# Patient Record
Sex: FEMALE | Race: WHITE | Employment: UNEMPLOYED | URBAN - METROPOLITAN AREA
[De-identification: names, ages, dates, MRNs, and addresses within clinical notes are randomized per-mention and may not be internally consistent; named-entity substitution may affect disease eponyms.]

---

## 2018-07-16 ENCOUNTER — HOSPITAL ENCOUNTER (INPATIENT)
Age: 58
LOS: 9 days | Discharge: HOME | DRG: 881 | End: 2018-07-26
Attending: EMERGENCY MEDICINE | Admitting: PSYCHIATRY & NEUROLOGY
Payer: COMMERCIAL

## 2018-07-16 ENCOUNTER — APPOINTMENT (OUTPATIENT)
Dept: CT IMAGING | Age: 58
DRG: 881 | End: 2018-07-16
Attending: PHYSICIAN ASSISTANT
Payer: COMMERCIAL

## 2018-07-16 DIAGNOSIS — N39.0 URINARY TRACT INFECTION WITHOUT HEMATURIA, SITE UNSPECIFIED: ICD-10-CM

## 2018-07-16 DIAGNOSIS — F22 PARANOIA (HCC): Primary | ICD-10-CM

## 2018-07-16 PROCEDURE — 99285 EMERGENCY DEPT VISIT HI MDM: CPT

## 2018-07-16 PROCEDURE — 84484 ASSAY OF TROPONIN QUANT: CPT | Performed by: EMERGENCY MEDICINE

## 2018-07-16 PROCEDURE — 80307 DRUG TEST PRSMV CHEM ANLYZR: CPT | Performed by: EMERGENCY MEDICINE

## 2018-07-16 PROCEDURE — 85025 COMPLETE CBC W/AUTO DIFF WBC: CPT | Performed by: EMERGENCY MEDICINE

## 2018-07-16 PROCEDURE — 90791 PSYCH DIAGNOSTIC EVALUATION: CPT

## 2018-07-16 PROCEDURE — 80053 COMPREHEN METABOLIC PANEL: CPT | Performed by: EMERGENCY MEDICINE

## 2018-07-16 PROCEDURE — 82550 ASSAY OF CK (CPK): CPT | Performed by: EMERGENCY MEDICINE

## 2018-07-16 PROCEDURE — 36415 COLL VENOUS BLD VENIPUNCTURE: CPT | Performed by: EMERGENCY MEDICINE

## 2018-07-16 NOTE — IP AVS SNAPSHOT
[address] 
[phone] Patient: Brook Scheuermann MRN: [id number] XML:[id number] A check rosita indicates which time of day the medication should be taken. My Medications START taking these medications Instructions Each Dose to Equal  
 Morning Noon Evening Bedtime  
 hydrOXYzine HCl 25 mg tablet Commonly known as:  ATARAX Your last dose was: Today at 6448 Your next dose is:  As needed Take 1 Tab by mouth every six (6) hours as needed (Anxiety) for up to 10 days. Indications: anxiety, Anxiety 25 mg  
    
   
   
   
  
 risperiDONE 1 mg tablet Commonly known as:  RisperDAL Your next dose is: Tonight at bedtime Take 1 Tab by mouth two (2) times a day. Indications: delusion 1 mg  
    
  
   
   
   
  
  
 sertraline 25 mg tablet Commonly known as:  ZOLOFT Your next dose is: This evening Take 3 Tabs by mouth every evening. Indications: major depressive disorder 75 mg  
    
   
   
  
   
  
 traZODone 50 mg tablet Commonly known as:  Petra Au Your next dose is: Tonight at bedtime as needed for sleep Take 1 Tab by mouth nightly as needed for Sleep. Indications: insomnia associated with depression 50 mg CONTINUE taking these medications Instructions Each Dose to Equal  
 Morning Noon Evening Bedtime  
 aspirin 325 mg tablet Commonly known as:  ASPIRIN Your next dose is:  Tomorrow morning Take 325-650 mg by mouth as needed for Pain. 325-650 mg  
    
  
   
   
   
  
 FLONASE 50 mcg/actuation nasal spray Generic drug:  fluticasone Your next dose is: Tonight at bedtime 2 Sprays by Both Nostrils route two (2) times a day. 2 Spray  
    
  
   
   
   
  
  
 levothyroxine 125 mcg tablet Commonly known as:  SYNTHROID Start taking on:  7/27/2018 Your next dose is:  Tomorrow morning before breakfast  
   
 Take 1 Tab by mouth Daily (before breakfast). Indications: hypothyroidism 125 mcg THERA tablet Generic drug:  therapeutic multivitamin Your next dose is:  Tomorrow morning Take 1 Tab by mouth daily. 1 Tab VITAMIN B-12 5,000 mcg Subl Generic drug:  cyanocobalamin (vitamin B-12) Your next dose is:  Tomorrow morning   
   
 5,000 mcg by SubLINGual route daily. 5000 mcg VITAMIN D3 1,000 unit Cap Generic drug:  cholecalciferol Your next dose is:  Tomorrow morning Take 1,000 Units by mouth daily. 1000 Units STOP taking these medications AMBIEN 10 mg tablet Generic drug:  zolpidem BENADRYL ALLERGY 25 mg tablet Generic drug:  diphenhydrAMINE  
   
  
 GREEN TEA EXTRACT PO Where to Get Your Medications Information on where to get these meds will be given to you by the nurse or doctor. ! Ask your nurse or doctor about these medications  
  hydrOXYzine HCl 25 mg tablet  
 levothyroxine 125 mcg tablet  
 risperiDONE 1 mg tablet  
 sertraline 25 mg tablet  
 traZODone 50 mg tablet

## 2018-07-16 NOTE — IP AVS SNAPSHOT
[address] [facility] [address] [place] [address] 
[phone] Patient: Arcadio Kumar MRN: [id number] LDF:[id number] About your hospitalization You were admitted on:  July 17, 2018 You last received care in the:  [facility] You were discharged on:  July 26, 2018 Why you were hospitalized Your primary diagnosis was:  Depression Follow-up Information Follow up With Details Comments Contact Info [address] to  [address] [place] [address] 
[phone] Edwina De La Paz MD Schedule an appointment as soon as possible for a visit  [address] [address] 
[phone] Plan to discharge to [address] on 7/26/2018  Kalin Drake , [address]  
[phone] Discharge Orders None A check rosita indicates which time of day the medication should be taken. My Medications START taking these medications Instructions Each Dose to Equal  
 Morning Noon Evening Bedtime  
 hydrOXYzine HCl 25 mg tablet Commonly known as:  ATARAX Your last dose was: Today at 8093 Your next dose is:  As needed Take 1 Tab by mouth every six (6) hours as needed (Anxiety) for up to 10 days. Indications: anxiety, Anxiety 25 mg  
    
   
   
   
  
 risperiDONE 1 mg tablet Commonly known as:  RisperDAL Your next dose is: Tonight at bedtime Take 1 Tab by mouth two (2) times a day. Indications: delusion 1 mg  
    
  
   
   
   
  
  
 sertraline 25 mg tablet Commonly known as:  ZOLOFT Your next dose is: This evening Take 3 Tabs by mouth every evening. Indications: major depressive disorder 75 mg  
    
   
   
  
   
  
 traZODone 50 mg tablet Commonly known as:  Phil Medina Your next dose is: Tonight at bedtime as needed for sleep Take 1 Tab by mouth nightly as needed for Sleep.  Indications: insomnia associated with depression 50 mg CONTINUE taking these medications Instructions Each Dose to Equal  
 Morning Noon Evening Bedtime  
 aspirin 325 mg tablet Commonly known as:  ASPIRIN Your next dose is:  Tomorrow morning Take 325-650 mg by mouth as needed for Pain. 325-650 mg  
    
  
   
   
   
  
 FLONASE 50 mcg/actuation nasal spray Generic drug:  fluticasone Your next dose is: Tonight at bedtime 2 Sprays by Both Nostrils route two (2) times a day. 2 Spray  
    
  
   
   
   
  
  
 levothyroxine 125 mcg tablet Commonly known as:  SYNTHROID Start taking on:  7/27/2018 Your next dose is:  Tomorrow morning before breakfast  
   
 Take 1 Tab by mouth Daily (before breakfast). Indications: hypothyroidism 125 mcg THERA tablet Generic drug:  therapeutic multivitamin Your next dose is:  Tomorrow morning Take 1 Tab by mouth daily. 1 Tab VITAMIN B-12 5,000 mcg Subl Generic drug:  cyanocobalamin (vitamin B-12) Your next dose is:  Tomorrow morning   
   
 5,000 mcg by SubLINGual route daily. 5000 mcg VITAMIN D3 1,000 unit Cap Generic drug:  cholecalciferol Your next dose is:  Tomorrow morning Take 1,000 Units by mouth daily. 1000 Units STOP taking these medications AMBIEN 10 mg tablet Generic drug:  zolpidem BENADRYL ALLERGY 25 mg tablet Generic drug:  diphenhydrAMINE  
   
  
 GREEN TEA EXTRACT PO Where to Get Your Medications Information on where to get these meds will be given to you by the nurse or doctor. ! Ask your nurse or doctor about these medications  
  hydrOXYzine HCl 25 mg tablet  
 levothyroxine 125 mcg tablet  
 risperiDONE 1 mg tablet  
 sertraline 25 mg tablet  
 traZODone 50 mg tablet Discharge Instructions DISCHARGE SUMMARY Cata Veliz : 1960 MRN: [id number] The patient Zac Arredondo exhibits the ability to control behavior in a less restrictive environment. Patient's level of functioning is improving. No assaultive/destructive behavior has been observed for the past 24 hours. No suicidal/homicidal threat or behavior has been observed for the past 24 hours. There is no evidence of serious medication side effects. Patient has not been in physical or protective restraints for at least the past 24 hours. If weapons involved, how are they secured? No weapons involved Is patient aware of and in agreement with discharge plan? Patient is aware of discharge and is in agreement Arrangements for medication:  Prescriptions filled per derek . Referral for substance abuse treatment ? no Referral for smoking cessation needed ? no 
 
Copy of discharge instructions to  provider?:  Yes, fax to [facility] and to [employer] Arrangements for transportation home:  Erica to transport Keep all follow up appointments as scheduled, continue to take prescribed medications per physician instructions. Mental health crisis number:  315 or your local mental health crisis line number at [phone]  
 
    
Preventing a Relapse of Depression: Care Instructions Your Care Instructions A relapse of depression means your symptoms have come back after you have gotten better. This illness often comes and goes during a lifetime. But there are many things you can do to keep it from coming back. Follow-up care is a key part of your treatment and safety. Be sure to make and go to all appointments, and call your doctor if you are having problems. It's also a good idea to know your test results and keep a list of the medicines you take. What do you need to know? Know your risk of relapse Talk to your doctor to find out if you are at risk of relapse.  Many things can make a person more likely to relapse into depression. These include having a family member with depression, dealing with serious problems in a relationship or a job, having a serious medical condition, or abusing drugs or alcohol. It is important to know your risk and to recognize warning signs of relapse. Once you know these things, you will be better able to keep it from happening to you. Know the warning signs of relapse The two most common signs of relapse are: · Feeling sad or hopeless. · Losing interest in your daily activities. You may have other symptoms, such as: 
· You lose or gain weight. · You sleep too much or not enough. · You feel restless and unable to sit still. · You feel unable to move. · You feel tired all the time. · You feel unworthy or guilty without an obvious reason. · You have problems concentrating, remembering, or making decisions. · You think often about death or suicide. · You feel angry or have panic attacks. How can you care for yourself at home? · Take your medicine as prescribed. Call your doctor if you have any problems with your medicine. Many people take their medicines for at least 6 months after they have recovered. This often helps keep symptoms from coming back. However, if your depression keeps coming back, you may have to take medicine for the rest of your life. · Continue counseling even after you have stopped taking medicine. · Eat healthy foods. Include fruits, vegetables, beans, and whole grains in your diet each day. · Get at least 30 minutes of exercise on most days of the week. Walking is a good choice. You also may want to do other activities, such as running, swimming, cycling, or playing tennis or team sports. · See your doctor right away if you have new symptoms or feel that your depression is coming back. · Keep a regular sleep schedule. Try for 8 hours of sleep a night. · Avoid alcohol and illegal drugs. · Keep the numbers for these national suicide hotlines: 9-079-453-TALK (5-149.836.5899) and 3-561-MCNBCVX (2-951.959.4334). If you or someone you know talks about suicide or feeling hopeless, get help right away. When should you call for help? Call 911 anytime you think you may need emergency care. For example, call if: 
   · You are thinking about suicide or are threatening suicide.  
   · You feel you cannot stop from hurting yourself or someone else.  
   · You hear or see things that aren't real.  
   · You think or speak in a bizarre way that is not like your usual behavior.  
 Call your doctor now or seek immediate medical care if: 
   · You are drinking a lot of alcohol or using illegal drugs.    · You are talking or writing about death.  
 Watch closely for changes in your health, and be sure to contact your doctor if: 
   · You find it hard or it's getting harder to deal with school, a job, family, or friends.  
Declan Durand · You think your treatment is not helping or you are not getting better.  
   · Your symptoms get worse or you get new symptoms.  
   · You have any problems with your antidepressant medicines, such as side effects, or you are thinking about stopping your medicine.  
   · You are having manic behavior, such as having very high energy, needing less sleep than normal, or showing risky behavior such as spending money you don't have or abusing others verbally or physically. Where can you learn more? Go to http://karina-kya.info/. Enter H230 in the search box to learn more about \"Preventing a Relapse of Depression: Care Instructions. \" Current as of: December 7, 2017 Content Version: 11.7 © 1112-6890 [employer]. Care instructions adapted under license by [employer] (which disclaims liability or warranty for this information).  If you have questions about a medical condition or this instruction, always ask your healthcare professional. Scottie Jesus[employer] disclaims any warranty or liability for your use of this information. 
  
   
 
  
  
  
Typemock Announcement We are excited to announce that we are making your provider's discharge notes available to you in Typemock. You will see these notes when they are completed and signed by the physician that discharged you from your recent hospital stay. If you have any questions or concerns about any information you see in Typemock, please call the Health Information Department where you were seen or reach out to your Primary Care Provider for more information about your plan of care. Introducing [facility] HE[facility]! Peñarian Paiz introduces Typemock patient portal. Now you can access parts of your medical record, email your doctor's office, and request medication refills online. 1. In your internet browser, go to [email or web address] 2. Click on the First Time User? Click Here link in the Sign In box. You will see the New Member Sign Up page. 3. Enter your Typemock Access Code exactly as it appears below. You will not need to use this code after youve completed the sign-up process. If you do not sign up before the expiration date, you must request a new code. · Typemock Access Code: [email or web address] Expires: 10/14/2018 10:02 PM 
 
4. Enter the last four digits of your Social Security Number (xxxx) and Date of Birth (mm/dd/yyyy) as indicated and click Submit. You will be taken to the next sign-up page. 5. Create a Typemock ID. This will be your Typemock login ID and cannot be changed, so think of one that is secure and easy to remember. 6. Create a Typemock password. You can change your password at any time. 7. Enter your Password Reset Question and Answer. This can be used at a later time if you forget your password. 8. Enter your e-mail address. You will receive e-mail notification when new information is available in [address] 9. Click Sign Up. You can now view and download portions of your medical record. 10. Click the Download Summary menu link to download a portable copy of your medical information. If you have questions, please visit the Frequently Asked Questions section of the prolliet website. Remember, Talkbits is NOT to be used for urgent needs. For medical emergencies, dial 911. Now available from your iPhone and Android! Introducing Geraldo Feliciano As a [facility] patient, I wanted to make you aware of our electronic visit tool called Geraldo Feliciano. QR Artist [facility] 24/7 allows you to connect within minutes with a medical provider 24 hours a day, seven days a week via a mobile device or tablet or logging into a secure website from your computer. You can access Geraldo Feliciano from anywhere in the [place]. A virtual visit might be right for you when you have a simple condition and feel like you just dont want to get out of bed, or cant get away from work for an appointment, when your regular [facility] provider is not available (evenings, weekends or holidays), or when youre out of town and need minor care. Electronic visits cost only $49 and if the [facility] 24/7 provider determines a prescription is needed to treat your condition, one can be electronically transmitted to a nearby pharmacy*. Please take a moment to enroll today if you have not already done so. The enrollment process is free and takes just a few minutes. To enroll, please download the Borderfree 24/7 phi to your tablet or phone, or visit [email or web address] to enroll on your computer. And, as an [address] [place] [address] patient with a TextPayMe account, the results of your visits will be scanned into your electronic medical record and your primary care provider will be able to view the scanned results.    
We urge you to continue to see your regular [facility] provider for your ongoing medical care. And while your primary care provider may not be the one available when you seek a Geraldo Yorkdavisfin virtual visit, the peace of mind you get from getting a real diagnosis real time can be priceless. For more information on Geraldo Yorkdavisfin, view our Frequently Asked Questions (FAQs) at [email or web address]. Sincerely, 
 
Valentino Milks, MD 
Chief Medical Officer Mike Pedroza *:  certain medications cannot be prescribed via Geraldo Yorkmaggie Providers Seen During Your Hospitalization Provider Specialty Primary office phone Daniel Schwartz MD Emergency Medicine [phone] Jesi Vieira MD Psychiatry [phone] Mini Wilson MD Psychiatry [phone] Your Primary Care Physician (PCP) Primary Care Physician Office Phone Office Fax Nicko Tompkins [phone] You are allergic to the following Allergen Reactions Other Food Other (comments) \"Bad chest pain\" with walnuts. Coke - asthma/stuffy head. Fish sticks causes an asthma attack. Astepro (Azelastine) Other (comments) Violent headaches. Bactrim (Sulfamethoprim) Other (comments) Difficulty breathing, chills, fever. Banana Other (comments) Diffculty breathing, wheezing, asthma attack. Coconut Other (comments) Difficulty breathing, asthma attack, SOB. Cortisone Hives Difficulty breathing. Peanut Other (comments) Wheezing, asthma attack, SOB. Prednisone Hives Difficulty breathing, kidney stones. Recent Documentation Height Weight Breastfeeding? BMI OB Status Smoking Status 1.575 m 72.6 kg No 29.26 kg/m2 Menopause Never Smoker Emergency Contacts Name Discharge Info Relation Home Work Mobile Imani Lacey DISCHARGE CAREGIVER [3] Parent [1] [phone] Patient Belongings The following personal items are in your possession at time of discharge: Dental Appliances: None  Visual Aid: Glasses      Home Medications: None   Jewelry: None  Clothing: Pants, Shirt, Undergarments (pants,shrt,2undies)    Other Valuables: None  Personal Items Sent to Safe: None Please provide this summary of care documentation to your next provider. Signatures-by signing, you are acknowledging that this After Visit Summary has been reviewed with you and you have received a copy. Patient Signature:  ____________________________________________________________ Date:  ____________________________________________________________  
  
[facility] Provider Signature:  ____________________________________________________________ Date:  ____________________________________________________________

## 2018-07-17 ENCOUNTER — APPOINTMENT (OUTPATIENT)
Dept: CT IMAGING | Age: 58
DRG: 881 | End: 2018-07-17
Attending: PHYSICIAN ASSISTANT
Payer: COMMERCIAL

## 2018-07-17 PROBLEM — F32.A DEPRESSION: Status: ACTIVE | Noted: 2018-07-17

## 2018-07-17 LAB
ALBUMIN SERPL-MCNC: 4 G/DL (ref 3.5–5)
ALBUMIN/GLOB SERPL: 1 {RATIO} (ref 1.1–2.2)
ALP SERPL-CCNC: 77 U/L (ref 45–117)
ALT SERPL-CCNC: 24 U/L (ref 12–78)
AMPHET UR QL SCN: NEGATIVE
ANION GAP SERPL CALC-SCNC: 10 MMOL/L (ref 5–15)
APAP SERPL-MCNC: <2 UG/ML (ref 10–30)
APPEARANCE UR: CLEAR
AST SERPL-CCNC: 13 U/L (ref 15–37)
ATRIAL RATE: 71 BPM
BACTERIA URNS QL MICRO: NEGATIVE /HPF
BARBITURATES UR QL SCN: NEGATIVE
BASOPHILS # BLD: 0 K/UL (ref 0–0.1)
BASOPHILS NFR BLD: 1 % (ref 0–1)
BENZODIAZ UR QL: NEGATIVE
BILIRUB SERPL-MCNC: 0.4 MG/DL (ref 0.2–1)
BILIRUB UR QL: NEGATIVE
BUN SERPL-MCNC: 13 MG/DL (ref 6–20)
BUN/CREAT SERPL: 12 (ref 12–20)
CALCIUM SERPL-MCNC: 8.7 MG/DL (ref 8.5–10.1)
CALCULATED P AXIS, ECG09: 58 DEGREES
CALCULATED R AXIS, ECG10: -24 DEGREES
CALCULATED T AXIS, ECG11: 39 DEGREES
CANNABINOIDS UR QL SCN: NEGATIVE
CHLORIDE SERPL-SCNC: 107 MMOL/L (ref 97–108)
CK SERPL-CCNC: 108 U/L (ref 26–192)
CO2 SERPL-SCNC: 23 MMOL/L (ref 21–32)
COCAINE UR QL SCN: NEGATIVE
COLOR UR: ABNORMAL
CREAT SERPL-MCNC: 1.06 MG/DL (ref 0.55–1.02)
DIAGNOSIS, 93000: NORMAL
DIFFERENTIAL METHOD BLD: NORMAL
DRUG SCRN COMMENT,DRGCM: NORMAL
EOSINOPHIL # BLD: 0.2 K/UL (ref 0–0.4)
EOSINOPHIL NFR BLD: 3 % (ref 0–7)
EPITH CASTS URNS QL MICRO: ABNORMAL /LPF
ERYTHROCYTE [DISTWIDTH] IN BLOOD BY AUTOMATED COUNT: 12.8 % (ref 11.5–14.5)
ETHANOL SERPL-MCNC: <10 MG/DL
GLOBULIN SER CALC-MCNC: 4.2 G/DL (ref 2–4)
GLUCOSE SERPL-MCNC: 144 MG/DL (ref 65–100)
GLUCOSE UR STRIP.AUTO-MCNC: NEGATIVE MG/DL
HCT VFR BLD AUTO: 40.2 % (ref 35–47)
HGB BLD-MCNC: 13 G/DL (ref 11.5–16)
HGB UR QL STRIP: NEGATIVE
HYALINE CASTS URNS QL MICRO: ABNORMAL /LPF (ref 0–5)
IMM GRANULOCYTES # BLD: 0 K/UL (ref 0–0.04)
IMM GRANULOCYTES NFR BLD AUTO: 0 % (ref 0–0.5)
KETONES UR QL STRIP.AUTO: NEGATIVE MG/DL
LEUKOCYTE ESTERASE UR QL STRIP.AUTO: ABNORMAL
LYMPHOCYTES # BLD: 2.2 K/UL (ref 0.8–3.5)
LYMPHOCYTES NFR BLD: 26 % (ref 12–49)
MCH RBC QN AUTO: 27.5 PG (ref 26–34)
MCHC RBC AUTO-ENTMCNC: 32.3 G/DL (ref 30–36.5)
MCV RBC AUTO: 85.2 FL (ref 80–99)
METHADONE UR QL: NEGATIVE
MONOCYTES # BLD: 0.5 K/UL (ref 0–1)
MONOCYTES NFR BLD: 6 % (ref 5–13)
NEUTS SEG # BLD: 5.5 K/UL (ref 1.8–8)
NEUTS SEG NFR BLD: 65 % (ref 32–75)
NITRITE UR QL STRIP.AUTO: NEGATIVE
NRBC # BLD: 0 K/UL (ref 0–0.01)
NRBC BLD-RTO: 0 PER 100 WBC
OPIATES UR QL: NEGATIVE
P-R INTERVAL, ECG05: 158 MS
PCP UR QL: NEGATIVE
PH UR STRIP: 5.5 [PH] (ref 5–8)
PLATELET # BLD AUTO: 313 K/UL (ref 150–400)
PMV BLD AUTO: 10.4 FL (ref 8.9–12.9)
POTASSIUM SERPL-SCNC: 3.6 MMOL/L (ref 3.5–5.1)
PROT SERPL-MCNC: 8.2 G/DL (ref 6.4–8.2)
PROT UR STRIP-MCNC: NEGATIVE MG/DL
Q-T INTERVAL, ECG07: 406 MS
QRS DURATION, ECG06: 88 MS
QTC CALCULATION (BEZET), ECG08: 441 MS
RBC # BLD AUTO: 4.72 M/UL (ref 3.8–5.2)
RBC #/AREA URNS HPF: ABNORMAL /HPF (ref 0–5)
SALICYLATES SERPL-MCNC: <1.7 MG/DL (ref 2.8–20)
SODIUM SERPL-SCNC: 140 MMOL/L (ref 136–145)
SP GR UR REFRACTOMETRY: 1.01 (ref 1–1.03)
TROPONIN I SERPL-MCNC: <0.05 NG/ML
TSH SERPL DL<=0.05 MIU/L-ACNC: 0.05 UIU/ML (ref 0.36–3.74)
UR CULT HOLD, URHOLD: NORMAL
UROBILINOGEN UR QL STRIP.AUTO: 0.2 EU/DL (ref 0.2–1)
VENTRICULAR RATE, ECG03: 71 BPM
WBC # BLD AUTO: 8.4 K/UL (ref 3.6–11)
WBC URNS QL MICRO: ABNORMAL /HPF (ref 0–4)

## 2018-07-17 PROCEDURE — 81001 URINALYSIS AUTO W/SCOPE: CPT | Performed by: PHYSICIAN ASSISTANT

## 2018-07-17 PROCEDURE — 74011250637 HC RX REV CODE- 250/637: Performed by: PHYSICIAN ASSISTANT

## 2018-07-17 PROCEDURE — 84443 ASSAY THYROID STIM HORMONE: CPT | Performed by: PSYCHIATRY & NEUROLOGY

## 2018-07-17 PROCEDURE — 80307 DRUG TEST PRSMV CHEM ANLYZR: CPT | Performed by: PHYSICIAN ASSISTANT

## 2018-07-17 PROCEDURE — 93005 ELECTROCARDIOGRAM TRACING: CPT

## 2018-07-17 PROCEDURE — 87086 URINE CULTURE/COLONY COUNT: CPT | Performed by: PHYSICIAN ASSISTANT

## 2018-07-17 PROCEDURE — 80307 DRUG TEST PRSMV CHEM ANLYZR: CPT | Performed by: EMERGENCY MEDICINE

## 2018-07-17 PROCEDURE — 70450 CT HEAD/BRAIN W/O DYE: CPT

## 2018-07-17 PROCEDURE — 93005 ELECTROCARDIOGRAM TRACING: CPT | Performed by: EMERGENCY MEDICINE

## 2018-07-17 PROCEDURE — 65220000003 HC RM SEMIPRIVATE PSYCH

## 2018-07-17 PROCEDURE — 74011250637 HC RX REV CODE- 250/637: Performed by: PSYCHIATRY & NEUROLOGY

## 2018-07-17 RX ORDER — IBUPROFEN 200 MG
1 TABLET ORAL
Status: DISCONTINUED | OUTPATIENT
Start: 2018-07-17 | End: 2018-07-26 | Stop reason: HOSPADM

## 2018-07-17 RX ORDER — LEVOTHYROXINE SODIUM 125 UG/1
125 TABLET ORAL
COMMUNITY
End: 2018-07-26

## 2018-07-17 RX ORDER — BENZTROPINE MESYLATE 2 MG/1
2 TABLET ORAL
Status: DISCONTINUED | OUTPATIENT
Start: 2018-07-17 | End: 2018-07-26 | Stop reason: HOSPADM

## 2018-07-17 RX ORDER — BENZTROPINE MESYLATE 1 MG/ML
2 INJECTION INTRAMUSCULAR; INTRAVENOUS
Status: DISCONTINUED | OUTPATIENT
Start: 2018-07-17 | End: 2018-07-26 | Stop reason: HOSPADM

## 2018-07-17 RX ORDER — ZOLPIDEM TARTRATE 10 MG/1
10 TABLET ORAL
Status: DISCONTINUED | OUTPATIENT
Start: 2018-07-17 | End: 2018-07-25

## 2018-07-17 RX ORDER — OLANZAPINE 5 MG/1
5 TABLET ORAL
Status: DISCONTINUED | OUTPATIENT
Start: 2018-07-17 | End: 2018-07-26 | Stop reason: HOSPADM

## 2018-07-17 RX ORDER — LORAZEPAM 2 MG/ML
2 INJECTION INTRAMUSCULAR
Status: DISCONTINUED | OUTPATIENT
Start: 2018-07-17 | End: 2018-07-23

## 2018-07-17 RX ORDER — LORAZEPAM 1 MG/1
1 TABLET ORAL
Status: DISCONTINUED | OUTPATIENT
Start: 2018-07-17 | End: 2018-07-23

## 2018-07-17 RX ORDER — ZOLPIDEM TARTRATE 10 MG/1
10 TABLET ORAL
COMMUNITY
End: 2018-07-26

## 2018-07-17 RX ORDER — CEPHALEXIN 500 MG/1
500 CAPSULE ORAL
Status: COMPLETED | OUTPATIENT
Start: 2018-07-17 | End: 2018-07-17

## 2018-07-17 RX ORDER — ADHESIVE BANDAGE
30 BANDAGE TOPICAL DAILY PRN
Status: DISCONTINUED | OUTPATIENT
Start: 2018-07-17 | End: 2018-07-26 | Stop reason: HOSPADM

## 2018-07-17 RX ORDER — LORAZEPAM 1 MG/1
1 TABLET ORAL
Status: COMPLETED | OUTPATIENT
Start: 2018-07-17 | End: 2018-07-17

## 2018-07-17 RX ORDER — ACETAMINOPHEN 325 MG/1
650 TABLET ORAL
Status: DISCONTINUED | OUTPATIENT
Start: 2018-07-17 | End: 2018-07-26 | Stop reason: HOSPADM

## 2018-07-17 RX ORDER — IBUPROFEN 400 MG/1
400 TABLET ORAL
Status: DISCONTINUED | OUTPATIENT
Start: 2018-07-17 | End: 2018-07-26 | Stop reason: HOSPADM

## 2018-07-17 RX ADMIN — LORAZEPAM 1 MG: 1 TABLET ORAL at 18:32

## 2018-07-17 RX ADMIN — LORAZEPAM 1 MG: 1 TABLET ORAL at 13:23

## 2018-07-17 RX ADMIN — CEPHALEXIN 500 MG: 500 CAPSULE ORAL at 02:49

## 2018-07-17 RX ADMIN — LORAZEPAM 1 MG: 1 TABLET ORAL at 01:36

## 2018-07-17 NOTE — BH NOTES
PRN Medication Documentation    Specific patient behavior that led to need for PRN medication: pacing, expressing concern over TDO status, tangential in conversation and easily overwhelmed  Staff interventions attempted prior to PRN being given: unit orientation, listening presence, general unit guidelines, explanation of TDO process  PRN medication given: ativan 1mg po prn at 1323  Patient response/effectiveness of PRN medication: 45 minutes post administration pt is more organized and overtly less anxious

## 2018-07-17 NOTE — BH NOTES
[address] on [facility].  Medications filled in last 6 months: Ambien 10 mg 1 at bedtime prn, levothyroxine 125 micrograms 1 PO before breakfast

## 2018-07-17 NOTE — ED NOTES
Pt is paranoid and delusional. Pt voiced feeling suicidal, but does not have a plan top carry. \" I have to get these thoughts away. You can put me in a coma or whatever. \" Pt close to nurses's station for monitoring. 0230: Pt pulled IV out.

## 2018-07-17 NOTE — BH NOTES
PSYCHOSOCIAL ASSESSMENT  :Patient identifying info:  Jennifer Lozada is a 59 y.o., female admitted 2018 11:22 PM     Presenting problem and precipitating factors: Patient was admitted on a Desha TDO due to bizarre behavior. She stated she was suicidal in the ER , but hue admitted she denied suicidal ideations. Mental status assessment:alert , oriented x's 3 - she was pleasant and cooperative , insight and judgement are impaired - she denies suicidal ideations. Current psychiatric providers and contact info: no current     Previous psychiatric services/providers and response to treatment:     Family history of mental illness :    Substance abuse history:    Social History   Substance Use Topics    Smoking status: Never Smoker    Smokeless tobacco: Never Used    Alcohol use No       Family constellation:     Is significant other involved?        Describe support system:     Describe living arrangements and home environment:lives at home with her father   Health issues:   Hospital Problems  Never Reviewed          Codes Class Noted POA    Depression ICD-10-CM: F32.9  ICD-9-CM: 347  2018 Unknown              Trauma history:recent death of her mother      Legal issues: no    History of  service: no    Financial status: not employed     Christianity/cultural factors: none noted    Education/work history: high school education    Have you been licensed as a maximilian care professional (current or ): no  Leisure and recreation preferences: unknown   Describe coping skills:ineffectual     Ander Swanson  2018

## 2018-07-17 NOTE — ED TRIAGE NOTES
Patient arrived via EMS from the side the side of the road where police were following her in the car. Patient was pulled over and reported she didn't see or hear the police trying to pull her over. Patient arrived despondent, and not willing to talk to RN. EMS stated patient reported chest pain, but patient denies. Patient reports suicidal ideations, but no homicidal ideations. Patient has recent deaths of loved ones. Patient appears paranoid in triage, but does not report hallucinations. RN has concern whether patient can contract safety.

## 2018-07-17 NOTE — BSMART NOTE
[facility] informed  call back in a few hours to check bed status as they referral to review. [facility] accepted patient information for review.

## 2018-07-17 NOTE — ED NOTES
Verbal report given to [facility] AND [facility], oncoming nurse. nurse. Report included SBAR, Kardex, MAR, recent results and pt status. Pt resting comfortably in bed.

## 2018-07-17 NOTE — ED NOTES
0605Patient belongings secured at nurses station, changed into hospital gown and nonskid footwear. Patients pockets checked for belongings. Patient in custody of [employer] now. Curtain of room open, visible from nurses station, [employer] outside of room. Patient c/o chest pain, discussed with Dr. Jay Leiva given for EKG. 1327: EKG completed. Patient SRx2 up, remains in [employer] custody, right arm handcuffed to SR. HOB elevated. Beverage offered to patient and provided.

## 2018-07-17 NOTE — ED NOTES
TRANSFER - OUT REPORT:    Verbal report given to Warren Yadav RN(name) on Susana Ponce  being transferred to [facility] acute(unit) for routine progression of care       Report consisted of patients Situation, Background, Assessment and   Recommendations(SBAR). Information from the following report(s) SBAR, ED Summary, [facility] and Recent Results was reviewed with the receiving nurse. Opportunity for questions and clarification was provided.       Patient transported with:   Registered Nurse,

## 2018-07-17 NOTE — PROGRESS NOTES
Problem: Depressed Mood (Adult/Pediatric)  Goal: *STG: Attends activities and groups  Outcome: Progressing Towards Goal  Patient encouraged to attend groups/activities in unit common areas and not be isolative to room  Goal: *STG: Remains safe in hospital  Outcome: Progressing Towards Goal  Environmental safety checks ; q15 min safety checks

## 2018-07-17 NOTE — ED NOTES
ED EKG interpretation:  Rhythm: normal sinus rhythm; and regular . Rate (approx.): 71; Axis: left axis deviation; P wave: normal; QRS interval: normal ; ST/T wave: non-specific changes; This EKG was interpreted by Neftali Hung MD,ED Provider.

## 2018-07-17 NOTE — ED NOTES
Pt is restless and agitated. Pt attempting to leave hospital. Uncooperative with staff. Officer attempted to talk to patient when pt reached in her purse and took out maze spray to point at officer. Pt placed in handcuffs to bed rails for safety. Pt placed under ECO. Will continue to monitor.

## 2018-07-17 NOTE — BSMART NOTE
Comprehensive Assessment Form Part 1      Section I - Disposition    Axis I - Depressive Disorder NOS                Paranoid   Axis II - Deferred  Axis III -   Past Medical History Date Comments      Thyroid disease [E07.9]       Unspecified adverse effect of anesthesia [T41.45XA]  nasal growth and misalignment and cannot breath through nose     PUD (peptic ulcer disease) [K27.9]       Chronic kidney disease [N18.9]  kidney stones x 2-3 times     Asthma [J45.909] 1967 hospitalized for asthma attack x 2     Ill-defined condition [R69]  nasal blockage from growth and misalignment - cannot breath out of nose - surgery in the future/cleared for colonoscopy 2014 - will bring in letter     Other ill-defined conditions(799.89) Coco Heymann  blood in stool     Other ill-defined conditions(799.89) [R69]         Axis IV - Grief and Loss for family; mother  in April  [place] V -       The Medical Doctor to Psychiatrist conference was not completed. The Medical Doctor is in agreement with Psychiatrist disposition because of (reason) patient is a voluntary admission. The plan is admit to [facility]. The on-call Psychiatrist consulted was Dr. Víctor Mckinnon. The admitting Psychiatrist will be Dr. Víctor Mckinnon. The admitting Diagnosis is Depressive Disorder. The Payor source is [employer]. The name of the representative was . This was approved for  days. The authorization number is . Section II - Integrated Summary  Summary:  Patient is 59year old female reporting to ED,Patient arrived via EMS from the side the side of the road where police were following her in the car. Patient was pulled over and reported she didn't see or hear the police trying to pull her over. Patient arrived despondent, and not willing to talk to RN. EMS stated patient reported chest pain, but patient denies. Patient reports suicidal ideations, but no homicidal ideations. Patient has recent deaths of loved ones.   Patient appears paranoid in triage, but does not report hallucinations. RN has concern whether patient can contract safety. At bedside, patient reported having suicidal thoughts, as reported she has thought about several ways to do it and stated to  \"Im not going to go into all that\". Patient denied homicidal thoughts and defined hallucinations. Patient reported feeling like she was going in the [place] of the abst\" Patient denied hospitalizations or nay mental health connections. Patient presents with paranoid thoughts, patient continued to shut curtain, door asking  to whisper and come closer. Patient reported that her mother  in 2018, pauses when she discusses death of mother constantly. Patient presents random scenarios, about father knowing whats going on, about wanting to leave, about things being done that will not help her. Patient acknowledges that she needs some help. Patient reported that she was holding her when her eye were open. As reported she took care of her mother while sick. Patient randomly stated 2-3 times well you might as well just hit me over the head of do something to me because it is not going to matter. Patient reported over past few weeks she has been forgetting things. Patient reported that she thinks things, patient also reported poor sleeping habits as reported she stays up all the time, patient does not eat as she should. The patienthas demonstrated mental capacity to provide informed consent. The information is given by the patient. The Chief Complaint is suicidal thoughts, paranoid. The Precipitant Factors are loss of mother, other unknown stressors. Previous Hospitalizations: no  The patient has not previously been in restraints. Current Psychiatrist and/or  is none. Lethality Assessment:    The potential for suicide noted by the following: ideation . The potential for homicide is not noted.   The patient has not been a perpetrator of sexual or physical abuse. There are not pending charges. The patient is not felt to be at risk for self harm or harm to others. The attending nurse was advised not noted. Section III - Psychosocial  The patient's overall mood and attitude is paranoid. Feelings of helplessness and hopelessness are not observed. Generalized anxiety is not observed. Panic is not observed. Phobias are not observed. Obsessive compulsive tendencies are not observed. Section IV - Mental Status Exam  The patient's appearance shows no evidence of impairment. The patient's behavior is guarded and shows poor eye contact. The patient is oriented to time, place, person and situation. The patient's speech shows no evidence of impairment. The patient's mood is depressed and is anxious. The range of affect is flat. The patient's thought content demonstrates paranoia. The thought process shows no evidence of impairment. The patient's perception shows no evidence of impairment. The patient's memory shows no evidence of impairment. The patient's appetite shows no evidence of impairment. The patient's sleep has evidence of hypersomnia. The patient's insight shows no evidence of impairment. The patient's judgement shows no evidence of impairment. Section V - Substance Abuse  The patient is not using substances. The patient is using not noted. The patient has experienced the following withdrawal symptoms: N/A. Section VI - Living Arrangements  The patient is single. The patient lives father. The patient has no children. The patient does plan to return home upon discharge. The patient does not have legal issues pending. The patient's source of income comes from family. Episcopalian and cultural practices have not been voiced at this time. The patient's greatest support comes from family  and this person will not be involved with the treatment.     The patient has not been in an event described as horrible or outside the realm of ordinary life experience either currently or in the past.  The patient has not been a victim of sexual/physical abuse. Section VII - Other Areas of Clinical Concern  The highest grade achieved is unknown with the overall quality of school experience being described as unknown. The patient is currently unemployed and speaks [place] as a primary language. The patient has no communication impairments affecting communication. The patient's preference for learning can be described as: can read and write adequately.   The patient's hearing is normal.  The patient's vision is normal.      Finesse Veronica

## 2018-07-17 NOTE — ED PROVIDER NOTES
HPI Comments: 60 yo F with hx of thyroid dx, PUD, kidney stone, and low BP here for mental health evaluation. Pt came with EMS after police pulled patient over and reported she didn't see or hear them attempting to pull her over. Pt states recent death of her mother and has been \"taking it hard\". Admits to SI; denies plan. Pt also rambling about \"everyone acting weird\" and appears paranoid. Denies fever, CP, SOB, abd pain, flank pain, urinary symptoms. Non smoker. Patient is a 59 y.o. female presenting with mental health disorder. The history is provided by the patient. Mental Health Problem    This is a new problem. Past Medical History:   Diagnosis Date    Asthma 1967    hospitalized for asthma attack x 2    Chronic kidney disease     kidney stones x 2-3 times    Ill-defined condition     nasal blockage from growth and misalignment - cannot breath out of nose - surgery in the future/cleared for colonoscopy 7/31/2014 - will bring in letter    Other ill-defined conditions(799.89)     blood in stool    Other ill-defined conditions(799.89)     hx low BP - 90's/60's-70's    PUD (peptic ulcer disease)     Thyroid disease     Unspecified adverse effect of anesthesia     nasal growth and misalignment and cannot breath through nose       Past Surgical History:   Procedure Laterality Date    HX HEENT      T & A    HX HEENT      turbinate surgery         Family History:   Problem Relation Age of Onset    Stroke Mother     Other Mother      erosion of esophagus    Cancer Mother      melanoma/squamous    Heart Surgery Father      x2    Delayed Awakening Father     Pacemaker Father     Other Father      carotid endartarectomy/polio    Cataract Father        Social History     Social History    Marital status: SINGLE     Spouse name: N/A    Number of children: N/A    Years of education: N/A     Occupational History    Not on file.      Social History Main Topics    Smoking status: Never Smoker    Smokeless tobacco: Never Used    Alcohol use No    Drug use: Not on file    Sexual activity: Not on file     Other Topics Concern    Not on file     Social History Narrative         ALLERGIES: Other food; Astepro [azelastine]; Bactrim [sulfamethoprim]; Banana; Coconut; Cortisone; Peanut; and Prednisone    Review of Systems   Constitutional: Negative. HENT: Negative for ear discharge. Eyes: Negative for discharge. Respiratory: Negative for cough, chest tightness and shortness of breath. Cardiovascular: Negative for chest pain and leg swelling. Gastrointestinal: Negative for abdominal distention and abdominal pain. Genitourinary: Negative for difficulty urinating, dysuria, flank pain, frequency and hematuria. Musculoskeletal: Negative for back pain, gait problem and joint swelling. Skin: Negative for color change and pallor. Neurological: Negative for dizziness, syncope and headaches. Psychiatric/Behavioral: Positive for behavioral problems and suicidal ideas. The patient is nervous/anxious and is hyperactive. Vitals:    07/16/18 2201 07/17/18 0313   BP: 165/77 132/89   Pulse: 71 63   Resp: 15 17   Temp: 98.2 °F (36.8 °C) 98.1 °F (36.7 °C)   SpO2: 98% 97%   Weight: 72.6 kg (160 lb)    Height: 5' 2\" (1.575 m)             Physical Exam   Constitutional: She appears well-developed and well-nourished. No distress. HENT:   Head: Normocephalic and atraumatic. Right Ear: External ear normal.   Left Ear: External ear normal.   Nose: Nose normal.   Mouth/Throat: Oropharynx is clear and moist.   Eyes: Conjunctivae and EOM are normal. Pupils are equal, round, and reactive to light. Right eye exhibits no discharge. Left eye exhibits no discharge. Neck: Normal range of motion. Neck supple. Cardiovascular: Normal rate, regular rhythm, normal heart sounds and intact distal pulses. Pulmonary/Chest: Effort normal and breath sounds normal.   Abdominal: Soft.  Bowel sounds are normal. She exhibits no distension. There is no tenderness. There is no rebound and no guarding. Musculoskeletal: Normal range of motion. She exhibits no edema or tenderness. Neurological: She is alert. No cranial nerve deficit. Coordination normal.   Skin: Skin is warm and dry. No rash noted. Psychiatric: Judgment normal. Her mood appears anxious. Her speech is rapid and/or pressured. Thought content is paranoid. She expresses suicidal ideation. Pt rambling about being \"men are dogs\" and people \"acting weird\"   Nursing note and vitals reviewed. MDM  Number of Diagnoses or Management Options  Paranoia ([facility] [address]):   Urinary tract infection without hematuria, site unspecified:      Amount and/or Complexity of Data Reviewed  Clinical lab tests: ordered and reviewed  Tests in the radiology section of CPT®: ordered and reviewed  Obtain history from someone other than the patient: yes  Discuss the patient with other providers: yes  Independent visualization of images, tracings, or specimens: yes          ED Course       Procedures    [facility] in to see pt; pt to be admitted to psych. JONAH Obregon    Pt medically cleared. JONAH Obregon    Patient has been reassessed. Ambulating in room. JONAH Obregon     Pt to be admitted; awaiting placement; signed out to Dr Bryan Torres at change of shift.  JONAH Obregon

## 2018-07-17 NOTE — BH NOTES
PRN Medication Documentation    Specific patient behavior that led to need for PRN medication: patient complaining of escalating anxiety; pacing outside nursing station window  Staff interventions attempted prior to PRN being given: redirection, mindfulness  PRN medication given: 1 mg ativan PO  Patient response/effectiveness of PRN medication: will continue to assess

## 2018-07-17 NOTE — PROGRESS NOTES
TRANSFER - IN REPORT:    Verbal report received from [facility] (name) on Blanchie Schwab  being received from ED for routine progression of care      Report consisted of patients Situation, Background, Assessment and   Recommendations(SBAR). Information from the following report(s) SBAR, Kardex, ED Summary and MAR was reviewed with the receiving nurse. Opportunity for questions and clarification was provided. Assessment completed upon patients arrival to unit and care assumed.

## 2018-07-17 NOTE — BSMART NOTE
attempted to secure bed for patient at the following places and as reported they do not have any beds: [facility], [place], [place], [place], [place], [facility], [place], , [facility], [facility], [place].

## 2018-07-17 NOTE — BSMART NOTE
Patient being placed ECO at this time per [facility] as she attempted to [facility]. Emma Mckeon from [address] [place] [address] will come evaluate patient.

## 2018-07-17 NOTE — BH NOTES
Admission Note    Admitting Diagnosis: depressive D/O    Admission Status: Erica CORDOVAO    Contracts for Safety: yes    Complaints of Suicidal or Homicidal Ideations: no    Symptoms Present on Admission:  Pt. Received from ER per w/c   Anxious  Disorganized   Manic  Difficulty following directions   Paranoid and tangential   Continues to state someone is after her    Pt. Recently stopped by police  Brought to ER  For   paranoia  And  Suicidal ideation  Pt. Has had recent deaths of loved ones  Pt. Informed she is  A TDO and will be seeing the  to decide if she needs to stay in the hospital     Valuables sent to security: cash  Change drivers license  [address]     Safety Precautions: Q 15 min safety checks          Skin Assessment    Primary Nurse Larissa Torres RN and Lilia Shook RN performed a dual skin assessment on this patient No impairment noted  Dejon score is 23    Pressure Injury Documentation  (COMPLETE ONE LABEL PER PRESSURE INJURY)  For further information, please review corresponding Wound Care flowsheet. Opalaram Zoraida has:    No pressure injury noted and pressure ulcer prevention initiated.     Larissa Torres RN

## 2018-07-18 LAB
BACTERIA SPEC CULT: NORMAL
CC UR VC: NORMAL
SERVICE CMNT-IMP: NORMAL

## 2018-07-18 PROCEDURE — 65220000003 HC RM SEMIPRIVATE PSYCH

## 2018-07-18 PROCEDURE — 74011250637 HC RX REV CODE- 250/637: Performed by: PSYCHIATRY & NEUROLOGY

## 2018-07-18 RX ORDER — CEPHALEXIN 500 MG/1
500 CAPSULE ORAL 2 TIMES DAILY
Status: DISCONTINUED | OUTPATIENT
Start: 2018-07-18 | End: 2018-07-19

## 2018-07-18 RX ORDER — CEPHALEXIN 500 MG/1
500 CAPSULE ORAL 2 TIMES DAILY
Status: DISCONTINUED | OUTPATIENT
Start: 2018-07-18 | End: 2018-07-18

## 2018-07-18 RX ORDER — RISPERIDONE 0.25 MG/1
0.25 TABLET, FILM COATED ORAL 2 TIMES DAILY
Status: DISCONTINUED | OUTPATIENT
Start: 2018-07-18 | End: 2018-07-19

## 2018-07-18 RX ORDER — THERA TABS 400 MCG
1 TAB ORAL DAILY
COMMUNITY
End: 2018-11-09

## 2018-07-18 RX ADMIN — RISPERIDONE 0.25 MG: 0.25 TABLET ORAL at 20:31

## 2018-07-18 RX ADMIN — ZOLPIDEM TARTRATE 10 MG: 10 TABLET ORAL at 22:44

## 2018-07-18 RX ADMIN — CEPHALEXIN 500 MG: 500 CAPSULE ORAL at 10:43

## 2018-07-18 RX ADMIN — CEPHALEXIN 500 MG: 500 CAPSULE ORAL at 20:31

## 2018-07-18 RX ADMIN — LORAZEPAM 1 MG: 1 TABLET ORAL at 10:45

## 2018-07-18 RX ADMIN — LORAZEPAM 1 MG: 1 TABLET ORAL at 20:31

## 2018-07-18 RX ADMIN — RISPERIDONE 0.25 MG: 0.25 TABLET ORAL at 10:43

## 2018-07-18 NOTE — BH NOTES
PRN Medication Documentation    Specific patient behavior that led to need for PRN medication: pt complain of low back pain 6/10  Staff interventions attempted prior to PRN being given: suggest repositioning  PRN medication given: 650mg tylenol  Patient response/effectiveness of PRN medication: pt in bed resting, will follow with pain reassessment

## 2018-07-18 NOTE — H&P
INITIAL PSYCHIATRIC EVALUATION            IDENTIFICATION:    Patient Name  Mylene Conway   Date of Birth 1960   [facility] [id number]   Medical Record Number  [id number]      Age  59 y.o. PCP Karen Ramirez MD   Admit date:  7/16/2018    Room Number  732/02  @ [facility]   Date of Service  7/18/2018            HISTORY         REASON FOR HOSPITALIZATION:  CC:  Pt admitted under a temporary skilled nursing order (TDO)  psychosis  proving to be an imminent danger to self and an inability to care for self. HISTORY OF PRESENT ILLNESS:    The patient, Mylene Conway, is a 59 y.o. WHITE [place]  female with a past psychiatric history significant for possible depression and psychosis , who presents at this time with complaints of (and/or evidence of) the following emotional symptoms: depression and delusions. Additional symptomatology include anxiety, feeling depressed and poor concentration. The above symptoms have been present for weeks . These symptoms are of moderate severity. These symptoms are intermittent/ fleeting in nature. 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 She is tangential and she is not logical and she is not feeling she needs to be here and she stated police pulled her over but she was not aware about getting stopped and she came to ER and mention that she has thoughts about hurting herself and she is not feeling that way now    ALLERGIES:   Allergies   Allergen Reactions    Other Food Other (comments)     \"Bad chest pain\" with walnuts. Coke - asthma/stuffy head. Fish sticks causes an asthma attack.  Astepro [Azelastine] Other (comments)     Violent headaches.  Bactrim [Sulfamethoprim] Other (comments)     Difficulty breathing, chills, fever.  Banana Other (comments)     Diffculty breathing, wheezing, asthma attack.     Coconut Other (comments)     Difficulty breathing, asthma attack, SOB.  Cortisone Hives     Difficulty breathing.  Peanut Other (comments)     Wheezing, asthma attack, SOB.  Prednisone Hives     Difficulty breathing, kidney stones. MEDICATIONS PRIOR TO ADMISSION:   Prescriptions Prior to Admission   Medication Sig    zolpidem (AMBIEN) 10 mg tablet Take 10 mg by mouth.  levothyroxine (SYNTHROID) 125 mcg tablet Take 125 mcg by mouth Daily (before breakfast). Indications: hypothyroidism    fluticasone (FLONASE) 50 mcg/actuation nasal spray 2 Sprays by Both Nostrils route two (2) times a day.  Cholecalciferol, Vitamin D3, (VITAMIN D3) 1,000 unit cap Take 1,000 Units by mouth daily.  MULTIVITAMIN PO Take  by mouth. Takes one women's multivitamin po daily.  cyanocobalamin, vitamin B-12, (VITAMIN B-12) 5,000 mcg subl 5,000 mcg by SubLINGual route daily.  GREEN TEA EXTRACT PO Take 700 mg by mouth daily.  aspirin (ASPIRIN) 325 mg tablet Take 325-650 mg by mouth as needed for Pain.  diphenhydrAMINE (BENADRYL ALLERGY) 25 mg tablet Take 50 mg by mouth as needed for Sleep.  levothyroxine (SYNTHROID) 137 mcg tablet Take 137 mcg by mouth daily.       PAST MEDICAL HISTORY:   Past Medical History:   Diagnosis Date    Asthma 1967    hospitalized for asthma attack x 2    Chronic kidney disease     kidney stones x 2-3 times    Ill-defined condition     nasal blockage from growth and misalignment - cannot breath out of nose - surgery in the future/cleared for colonoscopy 7/31/2014 - will bring in letter    Other ill-defined conditions(799.89)     blood in stool    Other ill-defined conditions(799.89)     hx low BP - 90's/60's-70's    PUD (peptic ulcer disease)     Thyroid disease     Unspecified adverse effect of anesthesia     nasal growth and misalignment and cannot breath through nose     Past Surgical History:   Procedure Laterality Date    HX HEENT      T & A    HX HEENT      turbinate surgery      SOCIAL HISTORY: single Social History     Social History    Marital status: SINGLE     Spouse name: N/A    Number of children: N/A    Years of education: N/A     Occupational History    Not on file. Social History Main Topics    Smoking status: Never Smoker    Smokeless tobacco: Never Used    Alcohol use No    Drug use: No    Sexual activity: Not Currently     Other Topics Concern    Not on file     Social History Narrative      FAMILY HISTORY: History reviewed. No pertinent family history. Family History   Problem Relation Age of Onset    Stroke Mother     Other Mother      erosion of esophagus    Cancer Mother      melanoma/squamous    Heart Surgery Father      x2    Delayed Awakening Father    [facility] [employer] Father     Other Father      carotid endartarectomy/polio    Cataract Father        REVIEW OF SYSTEMS:   History obtained from chart review and the patient  Pertinent items are noted in the History of Present Illness. All other Systems reviewed and are considered negative. MENTAL STATUS EXAM & VITALS     MENTAL STATUS EXAM (MSE):    MSE FINDINGS ARE WITHIN NORMAL LIMITS (WNL) UNLESS OTHERWISE STATED BELOW. ( ALL OF THE BELOW CATEGORIES OF THE MSE HAVE BEEN REVIEWED (reviewed 7/18/2018) AND UPDATED AS DEEMED APPROPRIATE )  General Presentation age appropriate, cooperative   Orientation oriented to time, place and person   Vital Signs  See below (reviewed 7/18/2018); Vital Signs (BP, Pulse, & Temp) are within normal limits if not listed below.    Gait and Station Stable/steady, no ataxia   Musculoskeletal System No extrapyramidal symptoms (EPS); no abnormal muscular movements or Tardive Dyskinesia (TD); muscle strength and tone are within normal limits   Language No aphasia or dysarthria   Speech:  normal pitch and normal volume   Thought Processes illogical; slow rate of thoughts; fair abstract reasoning/computation   Thought Associations loose associations and tangential   Thought Content paranoid delusions   Suicidal Ideations no plan  and no intention   Homicidal Ideations no plan  and no intention   Mood:  depressed   Affect:  constricted   Memory recent  fair   Memory remote:  fair   Concentration/Attention:  fair   Fund of Knowledge average   Insight:  limited   Reliability poor   Judgment:  limited          VITALS:     Patient Vitals for the past 24 hrs:   Temp Pulse Resp BP SpO2   07/18/18 0827 97.9 °F (36.6 °C) 64 16 123/83 -   07/17/18 2032 98.5 °F (36.9 °C) 71 16 113/74 98 %   07/17/18 1551 98 °F (36.7 °C) 85 18 109/72 95 %   07/17/18 1056 98.4 °F (36.9 °C) 70 16 139/90 99 %   07/17/18 0928 98 °F (36.7 °C) 63 17 134/88 95 %     Wt Readings from Last 3 Encounters:   07/16/18 72.6 kg (160 lb)   07/31/14 79.4 kg (175 lb)     Temp Readings from Last 3 Encounters:   07/18/18 97.9 °F (36.6 °C)     BP Readings from Last 3 Encounters:   07/18/18 123/83   07/31/14 110/69     Pulse Readings from Last 3 Encounters:   07/18/18 64   07/31/14 69            DATA     LABORATORY DATA:  Labs Reviewed   METABOLIC PANEL, COMPREHENSIVE - Abnormal; Notable for the following:        Result Value    Glucose 144 (*)     Creatinine 1.06 (*)     GFR est non-AA 53 (*)     AST (SGOT) 13 (*)     Globulin 4.2 (*)     A-G Ratio 1.0 (*)     All other components within normal limits   SALICYLATE - Abnormal; Notable for the following:     Salicylate level <3.3 (*)     All other components within normal limits   ACETAMINOPHEN - Abnormal; Notable for the following:     Acetaminophen level <2 (*)     All other components within normal limits   URINALYSIS W/MICROSCOPIC - Abnormal; Notable for the following:     Leukocyte Esterase SMALL (*)     All other components within normal limits   TSH 3RD GENERATION - Abnormal; Notable for the following:     TSH 0.05 (*)     All other components within normal limits   URINE CULTURE HOLD SAMPLE   CULTURE, URINE   CBC WITH AUTOMATED DIFF   SAMPLES BEING HELD   DRUG SCREEN, URINE   TROPONIN I   CK W/ REFLX CKMB   ETHYL ALCOHOL   ETHYL ALCOHOL     Admission on 07/16/2018   Component Date Value Ref Range Status    WBC 07/16/2018 8.4  3.6 - 11.0 K/uL Final    RBC 07/16/2018 4.72  3.80 - 5.20 M/uL Final    HGB 07/16/2018 13.0  11.5 - 16.0 g/dL Final    HCT 07/16/2018 40.2  35.0 - 47.0 % Final    MCV 07/16/2018 85.2  80.0 - 99.0 FL Final    MCH 07/16/2018 27.5  26.0 - 34.0 PG Final    MCHC 07/16/2018 32.3  30.0 - 36.5 g/dL Final    RDW 07/16/2018 12.8  11.5 - 14.5 % Final    PLATELET 46/77/3470 485  150 - 400 K/uL Final    MPV 07/16/2018 10.4  8.9 - 12.9 FL Final    NRBC 07/16/2018 0.0  0  WBC Final    ABSOLUTE NRBC 07/16/2018 0.00  0.00 - 0.01 K/uL Final    NEUTROPHILS 07/16/2018 65  32 - 75 % Final    LYMPHOCYTES 07/16/2018 26  12 - 49 % Final    MONOCYTES 07/16/2018 6  5 - 13 % Final    EOSINOPHILS 07/16/2018 3  0 - 7 % Final    BASOPHILS 07/16/2018 1  0 - 1 % Final    IMMATURE GRANULOCYTES 07/16/2018 0  0.0 - 0.5 % Final    ABS. NEUTROPHILS 07/16/2018 5.5  1.8 - 8.0 K/UL Final    ABS. LYMPHOCYTES 07/16/2018 2.2  0.8 - 3.5 K/UL Final    ABS. MONOCYTES 07/16/2018 0.5  0.0 - 1.0 K/UL Final    ABS. EOSINOPHILS 07/16/2018 0.2  0.0 - 0.4 K/UL Final    ABS. BASOPHILS 07/16/2018 0.0  0.0 - 0.1 K/UL Final    ABS. IMM.  GRANS. 07/16/2018 0.0  0.00 - 0.04 K/UL Final    DF 07/16/2018 AUTOMATED    Final    Sodium 07/16/2018 140  136 - 145 mmol/L Final    Potassium 07/16/2018 3.6  3.5 - 5.1 mmol/L Final    Chloride 07/16/2018 107  97 - 108 mmol/L Final    CO2 07/16/2018 23  21 - 32 mmol/L Final    Anion gap 07/16/2018 10  5 - 15 mmol/L Final    Glucose 07/16/2018 144* 65 - 100 mg/dL Final    BUN 07/16/2018 13  6 - 20 MG/DL Final    Creatinine 07/16/2018 1.06* 0.55 - 1.02 MG/DL Final    BUN/Creatinine ratio 07/16/2018 12  12 - 20   Final    GFR est AA 07/16/2018 >60  >60 ml/min/1.73m2 Final    GFR est non-AA 07/16/2018 53* >60 ml/min/1.73m2 Final    Calcium 07/16/2018 8.7  8.5 - 10.1 MG/DL Final    Bilirubin, total 07/16/2018 0.4  0.2 - 1.0 MG/DL Final    ALT (SGPT) 07/16/2018 24  12 - 78 U/L Final    AST (SGOT) 07/16/2018 13* 15 - 37 U/L Final    Alk.  phosphatase 07/16/2018 77  45 - 117 U/L Final    Protein, total 07/16/2018 8.2  6.4 - 8.2 g/dL Final    Albumin 07/16/2018 4.0  3.5 - 5.0 g/dL Final    Globulin 07/16/2018 4.2* 2.0 - 4.0 g/dL Final    A-G Ratio 07/16/2018 1.0* 1.1 - 2.2   Final    AMPHETAMINES 07/17/2018 NEGATIVE   NEG   Final    BARBITURATES 07/17/2018 NEGATIVE   NEG   Final    BENZODIAZEPINES 07/17/2018 NEGATIVE   NEG   Final    COCAINE 07/17/2018 NEGATIVE   NEG   Final    METHADONE 07/17/2018 NEGATIVE   NEG   Final    OPIATES 07/17/2018 NEGATIVE   NEG   Final    PCP(PHENCYCLIDINE) 07/17/2018 NEGATIVE   NEG   Final    THC (TH-CANNABINOL) 07/17/2018 NEGATIVE   NEG   Final    Drug screen comment 07/17/2018 (NOTE)   Final    Salicylate level 03/04/7386 <1.7* 2.8 - 20.0 MG/DL Final    Acetaminophen level 07/16/2018 <2* 10 - 30 ug/mL Final    Troponin-I, Qt. 07/16/2018 <0.05  <0.05 ng/mL Final    CK 07/16/2018 108  26 - 192 U/L Final    Color 07/17/2018 YELLOW/STRAW    Final    Appearance 07/17/2018 CLEAR  CLEAR   Final    Specific gravity 07/17/2018 1.013  1.003 - 1.030   Final    pH (UA) 07/17/2018 5.5  5.0 - 8.0   Final    Protein 07/17/2018 NEGATIVE   NEG mg/dL Final    Glucose 07/17/2018 NEGATIVE   NEG mg/dL Final    Ketone 07/17/2018 NEGATIVE   NEG mg/dL Final    Bilirubin 07/17/2018 NEGATIVE   NEG   Final    Blood 07/17/2018 NEGATIVE   NEG   Final    Urobilinogen 07/17/2018 0.2  0.2 - 1.0 EU/dL Final    Nitrites 07/17/2018 NEGATIVE   NEG   Final    Leukocyte Esterase 07/17/2018 SMALL* NEG   Final    WBC 07/17/2018 10-20  0 - 4 /hpf Final    RBC 07/17/2018 0-5  0 - 5 /hpf Final    Epithelial cells 07/17/2018 FEW  FEW /lpf Final    Bacteria 07/17/2018 NEGATIVE   NEG /hpf Final    Hyaline cast 07/17/2018 0-2  0 - 5 /lpf Final    Urine culture hold 07/17/2018 URINE ON HOLD IN MICROBIOLOGY DEPT FOR 3 DAYS. IF UNPRESERVED URINE IS SUBMITTED, IT CANNOT BE USED FOR ADDITIONAL TESTING AFTER 24 HRS, RECOLLECTION WILL BE REQUIRED. Final    Ventricular Rate 07/17/2018 71  BPM Final    Atrial Rate 07/17/2018 71  BPM Final    P-R Interval 07/17/2018 158  ms Final    QRS Duration 07/17/2018 88  ms Final    Q-T Interval 07/17/2018 406  ms Final    QTC Calculation (Bezet) 07/17/2018 441  ms Final    Calculated P Axis 07/17/2018 58  degrees Final    Calculated R Axis 07/17/2018 -24  degrees Final    Calculated T Axis 07/17/2018 39  degrees Final    Diagnosis 07/17/2018    Final                    Value:Normal sinus rhythm  When compared with ECG of 04-MAR-2005 17:15,  Previous ECG has undetermined rhythm, needs review  T wave inversion no longer evident in Anterior leads  Confirmed by Fozia Frank MD, Aldair Cedillo ([id number]) on 7/17/2018 9:49:31 AM      ALCOHOL(ETHYL),SERUM 07/17/2018 <10  <10 MG/DL Final    TSH 07/17/2018 0.05* 0.36 - 3.74 uIU/mL Final        RADIOLOGY REPORTS:    Results from Hospital Encounter encounter on 09/10/12   XR CHEST PA LAT   Narrative **Final Report**      ICD Codes / Adm. Diagnosis: 786.09   / Other dyspnea and respiratory    Examination:  CR CHEST PA AND LATERAL  - [id number] - Sep 10 2012  5:22PM  Accession No:  [id number]  Reason:  786.0      REPORT:  INDICATION:    786.0     COMPARISON: None. FINDINGS: PA and lateral radiographs of the chest demonstrate clear lungs. The cardiac and mediastinal contours and pulmonary vascularity are normal.    Spondylitic changes are present. .        IMPRESSION: No acute process           Signing/Reading Doctor: Robert Lisa ([id number])    Approved: Robert Lisa ([id number])  09/10/2012                                  Ct Head Wo Cont    Result Date: 7/17/2018  EXAM:  CT HEAD WO CONT INDICATION:  Altered mental status. COMPARISON: None. CONTRAST:  None.  TECHNIQUE: Unenhanced CT of the head was performed using 5 mm images. Brain and bone windows were generated. CT dose reduction was achieved through use of a standardized protocol tailored for this examination and automatic exposure control for dose modulation. Adaptive statistical iterative reconstruction (ASIR) was utilized. FINDINGS: The ventricles and sulci are normal in size, shape and configuration and midline. There is no significant white matter disease. There is no intracranial hemorrhage, extra-axial collection, mass, mass effect or midline shift. The basilar cisterns are open. No acute infarct is identified. The bone windows demonstrate no abnormalities. The visualized portions of the paranasal sinuses and mastoid air cells are clear. IMPRESSION: No acute findings.              MEDICATIONS       ALL MEDICATIONS  Current Facility-Administered Medications   Medication Dose Route Frequency    ziprasidone (GEODON) 20 mg in sterile water (preservative free) 1 mL injection  20 mg IntraMUSCular BID PRN    OLANZapine (ZyPREXA) tablet 5 mg  5 mg Oral Q6H PRN    benztropine (COGENTIN) tablet 2 mg  2 mg Oral BID PRN    benztropine (COGENTIN) injection 2 mg  2 mg IntraMUSCular BID PRN    LORazepam (ATIVAN) injection 2 mg  2 mg IntraMUSCular Q4H PRN    LORazepam (ATIVAN) tablet 1 mg  1 mg Oral Q4H PRN    zolpidem (AMBIEN) tablet 10 mg  10 mg Oral QHS PRN    acetaminophen (TYLENOL) tablet 650 mg  650 mg Oral Q4H PRN    ibuprofen (MOTRIN) tablet 400 mg  400 mg Oral Q8H PRN    magnesium hydroxide (MILK OF MAGNESIA) 400 mg/5 mL oral suspension 30 mL  30 mL Oral DAILY PRN    nicotine (NICODERM CQ) 21 mg/24 hr patch 1 Patch  1 Patch TransDERmal DAILY PRN      SCHEDULED MEDICATIONS  Current Facility-Administered Medications   Medication Dose Route Frequency                ASSESSMENT & PLAN        The patient, Arcadio Kumar, is a 59 y.o.  female who presents at this time for treatment of the following diagnoses:  Patient Active Hospital Problem List:   Psychos and Depression (7/17/2018)    Assessment: depression and delusion     Plan: initiate Risperidone 0.25 mg po bid           I will continue to monitor blood levels (Depakote, Tegretol, lithium, clozapine---a drug with a narrow therapeutic index= NTI) and associated labs for drug therapy implemented that require intense monitoring for toxicity as deemed appropriate based on current medication side effects and pharmacodynamically determined drug 1/2 lives. A coordinated, multidisplinary treatment team (includes the nurse, unit pharmcist,  and writer) round was conducted for this initial evaluation with the patient present. The following regarding medications was addressed during rounds with patient:   the risks and benefits of the proposed medication. The patient was given the opportunity to ask questions. Informed consent given to the use of the above medications. I will continue to adjust psychiatric and non-psychiatric medications (see above \"medication\" section and orders section for details) as deemed appropriate & based upon diagnoses and response to treatment. I have reviewed admission (and previous/old) labs and medical tests in the EHR and or transferring hospital documents. I will continue to order blood tests/labs and diagnostic tests as deemed appropriate and review results as they become available (see orders for details). I have reviewed old psychiatric and medical records available in the EHR. I Will order additional psychiatric records from other institutions to further elucidate the nature of patient's psychopathology and review once available. I will gather additional collateral information from friends, family and o/p treatment team to further elucidate the nature of patient's psychopathology and baselline level of psychiatric functioning.       ESTIMATED LENGTH OF STAY:    3       STRENGTHS:  Exercising self-direction/Resourceful, Access to housing/residential stability and Interpersonal/supportive relationships (family, friends, peers)                                        SIGNED:    Pablo Storm MD  7/18/2018

## 2018-07-18 NOTE — BH NOTES
PRN Medication Documentation    Specific patient behavior that led to need for PRN medication: anxiety  Staff interventions attempted prior to PRN being given: coping skills  PRN medication given: Ativan 1 mg po PRN  Patient response/effectiveness of PRN medication: 11:26 am-pt less anxious at this time

## 2018-07-18 NOTE — BH NOTES
GROUP THERAPY PROGRESS NOTE    Brook Scheuermann participated in the Acute Unit's afternoon Process Group for yesterday, with a focus on identifying feelings, planning for the rest of the day, and preparing for discharge. Group time: 55 minutes. Personal goal for participation: To increase the capacity to improve ones mood and structure. Goal orientation: The patient will be able to identify their feelings, develop a plan for structuring their day, and discharge planning. Group therapy participation: With prompting, this patient actively participated in the group. Therapeutic interventions reviewed and discussed: The group members were asked to introduce themselves to each other and to see if they could identify an emotion they are having and/or let the group know what they want to focus on for the day as they continue to make discharge plans. Impression of participation: The patient said she had been taking care of her mother, who  on  of this year, for twelve years. She also mentioned that she had a brother who  five years ago. She said that she and her mother were a lot alike and that she admired her mother for her \"style. \" She added that her mother did not believe in going to the doctors' and that she had significant heart difficulties. The patient expressed no current SI/HI and displayed no overt psychotic symptoms in this group. She did express some guilt over not being able to save her mother from her death and she could not immediately come up with anything she wished she had shared with her mother but did not have the time. She also wondered why she didn't see the police in her rear view mirror before she finally saw them and pulled over - immediately before being brought to the hospital. She also wondered what might cause her to drive and suddenly lose track of time for a few moments, this latter behavior she described with an increase in heart rhythm.  She was encouraged to discuss this symptom or sign with her attending psychiatrist because its cause might be physiological and/or psychological. She also admitted that she had been so wrapped up in taking care of her mother that she had little time left over for her own life. Her affect was depressed and anxious. Her mood reflected her affect. This was the first process group for this patient with the undersigned.

## 2018-07-18 NOTE — PROGRESS NOTES
Problem: Depressed Mood (Adult/Pediatric)  Goal: *STG: Participates in treatment plan  Outcome: Progressing Towards Goal  Pt participated in treatment team. Visible on the unit for small periods of time. Goal: *STG: Attends activities and groups  Outcome: Progressing Towards Goal  Encouraged pt to attend groups and express feelings and concerns. Goal: *STG: Remains safe in hospital  Outcome: Progressing Towards Goal  Pt remains safe in the hospital and continued on Q 15 minute safety checks.

## 2018-07-18 NOTE — PROGRESS NOTES
Problem: Depressed Mood (Adult/Pediatric)  Goal: *STG: Participates in treatment plan  Outcome: Progressing Towards Goal  Patient denies SI. Med and meal compliant

## 2018-07-19 LAB
CHOLEST SERPL-MCNC: 205 MG/DL
EST. AVERAGE GLUCOSE BLD GHB EST-MCNC: 128 MG/DL
HBA1C MFR BLD: 6.1 % (ref 4.2–6.3)
HDLC SERPL-MCNC: 68 MG/DL
HDLC SERPL: 3 {RATIO} (ref 0–5)
LDLC SERPL CALC-MCNC: 99.8 MG/DL (ref 0–100)
LIPID PROFILE,FLP: ABNORMAL
TRIGL SERPL-MCNC: 186 MG/DL (ref ?–150)
VLDLC SERPL CALC-MCNC: 37.2 MG/DL

## 2018-07-19 PROCEDURE — 65220000003 HC RM SEMIPRIVATE PSYCH

## 2018-07-19 PROCEDURE — 74011250637 HC RX REV CODE- 250/637: Performed by: PSYCHIATRY & NEUROLOGY

## 2018-07-19 PROCEDURE — 83036 HEMOGLOBIN GLYCOSYLATED A1C: CPT | Performed by: PSYCHIATRY & NEUROLOGY

## 2018-07-19 PROCEDURE — 80061 LIPID PANEL: CPT | Performed by: PSYCHIATRY & NEUROLOGY

## 2018-07-19 PROCEDURE — 36415 COLL VENOUS BLD VENIPUNCTURE: CPT | Performed by: PSYCHIATRY & NEUROLOGY

## 2018-07-19 RX ORDER — MELATONIN
1000 DAILY
Status: DISCONTINUED | OUTPATIENT
Start: 2018-07-19 | End: 2018-07-26 | Stop reason: HOSPADM

## 2018-07-19 RX ORDER — ASPIRIN 325 MG
325 TABLET ORAL DAILY
Status: DISCONTINUED | OUTPATIENT
Start: 2018-07-19 | End: 2018-07-26 | Stop reason: HOSPADM

## 2018-07-19 RX ORDER — RISPERIDONE 0.25 MG/1
0.5 TABLET, FILM COATED ORAL 2 TIMES DAILY
Status: DISCONTINUED | OUTPATIENT
Start: 2018-07-19 | End: 2018-07-23

## 2018-07-19 RX ORDER — FLUTICASONE PROPIONATE 50 MCG
2 SPRAY, SUSPENSION (ML) NASAL 2 TIMES DAILY
Status: DISCONTINUED | OUTPATIENT
Start: 2018-07-19 | End: 2018-07-26 | Stop reason: HOSPADM

## 2018-07-19 RX ORDER — THERA TABS 400 MCG
1 TAB ORAL DAILY
Status: DISCONTINUED | OUTPATIENT
Start: 2018-07-19 | End: 2018-07-26 | Stop reason: HOSPADM

## 2018-07-19 RX ADMIN — RISPERIDONE 0.5 MG: 0.5 TABLET, FILM COATED ORAL at 20:31

## 2018-07-19 RX ADMIN — FLUTICASONE PROPIONATE 2 SPRAY: 50 SPRAY, METERED NASAL at 20:30

## 2018-07-19 RX ADMIN — ZOLPIDEM TARTRATE 10 MG: 10 TABLET ORAL at 21:36

## 2018-07-19 RX ADMIN — LORAZEPAM 1 MG: 1 TABLET ORAL at 08:14

## 2018-07-19 RX ADMIN — ASPIRIN 325 MG: 325 TABLET ORAL at 10:01

## 2018-07-19 RX ADMIN — LEVOTHYROXINE SODIUM 125 MCG: 100 TABLET ORAL at 09:37

## 2018-07-19 RX ADMIN — VITAMIN D, TAB 1000IU (100/BT) 1000 UNITS: 25 TAB at 09:37

## 2018-07-19 RX ADMIN — THERA TABS 1 TABLET: TAB at 09:37

## 2018-07-19 RX ADMIN — RISPERIDONE 0.25 MG: 0.25 TABLET ORAL at 08:13

## 2018-07-19 RX ADMIN — FLUTICASONE PROPIONATE 2 SPRAY: 50 SPRAY, METERED NASAL at 10:01

## 2018-07-19 RX ADMIN — CEPHALEXIN 500 MG: 500 CAPSULE ORAL at 08:13

## 2018-07-19 RX ADMIN — LORAZEPAM 1 MG: 1 TABLET ORAL at 16:28

## 2018-07-19 NOTE — BH NOTES
GROUP THERAPY PROGRESS NOTE    The patient Naty Segura is participating in [employer]. Group time: 30 minutes    Personal goal for participation: to orient the patient to the unit.     Goal orientation: successful adoption of unit rules    Group therapy participation: active    Therapeutic interventions reviewed and discussed: Yes    Impression of participation:     Lola Forrester  7/19/2018 9:37 AM

## 2018-07-19 NOTE — PROGRESS NOTES
Laboratory Monitoring for Antipsychotics: This patient is currently prescribed the following medication(s):   Current Facility-Administered Medications   Medication Dose Route Frequency    levothyroxine (SYNTHROID) tablet 125 mcg  125 mcg Oral ACB    therapeutic multivitamin (THERAGRAN) tablet 1 Tab  1 Tab Oral DAILY    aspirin (ASPIRIN) tablet 325 mg  325 mg Oral DAILY    fluticasone (FLONASE) 50 mcg/actuation nasal spray 2 Spray  2 Spray Both Nostrils BID    risperiDONE (RisperDAL) tablet 0.5 mg  0.5 mg Oral BID    cholecalciferol (VITAMIN D3) tablet 1,000 Units  1,000 Units Oral DAILY     The following labs have been completed for monitoring of antipsychotics and/or mood stabilizers:    Height, Weight, BMI Estimation  Estimated body mass index is 29.26 kg/(m^2) as calculated from the following:    Height as of this encounter: 157.5 cm (62\"). Weight as of this encounter: 72.6 kg (160 lb). Vital Signs/Blood Pressure  Visit Vitals    /78    Pulse 73    Temp 98.3 °F (36.8 °C)    Resp 16    Ht 157.5 cm (62\")    Wt 72.6 kg (160 lb)    SpO2 100%    Breastfeeding No    BMI 29.26 kg/m2     Renal Function, Hepatic Function and Chemistry  Estimated Creatinine Clearance: 53.3 mL/min (based on Cr of 1.06). Lab Results   Component Value Date/Time    Sodium 140 07/16/2018 11:55 PM    Potassium 3.6 07/16/2018 11:55 PM    Chloride 107 07/16/2018 11:55 PM    CO2 23 07/16/2018 11:55 PM    Anion gap 10 07/16/2018 11:55 PM    BUN 13 07/16/2018 11:55 PM    Creatinine 1.06 (H) 07/16/2018 11:55 PM    BUN/Creatinine ratio 12 07/16/2018 11:55 PM    Bilirubin, total 0.4 07/16/2018 11:55 PM    Protein, total 8.2 07/16/2018 11:55 PM    Albumin 4.0 07/16/2018 11:55 PM    Globulin 4.2 (H) 07/16/2018 11:55 PM    A-G Ratio 1.0 (L) 07/16/2018 11:55 PM    ALT (SGPT) 24 07/16/2018 11:55 PM    Alk.  phosphatase 77 07/16/2018 11:55 PM     Lab Results   Component Value Date/Time    Glucose 144 (H) 07/16/2018 11:55 PM Lab Results   Component Value Date/Time    Hemoglobin A1c 6.1 07/19/2018 06:03 AM     Hematology  Lab Results   Component Value Date/Time    WBC 8.4 07/16/2018 11:55 PM    RBC 4.72 07/16/2018 11:55 PM    HGB 13.0 07/16/2018 11:55 PM    HCT 40.2 07/16/2018 11:55 PM    MCV 85.2 07/16/2018 11:55 PM    MCH 27.5 07/16/2018 11:55 PM    MCHC 32.3 07/16/2018 11:55 PM    RDW 12.8 07/16/2018 11:55 PM    PLATELET 116 28/30/5055 11:55 PM     Lipids  Lab Results   Component Value Date/Time    Cholesterol, total 205 (H) 07/19/2018 06:03 AM    HDL Cholesterol 68 07/19/2018 06:03 AM    LDL, calculated 99.8 07/19/2018 06:03 AM    Triglyceride 186 (H) 07/19/2018 06:03 AM    CHOL/HDL Ratio 3.0 07/19/2018 06:03 AM     Thyroid Function  Lab Results   Component Value Date/Time    TSH 0.05 (L) 07/17/2018 09:15 AM     Assessment/Plan:  Recommended baseline laboratory monitoring has been completed based on this patient's current medication regimen. The patient's estimated ASCVD 10-year risk was 5.2%, based on this value do not recommend starting a statin at this time.      Dain Stewart, PharmD, BCPP, [facility], [address] [place] [address]

## 2018-07-19 NOTE — BH NOTES
GROUP THERAPY PROGRESS NOTE    Gallo Edmonds participated in the Acute Unit's afternoon Process Group for yesterday, with a focus on identifying feelings, planning for the rest of the day, and preparing for discharge. Group time: 45 minutes. Personal goal for participation: To increase the capacity to improve ones mood and structure. Goal orientation: The patient will be able to identify their feelings, develop a plan for structuring their day, and discharge planning. Group therapy participation: With prompting, this patient participated in the group. Therapeutic interventions reviewed and discussed: The group members were asked to introduce themselves to each other and to see if they could identify an emotion they are having and/or let the group know what they want to focus on for the day as they continue to make discharge plans. Impression of participation: The patient was quiet through most of the group but responded when prompted. She said she was doing, [place]. \" She went on to say that she has been working on constructing a letter to her mother, but that wasn't in a place to share more than that at this time. She did talk about how it has not been easy for her to got outside her and her mother's home, except for very brief errands. She also indicated that all of her mother's clothes and artifacts remain throughout the house, because she has been unable to deal with her mother's possessions. Pradeep Bear The darnell are still covered with what my mother put up there over the years. \" The patient was encouraged not to rush herself in regards to cleaning out her mother's possessions, because she will know when it is time to part with some of her things. It takes time and she may need a friend to help her go through all the items throughout the house. She was alert and generally oriented. She expressed no current SI/HI and displayed no overt psychotic symptoms in this group.  Her affect remained depressed and anxious. Her mood reflected her affect. She is just beginning to tackle some of her conflicting feelings in regards to her mother and to begin working through her grief and loss.

## 2018-07-19 NOTE — INTERDISCIPLINARY ROUNDS
Behavioral Health Interdisciplinary Rounds     Patient Name: Shahrzad Forrester  Age: 59 y.o. Room/Bed:  732/02  Primary Diagnosis: <principal problem not specified>   Admission Status: Involuntary Commitment     Readmission within 30 days: no  Power of  in place: no  Patient requires a blocked bed: no          Reason for blocked bed:    VTE Prophylaxis: yes Plavix    Mobility needs/Fall risk: no    Nutritional Plan: no  Consults:        Labs/Testing due today?: yes    Sleep hours:  4     Participation in Care/Groups:  yes  Medication Compliant?: Yes  PRNS (last 24 hours):  Antianxiety and Sleep Aid    Restraints (last 24 hours):  no     CIWA (range last 24 hours):     COWS (range last 24 hours):      Alcohol screening (AUDIT) completed -   AUDIT Score: 0     If applicable, date SBIRT discussed in treatment team AND documented:     Tobacco - patient is a smoker: Have You Used Tobacco in the Past 30 Days: No  Illegal Drugs use: Have You Used Any Illegal Substances Over the Past 12 Months: No    24 hour chart check complete: yes     Patient goal(s) for today:   Treatment team focus/goals:   Progress note     LOS:  2  Expected LOS:     Financial concerns/prescription coverage:   Date of last family contact:      Family requesting physician contact today:   Discharge plan:   Guns in the home:       Outpatient provider(s):     Participating treatment team members: Shahrzad Forrester, * (assigned SW),

## 2018-07-19 NOTE — BH NOTES
PRN Medication Documentation    Specific patient behavior that led to need for PRN medication: to promote sleep  Staff interventions attempted prior to PRN being given: decrease in stimuli, dimming of lights, etc  PRN medication given: PO 10 mg of ambien  Patient response/effectiveness of PRN medication: will continue to monitor

## 2018-07-19 NOTE — PROGRESS NOTES
Problem: Falls - Risk of  Goal: *Absence of Falls  Document Samanta Fall Risk and appropriate interventions in the flowsheet. Outcome: Progressing Towards Goal  Fall Risk Interventions:  Request to write for a brief time , sitting quietly .   Q!5 min safety rounds continue             Medication Interventions: Teach patient to arise slowly

## 2018-07-19 NOTE — BH NOTES
PRN Medication Documentation    Specific patient behavior that led to need for PRN medication: anxiety  Staff interventions attempted prior to PRN being given: express feelings and concerns with staff  PRN medication given: Ativan 1 mg po PRN  Patient response/effectiveness of PRN medication: 9:15 am-pt less anxious at this time.

## 2018-07-19 NOTE — PROGRESS NOTES
Problem: Depressed Mood (Adult/Pediatric)  Goal: *STG: Verbalizes anger, guilt, and other feelings in a constructive manor  Outcome: Progressing Towards Goal  Pt able to verbalize feelings in a constructive manor. Goal: *STG: Attends activities and groups  Outcome: Progressing Towards Goal  Attended the Community Group. Goal: *STG: Remains safe in hospital  Outcome: Progressing Towards Goal  Remains safe in the hospital and continued on Q 15 minute safety checks.

## 2018-07-19 NOTE — BH NOTES
PSYCHIATRIC PROGRESS NOTE             IDENTIFICATION:    Patient Name  Demetria Lennox   Date of Birth 1960   [facility] [id number]   Medical Record Number  [id number]      Age  59 y.o. PCP Omar Joiner MD   Admit date:  7/16/2018    Room Number  732/02  @ [facility]   Date of Service  7/19/2018            HISTORY         REASON FOR HOSPITALIZATION:  CC:  Pt admitted under a temporary penitentiary order (TDO)  psychosis  proving to be an imminent danger to self and an inability to care for self. HISTORY OF PRESENT ILLNESS:    The patient, Demetria Lennox, is a 59 y.o. WHITE [place]  female with a past psychiatric history significant for possible depression and psychosis , who presents at this time with complaints of (and/or evidence of) the following emotional symptoms: depression and delusions. Additional symptomatology include anxiety, feeling depressed and poor concentration. The above symptoms have been present for weeks . These symptoms are of moderate severity. These symptoms are intermittent/ fleeting in nature. 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 She is tangential and she is not logical and she is not feeling she needs to be here and she stated police pulled her over but she was not aware about getting stopped and she came to ER and mention that she has thoughts about hurting herself and she is not feeling that way now  7/19/18 she is talking but not making sense and she is talking about her mother and she write letter for her , she not expressing any depression and no SI or HI and she is paranoid and no aggressive behavior     ALLERGIES:   Allergies   Allergen Reactions    Other Food Other (comments)     \"Bad chest pain\" with walnuts. Coke - asthma/stuffy head. Fish sticks causes an asthma attack.  Astepro [Azelastine] Other (comments)     Violent headaches.     Bactrim [Sulfamethoprim] Other (comments)     Difficulty breathing, chills, fever.  Banana Other (comments)     Diffculty breathing, wheezing, asthma attack.  Coconut Other (comments)     Difficulty breathing, asthma attack, SOB.  Cortisone Hives     Difficulty breathing.  Peanut Other (comments)     Wheezing, asthma attack, SOB.  Prednisone Hives     Difficulty breathing, kidney stones. MEDICATIONS PRIOR TO ADMISSION:   Prescriptions Prior to Admission   Medication Sig    therapeutic multivitamin (THERA) tablet Take 1 Tab by mouth daily.  zolpidem (AMBIEN) 10 mg tablet Take 10 mg by mouth nightly as needed.  levothyroxine (SYNTHROID) 125 mcg tablet Take 125 mcg by mouth Daily (before breakfast). Indications: hypothyroidism    fluticasone (FLONASE) 50 mcg/actuation nasal spray 2 Sprays by Both Nostrils route two (2) times a day.  Cholecalciferol, Vitamin D3, (VITAMIN D3) 1,000 unit cap Take 1,000 Units by mouth daily.  cyanocobalamin, vitamin B-12, (VITAMIN B-12) 5,000 mcg subl 5,000 mcg by SubLINGual route daily.  GREEN TEA EXTRACT PO Take 700 mg by mouth daily.  aspirin (ASPIRIN) 325 mg tablet Take 325-650 mg by mouth as needed for Pain.  diphenhydrAMINE (BENADRYL ALLERGY) 25 mg tablet Take 50 mg by mouth as needed for Sleep.       PAST MEDICAL HISTORY:   Past Medical History:   Diagnosis Date    Asthma 1967    hospitalized for asthma attack x 2    Chronic kidney disease     kidney stones x 2-3 times    Ill-defined condition     nasal blockage from growth and misalignment - cannot breath out of nose - surgery in the future/cleared for colonoscopy 7/31/2014 - will bring in letter    Other ill-defined conditions(799.89)     blood in stool    Other ill-defined conditions(799.89)     hx low BP - 90's/60's-70's    PUD (peptic ulcer disease)     Thyroid disease     Unspecified adverse effect of anesthesia     nasal growth and misalignment and cannot breath through nose Past Surgical History:   Procedure Laterality Date    HX HEENT      T & A    HX HEENT      turbinate surgery      SOCIAL HISTORY: single    Social History     Social History    Marital status: SINGLE     Spouse name: N/A    Number of children: N/A    Years of education: N/A     Occupational History    Not on file. Social History Main Topics    Smoking status: Never Smoker    Smokeless tobacco: Never Used    Alcohol use No    Drug use: No    Sexual activity: Not Currently     Other Topics Concern    Not on file     Social History Narrative      FAMILY HISTORY: History reviewed. No pertinent family history. Family History   Problem Relation Age of Onset    Stroke Mother     Other Mother      erosion of esophagus    Cancer Mother      melanoma/squamous    Heart Surgery Father      x2    Delayed Awakening Father    [facility] Pacemaker Father     Other Father      carotid endartarectomy/polio    Cataract Father        REVIEW OF SYSTEMS:   History obtained from chart review and the patient  Pertinent items are noted in the History of Present Illness. All other Systems reviewed and are considered negative. MENTAL STATUS EXAM & VITALS     MENTAL STATUS EXAM (MSE):    MSE FINDINGS ARE WITHIN NORMAL LIMITS (WNL) UNLESS OTHERWISE STATED BELOW. ( ALL OF THE BELOW CATEGORIES OF THE MSE HAVE BEEN REVIEWED (reviewed 7/19/2018) AND UPDATED AS DEEMED APPROPRIATE )  General Presentation age appropriate, cooperative   Orientation oriented to time, place and person   Vital Signs  See below (reviewed 7/19/2018); Vital Signs (BP, Pulse, & Temp) are within normal limits if not listed below.    Gait and Station Stable/steady, no ataxia   Musculoskeletal System No extrapyramidal symptoms (EPS); no abnormal muscular movements or Tardive Dyskinesia (TD); muscle strength and tone are within normal limits   Language No aphasia or dysarthria   Speech:  normal pitch and normal volume   Thought Processes illogical; slow rate of thoughts; fair abstract reasoning/computation   Thought Associations loose associations and tangential   Thought Content paranoid delusions   Suicidal Ideations no plan  and no intention   Homicidal Ideations no plan  and no intention   Mood:  depressed   Affect:  constricted   Memory recent  fair   Memory remote:  fair   Concentration/Attention:  fair   Fund of Knowledge average   Insight:  limited   Reliability poor   Judgment:  limited          VITALS:     Patient Vitals for the past 24 hrs:   Temp Pulse Resp BP SpO2   07/19/18 0800 97.6 °F (36.4 °C) 70 16 112/62 100 %   07/18/18 2023 98.7 °F (37.1 °C) 76 16 108/69 -   07/18/18 1646 98.2 °F (36.8 °C) 70 16 107/69 100 %   07/18/18 1228 98.2 °F (36.8 °C) 70 16 112/69 -     Wt Readings from Last 3 Encounters:   07/16/18 72.6 kg (160 lb)   07/31/14 79.4 kg (175 lb)     Temp Readings from Last 3 Encounters:   07/19/18 97.6 °F (36.4 °C)     BP Readings from Last 3 Encounters:   07/19/18 112/62   07/31/14 110/69     Pulse Readings from Last 3 Encounters:   07/19/18 70   07/31/14 69            DATA     LABORATORY DATA:  Labs Reviewed   METABOLIC PANEL, COMPREHENSIVE - Abnormal; Notable for the following:        Result Value    Glucose 144 (*)     Creatinine 1.06 (*)     GFR est non-AA 53 (*)     AST (SGOT) 13 (*)     Globulin 4.2 (*)     A-G Ratio 1.0 (*)     All other components within normal limits   SALICYLATE - Abnormal; Notable for the following:     Salicylate level <9.2 (*)     All other components within normal limits   ACETAMINOPHEN - Abnormal; Notable for the following:     Acetaminophen level <2 (*)     All other components within normal limits   URINALYSIS W/MICROSCOPIC - Abnormal; Notable for the following:     Leukocyte Esterase SMALL (*)     All other components within normal limits   TSH 3RD GENERATION - Abnormal; Notable for the following:     TSH 0.05 (*)     All other components within normal limits   LIPID PANEL - Abnormal; Notable for the following:     Cholesterol, total 205 (*)     Triglyceride 186 (*)     All other components within normal limits   URINE CULTURE HOLD SAMPLE   CULTURE, URINE   CBC WITH AUTOMATED DIFF   SAMPLES BEING HELD   DRUG SCREEN, URINE   TROPONIN I   CK W/ REFLX CKMB   ETHYL ALCOHOL   HEMOGLOBIN A1C WITH EAG     Admission on 07/16/2018   Component Date Value Ref Range Status    WBC 07/16/2018 8.4  3.6 - 11.0 K/uL Final    RBC 07/16/2018 4.72  3.80 - 5.20 M/uL Final    HGB 07/16/2018 13.0  11.5 - 16.0 g/dL Final    HCT 07/16/2018 40.2  35.0 - 47.0 % Final    MCV 07/16/2018 85.2  80.0 - 99.0 FL Final    MCH 07/16/2018 27.5  26.0 - 34.0 PG Final    MCHC 07/16/2018 32.3  30.0 - 36.5 g/dL Final    RDW 07/16/2018 12.8  11.5 - 14.5 % Final    PLATELET 43/23/7521 275  150 - 400 K/uL Final    MPV 07/16/2018 10.4  8.9 - 12.9 FL Final    NRBC 07/16/2018 0.0  0  WBC Final    ABSOLUTE NRBC 07/16/2018 0.00  0.00 - 0.01 K/uL Final    NEUTROPHILS 07/16/2018 65  32 - 75 % Final    LYMPHOCYTES 07/16/2018 26  12 - 49 % Final    MONOCYTES 07/16/2018 6  5 - 13 % Final    EOSINOPHILS 07/16/2018 3  0 - 7 % Final    BASOPHILS 07/16/2018 1  0 - 1 % Final    IMMATURE GRANULOCYTES 07/16/2018 0  0.0 - 0.5 % Final    ABS. NEUTROPHILS 07/16/2018 5.5  1.8 - 8.0 K/UL Final    ABS. LYMPHOCYTES 07/16/2018 2.2  0.8 - 3.5 K/UL Final    ABS. MONOCYTES 07/16/2018 0.5  0.0 - 1.0 K/UL Final    ABS. EOSINOPHILS 07/16/2018 0.2  0.0 - 0.4 K/UL Final    ABS. BASOPHILS 07/16/2018 0.0  0.0 - 0.1 K/UL Final    ABS. IMM.  GRANS. 07/16/2018 0.0  0.00 - 0.04 K/UL Final    DF 07/16/2018 AUTOMATED    Final    Sodium 07/16/2018 140  136 - 145 mmol/L Final    Potassium 07/16/2018 3.6  3.5 - 5.1 mmol/L Final    Chloride 07/16/2018 107  97 - 108 mmol/L Final    CO2 07/16/2018 23  21 - 32 mmol/L Final    Anion gap 07/16/2018 10  5 - 15 mmol/L Final    Glucose 07/16/2018 144* 65 - 100 mg/dL Final    BUN 07/16/2018 13  6 - 20 MG/DL Final    Creatinine 07/16/2018 1.06* 0.55 - 1.02 MG/DL Final    BUN/Creatinine ratio 07/16/2018 12  12 - 20   Final    GFR est AA 07/16/2018 >60  >60 ml/min/1.73m2 Final    GFR est non-AA 07/16/2018 53* >60 ml/min/1.73m2 Final    Calcium 07/16/2018 8.7  8.5 - 10.1 MG/DL Final    Bilirubin, total 07/16/2018 0.4  0.2 - 1.0 MG/DL Final    ALT (SGPT) 07/16/2018 24  12 - 78 U/L Final    AST (SGOT) 07/16/2018 13* 15 - 37 U/L Final    Alk.  phosphatase 07/16/2018 77  45 - 117 U/L Final    Protein, total 07/16/2018 8.2  6.4 - 8.2 g/dL Final    Albumin 07/16/2018 4.0  3.5 - 5.0 g/dL Final    Globulin 07/16/2018 4.2* 2.0 - 4.0 g/dL Final    A-G Ratio 07/16/2018 1.0* 1.1 - 2.2   Final    AMPHETAMINES 07/17/2018 NEGATIVE   NEG   Final    BARBITURATES 07/17/2018 NEGATIVE   NEG   Final    BENZODIAZEPINES 07/17/2018 NEGATIVE   NEG   Final    COCAINE 07/17/2018 NEGATIVE   NEG   Final    METHADONE 07/17/2018 NEGATIVE   NEG   Final    OPIATES 07/17/2018 NEGATIVE   NEG   Final    PCP(PHENCYCLIDINE) 07/17/2018 NEGATIVE   NEG   Final    THC (TH-CANNABINOL) 07/17/2018 NEGATIVE   NEG   Final    Drug screen comment 07/17/2018 (NOTE)   Final    Salicylate level 74/39/5733 <1.7* 2.8 - 20.0 MG/DL Final    Acetaminophen level 07/16/2018 <2* 10 - 30 ug/mL Final    Troponin-I, Qt. 07/16/2018 <0.05  <0.05 ng/mL Final    CK 07/16/2018 108  26 - 192 U/L Final    Color 07/17/2018 YELLOW/STRAW    Final    Appearance 07/17/2018 CLEAR  CLEAR   Final    Specific gravity 07/17/2018 1.013  1.003 - 1.030   Final    pH (UA) 07/17/2018 5.5  5.0 - 8.0   Final    Protein 07/17/2018 NEGATIVE   NEG mg/dL Final    Glucose 07/17/2018 NEGATIVE   NEG mg/dL Final    Ketone 07/17/2018 NEGATIVE   NEG mg/dL Final    Bilirubin 07/17/2018 NEGATIVE   NEG   Final    Blood 07/17/2018 NEGATIVE   NEG   Final    Urobilinogen 07/17/2018 0.2  0.2 - 1.0 EU/dL Final    Nitrites 07/17/2018 NEGATIVE   NEG   Final    Leukocyte Esterase 07/17/2018 SMALL* NEG Final    WBC 07/17/2018 10-20  0 - 4 /hpf Final    RBC 07/17/2018 0-5  0 - 5 /hpf Final    Epithelial cells 07/17/2018 FEW  FEW /lpf Final    Bacteria 07/17/2018 NEGATIVE   NEG /hpf Final    Hyaline cast 07/17/2018 0-2  0 - 5 /lpf Final    Urine culture hold 07/17/2018 URINE ON HOLD IN MICROBIOLOGY DEPT FOR 3 DAYS. IF UNPRESERVED URINE IS SUBMITTED, IT CANNOT BE USED FOR ADDITIONAL TESTING AFTER 24 HRS, RECOLLECTION WILL BE REQUIRED.     Final    Special Requests: 07/17/2018 NO SPECIAL REQUESTS    Final    [place] Count 07/17/2018     Final                    Value:[id number]  COLONIES/mL      Culture result: 07/17/2018 MIXED UROGENITAL KIMBERLEE ISOLATED    Final    Ventricular Rate 07/17/2018 71  BPM Final    Atrial Rate 07/17/2018 71  BPM Final    P-R Interval 07/17/2018 158  ms Final    QRS Duration 07/17/2018 88  ms Final    Q-T Interval 07/17/2018 406  ms Final    QTC Calculation (Bezet) 07/17/2018 441  ms Final    Calculated P Axis 07/17/2018 58  degrees Final    Calculated R Axis 07/17/2018 -24  degrees Final    Calculated T Axis 07/17/2018 39  degrees Final    Diagnosis 07/17/2018    Final                    Value:Normal sinus rhythm  When compared with ECG of 04-MAR-2005 17:15,  Previous ECG has undetermined rhythm, needs review  T wave inversion no longer evident in Anterior leads  Confirmed by Laraine Libman, MD, Melvin ([id number]) on 7/17/2018 9:49:31 AM      ALCOHOL(ETHYL),SERUM 07/17/2018 <10  <10 MG/DL Final    TSH 07/17/2018 0.05* 0.36 - 3.74 uIU/mL Final    LIPID PROFILE 07/19/2018        Final    Cholesterol, total 07/19/2018 205* <200 MG/DL Final    Triglyceride 07/19/2018 186* <150 MG/DL Final    HDL Cholesterol 07/19/2018 68  MG/DL Final    LDL, calculated 07/19/2018 99.8  0 - 100 MG/DL Final    VLDL, calculated 07/19/2018 37.2  MG/DL Final    CHOL/HDL Ratio 07/19/2018 3.0  0 - 5.0   Final    Hemoglobin A1c 07/19/2018 6.1  4.2 - 6.3 % Final    Est. average glucose 07/19/2018 128  mg/dL Final        RADIOLOGY REPORTS:    Results from Hospital Encounter encounter on 09/10/12   XR CHEST PA LAT   Narrative **Final Report**      ICD Codes / Adm. Diagnosis: 786.09   / Other dyspnea and respiratory    Examination:  CR CHEST PA AND LATERAL  - [id number] - Sep 10 2012  5:22PM  Accession No:  [id number]  Reason:  786.0      REPORT:  INDICATION:    786.0     COMPARISON: None. FINDINGS: PA and lateral radiographs of the chest demonstrate clear lungs. The cardiac and mediastinal contours and pulmonary vascularity are normal.    Spondylitic changes are present. .        IMPRESSION: No acute process           Signing/Reading Doctor: Osvaldo Lea ([id number])    Approved: Osvaldo Lea ([id number])  09/10/2012                                  Ct Head Wo Cont    Result Date: 7/17/2018  EXAM:  CT HEAD WO CONT INDICATION:  Altered mental status. COMPARISON: None. CONTRAST:  None. TECHNIQUE: Unenhanced CT of the head was performed using 5 mm images. Brain and bone windows were generated. CT dose reduction was achieved through use of a standardized protocol tailored for this examination and automatic exposure control for dose modulation. Adaptive statistical iterative reconstruction (ASIR) was utilized. FINDINGS: The ventricles and sulci are normal in size, shape and configuration and midline. There is no significant white matter disease. There is no intracranial hemorrhage, extra-axial collection, mass, mass effect or midline shift. The basilar cisterns are open. No acute infarct is identified. The bone windows demonstrate no abnormalities. The visualized portions of the paranasal sinuses and mastoid air cells are clear. IMPRESSION: No acute findings.              MEDICATIONS       ALL MEDICATIONS  Current Facility-Administered Medications   Medication Dose Route Frequency    levothyroxine (SYNTHROID) tablet 125 mcg  125 mcg Oral ACB    therapeutic multivitamin (THERAGRAN) tablet 1 Tab  1 Tab Oral DAILY    aspirin (ASPIRIN) tablet 325-650 mg  325-650 mg Oral DAILY    fluticasone (FLONASE) 50 mcg/actuation nasal spray 2 Spray  2 Spray Both Nostrils BID    risperiDONE (RisperDAL) tablet 0.5 mg  0.5 mg Oral BID    cholecalciferol (VITAMIN D3) tablet 1,000 Units  1,000 Units Oral DAILY    ziprasidone (GEODON) 20 mg in sterile water (preservative free) 1 mL injection  20 mg IntraMUSCular BID PRN    OLANZapine (ZyPREXA) tablet 5 mg  5 mg Oral Q6H PRN    benztropine (COGENTIN) tablet 2 mg  2 mg Oral BID PRN    benztropine (COGENTIN) injection 2 mg  2 mg IntraMUSCular BID PRN    LORazepam (ATIVAN) injection 2 mg  2 mg IntraMUSCular Q4H PRN    LORazepam (ATIVAN) tablet 1 mg  1 mg Oral Q4H PRN    zolpidem (AMBIEN) tablet 10 mg  10 mg Oral QHS PRN    acetaminophen (TYLENOL) tablet 650 mg  650 mg Oral Q4H PRN    ibuprofen (MOTRIN) tablet 400 mg  400 mg Oral Q8H PRN    magnesium hydroxide (MILK OF MAGNESIA) 400 mg/5 mL oral suspension 30 mL  30 mL Oral DAILY PRN    nicotine (NICODERM CQ) 21 mg/24 hr patch 1 Patch  1 Patch TransDERmal DAILY PRN      SCHEDULED MEDICATIONS  Current Facility-Administered Medications   Medication Dose Route Frequency    levothyroxine (SYNTHROID) tablet 125 mcg  125 mcg Oral ACB    therapeutic multivitamin (THERAGRAN) tablet 1 Tab  1 Tab Oral DAILY    aspirin (ASPIRIN) tablet 325-650 mg  325-650 mg Oral DAILY    fluticasone (FLONASE) 50 mcg/actuation nasal spray 2 Spray  2 Spray Both Nostrils BID    risperiDONE (RisperDAL) tablet 0.5 mg  0.5 mg Oral BID    cholecalciferol (VITAMIN D3) tablet 1,000 Units  1,000 Units Oral DAILY                ASSESSMENT & PLAN        The patient, Asher Smith, is a 59 y.o.  female who presents at this time for treatment of the following diagnoses:  Patient Active Hospital Problem List:   Psychos and Depression (7/17/2018)    Assessment: depression and delusion     Plan: initiate Risperidone 0.25 mg po bid   7/19/18 increase Risperidone to 0.5 mg po bid I will continue to monitor blood levels (Depakote, Tegretol, lithium, clozapine---a drug with a narrow therapeutic index= NTI) and associated labs for drug therapy implemented that require intense monitoring for toxicity as deemed appropriate based on current medication side effects and pharmacodynamically determined drug 1/2 lives. A coordinated, multidisplinary treatment team (includes the nurse, unit pharmcist,  and writer) round was conducted for this initial evaluation with the patient present. The following regarding medications was addressed during rounds with patient:   the risks and benefits of the proposed medication. The patient was given the opportunity to ask questions. Informed consent given to the use of the above medications. I will continue to adjust psychiatric and non-psychiatric medications (see above \"medication\" section and orders section for details) as deemed appropriate & based upon diagnoses and response to treatment. I have reviewed admission (and previous/old) labs and medical tests in the EHR and or transferring hospital documents. I will continue to order blood tests/labs and diagnostic tests as deemed appropriate and review results as they become available (see orders for details). I have reviewed old psychiatric and medical records available in the EHR. I Will order additional psychiatric records from other institutions to further elucidate the nature of patient's psychopathology and review once available. I will gather additional collateral information from friends, family and o/p treatment team to further elucidate the nature of patient's psychopathology and baselline level of psychiatric functioning.       ESTIMATED LENGTH OF STAY:    3       STRENGTHS:  Exercising self-direction/Resourceful, Access to housing/residential stability and Interpersonal/supportive relationships (family, friends, peers) SIGNED:    Joe Ochoa MD  7/19/2018

## 2018-07-19 NOTE — BH NOTES
GROUP THERAPY PROGRESS NOTE    Blanchie Schwab is participating in [employer] Group. Group time: 15 minutes    Personal goal for participation:      Goal orientation: relaxation    Group therapy participation: active    Therapeutic interventions reviewed and discussed: Review of unit guidelines and the benefits of showing gratitude.     Impression of participation: active

## 2018-07-19 NOTE — BH NOTES
PRN Medication Documentation    Specific patient behavior that led to need for PRN medication: loud vocal tone, manic like behavior, intrusive with peers, pacing   Staff interventions attempted prior to PRN being given: decreased stimuli, foods, fluids, reassurance  PRN medication given: ativan 1mg po prn at 1628  Patient response/effectiveness of PRN medication: 1 hour post administration pt remains with loud vocal tone, continues to be restless, slightly less intrusive

## 2018-07-20 PROCEDURE — 74011250637 HC RX REV CODE- 250/637: Performed by: PSYCHIATRY & NEUROLOGY

## 2018-07-20 PROCEDURE — 65220000003 HC RM SEMIPRIVATE PSYCH

## 2018-07-20 RX ADMIN — ZOLPIDEM TARTRATE 10 MG: 10 TABLET ORAL at 21:04

## 2018-07-20 RX ADMIN — LORAZEPAM 1 MG: 1 TABLET ORAL at 07:56

## 2018-07-20 RX ADMIN — ACETAMINOPHEN 650 MG: 325 TABLET, FILM COATED ORAL at 06:21

## 2018-07-20 RX ADMIN — THERA TABS 1 TABLET: TAB at 08:00

## 2018-07-20 RX ADMIN — OLANZAPINE 5 MG: 5 TABLET, FILM COATED ORAL at 23:38

## 2018-07-20 RX ADMIN — LORAZEPAM 1 MG: 1 TABLET ORAL at 16:04

## 2018-07-20 RX ADMIN — LORAZEPAM 1 MG: 1 TABLET ORAL at 23:38

## 2018-07-20 RX ADMIN — LEVOTHYROXINE SODIUM 125 MCG: 100 TABLET ORAL at 06:17

## 2018-07-20 RX ADMIN — RISPERIDONE 0.5 MG: 0.5 TABLET, FILM COATED ORAL at 21:04

## 2018-07-20 RX ADMIN — ASPIRIN 325 MG: 325 TABLET ORAL at 08:00

## 2018-07-20 RX ADMIN — RISPERIDONE 0.5 MG: 0.5 TABLET, FILM COATED ORAL at 07:56

## 2018-07-20 RX ADMIN — FLUTICASONE PROPIONATE 2 SPRAY: 50 SPRAY, METERED NASAL at 08:01

## 2018-07-20 RX ADMIN — VITAMIN D, TAB 1000IU (100/BT) 1000 UNITS: 25 TAB at 08:00

## 2018-07-20 NOTE — PROGRESS NOTES
Problem: Depressed Mood (Adult/Pediatric)  Goal: *STG: Remains safe in hospital  Outcome: Progressing Towards Goal  Lying quietly in bed with eyes closed, respirations even and unlabored , NAD noted   Q15 min safety rounds continue , sleeping in psych 2

## 2018-07-20 NOTE — BH NOTES
TRANSFER - IN REPORT:    Verbal report received from [facility] MICHAEL MADDEN SE(name) on Natalee Houser  being received from  ACUTE(unit) for routine progression of care      Report consisted of patients Situation, Background, Assessment and   Recommendations(SBAR). Information from the following report(s) Kardex, ED SUMMARY, MAR was reviewed with the receiving nurse. Opportunity for questions and clarification was provided. Assessment completed upon patients arrival to unit and care assumed.

## 2018-07-20 NOTE — BH NOTES
PRN Medication Documentation    Specific patient behavior that led to need for PRN medication: Complaining of anxiety due to mother passing away  Staff interventions attempted prior to PRN being given: Distraction and deep breathing offered  PRN medication given: Ativan PO  Patient response/effectiveness of PRN medication: Pt states she feels better an hour later.  Medication effective

## 2018-07-20 NOTE — PROGRESS NOTES
Problem: Depressed Mood (Adult/Pediatric)  Goal: *STG: Participates in treatment plan  Outcome: Progressing Towards Goal  Out on unit social w peers and staff. Mood and smiling and appropriate on unit. Paranoid and slight disorganization noted during conversation. Demonstrates appropriate behaviors and ability to follow staff direction. Daily goal was to shower, call family and attend group.  Staff focus is on coping skills education and maintain structured routine

## 2018-07-20 NOTE — PROGRESS NOTES
Problem: Depressed Mood (Adult/Pediatric)  Goal: *STG: Demonstrates reduction in symptoms and increase in insight into coping skills/future focused  Outcome: Progressing Towards Goal  Pt is wearing sunglasses. \"I'm grieving my mother:\"  Staff give emotional support and encourage her to share feelings and recommend journal ing. Continue q15 checks, encourage groups and activities.

## 2018-07-20 NOTE — BH NOTES
PSYCHIATRIC PROGRESS NOTE             IDENTIFICATION:    Patient Name  Natalee Houser   Date of Birth 1960   [facility] [id number]   Medical Record Number  [id number]      Age  59 y.o. PCP Don Encinas MD   Admit date:  7/16/2018    Room Number  727/02  @ [facility]   Date of Service  7/20/2018            HISTORY         REASON FOR HOSPITALIZATION:  CC:  Pt admitted under a temporary nursing home order (TDO)  psychosis  proving to be an imminent danger to self and an inability to care for self. HISTORY OF PRESENT ILLNESS:    The patient, Natalee Houser, is a 59 y.o. WHITE [place]  female with a past psychiatric history significant for possible depression and psychosis , who presents at this time with complaints of (and/or evidence of) the following emotional symptoms: depression and delusions. Additional symptomatology include anxiety, feeling depressed and poor concentration. The above symptoms have been present for weeks . These symptoms are of moderate severity. These symptoms are intermittent/ fleeting in nature. 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   She is tangential and she is not logical and she is not feeling she needs to be here and she stated police pulled her over but she was not aware about getting stopped and she came to ER and mention that she has thoughts about hurting herself and she is not feeling that way now  7/19/18 she is talking but not making sense and she is talking about her mother and she write letter for her , she not expressing any depression and no SI or HI and she is paranoid and no aggressive behavior   7/20/18 she is feeling little better but still paranoid and disorganized but better than before and no anger issues and no actin g out behavior and no SI or HI     ALLERGIES:   Allergies   Allergen Reactions    Other Food Other (comments)     \"Bad chest pain\" with walnuts. Coke - asthma/stuffy head. Fish sticks causes an asthma attack.  Astepro [Azelastine] Other (comments)     Violent headaches.  Bactrim [Sulfamethoprim] Other (comments)     Difficulty breathing, chills, fever.  Banana Other (comments)     Diffculty breathing, wheezing, asthma attack.  Coconut Other (comments)     Difficulty breathing, asthma attack, SOB.  Cortisone Hives     Difficulty breathing.  Peanut Other (comments)     Wheezing, asthma attack, SOB.  Prednisone Hives     Difficulty breathing, kidney stones. MEDICATIONS PRIOR TO ADMISSION:   Prescriptions Prior to Admission   Medication Sig    therapeutic multivitamin (THERA) tablet Take 1 Tab by mouth daily.  zolpidem (AMBIEN) 10 mg tablet Take 10 mg by mouth nightly as needed.  levothyroxine (SYNTHROID) 125 mcg tablet Take 125 mcg by mouth Daily (before breakfast). Indications: hypothyroidism    fluticasone (FLONASE) 50 mcg/actuation nasal spray 2 Sprays by Both Nostrils route two (2) times a day.  Cholecalciferol, Vitamin D3, (VITAMIN D3) 1,000 unit cap Take 1,000 Units by mouth daily.  cyanocobalamin, vitamin B-12, (VITAMIN B-12) 5,000 mcg subl 5,000 mcg by SubLINGual route daily.  GREEN TEA EXTRACT PO Take 700 mg by mouth daily.  aspirin (ASPIRIN) 325 mg tablet Take 325-650 mg by mouth as needed for Pain.  diphenhydrAMINE (BENADRYL ALLERGY) 25 mg tablet Take 50 mg by mouth as needed for Sleep.       PAST MEDICAL HISTORY:   Past Medical History:   Diagnosis Date    Asthma 1967    hospitalized for asthma attack x 2    Chronic kidney disease     kidney stones x 2-3 times    Ill-defined condition     nasal blockage from growth and misalignment - cannot breath out of nose - surgery in the future/cleared for colonoscopy 7/31/2014 - will bring in letter    Other ill-defined conditions(799.89)     blood in stool    Other ill-defined conditions(799.89)     hx low BP - 90's/60's-70's    PUD (peptic ulcer disease)     Thyroid disease     Unspecified adverse effect of anesthesia     nasal growth and misalignment and cannot breath through nose     Past Surgical History:   Procedure Laterality Date    HX HEENT      T & A    HX HEENT      turbinate surgery      SOCIAL HISTORY: single    Social History     Social History    Marital status: SINGLE     Spouse name: N/A    Number of children: N/A    Years of education: N/A     Occupational History    Not on file. Social History Main Topics    Smoking status: Never Smoker    Smokeless tobacco: Never Used    Alcohol use No    Drug use: No    Sexual activity: Not Currently     Other Topics Concern    Not on file     Social History Narrative      FAMILY HISTORY: History reviewed. No pertinent family history. Family History   Problem Relation Age of Onset    Stroke Mother     Other Mother      erosion of esophagus    Cancer Mother      melanoma/squamous    Heart Surgery Father      x2    Delayed Awakening Father    Orma Damme Pacemaker Father     Other Father      carotid endartarectomy/polio    Cataract Father        REVIEW OF SYSTEMS:   History obtained from chart review and the patient  Pertinent items are noted in the History of Present Illness. All other Systems reviewed and are considered negative. MENTAL STATUS EXAM & VITALS     MENTAL STATUS EXAM (MSE):    MSE FINDINGS ARE WITHIN NORMAL LIMITS (WNL) UNLESS OTHERWISE STATED BELOW. ( ALL OF THE BELOW CATEGORIES OF THE MSE HAVE BEEN REVIEWED (reviewed 7/20/2018) AND UPDATED AS DEEMED APPROPRIATE )  General Presentation age appropriate, cooperative   Orientation oriented to time, place and person   Vital Signs  See below (reviewed 7/20/2018); Vital Signs (BP, Pulse, & Temp) are within normal limits if not listed below.    Gait and Station Stable/steady, no ataxia   Musculoskeletal System No extrapyramidal symptoms (EPS); no abnormal muscular movements or Tardive Dyskinesia (TD); muscle strength and tone are within normal limits   Language No aphasia or dysarthria   Speech:  normal pitch and normal volume   Thought Processes illogical; slow rate of thoughts; fair abstract reasoning/computation   Thought Associations loose associations and tangential   Thought Content paranoid delusions   Suicidal Ideations no plan  and no intention   Homicidal Ideations no plan  and no intention   Mood:  depressed   Affect:  constricted   Memory recent  fair   Memory remote:  fair   Concentration/Attention:  fair   Fund of Knowledge average   Insight:  limited   Reliability poor   Judgment:  limited          VITALS:     Patient Vitals for the past 24 hrs:   Temp Pulse Resp BP SpO2   07/19/18 1950 98.1 °F (36.7 °C) 73 16 121/76 100 %   07/19/18 1559 97.9 °F (36.6 °C) 80 16 103/70 96 %   07/19/18 1136 98.3 °F (36.8 °C) 73 16 113/78 -     Wt Readings from Last 3 Encounters:   07/16/18 72.6 kg (160 lb)   07/31/14 79.4 kg (175 lb)     Temp Readings from Last 3 Encounters:   07/19/18 98.1 °F (36.7 °C)     BP Readings from Last 3 Encounters:   07/19/18 121/76   07/31/14 110/69     Pulse Readings from Last 3 Encounters:   07/19/18 73   07/31/14 69            DATA     LABORATORY DATA:  Labs Reviewed   METABOLIC PANEL, COMPREHENSIVE - Abnormal; Notable for the following:        Result Value    Glucose 144 (*)     Creatinine 1.06 (*)     GFR est non-AA 53 (*)     AST (SGOT) 13 (*)     Globulin 4.2 (*)     A-G Ratio 1.0 (*)     All other components within normal limits   SALICYLATE - Abnormal; Notable for the following:     Salicylate level <1.7 (*)     All other components within normal limits   ACETAMINOPHEN - Abnormal; Notable for the following:     Acetaminophen level <2 (*)     All other components within normal limits   URINALYSIS W/MICROSCOPIC - Abnormal; Notable for the following:     Leukocyte Esterase SMALL (*)     All other components within normal limits   TSH 3RD GENERATION - Abnormal; Notable for the following:     TSH 0.05 (*)     All other components within normal limits   LIPID PANEL - Abnormal; Notable for the following:     Cholesterol, total 205 (*)     Triglyceride 186 (*)     All other components within normal limits   URINE CULTURE HOLD SAMPLE   CULTURE, URINE   CBC WITH AUTOMATED DIFF   SAMPLES BEING HELD   DRUG SCREEN, URINE   TROPONIN I   CK W/ REFLX CKMB   ETHYL ALCOHOL   HEMOGLOBIN A1C WITH EAG     Admission on 07/16/2018   Component Date Value Ref Range Status    WBC 07/16/2018 8.4  3.6 - 11.0 K/uL Final    RBC 07/16/2018 4.72  3.80 - 5.20 M/uL Final    HGB 07/16/2018 13.0  11.5 - 16.0 g/dL Final    HCT 07/16/2018 40.2  35.0 - 47.0 % Final    MCV 07/16/2018 85.2  80.0 - 99.0 FL Final    MCH 07/16/2018 27.5  26.0 - 34.0 PG Final    MCHC 07/16/2018 32.3  30.0 - 36.5 g/dL Final    RDW 07/16/2018 12.8  11.5 - 14.5 % Final    PLATELET 15/35/2335 435  150 - 400 K/uL Final    MPV 07/16/2018 10.4  8.9 - 12.9 FL Final    NRBC 07/16/2018 0.0  0  WBC Final    ABSOLUTE NRBC 07/16/2018 0.00  0.00 - 0.01 K/uL Final    NEUTROPHILS 07/16/2018 65  32 - 75 % Final    LYMPHOCYTES 07/16/2018 26  12 - 49 % Final    MONOCYTES 07/16/2018 6  5 - 13 % Final    EOSINOPHILS 07/16/2018 3  0 - 7 % Final    BASOPHILS 07/16/2018 1  0 - 1 % Final    IMMATURE GRANULOCYTES 07/16/2018 0  0.0 - 0.5 % Final    ABS. NEUTROPHILS 07/16/2018 5.5  1.8 - 8.0 K/UL Final    ABS. LYMPHOCYTES 07/16/2018 2.2  0.8 - 3.5 K/UL Final    ABS. MONOCYTES 07/16/2018 0.5  0.0 - 1.0 K/UL Final    ABS. EOSINOPHILS 07/16/2018 0.2  0.0 - 0.4 K/UL Final    ABS. BASOPHILS 07/16/2018 0.0  0.0 - 0.1 K/UL Final    ABS. IMM.  GRANS. 07/16/2018 0.0  0.00 - 0.04 K/UL Final    DF 07/16/2018 AUTOMATED    Final    Sodium 07/16/2018 140  136 - 145 mmol/L Final    Potassium 07/16/2018 3.6  3.5 - 5.1 mmol/L Final    Chloride 07/16/2018 107  97 - 108 mmol/L Final    CO2 07/16/2018 23  21 - 32 mmol/L Final    Anion gap 07/16/2018 10  5 - 15 mmol/L Final    Glucose 07/16/2018 144* 65 - 100 mg/dL Final    BUN 07/16/2018 13  6 - 20 MG/DL Final    Creatinine 07/16/2018 1.06* 0.55 - 1.02 MG/DL Final    BUN/Creatinine ratio 07/16/2018 12  12 - 20   Final    GFR est AA 07/16/2018 >60  >60 ml/min/1.73m2 Final    GFR est non-AA 07/16/2018 53* >60 ml/min/1.73m2 Final    Calcium 07/16/2018 8.7  8.5 - 10.1 MG/DL Final    Bilirubin, total 07/16/2018 0.4  0.2 - 1.0 MG/DL Final    ALT (SGPT) 07/16/2018 24  12 - 78 U/L Final    AST (SGOT) 07/16/2018 13* 15 - 37 U/L Final    Alk.  phosphatase 07/16/2018 77  45 - 117 U/L Final    Protein, total 07/16/2018 8.2  6.4 - 8.2 g/dL Final    Albumin 07/16/2018 4.0  3.5 - 5.0 g/dL Final    Globulin 07/16/2018 4.2* 2.0 - 4.0 g/dL Final    A-G Ratio 07/16/2018 1.0* 1.1 - 2.2   Final    AMPHETAMINES 07/17/2018 NEGATIVE   NEG   Final    BARBITURATES 07/17/2018 NEGATIVE   NEG   Final    BENZODIAZEPINES 07/17/2018 NEGATIVE   NEG   Final    COCAINE 07/17/2018 NEGATIVE   NEG   Final    METHADONE 07/17/2018 NEGATIVE   NEG   Final    OPIATES 07/17/2018 NEGATIVE   NEG   Final    PCP(PHENCYCLIDINE) 07/17/2018 NEGATIVE   NEG   Final    THC (TH-CANNABINOL) 07/17/2018 NEGATIVE   NEG   Final    Drug screen comment 07/17/2018 (NOTE)   Final    Salicylate level 05/90/6210 <1.7* 2.8 - 20.0 MG/DL Final    Acetaminophen level 07/16/2018 <2* 10 - 30 ug/mL Final    Troponin-I, Qt. 07/16/2018 <0.05  <0.05 ng/mL Final    CK 07/16/2018 108  26 - 192 U/L Final    Color 07/17/2018 YELLOW/STRAW    Final    Appearance 07/17/2018 CLEAR  CLEAR   Final    Specific gravity 07/17/2018 1.013  1.003 - 1.030   Final    pH (UA) 07/17/2018 5.5  5.0 - 8.0   Final    Protein 07/17/2018 NEGATIVE   NEG mg/dL Final    Glucose 07/17/2018 NEGATIVE   NEG mg/dL Final    Ketone 07/17/2018 NEGATIVE   NEG mg/dL Final    Bilirubin 07/17/2018 NEGATIVE   NEG   Final    Blood 07/17/2018 NEGATIVE   NEG   Final    Urobilinogen 07/17/2018 0.2  0.2 - 1.0 EU/dL Final    Nitrites 07/17/2018 NEGATIVE   NEG   Final    Leukocyte Esterase 07/17/2018 SMALL* NEG   Final    WBC 07/17/2018 10-20  0 - 4 /hpf Final    RBC 07/17/2018 0-5  0 - 5 /hpf Final    Epithelial cells 07/17/2018 FEW  FEW /lpf Final    Bacteria 07/17/2018 NEGATIVE   NEG /hpf Final    Hyaline cast 07/17/2018 0-2  0 - 5 /lpf Final    Urine culture hold 07/17/2018 URINE ON HOLD IN MICROBIOLOGY DEPT FOR 3 DAYS. IF UNPRESERVED URINE IS SUBMITTED, IT CANNOT BE USED FOR ADDITIONAL TESTING AFTER 24 HRS, RECOLLECTION WILL BE REQUIRED.     Final    Special Requests: 07/17/2018 NO SPECIAL REQUESTS    Final    Wiggins Count 07/17/2018     Final                    Value:[id number]  COLONIES/mL      Culture result: 07/17/2018 MIXED UROGENITAL KIMBERLEE ISOLATED    Final    Ventricular Rate 07/17/2018 71  BPM Final    Atrial Rate 07/17/2018 71  BPM Final    P-R Interval 07/17/2018 158  ms Final    QRS Duration 07/17/2018 88  ms Final    Q-T Interval 07/17/2018 406  ms Final    QTC Calculation (Bezet) 07/17/2018 441  ms Final    Calculated P Axis 07/17/2018 58  degrees Final    Calculated R Axis 07/17/2018 -24  degrees Final    Calculated T Axis 07/17/2018 39  degrees Final    Diagnosis 07/17/2018    Final                    Value:Normal sinus rhythm  When compared with ECG of 04-MAR-2005 17:15,  Previous ECG has undetermined rhythm, needs review  T wave inversion no longer evident in Anterior leads  Confirmed by Laura Benites MD, Marilee Valentin ([id number]) on 7/17/2018 9:49:31 AM      ALCOHOL(ETHYL),SERUM 07/17/2018 <10  <10 MG/DL Final    TSH 07/17/2018 0.05* 0.36 - 3.74 uIU/mL Final    LIPID PROFILE 07/19/2018        Final    Cholesterol, total 07/19/2018 205* <200 MG/DL Final    Triglyceride 07/19/2018 186* <150 MG/DL Final    HDL Cholesterol 07/19/2018 68  MG/DL Final    LDL, calculated 07/19/2018 99.8  0 - 100 MG/DL Final    VLDL, calculated 07/19/2018 37.2  MG/DL Final    CHOL/HDL Ratio 07/19/2018 3.0  0 - 5.0 Final    Hemoglobin A1c 07/19/2018 6.1  4.2 - 6.3 % Final    Est. average glucose 07/19/2018 128  mg/dL Final        RADIOLOGY REPORTS:    Results from Hospital Encounter encounter on 09/10/12   XR CHEST PA LAT   Narrative **Final Report**      ICD Codes / Adm. Diagnosis: 786.09   / Other dyspnea and respiratory    Examination:  CR CHEST PA AND LATERAL  - [id number] - Sep 10 2012  5:22PM  Accession No:  [id number]  Reason:  786.0      REPORT:  INDICATION:    786.0     COMPARISON: None. FINDINGS: PA and lateral radiographs of the chest demonstrate clear lungs. The cardiac and mediastinal contours and pulmonary vascularity are normal.    Spondylitic changes are present. .        IMPRESSION: No acute process           Signing/Reading Doctor: Faizan Cabrera ([id number])    Approved: Faizan Cabrera ([id number])  09/10/2012                                  Ct Head Wo Cont    Result Date: 7/17/2018  EXAM:  CT HEAD WO CONT INDICATION:  Altered mental status. COMPARISON: None. CONTRAST:  None. TECHNIQUE: Unenhanced CT of the head was performed using 5 mm images. Brain and bone windows were generated. CT dose reduction was achieved through use of a standardized protocol tailored for this examination and automatic exposure control for dose modulation. Adaptive statistical iterative reconstruction (ASIR) was utilized. FINDINGS: The ventricles and sulci are normal in size, shape and configuration and midline. There is no significant white matter disease. There is no intracranial hemorrhage, extra-axial collection, mass, mass effect or midline shift. The basilar cisterns are open. No acute infarct is identified. The bone windows demonstrate no abnormalities. The visualized portions of the paranasal sinuses and mastoid air cells are clear. IMPRESSION: No acute findings.              MEDICATIONS       ALL MEDICATIONS  Current Facility-Administered Medications   Medication Dose Route Frequency    levothyroxine (SYNTHROID) tablet 125 mcg  125 mcg Oral ACB    therapeutic multivitamin (THERAGRAN) tablet 1 Tab  1 Tab Oral DAILY    aspirin (ASPIRIN) tablet 325 mg  325 mg Oral DAILY    fluticasone (FLONASE) 50 mcg/actuation nasal spray 2 Spray  2 Spray Both Nostrils BID    risperiDONE (RisperDAL) tablet 0.5 mg  0.5 mg Oral BID    cholecalciferol (VITAMIN D3) tablet 1,000 Units  1,000 Units Oral DAILY    ziprasidone (GEODON) 20 mg in sterile water (preservative free) 1 mL injection  20 mg IntraMUSCular BID PRN    OLANZapine (ZyPREXA) tablet 5 mg  5 mg Oral Q6H PRN    benztropine (COGENTIN) tablet 2 mg  2 mg Oral BID PRN    benztropine (COGENTIN) injection 2 mg  2 mg IntraMUSCular BID PRN    LORazepam (ATIVAN) injection 2 mg  2 mg IntraMUSCular Q4H PRN    LORazepam (ATIVAN) tablet 1 mg  1 mg Oral Q4H PRN    zolpidem (AMBIEN) tablet 10 mg  10 mg Oral QHS PRN    acetaminophen (TYLENOL) tablet 650 mg  650 mg Oral Q4H PRN    ibuprofen (MOTRIN) tablet 400 mg  400 mg Oral Q8H PRN    magnesium hydroxide (MILK OF MAGNESIA) 400 mg/5 mL oral suspension 30 mL  30 mL Oral DAILY PRN    nicotine (NICODERM CQ) 21 mg/24 hr patch 1 Patch  1 Patch TransDERmal DAILY PRN      SCHEDULED MEDICATIONS  Current Facility-Administered Medications   Medication Dose Route Frequency    levothyroxine (SYNTHROID) tablet 125 mcg  125 mcg Oral ACB    therapeutic multivitamin (THERAGRAN) tablet 1 Tab  1 Tab Oral DAILY    aspirin (ASPIRIN) tablet 325 mg  325 mg Oral DAILY    fluticasone (FLONASE) 50 mcg/actuation nasal spray 2 Spray  2 Spray Both Nostrils BID    risperiDONE (RisperDAL) tablet 0.5 mg  0.5 mg Oral BID    cholecalciferol (VITAMIN D3) tablet 1,000 Units  1,000 Units Oral DAILY                ASSESSMENT & PLAN        The patient, Susana Ponce, is a 59 y.o.  female who presents at this time for treatment of the following diagnoses:  Patient Active Hospital Problem List:   Psychos and Depression (7/17/2018)    Assessment: depression and delusion     Plan: initiate Risperidone 0.25 mg po bid   7/19/18 increase Risperidone to 0.5 mg po bid    7/20/18 continue same medications            I will continue to monitor blood levels (Depakote, Tegretol, lithium, clozapine---a drug with a narrow therapeutic index= NTI) and associated labs for drug therapy implemented that require intense monitoring for toxicity as deemed appropriate based on current medication side effects and pharmacodynamically determined drug 1/2 lives. A coordinated, multidisplinary treatment team (includes the nurse, unit pharmcist,  and writer) round was conducted for this initial evaluation with the patient present. The following regarding medications was addressed during rounds with patient:   the risks and benefits of the proposed medication. The patient was given the opportunity to ask questions. Informed consent given to the use of the above medications. I will continue to adjust psychiatric and non-psychiatric medications (see above \"medication\" section and orders section for details) as deemed appropriate & based upon diagnoses and response to treatment. I have reviewed admission (and previous/old) labs and medical tests in the EHR and or transferring hospital documents. I will continue to order blood tests/labs and diagnostic tests as deemed appropriate and review results as they become available (see orders for details). I have reviewed old psychiatric and medical records available in the EHR. I Will order additional psychiatric records from other institutions to further elucidate the nature of patient's psychopathology and review once available. I will gather additional collateral information from friends, family and o/p treatment team to further elucidate the nature of patient's psychopathology and baselline level of psychiatric functioning.       ESTIMATED LENGTH OF STAY:    3       STRENGTHS:  Exercising self-direction/Resourceful, Access to housing/residential stability and Interpersonal/supportive relationships (family, friends, peers)                                        SIGNED:    Josue Carlson MD  7/20/2018

## 2018-07-20 NOTE — PROGRESS NOTES
Problem: Depressed Mood (Adult/Pediatric)  Goal: *STG: Complies with medication therapy  Outcome: Progressing Towards Goal  Pt took scheduled medications. Visible on unit throughout shift. Father visited this evening. Tangential in conversation.

## 2018-07-20 NOTE — BH NOTES
TRANSFER - OUT REPORT:    Verbal report given to [employer], RN (name) on Naty Segura  being transferred to [employer] (unit) for routine progression of care       Report consisted of patients Situation, Background, Assessment and   Recommendations(SBAR). Information from the following report(s) SBAR, Kardex, ED Summary and Intake/Output and MAR was reviewed with the receiving nurse. Lines:       Opportunity for questions and clarification was provided.       Patient transported with:   Patient-specific medications from Pharmacy  Registered Nurse

## 2018-07-20 NOTE — BH NOTES
GROUP THERAPY PROGRESS NOTE    Blanchie Schwab minimally participated in a Process Group on the General Unit with a focus identifying feelings,   planning for the day, and learning about using the [address] as a long-term personal treatment plan. .   Group time: 65 minutes. Personal goal for participation: To increase the capacity to improve ones mood, set personal goals, and understand   more about basic activities to successfully state and get ones needs met through personal treatment goal setting. Goal orientation: The patients will be able to identify their feelings and develop a goal for   themselves for their day. The didactic portion of the session covered developing a personal short-term and long-term treatment plan for oneself. The group members were asked to consider filling in on their own   after group the following elements of a DBT house drawing with multiple levels:    1) foundation - values that govern your life;   2) first floor with a door  behaviors would like to manage and feel more control over or areas you want to change;   the door represents an opportunity to list or draw things that you keep hidden from others;   3) second floor  list or draw emotions you want to experience more often, more fully, or in a more healthy fashion;   4) third floor  a list of things that make you happy or want to feel happy about;   5) attic  list or draw what  a Life Fredda Peel would look like. There is also a roof, where people and things that protect you can be listed. The chimney provides an opportunity to list ways in which you blow off steam.   The billboard allows one to post those things in one's life they are proud of and the walls of the house   provide an opportunity to list those people and things that provide support. The patients were also provided copies of the [address] that they could complete on their own.      Group therapy participation: With prompting, this patient marginally and passively participated in the group. Therapeutic interventions reviewed and discussed: The group members were asked to   identify an emotion they are having and/or let the group know what they want to focus on for the   day as they continue to make discharge plans. The group were informed of the elements of the   [address] for them to complete in their free time. Impression of participation: The patient sat through the group and when prompted she said, \"I don't want to share. \" After group, she asked if she could speak with me individually. I spoke with her in the [place] for about twenty minutes. She said, \"I want to be with my mother. Jeremy Barrera I don't think the medication I'm getting is working. \" She appeared to be expressing a vague suicidal wish and was not prepared to tell her attending and her treatment team about her lack of progress. In addition to working on a complicated grief response, she also mentioned a practical financial problem: she indicated that she does not know how she will support herself and that she is \"unable to pay my pills\" and doesn't want to ask her father for any assistance, \"I'm [de-identified] years old, I'd be crazy to ask him for financial help at my age. \" The patient expressed no current HI but did voice vague suicidal ideation. She displayed no overt psychotic symptoms in this group, although her judgement and insight are restricted. She asked for example if I thought she was ready to leave the hospital. Her affect was depressed and anxious. Her mood reflected her affect. Her thinking is understandable in the context of the stress associated with the death of her mother and her fear of not being able to support herself. I think her stated desire \"to be with mother\" needs to be taken seriously as a vague suicidal threat and that she may need a assistance and referral to a local  office to help her develop an initial plan to manage her finances.  She is still speaking as if she remains helpless and hopeless about her situation and crisis.

## 2018-07-20 NOTE — PROGRESS NOTES
Problem: Depressed Mood (Adult/Pediatric)  Goal: *STG: Attends activities and groups  Outcome: Progressing Towards Goal  Pt encouraged to attend groups and activities. Goal: *STG: Remains safe in hospital  Outcome: Progressing Towards Goal  Pt remains safe in hospital on q 15 min safety checks. Goal: *STG: Complies with medication therapy  Outcome: Progressing Towards Goal  Education provided.

## 2018-07-20 NOTE — BH NOTES
PRN Medication Documentation    Specific patient behavior that led to need for PRN medication: c/o left flank pain 7 of 10  Staff interventions attempted prior to PRN being given: given cranberry juice and encouraged to hydrate  PRN medication given: tylenol 650 mg po  Patient response/effectiveness of PRN medication: continue to monitor

## 2018-07-20 NOTE — INTERDISCIPLINARY ROUNDS
Behavioral Health Interdisciplinary Rounds     Patient Name: Alvy Frankel  Age: 59 y.o. Room/Bed:  732/02  Primary Diagnosis: <principal problem not specified>   Admission Status: Involuntary Commitment     Readmission within 30 days: no  Power of  in place: no  Patient requires a blocked bed: no          Reason for blocked bed:     VTE Prophylaxis: Not indicated    Mobility needs/Fall risk: no    Nutritional Plan: no  Consults:       Labs/Testing due today?: no    Sleep hours:  7      Participation in Care/Groups:  yes  Medication Compliant?: Yes  PRNS (last 24 hours):  Antianxiety and Sleep Aid    Restraints (last 24 hours):  no     CIWA (range last 24 hours):     COWS (range last 24 hours):      Alcohol screening (AUDIT) completed -   AUDIT Score: 0     If applicable, date SBIRT discussed in treatment team AND documented:     Tobacco - patient is a smoker: Have You Used Tobacco in the Past 30 Days: No  Illegal Drugs use: Have You Used Any Illegal Substances Over the Past 12 Months: No    24 hour chart check complete: yes     Patient goal(s) for today: Adjust to new unit and comply with tx  Treatment team focus/goals: Transfer to General Unit and eval meds for their effectiveness  Progress note: Much improved sleep, better moods but still paranoid and disorganized    LOS:  3  Expected LOS:    Financial concerns/prescription coverage:   Date of last family contact:     Family requesting physician contact today:   Discharge plan: return home  Guns in the home: n/a     Outpatient provider(s): Link to [facility] / [address]     Participating treatment team members: Alvy Frankel, Dr Evonnie Reams RN

## 2018-07-21 PROCEDURE — 74011250637 HC RX REV CODE- 250/637: Performed by: PSYCHIATRY & NEUROLOGY

## 2018-07-21 PROCEDURE — 65220000003 HC RM SEMIPRIVATE PSYCH

## 2018-07-21 RX ORDER — SERTRALINE HYDROCHLORIDE 50 MG/1
25 TABLET, FILM COATED ORAL EVERY EVENING
Status: DISCONTINUED | OUTPATIENT
Start: 2018-07-21 | End: 2018-07-22

## 2018-07-21 RX ADMIN — ASPIRIN 325 MG: 325 TABLET ORAL at 09:13

## 2018-07-21 RX ADMIN — RISPERIDONE 0.5 MG: 0.5 TABLET, FILM COATED ORAL at 21:06

## 2018-07-21 RX ADMIN — VITAMIN D, TAB 1000IU (100/BT) 1000 UNITS: 25 TAB at 09:13

## 2018-07-21 RX ADMIN — LORAZEPAM 1 MG: 1 TABLET ORAL at 14:14

## 2018-07-21 RX ADMIN — LORAZEPAM 1 MG: 1 TABLET ORAL at 09:16

## 2018-07-21 RX ADMIN — LORAZEPAM 1 MG: 1 TABLET ORAL at 18:30

## 2018-07-21 RX ADMIN — THERA TABS 1 TABLET: TAB at 09:13

## 2018-07-21 RX ADMIN — SERTRALINE HYDROCHLORIDE 25 MG: 50 TABLET ORAL at 17:15

## 2018-07-21 RX ADMIN — RISPERIDONE 0.5 MG: 0.5 TABLET, FILM COATED ORAL at 09:13

## 2018-07-21 RX ADMIN — LEVOTHYROXINE SODIUM 125 MCG: 100 TABLET ORAL at 06:35

## 2018-07-21 RX ADMIN — ZOLPIDEM TARTRATE 10 MG: 10 TABLET ORAL at 21:06

## 2018-07-21 NOTE — PROGRESS NOTES
Problem: Falls - Risk of  Goal: *Absence of Falls  Document Samanta Fall Risk and appropriate interventions in the flowsheet. Outcome: Progressing Towards Goal  Fall Risk Interventions:            Medication Interventions: Teach patient to arise slowly       Resting in bed with eyes closed, no complaints, no distress noted. Safety measures in place, will continue to monitor.            PRN Medication Documentation    Specific patient behavior that led to need for PRN medication: anxiety and psychosis  Staff interventions attempted prior to PRN being given: education  PRN medication given: Ativan and Zyprexa  Patient response/effectiveness of PRN medication: will continue to monitor

## 2018-07-21 NOTE — BH NOTES
PSYCHIATRIC PROGRESS NOTE             IDENTIFICATION:    Patient Name  Brook Scheuermann   Date of Birth 1960   [facility] [id number]   Medical Record Number  [id number]      Age  59 y.o. PCP Julia Davila MD   Admit date:  7/16/2018    Room Number  727/02  @ [facility]   Date of Service  7/21/2018            HISTORY         REASON FOR HOSPITALIZATION:  CC:  Pt admitted under a temporary snf order (TDO)  psychosis  proving to be an imminent danger to self and an inability to care for self. HISTORY OF PRESENT ILLNESS:    The patient, Brook Scheuermann, is a 59 y.o. WHITE [place]  female with a past psychiatric history significant for possible depression and psychosis , who presents at this time with complaints of (and/or evidence of) the following emotional symptoms: depression and delusions. Additional symptomatology include anxiety, feeling depressed and poor concentration. The above symptoms have been present for weeks . These symptoms are of moderate severity. These symptoms are intermittent/ fleeting in nature. 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   She is tangential and she is not logical and she is not feeling she needs to be here and she stated police pulled her over but she was not aware about getting stopped and she came to ER and mention that she has thoughts about hurting herself and she is not feeling that way now  7/19/18 she is talking but not making sense and she is talking about her mother and she write letter for her , she not expressing any depression and no SI or HI and she is paranoid and no aggressive behavior   7/20/18 she is feeling little better but still paranoid and disorganized but better than before and no anger issues and no actin g out behavior and no SI or HI    7/21/18 she is frustrated and she is talking about her mother and she is upset about her mother passed away and not feeling able to go over that , still disorganized and still confused about her situation, still feel depressed     ALLERGIES:   Allergies   Allergen Reactions    Other Food Other (comments)     \"Bad chest pain\" with walnuts. Coke - asthma/stuffy head. Fish sticks causes an asthma attack.  Astepro [Azelastine] Other (comments)     Violent headaches.  Bactrim [Sulfamethoprim] Other (comments)     Difficulty breathing, chills, fever.  Banana Other (comments)     Diffculty breathing, wheezing, asthma attack.  Coconut Other (comments)     Difficulty breathing, asthma attack, SOB.  Cortisone Hives     Difficulty breathing.  Peanut Other (comments)     Wheezing, asthma attack, SOB.  Prednisone Hives     Difficulty breathing, kidney stones. MEDICATIONS PRIOR TO ADMISSION:   Prescriptions Prior to Admission   Medication Sig    therapeutic multivitamin (THERA) tablet Take 1 Tab by mouth daily.  zolpidem (AMBIEN) 10 mg tablet Take 10 mg by mouth nightly as needed.  levothyroxine (SYNTHROID) 125 mcg tablet Take 125 mcg by mouth Daily (before breakfast). Indications: hypothyroidism    fluticasone (FLONASE) 50 mcg/actuation nasal spray 2 Sprays by Both Nostrils route two (2) times a day.  Cholecalciferol, Vitamin D3, (VITAMIN D3) 1,000 unit cap Take 1,000 Units by mouth daily.  cyanocobalamin, vitamin B-12, (VITAMIN B-12) 5,000 mcg subl 5,000 mcg by SubLINGual route daily.  GREEN TEA EXTRACT PO Take 700 mg by mouth daily.  aspirin (ASPIRIN) 325 mg tablet Take 325-650 mg by mouth as needed for Pain.  diphenhydrAMINE (BENADRYL ALLERGY) 25 mg tablet Take 50 mg by mouth as needed for Sleep.       PAST MEDICAL HISTORY:   Past Medical History:   Diagnosis Date    Asthma 1967    hospitalized for asthma attack x 2    Chronic kidney disease     kidney stones x 2-3 times    Ill-defined condition     nasal blockage from growth and misalignment - cannot breath out of nose - surgery in the future/cleared for colonoscopy 7/31/2014 - will bring in letter    Other ill-defined conditions(799.89)     blood in stool    Other ill-defined conditions(799.89)     hx low BP - 90's/60's-70's    PUD (peptic ulcer disease)     Thyroid disease     Unspecified adverse effect of anesthesia     nasal growth and misalignment and cannot breath through nose     Past Surgical History:   Procedure Laterality Date    HX HEENT      T & A    HX HEENT      turbinate surgery      SOCIAL HISTORY: single    Social History     Social History    Marital status: SINGLE     Spouse name: N/A    Number of children: N/A    Years of education: N/A     Occupational History    Not on file. Social History Main Topics    Smoking status: Never Smoker    Smokeless tobacco: Never Used    Alcohol use No    Drug use: No    Sexual activity: Not Currently     Other Topics Concern    Not on file     Social History Narrative      FAMILY HISTORY: History reviewed. No pertinent family history. Family History   Problem Relation Age of Onset    Stroke Mother     Other Mother      erosion of esophagus    Cancer Mother      melanoma/squamous    Heart Surgery Father      x2    Delayed Awakening Father    [facility] Pacemaker Father     Other Father      carotid endartarectomy/polio    Cataract Father        REVIEW OF SYSTEMS:   History obtained from chart review and the patient  Pertinent items are noted in the History of Present Illness. All other Systems reviewed and are considered negative. MENTAL STATUS EXAM & VITALS     MENTAL STATUS EXAM (MSE):    MSE FINDINGS ARE WITHIN NORMAL LIMITS (WNL) UNLESS OTHERWISE STATED BELOW. ( ALL OF THE BELOW CATEGORIES OF THE MSE HAVE BEEN REVIEWED (reviewed 7/21/2018) AND UPDATED AS DEEMED APPROPRIATE )  General Presentation age appropriate, cooperative   Orientation oriented to time, place and person   Vital Signs  See below (reviewed 7/21/2018);  Vital Signs (BP, Pulse, & Temp) are within normal limits if not listed below.    Gait and Station Stable/steady, no ataxia   Musculoskeletal System No extrapyramidal symptoms (EPS); no abnormal muscular movements or Tardive Dyskinesia (TD); muscle strength and tone are within normal limits   Language No aphasia or dysarthria   Speech:  normal pitch and normal volume   Thought Processes illogical; slow rate of thoughts; fair abstract reasoning/computation   Thought Associations loose associations and tangential   Thought Content paranoid delusions   Suicidal Ideations no plan  and no intention   Homicidal Ideations no plan  and no intention   Mood:  depressed   Affect:  constricted   Memory recent  fair   Memory remote:  fair   Concentration/Attention:  fair   Fund of Knowledge average   Insight:  limited   Reliability poor   Judgment:  limited          VITALS:     Patient Vitals for the past 24 hrs:   Temp Pulse Resp BP SpO2   07/21/18 0810 97.7 °F (36.5 °C) 79 16 (!) 131/96 95 %   07/20/18 2056 98 °F (36.7 °C) 87 16 113/77 100 %   07/20/18 1546 97.9 °F (36.6 °C) 80 16 132/83 100 %   07/20/18 1215 98.2 °F (36.8 °C) 95 16 123/81 99 %     Wt Readings from Last 3 Encounters:   07/16/18 72.6 kg (160 lb)   07/31/14 79.4 kg (175 lb)     Temp Readings from Last 3 Encounters:   07/21/18 97.7 °F (36.5 °C)     BP Readings from Last 3 Encounters:   07/21/18 (!) 131/96   07/31/14 110/69     Pulse Readings from Last 3 Encounters:   07/21/18 79   07/31/14 69            DATA     LABORATORY DATA:  Labs Reviewed   METABOLIC PANEL, COMPREHENSIVE - Abnormal; Notable for the following:        Result Value    Glucose 144 (*)     Creatinine 1.06 (*)     GFR est non-AA 53 (*)     AST (SGOT) 13 (*)     Globulin 4.2 (*)     A-G Ratio 1.0 (*)     All other components within normal limits   SALICYLATE - Abnormal; Notable for the following:     Salicylate level <6.3 (*)     All other components within normal limits   ACETAMINOPHEN - Abnormal; Notable for the following:     Acetaminophen level <2 (*)     All other components within normal limits   URINALYSIS W/MICROSCOPIC - Abnormal; Notable for the following:     Leukocyte Esterase SMALL (*)     All other components within normal limits   TSH 3RD GENERATION - Abnormal; Notable for the following:     TSH 0.05 (*)     All other components within normal limits   LIPID PANEL - Abnormal; Notable for the following:     Cholesterol, total 205 (*)     Triglyceride 186 (*)     All other components within normal limits   URINE CULTURE HOLD SAMPLE   CULTURE, URINE   CBC WITH AUTOMATED DIFF   SAMPLES BEING HELD   DRUG SCREEN, URINE   TROPONIN I   CK W/ REFLX CKMB   ETHYL ALCOHOL   HEMOGLOBIN A1C WITH EAG     Admission on 07/16/2018   Component Date Value Ref Range Status    WBC 07/16/2018 8.4  3.6 - 11.0 K/uL Final    RBC 07/16/2018 4.72  3.80 - 5.20 M/uL Final    HGB 07/16/2018 13.0  11.5 - 16.0 g/dL Final    HCT 07/16/2018 40.2  35.0 - 47.0 % Final    MCV 07/16/2018 85.2  80.0 - 99.0 FL Final    MCH 07/16/2018 27.5  26.0 - 34.0 PG Final    MCHC 07/16/2018 32.3  30.0 - 36.5 g/dL Final    RDW 07/16/2018 12.8  11.5 - 14.5 % Final    PLATELET 32/91/8506 818  150 - 400 K/uL Final    MPV 07/16/2018 10.4  8.9 - 12.9 FL Final    NRBC 07/16/2018 0.0  0  WBC Final    ABSOLUTE NRBC 07/16/2018 0.00  0.00 - 0.01 K/uL Final    NEUTROPHILS 07/16/2018 65  32 - 75 % Final    LYMPHOCYTES 07/16/2018 26  12 - 49 % Final    MONOCYTES 07/16/2018 6  5 - 13 % Final    EOSINOPHILS 07/16/2018 3  0 - 7 % Final    BASOPHILS 07/16/2018 1  0 - 1 % Final    IMMATURE GRANULOCYTES 07/16/2018 0  0.0 - 0.5 % Final    ABS. NEUTROPHILS 07/16/2018 5.5  1.8 - 8.0 K/UL Final    ABS. LYMPHOCYTES 07/16/2018 2.2  0.8 - 3.5 K/UL Final    ABS. MONOCYTES 07/16/2018 0.5  0.0 - 1.0 K/UL Final    ABS. EOSINOPHILS 07/16/2018 0.2  0.0 - 0.4 K/UL Final    ABS. BASOPHILS 07/16/2018 0.0  0.0 - 0.1 K/UL Final    ABS. IMM.  GRANS. 07/16/2018 0.0  0.00 - 0.04 K/UL Final    DF 07/16/2018 AUTOMATED    Final    Sodium 07/16/2018 140  136 - 145 mmol/L Final    Potassium 07/16/2018 3.6  3.5 - 5.1 mmol/L Final    Chloride 07/16/2018 107  97 - 108 mmol/L Final    CO2 07/16/2018 23  21 - 32 mmol/L Final    Anion gap 07/16/2018 10  5 - 15 mmol/L Final    Glucose 07/16/2018 144* 65 - 100 mg/dL Final    BUN 07/16/2018 13  6 - 20 MG/DL Final    Creatinine 07/16/2018 1.06* 0.55 - 1.02 MG/DL Final    BUN/Creatinine ratio 07/16/2018 12  12 - 20   Final    GFR est AA 07/16/2018 >60  >60 ml/min/1.73m2 Final    GFR est non-AA 07/16/2018 53* >60 ml/min/1.73m2 Final    Calcium 07/16/2018 8.7  8.5 - 10.1 MG/DL Final    Bilirubin, total 07/16/2018 0.4  0.2 - 1.0 MG/DL Final    ALT (SGPT) 07/16/2018 24  12 - 78 U/L Final    AST (SGOT) 07/16/2018 13* 15 - 37 U/L Final    Alk.  phosphatase 07/16/2018 77  45 - 117 U/L Final    Protein, total 07/16/2018 8.2  6.4 - 8.2 g/dL Final    Albumin 07/16/2018 4.0  3.5 - 5.0 g/dL Final    Globulin 07/16/2018 4.2* 2.0 - 4.0 g/dL Final    A-G Ratio 07/16/2018 1.0* 1.1 - 2.2   Final    AMPHETAMINES 07/17/2018 NEGATIVE   NEG   Final    BARBITURATES 07/17/2018 NEGATIVE   NEG   Final    BENZODIAZEPINES 07/17/2018 NEGATIVE   NEG   Final    COCAINE 07/17/2018 NEGATIVE   NEG   Final    METHADONE 07/17/2018 NEGATIVE   NEG   Final    OPIATES 07/17/2018 NEGATIVE   NEG   Final    PCP(PHENCYCLIDINE) 07/17/2018 NEGATIVE   NEG   Final    THC (TH-CANNABINOL) 07/17/2018 NEGATIVE   NEG   Final    Drug screen comment 07/17/2018 (NOTE)   Final    Salicylate level 01/51/5249 <1.7* 2.8 - 20.0 MG/DL Final    Acetaminophen level 07/16/2018 <2* 10 - 30 ug/mL Final    Troponin-I, Qt. 07/16/2018 <0.05  <0.05 ng/mL Final    CK 07/16/2018 108  26 - 192 U/L Final    Color 07/17/2018 YELLOW/STRAW    Final    Appearance 07/17/2018 CLEAR  CLEAR   Final    Specific gravity 07/17/2018 1.013  1.003 - 1.030   Final    pH (UA) 07/17/2018 5.5  5.0 - 8.0 Final    Protein 07/17/2018 NEGATIVE   NEG mg/dL Final    Glucose 07/17/2018 NEGATIVE   NEG mg/dL Final    Ketone 07/17/2018 NEGATIVE   NEG mg/dL Final    Bilirubin 07/17/2018 NEGATIVE   NEG   Final    Blood 07/17/2018 NEGATIVE   NEG   Final    Urobilinogen 07/17/2018 0.2  0.2 - 1.0 EU/dL Final    Nitrites 07/17/2018 NEGATIVE   NEG   Final    Leukocyte Esterase 07/17/2018 SMALL* NEG   Final    WBC 07/17/2018 10-20  0 - 4 /hpf Final    RBC 07/17/2018 0-5  0 - 5 /hpf Final    Epithelial cells 07/17/2018 FEW  FEW /lpf Final    Bacteria 07/17/2018 NEGATIVE   NEG /hpf Final    Hyaline cast 07/17/2018 0-2  0 - 5 /lpf Final    Urine culture hold 07/17/2018 URINE ON HOLD IN MICROBIOLOGY DEPT FOR 3 DAYS. IF UNPRESERVED URINE IS SUBMITTED, IT CANNOT BE USED FOR ADDITIONAL TESTING AFTER 24 HRS, RECOLLECTION WILL BE REQUIRED.     Final    Special Requests: 07/17/2018 NO SPECIAL REQUESTS    Final    [place] Count 07/17/2018     Final                    Value:[id number]  COLONIES/mL      Culture result: 07/17/2018 MIXED UROGENITAL KIMBERLEE ISOLATED    Final    Ventricular Rate 07/17/2018 71  BPM Final    Atrial Rate 07/17/2018 71  BPM Final    P-R Interval 07/17/2018 158  ms Final    QRS Duration 07/17/2018 88  ms Final    Q-T Interval 07/17/2018 406  ms Final    QTC Calculation (Bezet) 07/17/2018 441  ms Final    Calculated P Axis 07/17/2018 58  degrees Final    Calculated R Axis 07/17/2018 -24  degrees Final    Calculated T Axis 07/17/2018 39  degrees Final    Diagnosis 07/17/2018    Final                    Value:Normal sinus rhythm  When compared with ECG of 04-MAR-2005 17:15,  Previous ECG has undetermined rhythm, needs review  T wave inversion no longer evident in Anterior leads  Confirmed by Colonel Dara MD, Dudley Muñiz ([id number]) on 7/17/2018 9:49:31 AM      ALCOHOL(ETHYL),SERUM 07/17/2018 <10  <10 MG/DL Final    TSH 07/17/2018 0.05* 0.36 - 3.74 uIU/mL Final    LIPID PROFILE 07/19/2018        Final    Cholesterol, total 07/19/2018 205* <200 MG/DL Final    Triglyceride 07/19/2018 186* <150 MG/DL Final    HDL Cholesterol 07/19/2018 68  MG/DL Final    LDL, calculated 07/19/2018 99.8  0 - 100 MG/DL Final    VLDL, calculated 07/19/2018 37.2  MG/DL Final    CHOL/HDL Ratio 07/19/2018 3.0  0 - 5.0   Final    Hemoglobin A1c 07/19/2018 6.1  4.2 - 6.3 % Final    Est. average glucose 07/19/2018 128  mg/dL Final        RADIOLOGY REPORTS:    Results from Hospital Encounter encounter on 09/10/12   XR CHEST PA LAT   Narrative **Final Report**      ICD Codes / Adm. Diagnosis: 786.09   / Other dyspnea and respiratory    Examination:  CR CHEST PA AND LATERAL  - [id number] - Sep 10 2012  5:22PM  Accession No:  [id number]  Reason:  786.0      REPORT:  INDICATION:    786.0     COMPARISON: None. FINDINGS: PA and lateral radiographs of the chest demonstrate clear lungs. The cardiac and mediastinal contours and pulmonary vascularity are normal.    Spondylitic changes are present. .        IMPRESSION: No acute process           Signing/Reading Doctor: Nilda Strong ([id number])    Approved: Nilda Strong ([id number])  09/10/2012                                  Ct Head Wo Cont    Result Date: 7/17/2018  EXAM:  CT HEAD WO CONT INDICATION:  Altered mental status. COMPARISON: None. CONTRAST:  None. TECHNIQUE: Unenhanced CT of the head was performed using 5 mm images. Brain and bone windows were generated. CT dose reduction was achieved through use of a standardized protocol tailored for this examination and automatic exposure control for dose modulation. Adaptive statistical iterative reconstruction (ASIR) was utilized. FINDINGS: The ventricles and sulci are normal in size, shape and configuration and midline. There is no significant white matter disease. There is no intracranial hemorrhage, extra-axial collection, mass, mass effect or midline shift. The basilar cisterns are open. No acute infarct is identified.  The bone windows demonstrate no abnormalities. The visualized portions of the paranasal sinuses and mastoid air cells are clear. IMPRESSION: No acute findings.              MEDICATIONS       ALL MEDICATIONS  Current Facility-Administered Medications   Medication Dose Route Frequency    levothyroxine (SYNTHROID) tablet 125 mcg  125 mcg Oral ACB    therapeutic multivitamin (THERAGRAN) tablet 1 Tab  1 Tab Oral DAILY    aspirin (ASPIRIN) tablet 325 mg  325 mg Oral DAILY    fluticasone (FLONASE) 50 mcg/actuation nasal spray 2 Spray  2 Spray Both Nostrils BID    risperiDONE (RisperDAL) tablet 0.5 mg  0.5 mg Oral BID    cholecalciferol (VITAMIN D3) tablet 1,000 Units  1,000 Units Oral DAILY    ziprasidone (GEODON) 20 mg in sterile water (preservative free) 1 mL injection  20 mg IntraMUSCular BID PRN    OLANZapine (ZyPREXA) tablet 5 mg  5 mg Oral Q6H PRN    benztropine (COGENTIN) tablet 2 mg  2 mg Oral BID PRN    benztropine (COGENTIN) injection 2 mg  2 mg IntraMUSCular BID PRN    LORazepam (ATIVAN) injection 2 mg  2 mg IntraMUSCular Q4H PRN    LORazepam (ATIVAN) tablet 1 mg  1 mg Oral Q4H PRN    zolpidem (AMBIEN) tablet 10 mg  10 mg Oral QHS PRN    acetaminophen (TYLENOL) tablet 650 mg  650 mg Oral Q4H PRN    ibuprofen (MOTRIN) tablet 400 mg  400 mg Oral Q8H PRN    magnesium hydroxide (MILK OF MAGNESIA) 400 mg/5 mL oral suspension 30 mL  30 mL Oral DAILY PRN    nicotine (NICODERM CQ) 21 mg/24 hr patch 1 Patch  1 Patch TransDERmal DAILY PRN      SCHEDULED MEDICATIONS  Current Facility-Administered Medications   Medication Dose Route Frequency    levothyroxine (SYNTHROID) tablet 125 mcg  125 mcg Oral ACB    therapeutic multivitamin (THERAGRAN) tablet 1 Tab  1 Tab Oral DAILY    aspirin (ASPIRIN) tablet 325 mg  325 mg Oral DAILY    fluticasone (FLONASE) 50 mcg/actuation nasal spray 2 Spray  2 Spray Both Nostrils BID    risperiDONE (RisperDAL) tablet 0.5 mg  0.5 mg Oral BID    cholecalciferol (VITAMIN D3) tablet 1,000 Units  1,000 Units Oral DAILY                ASSESSMENT & PLAN        The patient, Gallo Edmonds, is a 59 y.o.  female who presents at this time for treatment of the following diagnoses:  Patient Active Hospital Problem List:   Psychos and Depression (7/17/2018)    Assessment: depression and delusion     Plan: initiate Risperidone 0.25 mg po bid   7/19/18 increase Risperidone to 0.5 mg po bid    7/20/18 continue same medications    7/21/18 add Zoloft           I will continue to monitor blood levels (Depakote, Tegretol, lithium, clozapine---a drug with a narrow therapeutic index= NTI) and associated labs for drug therapy implemented that require intense monitoring for toxicity as deemed appropriate based on current medication side effects and pharmacodynamically determined drug 1/2 lives. A coordinated, multidisplinary treatment team (includes the nurse, unit pharmcist,  and writer) round was conducted for this initial evaluation with the patient present. The following regarding medications was addressed during rounds with patient:   the risks and benefits of the proposed medication. The patient was given the opportunity to ask questions. Informed consent given to the use of the above medications. I will continue to adjust psychiatric and non-psychiatric medications (see above \"medication\" section and orders section for details) as deemed appropriate & based upon diagnoses and response to treatment. I have reviewed admission (and previous/old) labs and medical tests in the EHR and or transferring hospital documents. I will continue to order blood tests/labs and diagnostic tests as deemed appropriate and review results as they become available (see orders for details). I have reviewed old psychiatric and medical records available in the EHR.  I Will order additional psychiatric records from other institutions to further elucidate the nature of patient's psychopathology and review once available. I will gather additional collateral information from friends, family and o/p treatment team to further elucidate the nature of patient's psychopathology and baselline level of psychiatric functioning.       ESTIMATED LENGTH OF STAY:    3       STRENGTHS:  Exercising self-direction/Resourceful, Access to housing/residential stability and Interpersonal/supportive relationships (family, friends, peers)                                        SIGNED:    Dianelys Reid MD  7/21/2018

## 2018-07-21 NOTE — BH NOTES
PRN Medication Documentation    Specific patient behavior that led to need for PRN medication: patient speaking with BHT in dining room talking about her escalating anxiety level; patient seen pacing around dining room table  Staff interventions attempted prior to PRN being given: redirection, mindfulness  PRN medication given: 1 mg ativan PO  Patient response/effectiveness of PRN medication: will continue to assess

## 2018-07-21 NOTE — INTERDISCIPLINARY ROUNDS
Behavioral Health Interdisciplinary Rounds     Patient Name: Natalee Houser  Age: 59 y.o. Room/Bed:  727/  Primary Diagnosis: <principal problem not specified>   Admission Status: Involuntary Commitment     Readmission within 30 days: no  Power of  in place: no  Patient requires a blocked bed: no          Reason for blocked bed:     VTE Prophylaxis: Not indicated    Mobility needs/Fall risk: no    Nutritional Plan: no  Consults:          Labs/Testing due today?: no    Sleep hours:  2      Participation in Care/Groups:  yes  Medication Compliant?: Yes  PRNS (last 24 hours):  Antipsychotic (PO), Antianxiety and Sleep Aid    Restraints (last 24 hours):  no     CIWA (range last 24 hours):     COWS (range last 24 hours):      Alcohol screening (AUDIT) completed -   AUDIT Score: 0     If applicable, date SBIRT discussed in treatment team AND documented:     Tobacco - patient is a smoker: Have You Used Tobacco in the Past 30 Days: No  Illegal Drugs use: Have You Used Any Illegal Substances Over the Past 12 Months: No    24 hour chart check complete: yes     Patient goal(s) for today: Comply with tx   Treatment team focus/goals: Eval for safety- return back to acute unit for closer monitoring   Progress note : depressed and feeling unsafe- scratched herself with plastic spoon - grieving death of her mother    LOS:  4  Expected LOS:     Financial concerns/prescription coverage:    Date of last family contact:      Family requesting physician contact today:    Discharge plan: Return home with father  Zay Daugherty in the home: no      Outpatient provider(s): Refer to [facility] [address]     Participating treatment team members: Dr Sandra Barajas and Olman Camejo RN

## 2018-07-21 NOTE — PROGRESS NOTES
TRANSFER - OUT REPORT:    Verbal report given to Juan Jose Leigh RN(name) on Demetria Lennox  being transferred to Acute(unit) for increased level of care. Report consisted of patients Situation, Background, Assessment and   Recommendations(SBAR). Information from the following report(s) SBAR was reviewed with the receiving nurse. Lines:       Opportunity for questions and clarification was provided.       Patient transported with:   Registered Nurse

## 2018-07-21 NOTE — BH NOTES
PRN Medication Documentation    Specific patient behavior that led to need for PRN medication: \"I have anxiety, I need something\"  Staff interventions attempted prior to PRN being given:  Discussed coping skills  PRN medication given:  Ativan 1 mg po  Patient response/effectiveness of PRN medication:  Will continue to monitor

## 2018-07-21 NOTE — BH NOTES
GROUP THERAPY PROGRESS NOTE    Vitaly Kemp is participating in reflections group.      Group time: 30 minutes    Personal goal for participation: Stay positive     Goal orientation: personal    Group therapy participation: active    Therapeutic interventions reviewed and discussed: Unit Rules/Goals     Impression of participation: Active

## 2018-07-21 NOTE — BH NOTES
Pt. Pacing on unit minimal interaction with peers    Pt. States ativan usually helps her    Pt. On 15 min.  Safety checks

## 2018-07-21 NOTE — BH NOTES
PRN Medication Documentation    Specific patient behavior that led to need for PRN medication: complaining of bad mood and anxiety  Staff interventions attempted prior to PRN being given: coping skills, deep breathing, education offered  PRN medication given: ativan PO  Patient response/effectiveness of PRN medication: will reassess patient in one hour    1000: Went in patients room, verbalizes she feels better and less anxious. Medication effective.

## 2018-07-21 NOTE — PROGRESS NOTES
Problem: Depressed Mood (Adult/Pediatric)  Goal: *STG: Attends activities and groups  Outcome: Progressing Towards Goal  Patient encouraged to attend groups/activities and not be isolative to room  Goal: *STG: Remains safe in hospital  Outcome: Progressing Towards Goal  Environmental safety rounds and q15min safety checks

## 2018-07-21 NOTE — BH NOTES
TRANSFER - IN REPORT:    Verbal report received from Monica Pappas    on Blanchie Schwab  being received from general  Unit for routine progression of care      Report consisted of patients Situation, Background, Assessment and   Recommendations(SBAR). Information from the following report(s) SBAR was reviewed with the receiving nurse. Opportunity for questions and clarification was provided. Assessment completed upon patients arrival to unit and care assumed.

## 2018-07-21 NOTE — PROGRESS NOTES
Problem: Depressed Mood (Adult/Pediatric)  Goal: *STG: Remains safe in hospital  Outcome: Progressing Towards Goal  Patient remains safe in hospital.  Patient describes mood as \"depressed\", affect congruent, contracts for safety. Patient is participating in treatment plan, stated she wanted to be with her mom last night. Patient has disorganized thoughts, isolative to bed.

## 2018-07-22 PROCEDURE — 65220000003 HC RM SEMIPRIVATE PSYCH

## 2018-07-22 PROCEDURE — 74011250637 HC RX REV CODE- 250/637: Performed by: PSYCHIATRY & NEUROLOGY

## 2018-07-22 RX ORDER — HYDROXYZINE 25 MG/1
25 TABLET, FILM COATED ORAL
Status: DISCONTINUED | OUTPATIENT
Start: 2018-07-22 | End: 2018-07-26 | Stop reason: HOSPADM

## 2018-07-22 RX ORDER — SERTRALINE HYDROCHLORIDE 50 MG/1
50 TABLET, FILM COATED ORAL EVERY EVENING
Status: DISCONTINUED | OUTPATIENT
Start: 2018-07-22 | End: 2018-07-24

## 2018-07-22 RX ADMIN — ZOLPIDEM TARTRATE 10 MG: 10 TABLET ORAL at 21:12

## 2018-07-22 RX ADMIN — THERA TABS 1 TABLET: TAB at 08:43

## 2018-07-22 RX ADMIN — VITAMIN D, TAB 1000IU (100/BT) 1000 UNITS: 25 TAB at 08:42

## 2018-07-22 RX ADMIN — LEVOTHYROXINE SODIUM 125 MCG: 100 TABLET ORAL at 08:42

## 2018-07-22 RX ADMIN — RISPERIDONE 0.5 MG: 0.5 TABLET, FILM COATED ORAL at 21:12

## 2018-07-22 RX ADMIN — FLUTICASONE PROPIONATE 2 SPRAY: 50 SPRAY, METERED NASAL at 08:46

## 2018-07-22 RX ADMIN — HYDROXYZINE HYDROCHLORIDE 25 MG: 25 TABLET, FILM COATED ORAL at 12:47

## 2018-07-22 RX ADMIN — RISPERIDONE 0.5 MG: 0.5 TABLET, FILM COATED ORAL at 08:42

## 2018-07-22 RX ADMIN — ASPIRIN 325 MG: 325 TABLET ORAL at 08:42

## 2018-07-22 RX ADMIN — FLUTICASONE PROPIONATE 2 SPRAY: 50 SPRAY, METERED NASAL at 21:14

## 2018-07-22 RX ADMIN — SERTRALINE HYDROCHLORIDE 50 MG: 50 TABLET ORAL at 17:03

## 2018-07-22 NOTE — PROGRESS NOTES
Problem: Depressed Mood (Adult/Pediatric)  Goal: *STG: Remains safe in hospital  Outcome: Progressing Towards Goal  Patient is resting quietly in bed. Awake, alert, calm and cooperative. No respiratory distress noted. Will continue to monitor.

## 2018-07-22 NOTE — BH NOTES
PRN Medication Documentation    Specific patient behavior that led to need for PRN medication:  \"I  Need something for anxiety\"  Staff interventions attempted prior to PRN being given:  Discussed coping  PRN medication given:  Atarax 25 mg po  Patient response/effectiveness of PRN medication:  Will continue to monitor    1330 pt. journaling on the unit    Cal[place]  On 15 min.  Checks for safety

## 2018-07-22 NOTE — PROGRESS NOTES
Problem: Anxiety-Behavioral Health (Adult/Pediatric)  Goal: *STG: Attends activities and groups  Outcome: Progressing Towards Goal  Pt alert anxious. Cooperative. Education provided. Pt encourage to attend groups and activities. Coping skills encouraged. 1016- PRN Atarax 25 mg po q 6 hr available for pt as first line mediation for anxiety per Dr. Art Dykes.

## 2018-07-22 NOTE — BH NOTES
Behavioral Health Interdisciplinary Rounds     Patient Name: Moris Torres  Age: 59 y.o. Room/Bed:  732/02  Primary Diagnosis: <principal problem not specified>   Admission Status: Involuntary Commitment     Readmission within 30 days: no  Power of  in place: no  Patient requires a blocked bed: no          Reason for blocked bed: n/a    VTE Prophylaxis: No    Mobility needs/Fall risk: no    Nutritional Plan: no  Consults: n/a         Labs/Testing due today?: no    Sleep hours: 7       Participation in Care/Groups:  no  Medication Compliant?: Yes  PRNS (last 24 hours):  Antianxiety and Sleep Aid    Restraints (last 24 hours):  no     CIWA (range last 24 hours):     COWS (range last 24 hours):      Alcohol screening (AUDIT) completed -   AUDIT Score: 0     If applicable, date SBIRT discussed in treatment team AND documented:     Tobacco - patient is a smoker: Have You Used Tobacco in the Past 30 Days: No  Illegal Drugs use: Have You Used Any Illegal Substances Over the Past 12 Months: No    24 hour chart check complete: yes     Patient goal(s) for today:   Treatment team focus/goals:   Progress note     LOS:  4  Expected LOS:     Financial concerns/prescription coverage:   Date of last family contact:      Family requesting physician contact today:    Discharge plan:  Guns in the home:        Outpatient provider(s):     Participating treatment team members: Moris Torres  (assigned SW),

## 2018-07-22 NOTE — BH NOTES
GROUP THERAPY PROGRESS NOTE    Guillermo Topete is participating in [place]. Group time: 15 minutes    Personal goal for participation: Just to be cooperative. Goal orientation: personal    Group therapy participation: active    Therapeutic interventions reviewed and discussed: Writer listened attentively and explained the unit routine. Impression of participation: Patient participated actively.

## 2018-07-22 NOTE — PROGRESS NOTES
Problem: Falls - Risk of  Goal: *Absence of Falls  Document Samanta Fall Risk and appropriate interventions in the flowsheet. Outcome: Not Progressing Towards Goal  Fall Risk Interventions:     Non slip socks  Bed in low position        Medication Interventions: Teach patient to arise slowly                  Problem: Anxiety-Behavioral Health (Adult/Pediatric)  Goal: *STG: Remains safe in hospital  Variance: Patient Condition  Pt. Met with Dr. Kristian Welch in treatment team states she is still depressed and having anxiety   Has reoccurring thoughts about needing to be with her  mother  Contracts for safety in the hospital  Goal: *STG/LTG: Complies with medication therapy  Outcome: Progressing Towards Goal  Medication compliant  Reviewed in treatment team     Pt. Asking for prn ativan each  For anxiety    Coping skills discussed    Pt. Asked to journal feelings  Positives in her life   \"ok\"  Goal: Interventions  Outcome: Progressing Towards Goal  Will continue to monitor   On 15 min.  Checks for safety  Assess anxiety  Depression  Medication compliance   Effectiveness  Encourage groups

## 2018-07-22 NOTE — BH NOTES
PSYCHIATRIC PROGRESS NOTE             IDENTIFICATION:    Patient Name  Luis Fernando Tenorio   Date of Birth 1960   [facility] [id number]   Medical Record Number  [id number]      Age  59 y.o. PCP Vesna Mcgraw MD   Admit date:  7/16/2018    Room Number  732/02  @ . [facility]   Date of Service  7/22/2018            HISTORY         REASON FOR HOSPITALIZATION:  CC:  Pt admitted under a temporary skilled nursing order (TDO)  psychosis  proving to be an imminent danger to self and an inability to care for self. HISTORY OF PRESENT ILLNESS:    The patient, Luis Fernando Tenorio, is a 59 y.o. WHITE [place]  female with a past psychiatric history significant for possible depression and psychosis , who presents at this time with complaints of (and/or evidence of) the following emotional symptoms: depression and delusions. Additional symptomatology include anxiety, feeling depressed and poor concentration. The above symptoms have been present for weeks . These symptoms are of moderate severity. These symptoms are intermittent/ fleeting in nature. 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   She is tangential and she is not logical and she is not feeling she needs to be here and she stated police pulled her over but she was not aware about getting stopped and she came to ER and mention that she has thoughts about hurting herself and she is not feeling that way now  7/19/18 she is talking but not making sense and she is talking about her mother and she write letter for her , she not expressing any depression and no SI or HI and she is paranoid and no aggressive behavior   7/20/18 she is feeling little better but still paranoid and disorganized but better than before and no anger issues and no actin g out behavior and no SI or HI    7/21/18 she is frustrated and she is talking about her mother and she is upset about her mother passed away and not feeling able to go over that , still disorganized and still confused about her situation, still feel depressed    7/22/18 she still feeling depressed and still wants to go with her mother and she is not acting about depressive thoughts and no anger issues and no SI or HI and still little paranoid    ALLERGIES:   Allergies   Allergen Reactions    Other Food Other (comments)     \"Bad chest pain\" with walnuts. Coke - asthma/stuffy head. Fish sticks causes an asthma attack.  Astepro [Azelastine] Other (comments)     Violent headaches.  Bactrim [Sulfamethoprim] Other (comments)     Difficulty breathing, chills, fever.  Banana Other (comments)     Diffculty breathing, wheezing, asthma attack.  Coconut Other (comments)     Difficulty breathing, asthma attack, SOB.  Cortisone Hives     Difficulty breathing.  Peanut Other (comments)     Wheezing, asthma attack, SOB.  Prednisone Hives     Difficulty breathing, kidney stones. MEDICATIONS PRIOR TO ADMISSION:   Prescriptions Prior to Admission   Medication Sig    therapeutic multivitamin (THERA) tablet Take 1 Tab by mouth daily.  zolpidem (AMBIEN) 10 mg tablet Take 10 mg by mouth nightly as needed.  levothyroxine (SYNTHROID) 125 mcg tablet Take 125 mcg by mouth Daily (before breakfast). Indications: hypothyroidism    fluticasone (FLONASE) 50 mcg/actuation nasal spray 2 Sprays by Both Nostrils route two (2) times a day.  Cholecalciferol, Vitamin D3, (VITAMIN D3) 1,000 unit cap Take 1,000 Units by mouth daily.  cyanocobalamin, vitamin B-12, (VITAMIN B-12) 5,000 mcg subl 5,000 mcg by SubLINGual route daily.  GREEN TEA EXTRACT PO Take 700 mg by mouth daily.  aspirin (ASPIRIN) 325 mg tablet Take 325-650 mg by mouth as needed for Pain.  diphenhydrAMINE (BENADRYL ALLERGY) 25 mg tablet Take 50 mg by mouth as needed for Sleep.       PAST MEDICAL HISTORY:   Past Medical History:   Diagnosis Date    Asthma 1967    hospitalized for asthma attack x 2    Chronic kidney disease     kidney stones x 2-3 times    Ill-defined condition     nasal blockage from growth and misalignment - cannot breath out of nose - surgery in the future/cleared for colonoscopy 7/31/2014 - will bring in letter    Other ill-defined conditions(799.89)     blood in stool    Other ill-defined conditions(799.89)     hx low BP - 90's/60's-70's    PUD (peptic ulcer disease)     Thyroid disease     Unspecified adverse effect of anesthesia     nasal growth and misalignment and cannot breath through nose     Past Surgical History:   Procedure Laterality Date    HX HEENT      T & A    HX HEENT      turbinate surgery      SOCIAL HISTORY: single    Social History     Social History    Marital status: SINGLE     Spouse name: N/A    Number of children: N/A    Years of education: N/A     Occupational History    Not on file. Social History Main Topics    Smoking status: Never Smoker    Smokeless tobacco: Never Used    Alcohol use No    Drug use: No    Sexual activity: Not Currently     Other Topics Concern    Not on file     Social History Narrative      FAMILY HISTORY: History reviewed. No pertinent family history. Family History   Problem Relation Age of Onset    Stroke Mother     Other Mother      erosion of esophagus    Cancer Mother      melanoma/squamous    Heart Surgery Father      x2    Delayed Awakening Father    Maria R Chau Pacemaker Father     Other Father      carotid endartarectomy/polio    Cataract Father        REVIEW OF SYSTEMS:   History obtained from chart review and the patient  Pertinent items are noted in the History of Present Illness. All other Systems reviewed and are considered negative.            MENTAL STATUS EXAM & VITALS     MENTAL STATUS EXAM (MSE):    MSE FINDINGS ARE WITHIN NORMAL LIMITS (WNL) UNLESS OTHERWISE STATED BELOW. ( ALL OF THE BELOW CATEGORIES OF THE MSE HAVE BEEN REVIEWED (reviewed 7/22/2018) AND UPDATED AS DEEMED APPROPRIATE )  General Presentation age appropriate, cooperative   Orientation oriented to time, place and person   Vital Signs  See below (reviewed 7/22/2018); Vital Signs (BP, Pulse, & Temp) are within normal limits if not listed below.    Gait and Station Stable/steady, no ataxia   Musculoskeletal System No extrapyramidal symptoms (EPS); no abnormal muscular movements or Tardive Dyskinesia (TD); muscle strength and tone are within normal limits   Language No aphasia or dysarthria   Speech:  normal pitch and normal volume   Thought Processes illogical; slow rate of thoughts; fair abstract reasoning/computation   Thought Associations loose associations and tangential   Thought Content paranoid delusions   Suicidal Ideations no plan  and no intention   Homicidal Ideations no plan  and no intention   Mood:  depressed   Affect:  constricted   Memory recent  fair   Memory remote:  fair   Concentration/Attention:  fair   Fund of Knowledge average   Insight:  limited   Reliability poor   Judgment:  limited          VITALS:     Patient Vitals for the past 24 hrs:   Temp Pulse Resp BP SpO2   07/22/18 0934 97.7 °F (36.5 °C) 94 16 110/73 -   07/21/18 2014 98.2 °F (36.8 °C) 78 16 133/85 100 %   07/21/18 1630 98.5 °F (36.9 °C) 78 18 109/72 100 %     Wt Readings from Last 3 Encounters:   07/16/18 72.6 kg (160 lb)   07/31/14 79.4 kg (175 lb)     Temp Readings from Last 3 Encounters:   07/22/18 97.7 °F (36.5 °C)     BP Readings from Last 3 Encounters:   07/22/18 110/73   07/31/14 110/69     Pulse Readings from Last 3 Encounters:   07/22/18 94   07/31/14 69            DATA     LABORATORY DATA:  Labs Reviewed   METABOLIC PANEL, COMPREHENSIVE - Abnormal; Notable for the following:        Result Value    Glucose 144 (*)     Creatinine 1.06 (*)     GFR est non-AA 53 (*)     AST (SGOT) 13 (*)     Globulin 4.2 (*)     A-G Ratio 1.0 (*)     All other components within normal limits   SALICYLATE - Abnormal; Notable for the following:     Salicylate level <6.6 (*)     All other components within normal limits   ACETAMINOPHEN - Abnormal; Notable for the following:     Acetaminophen level <2 (*)     All other components within normal limits   URINALYSIS W/MICROSCOPIC - Abnormal; Notable for the following:     Leukocyte Esterase SMALL (*)     All other components within normal limits   TSH 3RD GENERATION - Abnormal; Notable for the following:     TSH 0.05 (*)     All other components within normal limits   LIPID PANEL - Abnormal; Notable for the following:     Cholesterol, total 205 (*)     Triglyceride 186 (*)     All other components within normal limits   URINE CULTURE HOLD SAMPLE   CULTURE, URINE   CBC WITH AUTOMATED DIFF   SAMPLES BEING HELD   DRUG SCREEN, URINE   TROPONIN I   CK W/ REFLX CKMB   ETHYL ALCOHOL   HEMOGLOBIN A1C WITH EAG     Admission on 07/16/2018   Component Date Value Ref Range Status    WBC 07/16/2018 8.4  3.6 - 11.0 K/uL Final    RBC 07/16/2018 4.72  3.80 - 5.20 M/uL Final    HGB 07/16/2018 13.0  11.5 - 16.0 g/dL Final    HCT 07/16/2018 40.2  35.0 - 47.0 % Final    MCV 07/16/2018 85.2  80.0 - 99.0 FL Final    MCH 07/16/2018 27.5  26.0 - 34.0 PG Final    MCHC 07/16/2018 32.3  30.0 - 36.5 g/dL Final    RDW 07/16/2018 12.8  11.5 - 14.5 % Final    PLATELET 81/94/0383 940  150 - 400 K/uL Final    MPV 07/16/2018 10.4  8.9 - 12.9 FL Final    NRBC 07/16/2018 0.0  0  WBC Final    ABSOLUTE NRBC 07/16/2018 0.00  0.00 - 0.01 K/uL Final    NEUTROPHILS 07/16/2018 65  32 - 75 % Final    LYMPHOCYTES 07/16/2018 26  12 - 49 % Final    MONOCYTES 07/16/2018 6  5 - 13 % Final    EOSINOPHILS 07/16/2018 3  0 - 7 % Final    BASOPHILS 07/16/2018 1  0 - 1 % Final    IMMATURE GRANULOCYTES 07/16/2018 0  0.0 - 0.5 % Final    ABS. NEUTROPHILS 07/16/2018 5.5  1.8 - 8.0 K/UL Final    ABS. LYMPHOCYTES 07/16/2018 2.2  0.8 - 3.5 K/UL Final    ABS. MONOCYTES 07/16/2018 0.5  0.0 - 1.0 K/UL Final    ABS.  EOSINOPHILS 07/16/2018 0.2  0.0 - 0.4 K/UL Final    ABS. BASOPHILS 07/16/2018 0.0  0.0 - 0.1 K/UL Final    ABS. IMM. GRANS. 07/16/2018 0.0  0.00 - 0.04 K/UL Final    DF 07/16/2018 AUTOMATED    Final    Sodium 07/16/2018 140  136 - 145 mmol/L Final    Potassium 07/16/2018 3.6  3.5 - 5.1 mmol/L Final    Chloride 07/16/2018 107  97 - 108 mmol/L Final    CO2 07/16/2018 23  21 - 32 mmol/L Final    Anion gap 07/16/2018 10  5 - 15 mmol/L Final    Glucose 07/16/2018 144* 65 - 100 mg/dL Final    BUN 07/16/2018 13  6 - 20 MG/DL Final    Creatinine 07/16/2018 1.06* 0.55 - 1.02 MG/DL Final    BUN/Creatinine ratio 07/16/2018 12  12 - 20   Final    GFR est AA 07/16/2018 >60  >60 ml/min/1.73m2 Final    GFR est non-AA 07/16/2018 53* >60 ml/min/1.73m2 Final    Calcium 07/16/2018 8.7  8.5 - 10.1 MG/DL Final    Bilirubin, total 07/16/2018 0.4  0.2 - 1.0 MG/DL Final    ALT (SGPT) 07/16/2018 24  12 - 78 U/L Final    AST (SGOT) 07/16/2018 13* 15 - 37 U/L Final    Alk.  phosphatase 07/16/2018 77  45 - 117 U/L Final    Protein, total 07/16/2018 8.2  6.4 - 8.2 g/dL Final    Albumin 07/16/2018 4.0  3.5 - 5.0 g/dL Final    Globulin 07/16/2018 4.2* 2.0 - 4.0 g/dL Final    A-G Ratio 07/16/2018 1.0* 1.1 - 2.2   Final    AMPHETAMINES 07/17/2018 NEGATIVE   NEG   Final    BARBITURATES 07/17/2018 NEGATIVE   NEG   Final    BENZODIAZEPINES 07/17/2018 NEGATIVE   NEG   Final    COCAINE 07/17/2018 NEGATIVE   NEG   Final    METHADONE 07/17/2018 NEGATIVE   NEG   Final    OPIATES 07/17/2018 NEGATIVE   NEG   Final    PCP(PHENCYCLIDINE) 07/17/2018 NEGATIVE   NEG   Final    THC (TH-CANNABINOL) 07/17/2018 NEGATIVE   NEG   Final    Drug screen comment 07/17/2018 (NOTE)   Final    Salicylate level 69/79/5704 <1.7* 2.8 - 20.0 MG/DL Final    Acetaminophen level 07/16/2018 <2* 10 - 30 ug/mL Final    Troponin-I, Qt. 07/16/2018 <0.05  <0.05 ng/mL Final    CK 07/16/2018 108  26 - 192 U/L Final    Color 07/17/2018 YELLOW/STRAW    Final    Appearance 07/17/2018 CLEAR  CLEAR   Final    Specific gravity 07/17/2018 1.013  1.003 - 1.030   Final    pH (UA) 07/17/2018 5.5  5.0 - 8.0   Final    Protein 07/17/2018 NEGATIVE   NEG mg/dL Final    Glucose 07/17/2018 NEGATIVE   NEG mg/dL Final    Ketone 07/17/2018 NEGATIVE   NEG mg/dL Final    Bilirubin 07/17/2018 NEGATIVE   NEG   Final    Blood 07/17/2018 NEGATIVE   NEG   Final    Urobilinogen 07/17/2018 0.2  0.2 - 1.0 EU/dL Final    Nitrites 07/17/2018 NEGATIVE   NEG   Final    Leukocyte Esterase 07/17/2018 SMALL* NEG   Final    WBC 07/17/2018 10-20  0 - 4 /hpf Final    RBC 07/17/2018 0-5  0 - 5 /hpf Final    Epithelial cells 07/17/2018 FEW  FEW /lpf Final    Bacteria 07/17/2018 NEGATIVE   NEG /hpf Final    Hyaline cast 07/17/2018 0-2  0 - 5 /lpf Final    Urine culture hold 07/17/2018 URINE ON HOLD IN MICROBIOLOGY DEPT FOR 3 DAYS. IF UNPRESERVED URINE IS SUBMITTED, IT CANNOT BE USED FOR ADDITIONAL TESTING AFTER 24 HRS, RECOLLECTION WILL BE REQUIRED.     Final    Special Requests: 07/17/2018 NO SPECIAL REQUESTS    Final    Closter Count 07/17/2018     Final                    Value:[id number]  COLONIES/mL      Culture result: 07/17/2018 MIXED UROGENITAL KIMBERLEE ISOLATED    Final    Ventricular Rate 07/17/2018 71  BPM Final    Atrial Rate 07/17/2018 71  BPM Final    P-R Interval 07/17/2018 158  ms Final    QRS Duration 07/17/2018 88  ms Final    Q-T Interval 07/17/2018 406  ms Final    QTC Calculation (Bezet) 07/17/2018 441  ms Final    Calculated P Axis 07/17/2018 58  degrees Final    Calculated R Axis 07/17/2018 -24  degrees Final    Calculated T Axis 07/17/2018 39  degrees Final    Diagnosis 07/17/2018    Final                    Value:Normal sinus rhythm  When compared with ECG of 04-MAR-2005 17:15,  Previous ECG has undetermined rhythm, needs review  T wave inversion no longer evident in Anterior leads  Confirmed by Shashank Edwards MD, [facility] ([id number]) on 7/17/2018 9:49:31 AM      ALCOHOL(ETHYL),SERUM 07/17/2018 <10  <10 MG/DL Final    TSH 07/17/2018 0.05* 0.36 - 3.74 uIU/mL Final    LIPID PROFILE 07/19/2018        Final    Cholesterol, total 07/19/2018 205* <200 MG/DL Final    Triglyceride 07/19/2018 186* <150 MG/DL Final    HDL Cholesterol 07/19/2018 68  MG/DL Final    LDL, calculated 07/19/2018 99.8  0 - 100 MG/DL Final    VLDL, calculated 07/19/2018 37.2  MG/DL Final    CHOL/HDL Ratio 07/19/2018 3.0  0 - 5.0   Final    Hemoglobin A1c 07/19/2018 6.1  4.2 - 6.3 % Final    Est. average glucose 07/19/2018 128  mg/dL Final        RADIOLOGY REPORTS:    Results from Hospital Encounter encounter on 09/10/12   XR CHEST PA LAT   Narrative **Final Report**      ICD Codes / Adm. Diagnosis: 786.09   / Other dyspnea and respiratory    Examination:  CR CHEST PA AND LATERAL  - [id number] - Sep 10 2012  5:22PM  Accession No:  [id number]  Reason:  786.0      REPORT:  INDICATION:    786.0     COMPARISON: None. FINDINGS: PA and lateral radiographs of the chest demonstrate clear lungs. The cardiac and mediastinal contours and pulmonary vascularity are normal.    Spondylitic changes are present. .        IMPRESSION: No acute process           Signing/Reading Doctor: Dinora Alfaro ([id number])    Approved: Dinora Alfaro ([id number])  09/10/2012                                  Ct Head Wo Cont    Result Date: 7/17/2018  EXAM:  CT HEAD WO CONT INDICATION:  Altered mental status. COMPARISON: None. CONTRAST:  None. TECHNIQUE: Unenhanced CT of the head was performed using 5 mm images. Brain and bone windows were generated. CT dose reduction was achieved through use of a standardized protocol tailored for this examination and automatic exposure control for dose modulation. Adaptive statistical iterative reconstruction (ASIR) was utilized. FINDINGS: The ventricles and sulci are normal in size, shape and configuration and midline. There is no significant white matter disease.  There is no intracranial hemorrhage, extra-axial collection, mass, mass effect or midline shift. The basilar cisterns are open. No acute infarct is identified. The bone windows demonstrate no abnormalities. The visualized portions of the paranasal sinuses and mastoid air cells are clear. IMPRESSION: No acute findings.              MEDICATIONS       ALL MEDICATIONS  Current Facility-Administered Medications   Medication Dose Route Frequency    hydrOXYzine HCl (ATARAX) tablet 25 mg  25 mg Oral Q6H PRN    sertraline (ZOLOFT) tablet 50 mg  50 mg Oral QPM    levothyroxine (SYNTHROID) tablet 125 mcg  125 mcg Oral ACB    therapeutic multivitamin (THERAGRAN) tablet 1 Tab  1 Tab Oral DAILY    aspirin (ASPIRIN) tablet 325 mg  325 mg Oral DAILY    fluticasone (FLONASE) 50 mcg/actuation nasal spray 2 Spray  2 Spray Both Nostrils BID    risperiDONE (RisperDAL) tablet 0.5 mg  0.5 mg Oral BID    cholecalciferol (VITAMIN D3) tablet 1,000 Units  1,000 Units Oral DAILY    ziprasidone (GEODON) 20 mg in sterile water (preservative free) 1 mL injection  20 mg IntraMUSCular BID PRN    OLANZapine (ZyPREXA) tablet 5 mg  5 mg Oral Q6H PRN    benztropine (COGENTIN) tablet 2 mg  2 mg Oral BID PRN    benztropine (COGENTIN) injection 2 mg  2 mg IntraMUSCular BID PRN    LORazepam (ATIVAN) injection 2 mg  2 mg IntraMUSCular Q4H PRN    LORazepam (ATIVAN) tablet 1 mg  1 mg Oral Q4H PRN    zolpidem (AMBIEN) tablet 10 mg  10 mg Oral QHS PRN    acetaminophen (TYLENOL) tablet 650 mg  650 mg Oral Q4H PRN    ibuprofen (MOTRIN) tablet 400 mg  400 mg Oral Q8H PRN    magnesium hydroxide (MILK OF MAGNESIA) 400 mg/5 mL oral suspension 30 mL  30 mL Oral DAILY PRN    nicotine (NICODERM CQ) 21 mg/24 hr patch 1 Patch  1 Patch TransDERmal DAILY PRN      SCHEDULED MEDICATIONS  Current Facility-Administered Medications   Medication Dose Route Frequency    sertraline (ZOLOFT) tablet 50 mg  50 mg Oral QPM    levothyroxine (SYNTHROID) tablet 125 mcg  125 mcg Oral ACB    therapeutic multivitamin (THERAGRAN) tablet 1 Tab  1 Tab Oral DAILY    aspirin (ASPIRIN) tablet 325 mg  325 mg Oral DAILY    fluticasone (FLONASE) 50 mcg/actuation nasal spray 2 Spray  2 Spray Both Nostrils BID    risperiDONE (RisperDAL) tablet 0.5 mg  0.5 mg Oral BID    cholecalciferol (VITAMIN D3) tablet 1,000 Units  1,000 Units Oral DAILY                ASSESSMENT & PLAN        The patient, Maggie Matson, is a 59 y.o.  female who presents at this time for treatment of the following diagnoses:  Patient Active Hospital Problem List:   Psychos and Depression (7/17/2018)    Assessment: depression and delusion     Plan: initiate Risperidone 0.25 mg po bid   7/19/18 increase Risperidone to 0.5 mg po bid    7/20/18 continue same medications    7/21/18 add Zoloft   7/22/18 increase Zoloft to 50 mg po daily           I will continue to monitor blood levels (Depakote, Tegretol, lithium, clozapine---a drug with a narrow therapeutic index= NTI) and associated labs for drug therapy implemented that require intense monitoring for toxicity as deemed appropriate based on current medication side effects and pharmacodynamically determined drug 1/2 lives. A coordinated, multidisplinary treatment team (includes the nurse, unit pharmcist,  and writer) round was conducted for this initial evaluation with the patient present. The following regarding medications was addressed during rounds with patient:   the risks and benefits of the proposed medication. The patient was given the opportunity to ask questions. Informed consent given to the use of the above medications. I will continue to adjust psychiatric and non-psychiatric medications (see above \"medication\" section and orders section for details) as deemed appropriate & based upon diagnoses and response to treatment. I have reviewed admission (and previous/old) labs and medical tests in the EHR and or transferring hospital documents.  I will continue to order blood tests/labs and diagnostic tests as deemed appropriate and review results as they become available (see orders for details). I have reviewed old psychiatric and medical records available in the EHR. I Will order additional psychiatric records from other institutions to further elucidate the nature of patient's psychopathology and review once available. I will gather additional collateral information from friends, family and o/p treatment team to further elucidate the nature of patient's psychopathology and baselline level of psychiatric functioning.       ESTIMATED LENGTH OF STAY:    3       STRENGTHS:  Exercising self-direction/Resourceful, Access to housing/residential stability and Interpersonal/supportive relationships (family, friends, peers)                                        SIGNED:    Eric Vergara MD  7/22/2018

## 2018-07-22 NOTE — BH NOTES
PRN Medication Documentation    Specific patient behavior that led to need for PRN medication: patient asking for sleep aid  Staff interventions attempted prior to PRN being given: redirection  PRN medication given: 10 mg ambien PO  Patient response/effectiveness of PRN medication: will continue to assess

## 2018-07-22 NOTE — BH NOTES
GROUP THERAPY PROGRESS NOTE    Sahara Randall is participating in [facility].      Group time: 30 minutes    Personal goal for participation: Relaxation    Goal orientation: relaxation    Group therapy participation: active    Therapeutic interventions reviewed and discussed:  Yes    Impression of participation: Active

## 2018-07-23 PROCEDURE — 74011250637 HC RX REV CODE- 250/637: Performed by: PSYCHIATRY & NEUROLOGY

## 2018-07-23 PROCEDURE — 65220000003 HC RM SEMIPRIVATE PSYCH

## 2018-07-23 RX ORDER — RISPERIDONE 1 MG/1
1 TABLET, FILM COATED ORAL 2 TIMES DAILY
Status: DISCONTINUED | OUTPATIENT
Start: 2018-07-23 | End: 2018-07-26 | Stop reason: HOSPADM

## 2018-07-23 RX ORDER — LORAZEPAM 0.5 MG/1
0.5 TABLET ORAL
Status: DISCONTINUED | OUTPATIENT
Start: 2018-07-23 | End: 2018-07-25

## 2018-07-23 RX ADMIN — ASPIRIN 325 MG: 325 TABLET ORAL at 08:37

## 2018-07-23 RX ADMIN — FLUTICASONE PROPIONATE 2 SPRAY: 50 SPRAY, METERED NASAL at 21:12

## 2018-07-23 RX ADMIN — SERTRALINE HYDROCHLORIDE 50 MG: 50 TABLET ORAL at 17:06

## 2018-07-23 RX ADMIN — RISPERIDONE 0.5 MG: 0.5 TABLET, FILM COATED ORAL at 08:37

## 2018-07-23 RX ADMIN — ACETAMINOPHEN 650 MG: 325 TABLET, FILM COATED ORAL at 17:08

## 2018-07-23 RX ADMIN — VITAMIN D, TAB 1000IU (100/BT) 1000 UNITS: 25 TAB at 08:37

## 2018-07-23 RX ADMIN — LORAZEPAM 0.5 MG: 0.5 TABLET ORAL at 17:08

## 2018-07-23 RX ADMIN — HYDROXYZINE HYDROCHLORIDE 25 MG: 25 TABLET, FILM COATED ORAL at 16:04

## 2018-07-23 RX ADMIN — THERA TABS 1 TABLET: TAB at 08:37

## 2018-07-23 RX ADMIN — ZOLPIDEM TARTRATE 10 MG: 10 TABLET ORAL at 22:08

## 2018-07-23 RX ADMIN — HYDROXYZINE HYDROCHLORIDE 25 MG: 25 TABLET, FILM COATED ORAL at 08:38

## 2018-07-23 RX ADMIN — LEVOTHYROXINE SODIUM 125 MCG: 100 TABLET ORAL at 06:39

## 2018-07-23 RX ADMIN — RISPERIDONE 1 MG: 1 TABLET ORAL at 21:05

## 2018-07-23 NOTE — BH NOTES
GROUP THERAPY PROGRESS NOTE    Shahrzad Forrester is participating in [place].      Group time: 10 minutes    Personal goal for participation: \"discharge\"    Goal orientation: personal    Group therapy participation: active    Therapeutic interventions reviewed and discussed:     Impression of participation:

## 2018-07-23 NOTE — PROGRESS NOTES
Problem: Falls - Risk of  Goal: *Absence of Falls  Document Samanta Fall Risk and appropriate interventions in the flowsheet. Fall Risk Interventions: In bed asleep with respirations noted as even and unlabored as chest was rising and falling. Will continue to monitor for safety and 15 minute checks throughout shift.          Medication Interventions: Teach patient to arise slowly

## 2018-07-23 NOTE — PROGRESS NOTES
Problem: Depressed Mood (Adult/Pediatric)  Goal: *STG: Participates in treatment plan  Outcome: Progressing Towards Goal  Pt. Met in treatment team with Dr. Dorina Hughes    Pt. Discussed  Her sadness  And feelings about her mothers death   Continues to want to be with her  Contracts for safety in the hospital   Goal: *STG: Attends activities and groups  Outcome: Progressing Towards Goal  Attends select groups   Writing in her journal   Goal: Interventions  Outcome: Progressing Towards Goal  Will continue to monitor  On 15 min. Checks for safety  Assess  depression and anxiety  Medication compliance effectiveness  Encourage groups    Problem: Falls - Risk of  Goal: *Absence of Falls  Document Samanta Fall Risk and appropriate interventions in the flowsheet.    Outcome: Progressing Towards Goal  Fall Risk Interventions:        non slip socks  Bed in low position    Medication Interventions: Teach patient to arise slowly

## 2018-07-23 NOTE — BH NOTES
PRN Medication Documentation    Specific patient behavior that led to need for PRN medication: increased anxiety  Staff interventions attempted prior to PRN being given: decrease in stimuli, dimming of lights, etc  PRN medication given: PO 25 mg of atarax  Patient response/effectiveness of PRN medication: will continue to monitor      1708   PRN Medication Documentation    Specific patient behavior that led to need for PRN medication: continued increased anxiety  Staff interventions attempted prior to PRN being given: decrease in stimuli, dimming of lights, etc  PRN medication given: PO 0.5 mg ativan  Patient response/effectiveness of PRN medication: Will continue to monitor    PRN Medication Documentation    Specific patient behavior that led to need for PRN medication: headache pain  Staff interventions attempted prior to PRN being given: decrease in stimuli  PRN medication given:  mg of tylenol  Patient response/effectiveness of PRN medication: will reassess within 1 hour

## 2018-07-23 NOTE — BH NOTES
PSYCHIATRIC PROGRESS NOTE             IDENTIFICATION:    Patient Name  Brook Scheuermann   Date of Birth 1960   [facility] [id number]   Medical Record Number  [id number]      Age  59 y.o. PCP Julia Davila MD   Admit date:  7/16/2018    Room Number  732/02  @ [facility]   Date of Service  7/23/2018            HISTORY         REASON FOR HOSPITALIZATION:  CC:  Pt admitted under a temporary senior living order (TDO)  psychosis  proving to be an imminent danger to self and an inability to care for self. HISTORY OF PRESENT ILLNESS:    The patient, Brook Scheuermann, is a 59 y.o. WHITE [place]  female with a past psychiatric history significant for possible depression and psychosis , who presents at this time with complaints of (and/or evidence of) the following emotional symptoms: depression and delusions. Additional symptomatology include anxiety, feeling depressed and poor concentration. The above symptoms have been present for weeks . These symptoms are of moderate severity. These symptoms are intermittent/ fleeting in nature. 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   She is tangential and she is not logical and she is not feeling she needs to be here and she stated police pulled her over but she was not aware about getting stopped and she came to ER and mention that she has thoughts about hurting herself and she is not feeling that way now  7/19/18 she is talking but not making sense and she is talking about her mother and she write letter for her , she not expressing any depression and no SI or HI and she is paranoid and no aggressive behavior   7/20/18 she is feeling little better but still paranoid and disorganized but better than before and no anger issues and no actin g out behavior and no SI or HI    7/21/18 she is frustrated and she is talking about her mother and she is upset about her mother passed away and not feeling able to go over that , still disorganized and still confused about her situation, still feel depressed    7/22/18 she still feeling depressed and still wants to go with her mother and she is not acting about depressive thoughts and no anger issues and no SI or HI and still little paranoid   7/23/18 she still has suicidal gesture and she still feel depressed and anxious and still has been asking for discharge and she still talking about her past and has been not feeling happy and she requested to see  , still internally preoccupied    ALLERGIES:   Allergies   Allergen Reactions    Other Food Other (comments)     \"Bad chest pain\" with walnuts. Coke - asthma/stuffy head. Fish sticks causes an asthma attack.  Astepro [Azelastine] Other (comments)     Violent headaches.  Bactrim [Sulfamethoprim] Other (comments)     Difficulty breathing, chills, fever.  Banana Other (comments)     Diffculty breathing, wheezing, asthma attack.  Coconut Other (comments)     Difficulty breathing, asthma attack, SOB.  Cortisone Hives     Difficulty breathing.  Peanut Other (comments)     Wheezing, asthma attack, SOB.  Prednisone Hives     Difficulty breathing, kidney stones. MEDICATIONS PRIOR TO ADMISSION:   Prescriptions Prior to Admission   Medication Sig    therapeutic multivitamin (THERA) tablet Take 1 Tab by mouth daily.  zolpidem (AMBIEN) 10 mg tablet Take 10 mg by mouth nightly as needed.  levothyroxine (SYNTHROID) 125 mcg tablet Take 125 mcg by mouth Daily (before breakfast). Indications: hypothyroidism    fluticasone (FLONASE) 50 mcg/actuation nasal spray 2 Sprays by Both Nostrils route two (2) times a day.  Cholecalciferol, Vitamin D3, (VITAMIN D3) 1,000 unit cap Take 1,000 Units by mouth daily.  cyanocobalamin, vitamin B-12, (VITAMIN B-12) 5,000 mcg subl 5,000 mcg by SubLINGual route daily.  GREEN TEA EXTRACT PO Take 700 mg by mouth daily.  aspirin (ASPIRIN) 325 mg tablet Take 325-650 mg by mouth as needed for Pain.  diphenhydrAMINE (BENADRYL ALLERGY) 25 mg tablet Take 50 mg by mouth as needed for Sleep. PAST MEDICAL HISTORY:   Past Medical History:   Diagnosis Date    Asthma 1967    hospitalized for asthma attack x 2    Chronic kidney disease     kidney stones x 2-3 times    Ill-defined condition     nasal blockage from growth and misalignment - cannot breath out of nose - surgery in the future/cleared for colonoscopy 7/31/2014 - will bring in letter    Other ill-defined conditions(799.89)     blood in stool    Other ill-defined conditions(799.89)     hx low BP - 90's/60's-70's    PUD (peptic ulcer disease)     Thyroid disease     Unspecified adverse effect of anesthesia     nasal growth and misalignment and cannot breath through nose     Past Surgical History:   Procedure Laterality Date    HX HEENT      T & A    HX HEENT      turbinate surgery      SOCIAL HISTORY: single    Social History     Social History    Marital status: SINGLE     Spouse name: N/A    Number of children: N/A    Years of education: N/A     Occupational History    Not on file. Social History Main Topics    Smoking status: Never Smoker    Smokeless tobacco: Never Used    Alcohol use No    Drug use: No    Sexual activity: Not Currently     Other Topics Concern    Not on file     Social History Narrative      FAMILY HISTORY: History reviewed. No pertinent family history. Family History   Problem Relation Age of Onset    Stroke Mother     Other Mother      erosion of esophagus    Cancer Mother      melanoma/squamous    Heart Surgery Father      x2    Delayed Awakening Father    [place].Darter Pacemaker Father     Other Father      carotid endartarectomy/polio    Cataract Father        REVIEW OF SYSTEMS:   History obtained from chart review and the patient  Pertinent items are noted in the History of Present Illness.   All other Systems reviewed and are considered negative. MENTAL STATUS EXAM & VITALS     MENTAL STATUS EXAM (MSE):    MSE FINDINGS ARE WITHIN NORMAL LIMITS (WNL) UNLESS OTHERWISE STATED BELOW. ( ALL OF THE BELOW CATEGORIES OF THE MSE HAVE BEEN REVIEWED (reviewed 7/23/2018) AND UPDATED AS DEEMED APPROPRIATE )  General Presentation age appropriate, cooperative   Orientation oriented to time, place and person   Vital Signs  See below (reviewed 7/23/2018); Vital Signs (BP, Pulse, & Temp) are within normal limits if not listed below.    Gait and Station Stable/steady, no ataxia   Musculoskeletal System No extrapyramidal symptoms (EPS); no abnormal muscular movements or Tardive Dyskinesia (TD); muscle strength and tone are within normal limits   Language No aphasia or dysarthria   Speech:  normal pitch and normal volume   Thought Processes illogical; slow rate of thoughts; fair abstract reasoning/computation   Thought Associations loose associations and tangential   Thought Content paranoid delusions   Suicidal Ideations no plan  and no intention   Homicidal Ideations no plan  and no intention   Mood:  depressed   Affect:  constricted   Memory recent  fair   Memory remote:  fair   Concentration/Attention:  fair   Fund of Knowledge average   Insight:  limited   Reliability poor   Judgment:  limited          VITALS:     Patient Vitals for the past 24 hrs:   Temp Pulse Resp BP SpO2   07/23/18 0824 98.6 °F (37 °C) 67 16 128/82 100 %   07/22/18 2029 98.4 °F (36.9 °C) 66 16 132/76 99 %   07/22/18 1634 98.1 °F (36.7 °C) 80 16 113/75 -   07/22/18 0934 97.7 °F (36.5 °C) 94 16 110/73 -     Wt Readings from Last 3 Encounters:   07/16/18 72.6 kg (160 lb)   07/31/14 79.4 kg (175 lb)     Temp Readings from Last 3 Encounters:   07/23/18 98.6 °F (37 °C)     BP Readings from Last 3 Encounters:   07/23/18 128/82   07/31/14 110/69     Pulse Readings from Last 3 Encounters:   07/23/18 67   07/31/14 69            DATA     LABORATORY DATA:  Labs Reviewed   METABOLIC PANEL, COMPREHENSIVE - Abnormal; Notable for the following:        Result Value    Glucose 144 (*)     Creatinine 1.06 (*)     GFR est non-AA 53 (*)     AST (SGOT) 13 (*)     Globulin 4.2 (*)     A-G Ratio 1.0 (*)     All other components within normal limits   SALICYLATE - Abnormal; Notable for the following:     Salicylate level <7.0 (*)     All other components within normal limits   ACETAMINOPHEN - Abnormal; Notable for the following:     Acetaminophen level <2 (*)     All other components within normal limits   URINALYSIS W/MICROSCOPIC - Abnormal; Notable for the following:     Leukocyte Esterase SMALL (*)     All other components within normal limits   TSH 3RD GENERATION - Abnormal; Notable for the following:     TSH 0.05 (*)     All other components within normal limits   LIPID PANEL - Abnormal; Notable for the following:     Cholesterol, total 205 (*)     Triglyceride 186 (*)     All other components within normal limits   URINE CULTURE HOLD SAMPLE   CULTURE, URINE   CBC WITH AUTOMATED DIFF   SAMPLES BEING HELD   DRUG SCREEN, URINE   TROPONIN I   CK W/ REFLX CKMB   ETHYL ALCOHOL   HEMOGLOBIN A1C WITH EAG     Admission on 07/16/2018   Component Date Value Ref Range Status    WBC 07/16/2018 8.4  3.6 - 11.0 K/uL Final    RBC 07/16/2018 4.72  3.80 - 5.20 M/uL Final    HGB 07/16/2018 13.0  11.5 - 16.0 g/dL Final    HCT 07/16/2018 40.2  35.0 - 47.0 % Final    MCV 07/16/2018 85.2  80.0 - 99.0 FL Final    MCH 07/16/2018 27.5  26.0 - 34.0 PG Final    MCHC 07/16/2018 32.3  30.0 - 36.5 g/dL Final    RDW 07/16/2018 12.8  11.5 - 14.5 % Final    PLATELET 60/56/9577 938  150 - 400 K/uL Final    MPV 07/16/2018 10.4  8.9 - 12.9 FL Final    NRBC 07/16/2018 0.0  0  WBC Final    ABSOLUTE NRBC 07/16/2018 0.00  0.00 - 0.01 K/uL Final    NEUTROPHILS 07/16/2018 65  32 - 75 % Final    LYMPHOCYTES 07/16/2018 26  12 - 49 % Final    MONOCYTES 07/16/2018 6  5 - 13 % Final    EOSINOPHILS 07/16/2018 3  0 - 7 % Final  BASOPHILS 07/16/2018 1  0 - 1 % Final    IMMATURE GRANULOCYTES 07/16/2018 0  0.0 - 0.5 % Final    ABS. NEUTROPHILS 07/16/2018 5.5  1.8 - 8.0 K/UL Final    ABS. LYMPHOCYTES 07/16/2018 2.2  0.8 - 3.5 K/UL Final    ABS. MONOCYTES 07/16/2018 0.5  0.0 - 1.0 K/UL Final    ABS. EOSINOPHILS 07/16/2018 0.2  0.0 - 0.4 K/UL Final    ABS. BASOPHILS 07/16/2018 0.0  0.0 - 0.1 K/UL Final    ABS. IMM. GRANS. 07/16/2018 0.0  0.00 - 0.04 K/UL Final    DF 07/16/2018 AUTOMATED    Final    Sodium 07/16/2018 140  136 - 145 mmol/L Final    Potassium 07/16/2018 3.6  3.5 - 5.1 mmol/L Final    Chloride 07/16/2018 107  97 - 108 mmol/L Final    CO2 07/16/2018 23  21 - 32 mmol/L Final    Anion gap 07/16/2018 10  5 - 15 mmol/L Final    Glucose 07/16/2018 144* 65 - 100 mg/dL Final    BUN 07/16/2018 13  6 - 20 MG/DL Final    Creatinine 07/16/2018 1.06* 0.55 - 1.02 MG/DL Final    BUN/Creatinine ratio 07/16/2018 12  12 - 20   Final    GFR est AA 07/16/2018 >60  >60 ml/min/1.73m2 Final    GFR est non-AA 07/16/2018 53* >60 ml/min/1.73m2 Final    Calcium 07/16/2018 8.7  8.5 - 10.1 MG/DL Final    Bilirubin, total 07/16/2018 0.4  0.2 - 1.0 MG/DL Final    ALT (SGPT) 07/16/2018 24  12 - 78 U/L Final    AST (SGOT) 07/16/2018 13* 15 - 37 U/L Final    Alk.  phosphatase 07/16/2018 77  45 - 117 U/L Final    Protein, total 07/16/2018 8.2  6.4 - 8.2 g/dL Final    Albumin 07/16/2018 4.0  3.5 - 5.0 g/dL Final    Globulin 07/16/2018 4.2* 2.0 - 4.0 g/dL Final    A-G Ratio 07/16/2018 1.0* 1.1 - 2.2   Final    AMPHETAMINES 07/17/2018 NEGATIVE   NEG   Final    BARBITURATES 07/17/2018 NEGATIVE   NEG   Final    BENZODIAZEPINES 07/17/2018 NEGATIVE   NEG   Final    COCAINE 07/17/2018 NEGATIVE   NEG   Final    METHADONE 07/17/2018 NEGATIVE   NEG   Final    OPIATES 07/17/2018 NEGATIVE   NEG   Final    PCP(PHENCYCLIDINE) 07/17/2018 NEGATIVE   NEG   Final    THC (TH-CANNABINOL) 07/17/2018 NEGATIVE   NEG   Final    Drug screen comment 07/17/2018 (NOTE)   Final    Salicylate level 76/38/9115 <1.7* 2.8 - 20.0 MG/DL Final    Acetaminophen level 07/16/2018 <2* 10 - 30 ug/mL Final    Troponin-I, Qt. 07/16/2018 <0.05  <0.05 ng/mL Final    CK 07/16/2018 108  26 - 192 U/L Final    Color 07/17/2018 YELLOW/STRAW    Final    Appearance 07/17/2018 CLEAR  CLEAR   Final    Specific gravity 07/17/2018 1.013  1.003 - 1.030   Final    pH (UA) 07/17/2018 5.5  5.0 - 8.0   Final    Protein 07/17/2018 NEGATIVE   NEG mg/dL Final    Glucose 07/17/2018 NEGATIVE   NEG mg/dL Final    Ketone 07/17/2018 NEGATIVE   NEG mg/dL Final    Bilirubin 07/17/2018 NEGATIVE   NEG   Final    Blood 07/17/2018 NEGATIVE   NEG   Final    Urobilinogen 07/17/2018 0.2  0.2 - 1.0 EU/dL Final    Nitrites 07/17/2018 NEGATIVE   NEG   Final    Leukocyte Esterase 07/17/2018 SMALL* NEG   Final    WBC 07/17/2018 10-20  0 - 4 /hpf Final    RBC 07/17/2018 0-5  0 - 5 /hpf Final    Epithelial cells 07/17/2018 FEW  FEW /lpf Final    Bacteria 07/17/2018 NEGATIVE   NEG /hpf Final    Hyaline cast 07/17/2018 0-2  0 - 5 /lpf Final    Urine culture hold 07/17/2018 URINE ON HOLD IN MICROBIOLOGY DEPT FOR 3 DAYS. IF UNPRESERVED URINE IS SUBMITTED, IT CANNOT BE USED FOR ADDITIONAL TESTING AFTER 24 HRS, RECOLLECTION WILL BE REQUIRED.     Final    Special Requests: 07/17/2018 NO SPECIAL REQUESTS    Final    [place] Count 07/17/2018     Final                    Value:[id number]  COLONIES/mL      Culture result: 07/17/2018 MIXED UROGENITAL KIMBERLEE ISOLATED    Final    Ventricular Rate 07/17/2018 71  BPM Final    Atrial Rate 07/17/2018 71  BPM Final    P-R Interval 07/17/2018 158  ms Final    QRS Duration 07/17/2018 88  ms Final    Q-T Interval 07/17/2018 406  ms Final    QTC Calculation (Bezet) 07/17/2018 441  ms Final    Calculated P Axis 07/17/2018 58  degrees Final    Calculated R Axis 07/17/2018 -24  degrees Final    Calculated T Axis 07/17/2018 39  degrees Final    Diagnosis 07/17/2018 Final                    Value:Normal sinus rhythm  When compared with ECG of 04-MAR-2005 17:15,  Previous ECG has undetermined rhythm, needs review  T wave inversion no longer evident in Anterior leads  Confirmed by Stella Longoria MD, Miller William ([id number]) on 7/17/2018 9:49:31 AM      ALCOHOL(ETHYL),SERUM 07/17/2018 <10  <10 MG/DL Final    TSH 07/17/2018 0.05* 0.36 - 3.74 uIU/mL Final    LIPID PROFILE 07/19/2018        Final    Cholesterol, total 07/19/2018 205* <200 MG/DL Final    Triglyceride 07/19/2018 186* <150 MG/DL Final    HDL Cholesterol 07/19/2018 68  MG/DL Final    LDL, calculated 07/19/2018 99.8  0 - 100 MG/DL Final    VLDL, calculated 07/19/2018 37.2  MG/DL Final    CHOL/HDL Ratio 07/19/2018 3.0  0 - 5.0   Final    Hemoglobin A1c 07/19/2018 6.1  4.2 - 6.3 % Final    Est. average glucose 07/19/2018 128  mg/dL Final        RADIOLOGY REPORTS:    Results from Hospital Encounter encounter on 09/10/12   XR CHEST PA LAT   Narrative **Final Report**      ICD Codes / Adm. Diagnosis: 786.09   / Other dyspnea and respiratory    Examination:  CR CHEST PA AND LATERAL  - [id number] - Sep 10 2012  5:22PM  Accession No:  [id number]  Reason:  786.0      REPORT:  INDICATION:    786.0     COMPARISON: None. FINDINGS: PA and lateral radiographs of the chest demonstrate clear lungs. The cardiac and mediastinal contours and pulmonary vascularity are normal.    Spondylitic changes are present. .        IMPRESSION: No acute process           Signing/Reading Doctor: Jasmeet Gandhi ([id number])    Approved: Jasmeet Gandhi ([id number])  09/10/2012                                  Ct Head Wo Cont    Result Date: 7/17/2018  EXAM:  CT HEAD WO CONT INDICATION:  Altered mental status. COMPARISON: None. CONTRAST:  None. TECHNIQUE: Unenhanced CT of the head was performed using 5 mm images. Brain and bone windows were generated.   CT dose reduction was achieved through use of a standardized protocol tailored for this examination and automatic exposure control for dose modulation. Adaptive statistical iterative reconstruction (ASIR) was utilized. FINDINGS: The ventricles and sulci are normal in size, shape and configuration and midline. There is no significant white matter disease. There is no intracranial hemorrhage, extra-axial collection, mass, mass effect or midline shift. The basilar cisterns are open. No acute infarct is identified. The bone windows demonstrate no abnormalities. The visualized portions of the paranasal sinuses and mastoid air cells are clear. IMPRESSION: No acute findings.              MEDICATIONS       ALL MEDICATIONS  Current Facility-Administered Medications   Medication Dose Route Frequency    LORazepam (ATIVAN) tablet 0.5 mg  0.5 mg Oral Q6H PRN    hydrOXYzine HCl (ATARAX) tablet 25 mg  25 mg Oral Q6H PRN    sertraline (ZOLOFT) tablet 50 mg  50 mg Oral QPM    levothyroxine (SYNTHROID) tablet 125 mcg  125 mcg Oral ACB    therapeutic multivitamin (THERAGRAN) tablet 1 Tab  1 Tab Oral DAILY    aspirin (ASPIRIN) tablet 325 mg  325 mg Oral DAILY    fluticasone (FLONASE) 50 mcg/actuation nasal spray 2 Spray  2 Spray Both Nostrils BID    risperiDONE (RisperDAL) tablet 0.5 mg  0.5 mg Oral BID    cholecalciferol (VITAMIN D3) tablet 1,000 Units  1,000 Units Oral DAILY    ziprasidone (GEODON) 20 mg in sterile water (preservative free) 1 mL injection  20 mg IntraMUSCular BID PRN    OLANZapine (ZyPREXA) tablet 5 mg  5 mg Oral Q6H PRN    benztropine (COGENTIN) tablet 2 mg  2 mg Oral BID PRN    benztropine (COGENTIN) injection 2 mg  2 mg IntraMUSCular BID PRN    zolpidem (AMBIEN) tablet 10 mg  10 mg Oral QHS PRN    acetaminophen (TYLENOL) tablet 650 mg  650 mg Oral Q4H PRN    ibuprofen (MOTRIN) tablet 400 mg  400 mg Oral Q8H PRN    magnesium hydroxide (MILK OF MAGNESIA) 400 mg/5 mL oral suspension 30 mL  30 mL Oral DAILY PRN    nicotine (NICODERM CQ) 21 mg/24 hr patch 1 Patch  1 Patch TransDERmal DAILY PRN      SCHEDULED MEDICATIONS  Current Facility-Administered Medications   Medication Dose Route Frequency    sertraline (ZOLOFT) tablet 50 mg  50 mg Oral QPM    levothyroxine (SYNTHROID) tablet 125 mcg  125 mcg Oral ACB    therapeutic multivitamin (THERAGRAN) tablet 1 Tab  1 Tab Oral DAILY    aspirin (ASPIRIN) tablet 325 mg  325 mg Oral DAILY    fluticasone (FLONASE) 50 mcg/actuation nasal spray 2 Spray  2 Spray Both Nostrils BID    risperiDONE (RisperDAL) tablet 0.5 mg  0.5 mg Oral BID    cholecalciferol (VITAMIN D3) tablet 1,000 Units  1,000 Units Oral DAILY                ASSESSMENT & PLAN        The patient, Hamilton Irving, is a 59 y.o.  female who presents at this time for treatment of the following diagnoses:  Patient Active Hospital Problem List:   Psychos and Depression (7/17/2018)    Assessment: depression and delusion     Plan: initiate Risperidone 0.25 mg po bid   7/19/18 increase Risperidone to 0.5 mg po bid    7/20/18 continue same medications    7/21/18 add Zoloft   7/22/18 increase Zoloft to 50 mg po daily   7/23/18 increase Risperidone to 1 mg po bid           I will continue to monitor blood levels (Depakote, Tegretol, lithium, clozapine---a drug with a narrow therapeutic index= NTI) and associated labs for drug therapy implemented that require intense monitoring for toxicity as deemed appropriate based on current medication side effects and pharmacodynamically determined drug 1/2 lives. A coordinated, multidisplinary treatment team (includes the nurse, unit pharmcist,  and writer) round was conducted for this initial evaluation with the patient present. The following regarding medications was addressed during rounds with patient:   the risks and benefits of the proposed medication. The patient was given the opportunity to ask questions. Informed consent given to the use of the above medications.      I will continue to adjust psychiatric and non-psychiatric medications (see above \"medication\" section and orders section for details) as deemed appropriate & based upon diagnoses and response to treatment. I have reviewed admission (and previous/old) labs and medical tests in the EHR and or transferring hospital documents. I will continue to order blood tests/labs and diagnostic tests as deemed appropriate and review results as they become available (see orders for details). I have reviewed old psychiatric and medical records available in the EHR. I Will order additional psychiatric records from other institutions to further elucidate the nature of patient's psychopathology and review once available. I will gather additional collateral information from friends, family and o/p treatment team to further elucidate the nature of patient's psychopathology and baselline level of psychiatric functioning.       ESTIMATED LENGTH OF STAY:    3       STRENGTHS:  Exercising self-direction/Resourceful, Access to housing/residential stability and Interpersonal/supportive relationships (family, friends, peers)                                        SIGNED:    Jamila Rai MD  7/23/2018

## 2018-07-23 NOTE — BH NOTES
GROUP THERAPY PROGRESS NOTE    Armaan Lagos did not participate in an 25 minute Acute Unit Process Group, with a focus identifying feelings, planning for the rest of the day, and discussing discharge.

## 2018-07-23 NOTE — PROGRESS NOTES
Spiritual Care Assessment/Progress Note  [facility]      NAME: Jada Peña      MRN: [id number]  AGE: 59 y.o.  SEX: female  Lutheran Affiliation: Scientologist   Language: English     7/23/2018     Total Time (in minutes): 50     Spiritual Assessment begun in [facility] through conversation with:         [x]Patient        [] Family    [] Friend(s)        Reason for Consult: Initial/Spiritual assessment, patient floor, Request by patient     Spiritual beliefs: (Please include comment if needed)     [x] Identifies with a brian tradition:         [] Supported by a brian community:            [] Claims no spiritual orientation:           [] Seeking spiritual identity:                [] Adheres to an individual form of spirituality:           [] Not able to assess:                           Identified resources for coping:      [] Prayer                               [] Music                  [] Guided Imagery     [x] Family/friends                 [] Pet visits     [] Devotional reading                         [] Unknown     [] Other:                                             Interventions offered during this visit: (See comments for more details)    Patient Interventions: Affirmation of emotions/emotional suffering, Affirmation of brian, Catharsis/review of pertinent events in supportive environment, Iconic (affirming the presence of God/Higher Power)           Plan of Care:     [x] Support spiritual and/or cultural needs    [] Support AMD and/or advance care planning process      [] Support grieving process   [] Coordinate Rites and/or Rituals    [] Coordination with community clergy   [] No spiritual needs identified at this time   [] Detailed Plan of Care below (See Comments)  [] Make referral to Music Therapy  [] Make referral to Pet Therapy     [] Make referral to Addiction services  [] Make referral to [facility]  [] Make referral to Spiritual Care Partner  [] No future visits requested [x] Follow up visits as needed     Visited pt on 7W Acute and her request. Pt's mother  recently and this was a difficult loss for her. Evidently she was very close to her mother and she repeated a refrain of wanting to go and be with her  mother. She spoke of not wanting to go the the \"bad place\" when she dies, rather wanting to go to the [facility]. \" She also reflected on her brother who  some years ago. During the visit she did not look up and never made eye contact with me. Chaplains will follow as needed.   Chaplain Eleno, MDiv, MS, [address] [employer]  [address] [place] [address] ([id number])

## 2018-07-23 NOTE — PROGRESS NOTES
Problem: Depressed Mood (Adult/Pediatric)  Goal: *STG: Remains safe in hospital  Outcome: Progressing Towards Goal  Pt demonstrates no acting out behaviors. She visits amenably with her father.

## 2018-07-23 NOTE — INTERDISCIPLINARY ROUNDS
Behavioral Health Interdisciplinary Rounds     Patient Name: Blanchie Schwab  Age: 59 y.o. Room/Bed:  732/02  Primary Diagnosis: Depression   Admission Status: Involuntary Commitment     Readmission within 30 days: no  Power of  in place: no  Patient requires a blocked bed: no          Reason for blocked bed:     VTE Prophylaxis: Not indicated    Mobility needs/Fall risk: no    Nutritional Plan: no  Consults:          Labs/Testing due today?: no    Sleep hours: 6.25       Participation in Care/Groups:  yes  Medication Compliant?: Yes  PRNS (last 24 hours): Antipsychotic (PO), Antianxiety and Sleep Aid    Restraints (last 24 hours):  no     CIWA (range last 24 hours):     COWS (range last 24 hours):      Alcohol screening (AUDIT) completed -   AUDIT Score: 0     If applicable, date SBIRT discussed in treatment team AND documented:     Tobacco - patient is a smoker: Have You Used Tobacco in the Past 30 Days: No  Illegal Drugs use: Have You Used Any Illegal Substances Over the Past 12 Months: No    24 hour chart check complete: yes     Patient goal(s) for today:   Treatment team focus/goals: Plan to titrate her medications. Progress note - she remains depressed and flat. She had bad dreams about her mother last night. She has been writing in her journal each day . LOS:  6  Expected LOS: TBD     Financial concerns/prescription coverage:  No benefits -- Erica SOTO   Date of last family contact:  Father visited last night      Family requesting physician contact today:    Discharge plan: She will return home with her father    Guns in the home:   no      Outpatient provider(s): [facility]    Participating treatment team members: Nikki Reyes, PharmD.

## 2018-07-24 PROCEDURE — 74011250637 HC RX REV CODE- 250/637: Performed by: PSYCHIATRY & NEUROLOGY

## 2018-07-24 PROCEDURE — 65220000003 HC RM SEMIPRIVATE PSYCH

## 2018-07-24 RX ORDER — SERTRALINE HYDROCHLORIDE 50 MG/1
75 TABLET, FILM COATED ORAL EVERY EVENING
Status: DISCONTINUED | OUTPATIENT
Start: 2018-07-24 | End: 2018-07-26 | Stop reason: HOSPADM

## 2018-07-24 RX ADMIN — ZOLPIDEM TARTRATE 10 MG: 10 TABLET ORAL at 21:16

## 2018-07-24 RX ADMIN — LEVOTHYROXINE SODIUM 125 MCG: 100 TABLET ORAL at 06:16

## 2018-07-24 RX ADMIN — VITAMIN D, TAB 1000IU (100/BT) 1000 UNITS: 25 TAB at 08:49

## 2018-07-24 RX ADMIN — HYDROXYZINE HYDROCHLORIDE 25 MG: 25 TABLET, FILM COATED ORAL at 10:16

## 2018-07-24 RX ADMIN — RISPERIDONE 1 MG: 1 TABLET ORAL at 08:49

## 2018-07-24 RX ADMIN — THERA TABS 1 TABLET: TAB at 08:49

## 2018-07-24 RX ADMIN — ACETAMINOPHEN 650 MG: 325 TABLET, FILM COATED ORAL at 06:16

## 2018-07-24 RX ADMIN — ASPIRIN 325 MG: 325 TABLET ORAL at 08:49

## 2018-07-24 RX ADMIN — SERTRALINE HYDROCHLORIDE 75 MG: 50 TABLET ORAL at 17:05

## 2018-07-24 RX ADMIN — LORAZEPAM 0.5 MG: 0.5 TABLET ORAL at 17:04

## 2018-07-24 RX ADMIN — RISPERIDONE 1 MG: 1 TABLET ORAL at 21:16

## 2018-07-24 NOTE — PROGRESS NOTES
Problem: Discharge Planning  Goal: *Discharge to safe environment  Outcome: Progressing Towards Goal  Father is very supportive. Erica is granting her stay and will see when she is discharge.       Goal: *Knowledge of medication management  She is complaint with her medications and treatment   Goal: *Knowledge of discharge instructions  Outcome: Progressing Towards Goal  Consider stepping down to CSU

## 2018-07-24 NOTE — PROGRESS NOTES
Problem: Depressed Mood (Adult/Pediatric)  Goal: *STG: Remains safe in hospital  Outcome: Progressing Towards Goal  2300- Received client resting quietly in bed with eyes closed. Respirations even and unlabored, NAD. Will continue to monitor for safety.

## 2018-07-24 NOTE — BH NOTES
PSYCHIATRIC PROGRESS NOTE             IDENTIFICATION:    Patient Name  Liot Li   Date of Birth 1960   [facility] [id number]   Medical Record Number  [id number]      Age  59 y.o. PCP Nohelia Meredith MD   Admit date:  7/16/2018    Room Number  732/02  @ [facility]   Date of Service  7/24/2018            HISTORY         REASON FOR HOSPITALIZATION:  CC:  Pt admitted under a temporary residential order (TDO)  psychosis  proving to be an imminent danger to self and an inability to care for self. HISTORY OF PRESENT ILLNESS:    The patient, Lito Li, is a 59 y.o. WHITE [place]  female with a past psychiatric history significant for possible depression and psychosis , who presents at this time with complaints of (and/or evidence of) the following emotional symptoms: depression and delusions. Additional symptomatology include anxiety, feeling depressed and poor concentration. The above symptoms have been present for weeks . These symptoms are of moderate severity. These symptoms are intermittent/ fleeting in nature. 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   She is tangential and she is not logical and she is not feeling she needs to be here and she stated police pulled her over but she was not aware about getting stopped and she came to ER and mention that she has thoughts about hurting herself and she is not feeling that way now  7/19/18 she is talking but not making sense and she is talking about her mother and she write letter for her , she not expressing any depression and no SI or HI and she is paranoid and no aggressive behavior   7/20/18 she is feeling little better but still paranoid and disorganized but better than before and no anger issues and no actin g out behavior and no SI or HI    7/21/18 she is frustrated and she is talking about her mother and she is upset about her mother passed away and not feeling able to go over that , still disorganized and still confused about her situation, still feel depressed    7/22/18 she still feeling depressed and still wants to go with her mother and she is not acting about depressive thoughts and no anger issues and no SI or HI and still little paranoid   7/23/18 she still has suicidal gesture and she still feel depressed and anxious and still has been asking for discharge and she still talking about her past and has been not feeling happy and she requested to see  , still internally preoccupied   7/24/18 she still feels depressed and she still grieving about her mother and she stated she wants to be with her and she does not have plan to hurt herself and she still talking about wanting to join her mother but still wants to act like her, no paranoid feeling but still disorganized    ALLERGIES:   Allergies   Allergen Reactions    Other Food Other (comments)     \"Bad chest pain\" with walnuts. Coke - asthma/stuffy head. Fish sticks causes an asthma attack.  Astepro [Azelastine] Other (comments)     Violent headaches.  Bactrim [Sulfamethoprim] Other (comments)     Difficulty breathing, chills, fever.  Banana Other (comments)     Diffculty breathing, wheezing, asthma attack.  Coconut Other (comments)     Difficulty breathing, asthma attack, SOB.  Cortisone Hives     Difficulty breathing.  Peanut Other (comments)     Wheezing, asthma attack, SOB.  Prednisone Hives     Difficulty breathing, kidney stones. MEDICATIONS PRIOR TO ADMISSION:   Prescriptions Prior to Admission   Medication Sig    therapeutic multivitamin (THERA) tablet Take 1 Tab by mouth daily.  zolpidem (AMBIEN) 10 mg tablet Take 10 mg by mouth nightly as needed.  levothyroxine (SYNTHROID) 125 mcg tablet Take 125 mcg by mouth Daily (before breakfast).  Indications: hypothyroidism    fluticasone (FLONASE) 50 mcg/actuation nasal spray 2 Sprays by Both Nostrils route two (2) times a day.  Cholecalciferol, Vitamin D3, (VITAMIN D3) 1,000 unit cap Take 1,000 Units by mouth daily.  cyanocobalamin, vitamin B-12, (VITAMIN B-12) 5,000 mcg subl 5,000 mcg by SubLINGual route daily.  GREEN TEA EXTRACT PO Take 700 mg by mouth daily.  aspirin (ASPIRIN) 325 mg tablet Take 325-650 mg by mouth as needed for Pain.  diphenhydrAMINE (BENADRYL ALLERGY) 25 mg tablet Take 50 mg by mouth as needed for Sleep. PAST MEDICAL HISTORY:   Past Medical History:   Diagnosis Date    Asthma 1967    hospitalized for asthma attack x 2    Chronic kidney disease     kidney stones x 2-3 times    Ill-defined condition     nasal blockage from growth and misalignment - cannot breath out of nose - surgery in the future/cleared for colonoscopy 7/31/2014 - will bring in letter    Other ill-defined conditions(799.89)     blood in stool    Other ill-defined conditions(799.89)     hx low BP - 90's/60's-70's    PUD (peptic ulcer disease)     Thyroid disease     Unspecified adverse effect of anesthesia     nasal growth and misalignment and cannot breath through nose     Past Surgical History:   Procedure Laterality Date    HX HEENT      T & A    HX HEENT      turbinate surgery      SOCIAL HISTORY: single    Social History     Social History    Marital status: SINGLE     Spouse name: N/A    Number of children: N/A    Years of education: N/A     Occupational History    Not on file. Social History Main Topics    Smoking status: Never Smoker    Smokeless tobacco: Never Used    Alcohol use No    Drug use: No    Sexual activity: Not Currently     Other Topics Concern    Not on file     Social History Narrative      FAMILY HISTORY: History reviewed. No pertinent family history.    Family History   Problem Relation Age of Onset    Stroke Mother     Other Mother      erosion of esophagus    Cancer Mother      melanoma/squamous    Heart Surgery Father      x2    Delayed Awakening Father     Pacemaker Father     Other Father      carotid endartarectomy/polio    Cataract Father        REVIEW OF SYSTEMS:   History obtained from chart review and the patient  Pertinent items are noted in the History of Present Illness. All other Systems reviewed and are considered negative. MENTAL STATUS EXAM & VITALS     MENTAL STATUS EXAM (MSE):    MSE FINDINGS ARE WITHIN NORMAL LIMITS (WNL) UNLESS OTHERWISE STATED BELOW. ( ALL OF THE BELOW CATEGORIES OF THE MSE HAVE BEEN REVIEWED (reviewed 7/24/2018) AND UPDATED AS DEEMED APPROPRIATE )  General Presentation age appropriate, cooperative   Orientation oriented to time, place and person   Vital Signs  See below (reviewed 7/24/2018); Vital Signs (BP, Pulse, & Temp) are within normal limits if not listed below.    Gait and Station Stable/steady, no ataxia   Musculoskeletal System No extrapyramidal symptoms (EPS); no abnormal muscular movements or Tardive Dyskinesia (TD); muscle strength and tone are within normal limits   Language No aphasia or dysarthria   Speech:  normal pitch and normal volume   Thought Processes illogical; slow rate of thoughts; fair abstract reasoning/computation   Thought Associations loose associations and tangential   Thought Content paranoid delusions   Suicidal Ideations no plan  and no intention   Homicidal Ideations no plan  and no intention   Mood:  depressed   Affect:  constricted   Memory recent  fair   Memory remote:  fair   Concentration/Attention:  fair   Fund of Knowledge average   Insight:  limited   Reliability poor   Judgment:  limited          VITALS:     Patient Vitals for the past 24 hrs:   Temp Pulse Resp BP SpO2   07/24/18 0727 97.8 °F (36.6 °C) 70 16 117/79 99 %   07/23/18 2100 98.5 °F (36.9 °C) 75 16 122/72 -   07/23/18 1515 98.1 °F (36.7 °C) 66 16 130/82 99 %   07/23/18 0824 98.6 °F (37 °C) 67 16 128/82 100 %     Wt Readings from Last 3 Encounters:   07/16/18 72.6 kg (160 lb)   07/31/14 79.4 kg (175 lb)     Temp Readings from Last 3 Encounters:   07/24/18 97.8 °F (36.6 °C)     BP Readings from Last 3 Encounters:   07/24/18 117/79   07/31/14 110/69     Pulse Readings from Last 3 Encounters:   07/24/18 70   07/31/14 69            DATA     LABORATORY DATA:  Labs Reviewed   METABOLIC PANEL, COMPREHENSIVE - Abnormal; Notable for the following:        Result Value    Glucose 144 (*)     Creatinine 1.06 (*)     GFR est non-AA 53 (*)     AST (SGOT) 13 (*)     Globulin 4.2 (*)     A-G Ratio 1.0 (*)     All other components within normal limits   SALICYLATE - Abnormal; Notable for the following:     Salicylate level <7.4 (*)     All other components within normal limits   ACETAMINOPHEN - Abnormal; Notable for the following:     Acetaminophen level <2 (*)     All other components within normal limits   URINALYSIS W/MICROSCOPIC - Abnormal; Notable for the following:     Leukocyte Esterase SMALL (*)     All other components within normal limits   TSH 3RD GENERATION - Abnormal; Notable for the following:     TSH 0.05 (*)     All other components within normal limits   LIPID PANEL - Abnormal; Notable for the following:     Cholesterol, total 205 (*)     Triglyceride 186 (*)     All other components within normal limits   URINE CULTURE HOLD SAMPLE   CULTURE, URINE   CBC WITH AUTOMATED DIFF   SAMPLES BEING HELD   DRUG SCREEN, URINE   TROPONIN I   CK W/ REFLX CKMB   ETHYL ALCOHOL   HEMOGLOBIN A1C WITH EAG     Admission on 07/16/2018   Component Date Value Ref Range Status    WBC 07/16/2018 8.4  3.6 - 11.0 K/uL Final    RBC 07/16/2018 4.72  3.80 - 5.20 M/uL Final    HGB 07/16/2018 13.0  11.5 - 16.0 g/dL Final    HCT 07/16/2018 40.2  35.0 - 47.0 % Final    MCV 07/16/2018 85.2  80.0 - 99.0 FL Final    MCH 07/16/2018 27.5  26.0 - 34.0 PG Final    MCHC 07/16/2018 32.3  30.0 - 36.5 g/dL Final    RDW 07/16/2018 12.8  11.5 - 14.5 % Final    PLATELET 84/74/6589 354  150 - 400 K/uL Final    MPV 07/16/2018 10.4  8.9 - 12.9 FL Final    NRBC 07/16/2018 0.0  0  WBC Final    ABSOLUTE NRBC 07/16/2018 0.00  0.00 - 0.01 K/uL Final    NEUTROPHILS 07/16/2018 65  32 - 75 % Final    LYMPHOCYTES 07/16/2018 26  12 - 49 % Final    MONOCYTES 07/16/2018 6  5 - 13 % Final    EOSINOPHILS 07/16/2018 3  0 - 7 % Final    BASOPHILS 07/16/2018 1  0 - 1 % Final    IMMATURE GRANULOCYTES 07/16/2018 0  0.0 - 0.5 % Final    ABS. NEUTROPHILS 07/16/2018 5.5  1.8 - 8.0 K/UL Final    ABS. LYMPHOCYTES 07/16/2018 2.2  0.8 - 3.5 K/UL Final    ABS. MONOCYTES 07/16/2018 0.5  0.0 - 1.0 K/UL Final    ABS. EOSINOPHILS 07/16/2018 0.2  0.0 - 0.4 K/UL Final    ABS. BASOPHILS 07/16/2018 0.0  0.0 - 0.1 K/UL Final    ABS. IMM. GRANS. 07/16/2018 0.0  0.00 - 0.04 K/UL Final    DF 07/16/2018 AUTOMATED    Final    Sodium 07/16/2018 140  136 - 145 mmol/L Final    Potassium 07/16/2018 3.6  3.5 - 5.1 mmol/L Final    Chloride 07/16/2018 107  97 - 108 mmol/L Final    CO2 07/16/2018 23  21 - 32 mmol/L Final    Anion gap 07/16/2018 10  5 - 15 mmol/L Final    Glucose 07/16/2018 144* 65 - 100 mg/dL Final    BUN 07/16/2018 13  6 - 20 MG/DL Final    Creatinine 07/16/2018 1.06* 0.55 - 1.02 MG/DL Final    BUN/Creatinine ratio 07/16/2018 12  12 - 20   Final    GFR est AA 07/16/2018 >60  >60 ml/min/1.73m2 Final    GFR est non-AA 07/16/2018 53* >60 ml/min/1.73m2 Final    Calcium 07/16/2018 8.7  8.5 - 10.1 MG/DL Final    Bilirubin, total 07/16/2018 0.4  0.2 - 1.0 MG/DL Final    ALT (SGPT) 07/16/2018 24  12 - 78 U/L Final    AST (SGOT) 07/16/2018 13* 15 - 37 U/L Final    Alk.  phosphatase 07/16/2018 77  45 - 117 U/L Final    Protein, total 07/16/2018 8.2  6.4 - 8.2 g/dL Final    Albumin 07/16/2018 4.0  3.5 - 5.0 g/dL Final    Globulin 07/16/2018 4.2* 2.0 - 4.0 g/dL Final    A-G Ratio 07/16/2018 1.0* 1.1 - 2.2   Final    AMPHETAMINES 07/17/2018 NEGATIVE   NEG   Final    BARBITURATES 07/17/2018 NEGATIVE   NEG   Final    BENZODIAZEPINES 07/17/2018 NEGATIVE NEG   Final    COCAINE 07/17/2018 NEGATIVE   NEG   Final    METHADONE 07/17/2018 NEGATIVE   NEG   Final    OPIATES 07/17/2018 NEGATIVE   NEG   Final    PCP(PHENCYCLIDINE) 07/17/2018 NEGATIVE   NEG   Final    THC (TH-CANNABINOL) 07/17/2018 NEGATIVE   NEG   Final    Drug screen comment 07/17/2018 (NOTE)   Final    Salicylate level 72/21/2949 <1.7* 2.8 - 20.0 MG/DL Final    Acetaminophen level 07/16/2018 <2* 10 - 30 ug/mL Final    Troponin-I, Qt. 07/16/2018 <0.05  <0.05 ng/mL Final    CK 07/16/2018 108  26 - 192 U/L Final    Color 07/17/2018 YELLOW/STRAW    Final    Appearance 07/17/2018 CLEAR  CLEAR   Final    Specific gravity 07/17/2018 1.013  1.003 - 1.030   Final    pH (UA) 07/17/2018 5.5  5.0 - 8.0   Final    Protein 07/17/2018 NEGATIVE   NEG mg/dL Final    Glucose 07/17/2018 NEGATIVE   NEG mg/dL Final    Ketone 07/17/2018 NEGATIVE   NEG mg/dL Final    Bilirubin 07/17/2018 NEGATIVE   NEG   Final    Blood 07/17/2018 NEGATIVE   NEG   Final    Urobilinogen 07/17/2018 0.2  0.2 - 1.0 EU/dL Final    Nitrites 07/17/2018 NEGATIVE   NEG   Final    Leukocyte Esterase 07/17/2018 SMALL* NEG   Final    WBC 07/17/2018 10-20  0 - 4 /hpf Final    RBC 07/17/2018 0-5  0 - 5 /hpf Final    Epithelial cells 07/17/2018 FEW  FEW /lpf Final    Bacteria 07/17/2018 NEGATIVE   NEG /hpf Final    Hyaline cast 07/17/2018 0-2  0 - 5 /lpf Final    Urine culture hold 07/17/2018 URINE ON HOLD IN MICROBIOLOGY DEPT FOR 3 DAYS. IF UNPRESERVED URINE IS SUBMITTED, IT CANNOT BE USED FOR ADDITIONAL TESTING AFTER 24 HRS, RECOLLECTION WILL BE REQUIRED.     Final    Special Requests: 07/17/2018 NO SPECIAL REQUESTS    Final    Beatrice Count 07/17/2018     Final                    Value:10702  COLONIES/mL      Culture result: 07/17/2018 MIXED UROGENITAL KIMBERLEE ISOLATED    Final    Ventricular Rate 07/17/2018 71  BPM Final    Atrial Rate 07/17/2018 71  BPM Final    P-R Interval 07/17/2018 158  ms Final    QRS Duration 07/17/2018 88  ms Final    Q-T Interval 07/17/2018 406  ms Final    QTC Calculation (Bezet) 07/17/2018 441  ms Final    Calculated P Axis 07/17/2018 58  degrees Final    Calculated R Axis 07/17/2018 -24  degrees Final    Calculated T Axis 07/17/2018 39  degrees Final    Diagnosis 07/17/2018    Final                    Value:Normal sinus rhythm  When compared with ECG of 04-MAR-2005 17:15,  Previous ECG has undetermined rhythm, needs review  T wave inversion no longer evident in Anterior leads  Confirmed by Reed King MD, Melvin ([id number]) on 7/17/2018 9:49:31 AM      ALCOHOL(ETHYL),SERUM 07/17/2018 <10  <10 MG/DL Final    TSH 07/17/2018 0.05* 0.36 - 3.74 uIU/mL Final    LIPID PROFILE 07/19/2018        Final    Cholesterol, total 07/19/2018 205* <200 MG/DL Final    Triglyceride 07/19/2018 186* <150 MG/DL Final    HDL Cholesterol 07/19/2018 68  MG/DL Final    LDL, calculated 07/19/2018 99.8  0 - 100 MG/DL Final    VLDL, calculated 07/19/2018 37.2  MG/DL Final    CHOL/HDL Ratio 07/19/2018 3.0  0 - 5.0   Final    Hemoglobin A1c 07/19/2018 6.1  4.2 - 6.3 % Final    Est. average glucose 07/19/2018 128  mg/dL Final        RADIOLOGY REPORTS:    Results from Hospital Encounter encounter on 09/10/12   XR CHEST PA LAT   Narrative **Final Report**      ICD Codes / Adm. Diagnosis: 786.09   / Other dyspnea and respiratory    Examination:  CR CHEST PA AND LATERAL  - [id number] - Sep 10 2012  5:22PM  Accession No:  [id number]  Reason:  786.0      REPORT:  INDICATION:    786.0     COMPARISON: None. FINDINGS: PA and lateral radiographs of the chest demonstrate clear lungs. The cardiac and mediastinal contours and pulmonary vascularity are normal.    Spondylitic changes are present. .        IMPRESSION: No acute process           Signing/Reading Doctor: Lars Mead ([id number])    Approved: Lars Mead ([id number])  09/10/2012                                  Ct Head Wo Cont    Result Date: 7/17/2018  EXAM:  CT HEAD WO CONT INDICATION: Altered mental status. COMPARISON: None. CONTRAST:  None. TECHNIQUE: Unenhanced CT of the head was performed using 5 mm images. Brain and bone windows were generated. CT dose reduction was achieved through use of a standardized protocol tailored for this examination and automatic exposure control for dose modulation. Adaptive statistical iterative reconstruction (ASIR) was utilized. FINDINGS: The ventricles and sulci are normal in size, shape and configuration and midline. There is no significant white matter disease. There is no intracranial hemorrhage, extra-axial collection, mass, mass effect or midline shift. The basilar cisterns are open. No acute infarct is identified. The bone windows demonstrate no abnormalities. The visualized portions of the paranasal sinuses and mastoid air cells are clear. IMPRESSION: No acute findings.              MEDICATIONS       ALL MEDICATIONS  Current Facility-Administered Medications   Medication Dose Route Frequency    LORazepam (ATIVAN) tablet 0.5 mg  0.5 mg Oral Q6H PRN    risperiDONE (RisperDAL) tablet 1 mg  1 mg Oral BID    hydrOXYzine HCl (ATARAX) tablet 25 mg  25 mg Oral Q6H PRN    sertraline (ZOLOFT) tablet 50 mg  50 mg Oral QPM    levothyroxine (SYNTHROID) tablet 125 mcg  125 mcg Oral ACB    therapeutic multivitamin (THERAGRAN) tablet 1 Tab  1 Tab Oral DAILY    aspirin (ASPIRIN) tablet 325 mg  325 mg Oral DAILY    fluticasone (FLONASE) 50 mcg/actuation nasal spray 2 Spray  2 Spray Both Nostrils BID    cholecalciferol (VITAMIN D3) tablet 1,000 Units  1,000 Units Oral DAILY    ziprasidone (GEODON) 20 mg in sterile water (preservative free) 1 mL injection  20 mg IntraMUSCular BID PRN    OLANZapine (ZyPREXA) tablet 5 mg  5 mg Oral Q6H PRN    benztropine (COGENTIN) tablet 2 mg  2 mg Oral BID PRN    benztropine (COGENTIN) injection 2 mg  2 mg IntraMUSCular BID PRN    zolpidem (AMBIEN) tablet 10 mg  10 mg Oral QHS PRN    acetaminophen (TYLENOL) tablet 650 mg 650 mg Oral Q4H PRN    ibuprofen (MOTRIN) tablet 400 mg  400 mg Oral Q8H PRN    magnesium hydroxide (MILK OF MAGNESIA) 400 mg/5 mL oral suspension 30 mL  30 mL Oral DAILY PRN    nicotine (NICODERM CQ) 21 mg/24 hr patch 1 Patch  1 Patch TransDERmal DAILY PRN      SCHEDULED MEDICATIONS  Current Facility-Administered Medications   Medication Dose Route Frequency    risperiDONE (RisperDAL) tablet 1 mg  1 mg Oral BID    sertraline (ZOLOFT) tablet 50 mg  50 mg Oral QPM    levothyroxine (SYNTHROID) tablet 125 mcg  125 mcg Oral ACB    therapeutic multivitamin (THERAGRAN) tablet 1 Tab  1 Tab Oral DAILY    aspirin (ASPIRIN) tablet 325 mg  325 mg Oral DAILY    fluticasone (FLONASE) 50 mcg/actuation nasal spray 2 Spray  2 Spray Both Nostrils BID    cholecalciferol (VITAMIN D3) tablet 1,000 Units  1,000 Units Oral DAILY                ASSESSMENT & PLAN        The patient, Natalee Houser, is a 59 y.o.  female who presents at this time for treatment of the following diagnoses:  Patient Active Hospital Problem List:   Psychos and Depression (7/17/2018)    Assessment: depression and delusion     Plan: initiate Risperidone 0.25 mg po bid   7/19/18 increase Risperidone to 0.5 mg po bid    7/20/18 continue same medications    7/21/18 add Zoloft   7/22/18 increase Zoloft to 50 mg   7/24/18 increase Zoloft to 75 mg            I will continue to monitor blood levels (Depakote, Tegretol, lithium, clozapine---a drug with a narrow therapeutic index= NTI) and associated labs for drug therapy implemented that require intense monitoring for toxicity as deemed appropriate based on current medication side effects and pharmacodynamically determined drug 1/2 lives. A coordinated, multidisplinary treatment team (includes the nurse, unit pharmcist,  and writer) round was conducted for this initial evaluation with the patient present.      The following regarding medications was addressed during rounds with patient:   the risks and benefits of the proposed medication. The patient was given the opportunity to ask questions. Informed consent given to the use of the above medications. I will continue to adjust psychiatric and non-psychiatric medications (see above \"medication\" section and orders section for details) as deemed appropriate & based upon diagnoses and response to treatment. I have reviewed admission (and previous/old) labs and medical tests in the EHR and or transferring hospital documents. I will continue to order blood tests/labs and diagnostic tests as deemed appropriate and review results as they become available (see orders for details). I have reviewed old psychiatric and medical records available in the EHR. I Will order additional psychiatric records from other institutions to further elucidate the nature of patient's psychopathology and review once available. I will gather additional collateral information from friends, family and o/p treatment team to further elucidate the nature of patient's psychopathology and baselline level of psychiatric functioning.       ESTIMATED LENGTH OF STAY:    3       STRENGTHS:  Exercising self-direction/Resourceful, Access to housing/residential stability and Interpersonal/supportive relationships (family, friends, peers)                                        SIGNED:    Antelmo Jose MD  7/24/2018

## 2018-07-24 NOTE — INTERDISCIPLINARY ROUNDS
Behavioral Health Interdisciplinary Rounds     Patient Name: Jada Peña  Age: 59 y.o. Room/Bed:  732/02  Primary Diagnosis: Depression   Admission Status: Involuntary Commitment     Readmission within 30 days: no  Power of  in place: no  Patient requires a blocked bed: no          Reason for blocked bed:     VTE Prophylaxis: Not indicated    Mobility needs/Fall risk: no    Nutritional Plan: no  Consults:          Labs/Testing due today?: no    Sleep hours:  7 hours      Participation in Care/Groups:  yes  Medication Compliant?: Yes  PRNS (last 24 hours): Antianxiety and Pain    Restraints (last 24 hours):  no     CIWA (range last 24 hours):     COWS (range last 24 hours):      Alcohol screening (AUDIT) completed -   AUDIT Score: 0     If applicable, date SBIRT discussed in treatment team AND documented:     Tobacco - patient is a smoker: Have You Used Tobacco in the Past 30 Days: No  Illegal Drugs use: Have You Used Any Illegal Substances Over the Past 12 Months: No    24 hour chart check complete: yes     Patient goal(s) for today: Continue to write in her journal about the feeling she has about joining her mother in her death. Treatment team focus/goals:  Urge patient to process feelings re death  Progress note Patient remains about the same and continues to ruminate about death. LOS:  7  Expected LOS: TBD    Financial concerns/prescription coverage:  no  Date of last family contact:       Family requesting physician contact today:  no  Discharge plan: Return to home  Guns in the home: no        Outpatient provider(s): [facility]    Participating treatment team members: Jada Treencemelyssa; Dr. Yobani Dewitt; Myra Salazar, Nurse Extern;  Xochitl Uriarte, PharmD; Damaso Sheth

## 2018-07-24 NOTE — DISCHARGE INSTRUCTIONS
DISCHARGE SUMMARY    NAME:Prudence Ware  : 1960  MRN: [id number]    The patient Justin García exhibits the ability to control behavior in a less restrictive environment. Patient's level of functioning is improving. No assaultive/destructive behavior has been observed for the past 24 hours. No suicidal/homicidal threat or behavior has been observed for the past 24 hours. There is no evidence of serious medication side effects. Patient has not been in physical or protective restraints for at least the past 24 hours. If weapons involved, how are they secured? No weapons involved     Is patient aware of and in agreement with discharge plan? Patient is aware of discharge and is in agreement     Arrangements for medication:  Prescriptions filled per derek . Referral for substance abuse treatment ? no    Referral for smoking cessation needed ? no    Copy of discharge instructions to  provider?:  Yes, fax to [facility] and to [facility] for transportation home:  [facility] to transport     Keep all follow up appointments as scheduled, continue to take prescribed medications per physician instructions. Mental health crisis number:  [phone] or your local mental health crisis line number at [phone]          Preventing a Relapse of Depression: Care Instructions  Your Care Instructions    A relapse of depression means your symptoms have come back after you have gotten better. This illness often comes and goes during a lifetime. But there are many things you can do to keep it from coming back. Follow-up care is a key part of your treatment and safety. Be sure to make and go to all appointments, and call your doctor if you are having problems. It's also a good idea to know your test results and keep a list of the medicines you take. What do you need to know? Know your risk of relapse  Talk to your doctor to find out if you are at risk of relapse.  Many things can make a person more likely to relapse into depression. These include having a family member with depression, dealing with serious problems in a relationship or a job, having a serious medical condition, or abusing drugs or alcohol. It is important to know your risk and to recognize warning signs of relapse. Once you know these things, you will be better able to keep it from happening to you. Know the warning signs of relapse  The two most common signs of relapse are:  · Feeling sad or hopeless. · Losing interest in your daily activities. You may have other symptoms, such as:  · You lose or gain weight. · You sleep too much or not enough. · You feel restless and unable to sit still. · You feel unable to move. · You feel tired all the time. · You feel unworthy or guilty without an obvious reason. · You have problems concentrating, remembering, or making decisions. · You think often about death or suicide. · You feel angry or have panic attacks. How can you care for yourself at home? · Take your medicine as prescribed. Call your doctor if you have any problems with your medicine. Many people take their medicines for at least 6 months after they have recovered. This often helps keep symptoms from coming back. However, if your depression keeps coming back, you may have to take medicine for the rest of your life. · Continue counseling even after you have stopped taking medicine. · Eat healthy foods. Include fruits, vegetables, beans, and whole grains in your diet each day. · Get at least 30 minutes of exercise on most days of the week. Walking is a good choice. You also may want to do other activities, such as running, swimming, cycling, or playing tennis or team sports. · See your doctor right away if you have new symptoms or feel that your depression is coming back. · Keep a regular sleep schedule. Try for 8 hours of sleep a night. · Avoid alcohol and illegal drugs.   · Keep the numbers for these national suicide hotlines: 2-558-941-TALK (2-528-568-699.846.5752) and 1-223-HOSEBFZ (9-707.783.4649). If you or someone you know talks about suicide or feeling hopeless, get help right away. When should you call for help? Call 911 anytime you think you may need emergency care. For example, call if:     · You are thinking about suicide or are threatening suicide.      · You feel you cannot stop from hurting yourself or someone else.      · You hear or see things that aren't real.      · You think or speak in a bizarre way that is not like your usual behavior.    Call your doctor now or seek immediate medical care if:     · You are drinking a lot of alcohol or using illegal drugs.    · You are talking or writing about death.    Watch closely for changes in your health, and be sure to contact your doctor if:     · You find it hard or it's getting harder to deal with school, a job, family, or friends.   [place] · You think your treatment is not helping or you are not getting better.      · Your symptoms get worse or you get new symptoms.      · You have any problems with your antidepressant medicines, such as side effects, or you are thinking about stopping your medicine.      · You are having manic behavior, such as having very high energy, needing less sleep than normal, or showing risky behavior such as spending money you don't have or abusing others verbally or physically. Where can you learn more? Go to http://karina-kya.info/. Enter D092 in the search box to learn more about \"Preventing a Relapse of Depression: Care Instructions. \"  Current as of: December 7, 2017  Content Version: 11.7  © 8532-7051 [employer]. Care instructions adapted under license by [employer] (which disclaims liability or warranty for this information).  If you have questions about a medical condition or this instruction, always ask your healthcare professional. [address] any warranty or liability for your use of this information.         DISCHARGE SUMMARY from Nurse    PATIENT INSTRUCTIONS:      What to do at Home:  Recommended activity: Activity as tolerated,     If you experience any of the following symptoms depression or self-harm thoughts, please follow up with [phone] . *  Please give a list of your current medications to your Primary Care Provider. *  Please update this list whenever your medications are discontinued, doses are      changed, or new medications (including over-the-counter products) are added. *  Please carry medication information at all times in case of emergency situations. These are general instructions for a healthy lifestyle:    No smoking/ No tobacco products/ Avoid exposure to second hand smoke  Surgeon General's Warning:  Quitting smoking now greatly reduces serious risk to your health. Obesity, smoking, and sedentary lifestyle greatly increases your risk for illness    A healthy diet, regular physical exercise & weight monitoring are important for maintaining a healthy lifestyle    You may be retaining fluid if you have a history of heart failure or if you experience any of the following symptoms:  Weight gain of 3 pounds or more overnight or 5 pounds in a week, increased swelling in our hands or feet or shortness of breath while lying flat in bed. Please call your doctor as soon as you notice any of these symptoms; do not wait until your next office visit. Recognize signs and symptoms of STROKE:    F-face looks uneven    A-arms unable to move or move unevenly    S-speech slurred or non-existent    T-time-call 911 as soon as signs and symptoms begin-DO NOT go       Back to bed or wait to see if you get better-TIME IS BRAIN. Warning Signs of HEART ATTACK     Call 911 if you have these symptoms:   Chest discomfort. Most heart attacks involve discomfort in the center of the chest that lasts more than a few minutes, or that goes away and comes back.  It can feel like uncomfortable pressure, squeezing, fullness, or pain.  Discomfort in other areas of the upper body. Symptoms can include pain or discomfort in one or both arms, the back, neck, jaw, or stomach.  Shortness of breath with or without chest discomfort.  Other signs may include breaking out in a cold sweat, nausea, or lightheadedness. Don't wait more than five minutes to call 911 - MINUTES MATTER! Fast action can save your life. Calling 911 is almost always the fastest way to get lifesaving treatment. Emergency Medical Services staff can begin treatment when they arrive -- up to an hour sooner than if someone gets to the hospital by car. The discharge information has been reviewed with the patient. The patient verbalized understanding. Discharge medications reviewed with the patient and appropriate educational materials and side effects teaching were provided.   ___________________________________________________________________________________________________________________________________

## 2018-07-24 NOTE — BH NOTES
LEISURE GROUP THERAPY PROGRESS NOTE    The patient Lito herron 59 y.o. female is participating in Leisure Group. Group time: 45 minutes    Personal goal for participation: Daily social interactions with other peers & staff.     Goal orientation: Relaxation activities    Group therapy participation: active    Therapeutic interventions reviewed and discussed:  Yes    Impression of participation: active    Yanique Vigil  7/23/2018  8:22 PM

## 2018-07-24 NOTE — BH NOTES
GROUP THERAPY PROGRESS NOTE    Camilla Chow is participating in [place]. Group time: 15 minutes    Personal goal for participation: To do laundry. Goal orientation: personal    Group therapy participation: active    Therapeutic interventions reviewed and discussed: writer listened attentively and explain the unit routine. Impression of participation: Patient participated actively.

## 2018-07-24 NOTE — PROGRESS NOTES
Problem: Anxiety-Behavioral Health (Adult/Pediatric)  Goal: *STG: Remains safe in hospital  Outcome: Progressing Towards Goal  Out in milieu during shift change complaining of some anxiety. Thoughts somewhat disorganized. Staff suggests alternative coping skills like deep breathing, meditating, etc.  Staff will continue to monitor q 15 min checks.

## 2018-07-24 NOTE — BH NOTES
GROUP THERAPY PROGRESS NOTE    Diana Guerrero participated in the Acute Unit's afternoon Process Group for yesterday, with a focus on identifying feelings, planning for the rest of the day, and preparing for discharge. Group time: 40 minutes. Personal goal for participation: To increase the capacity to improve ones mood and structure. Goal orientation: The patient will be able to identify their feelings, develop a plan for structuring their day, and discharge planning. Group therapy participation: With prompting, this patient participated in the group. Therapeutic interventions reviewed and discussed: The group members were asked to introduce themselves to each other and to see if they could identify an emotion they are having and/or let the group know what they want to focus on for the day as they continue to make discharge plans. Impression of participation: The patient said she wanted to share from some of the writing she has been doing in her journal about her brother and her mother (both are ). I found the writing difficult to follow but it was clear the patient found the passages personally meaningful. After her reading, she talked about still missing and wanting to be with her mother, while worrying about \"going to the bad place. \" She added that she wanted, \"what I cannot have, my mother back. \" She also talked about feeling both guilty and judged by her community because of \"something bad, I did in . [place] Tang I wanted to have a relationship with a  man. Daisy Tang I never had sex with him [?]. Daisy Tang I was running away from myself. \" She was asked if she were still \"running away from herself? \" She affirmed that she was still \"running from herself. \" She said she had made an agreement or contract with her mother based on her behavior and/or feelings in  and the patient said she maintained her end of the bargain, committing herself to taking care of her mother around the clock and very rarely leaving the house, except to make short trips to a local store. When asked what I life worth living might look like for her, she responded, \"I want to ride a motorcycle across the country. ..even though I've never ridden a motorcycle. \" She expressed a vague desire to be with her mother, without a plan, and which might be considered a death fantasy where she hopes to rejoin her mother. She said she is partially restrained because she is not completely sure she would not go \"to the bad place\" for killing herself. She expressed no HI and displayed no overt psychotic symptoms in this group. At one point, she looked up and said she, \"needed to be home to take care of my father. \" She seemed to express a dynamic conflict between family obligation and a desire for radical freedom, without responsibility or obligations. She did not share about any past trauma in her life in this session, but her fascination with \"joining her mother\" and her difficulty being around family, particularly over Summer, suggest that she may have been traumatized in her childhood. Her thinking continued to have an immature and disjointed quality to it. Her judgment and insight appear limited. Her self-fragmentation imaged in her dynamic conflict of family obligation and radical freedom is not yet resolved for this patient, making it hard for her to engage in much realistic planning. Focusing on realistic aftercare plans might help her take a more active step in this reintegration. Her affect is depressed and anxious. Her mood reflects her affect and restricts the broadening of her emotions. She almost presents in something like a schizoid or autistic fashion - in touch with profound feelings and cut off from them at the same time.

## 2018-07-24 NOTE — PROGRESS NOTES
Problem: Discharge Planning  Goal: *Discharge to safe environment  Outcome: Progressing Towards Goal  Plan to consider stepping down to [facility] for continued treatment

## 2018-07-24 NOTE — BH NOTES
PRN Medication Documentation    Specific patient behavior that led to need for PRN medication: pain  Staff interventions attempted prior to PRN being given: none  PRN medication given: Tylenol  Patient response/effectiveness of PRN medication: will continue to monitor

## 2018-07-24 NOTE — BH NOTES
PRN Medication Documentation  At 1016  Specific patient behavior that led to need for PRN medication: c/o anxiety and mid sternal chest pain, patient did not want med for pain she requested medication for anxiety  Staff interventions attempted prior to PRN being given: relaxation  PRN medication given: atarax 25 mg po prn   Patient response/effectiveness of PRN medication: will determine

## 2018-07-25 PROCEDURE — 74011250637 HC RX REV CODE- 250/637: Performed by: PSYCHIATRY & NEUROLOGY

## 2018-07-25 PROCEDURE — 65220000003 HC RM SEMIPRIVATE PSYCH

## 2018-07-25 RX ORDER — LORAZEPAM 0.5 MG/1
0.5 TABLET ORAL
Status: DISCONTINUED | OUTPATIENT
Start: 2018-07-25 | End: 2018-07-25

## 2018-07-25 RX ORDER — TRAZODONE HYDROCHLORIDE 50 MG/1
50 TABLET ORAL
Status: DISCONTINUED | OUTPATIENT
Start: 2018-07-25 | End: 2018-07-26 | Stop reason: HOSPADM

## 2018-07-25 RX ADMIN — TRAZODONE HYDROCHLORIDE 50 MG: 50 TABLET ORAL at 21:04

## 2018-07-25 RX ADMIN — THERA TABS 1 TABLET: TAB at 09:03

## 2018-07-25 RX ADMIN — RISPERIDONE 1 MG: 1 TABLET ORAL at 21:03

## 2018-07-25 RX ADMIN — FLUTICASONE PROPIONATE 2 SPRAY: 50 SPRAY, METERED NASAL at 09:05

## 2018-07-25 RX ADMIN — SERTRALINE HYDROCHLORIDE 75 MG: 50 TABLET ORAL at 17:04

## 2018-07-25 RX ADMIN — LEVOTHYROXINE SODIUM 125 MCG: 100 TABLET ORAL at 06:37

## 2018-07-25 RX ADMIN — ASPIRIN 325 MG: 325 TABLET ORAL at 09:03

## 2018-07-25 RX ADMIN — VITAMIN D, TAB 1000IU (100/BT) 1000 UNITS: 25 TAB at 09:00

## 2018-07-25 RX ADMIN — HYDROXYZINE HYDROCHLORIDE 25 MG: 25 TABLET, FILM COATED ORAL at 21:03

## 2018-07-25 RX ADMIN — LORAZEPAM 0.5 MG: 0.5 TABLET ORAL at 09:52

## 2018-07-25 RX ADMIN — RISPERIDONE 1 MG: 1 TABLET ORAL at 09:03

## 2018-07-25 NOTE — PROGRESS NOTES
Problem: Falls - Risk of  Goal: *Absence of Falls  Document Samanta Fall Risk and appropriate interventions in the flowsheet. Outcome: Progressing Towards Goal  Fall Risk Interventions: In bed asleep with respirations noted as even and unlabored as chest was rising and falling, Will continue to monitor for safety and 15  Minute checks throughout shift.          Medication Interventions: Teach patient to arise slowly

## 2018-07-25 NOTE — BH NOTES
LEISURE GROUP THERAPY PROGRESS NOTE    The patient Bob herron 59 y.o. female is participating in Leisure Group. Group time: 45 minutes    Personal goal for participation: Daily social interactions with other peers & staff.     Goal orientation: Relaxation activities     Group therapy participation: active    Therapeutic interventions reviewed and discussed:  Yes    Impression of participation: active    Rogelio Johns  7/24/2018  8:09 PM

## 2018-07-25 NOTE — BH NOTES
GROUP THERAPY PROGRESS NOTE    Kimberley Cervantes participated in the Acute Unit's afternoon Process Group for yesterday, with a focus   on identifying feelings, planning for the rest of the day, and preparing for discharge. Group time: 40 minutes. Personal goal for participation: To increase the capacity to improve ones mood and structure. Goal orientation: The patient will be able to identify their feelings, develop a plan for structuring their day,   and discharge planning. Group therapy participation: With prompting, this patient participated in the group. Therapeutic interventions reviewed and discussed: The group members were asked to introduce   themselves to each other and to see if they could identify an emotion they are having and/or let the group   know what they want to focus on for the day as they continue to make discharge plans. They were also   provided a handout to work on following the meeting on their own. It asked the members to consider three   potential obstacles to meeting their aftercare goals. Impression of participation: The patient waited until her peers had shared before speaking. She expressed  a desire \"to be with my mother. .my mother saw my soul. Chandrika Torres It was like we were one person. \"  She also said  she was anxious about going to the [facility] tomorrow. She indicated this was part of her  treatment and discharge plan. She was alert and generally oriented. She expressed no current and active   SI/HI, although her professed desire \"to be with\" her mother might be read as a passive suicidal wish. She displayed no overt psychotic symptoms in this group, although her thinking still has an odd   idiosyncratic tone. 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 Her affect was restricted  and her mood reflected her affect. Chandrika Torres

## 2018-07-25 NOTE — INTERDISCIPLINARY ROUNDS
Behavioral Health Interdisciplinary Rounds     Patient Name: Mylene Conway  Age: 59 y.o. Room/Bed:  732/02  Primary Diagnosis: Depression   Admission Status: Involuntary Commitment     Readmission within 30 days: no  Power of  in place: no  Patient requires a blocked bed: no          Reason for blocked bed:     VTE Prophylaxis: Not indicated    Mobility needs/Fall risk: no    Nutritional Plan: no  Consults:          Labs/Testing due today?: no    Sleep hours:  6.50      Participation in Care/Groups:  yes  Medication Compliant?: Yes  PRNS (last 24 hours): Antianxiety and sleep    Restraints (last 24 hours):  no     CIWA (range last 24 hours):     COWS (range last 24 hours):      Alcohol screening (AUDIT) completed -   AUDIT Score: 0     If applicable, date SBIRT discussed in treatment team AND documented:     Tobacco - patient is a smoker: Have You Used Tobacco in the Past 30 Days: No  Illegal Drugs use: Have You Used Any Illegal Substances Over the Past 12 Months: No    24 hour chart check complete: yes     Patient goal(s) for today:   Treatment team focus/goals: Plan to step down to [facility]   Progress note - she has been pleasant and complaint on the unit.       LOS:  7  Expected LOS: TBD     Financial concerns/prescription coverage: [facility] SOTO    Date of last family contact:       Family requesting physician contact today:    Discharge plan: plan to step down to [facility]   Guns in the home: no        Outpatient provider(s): Erica     Participating treatment team members: Stef Valadez Dr., RN

## 2018-07-25 NOTE — PROGRESS NOTES
Problem: Depressed Mood (Adult/Pediatric)  Goal: *STG: Verbalizes anger, guilt, and other feelings in a constructive manor  Outcome: Progressing Towards Goal  Continued significant personal information shareing during treatment team.  Goal: *STG: Demonstrates reduction in symptoms and increase in insight into coping skills/future focused  Outcome: Not Progressing Towards Goal  Feelings of depression and grief related to death of her approximately  2 years ago. Patient became tearful when expressing feelings. Goal: *STG: Remains safe in hospital  Outcome: Progressing Towards Goal  At intervals states \"I want to join my mother in heaven. \"but stated\"' I don`t really have a plan. I just miss her so much. \"    Problem: Anxiety-Behavioral Health (Adult/Pediatric)  Goal: *STG: Remains safe in hospital  Outcome: Progressing Towards Goal  Pt denies any suicidal or homicidal thoughts. Contracts for safety. Remains on Q 15 min safety checks. No agitation or aggression observed.

## 2018-07-25 NOTE — BH NOTES
PSYCHIATRIC PROGRESS NOTE             IDENTIFICATION:    Patient Name  Susana Ponce   Date of Birth 1960   [facility] [id number]   Medical Record Number  [id number]      Age  59 y.o. PCP Julio Rocha MD   Admit date:  7/16/2018    Room Number  732/02  @ [facility]   Date of Service  7/25/2018            HISTORY         REASON FOR HOSPITALIZATION:  CC:  Pt admitted under a temporary residential order (TDO)  psychosis  proving to be an imminent danger to self and an inability to care for self. HISTORY OF PRESENT ILLNESS:    The patient, Susana Ponce, is a 59 y.o. WHITE [place]  female with a past psychiatric history significant for possible depression and psychosis , who presents at this time with complaints of (and/or evidence of) the following emotional symptoms: depression and delusions. Additional symptomatology include anxiety, feeling depressed and poor concentration. The above symptoms have been present for weeks . These symptoms are of moderate severity. These symptoms are intermittent/ fleeting in nature. 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   She is tangential and she is not logical and she is not feeling she needs to be here and she stated police pulled her over but she was not aware about getting stopped and she came to ER and mention that she has thoughts about hurting herself and she is not feeling that way now  7/19/18 she is talking but not making sense and she is talking about her mother and she write letter for her , she not expressing any depression and no SI or HI and she is paranoid and no aggressive behavior   7/20/18 she is feeling little better but still paranoid and disorganized but better than before and no anger issues and no actin g out behavior and no SI or HI    7/21/18 she is frustrated and she is talking about her mother and she is upset about her mother passed away and not feeling able to go over that , still disorganized and still confused about her situation, still feel depressed    7/22/18 she still feeling depressed and still wants to go with her mother and she is not acting about depressive thoughts and no anger issues and no SI or HI and still little paranoid   7/23/18 she still has suicidal gesture and she still feel depressed and anxious and still has been asking for discharge and she still talking about her past and has been not feeling happy and she requested to see  , still internally preoccupied   7/24/18 she still feels depressed and she still grieving about her mother and she stated she wants to be with her and she does not have plan to hurt herself and she still talking about wanting to join her mother but still wants to act like her, no paranoid feeling but still disorganized   7/25/18 she still talking about her mother and she still not feeling  She is not  over the grieve and she still thinks about her mother and she has no SI or HI    ALLERGIES:   Allergies   Allergen Reactions    Other Food Other (comments)     \"Bad chest pain\" with walnuts. Coke - asthma/stuffy head. Fish sticks causes an asthma attack.  Astepro [Azelastine] Other (comments)     Violent headaches.  Bactrim [Sulfamethoprim] Other (comments)     Difficulty breathing, chills, fever.  Banana Other (comments)     Diffculty breathing, wheezing, asthma attack.  Coconut Other (comments)     Difficulty breathing, asthma attack, SOB.  Cortisone Hives     Difficulty breathing.  Peanut Other (comments)     Wheezing, asthma attack, SOB.  Prednisone Hives     Difficulty breathing, kidney stones. MEDICATIONS PRIOR TO ADMISSION:   Prescriptions Prior to Admission   Medication Sig    therapeutic multivitamin (THERA) tablet Take 1 Tab by mouth daily.  zolpidem (AMBIEN) 10 mg tablet Take 10 mg by mouth nightly as needed.     levothyroxine (SYNTHROID) 125 mcg tablet Take 125 mcg by mouth Daily (before breakfast). Indications: hypothyroidism    fluticasone (FLONASE) 50 mcg/actuation nasal spray 2 Sprays by Both Nostrils route two (2) times a day.  Cholecalciferol, Vitamin D3, (VITAMIN D3) 1,000 unit cap Take 1,000 Units by mouth daily.  cyanocobalamin, vitamin B-12, (VITAMIN B-12) 5,000 mcg subl 5,000 mcg by SubLINGual route daily.  GREEN TEA EXTRACT PO Take 700 mg by mouth daily.  aspirin (ASPIRIN) 325 mg tablet Take 325-650 mg by mouth as needed for Pain.  diphenhydrAMINE (BENADRYL ALLERGY) 25 mg tablet Take 50 mg by mouth as needed for Sleep. PAST MEDICAL HISTORY:   Past Medical History:   Diagnosis Date    Asthma 1967    hospitalized for asthma attack x 2    Chronic kidney disease     kidney stones x 2-3 times    Ill-defined condition     nasal blockage from growth and misalignment - cannot breath out of nose - surgery in the future/cleared for colonoscopy 7/31/2014 - will bring in letter    Other ill-defined conditions(799.89)     blood in stool    Other ill-defined conditions(799.89)     hx low BP - 90's/60's-70's    PUD (peptic ulcer disease)     Thyroid disease     Unspecified adverse effect of anesthesia     nasal growth and misalignment and cannot breath through nose     Past Surgical History:   Procedure Laterality Date    HX HEENT      T & A    HX HEENT      turbinate surgery      SOCIAL HISTORY: single    Social History     Social History    Marital status: SINGLE     Spouse name: N/A    Number of children: N/A    Years of education: N/A     Occupational History    Not on file. Social History Main Topics    Smoking status: Never Smoker    Smokeless tobacco: Never Used    Alcohol use No    Drug use: No    Sexual activity: Not Currently     Other Topics Concern    Not on file     Social History Narrative      FAMILY HISTORY: History reviewed. No pertinent family history.    Family History   Problem Relation Age of Onset    Stroke Mother     Other Mother      erosion of esophagus    Cancer Mother      melanoma/squamous    Heart Surgery Father      x2    Delayed Awakening Father    [facility] Pac[employer] Father     Other Father      carotid endartarectomy/polio    Cataract Father        REVIEW OF SYSTEMS:   History obtained from chart review and the patient  Pertinent items are noted in the History of Present Illness. All other Systems reviewed and are considered negative. MENTAL STATUS EXAM & VITALS     MENTAL STATUS EXAM (MSE):    MSE FINDINGS ARE WITHIN NORMAL LIMITS (WNL) UNLESS OTHERWISE STATED BELOW. ( ALL OF THE BELOW CATEGORIES OF THE MSE HAVE BEEN REVIEWED (reviewed 7/25/2018) AND UPDATED AS DEEMED APPROPRIATE )  General Presentation age appropriate, cooperative   Orientation oriented to time, place and person   Vital Signs  See below (reviewed 7/25/2018); Vital Signs (BP, Pulse, & Temp) are within normal limits if not listed below.    Gait and Station Stable/steady, no ataxia   Musculoskeletal System No extrapyramidal symptoms (EPS); no abnormal muscular movements or Tardive Dyskinesia (TD); muscle strength and tone are within normal limits   Language No aphasia or dysarthria   Speech:  normal pitch and normal volume   Thought Processes illogical; slow rate of thoughts; fair abstract reasoning/computation   Thought Associations loose associations and tangential   Thought Content paranoid delusions   Suicidal Ideations no plan  and no intention   Homicidal Ideations no plan  and no intention   Mood:  depressed   Affect:  constricted   Memory recent  fair   Memory remote:  fair   Concentration/Attention:  fair   Fund of Knowledge average   Insight:  limited   Reliability poor   Judgment:  limited          VITALS:     Patient Vitals for the past 24 hrs:   Temp Pulse Resp BP SpO2   07/25/18 0800 98 °F (36.7 °C) 67 16 110/71 97 %   07/24/18 2015 98.7 °F (37.1 °C) 81 20 137/80 -   07/24/18 1518 98 °F (36.7 °C) 75 16 122/70 100 %   07/24/18 1019 - 75 - 116/76 -     Wt Readings from Last 3 Encounters:   07/16/18 72.6 kg (160 lb)   07/31/14 79.4 kg (175 lb)     Temp Readings from Last 3 Encounters:   07/25/18 98 °F (36.7 °C)     BP Readings from Last 3 Encounters:   07/25/18 110/71   07/31/14 110/69     Pulse Readings from Last 3 Encounters:   07/25/18 67   07/31/14 69            DATA     LABORATORY DATA:  Labs Reviewed   METABOLIC PANEL, COMPREHENSIVE - Abnormal; Notable for the following:        Result Value    Glucose 144 (*)     Creatinine 1.06 (*)     GFR est non-AA 53 (*)     AST (SGOT) 13 (*)     Globulin 4.2 (*)     A-G Ratio 1.0 (*)     All other components within normal limits   SALICYLATE - Abnormal; Notable for the following:     Salicylate level <6.9 (*)     All other components within normal limits   ACETAMINOPHEN - Abnormal; Notable for the following:     Acetaminophen level <2 (*)     All other components within normal limits   URINALYSIS W/MICROSCOPIC - Abnormal; Notable for the following:     Leukocyte Esterase SMALL (*)     All other components within normal limits   TSH 3RD GENERATION - Abnormal; Notable for the following:     TSH 0.05 (*)     All other components within normal limits   LIPID PANEL - Abnormal; Notable for the following:     Cholesterol, total 205 (*)     Triglyceride 186 (*)     All other components within normal limits   URINE CULTURE HOLD SAMPLE   CULTURE, URINE   CBC WITH AUTOMATED DIFF   SAMPLES BEING HELD   DRUG SCREEN, URINE   TROPONIN I   CK W/ REFLX CKMB   ETHYL ALCOHOL   HEMOGLOBIN A1C WITH EAG     Admission on 07/16/2018   Component Date Value Ref Range Status    WBC 07/16/2018 8.4  3.6 - 11.0 K/uL Final    RBC 07/16/2018 4.72  3.80 - 5.20 M/uL Final    HGB 07/16/2018 13.0  11.5 - 16.0 g/dL Final    HCT 07/16/2018 40.2  35.0 - 47.0 % Final    MCV 07/16/2018 85.2  80.0 - 99.0 FL Final    MCH 07/16/2018 27.5  26.0 - 34.0 PG Final    MCHC 07/16/2018 32.3  30.0 - 36.5 g/dL Final    RDW 07/16/2018 12.8  11.5 - 14.5 % Final    PLATELET 57/80/8835 144  150 - 400 K/uL Final    MPV 07/16/2018 10.4  8.9 - 12.9 FL Final    NRBC 07/16/2018 0.0  0  WBC Final    ABSOLUTE NRBC 07/16/2018 0.00  0.00 - 0.01 K/uL Final    NEUTROPHILS 07/16/2018 65  32 - 75 % Final    LYMPHOCYTES 07/16/2018 26  12 - 49 % Final    MONOCYTES 07/16/2018 6  5 - 13 % Final    EOSINOPHILS 07/16/2018 3  0 - 7 % Final    BASOPHILS 07/16/2018 1  0 - 1 % Final    IMMATURE GRANULOCYTES 07/16/2018 0  0.0 - 0.5 % Final    ABS. NEUTROPHILS 07/16/2018 5.5  1.8 - 8.0 K/UL Final    ABS. LYMPHOCYTES 07/16/2018 2.2  0.8 - 3.5 K/UL Final    ABS. MONOCYTES 07/16/2018 0.5  0.0 - 1.0 K/UL Final    ABS. EOSINOPHILS 07/16/2018 0.2  0.0 - 0.4 K/UL Final    ABS. BASOPHILS 07/16/2018 0.0  0.0 - 0.1 K/UL Final    ABS. IMM. GRANS. 07/16/2018 0.0  0.00 - 0.04 K/UL Final    DF 07/16/2018 AUTOMATED    Final    Sodium 07/16/2018 140  136 - 145 mmol/L Final    Potassium 07/16/2018 3.6  3.5 - 5.1 mmol/L Final    Chloride 07/16/2018 107  97 - 108 mmol/L Final    CO2 07/16/2018 23  21 - 32 mmol/L Final    Anion gap 07/16/2018 10  5 - 15 mmol/L Final    Glucose 07/16/2018 144* 65 - 100 mg/dL Final    BUN 07/16/2018 13  6 - 20 MG/DL Final    Creatinine 07/16/2018 1.06* 0.55 - 1.02 MG/DL Final    BUN/Creatinine ratio 07/16/2018 12  12 - 20   Final    GFR est AA 07/16/2018 >60  >60 ml/min/1.73m2 Final    GFR est non-AA 07/16/2018 53* >60 ml/min/1.73m2 Final    Calcium 07/16/2018 8.7  8.5 - 10.1 MG/DL Final    Bilirubin, total 07/16/2018 0.4  0.2 - 1.0 MG/DL Final    ALT (SGPT) 07/16/2018 24  12 - 78 U/L Final    AST (SGOT) 07/16/2018 13* 15 - 37 U/L Final    Alk.  phosphatase 07/16/2018 77  45 - 117 U/L Final    Protein, total 07/16/2018 8.2  6.4 - 8.2 g/dL Final    Albumin 07/16/2018 4.0  3.5 - 5.0 g/dL Final    Globulin 07/16/2018 4.2* 2.0 - 4.0 g/dL Final    A-G Ratio 07/16/2018 1.0* 1.1 - 2.2   Final  AMPHETAMINES 07/17/2018 NEGATIVE   NEG   Final    BARBITURATES 07/17/2018 NEGATIVE   NEG   Final    BENZODIAZEPINES 07/17/2018 NEGATIVE   NEG   Final    COCAINE 07/17/2018 NEGATIVE   NEG   Final    METHADONE 07/17/2018 NEGATIVE   NEG   Final    OPIATES 07/17/2018 NEGATIVE   NEG   Final    PCP(PHENCYCLIDINE) 07/17/2018 NEGATIVE   NEG   Final    THC (TH-CANNABINOL) 07/17/2018 NEGATIVE   NEG   Final    Drug screen comment 07/17/2018 (NOTE)   Final    Salicylate level 42/89/7130 <1.7* 2.8 - 20.0 MG/DL Final    Acetaminophen level 07/16/2018 <2* 10 - 30 ug/mL Final    Troponin-I, Qt. 07/16/2018 <0.05  <0.05 ng/mL Final    CK 07/16/2018 108  26 - 192 U/L Final    Color 07/17/2018 YELLOW/STRAW    Final    Appearance 07/17/2018 CLEAR  CLEAR   Final    Specific gravity 07/17/2018 1.013  1.003 - 1.030   Final    pH (UA) 07/17/2018 5.5  5.0 - 8.0   Final    Protein 07/17/2018 NEGATIVE   NEG mg/dL Final    Glucose 07/17/2018 NEGATIVE   NEG mg/dL Final    Ketone 07/17/2018 NEGATIVE   NEG mg/dL Final    Bilirubin 07/17/2018 NEGATIVE   NEG   Final    Blood 07/17/2018 NEGATIVE   NEG   Final    Urobilinogen 07/17/2018 0.2  0.2 - 1.0 EU/dL Final    Nitrites 07/17/2018 NEGATIVE   NEG   Final    Leukocyte Esterase 07/17/2018 SMALL* NEG   Final    WBC 07/17/2018 10-20  0 - 4 /hpf Final    RBC 07/17/2018 0-5  0 - 5 /hpf Final    Epithelial cells 07/17/2018 FEW  FEW /lpf Final    Bacteria 07/17/2018 NEGATIVE   NEG /hpf Final    Hyaline cast 07/17/2018 0-2  0 - 5 /lpf Final    Urine culture hold 07/17/2018 URINE ON HOLD IN MICROBIOLOGY DEPT FOR 3 DAYS. IF UNPRESERVED URINE IS SUBMITTED, IT CANNOT BE USED FOR ADDITIONAL TESTING AFTER 24 HRS, RECOLLECTION WILL BE REQUIRED.     Final    Special Requests: 07/17/2018 NO SPECIAL REQUESTS    Final    [place] Count 07/17/2018     Final                    Value:[id number]  COLONIES/mL      Culture result: 07/17/2018 MIXED UROGENITAL KIMBERLEE ISOLATED    Final    Ventricular Rate 07/17/2018 71  BPM Final    Atrial Rate 07/17/2018 71  BPM Final    P-R Interval 07/17/2018 158  ms Final    QRS Duration 07/17/2018 88  ms Final    Q-T Interval 07/17/2018 406  ms Final    QTC Calculation (Bezet) 07/17/2018 441  ms Final    Calculated P Axis 07/17/2018 58  degrees Final    Calculated R Axis 07/17/2018 -24  degrees Final    Calculated T Axis 07/17/2018 39  degrees Final    Diagnosis 07/17/2018    Final                    Value:Normal sinus rhythm  When compared with ECG of 04-MAR-2005 17:15,  Previous ECG has undetermined rhythm, needs review  T wave inversion no longer evident in Anterior leads  Confirmed by Joyce Whyte MD, Melvin ([id number]) on 7/17/2018 9:49:31 AM      ALCOHOL(ETHYL),SERUM 07/17/2018 <10  <10 MG/DL Final    TSH 07/17/2018 0.05* 0.36 - 3.74 uIU/mL Final    LIPID PROFILE 07/19/2018        Final    Cholesterol, total 07/19/2018 205* <200 MG/DL Final    Triglyceride 07/19/2018 186* <150 MG/DL Final    HDL Cholesterol 07/19/2018 68  MG/DL Final    LDL, calculated 07/19/2018 99.8  0 - 100 MG/DL Final    VLDL, calculated 07/19/2018 37.2  MG/DL Final    CHOL/HDL Ratio 07/19/2018 3.0  0 - 5.0   Final    Hemoglobin A1c 07/19/2018 6.1  4.2 - 6.3 % Final    Est. average glucose 07/19/2018 128  mg/dL Final        RADIOLOGY REPORTS:    Results from Hospital Encounter encounter on 09/10/12   XR CHEST PA LAT   Narrative **Final Report**      ICD Codes / Adm. Diagnosis: 786.09   / Other dyspnea and respiratory    Examination:  CR CHEST PA AND LATERAL  - [id number] - Sep 10 2012  5:22PM  Accession No:  [id number]  Reason:  786.0      REPORT:  INDICATION:    786.0     COMPARISON: None. FINDINGS: PA and lateral radiographs of the chest demonstrate clear lungs. The cardiac and mediastinal contours and pulmonary vascularity are normal.    Spondylitic changes are present. .        IMPRESSION: No acute process           Signing/Reading Doctor: Rachelle Nichole ([id number])    Approved: Magali Wong ([id number])  09/10/2012                                  Ct Head Wo Cont    Result Date: 7/17/2018  EXAM:  CT HEAD WO CONT INDICATION:  Altered mental status. COMPARISON: None. CONTRAST:  None. TECHNIQUE: Unenhanced CT of the head was performed using 5 mm images. Brain and bone windows were generated. CT dose reduction was achieved through use of a standardized protocol tailored for this examination and automatic exposure control for dose modulation. Adaptive statistical iterative reconstruction (ASIR) was utilized. FINDINGS: The ventricles and sulci are normal in size, shape and configuration and midline. There is no significant white matter disease. There is no intracranial hemorrhage, extra-axial collection, mass, mass effect or midline shift. The basilar cisterns are open. No acute infarct is identified. The bone windows demonstrate no abnormalities. The visualized portions of the paranasal sinuses and mastoid air cells are clear. IMPRESSION: No acute findings.              MEDICATIONS       ALL MEDICATIONS  Current Facility-Administered Medications   Medication Dose Route Frequency    LORazepam (ATIVAN) tablet 0.5 mg  0.5 mg Oral Q6H PRN    sertraline (ZOLOFT) tablet 75 mg  75 mg Oral QPM    risperiDONE (RisperDAL) tablet 1 mg  1 mg Oral BID    hydrOXYzine HCl (ATARAX) tablet 25 mg  25 mg Oral Q6H PRN    levothyroxine (SYNTHROID) tablet 125 mcg  125 mcg Oral ACB    therapeutic multivitamin (THERAGRAN) tablet 1 Tab  1 Tab Oral DAILY    aspirin (ASPIRIN) tablet 325 mg  325 mg Oral DAILY    fluticasone (FLONASE) 50 mcg/actuation nasal spray 2 Spray  2 Spray Both Nostrils BID    cholecalciferol (VITAMIN D3) tablet 1,000 Units  1,000 Units Oral DAILY    ziprasidone (GEODON) 20 mg in sterile water (preservative free) 1 mL injection  20 mg IntraMUSCular BID PRN    OLANZapine (ZyPREXA) tablet 5 mg  5 mg Oral Q6H PRN    benztropine (COGENTIN) tablet 2 mg  2 mg Oral BID PRN    benztropine (COGENTIN) injection 2 mg  2 mg IntraMUSCular BID PRN    zolpidem (AMBIEN) tablet 10 mg  10 mg Oral QHS PRN    acetaminophen (TYLENOL) tablet 650 mg  650 mg Oral Q4H PRN    ibuprofen (MOTRIN) tablet 400 mg  400 mg Oral Q8H PRN    magnesium hydroxide (MILK OF MAGNESIA) 400 mg/5 mL oral suspension 30 mL  30 mL Oral DAILY PRN    nicotine (NICODERM CQ) 21 mg/24 hr patch 1 Patch  1 Patch TransDERmal DAILY PRN      SCHEDULED MEDICATIONS  Current Facility-Administered Medications   Medication Dose Route Frequency    sertraline (ZOLOFT) tablet 75 mg  75 mg Oral QPM    risperiDONE (RisperDAL) tablet 1 mg  1 mg Oral BID    levothyroxine (SYNTHROID) tablet 125 mcg  125 mcg Oral ACB    therapeutic multivitamin (THERAGRAN) tablet 1 Tab  1 Tab Oral DAILY    aspirin (ASPIRIN) tablet 325 mg  325 mg Oral DAILY    fluticasone (FLONASE) 50 mcg/actuation nasal spray 2 Spray  2 Spray Both Nostrils BID    cholecalciferol (VITAMIN D3) tablet 1,000 Units  1,000 Units Oral DAILY                ASSESSMENT & PLAN        The patient, Camilla Chow, is a 59 y.o.  female who presents at this time for treatment of the following diagnoses:  Patient Active Hospital Problem List:   Psychos and Depression (7/17/2018)    Assessment: depression and delusion     Plan: initiate Risperidone 0.25 mg po bid   7/19/18 increase Risperidone to 0.5 mg po bid    7/20/18 continue same medications    7/21/18 add Zoloft   7/22/18 increase Zoloft to 50 mg   7/24/18 increase Zoloft to 75 mg    7/25/18 possible discharge to stabilization  Unit           I will continue to monitor blood levels (Depakote, Tegretol, lithium, clozapine---a drug with a narrow therapeutic index= NTI) and associated labs for drug therapy implemented that require intense monitoring for toxicity as deemed appropriate based on current medication side effects and pharmacodynamically determined drug 1/2 lives.          A coordinated, multidisplinary treatment team (includes the nurse, unit pharmcist,  and writer) round was conducted for this initial evaluation with the patient present. The following regarding medications was addressed during rounds with patient:   the risks and benefits of the proposed medication. The patient was given the opportunity to ask questions. Informed consent given to the use of the above medications. I will continue to adjust psychiatric and non-psychiatric medications (see above \"medication\" section and orders section for details) as deemed appropriate & based upon diagnoses and response to treatment. I have reviewed admission (and previous/old) labs and medical tests in the EHR and or transferring hospital documents. I will continue to order blood tests/labs and diagnostic tests as deemed appropriate and review results as they become available (see orders for details). I have reviewed old psychiatric and medical records available in the EHR. I Will order additional psychiatric records from other institutions to further elucidate the nature of patient's psychopathology and review once available. I will gather additional collateral information from friends, family and o/p treatment team to further elucidate the nature of patient's psychopathology and baselline level of psychiatric functioning.       ESTIMATED LENGTH OF STAY:    3       STRENGTHS:  Exercising self-direction/Resourceful, Access to housing/residential stability and Interpersonal/supportive relationships (family, friends, peers)                                        SIGNED:    Boubacar Aguila MD  7/25/2018

## 2018-07-25 NOTE — PROGRESS NOTES
Problem: Depressed Mood (Adult/Pediatric)  Goal: *STG: Attends activities and groups  Outcome: Progressing Towards Goal  Patient encouraged to attend groups/activities in unit common areas and not be isolative to room  Goal: *STG: Remains safe in hospital  Outcome: Progressing Towards Goal  Environmental safety checks; q15min safety checks

## 2018-07-25 NOTE — PROGRESS NOTES
Susan Schilling actively participated in Spirituality Group about courage on [facility] 22 unit    dione Merrill M.Div, M.S, [place] 030 [id number]

## 2018-07-26 VITALS
HEART RATE: 62 BPM | SYSTOLIC BLOOD PRESSURE: 108 MMHG | DIASTOLIC BLOOD PRESSURE: 71 MMHG | WEIGHT: 160 LBS | BODY MASS INDEX: 29.44 KG/M2 | TEMPERATURE: 98 F | RESPIRATION RATE: 16 BRPM | OXYGEN SATURATION: 99 % | HEIGHT: 62 IN

## 2018-07-26 PROCEDURE — 74011250637 HC RX REV CODE- 250/637: Performed by: PSYCHIATRY & NEUROLOGY

## 2018-07-26 RX ORDER — SERTRALINE HYDROCHLORIDE 25 MG/1
75 TABLET, FILM COATED ORAL EVERY EVENING
Qty: 45 TAB | Refills: 0 | Status: SHIPPED | OUTPATIENT
Start: 2018-07-26 | End: 2018-08-13

## 2018-07-26 RX ORDER — HYDROXYZINE 25 MG/1
25 TABLET, FILM COATED ORAL
Qty: 15 TAB | Refills: 0 | Status: SHIPPED | OUTPATIENT
Start: 2018-07-26 | End: 2018-08-13

## 2018-07-26 RX ORDER — RISPERIDONE 1 MG/1
1 TABLET, FILM COATED ORAL 2 TIMES DAILY
Qty: 60 TAB | Refills: 0 | Status: SHIPPED | OUTPATIENT
Start: 2018-07-26 | End: 2018-08-13

## 2018-07-26 RX ORDER — TRAZODONE HYDROCHLORIDE 50 MG/1
50 TABLET ORAL
Qty: 30 TAB | Refills: 0 | Status: SHIPPED | OUTPATIENT
Start: 2018-07-26 | End: 2018-08-13

## 2018-07-26 RX ORDER — LEVOTHYROXINE SODIUM 125 UG/1
125 TABLET ORAL
Qty: 30 TAB | Refills: 0 | Status: SHIPPED | OUTPATIENT
Start: 2018-07-27 | End: 2018-08-13

## 2018-07-26 RX ADMIN — RISPERIDONE 1 MG: 1 TABLET ORAL at 09:11

## 2018-07-26 RX ADMIN — HYDROXYZINE HYDROCHLORIDE 25 MG: 25 TABLET, FILM COATED ORAL at 07:03

## 2018-07-26 RX ADMIN — VITAMIN D, TAB 1000IU (100/BT) 1000 UNITS: 25 TAB at 09:11

## 2018-07-26 RX ADMIN — LEVOTHYROXINE SODIUM 125 MCG: 100 TABLET ORAL at 06:35

## 2018-07-26 RX ADMIN — THERA TABS 1 TABLET: TAB at 09:11

## 2018-07-26 RX ADMIN — ASPIRIN 325 MG: 325 TABLET ORAL at 09:11

## 2018-07-26 NOTE — DISCHARGE SUMMARY
PSYCHIATRIC DISCHARGE SUMMARY         IDENTIFICATION:    Patient Name  Mylene Conway   Date of Birth 1960   [facility] [id number]   Medical Record Number  [id number]      Age  59 y.o. PCP Karen Ramirez MD   Admit date:  7/16/2018    Discharge date: 7/26/2018   Room Number  732/02  @ [facility]   Date of Service  7/26/2018            TYPE OF DISCHARGE: REGULAR               CONDITION AT DISCHARGE: improved       PROVISIONAL & DISCHARGE DIAGNOSES:    Problem List  Never Reviewed          Codes Class    * (Principal)Depression ICD-10-CM: F32.9  ICD-9-CM: [address] Problems    *Depression        DISCHARGE DIAGNOSIS:   Axis I:  SEE ABOVE  Axis II: SEE ABOVE  Axis III: SEE ABOVE  Axis IV:  lack of structure  Axis V:  35 on admission, 55 on discharge 55(baseline)       CC & HISTORY OF PRESENT ILLNESS:  CC:  Pt admitted under a temporary care home order (TDO)  psychosis  proving to be an imminent danger to self and an inability to care for self. HISTORY OF PRESENT ILLNESS:    The patient, Mylene Conway, is a 59 y.o. WHITE [place]  female with a past psychiatric history significant for possible depression and psychosis , who presents at this time with complaints of (and/or evidence of) the following emotional symptoms: depression and delusions. Additional symptomatology include anxiety, feeling depressed and poor concentration. The above symptoms have been present for weeks . These symptoms are of moderate severity. These symptoms are intermittent/ fleeting in nature. 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   She is tangential and she is not logical and she is not feeling she needs to be here and she stated police pulled her over but she was not aware about getting stopped and she came to ER and mention that she has thoughts about hurting herself and she is not feeling that way now  7/19/18 she is talking but not making sense and she is talking about her mother and she write letter for her , she not expressing any depression and no SI or HI and she is paranoid and no aggressive behavior   7/20/18 she is feeling little better but still paranoid and disorganized but better than before and no anger issues and no actin g out behavior and no SI or HI    7/21/18 she is frustrated and she is talking about her mother and she is upset about her mother passed away and not feeling able to go over that , still disorganized and still confused about her situation, still feel depressed    7/22/18 she still feeling depressed and still wants to go with her mother and she is not acting about depressive thoughts and no anger issues and no SI or HI and still little paranoid   7/23/18 she still has suicidal gesture and she still feel depressed and anxious and still has been asking for discharge and she still talking about her past and has been not feeling happy and she requested to see  , still internally preoccupied   7/24/18 she still feels depressed and she still grieving about her mother and she stated she wants to be with her and she does not have plan to hurt herself and she still talking about wanting to join her mother but still wants to act like her, no paranoid feeling but still disorganized   7/25/18 she still talking about her mother and she still not feeling  She is not  over the grieve and she still thinks about her mother and she has no SI or HI   7/26/18 she is doing the same and she still talking about her mother and she is welling to go to crisis stabilization and she is not feeling angry and agitated and no acting out behavior and  Compliant with medications         SOCIAL HISTORY:    Social History     Social History    Marital status: SINGLE     Spouse name: N/A    Number of children: N/A    Years of education: N/A     Occupational History    Not on file. Social History Main Topics    Smoking status: Never Smoker    Smokeless tobacco: Never Used    Alcohol use No    Drug use: No    Sexual activity: Not Currently     Other Topics Concern    Not on file     Social History Narrative      FAMILY HISTORY:   Family History   Problem Relation Age of Onset    Stroke Mother     Other Mother      erosion of esophagus    Cancer Mother      melanoma/squamous    Heart Surgery Father      x2    Delayed Awakening Father     Pacemaker Father     Other Father      carotid endartarectomy/polio    Cataract Father              HOSPITALIZATION COURSE:    Arcadio Kumar was admitted to the inpatient psychiatric unit [facility] for acute psychiatric stabilization in regards to symptomatology as described in the HPI above. The differential diagnosis at time of admission included: depression and psychosis . While on the unit Arcadio Kumar was involved in individual, group, occupational and milieu therapy. Psychiatric medications were adjusted during this hospitalization including Risperidone and Zoloft  . Arcadio Kumar demonstrated a slow, but progressive improvement in overall condition. Much of patient's depression appeared to be related to situational stressors,  and psychological factors. Please see individual progress notes for more specific details regarding patient's hospitalization course. At time of discharge, Arcadio Kumar is without significant problems of depression psychosis  guevara. Patient free of suicidal and homicidal ideations (appears to be at very low risk of suicide or homicide) and reports many positive predictive factors in terms of not attempting suicide or homicide. Overall presentation at time of discharge is most consistent with the diagnosis of adjustment disorder with depressed mood. Patient with request for discharge today. There are no grounds to seek a TDO.  Patient has maximized benefit to be derived from acute inpatient psychiatric treatment. All members of the treatment team concur with each other in regards to plans for discharge today per patient's request.  Patient and family are aware and in agreement with discharge and discharge plan.     Per my last note:          LABS AND IMAGAING:    Labs Reviewed   METABOLIC PANEL, COMPREHENSIVE - Abnormal; Notable for the following:        Result Value    Glucose 144 (*)     Creatinine 1.06 (*)     GFR est non-AA 53 (*)     AST (SGOT) 13 (*)     Globulin 4.2 (*)     A-G Ratio 1.0 (*)     All other components within normal limits   SALICYLATE - Abnormal; Notable for the following:     Salicylate level <8.7 (*)     All other components within normal limits   ACETAMINOPHEN - Abnormal; Notable for the following:     Acetaminophen level <2 (*)     All other components within normal limits   URINALYSIS W/MICROSCOPIC - Abnormal; Notable for the following:     Leukocyte Esterase SMALL (*)     All other components within normal limits   TSH 3RD GENERATION - Abnormal; Notable for the following:     TSH 0.05 (*)     All other components within normal limits   LIPID PANEL - Abnormal; Notable for the following:     Cholesterol, total 205 (*)     Triglyceride 186 (*)     All other components within normal limits   URINE CULTURE HOLD SAMPLE   CULTURE, URINE   CBC WITH AUTOMATED DIFF   SAMPLES BEING HELD   DRUG SCREEN, URINE   TROPONIN I   CK W/ REFLX CKMB   ETHYL ALCOHOL   HEMOGLOBIN A1C WITH EAG     No results found for: DS35, PHEN, PHENO, PHENT, DILF, DS39, PHENY, PTN, VALF2, VALAC, VALP, VALPR, DS6, CRBAM, CRBAMP, CARB2, XCRBAM  Admission on 07/16/2018   Component Date Value Ref Range Status    WBC 07/16/2018 8.4  3.6 - 11.0 K/uL Final    RBC 07/16/2018 4.72  3.80 - 5.20 M/uL Final    HGB 07/16/2018 13.0  11.5 - 16.0 g/dL Final    HCT 07/16/2018 40.2  35.0 - 47.0 % Final    MCV 07/16/2018 85.2  80.0 - 99.0 FL Final    MCH 07/16/2018 27.5  26.0 - 34.0 PG Final    MCHC 07/16/2018 32.3  30.0 - 36.5 g/dL Final    RDW 07/16/2018 12.8  11.5 - 14.5 % Final    PLATELET 58/53/7722 505  150 - 400 K/uL Final    MPV 07/16/2018 10.4  8.9 - 12.9 FL Final    NRBC 07/16/2018 0.0  0  WBC Final    ABSOLUTE NRBC 07/16/2018 0.00  0.00 - 0.01 K/uL Final    NEUTROPHILS 07/16/2018 65  32 - 75 % Final    LYMPHOCYTES 07/16/2018 26  12 - 49 % Final    MONOCYTES 07/16/2018 6  5 - 13 % Final    EOSINOPHILS 07/16/2018 3  0 - 7 % Final    BASOPHILS 07/16/2018 1  0 - 1 % Final    IMMATURE GRANULOCYTES 07/16/2018 0  0.0 - 0.5 % Final    ABS. NEUTROPHILS 07/16/2018 5.5  1.8 - 8.0 K/UL Final    ABS. LYMPHOCYTES 07/16/2018 2.2  0.8 - 3.5 K/UL Final    ABS. MONOCYTES 07/16/2018 0.5  0.0 - 1.0 K/UL Final    ABS. EOSINOPHILS 07/16/2018 0.2  0.0 - 0.4 K/UL Final    ABS. BASOPHILS 07/16/2018 0.0  0.0 - 0.1 K/UL Final    ABS. IMM. GRANS. 07/16/2018 0.0  0.00 - 0.04 K/UL Final    DF 07/16/2018 AUTOMATED    Final    Sodium 07/16/2018 140  136 - 145 mmol/L Final    Potassium 07/16/2018 3.6  3.5 - 5.1 mmol/L Final    Chloride 07/16/2018 107  97 - 108 mmol/L Final    CO2 07/16/2018 23  21 - 32 mmol/L Final    Anion gap 07/16/2018 10  5 - 15 mmol/L Final    Glucose 07/16/2018 144* 65 - 100 mg/dL Final    BUN 07/16/2018 13  6 - 20 MG/DL Final    Creatinine 07/16/2018 1.06* 0.55 - 1.02 MG/DL Final    BUN/Creatinine ratio 07/16/2018 12  12 - 20   Final    GFR est AA 07/16/2018 >60  >60 ml/min/1.73m2 Final    GFR est non-AA 07/16/2018 53* >60 ml/min/1.73m2 Final    Calcium 07/16/2018 8.7  8.5 - 10.1 MG/DL Final    Bilirubin, total 07/16/2018 0.4  0.2 - 1.0 MG/DL Final    ALT (SGPT) 07/16/2018 24  12 - 78 U/L Final    AST (SGOT) 07/16/2018 13* 15 - 37 U/L Final    Alk.  phosphatase 07/16/2018 77  45 - 117 U/L Final    Protein, total 07/16/2018 8.2  6.4 - 8.2 g/dL Final    Albumin 07/16/2018 4.0  3.5 - 5.0 g/dL Final    Globulin 07/16/2018 4.2* 2.0 - 4.0 g/dL Final    A-G Ratio 07/16/2018 1.0* 1.1 - 2.2   Final  AMPHETAMINES 07/17/2018 NEGATIVE   NEG   Final    BARBITURATES 07/17/2018 NEGATIVE   NEG   Final    BENZODIAZEPINES 07/17/2018 NEGATIVE   NEG   Final    COCAINE 07/17/2018 NEGATIVE   NEG   Final    METHADONE 07/17/2018 NEGATIVE   NEG   Final    OPIATES 07/17/2018 NEGATIVE   NEG   Final    PCP(PHENCYCLIDINE) 07/17/2018 NEGATIVE   NEG   Final    THC (TH-CANNABINOL) 07/17/2018 NEGATIVE   NEG   Final    Drug screen comment 07/17/2018 (NOTE)   Final    Salicylate level 92/41/3162 <1.7* 2.8 - 20.0 MG/DL Final    Acetaminophen level 07/16/2018 <2* 10 - 30 ug/mL Final    Troponin-I, Qt. 07/16/2018 <0.05  <0.05 ng/mL Final    CK 07/16/2018 108  26 - 192 U/L Final    Color 07/17/2018 YELLOW/STRAW    Final    Appearance 07/17/2018 CLEAR  CLEAR   Final    Specific gravity 07/17/2018 1.013  1.003 - 1.030   Final    pH (UA) 07/17/2018 5.5  5.0 - 8.0   Final    Protein 07/17/2018 NEGATIVE   NEG mg/dL Final    Glucose 07/17/2018 NEGATIVE   NEG mg/dL Final    Ketone 07/17/2018 NEGATIVE   NEG mg/dL Final    Bilirubin 07/17/2018 NEGATIVE   NEG   Final    Blood 07/17/2018 NEGATIVE   NEG   Final    Urobilinogen 07/17/2018 0.2  0.2 - 1.0 EU/dL Final    Nitrites 07/17/2018 NEGATIVE   NEG   Final    Leukocyte Esterase 07/17/2018 SMALL* NEG   Final    WBC 07/17/2018 10-20  0 - 4 /hpf Final    RBC 07/17/2018 0-5  0 - 5 /hpf Final    Epithelial cells 07/17/2018 FEW  FEW /lpf Final    Bacteria 07/17/2018 NEGATIVE   NEG /hpf Final    Hyaline cast 07/17/2018 0-2  0 - 5 /lpf Final    Urine culture hold 07/17/2018 URINE ON HOLD IN MICROBIOLOGY DEPT FOR 3 DAYS. IF UNPRESERVED URINE IS SUBMITTED, IT CANNOT BE USED FOR ADDITIONAL TESTING AFTER 24 HRS, RECOLLECTION WILL BE REQUIRED.     Final    Special Requests: 07/17/2018 NO SPECIAL REQUESTS    Final    Derby Count 07/17/2018     Final                    Value:[id number]  COLONIES/mL      Culture result: 07/17/2018 MIXED UROGENITAL KIMBERLEE ISOLATED    Final    Ventricular Rate 07/17/2018 71  BPM Final    Atrial Rate 07/17/2018 71  BPM Final    P-R Interval 07/17/2018 158  ms Final    QRS Duration 07/17/2018 88  ms Final    Q-T Interval 07/17/2018 406  ms Final    QTC Calculation (Bezet) 07/17/2018 441  ms Final    Calculated P Axis 07/17/2018 58  degrees Final    Calculated R Axis 07/17/2018 -24  degrees Final    Calculated T Axis 07/17/2018 39  degrees Final    Diagnosis 07/17/2018    Final                    Value:Normal sinus rhythm  When compared with ECG of 04-MAR-2005 17:15,  Previous ECG has undetermined rhythm, needs review  T wave inversion no longer evident in Anterior leads  Confirmed by Lisa Pompa MD, Melvin ([id number]) on 7/17/2018 9:49:31 AM      ALCOHOL(ETHYL),SERUM 07/17/2018 <10  <10 MG/DL Final    TSH 07/17/2018 0.05* 0.36 - 3.74 uIU/mL Final    LIPID PROFILE 07/19/2018        Final    Cholesterol, total 07/19/2018 205* <200 MG/DL Final    Triglyceride 07/19/2018 186* <150 MG/DL Final    HDL Cholesterol 07/19/2018 68  MG/DL Final    LDL, calculated 07/19/2018 99.8  0 - 100 MG/DL Final    VLDL, calculated 07/19/2018 37.2  MG/DL Final    CHOL/HDL Ratio 07/19/2018 3.0  0 - 5.0   Final    Hemoglobin A1c 07/19/2018 6.1  4.2 - 6.3 % Final    Est. average glucose 07/19/2018 128  mg/dL Final     Ct Head Wo Cont    Result Date: 7/17/2018  EXAM:  CT HEAD WO CONT INDICATION:  Altered mental status. COMPARISON: None. CONTRAST:  None. TECHNIQUE: Unenhanced CT of the head was performed using 5 mm images. Brain and bone windows were generated. CT dose reduction was achieved through use of a standardized protocol tailored for this examination and automatic exposure control for dose modulation. Adaptive statistical iterative reconstruction (ASIR) was utilized. FINDINGS: The ventricles and sulci are normal in size, shape and configuration and midline. There is no significant white matter disease.  There is no intracranial hemorrhage, extra-axial collection, mass, mass effect or midline shift. The basilar cisterns are open. No acute infarct is identified. The bone windows demonstrate no abnormalities. The visualized portions of the paranasal sinuses and mastoid air cells are clear. IMPRESSION: No acute findings. DISPOSITION:    Home. Patient to f/u with psychiatric, and psychotherapy appointments. Patient is to f/u with internist as directed. FOLLOW-UP CARE:    Activity as tolerated  Regular Diet  Wound Care: none needed. Follow-up Information     Follow up With Details Comments [address] [address]  Peace Fernandes MD   [address] [id number] [phone]                   PROGNOSIS:   Good / ---- based on nature of patient's pathology/ies and treatment compliance issues. Prognosis is greatly dependent upon patient's ability to follow up with psychiatric/psychotherapy appointments as well as to comply with psychiatric medications as prescribed. DISCHARGE MEDICATIONS:     Informed consent given for the use of following psychotropic medications:  Current Discharge Medication List      START taking these medications    Details   risperiDONE (RISPERDAL) 1 mg tablet Take 1 Tab by mouth two (2) times a day. Indications: delusion  Qty: 60 Tab, Refills: 0      sertraline (ZOLOFT) 25 mg tablet Take 3 Tabs by mouth every evening. Indications: major depressive disorder  Qty: 45 Tab, Refills: 0      traZODone (DESYREL) 50 mg tablet Take 1 Tab by mouth nightly as needed for Sleep. Indications: insomnia associated with depression  Qty: 30 Tab, Refills: 0         CONTINUE these medications which have CHANGED    Details   !! levothyroxine (SYNTHROID) 125 mcg tablet Take 1 Tab by mouth Daily (before breakfast). Indications: hypothyroidism  Qty: 30 Tab, Refills: 0       !! - Potential duplicate medications found.  Please discuss with provider. CONTINUE these medications which have NOT CHANGED    Details   therapeutic multivitamin (THERA) tablet Take 1 Tab by mouth daily. !! levothyroxine (SYNTHROID) 125 mcg tablet Take 125 mcg by mouth Daily (before breakfast). Indications: hypothyroidism      fluticasone (FLONASE) 50 mcg/actuation nasal spray 2 Sprays by Both Nostrils route two (2) times a day. Cholecalciferol, Vitamin D3, (VITAMIN D3) 1,000 unit cap Take 1,000 Units by mouth daily. cyanocobalamin, vitamin B-12, (VITAMIN B-12) 5,000 mcg subl 5,000 mcg by SubLINGual route daily. aspirin (ASPIRIN) 325 mg tablet Take 325-650 mg by mouth as needed for Pain. !! - Potential duplicate medications found. Please discuss with provider. STOP taking these medications       zolpidem (AMBIEN) 10 mg tablet Comments:   Reason for Stopping:         GREEN TEA EXTRACT PO Comments:   Reason for Stopping:         diphenhydrAMINE (BENADRYL ALLERGY) 25 mg tablet Comments:   Reason for Stopping:                      A coordinated, multidisplinary treatment team round was conducted with Bogdan Lacey---this is done daily here at [facility]. This team consists of the nurse, psychiatric unit pharmcist,  and writer. I have spent greater than 35 minutes on discharge work.     Signed:  Constantine Perry MD  7/26/2018

## 2018-07-26 NOTE — PROGRESS NOTES
Problem: Falls - Risk of  Goal: *Absence of Falls  Document Samanta Fall Risk and appropriate interventions in the flowsheet. Outcome: Progressing Towards Goal  Fall Risk Interventions: In bed asleep with respirations noted as even and unlabored as chest was rising and falling. Will continue to monitor for safety and 15 minute checks throughout shift.          Medication Interventions: Teach patient to arise slowly

## 2018-07-26 NOTE — PROGRESS NOTES
Pharmacist Discharge Medication Reconciliation    Discharging Provider: Dr. Yosi Naranjo PMH:   Past Medical History:   Diagnosis Date    Asthma 1967    hospitalized for asthma attack x 2    Chronic kidney disease     kidney stones x 2-3 times    Ill-defined condition     nasal blockage from growth and misalignment - cannot breath out of nose - surgery in the future/cleared for colonoscopy 7/31/2014 - will bring in letter    Other ill-defined conditions(799.89)     blood in stool    Other ill-defined conditions(799.89)     hx low BP - 90's/60's-70's    PUD (peptic ulcer disease)     Thyroid disease     Unspecified adverse effect of anesthesia     nasal growth and misalignment and cannot breath through nose     Chief Complaint for this Admission:   Chief Complaint   Patient presents with    Mental Health Problem     Allergies: Other food; Astepro [azelastine]; Bactrim [sulfamethoprim]; Banana; Coconut; Cortisone; Peanut; and Prednisone    Discharge Medications:   Current Discharge Medication List        START taking these medications    Details   risperiDONE (RISPERDAL) 1 mg tablet Take 1 Tab by mouth two (2) times a day. Indications: delusion  Qty: 60 Tab, Refills: 0      sertraline (ZOLOFT) 25 mg tablet Take 3 Tabs by mouth every evening. Indications: major depressive disorder  Qty: 45 Tab, Refills: 0      traZODone (DESYREL) 50 mg tablet Take 1 Tab by mouth nightly as needed for Sleep. Indications: insomnia associated with depression  Qty: 30 Tab, Refills: 0      hydrOXYzine HCl (ATARAX) 25 mg tablet Take 1 Tab by mouth every six (6) hours as needed (Anxiety) for up to 10 days. Indications: anxiety, Anxiety  Qty: 15 Tab, Refills: 0           CONTINUE these medications which have CHANGED    Details   levothyroxine (SYNTHROID) 125 mcg tablet Take 1 Tab by mouth Daily (before breakfast).  Indications: hypothyroidism  Qty: 30 Tab, Refills: 0           CONTINUE these medications which have NOT CHANGED Details   therapeutic multivitamin (THERA) tablet Take 1 Tab by mouth daily. fluticasone (FLONASE) 50 mcg/actuation nasal spray 2 Sprays by Both Nostrils route two (2) times a day. Cholecalciferol, Vitamin D3, (VITAMIN D3) 1,000 unit cap Take 1,000 Units by mouth daily. cyanocobalamin, vitamin B-12, (VITAMIN B-12) 5,000 mcg subl 5,000 mcg by SubLINGual route daily. aspirin (ASPIRIN) 325 mg tablet Take 325-650 mg by mouth as needed for Pain.            STOP taking these medications       zolpidem (AMBIEN) 10 mg tablet Comments:   Reason for Stopping:         GREEN TEA EXTRACT PO Comments:   Reason for Stopping:         diphenhydrAMINE (BENADRYL ALLERGY) 25 mg tablet Comments:   Reason for Stopping:             The patient's chart, MAR and AVS were reviewed by Sofia Sol, PHARMD.

## 2018-07-26 NOTE — BH NOTES
DISCHARGE SUMMARY from Nurse    PATIENT INSTRUCTIONS:    After general anesthesia or intravenous sedation, for 24 hours or while taking prescription Narcotics:  · Limit your activities  · Do not drive and operate hazardous machinery  · Do not make important personal or business decisions  · Do  not drink alcoholic beverages  · If you have not urinated within 8 hours after discharge, please contact your surgeon on call. Report the following to your surgeon:  · Excessive pain, swelling, redness or odor of or around the surgical area  · Temperature over 100.5  · Nausea and vomiting lasting longer than 4 hours or if unable to take medications  · Any signs of decreased circulation or nerve impairment to extremity: change in color, persistent  numbness, tingling, coldness or increase pain  · Any questions    What to do at Home:  Recommended activity: Activity as tolerated and no driving for today,     Ie give a list of your current medications to your Primary Care Provider. *  Please update this list whenever your medications are discontinued, doses are      changed, or new medications (including over-the-counter products) are added. *  Please carry medication information at all times in case of emergency situations. These are general instructions for a healthy lifestyle:    No smoking/ No tobacco products/ Avoid exposure to second hand smoke  Surgeon General's Warning:  Quitting smoking now greatly reduces serious risk to your health. Obesity, smoking, and sedentary lifestyle greatly increases your risk for illness    A healthy diet, regular physical exercise & weight monitoring are important for maintaining a healthy lifestyle    You may be retaining fluid if you have a history of heart failure or if you experience any of the following symptoms:  Weight gain of 3 pounds or more overnight or 5 pounds in a week, increased swelling in our hands or feet or shortness of breath while lying flat in bed. Please call your doctor as soon as you notice any of these symptoms; do not wait until your next office visit. Recognize signs and symptoms of STROKE:    F-face looks uneven    A-arms unable to move or move unevenly    S-speech slurred or non-existent    T-time-call 911 as soon as signs and symptoms begin-DO NOT go       Back to bed or wait to see if you get better-TIME IS BRAIN. Warning Signs of HEART ATTACK     Call 911 if you have these symptoms:   Chest discomfort. Most heart attacks involve discomfort in the center of the chest that lasts more than a few minutes, or that goes away and comes back. It can feel like uncomfortable pressure, squeezing, fullness, or pain.  Discomfort in other areas of the upper body. Symptoms can include pain or discomfort in one or both arms, the back, neck, jaw, or stomach.  Shortness of breath with or without chest discomfort.  Other signs may include breaking out in a cold sweat, nausea, or lightheadedness. Don't wait more than five minutes to call [address]! Fast action can save your life. Calling 911 is almost always the fastest way to get lifesaving treatment. Emergency Medical Services staff can begin treatment when they arrive  up to an hour sooner than if someone gets to the hospital by car. The discharge information has been reviewed with the patient. The patient verbalized understanding. Discharge medications reviewed with the patient and appropriate educational materials and side effects teaching were provided. Valuables from security returned to patient. Awaiting cab for transport to Crisis stabilization.    ___________________________________________________________________________________________________________________________________

## 2018-07-26 NOTE — BH NOTES
PSYCHIATRIC PROGRESS NOTE             IDENTIFICATION:    Patient Name  Pawan Bethea   Date of Birth 1960   [facility] [id number]   Medical Record Number  [id number]      Age  59 y.o. PCP Arnoldo Lees MD   Admit date:  7/16/2018    Room Number  732/02  @ [facility]   Date of Service  7/26/2018            HISTORY         REASON FOR HOSPITALIZATION:  CC:  Pt admitted under a temporary prison order (TDO)  psychosis  proving to be an imminent danger to self and an inability to care for self. HISTORY OF PRESENT ILLNESS:    The patient, Pawan Bethea, is a 59 y.o. WHITE [place]  female with a past psychiatric history significant for possible depression and psychosis , who presents at this time with complaints of (and/or evidence of) the following emotional symptoms: depression and delusions. Additional symptomatology include anxiety, feeling depressed and poor concentration. The above symptoms have been present for weeks . These symptoms are of moderate severity. These symptoms are intermittent/ fleeting in nature. 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   She is tangential and she is not logical and she is not feeling she needs to be here and she stated police pulled her over but she was not aware about getting stopped and she came to ER and mention that she has thoughts about hurting herself and she is not feeling that way now  7/19/18 she is talking but not making sense and she is talking about her mother and she write letter for her , she not expressing any depression and no SI or HI and she is paranoid and no aggressive behavior   7/20/18 she is feeling little better but still paranoid and disorganized but better than before and no anger issues and no actin g out behavior and no SI or HI    7/21/18 she is frustrated and she is talking about her mother and she is upset about her mother passed away and not feeling able to go over that , still disorganized and still confused about her situation, still feel depressed    7/22/18 she still feeling depressed and still wants to go with her mother and she is not acting about depressive thoughts and no anger issues and no SI or HI and still little paranoid   7/23/18 she still has suicidal gesture and she still feel depressed and anxious and still has been asking for discharge and she still talking about her past and has been not feeling happy and she requested to see  , still internally preoccupied   7/24/18 she still feels depressed and she still grieving about her mother and she stated she wants to be with her and she does not have plan to hurt herself and she still talking about wanting to join her mother but still wants to act like her, no paranoid feeling but still disorganized   7/25/18 she still talking about her mother and she still not feeling  She is not  over the grieve and she still thinks about her mother and she has no SI or HI   7/26/18 she is doing the same and she still talking about her mother and she is welling to go to crisis stabilization and she is not feeling angry and agitated and no acting out behavior and  Compliant with medications     ALLERGIES:   Allergies   Allergen Reactions    Other Food Other (comments)     \"Bad chest pain\" with walnuts. Coke - asthma/stuffy head. Fish sticks causes an asthma attack.  Astepro [Azelastine] Other (comments)     Violent headaches.  Bactrim [Sulfamethoprim] Other (comments)     Difficulty breathing, chills, fever.  Banana Other (comments)     Diffculty breathing, wheezing, asthma attack.  Coconut Other (comments)     Difficulty breathing, asthma attack, SOB.  Cortisone Hives     Difficulty breathing.  Peanut Other (comments)     Wheezing, asthma attack, SOB.  Prednisone Hives     Difficulty breathing, kidney stones.       MEDICATIONS PRIOR TO ADMISSION: Prescriptions Prior to Admission   Medication Sig    therapeutic multivitamin (THERA) tablet Take 1 Tab by mouth daily.  zolpidem (AMBIEN) 10 mg tablet Take 10 mg by mouth nightly as needed.  levothyroxine (SYNTHROID) 125 mcg tablet Take 125 mcg by mouth Daily (before breakfast). Indications: hypothyroidism    fluticasone (FLONASE) 50 mcg/actuation nasal spray 2 Sprays by Both Nostrils route two (2) times a day.  Cholecalciferol, Vitamin D3, (VITAMIN D3) 1,000 unit cap Take 1,000 Units by mouth daily.  cyanocobalamin, vitamin B-12, (VITAMIN B-12) 5,000 mcg subl 5,000 mcg by SubLINGual route daily.  GREEN TEA EXTRACT PO Take 700 mg by mouth daily.  aspirin (ASPIRIN) 325 mg tablet Take 325-650 mg by mouth as needed for Pain.  diphenhydrAMINE (BENADRYL ALLERGY) 25 mg tablet Take 50 mg by mouth as needed for Sleep. PAST MEDICAL HISTORY:   Past Medical History:   Diagnosis Date    Asthma 1967    hospitalized for asthma attack x 2    Chronic kidney disease     kidney stones x 2-3 times    Ill-defined condition     nasal blockage from growth and misalignment - cannot breath out of nose - surgery in the future/cleared for colonoscopy 7/31/2014 - will bring in letter    Other ill-defined conditions(799.89)     blood in stool    Other ill-defined conditions(799.89)     hx low BP - 90's/60's-70's    PUD (peptic ulcer disease)     Thyroid disease     Unspecified adverse effect of anesthesia     nasal growth and misalignment and cannot breath through nose     Past Surgical History:   Procedure Laterality Date    HX HEENT      T & A    HX HEENT      turbinate surgery      SOCIAL HISTORY: single    Social History     Social History    Marital status: SINGLE     Spouse name: N/A    Number of children: N/A    Years of education: N/A     Occupational History    Not on file.      Social History Main Topics    Smoking status: Never Smoker    Smokeless tobacco: Never Used    Alcohol use No  Drug use: No    Sexual activity: Not Currently     Other Topics Concern    Not on file     Social History Narrative      FAMILY HISTORY: History reviewed. No pertinent family history. Family History   Problem Relation Age of Onset    Stroke Mother     Other Mother      erosion of esophagus    Cancer Mother      melanoma/squamous    Heart Surgery Father      x2    Delayed Awakening Father    [facility] Pacemaker Father     Other Father      carotid endartarectomy/polio    Cataract Father        REVIEW OF SYSTEMS:   History obtained from chart review and the patient  Pertinent items are noted in the History of Present Illness. All other Systems reviewed and are considered negative. MENTAL STATUS EXAM & VITALS     MENTAL STATUS EXAM (MSE):    MSE FINDINGS ARE WITHIN NORMAL LIMITS (WNL) UNLESS OTHERWISE STATED BELOW. ( ALL OF THE BELOW CATEGORIES OF THE MSE HAVE BEEN REVIEWED (reviewed 7/26/2018) AND UPDATED AS DEEMED APPROPRIATE )  General Presentation age appropriate, cooperative   Orientation oriented to time, place and person   Vital Signs  See below (reviewed 7/26/2018); Vital Signs (BP, Pulse, & Temp) are within normal limits if not listed below.    Gait and Station Stable/steady, no ataxia   Musculoskeletal System No extrapyramidal symptoms (EPS); no abnormal muscular movements or Tardive Dyskinesia (TD); muscle strength and tone are within normal limits   Language No aphasia or dysarthria   Speech:  normal pitch and normal volume   Thought Processes illogical; slow rate of thoughts; fair abstract reasoning/computation   Thought Associations loose associations and tangential   Thought Content paranoid delusions   Suicidal Ideations no plan  and no intention   Homicidal Ideations no plan  and no intention   Mood:  depressed   Affect:  constricted   Memory recent  fair   Memory remote:  fair   Concentration/Attention:  fair   Fund of Knowledge average   Insight:  limited   Reliability poor Judgment:  limited          VITALS:     Patient Vitals for the past 24 hrs:   Temp Pulse Resp BP SpO2   07/25/18 2008 98.3 °F (36.8 °C) 73 - 126/80 -   07/25/18 1554 98.7 °F (37.1 °C) 71 16 107/62 99 %     Wt Readings from Last 3 Encounters:   07/16/18 72.6 kg (160 lb)   07/31/14 79.4 kg (175 lb)     Temp Readings from Last 3 Encounters:   07/25/18 98.3 °F (36.8 °C)     BP Readings from Last 3 Encounters:   07/25/18 126/80   07/31/14 110/69     Pulse Readings from Last 3 Encounters:   07/25/18 73   07/31/14 69            DATA     LABORATORY DATA:  Labs Reviewed   METABOLIC PANEL, COMPREHENSIVE - Abnormal; Notable for the following:        Result Value    Glucose 144 (*)     Creatinine 1.06 (*)     GFR est non-AA 53 (*)     AST (SGOT) 13 (*)     Globulin 4.2 (*)     A-G Ratio 1.0 (*)     All other components within normal limits   SALICYLATE - Abnormal; Notable for the following:     Salicylate level <2.5 (*)     All other components within normal limits   ACETAMINOPHEN - Abnormal; Notable for the following:     Acetaminophen level <2 (*)     All other components within normal limits   URINALYSIS W/MICROSCOPIC - Abnormal; Notable for the following:     Leukocyte Esterase SMALL (*)     All other components within normal limits   TSH 3RD GENERATION - Abnormal; Notable for the following:     TSH 0.05 (*)     All other components within normal limits   LIPID PANEL - Abnormal; Notable for the following:     Cholesterol, total 205 (*)     Triglyceride 186 (*)     All other components within normal limits   URINE CULTURE HOLD SAMPLE   CULTURE, URINE   CBC WITH AUTOMATED DIFF   SAMPLES BEING HELD   DRUG SCREEN, URINE   TROPONIN I   CK W/ REFLX CKMB   ETHYL ALCOHOL   HEMOGLOBIN A1C WITH EAG     Admission on 07/16/2018   Component Date Value Ref Range Status    WBC 07/16/2018 8.4  3.6 - 11.0 K/uL Final    RBC 07/16/2018 4.72  3.80 - 5.20 M/uL Final    HGB 07/16/2018 13.0  11.5 - 16.0 g/dL Final    HCT 07/16/2018 40.2 35.0 - 47.0 % Final    MCV 07/16/2018 85.2  80.0 - 99.0 FL Final    MCH 07/16/2018 27.5  26.0 - 34.0 PG Final    MCHC 07/16/2018 32.3  30.0 - 36.5 g/dL Final    RDW 07/16/2018 12.8  11.5 - 14.5 % Final    PLATELET 54/28/0442 818  150 - 400 K/uL Final    MPV 07/16/2018 10.4  8.9 - 12.9 FL Final    NRBC 07/16/2018 0.0  0  WBC Final    ABSOLUTE NRBC 07/16/2018 0.00  0.00 - 0.01 K/uL Final    NEUTROPHILS 07/16/2018 65  32 - 75 % Final    LYMPHOCYTES 07/16/2018 26  12 - 49 % Final    MONOCYTES 07/16/2018 6  5 - 13 % Final    EOSINOPHILS 07/16/2018 3  0 - 7 % Final    BASOPHILS 07/16/2018 1  0 - 1 % Final    IMMATURE GRANULOCYTES 07/16/2018 0  0.0 - 0.5 % Final    ABS. NEUTROPHILS 07/16/2018 5.5  1.8 - 8.0 K/UL Final    ABS. LYMPHOCYTES 07/16/2018 2.2  0.8 - 3.5 K/UL Final    ABS. MONOCYTES 07/16/2018 0.5  0.0 - 1.0 K/UL Final    ABS. EOSINOPHILS 07/16/2018 0.2  0.0 - 0.4 K/UL Final    ABS. BASOPHILS 07/16/2018 0.0  0.0 - 0.1 K/UL Final    ABS. IMM. GRANS. 07/16/2018 0.0  0.00 - 0.04 K/UL Final    DF 07/16/2018 AUTOMATED    Final    Sodium 07/16/2018 140  136 - 145 mmol/L Final    Potassium 07/16/2018 3.6  3.5 - 5.1 mmol/L Final    Chloride 07/16/2018 107  97 - 108 mmol/L Final    CO2 07/16/2018 23  21 - 32 mmol/L Final    Anion gap 07/16/2018 10  5 - 15 mmol/L Final    Glucose 07/16/2018 144* 65 - 100 mg/dL Final    BUN 07/16/2018 13  6 - 20 MG/DL Final    Creatinine 07/16/2018 1.06* 0.55 - 1.02 MG/DL Final    BUN/Creatinine ratio 07/16/2018 12  12 - 20   Final    GFR est AA 07/16/2018 >60  >60 ml/min/1.73m2 Final    GFR est non-AA 07/16/2018 53* >60 ml/min/1.73m2 Final    Calcium 07/16/2018 8.7  8.5 - 10.1 MG/DL Final    Bilirubin, total 07/16/2018 0.4  0.2 - 1.0 MG/DL Final    ALT (SGPT) 07/16/2018 24  12 - 78 U/L Final    AST (SGOT) 07/16/2018 13* 15 - 37 U/L Final    Alk.  phosphatase 07/16/2018 77  45 - 117 U/L Final    Protein, total 07/16/2018 8.2  6.4 - 8.2 g/dL Final  Albumin 07/16/2018 4.0  3.5 - 5.0 g/dL Final    Globulin 07/16/2018 4.2* 2.0 - 4.0 g/dL Final    A-G Ratio 07/16/2018 1.0* 1.1 - 2.2   Final    AMPHETAMINES 07/17/2018 NEGATIVE   NEG   Final    BARBITURATES 07/17/2018 NEGATIVE   NEG   Final    BENZODIAZEPINES 07/17/2018 NEGATIVE   NEG   Final    COCAINE 07/17/2018 NEGATIVE   NEG   Final    METHADONE 07/17/2018 NEGATIVE   NEG   Final    OPIATES 07/17/2018 NEGATIVE   NEG   Final    PCP(PHENCYCLIDINE) 07/17/2018 NEGATIVE   NEG   Final    THC (TH-CANNABINOL) 07/17/2018 NEGATIVE   NEG   Final    Drug screen comment 07/17/2018 (NOTE)   Final    Salicylate level 39/61/1786 <1.7* 2.8 - 20.0 MG/DL Final    Acetaminophen level 07/16/2018 <2* 10 - 30 ug/mL Final    Troponin-I, Qt. 07/16/2018 <0.05  <0.05 ng/mL Final    CK 07/16/2018 108  26 - 192 U/L Final    Color 07/17/2018 YELLOW/STRAW    Final    Appearance 07/17/2018 CLEAR  CLEAR   Final    Specific gravity 07/17/2018 1.013  1.003 - 1.030   Final    pH (UA) 07/17/2018 5.5  5.0 - 8.0   Final    Protein 07/17/2018 NEGATIVE   NEG mg/dL Final    Glucose 07/17/2018 NEGATIVE   NEG mg/dL Final    Ketone 07/17/2018 NEGATIVE   NEG mg/dL Final    Bilirubin 07/17/2018 NEGATIVE   NEG   Final    Blood 07/17/2018 NEGATIVE   NEG   Final    Urobilinogen 07/17/2018 0.2  0.2 - 1.0 EU/dL Final    Nitrites 07/17/2018 NEGATIVE   NEG   Final    Leukocyte Esterase 07/17/2018 SMALL* NEG   Final    WBC 07/17/2018 10-20  0 - 4 /hpf Final    RBC 07/17/2018 0-5  0 - 5 /hpf Final    Epithelial cells 07/17/2018 FEW  FEW /lpf Final    Bacteria 07/17/2018 NEGATIVE   NEG /hpf Final    Hyaline cast 07/17/2018 0-2  0 - 5 /lpf Final    Urine culture hold 07/17/2018 URINE ON HOLD IN MICROBIOLOGY DEPT FOR 3 DAYS. IF UNPRESERVED URINE IS SUBMITTED, IT CANNOT BE USED FOR ADDITIONAL TESTING AFTER 24 HRS, RECOLLECTION WILL BE REQUIRED.     Final    Special Requests: 07/17/2018 NO SPECIAL REQUESTS    Final    [place] Count 07/17/2018     Final                    Value:[id number]  COLONIES/mL      Culture result: 07/17/2018 MIXED UROGENITAL KIMBERLEE ISOLATED    Final    Ventricular Rate 07/17/2018 71  BPM Final    Atrial Rate 07/17/2018 71  BPM Final    P-R Interval 07/17/2018 158  ms Final    QRS Duration 07/17/2018 88  ms Final    Q-T Interval 07/17/2018 406  ms Final    QTC Calculation (Bezet) 07/17/2018 441  ms Final    Calculated P Axis 07/17/2018 58  degrees Final    Calculated R Axis 07/17/2018 -24  degrees Final    Calculated T Axis 07/17/2018 39  degrees Final    Diagnosis 07/17/2018    Final                    Value:Normal sinus rhythm  When compared with ECG of 04-MAR-2005 17:15,  Previous ECG has undetermined rhythm, needs review  T wave inversion no longer evident in Anterior leads  Confirmed by Angelica Mckeon MD, Melvin ([id number]) on 7/17/2018 9:49:31 AM      ALCOHOL(ETHYL),SERUM 07/17/2018 <10  <10 MG/DL Final    TSH 07/17/2018 0.05* 0.36 - 3.74 uIU/mL Final    LIPID PROFILE 07/19/2018        Final    Cholesterol, total 07/19/2018 205* <200 MG/DL Final    Triglyceride 07/19/2018 186* <150 MG/DL Final    HDL Cholesterol 07/19/2018 68  MG/DL Final    LDL, calculated 07/19/2018 99.8  0 - 100 MG/DL Final    VLDL, calculated 07/19/2018 37.2  MG/DL Final    CHOL/HDL Ratio 07/19/2018 3.0  0 - 5.0   Final    Hemoglobin A1c 07/19/2018 6.1  4.2 - 6.3 % Final    Est. average glucose 07/19/2018 128  mg/dL Final        RADIOLOGY REPORTS:    Results from Hospital Encounter encounter on 09/10/12   XR CHEST PA LAT   Narrative **Final Report**      ICD Codes / Adm. Diagnosis: 786.09   / Other dyspnea and respiratory    Examination:  CR CHEST PA AND LATERAL  - [id number] - Sep 10 2012  5:22PM  Accession No:  [id number]  Reason:  786.0      REPORT:  INDICATION:    786.0     COMPARISON: None. FINDINGS: PA and lateral radiographs of the chest demonstrate clear lungs.    The cardiac and mediastinal contours and pulmonary vascularity are normal. Spondylitic changes are present. .        IMPRESSION: No acute process           Signing/Reading Doctor: Magali Wong ([id number])    Approved: Magali Wong ([id number])  09/10/2012                                  Ct Head Wo Cont    Result Date: 7/17/2018  EXAM:  CT HEAD WO CONT INDICATION:  Altered mental status. COMPARISON: None. CONTRAST:  None. TECHNIQUE: Unenhanced CT of the head was performed using 5 mm images. Brain and bone windows were generated. CT dose reduction was achieved through use of a standardized protocol tailored for this examination and automatic exposure control for dose modulation. Adaptive statistical iterative reconstruction (ASIR) was utilized. FINDINGS: The ventricles and sulci are normal in size, shape and configuration and midline. There is no significant white matter disease. There is no intracranial hemorrhage, extra-axial collection, mass, mass effect or midline shift. The basilar cisterns are open. No acute infarct is identified. The bone windows demonstrate no abnormalities. The visualized portions of the paranasal sinuses and mastoid air cells are clear. IMPRESSION: No acute findings.              MEDICATIONS       ALL MEDICATIONS  Current Facility-Administered Medications   Medication Dose Route Frequency    traZODone (DESYREL) tablet 50 mg  50 mg Oral QHS PRN    sertraline (ZOLOFT) tablet 75 mg  75 mg Oral QPM    risperiDONE (RisperDAL) tablet 1 mg  1 mg Oral BID    hydrOXYzine HCl (ATARAX) tablet 25 mg  25 mg Oral Q6H PRN    levothyroxine (SYNTHROID) tablet 125 mcg  125 mcg Oral ACB    therapeutic multivitamin (THERAGRAN) tablet 1 Tab  1 Tab Oral DAILY    aspirin (ASPIRIN) tablet 325 mg  325 mg Oral DAILY    fluticasone (FLONASE) 50 mcg/actuation nasal spray 2 Spray  2 Spray Both Nostrils BID    cholecalciferol (VITAMIN D3) tablet 1,000 Units  1,000 Units Oral DAILY    ziprasidone (GEODON) 20 mg in sterile water (preservative free) 1 mL injection  20 mg IntraMUSCular BID PRN    OLANZapine (ZyPREXA) tablet 5 mg  5 mg Oral Q6H PRN    benztropine (COGENTIN) tablet 2 mg  2 mg Oral BID PRN    benztropine (COGENTIN) injection 2 mg  2 mg IntraMUSCular BID PRN    acetaminophen (TYLENOL) tablet 650 mg  650 mg Oral Q4H PRN    ibuprofen (MOTRIN) tablet 400 mg  400 mg Oral Q8H PRN    magnesium hydroxide (MILK OF MAGNESIA) 400 mg/5 mL oral suspension 30 mL  30 mL Oral DAILY PRN    nicotine (NICODERM CQ) 21 mg/24 hr patch 1 Patch  1 Patch TransDERmal DAILY PRN      SCHEDULED MEDICATIONS  Current Facility-Administered Medications   Medication Dose Route Frequency    sertraline (ZOLOFT) tablet 75 mg  75 mg Oral QPM    risperiDONE (RisperDAL) tablet 1 mg  1 mg Oral BID    levothyroxine (SYNTHROID) tablet 125 mcg  125 mcg Oral ACB    therapeutic multivitamin (THERAGRAN) tablet 1 Tab  1 Tab Oral DAILY    aspirin (ASPIRIN) tablet 325 mg  325 mg Oral DAILY    fluticasone (FLONASE) 50 mcg/actuation nasal spray 2 Spray  2 Spray Both Nostrils BID    cholecalciferol (VITAMIN D3) tablet 1,000 Units  1,000 Units Oral DAILY                ASSESSMENT & PLAN        The patient, Kristy Patel, is a 59 y.o.  female who presents at this time for treatment of the following diagnoses:  Patient Active Hospital Problem List:   Psychos and Depression (7/17/2018)    Assessment: depression and delusion     Plan: initiate Risperidone 0.25 mg po bid   7/19/18 increase Risperidone to 0.5 mg po bid    7/20/18 continue same medications    7/21/18 add Zoloft   7/22/18 increase Zoloft to 50 mg   7/24/18 increase Zoloft to 75 mg    7/25/18 possible discharge to stabilization  Unit   7/26/18 continue same medications  And will be discharge today           I will continue to monitor blood levels (Depakote, Tegretol, lithium, clozapine---a drug with a narrow therapeutic index= NTI) and associated labs for drug therapy implemented that require intense monitoring for toxicity as deemed appropriate based on current medication side effects and pharmacodynamically determined drug 1/2 lives. A coordinated, multidisplinary treatment team (includes the nurse, unit pharmcist,  and writer) round was conducted for this initial evaluation with the patient present. The following regarding medications was addressed during rounds with patient:   the risks and benefits of the proposed medication. The patient was given the opportunity to ask questions. Informed consent given to the use of the above medications. I will continue to adjust psychiatric and non-psychiatric medications (see above \"medication\" section and orders section for details) as deemed appropriate & based upon diagnoses and response to treatment. I have reviewed admission (and previous/old) labs and medical tests in the EHR and or transferring hospital documents. I will continue to order blood tests/labs and diagnostic tests as deemed appropriate and review results as they become available (see orders for details). I have reviewed old psychiatric and medical records available in the EHR. I Will order additional psychiatric records from other institutions to further elucidate the nature of patient's psychopathology and review once available. I will gather additional collateral information from friends, family and o/p treatment team to further elucidate the nature of patient's psychopathology and baselline level of psychiatric functioning.       ESTIMATED LENGTH OF STAY:    3       STRENGTHS:  Exercising self-direction/Resourceful, Access to housing/residential stability and Interpersonal/supportive relationships (family, friends, peers)                                        SIGNED:    Anderson Strickland MD  7/26/2018

## 2018-07-26 NOTE — BH NOTES
PRN Medication Documentation    Specific patient behavior that led to need for PRN medication: patient asking for sleep aid  Staff interventions attempted prior to PRN being given: redirection  PRN medication given: 50 mg trazodone PO  Patient response/effectiveness of PRN medication: will continue to assess

## 2018-07-26 NOTE — BH NOTES
Behavioral Health Transition Record to Provider    Patient Name: Kristy Patel  YOB: 1960  Medical Record Number: [id number]  Date of Admission: 7/16/2018  Date of Discharge: 7/26/2018     Attending Provider: Clive Vásquez MD  Discharging Provider: Dr. Zulema Roman   To contact this individual call [phone] and ask the  to page. If unavailable, ask to be transferred to [facility] Provider on call. [facility] Provider will be available on call 24/7 and during holidays. Primary Care Provider: Moreno Zapien MD    Allergies   Allergen Reactions    Other Food Other (comments)     \"Bad chest pain\" with walnuts. Coke - asthma/stuffy head. Fish sticks causes an asthma attack.  Astepro [Azelastine] Other (comments)     Violent headaches.  Bactrim [Sulfamethoprim] Other (comments)     Difficulty breathing, chills, fever.  Banana Other (comments)     Diffculty breathing, wheezing, asthma attack.  Coconut Other (comments)     Difficulty breathing, asthma attack, SOB.  Cortisone Hives     Difficulty breathing.  Peanut Other (comments)     Wheezing, asthma attack, SOB.  Prednisone Hives     Difficulty breathing, kidney stones. Reason for Admission: Pt admitted under a temporary prison order (TDO)  psychosis  proving to be an imminent danger to self and an inability to care for self. Admission Diagnosis: Depression    * No surgery found *    Results for orders placed or performed during the hospital encounter of 07/16/18   URINE CULTURE HOLD SAMPLE   Result Value Ref Range    Urine culture hold        URINE ON HOLD IN MICROBIOLOGY DEPT FOR 3 DAYS. IF UNPRESERVED URINE IS SUBMITTED, IT CANNOT BE USED FOR ADDITIONAL TESTING AFTER 24 HRS, RECOLLECTION WILL BE REQUIRED.    CULTURE, URINE   Result Value Ref Range    Special Requests: NO SPECIAL REQUESTS      [place] Count [id number]  COLONIES/mL        Culture result: MIXED UROGENITAL KIMBERLEE ISOLATED     CBC WITH AUTOMATED DIFF   Result Value Ref Range    WBC 8.4 3.6 - 11.0 K/uL    RBC 4.72 3.80 - 5.20 M/uL    HGB 13.0 11.5 - 16.0 g/dL    HCT 40.2 35.0 - 47.0 %    MCV 85.2 80.0 - 99.0 FL    MCH 27.5 26.0 - 34.0 PG    MCHC 32.3 30.0 - 36.5 g/dL    RDW 12.8 11.5 - 14.5 %    PLATELET 797 361 - 102 K/uL    MPV 10.4 8.9 - 12.9 FL    NRBC 0.0 0  WBC    ABSOLUTE NRBC 0.00 0.00 - 0.01 K/uL    NEUTROPHILS 65 32 - 75 %    LYMPHOCYTES 26 12 - 49 %    MONOCYTES 6 5 - 13 %    EOSINOPHILS 3 0 - 7 %    BASOPHILS 1 0 - 1 %    IMMATURE GRANULOCYTES 0 0.0 - 0.5 %    ABS. NEUTROPHILS 5.5 1.8 - 8.0 K/UL    ABS. LYMPHOCYTES 2.2 0.8 - 3.5 K/UL    ABS. MONOCYTES 0.5 0.0 - 1.0 K/UL    ABS. EOSINOPHILS 0.2 0.0 - 0.4 K/UL    ABS. BASOPHILS 0.0 0.0 - 0.1 K/UL    ABS. IMM. GRANS. 0.0 0.00 - 0.04 K/UL    DF AUTOMATED     METABOLIC PANEL, COMPREHENSIVE   Result Value Ref Range    Sodium 140 136 - 145 mmol/L    Potassium 3.6 3.5 - 5.1 mmol/L    Chloride 107 97 - 108 mmol/L    CO2 23 21 - 32 mmol/L    Anion gap 10 5 - 15 mmol/L    Glucose 144 (H) 65 - 100 mg/dL    BUN 13 6 - 20 MG/DL    Creatinine 1.06 (H) 0.55 - 1.02 MG/DL    BUN/Creatinine ratio 12 12 - 20      GFR est AA >60 >60 ml/min/1.73m2    GFR est non-AA 53 (L) >60 ml/min/1.73m2    Calcium 8.7 8.5 - 10.1 MG/DL    Bilirubin, total 0.4 0.2 - 1.0 MG/DL    ALT (SGPT) 24 12 - 78 U/L    AST (SGOT) 13 (L) 15 - 37 U/L    Alk.  phosphatase 77 45 - 117 U/L    Protein, total 8.2 6.4 - 8.2 g/dL    Albumin 4.0 3.5 - 5.0 g/dL    Globulin 4.2 (H) 2.0 - 4.0 g/dL    A-G Ratio 1.0 (L) 1.1 - 2.2     DRUG SCREEN, URINE   Result Value Ref Range    AMPHETAMINES NEGATIVE  NEG      BARBITURATES NEGATIVE  NEG      BENZODIAZEPINES NEGATIVE  NEG      COCAINE NEGATIVE  NEG      METHADONE NEGATIVE  NEG      OPIATES NEGATIVE  NEG      PCP(PHENCYCLIDINE) NEGATIVE  NEG      THC (TH-CANNABINOL) NEGATIVE  NEG      Drug screen comment (NOTE)    SALICYLATE   Result Value Ref Range    Salicylate level <1.6 (L) 2.8 - 20.0 MG/DL ACETAMINOPHEN   Result Value Ref Range    Acetaminophen level <2 (L) 10 - 30 ug/mL   TROPONIN I   Result Value Ref Range    Troponin-I, Qt. <0.05 <0.05 ng/mL   CK W/ REFLX CKMB   Result Value Ref Range     26 - 192 U/L   URINALYSIS W/MICROSCOPIC   Result Value Ref Range    Color YELLOW/STRAW      Appearance CLEAR CLEAR      Specific gravity 1.013 1.003 - 1.030      pH (UA) 5.5 5.0 - 8.0      Protein NEGATIVE  NEG mg/dL    Glucose NEGATIVE  NEG mg/dL    Ketone NEGATIVE  NEG mg/dL    Bilirubin NEGATIVE  NEG      Blood NEGATIVE  NEG      Urobilinogen 0.2 0.2 - 1.0 EU/dL    Nitrites NEGATIVE  NEG      Leukocyte Esterase SMALL (A) NEG      WBC 10-20 0 - 4 /hpf    RBC 0-5 0 - 5 /hpf    Epithelial cells FEW FEW /lpf    Bacteria NEGATIVE  NEG /hpf    Hyaline cast 0-2 0 - 5 /lpf   ETHYL ALCOHOL   Result Value Ref Range    ALCOHOL(ETHYL),SERUM <10 <10 MG/DL   TSH 3RD GENERATION   Result Value Ref Range    TSH 0.05 (L) 0.36 - 3.74 uIU/mL   LIPID PANEL   Result Value Ref Range    LIPID PROFILE          Cholesterol, total 205 (H) <200 MG/DL    Triglyceride 186 (H) <150 MG/DL    HDL Cholesterol 68 MG/DL    LDL, calculated 99.8 0 - 100 MG/DL    VLDL, calculated 37.2 MG/DL    CHOL/HDL Ratio 3.0 0 - 5.0     HEMOGLOBIN A1C WITH EAG   Result Value Ref Range    Hemoglobin A1c 6.1 4.2 - 6.3 %    Est. average glucose 128 mg/dL   EKG, 12 LEAD, INITIAL   Result Value Ref Range    Ventricular Rate 71 BPM    Atrial Rate 71 BPM    P-R Interval 158 ms    QRS Duration 88 ms    Q-T Interval 406 ms    QTC Calculation (Bezet) 441 ms    Calculated P Axis 58 degrees    Calculated R Axis -24 degrees    Calculated T Axis 39 degrees    Diagnosis       Normal sinus rhythm  When compared with ECG of 04-MAR-2005 17:15,  Previous ECG has undetermined rhythm, needs review  T wave inversion no longer evident in Anterior leads  Confirmed by Reed King MD, Shani Price ([id number]) on 7/17/2018 9:49:31 AM         Immunizations administered during this encounter: There is no immunization history on file for this patient. Screening for Metabolic Disorders for Patients on Antipsychotic Medications  (Data obtained from the EMR)    Estimated Body Mass Index  Estimated body mass index is 29.26 kg/(m^2) as calculated from the following:    Height as of this encounter: 5' 2\" (1.575 m). Weight as of this encounter: 72.6 kg (160 lb). Vital Signs/Blood Pressure  Visit Vitals    /71 (BP 1 Location: Left arm, BP Patient Position: Sitting)    Pulse 62    Temp 98 °F (36.7 °C)    Resp 16    Ht 5' 2\" (1.575 m)    Wt 72.6 kg (160 lb)    SpO2 99%    Breastfeeding No    BMI 29.26 kg/m2       Blood Glucose/Hemoglobin A1c  Lab Results   Component Value Date/Time    Glucose 144 (H) 07/16/2018 11:55 PM       Lab Results   Component Value Date/Time    Hemoglobin A1c 6.1 07/19/2018 06:03 AM        Lipid Panel  Lab Results   Component Value Date/Time    Cholesterol, total 205 (H) 07/19/2018 06:03 AM    HDL Cholesterol 68 07/19/2018 06:03 AM    LDL, calculated 99.8 07/19/2018 06:03 AM    Triglyceride 186 (H) 07/19/2018 06:03 AM    CHOL/HDL Ratio 3.0 07/19/2018 06:03 AM        Discharge Diagnosis: Depression    Discharge Plan: She will step down to CSU     The patient Arcadio Kumar exhibits the ability to control behavior in a less restrictive environment. Patient's level of functioning is improving. No assaultive/destructive behavior has been observed for the past 24 hours. No suicidal/homicidal threat or behavior has been observed for the past 24 hours. There is no evidence of serious medication side effects. Patient has not been in physical or protective restraints for at least the past 24 hours. If weapons involved, how are they secured? No weapons involved     Is patient aware of and in agreement with discharge plan? Patient is aware of discharge and is in agreement     Arrangements for medication:  Prescriptions filled per derek .       Referral for substance abuse treatment ? no    Referral for smoking cessation needed ? no    Copy of discharge instructions to  provider?:  Yes, fax to [facility] and to [facility] for transportation home:  Erica to transport     Keep all follow up appointments as scheduled, continue to take prescribed medications per physician instructions. Mental health crisis number:  [phone] or your local mental health crisis line number at [address] [place] [address]                Discharge Medication List and Instructions:   Current Discharge Medication List      START taking these medications    Details   risperiDONE (RISPERDAL) 1 mg tablet Take 1 Tab by mouth two (2) times a day. Indications: delusion  Qty: 60 Tab, Refills: 0      sertraline (ZOLOFT) 25 mg tablet Take 3 Tabs by mouth every evening. Indications: major depressive disorder  Qty: 45 Tab, Refills: 0      traZODone (DESYREL) 50 mg tablet Take 1 Tab by mouth nightly as needed for Sleep. Indications: insomnia associated with depression  Qty: 30 Tab, Refills: 0      hydrOXYzine HCl (ATARAX) 25 mg tablet Take 1 Tab by mouth every six (6) hours as needed (Anxiety) for up to 10 days. Indications: anxiety, Anxiety  Qty: 15 Tab, Refills: 0         CONTINUE these medications which have CHANGED    Details   !! levothyroxine (SYNTHROID) 125 mcg tablet Take 1 Tab by mouth Daily (before breakfast). Indications: hypothyroidism  Qty: 30 Tab, Refills: 0       !! - Potential duplicate medications found. Please discuss with provider. CONTINUE these medications which have NOT CHANGED    Details   therapeutic multivitamin (THERA) tablet Take 1 Tab by mouth daily. !! levothyroxine (SYNTHROID) 125 mcg tablet Take 125 mcg by mouth Daily (before breakfast). Indications: hypothyroidism      fluticasone (FLONASE) 50 mcg/actuation nasal spray 2 Sprays by Both Nostrils route two (2) times a day. Cholecalciferol, Vitamin D3, (VITAMIN D3) 1,000 unit cap Take 1,000 Units by mouth daily.       cyanocobalamin, vitamin B-12, (VITAMIN B-12) 5,000 mcg subl 5,000 mcg by SubLINGual route daily. aspirin (ASPIRIN) 325 mg tablet Take 325-650 mg by mouth as needed for Pain. !! - Potential duplicate medications found. Please discuss with provider. STOP taking these medications       zolpidem (AMBIEN) 10 mg tablet Comments:   Reason for Stopping:         GREEN TEA EXTRACT PO Comments:   Reason for Stopping:         diphenhydrAMINE (BENADRYL ALLERGY) 25 mg tablet Comments:   Reason for Stopping:               Unresulted Labs     None        To obtain results of studies pending at discharge, please contact [phone]    Follow-up Information     Follow up With Details Comments [address] to  Oswaldo Jeffries   [phone]    Luna Gill MD Schedule an appointment as soon as possible for a visit  [address]  [address] [address] [phone]      Plan to discharge to [address] [place] [address] on 7/26/2018  [address] [facility] [address]  [address] [place]   [phone]          Advanced Directive:   Does the patient have an appointed surrogate decision maker? No  Does the patient have a Medical Advance Directive? No  Does the patient have a Psychiatric Advance Directive? No  If the patient does not have a surrogate or Medical Advance Directive AND Psychiatric Advance Directive, the patient was offered information on these advance directives Patient declined to complete    Patient Instructions: Please continue all medications until otherwise directed by physician. Tobacco Cessation Discharge Plan:   Is the patient a smoker and needs referral for smoking cessation? No  Patient referred to the following for smoking cessation with an appointment? No     Patient was offered medication to assist with smoking cessation at discharge? No  Was education for smoking cessation added to the discharge instructions?  Yes    Alcohol/Substance Abuse Discharge Plan:   Does the patient have a history of substance/alcohol abuse and requires a referral for treatment? No  Patient referred to the following for substance/alcohol abuse treatment with an appointment? No  Patient was offered medication to assist with alcohol cessation at discharge? No  Was education for substance/alcohol abuse added to discharge instructions? No    Patient discharged to Home; discussed with patient/caregiver and provided to the patient/caregiver either in hard copy or electronically.

## 2018-07-26 NOTE — PROGRESS NOTES
GROUP THERAPY PROGRESS NOTE    Johanna Lagos is participating in community group. Group time: 15 min    Personal goal for participation: To \"move to the light at the end of the tunnel. \"    Goal orientation: community    Group therapy participation: Active    Therapeutic interventions reviewed and discussed:     Impression of participation: She actively participated in group, answered when called upon, shared her artwork, and asked questions.

## 2018-07-26 NOTE — BH NOTES
PRN Medication Documentation    Specific patient behavior that led to need for PRN medication: patient stating she is having escalating anxiety level and would like medicine for it  Staff interventions attempted prior to PRN being given: redirection, mindfulness  PRN medication given: 25 mg atarax PO  Patient response/effectiveness of PRN medication: will continue to assess

## 2018-07-26 NOTE — INTERDISCIPLINARY ROUNDS
Behavioral Health Interdisciplinary Rounds     Patient Name: Kane Huntley  Age: 59 y.o. Room/Bed:  732/02  Primary Diagnosis: Depression   Admission Status: Involuntary Commitment     Readmission within 30 days: no  Power of  in place: no  Patient requires a blocked bed: no          Reason for blocked bed:     VTE Prophylaxis: Not indicated    Mobility needs/Fall risk: no    Nutritional Plan: no  Consults:          Labs/Testing due today?: no    Sleep hours: 7.50       Participation in Care/Groups:  yes  Medication Compliant?: Yes  PRNS (last 24 hours): Antianxiety and Sleep Aid    Restraints (last 24 hours):  no     CIWA (range last 24 hours):     COWS (range last 24 hours):      Alcohol screening (AUDIT) completed -   AUDIT Score: 0     If applicable, date SBIRT discussed in treatment team AND documented:     Tobacco - patient is a smoker: Have You Used Tobacco in the Past 30 Days: No  Illegal Drugs use: Have You Used Any Illegal Substances Over the Past 12 Months: No    24 hour chart check complete: yes     Patient goal(s) for today:   Treatment team focus/goals: plan to discharge to CSU today   Progress note : she remains sad and flat. She attends all groups and is pleasant on the unit. She is compliant with her medications. LOS:  8  Expected LOS: plan to discharge to CSU today     Financial concerns/prescription coverage:  Marc Hanks   Date of last family contact:       Family requesting physician contact today:    Discharge plan: plan to discharge to [facility] in the home: no        Outpatient provider(s): Erica     Participating treatment team members: Kane Huntley,  Jeri Hickman, PharmD.

## 2018-07-26 NOTE — BH NOTES
PRN Medication Documentation    Specific patient behavior that led to need for PRN medication: c/o anxiety upon waking  Staff interventions attempted prior to PRN being given: milieu quiet, distraction, encouraged coping skills  PRN medication given: Atarax 25 mg po  Patient response/effectiveness of PRN medication: continue to monitor

## 2018-07-26 NOTE — BH NOTES
GROUP THERAPY PROGRESS NOTE    Demetria Lennox did not participate in an 20 minute Acute Unit Process Group, with a focus identifying feelings, planning for the rest of the day, and discussing discharge. The patient was discharged before the start of this group today.

## 2018-07-27 ENCOUNTER — HOSPITAL ENCOUNTER (INPATIENT)
Age: 58
LOS: 17 days | Discharge: HOME | DRG: 885 | End: 2018-08-13
Attending: PSYCHIATRY & NEUROLOGY | Admitting: PSYCHIATRY & NEUROLOGY
Payer: COMMERCIAL

## 2018-07-27 DIAGNOSIS — F41.9 ANXIETY: ICD-10-CM

## 2018-07-27 DIAGNOSIS — F51.04 PSYCHOPHYSIOLOGICAL INSOMNIA: Primary | ICD-10-CM

## 2018-07-27 PROCEDURE — 74011250637 HC RX REV CODE- 250/637: Performed by: PSYCHIATRY & NEUROLOGY

## 2018-07-27 PROCEDURE — 65220000003 HC RM SEMIPRIVATE PSYCH

## 2018-07-27 RX ORDER — IBUPROFEN 400 MG/1
400 TABLET ORAL
Status: DISCONTINUED | OUTPATIENT
Start: 2018-07-27 | End: 2018-08-13 | Stop reason: HOSPADM

## 2018-07-27 RX ORDER — MELATONIN
1000 DAILY
Status: DISCONTINUED | OUTPATIENT
Start: 2018-07-27 | End: 2018-08-13 | Stop reason: HOSPADM

## 2018-07-27 RX ORDER — OLANZAPINE 5 MG/1
5 TABLET ORAL
Status: DISCONTINUED | OUTPATIENT
Start: 2018-07-27 | End: 2018-08-13 | Stop reason: HOSPADM

## 2018-07-27 RX ORDER — LORAZEPAM 1 MG/1
1 TABLET ORAL
Status: DISCONTINUED | OUTPATIENT
Start: 2018-07-27 | End: 2018-07-27

## 2018-07-27 RX ORDER — HYDROXYZINE 25 MG/1
25 TABLET, FILM COATED ORAL
Status: DISCONTINUED | OUTPATIENT
Start: 2018-07-27 | End: 2018-08-13 | Stop reason: HOSPADM

## 2018-07-27 RX ORDER — ZOLPIDEM TARTRATE 10 MG/1
10 TABLET ORAL
Status: DISCONTINUED | OUTPATIENT
Start: 2018-07-27 | End: 2018-07-27

## 2018-07-27 RX ORDER — RISPERIDONE 1 MG/1
1 TABLET, FILM COATED ORAL 2 TIMES DAILY
Status: DISCONTINUED | OUTPATIENT
Start: 2018-07-27 | End: 2018-08-02

## 2018-07-27 RX ORDER — BENZTROPINE MESYLATE 1 MG/ML
2 INJECTION INTRAMUSCULAR; INTRAVENOUS
Status: DISCONTINUED | OUTPATIENT
Start: 2018-07-27 | End: 2018-08-13 | Stop reason: HOSPADM

## 2018-07-27 RX ORDER — ACETAMINOPHEN 325 MG/1
650 TABLET ORAL
Status: DISCONTINUED | OUTPATIENT
Start: 2018-07-27 | End: 2018-08-13 | Stop reason: HOSPADM

## 2018-07-27 RX ORDER — LORAZEPAM 2 MG/ML
2 INJECTION INTRAMUSCULAR
Status: DISCONTINUED | OUTPATIENT
Start: 2018-07-27 | End: 2018-08-13 | Stop reason: HOSPADM

## 2018-07-27 RX ORDER — ASPIRIN 325 MG
325 TABLET ORAL DAILY
Status: DISCONTINUED | OUTPATIENT
Start: 2018-07-27 | End: 2018-08-13 | Stop reason: HOSPADM

## 2018-07-27 RX ORDER — SERTRALINE HYDROCHLORIDE 50 MG/1
75 TABLET, FILM COATED ORAL EVERY EVENING
Status: DISCONTINUED | OUTPATIENT
Start: 2018-07-27 | End: 2018-07-28

## 2018-07-27 RX ORDER — TRAZODONE HYDROCHLORIDE 50 MG/1
50 TABLET ORAL
Status: DISCONTINUED | OUTPATIENT
Start: 2018-07-27 | End: 2018-07-30

## 2018-07-27 RX ORDER — IBUPROFEN 200 MG
1 TABLET ORAL
Status: DISCONTINUED | OUTPATIENT
Start: 2018-07-27 | End: 2018-08-13 | Stop reason: HOSPADM

## 2018-07-27 RX ORDER — BENZTROPINE MESYLATE 2 MG/1
2 TABLET ORAL
Status: DISCONTINUED | OUTPATIENT
Start: 2018-07-27 | End: 2018-08-13 | Stop reason: HOSPADM

## 2018-07-27 RX ORDER — ASPIRIN 325 MG
325-650 TABLET ORAL ONCE
Status: DISCONTINUED | OUTPATIENT
Start: 2018-07-27 | End: 2018-07-27

## 2018-07-27 RX ORDER — LEVOTHYROXINE SODIUM 50 UG/1
125 TABLET ORAL
Status: DISCONTINUED | OUTPATIENT
Start: 2018-07-28 | End: 2018-08-13 | Stop reason: HOSPADM

## 2018-07-27 RX ORDER — ASPIRIN 325 MG
325 TABLET ORAL ONCE
Status: DISCONTINUED | OUTPATIENT
Start: 2018-07-27 | End: 2018-07-27

## 2018-07-27 RX ORDER — ADHESIVE BANDAGE
30 BANDAGE TOPICAL DAILY PRN
Status: DISCONTINUED | OUTPATIENT
Start: 2018-07-27 | End: 2018-08-13 | Stop reason: HOSPADM

## 2018-07-27 RX ADMIN — HYDROXYZINE HYDROCHLORIDE 25 MG: 25 TABLET ORAL at 12:28

## 2018-07-27 RX ADMIN — SERTRALINE HYDROCHLORIDE 75 MG: 50 TABLET ORAL at 17:44

## 2018-07-27 RX ADMIN — TRAZODONE HYDROCHLORIDE 50 MG: 50 TABLET ORAL at 22:31

## 2018-07-27 RX ADMIN — VITAMIN D 1000 UNITS: 25 TAB ORAL at 12:28

## 2018-07-27 RX ADMIN — RISPERIDONE 1 MG: 1 TABLET ORAL at 21:19

## 2018-07-27 RX ADMIN — HYDROXYZINE HYDROCHLORIDE 25 MG: 25 TABLET ORAL at 21:19

## 2018-07-27 RX ADMIN — ASPIRIN 325 MG ORAL TABLET 325 MG: 325 PILL ORAL at 12:28

## 2018-07-27 RX ADMIN — RISPERIDONE 1 MG: 1 TABLET ORAL at 12:28

## 2018-07-27 NOTE — IP AVS SNAPSHOT
[address] 
 
 
 [address] Anna Marie Rosales Patient: Zac Arredondo MRN: [id number] About your hospitalization You were admitted on:  July 27, 2018 You last received care in the:  [facility]. 291 You were discharged on:  August 13, 2018 Why you were hospitalized Your primary diagnosis was:  Severe Major Depression With Psychotic Features, Mood-Congruent (Hcc) Your diagnoses also included:  Depression Follow-up Information Follow up With Details Comments Contact Info [address] on 8/21/2018 Follow up on 8/21/2018 @ 1[facility]00pm [facility] [address] [facility] [facility], case management and psychiatry services. *please bring id, insurance cards & medications [address] 
[phone] [facility]  Follow up referral for in-home mobile crisis stabilization services. [address] [facility] 
[address] 
[phone] Full [facility]  Follow up for intake to attend [facility] grief support group. [address] [facility], [facility] 
[phone] ext. [phone] Lucas Isaac Caceres MD   [address] [place] [facility] [address] [facility] 7 [address] [phone] Discharge Orders None A check rosita indicates which time of day the medication should be taken. My Medications START taking these medications Instructions Each Dose to Equal  
 Morning Noon Evening Bedtime  
 cholecalciferol 1,000 unit tablet Commonly known as:  VITAMIN D3 Start taking on:  8/14/2018 Replaces:  VITAMIN D3 1,000 unit Cap Your last dose was: Your next dose is: Take 1 Tab by mouth daily. Indications: PREVENTION OF VITAMIN D DEFICIENCY, Vitamin D Deficiency 1000 Units LORazepam 0.5 mg tablet Commonly known as:  ATIVAN  
   
 Your last dose was: Your next dose is: Take 0.5 Tabs by mouth three (3) times daily. Max Daily Amount: 0.75 mg. Indications: anxiety 0.25 mg  
    
   
   
   
  
 zolpidem 10 mg tablet Commonly known as:  AMBIEN Your last dose was: Your next dose is: Take 1 Tab by mouth nightly. Max Daily Amount: 10 mg. Indications: SLEEP-ONSET INSOMNIA 10 mg CHANGE how you take these medications Instructions Each Dose to Equal  
 Morning Noon Evening Bedtime  
 aspirin 325 mg tablet Commonly known as:  ASPIRIN Start taking on:  8/14/2018 What changed:   
- how much to take - when to take this 
- reasons to take this Your last dose was: Your next dose is: Take 1 Tab by mouth daily. Indications: myocardial infarction prevention 325 mg  
    
   
   
   
  
 fluticasone 50 mcg/actuation nasal spray Commonly known as:  Olivia Murphy What changed:  when to take this Your last dose was: Your next dose is: 2 Sprays by Both Nostrils route daily. Indications: Allergic Rhinitis 2 Spray  
    
   
   
   
  
 risperiDONE 2 mg tablet Commonly known as:  RisperDAL What changed:   
- medication strength 
- how much to take Your last dose was: Your next dose is: Take 1 Tab by mouth two (2) times a day. Indications: DEPRESSION TREATMENT ADJUNCT  
 2 mg  
    
   
   
   
  
 sertraline 100 mg tablet Commonly known as:  ZOLOFT What changed:   
- medication strength 
- how much to take - when to take this Your last dose was: Your next dose is: Take 2 Tabs by mouth daily (with dinner). Indications: Generalized Anxiety Disorder, major depressive disorder  
 200 mg CONTINUE taking these medications Instructions Each Dose to Equal  
 Morning Noon Evening Bedtime  
 levothyroxine 125 mcg tablet Commonly known as:  SYNTHROID Start taking on:  8/14/2018 Your last dose was: Your next dose is: Take 1 Tab by mouth Daily (before breakfast). Indications: hypothyroidism 125 mcg THERA tablet Generic drug:  therapeutic multivitamin Your last dose was: Your next dose is: Take 1 Tab by mouth daily. 1 Tab VITAMIN B-12 5,000 mcg Subl Generic drug:  cyanocobalamin (vitamin B-12) Your last dose was: Your next dose is:    
   
   
 5,000 mcg by SubLINGual route daily. 5000 mcg STOP taking these medications   
 hydrOXYzine HCl 25 mg tablet Commonly known as:  ATARAX  
   
  
 traZODone 50 mg tablet Commonly known as:  DESYREL  
   
  
 VITAMIN D3 1,000 unit Cap Generic drug:  cholecalciferol Replaced by:  cholecalciferol 1,000 unit tablet Where to Get Your Medications Information on where to get these meds will be given to you by the nurse or doctor. ! Ask your nurse or doctor about these medications  
  aspirin 325 mg tablet  
 cholecalciferol 1,000 unit tablet  
 fluticasone 50 mcg/actuation nasal spray  
 levothyroxine 125 mcg tablet LORazepam 0.5 mg tablet  
 risperiDONE 2 mg tablet  
 sertraline 100 mg tablet  
 zolpidem 10 mg tablet Discharge Instructions DISCHARGE SUMMARY from Nurse The following personal items are in your possession at time of discharge: 
 
  
Visual Aid: Glasses, With patient PATIENT INSTRUCTIONS: 
 
If I feel that I can not keep these promises and I am at risk of hurting myself or others, I will call the crisis office and speak with a crisis worker who will assist me during my crisis. [address] [place] [address]  [phone] [address] [place] [address]   [phone] [facility]  [phone] [facility]  [phone] Lifestyle Tips: 
Appointment after discharge and follow up phone call: After being discharged, if your appointment does not work for you, please feel free to contact the physician's office to change the appointment. Medications: Take your medicines exactly as instructed. Ask your doctor or nurse if you have questions about your medicines. Tell your doctor right away if you have problems with your medicines. These are general instructions for a healthy lifestyle: No smoking/ No tobacco products/ Avoid exposure to second hand smoke Surgeon General's Warning:  Quitting smoking now greatly reduces serious risk to your health. Obesity, smoking, and sedentary lifestyle greatly increases your risk for illness A healthy diet, regular physical exercise & weight monitoring are important for maintaining a healthy lifestyle Recognize signs and symptoms of STROKE: 
 
F-face looks uneven A-arms unable to move or move unevenly S-speech slurred or non-existent T-time-call 911 as soon as signs and symptoms begin-DO NOT go Back to bed or wait to see if you get better-TIME IS BRAIN. Warning Signs of HEART ATTACK Call 911 if you have these symptoms: 
? Chest discomfort. Most heart attacks involve discomfort in the center of the chest that lasts more than a few minutes, or that goes away and comes back. It can feel like uncomfortable pressure, squeezing, fullness, or pain. ? Discomfort in other areas of the upper body. Symptoms can include pain or discomfort in one or both arms, the back, neck, jaw, or stomach. ? Shortness of breath with or without chest discomfort. ? Other signs may include breaking out in a cold sweat, nausea, or lightheadedness. Don't wait more than five minutes to call [address]! Fast action can save your life.  Calling 911 is almost always the fastest way to get lifesaving treatment. Emergency Medical Services staff can begin treatment when they arrive  up to an hour sooner than if someone gets to the hospital by car. Myself and/or my family have received education about my diagnosis throughout my hospital stay. During my hospital stay, I and/or my family/caregiver were included in planning my care upon discharge. My needs were taken into consideration and I was included in my discharge planning. I had an opportunity to ask questions. The discharge information has been reviewed with the patient. The patient verbalized understanding. Discharge medications reviewed with the patient and appropriate educational materials and side effects teaching were provided. citibuddies Announcement We are excited to announce that we are making your provider's discharge notes available to you in citibuddies. You will see these notes when they are completed and signed by the physician that discharged you from your recent hospital stay. If you have any questions or concerns about any information you see in citibuddies, please call the Health Information Department where you were seen or reach out to your Primary Care Provider for more information about your plan of care. Introducing [facility]! Tyrese Arteaga introduces citibuddies patient portal. Now you can access parts of your medical record, email your doctor's office, and request medication refills online. 1. In your internet browser, go to [email or web address] 2. Click on the First Time User? Click Here link in the Sign In box. You will see the New Member Sign Up page. 3. Enter your citibuddies Access Code exactly as it appears below. You will not need to use this code after youve completed the sign-up process. If you do not sign up before the expiration date, you must request a new code. · citibuddies Access Code: [email or web address] Expires: 10/14/2018 10:02 PM 
 4. Enter the last four digits of your Social Security Number (xxxx) and Date of Birth (mm/dd/yyyy) as indicated and click Submit. You will be taken to the next sign-up page. 5. Create a Nettle ID. This will be your Nettle login ID and cannot be changed, so think of one that is secure and easy to remember. 6. Create a Nettle password. You can change your password at any time. 7. Enter your Password Reset Question and Answer. This can be used at a later time if you forget your password. 8. Enter your e-mail address. You will receive e-mail notification when new information is available in [address] 9. Click Sign Up. You can now view and download portions of your medical record. 10. Click the Download Summary menu link to download a portable copy of your medical information. If you have questions, please visit the Frequently Asked Questions section of the Nettle website. Remember, Nettle is NOT to be used for urgent needs. For medical emergencies, dial 911. Now available from your iPhone and Android! Introducing Geraldo Feliciano As a Eduard Shove patient, I wanted to make you aware of our electronic visit tool called Geraldo Hernandezfin. Eduard Hinton 24/7 allows you to connect within minutes with a medical provider 24 hours a day, seven days a week via a mobile device or tablet or logging into a secure website from your computer. You can access Geraldo Jaylendavisfin from anywhere in the [place]. A virtual visit might be right for you when you have a simple condition and feel like you just dont want to get out of bed, or cant get away from work for an appointment, when your regular Eduard Shove provider is not available (evenings, weekends or holidays), or when youre out of town and need minor care.   Electronic visits cost only $49 and if the Geraldo Feliciano provider determines a prescription is needed to treat your condition, one can be electronically transmitted to a nearby pharmacy*. Please take a moment to enroll today if you have not already done so. The enrollment process is free and takes just a few minutes. To enroll, please download the ImageShack 24/7 phi to your tablet or phone, or visit [email or web address] to enroll on your computer. And, as an [address] [place] [address] patient with a [email or web address] account, the results of your visits will be scanned into your electronic medical record and your primary care provider will be able to view the scanned results. We urge you to continue to see your regular ImageShack provider for your ongoing medical care. And while your primary care provider may not be the one available when you seek a Isowalk virtual visit, the peace of mind you get from getting a real diagnosis real time can be priceless. For more information on Isowalk, view our Frequently Asked Questions (FAQs) at [email or web address]. Sincerely, 
 
Gricel García MD 
Chief Medical Officer [address] [place] [address] *:  certain medications cannot be prescribed via Isowalk Providers Seen During Your Hospitalization Provider Specialty Primary office phone Thanh Junior MD Psychiatry [phone] John Paul Hameed MD Psychiatry [phone] Your Primary Care Physician (PCP) Primary Care Physician Office Phone Office Fax Thomas Simon [phone] You are allergic to the following Allergen Reactions Other Food Other (comments) \"Bad chest pain\" with walnuts. Coke - asthma/stuffy head. Fish sticks causes an asthma attack. Astepro (Azelastine) Other (comments) Violent headaches. Bactrim (Sulfamethoprim) Other (comments) Difficulty breathing, chills, fever. Banana Other (comments) Diffculty breathing, wheezing, asthma attack. Coconut Other (comments) Difficulty breathing, asthma attack, SOB. Cortisone Hives Difficulty breathing. Peanut Other (comments) Wheezing, asthma attack, SOB. Prednisone Hives Difficulty breathing, kidney stones. Recent Documentation Height Weight Breastfeeding? BMI OB Status Smoking Status 1.626 m 72.6 kg No 27.46 kg/m2 Menopause Never Smoker Emergency Contacts Name Discharge Info Relation Home Work Mobile Imani Lacey DISCHARGE CAREGIVER [3] Parent [1] [phone] Patient Belongings The following personal items are in your possession at time of discharge: 
     Visual Aid: Glasses, With patient Please provide this summary of care documentation to your next provider. Signatures-by signing, you are acknowledging that this After Visit Summary has been reviewed with you and you have received a copy. Patient Signature:  ____________________________________________________________ Date:  ____________________________________________________________  
  
[facility] Provider Signature:  ____________________________________________________________ Date:  ____________________________________________________________

## 2018-07-27 NOTE — PROGRESS NOTES
Problem: Depressed Mood (Adult/Pediatric) Goal: *STG: Participates in treatment plan [address] [facility] 60 Master Treatment Plan for Susana Ponce Date Treatment Plan Initiated:07/27/2018 Treatment Plan Modalities: 
Type of Modality Amount (x minutes) Frequency (x/week) Duration (x days) Name of Responsible Staff Community & wrap-up meetings to encourage peer interactions 15 7 1 Group psychotherapy to assist in building coping skills and internal controls 60 7 [address] [facility] Therapeutic activity groups to build coping skills 60 7 1 Davin Banegas Psychoeducation in group setting to address:  
Medication education 15 7 1 Coping skills Relaxation techniques Symptom management Discharge planning [address] [place] [address] 71 [facility] [address] [place] [address] 60 1 1 volunteer Recovery/AA/NA 
    volunteer Physician medication management 15 7 1 Family meeting/discharge planning [place] Argie Dandy

## 2018-07-27 NOTE — IP AVS SNAPSHOT
[address] [facility] 
 
 
 [address] Lucase Devang Gomez Patient: Eugenie Rabago MRN: [id number] A check rosita indicates which time of day the medication should be taken. My Medications START taking these medications Instructions Each Dose to Equal  
 Morning Noon Evening Bedtime  
 cholecalciferol 1,000 unit tablet Commonly known as:  VITAMIN D3 Start taking on:  8/14/2018 Replaces:  VITAMIN D3 1,000 unit Cap Your last dose was: Your next dose is: Take 1 Tab by mouth daily. Indications: PREVENTION OF VITAMIN D DEFICIENCY, Vitamin D Deficiency 1000 Units LORazepam 0.5 mg tablet Commonly known as:  ATIVAN Your last dose was: Your next dose is: Take 0.5 Tabs by mouth three (3) times daily. Max Daily Amount: 0.75 mg. Indications: anxiety 0.25 mg  
    
   
   
   
  
 zolpidem 10 mg tablet Commonly known as:  AMBIEN Your last dose was: Your next dose is: Take 1 Tab by mouth nightly. Max Daily Amount: 10 mg. Indications: SLEEP-ONSET INSOMNIA 10 mg CHANGE how you take these medications Instructions Each Dose to Equal  
 Morning Noon Evening Bedtime  
 aspirin 325 mg tablet Commonly known as:  ASPIRIN Start taking on:  8/14/2018 What changed:   
- how much to take - when to take this 
- reasons to take this Your last dose was: Your next dose is: Take 1 Tab by mouth daily. Indications: myocardial infarction prevention 325 mg  
    
   
   
   
  
 fluticasone 50 mcg/actuation nasal spray Commonly known as:  Shubham Underwood What changed:  when to take this Your last dose was: Your next dose is: 2 Sprays by Both Nostrils route daily. Indications: Allergic Rhinitis 2 Spray  
    
   
   
   
  
 risperiDONE 2 mg tablet Commonly known as:  RisperDAL What changed:   
- medication strength 
- how much to take Your last dose was: Your next dose is: Take 1 Tab by mouth two (2) times a day. Indications: DEPRESSION TREATMENT ADJUNCT  
 2 mg  
    
   
   
   
  
 sertraline 100 mg tablet Commonly known as:  ZOLOFT What changed:   
- medication strength 
- how much to take - when to take this Your last dose was: Your next dose is: Take 2 Tabs by mouth daily (with dinner). Indications: Generalized Anxiety Disorder, major depressive disorder  
 200 mg CONTINUE taking these medications Instructions Each Dose to Equal  
 Morning Noon Evening Bedtime  
 levothyroxine 125 mcg tablet Commonly known as:  SYNTHROID Start taking on:  8/14/2018 Your last dose was: Your next dose is: Take 1 Tab by mouth Daily (before breakfast). Indications: hypothyroidism 125 mcg THERA tablet Generic drug:  therapeutic multivitamin Your last dose was: Your next dose is: Take 1 Tab by mouth daily. 1 Tab VITAMIN B-12 5,000 mcg Subl Generic drug:  cyanocobalamin (vitamin B-12) Your last dose was: Your next dose is:    
   
   
 5,000 mcg by SubLINGual route daily. 5000 mcg STOP taking these medications   
 hydrOXYzine HCl 25 mg tablet Commonly known as:  ATARAX  
   
  
 traZODone 50 mg tablet Commonly known as:  DESYREL  
   
  
 VITAMIN D3 1,000 unit Cap Generic drug:  cholecalciferol Replaced by:  cholecalciferol 1,000 unit tablet Where to Get Your Medications Information on where to get these meds will be given to you by the nurse or doctor. ! Ask your nurse or doctor about these medications  
  aspirin 325 mg tablet  
 cholecalciferol 1,000 unit tablet fluticasone 50 mcg/actuation nasal spray  
 levothyroxine 125 mcg tablet LORazepam 0.5 mg tablet  
 risperiDONE 2 mg tablet  
 sertraline 100 mg tablet  
 zolpidem 10 mg tablet

## 2018-07-27 NOTE — PROGRESS NOTES
Patient arrived to unit at around 1030. Vital signs obtained and skin assessment/search completed by Chioma Turpin RN and [place]; skin unremarkable. Patient depressed with flat affect, thought blocking and suicidal ideations. Patient states she has no plan to harm herself. Voluntar admission status. Order obtained from Margaux Luna MD for patient to keep prescription sunglasses. [facility] [address]; will search them prior to 1500.  
 
1400- Patient's belongings checked and appropriate clothing (two pairs of elastic band pants, 1 shirt) and notebooks returned to patient. Rest were bagged, labeled and placed in storage closet. Home medications (zoloft, synthroid, atarax, risperdal, trazadone) placed in medication bag, labeled, and placed in medication room to take down to pharmacy. Patient has valuables to send down to security; attempted to contact them with no success. 1500- Security arrived to retrieve patient's valuables.

## 2018-07-27 NOTE — PROGRESS NOTES
Laboratory monitoring for mood stabilizer and antipsychotics: 
 
Recommended baseline monitoring has been completed based on this patient's current medication regimen. The patient is currently taking the following medication(s):  
Current Facility-Administered Medications Medication Dose Route Frequency  [START ON 7/28/2018] levothyroxine (SYNTHROID) tablet 125 mcg  125 mcg Oral ACB  risperiDONE (RisperDAL) tablet 1 mg  1 mg Oral BID  sertraline (ZOLOFT) tablet 75 mg  75 mg Oral QPM  
 cholecalciferol (VITAMIN D3) tablet 1,000 Units  1,000 Units Oral DAILY  aspirin (ASPIRIN) tablet 325 mg  325 mg Oral DAILY Height, Weight, BMI Estimation Estimated body mass index is 27.46 kg/(m^2) as calculated from the following: 
  Height as of this encounter: 162.6 cm (64\"). Weight as of this encounter: 72.6 kg (160 lb). Renal Function, Hepatic Function and Chemistry Estimated Creatinine Clearance: 55.8 mL/min (based on Cr of 1.06). Lab Results Component Value Date/Time Sodium 140 07/16/2018 11:55 PM  
 Potassium 3.6 07/16/2018 11:55 PM  
 Chloride 107 07/16/2018 11:55 PM  
 CO2 23 07/16/2018 11:55 PM  
 Anion gap 10 07/16/2018 11:55 PM  
 Glucose 144 (H) 07/16/2018 11:55 PM  
 BUN 13 07/16/2018 11:55 PM  
 Creatinine 1.06 (H) 07/16/2018 11:55 PM  
 BUN/Creatinine ratio 12 07/16/2018 11:55 PM  
 GFR est AA >60 07/16/2018 11:55 PM  
 GFR est non-AA 53 (L) 07/16/2018 11:55 PM  
 Calcium 8.7 07/16/2018 11:55 PM  
 ALT (SGPT) 24 07/16/2018 11:55 PM  
 AST (SGOT) 13 (L) 07/16/2018 11:55 PM  
 Alk. phosphatase 77 07/16/2018 11:55 PM  
 Protein, total 8.2 07/16/2018 11:55 PM  
 Albumin 4.0 07/16/2018 11:55 PM  
 Globulin 4.2 (H) 07/16/2018 11:55 PM  
 A-G Ratio 1.0 (L) 07/16/2018 11:55 PM  
 Bilirubin, total 0.4 07/16/2018 11:55 PM  
 
 
Lab Results Component Value Date/Time Glucose 144 (H) 07/16/2018 11:55 PM  
 
 
Lab Results Component Value Date/Time  Hemoglobin A1c 6.1 07/19/2018 06:03 AM  
 
 
Hematology Lab Results Component Value Date/Time WBC 8.4 07/16/2018 11:55 PM  
 HGB 13.0 07/16/2018 11:55 PM  
 HCT 40.2 07/16/2018 11:55 PM  
 PLATELET 747 27/90/6092 11:55 PM  
 MCV 85.2 07/16/2018 11:55 PM  
 
 
Lipids Lab Results Component Value Date/Time Cholesterol, total 205 (H) 07/19/2018 06:03 AM  
 HDL Cholesterol 68 07/19/2018 06:03 AM  
 LDL, calculated 99.8 07/19/2018 06:03 AM  
 Triglyceride 186 (H) 07/19/2018 06:03 AM  
 CHOL/HDL Ratio 3.0 07/19/2018 06:03 AM  
 
 
Thyroid Function Lab Results Component Value Date/Time TSH 0.05 (L) 07/17/2018 09:15 AM  
 
Vitals Visit Vitals  /81 (BP 1 Location: Left arm, BP Patient Position: At rest;Sitting)  Pulse 62  Temp 98.4 °F (36.9 °C)  Resp 16  
 Ht 162.6 cm (64\")  Wt 72.6 kg (160 lb)  SpO2 100%  Breastfeeding No  
 BMI 27.46 kg/m2 Rufino Becerra, PharmD, BCPS 
[phone] (pharmacy)

## 2018-07-27 NOTE — PROGRESS NOTES
[facility] Admission Pharmacy Medication Reconciliation Recommendations/Findings:  
1) Patient was discharged from [facility] yesterday. Discharge medication list was compared with current [facility] medication list and no changes were made. Total Time Spent: 5 minutes Information obtained from: Discharge medication reconciliation completed at [facility] on 18 Patient allergies: Allergies as of 2018 - Review Complete 2018 Allergen Reaction Noted  Other food Other (comments) 2014  Astepro [azelastine] Other (comments) 2014  Bactrim [sulfamethoprim] Other (comments) 2014  Banana Other (comments) 2014  Coconut Other (comments) 2014  Cortisone Hives 2014  Peanut Other (comments) 2014  Prednisone Hives 2014 Prior to Admission Medications Prescriptions Last Dose Informant Patient Reported? Taking? Cholecalciferol, Vitamin D3, (VITAMIN D3) 1,000 unit cap   Yes No  
Sig: Take 1,000 Units by mouth daily. aspirin (ASPIRIN) 325 mg tablet   Yes No  
Sig: Take 325-650 mg by mouth as needed for Pain. cyanocobalamin, vitamin B-12, (VITAMIN B-12) 5,000 mcg subl   Yes No  
Si,000 mcg by SubLINGual route daily. fluticasone (FLONASE) 50 mcg/actuation nasal spray   Yes No  
Si Sprays by Both Nostrils route two (2) times a day.  
hydrOXYzine HCl (ATARAX) 25 mg tablet   No No  
Sig: Take 1 Tab by mouth every six (6) hours as needed (Anxiety) for up to 10 days. Indications: anxiety, Anxiety  
levothyroxine (SYNTHROID) 125 mcg tablet   No No  
Sig: Take 1 Tab by mouth Daily (before breakfast). Indications: hypothyroidism  
risperiDONE (RISPERDAL) 1 mg tablet   No No  
Sig: Take 1 Tab by mouth two (2) times a day. Indications: delusion  
sertraline (ZOLOFT) 25 mg tablet   No No  
Sig: Take 3 Tabs by mouth every evening.  Indications: major depressive disorder  
therapeutic multivitamin (THERA) tablet   Yes No  
Sig: Take 1 Tab by mouth daily.  
traZODone (DESYREL) 50 mg tablet   No No  
Sig: Take 1 Tab by mouth nightly as needed for Sleep. Indications: insomnia associated with depression Facility-Administered Medications: None Thank you, Angel Bullock, PHARMD, BCPS Clinical Pharmacy Specialist, [facility]

## 2018-07-28 LAB
CHOLEST SERPL-MCNC: 209 MG/DL
GLUCOSE P FAST SERPL-MCNC: 87 MG/DL (ref 65–100)
HDLC SERPL-MCNC: 55 MG/DL
HDLC SERPL: 3.8 {RATIO} (ref 0–5)
LDLC SERPL CALC-MCNC: 126.4 MG/DL (ref 0–100)
LIPID PROFILE,FLP: ABNORMAL
TRIGL SERPL-MCNC: 138 MG/DL (ref ?–150)
TSH SERPL DL<=0.05 MIU/L-ACNC: 0.08 UIU/ML (ref 0.36–3.74)
VLDLC SERPL CALC-MCNC: 27.6 MG/DL

## 2018-07-28 PROCEDURE — 74011250637 HC RX REV CODE- 250/637: Performed by: PSYCHIATRY & NEUROLOGY

## 2018-07-28 PROCEDURE — 65220000003 HC RM SEMIPRIVATE PSYCH

## 2018-07-28 PROCEDURE — 80061 LIPID PANEL: CPT | Performed by: PSYCHIATRY & NEUROLOGY

## 2018-07-28 PROCEDURE — 82947 ASSAY GLUCOSE BLOOD QUANT: CPT | Performed by: PSYCHIATRY & NEUROLOGY

## 2018-07-28 PROCEDURE — 74011250637 HC RX REV CODE- 250/637: Performed by: INTERNAL MEDICINE

## 2018-07-28 PROCEDURE — 84443 ASSAY THYROID STIM HORMONE: CPT | Performed by: PSYCHIATRY & NEUROLOGY

## 2018-07-28 RX ORDER — SERTRALINE HYDROCHLORIDE 50 MG/1
100 TABLET, FILM COATED ORAL EVERY EVENING
Status: DISCONTINUED | OUTPATIENT
Start: 2018-07-28 | End: 2018-08-01

## 2018-07-28 RX ORDER — FLUTICASONE PROPIONATE 50 MCG
2 SPRAY, SUSPENSION (ML) NASAL 2 TIMES DAILY
Status: DISCONTINUED | OUTPATIENT
Start: 2018-07-28 | End: 2018-08-13 | Stop reason: HOSPADM

## 2018-07-28 RX ORDER — SERTRALINE HYDROCHLORIDE 50 MG/1
100 TABLET, FILM COATED ORAL EVERY EVENING
Status: DISCONTINUED | OUTPATIENT
Start: 2018-07-29 | End: 2018-07-28

## 2018-07-28 RX ORDER — SERTRALINE HYDROCHLORIDE 50 MG/1
25 TABLET, FILM COATED ORAL ONCE
Status: DISCONTINUED | OUTPATIENT
Start: 2018-07-28 | End: 2018-07-28 | Stop reason: DRUGHIGH

## 2018-07-28 RX ADMIN — HYDROXYZINE HYDROCHLORIDE 25 MG: 25 TABLET ORAL at 06:07

## 2018-07-28 RX ADMIN — FLUTICASONE PROPIONATE 2 SPRAY: 50 SPRAY, METERED NASAL at 12:57

## 2018-07-28 RX ADMIN — ASPIRIN 325 MG ORAL TABLET 325 MG: 325 PILL ORAL at 08:26

## 2018-07-28 RX ADMIN — LEVOTHYROXINE SODIUM 125 MCG: 50 TABLET ORAL at 06:08

## 2018-07-28 RX ADMIN — FLUTICASONE PROPIONATE 2 SPRAY: 50 SPRAY, METERED NASAL at 16:41

## 2018-07-28 RX ADMIN — RISPERIDONE 1 MG: 1 TABLET ORAL at 20:12

## 2018-07-28 RX ADMIN — VITAMIN D 1000 UNITS: 25 TAB ORAL at 08:26

## 2018-07-28 RX ADMIN — HYDROXYZINE HYDROCHLORIDE 25 MG: 25 TABLET ORAL at 12:57

## 2018-07-28 RX ADMIN — OLANZAPINE 5 MG: 5 TABLET, FILM COATED ORAL at 19:07

## 2018-07-28 RX ADMIN — TRAZODONE HYDROCHLORIDE 50 MG: 50 TABLET ORAL at 21:26

## 2018-07-28 RX ADMIN — SERTRALINE HYDROCHLORIDE 100 MG: 50 TABLET ORAL at 17:00

## 2018-07-28 RX ADMIN — RISPERIDONE 1 MG: 1 TABLET ORAL at 08:26

## 2018-07-28 NOTE — BH NOTES
Patient visited with her elderly father. Discussed the difficulty of elderly parents. She verbalized difficulty with fixed memory of her mother dying in her presence. Good interaction about difficulty of parents becoming older, with disabilities and Death. Did request sleep aide. Given trazodone as ordered, for sleep.
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
## 2018-07-28 NOTE — BH NOTES
Patient is alert and oriented x 3, denies SI/HI, denies auditory or visual hallucination. Patient requested prn for anxiety, 25 mg atarax administered as per doctor's order, breathing is unlabored, slept for 6.5 hours. Staff will continue to monitor patient every 15 minutes for safety.

## 2018-07-28 NOTE — PROGRESS NOTES
Problem: Depressed Mood (Adult/Pediatric) Goal: *STG: Remains safe in hospital 
Outcome: Progressing Towards Goal 
Pt slept 6 hours. Cooperative with AM lab work. PRN Atarax 25 mg given. Pt had no complaints or signs of distress throughout night. Respirations were unlabored. Continuing to monitor with q15 min rounds for safety.

## 2018-07-29 PROCEDURE — 65220000003 HC RM SEMIPRIVATE PSYCH

## 2018-07-29 PROCEDURE — 74011250637 HC RX REV CODE- 250/637: Performed by: PSYCHIATRY & NEUROLOGY

## 2018-07-29 RX ORDER — LORAZEPAM 0.5 MG/1
0.5 TABLET ORAL
Status: DISCONTINUED | OUTPATIENT
Start: 2018-07-29 | End: 2018-08-02

## 2018-07-29 RX ORDER — LORAZEPAM 1 MG/1
1 TABLET ORAL
Status: DISCONTINUED | OUTPATIENT
Start: 2018-07-29 | End: 2018-08-13 | Stop reason: HOSPADM

## 2018-07-29 RX ADMIN — ASPIRIN 325 MG ORAL TABLET 325 MG: 325 PILL ORAL at 08:36

## 2018-07-29 RX ADMIN — FLUTICASONE PROPIONATE 2 SPRAY: 50 SPRAY, METERED NASAL at 17:30

## 2018-07-29 RX ADMIN — OLANZAPINE 5 MG: 5 TABLET, FILM COATED ORAL at 05:38

## 2018-07-29 RX ADMIN — RISPERIDONE 1 MG: 1 TABLET ORAL at 21:18

## 2018-07-29 RX ADMIN — TRAZODONE HYDROCHLORIDE 50 MG: 50 TABLET ORAL at 21:18

## 2018-07-29 RX ADMIN — LORAZEPAM 0.5 MG: 0.5 TABLET ORAL at 17:29

## 2018-07-29 RX ADMIN — FLUTICASONE PROPIONATE 2 SPRAY: 50 SPRAY, METERED NASAL at 08:37

## 2018-07-29 RX ADMIN — LORAZEPAM 1 MG: 1 TABLET ORAL at 08:36

## 2018-07-29 RX ADMIN — RISPERIDONE 1 MG: 1 TABLET ORAL at 08:36

## 2018-07-29 RX ADMIN — VITAMIN D 1000 UNITS: 25 TAB ORAL at 08:36

## 2018-07-29 RX ADMIN — LEVOTHYROXINE SODIUM 125 MCG: 50 TABLET ORAL at 05:38

## 2018-07-29 RX ADMIN — SERTRALINE HYDROCHLORIDE 100 MG: 50 TABLET ORAL at 17:29

## 2018-07-29 RX ADMIN — LORAZEPAM 0.5 MG: 0.5 TABLET ORAL at 11:50

## 2018-07-29 NOTE — BH NOTES
Pt was suicidal. She broke a plastic spoon to use it to cut her wrist. Dr. Loly Ruelas saw pt and ordered one to one for active suicide prevention. Nursing supervisor has been notified of needing a staff to watch pt. Nursing supervisor stated she does not have anybody. Dr. Loly Ruelas said it is the hospital's responsibility to provide.

## 2018-07-29 NOTE — BH NOTES
Pt was assessed in the seclusion room. She no longer displays any SI. She is now able to contract for safety. Pt is walked out with staff. Will monitor q 15 for safety, mood and behavior changes.

## 2018-07-29 NOTE — BH NOTES
BEHAVIORAL HEALTH RESTRAINT/SECLUSION INITIAL ASSESSMENT 1. Assessment of High Risk factors: Preexisting medical conditions that places patient at greater risk when placed in restraints are as follows: None 2. Preexisting history of psychological conditions that place patient at greater risk when placed in restraints: None 3. Current behavior/mental status: Insight into issues, Severe anxiety and Suicidal ideation 4. Initial use of restraint for this patient is justified by: Occurrence of self injury 5. Least restrictive tools/methods (Check all that apply): Attended comfort needs, Problem solving, PRN medications, Quiet time, Redirect patient focus, Verbal de-escalation and 1:1 Observation 6. Criteria for release: No longer verbalizing threats of harm to self or others, Acute signs and symptoms necessitating restraint/seclusion have decreased substantially to the level where patient safety function in less restrictive environment and Substantial reduction in level of agitation/anxiety as indicated in reduction in motor over activity, ability to focus, maintain attention and decrease in hostility 7. Treatment plan revisions to assist the patient in regaining control: Continue problem solving discussions with staff and Medication evaluation 8. Patient consented to family involvement in restraint/seclusion process: No 
 
9. Personal safety search completed: Yes - Ms. Claudia Kim, [facility] 10. Vital Signs: 
       B/P: 
       Pulse: 
       Respirations: 
       Temperature: 
 
 
 
MD/RN Signature:_________________________________  Date:_____________

## 2018-07-29 NOTE — PROGRESS NOTES
Problem: Depressed Mood (Adult/Pediatric) Goal: *STG: Remains safe in hospital 
Outcome: Progressing Towards Goal 
Pt slept 5 hours. Pt began c/o AVH of her mom. She reported that she wanted to kill her self by cutting herself. After talking to pt, she said she would contract for safety to not harm herself or others. She was still placed on an unofficial 1:1 to ensure safety. PRN Zyprexa 5 mg given this. Respirations were unlabored. Continuing to monitor with q15 min rounds for safety.

## 2018-07-29 NOTE — BH NOTES
Pt is unable to contract for safety in the hospital. Pt has cut self in her left wrist several times. Dr. Jese Cardenas was contacted and ordered pt go into seclusion for safety. Pt was explained and she agreed and walked with staff into seclusion.

## 2018-07-29 NOTE — BH NOTES
BEHAVIORAL HEALTH RESTRAINT/SECLUSION PROGRESS NOTE TERMINATION OF RESTRAINT/SECLUSION 1. Current mental status/behavior: Cooperative and Denies suicidal ideation 2. Criteria for release met: No longer verbalizing threats of harm to self and others 3. Additional interventions to prevent recurrence of dangerous behaviors include the following in addition to the debriefing process by the team.    
 
 
DOROTHY Signature_____becky espinosa_____________________________ Date_______________ 
 
 
MD Signature__________________________________ Date_______________

## 2018-07-29 NOTE — H&P
INITIAL PSYCHIATRIC EVALUATION   
 
   
 
IDENTIFICATION:   
Patient Name  Kimberley Cervantes Date of Birth 1960 [facility] [id number] Medical Record Number  [id number] Age  59 y.o. PCP Jaime Mata MD  
Admit date:  7/27/2018 Room Number  323/02  @ [facility]  
Date of Service  7/28/2018 HISTORY  
 
   
REASON FOR HOSPITALIZATION: 
CC:  Pt admitted under a voluntary basis for severe depression with suicidal ideations and guevara proving to be an imminent danger to self. HISTORY OF PRESENT ILLNESS:   
The patient, Kimberley Cervantes, is a 59 y.o. WHITE [place]  female with a past psychiatric history significant for Depression, who presents at this time with complaints of (and/or evidence of) the following emotional symptoms: depression, suicidal thoughts/threats, self mutilation and anxiety. Additional symptomatology include anxiety, depression worse, difficulty sleeping, fear of impending doom, fearfulness, feeling depressed and feeling suicidal.  The above symptoms have been present for two weeks. These symptoms are of high severity. These symptoms are constant  in nature. The patient's condition has been precipitated by multiple psychosocial stressors . Patient's condition made worse by unresolved grief issues and Borderline tendencies. UDS:negative; BAL=0. Pt was discharged from [facility] [place] to [facility] on 7/26/18. She c/o increased suicidal ideations. Pt's mother passed away in April. Reports, she keeps hearing her voice to come join her. Contracts for safety. Presents with Depressed mood, flat affect, poor eye contact. She was seen by the Psych attending on Friday AM and started on her PTA meds. ALLERGIES:  
Allergies Allergen Reactions  Other Food Other (comments) \"Bad chest pain\" with walnuts. Coke - asthma/stuffy head. Fish sticks causes an asthma attack.  Astepro [Azelastine] Other (comments) Violent headaches.   
 Bactrim [Sulfamethoprim] Other (comments) Difficulty breathing, chills, fever.  Banana Other (comments) Diffculty breathing, wheezing, asthma attack.  Coconut Other (comments) Difficulty breathing, asthma attack, SOB.  Cortisone Hives Difficulty breathing.  Peanut Other (comments) Wheezing, asthma attack, SOB.  Prednisone Hives Difficulty breathing, kidney stones. MEDICATIONS PRIOR TO ADMISSION:  
Prescriptions Prior to Admission Medication Sig  levothyroxine (SYNTHROID) 125 mcg tablet Take 1 Tab by mouth Daily (before breakfast). Indications: hypothyroidism  risperiDONE (RISPERDAL) 1 mg tablet Take 1 Tab by mouth two (2) times a day. Indications: delusion  sertraline (ZOLOFT) 25 mg tablet Take 3 Tabs by mouth every evening. Indications: major depressive disorder  traZODone (DESYREL) 50 mg tablet Take 1 Tab by mouth nightly as needed for Sleep. Indications: insomnia associated with depression  hydrOXYzine HCl (ATARAX) 25 mg tablet Take 1 Tab by mouth every six (6) hours as needed (Anxiety) for up to 10 days. Indications: anxiety, Anxiety  therapeutic multivitamin (THERA) tablet Take 1 Tab by mouth daily.  fluticasone (FLONASE) 50 mcg/actuation nasal spray 2 Sprays by Both Nostrils route two (2) times a day.  Cholecalciferol, Vitamin D3, (VITAMIN D3) 1,000 unit cap Take 1,000 Units by mouth daily.  cyanocobalamin, vitamin B-12, (VITAMIN B-12) 5,000 mcg subl 5,000 mcg by SubLINGual route daily.  aspirin (ASPIRIN) 325 mg tablet Take 325-650 mg by mouth as needed for Pain. PAST MEDICAL HISTORY:  
Past Medical History:  
Diagnosis Date  Asthma 1967  
 hospitalized for asthma attack x 2  Chronic kidney disease   
 kidney stones x 2-3 times  Ill-defined condition   
 nasal blockage from growth and misalignment - cannot breath out of nose - surgery in the future/cleared for colonoscopy 7/31/2014 - will bring in letter  Other ill-defined conditions(182.35) blood in stool  Other ill-defined conditions(799.89)   
 hx low BP - 90's/60's-70's  PUD (peptic ulcer disease)  Thyroid disease  Unspecified adverse effect of anesthesia   
 nasal growth and misalignment and cannot breath through nose Past Surgical History:  
Procedure Laterality Date  HX HEENT    
 T & A  
 HX HEENT    
 turbinate surgery SOCIAL HISTORY: Per  note Social History Social History  Marital status: SINGLE Spouse name: N/A  
 Number of children: N/A  
 Years of education: N/A Occupational History  Not on file. Social History Main Topics  Smoking status: Never Smoker  Smokeless tobacco: Never Used  Alcohol use No  
 Drug use: No  
 Sexual activity: Not Currently Other Topics Concern  Not on file Social History Narrative FAMILY HISTORY: History reviewed. No pertinent family history. Family History Problem Relation Age of Onset  Stroke Mother  Other Mother   
  erosion of esophagus  Cancer Mother   
  melanoma/squamous  Heart Surgery Father x2  Delayed Awakening Father  Pacemaker Father  Other Father   
  carotid endartarectomy/polio  Cataract Father REVIEW OF SYSTEMS:  
Psychological ROS: positive for - anxiety, depression, irritability, sleep disturbances and suicidal ideation Pertinent items are noted in the History of Present Illness. All other Systems reviewed and are considered negative. [facility] MENTAL STATUS EXAM (MSE): MSE FINDINGS ARE WITHIN NORMAL LIMITS (WNL) UNLESS OTHERWISE STATED BELOW. ( ALL OF THE BELOW CATEGORIES OF THE MSE HAVE BEEN REVIEWED (reviewed 7/28/2018) AND UPDATED AS DEEMED APPROPRIATE ) General Presentation age appropriate and well dressed, cooperative Orientation oriented to time, place and person Vital Signs  See below (reviewed 7/28/2018);  Vital Signs (BP, Pulse, & Temp) are within normal limits if not listed below. Gait and Station Stable/steady, no ataxia Musculoskeletal System No extrapyramidal symptoms (EPS); no abnormal muscular movements or Tardive Dyskinesia (TD); muscle strength and tone are within normal limits Language No aphasia or dysarthria Speech:  hypoverbal, monotone and soft Thought Processes logical; slow rate of thoughts; fair abstract reasoning/computation Thought Associations goal directed Thought Content auditory hallucinations and preoccupations Suicidal Ideations contracts for safety Homicidal Ideations none Mood:  depressed Affect:  sad Memory recent  fair Memory remote:  fair Concentration/Attention:  fair Fund of Knowledge average Insight:  limited Reliability fair Judgment:  limited VITALS:    
Patient Vitals for the past 24 hrs: 
 Temp Pulse Resp BP SpO2  
07/28/18 2013 98.6 °F (37 °C) (!) 59 16 94/55 98 %  
07/28/18 0633 98.7 °F (37.1 °C) 68 16 115/60 100 % Wt Readings from Last 3 Encounters:  
07/27/18 72.6 kg (160 lb)  
07/16/18 72.6 kg (160 lb)  
07/31/14 79.4 kg (175 lb) Temp Readings from Last 3 Encounters:  
07/28/18 98.6 °F (37 °C)  
07/26/18 98 °F (36.7 °C) BP Readings from Last 3 Encounters:  
07/28/18 94/55  
07/26/18 108/71  
07/31/14 110/69 Pulse Readings from Last 3 Encounters:  
07/28/18 (!) 59  
07/26/18 62  
07/31/14 69 DATA LABORATORY DATA: 
Labs Reviewed TSH 3RD GENERATION - Abnormal; Notable for the following:   
    Result Value TSH 0.08 (*) All other components within normal limits LIPID PANEL - Abnormal; Notable for the following:   
 Cholesterol, total 209 (*)   
 LDL, calculated 126.4 (*) All other components within normal limits GLUCOSE, FASTING Admission on 07/27/2018 Component Date Value Ref Range Status  Glucose 07/28/2018 87  65 - 100 MG/DL Final  
 TSH 07/28/2018 0.08* 0.36 - 3.74 uIU/mL Final  
 LIPID PROFILE 07/28/2018        Final  
 Cholesterol, total 07/28/2018 209* <200 MG/DL Final  
 Triglyceride 07/28/2018 138  <150 MG/DL Final  
 HDL Cholesterol 07/28/2018 55  MG/DL Final  
 LDL, calculated 07/28/2018 126.4* 0 - 100 MG/DL Final  
 VLDL, calculated 07/28/2018 27.6  MG/DL Final  
 CHOL/HDL Ratio 07/28/2018 3.8  0 - 5.0   Final  
 
  
RADIOLOGY REPORTS: 
 
Results from Hospital Encounter encounter on 09/10/12 XR CHEST PA LAT Narrative **Final Report** 
 
 
ICD Codes / Adm. Diagnosis: 786.09   / Other dyspnea and respiratory Examination:  CR CHEST PA AND LATERAL  - [id number] - Sep 10 2012  5:22PM 
Accession No:  [id number] Reason:  786.0 
 
 
REPORT: 
INDICATION:    786.0  
 
COMPARISON: None. FINDINGS: PA and lateral radiographs of the chest demonstrate clear lungs. The cardiac and mediastinal contours and pulmonary vascularity are normal.   
Spondylitic changes are present. Raymundo Bunch IMPRESSION: No acute process Signing/Reading Doctor: Rachelle Nichole ([id number]) Addy Burr ([id number])  09/10/2012 Ct Head Wo Cont Result Date: 7/17/2018 EXAM:  CT HEAD WO CONT INDICATION:  Altered mental status. COMPARISON: None. CONTRAST:  None. TECHNIQUE: Unenhanced CT of the head was performed using 5 mm images. Brain and bone windows were generated. CT dose reduction was achieved through use of a standardized protocol tailored for this examination and automatic exposure control for dose modulation. Adaptive statistical iterative reconstruction (ASIR) was utilized. FINDINGS: The ventricles and sulci are normal in size, shape and configuration and midline. There is no significant white matter disease. There is no intracranial hemorrhage, extra-axial collection, mass, mass effect or midline shift. The basilar cisterns are open. No acute infarct is identified. The bone windows demonstrate no abnormalities. The visualized portions of the paranasal sinuses and mastoid air cells are clear.   
 
IMPRESSION: No acute findings. MEDICATIONS ALL MEDICATIONS Current Facility-Administered Medications Medication Dose Route Frequency  fluticasone (FLONASE) 50 mcg/actuation nasal spray 2 Spray  2 Spray Both Nostrils BID  sertraline (ZOLOFT) tablet 100 mg  100 mg Oral QPM  
 ziprasidone (GEODON) 20 mg in sterile water (preservative free) 1 mL injection  20 mg IntraMUSCular BID PRN  
 OLANZapine (ZyPREXA) tablet 5 mg  5 mg Oral Q6H PRN  
 benztropine (COGENTIN) tablet 2 mg  2 mg Oral BID PRN  
 benztropine (COGENTIN) injection 2 mg  2 mg IntraMUSCular BID PRN  
 LORazepam (ATIVAN) injection 2 mg  2 mg IntraMUSCular Q4H PRN  
 acetaminophen (TYLENOL) tablet 650 mg  650 mg Oral Q4H PRN  
 ibuprofen (MOTRIN) tablet 400 mg  400 mg Oral Q8H PRN  
 magnesium hydroxide (MILK OF MAGNESIA) 400 mg/5 mL oral suspension 30 mL  30 mL Oral DAILY PRN  
 nicotine (NICODERM CQ) 21 mg/24 hr patch 1 Patch  1 Patch TransDERmal DAILY PRN  
 traZODone (DESYREL) tablet 50 mg  50 mg Oral QHS PRN  
 hydrOXYzine HCl (ATARAX) tablet 25 mg  25 mg Oral Q6H PRN  
 levothyroxine (SYNTHROID) tablet 125 mcg  125 mcg Oral ACB  risperiDONE (RisperDAL) tablet 1 mg  1 mg Oral BID  cholecalciferol (VITAMIN D3) tablet 1,000 Units  1,000 Units Oral DAILY  aspirin (ASPIRIN) tablet 325 mg  325 mg Oral DAILY  
  
SCHEDULED MEDICATIONS Current Facility-Administered Medications Medication Dose Route Frequency  fluticasone (FLONASE) 50 mcg/actuation nasal spray 2 Spray  2 Spray Both Nostrils BID  sertraline (ZOLOFT) tablet 100 mg  100 mg Oral QPM  
 levothyroxine (SYNTHROID) tablet 125 mcg  125 mcg Oral ACB  risperiDONE (RisperDAL) tablet 1 mg  1 mg Oral BID  cholecalciferol (VITAMIN D3) tablet 1,000 Units  1,000 Units Oral DAILY  aspirin (ASPIRIN) tablet 325 mg  325 mg Oral DAILY ASSESSMENT & PLAN The patient, Luis Angel Frank, is a 59 y.o.  female who presents at this time for treatment of the following diagnoses: 
Patient Active Hospital Problem List: 
 Depression (7/17/2018) Assessment: Depressed and c/o AH Plan: Started by Psych attending on her PTA meds Risperdal 1 mg bid; Sertraline 75 mg daily 7/28- Sertraline increased to 100 mg daily If started on any of these meds,I will continue to monitor blood levels (Depakote, Tegretol, lithium, clozapine---a drug with a narrow therapeutic index= NTI) and associated labs for drug therapy implemented that require intense monitoring for toxicity as deemed appropriate based on current medication side effects and pharmacodynamically determined drug 1/2 lives. A coordinated, multidisplinary treatment team (includes the nurse, unit pharmcist,  and writer) round was conducted for this initial evaluation with the patient present. The following regarding medications was addressed during rounds with patient:  
the risks and benefits of the proposed medication. The patient was given the opportunity to ask questions. Informed consent given to the use of the above medications. I will continue to adjust psychiatric and non-psychiatric medications (see above \"medication\" section and orders section for details) as deemed appropriate & based upon diagnoses and response to treatment. I have reviewed admission (and previous/old) labs and medical tests in the EHR and or transferring hospital documents. I will continue to order blood tests/labs and diagnostic tests as deemed appropriate and review results as they become available (see orders for details). I have reviewed old psychiatric and medical records available in the EHR. I Will order additional psychiatric records from other institutions to further elucidate the nature of patient's psychopathology and review once available.  
 
I will gather additional collateral information from friends, family and o/p treatment team to further elucidate the nature of patient's psychopathology and baselline level of psychiatric functioning. ESTIMATED LENGTH OF STAY:   
tbd  
 
 
STRENGTHS: 
Exercising self-direction/Resourceful, Access to housing/residential stability and Knowledge of medications SIGNED:   
Wes Adamson MD 
7/28/2018

## 2018-07-30 PROBLEM — F32.3 SEVERE MAJOR DEPRESSION WITH PSYCHOTIC FEATURES, MOOD-CONGRUENT (HCC): Status: ACTIVE | Noted: 2018-07-30

## 2018-07-30 PROBLEM — F33.2 SEVERE EPISODE OF RECURRENT MAJOR DEPRESSIVE DISORDER (HCC): Status: ACTIVE | Noted: 2018-07-30

## 2018-07-30 PROCEDURE — 74011250637 HC RX REV CODE- 250/637: Performed by: PSYCHIATRY & NEUROLOGY

## 2018-07-30 PROCEDURE — 65220000003 HC RM SEMIPRIVATE PSYCH

## 2018-07-30 RX ORDER — TRAZODONE HYDROCHLORIDE 100 MG/1
100 TABLET ORAL
Status: DISCONTINUED | OUTPATIENT
Start: 2018-07-30 | End: 2018-07-31

## 2018-07-30 RX ADMIN — OLANZAPINE 5 MG: 5 TABLET, FILM COATED ORAL at 19:48

## 2018-07-30 RX ADMIN — RISPERIDONE 1 MG: 1 TABLET ORAL at 08:20

## 2018-07-30 RX ADMIN — LORAZEPAM 1 MG: 1 TABLET ORAL at 19:48

## 2018-07-30 RX ADMIN — HYDROXYZINE HYDROCHLORIDE 25 MG: 25 TABLET ORAL at 16:48

## 2018-07-30 RX ADMIN — LEVOTHYROXINE SODIUM 125 MCG: 50 TABLET ORAL at 06:11

## 2018-07-30 RX ADMIN — FLUTICASONE PROPIONATE 2 SPRAY: 50 SPRAY, METERED NASAL at 09:00

## 2018-07-30 RX ADMIN — ASPIRIN 325 MG ORAL TABLET 325 MG: 325 PILL ORAL at 08:20

## 2018-07-30 RX ADMIN — LORAZEPAM 0.5 MG: 0.5 TABLET ORAL at 13:14

## 2018-07-30 RX ADMIN — SERTRALINE HYDROCHLORIDE 100 MG: 50 TABLET ORAL at 16:43

## 2018-07-30 RX ADMIN — FLUTICASONE PROPIONATE 2 SPRAY: 50 SPRAY, METERED NASAL at 16:44

## 2018-07-30 RX ADMIN — LORAZEPAM 0.5 MG: 0.5 TABLET ORAL at 08:21

## 2018-07-30 RX ADMIN — ACETAMINOPHEN 650 MG: 325 TABLET, FILM COATED ORAL at 08:59

## 2018-07-30 RX ADMIN — VITAMIN D 1000 UNITS: 25 TAB ORAL at 08:21

## 2018-07-30 NOTE — BH NOTES
The patient is on one to one observation with staff because  She continues to express suicidal ideations. She is medication and meal compliant. The patient will continue with one on one until she has no more suicidal ideations and can contract for safety.

## 2018-07-30 NOTE — BH NOTES
PSYCHIATRIC PROGRESS NOTE IDENTIFICATION:   
Patient Name  Thomas Whelan Date of Birth 1960 [facility] [id number] Medical Record Number  [id number] Age  59 y.o. PCP Fern Ambrose MD  
Admit date:  7/27/2018 Room Number  323/02  @ [facility]  
Date of Service  7/29/2018 HISTORY  
 
   
REASON FOR HOSPITALIZATION: 
CC:  Pt admitted under a voluntary basis for severe depression with suicidal ideations and guevara proving to be an imminent danger to self. HISTORY OF PRESENT ILLNESS:   
The patient, Thomas Whelan, is a 59 y.o. WHITE [place]  female with a past psychiatric history significant for Depression, who presents at this time with complaints of (and/or evidence of) the following emotional symptoms: depression, suicidal thoughts/threats, self mutilation and anxiety. Additional symptomatology include anxiety, depression worse, difficulty sleeping, fear of impending doom, fearfulness, feeling depressed and feeling suicidal.  The above symptoms have been present for two weeks. These symptoms are of high severity. These symptoms are constant  in nature. The patient's condition has been precipitated by multiple psychosocial stressors . Patient's condition made worse by unresolved grief issues and Borderline tendencies. UDS:negative; BAL=0. Pt was discharged from [facility] to [facility] on 7/26/18. She c/o increased suicidal ideations. Pt's mother passed away in April. Reports, she keeps hearing her voice to come join her. Contracts for safety. Presents with Depressed mood, flat affect, poor eye contact. She was seen by the Psych attending on Friday AM and started on her PTA meds. 7/29-Has periods with increased anxiety. Received Zyprexa, Ativan as prn's. Presented withdrawn with poor eye contact. Fresh,red superficial scratches noticed on her Rt. Wrist. Pt admitted to cutting on herself and produced a sharpened plastic fork.  Admitted to ongoing SI, will not contact for safety. Placed on 1 to 1. ALLERGIES:  
Allergies Allergen Reactions  Other Food Other (comments) \"Bad chest pain\" with walnuts. Coke - asthma/stuffy head. Fish sticks causes an asthma attack.  Astepro [Azelastine] Other (comments) Violent headaches.  Bactrim [Sulfamethoprim] Other (comments) Difficulty breathing, chills, fever.  Banana Other (comments) Diffculty breathing, wheezing, asthma attack.  Coconut Other (comments) Difficulty breathing, asthma attack, SOB.  Cortisone Hives Difficulty breathing.  Peanut Other (comments) Wheezing, asthma attack, SOB.  Prednisone Hives Difficulty breathing, kidney stones. MEDICATIONS PRIOR TO ADMISSION:  
Prescriptions Prior to Admission Medication Sig  levothyroxine (SYNTHROID) 125 mcg tablet Take 1 Tab by mouth Daily (before breakfast). Indications: hypothyroidism  risperiDONE (RISPERDAL) 1 mg tablet Take 1 Tab by mouth two (2) times a day. Indications: delusion  sertraline (ZOLOFT) 25 mg tablet Take 3 Tabs by mouth every evening. Indications: major depressive disorder  traZODone (DESYREL) 50 mg tablet Take 1 Tab by mouth nightly as needed for Sleep. Indications: insomnia associated with depression  hydrOXYzine HCl (ATARAX) 25 mg tablet Take 1 Tab by mouth every six (6) hours as needed (Anxiety) for up to 10 days. Indications: anxiety, Anxiety  therapeutic multivitamin (THERA) tablet Take 1 Tab by mouth daily.  fluticasone (FLONASE) 50 mcg/actuation nasal spray 2 Sprays by Both Nostrils route two (2) times a day.  Cholecalciferol, Vitamin D3, (VITAMIN D3) 1,000 unit cap Take 1,000 Units by mouth daily.  cyanocobalamin, vitamin B-12, (VITAMIN B-12) 5,000 mcg subl 5,000 mcg by SubLINGual route daily.  aspirin (ASPIRIN) 325 mg tablet Take 325-650 mg by mouth as needed for Pain. PAST MEDICAL HISTORY:  
Past Medical History:  
Diagnosis Date [address] 72 hospitalized for asthma attack x 2  Chronic kidney disease   
 kidney stones x 2-3 times  Ill-defined condition   
 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   
 blood in stool  Other ill-defined conditions(799.89)   
 hx low BP - 90's/60's-70's  PUD (peptic ulcer disease)  Thyroid disease  Unspecified adverse effect of anesthesia   
 nasal growth and misalignment and cannot breath through nose Past Surgical History:  
Procedure Laterality Date  HX HEENT    
 T & A  
 HX HEENT    
 turbinate surgery SOCIAL HISTORY: Per  note Social History Social History  Marital status: SINGLE Spouse name: N/A  
 Number of children: N/A  
 Years of education: N/A Occupational History  Not on file. Social History Main Topics  Smoking status: Never Smoker  Smokeless tobacco: Never Used  Alcohol use No  
 Drug use: No  
 Sexual activity: Not Currently Other Topics Concern  Not on file Social History Narrative FAMILY HISTORY: History reviewed. No pertinent family history. Family History Problem Relation Age of Onset  Stroke Mother  Other Mother   
  erosion of esophagus  Cancer Mother   
  melanoma/squamous  Heart Surgery Father x2  Delayed Awakening Father  Pacemaker Father  Other Father   
  carotid endartarectomy/polio  Cataract Father REVIEW OF SYSTEMS:  
Psychological ROS: positive for - anxiety, depression, irritability, sleep disturbances and suicidal ideation Pertinent items are noted in the History of Present Illness. All other Systems reviewed and are considered negative. [place] MENTAL STATUS EXAM (MSE): MSE FINDINGS ARE WITHIN NORMAL LIMITS (WNL) UNLESS OTHERWISE STATED BELOW. ( ALL OF THE BELOW CATEGORIES OF THE MSE HAVE BEEN REVIEWED (reviewed 7/29/2018) AND UPDATED AS DEEMED APPROPRIATE ) General Presentation age appropriate and well dressed, cooperative Orientation oriented to time, place and person Vital Signs  See below (reviewed 7/29/2018); Vital Signs (BP, Pulse, & Temp) are within normal limits if not listed below. Gait and Station Stable/steady, no ataxia Musculoskeletal System No extrapyramidal symptoms (EPS); no abnormal muscular movements or Tardive Dyskinesia (TD); muscle strength and tone are within normal limits Language No aphasia or dysarthria Speech:  hypoverbal, monotone and soft Thought Processes logical; slow rate of thoughts; fair abstract reasoning/computation Thought Associations goal directed Thought Content auditory hallucinations and preoccupations Suicidal Ideations contracts for safety Homicidal Ideations none Mood:  depressed Affect:  sad Memory recent  fair Memory remote:  fair Concentration/Attention:  fair Fund of Knowledge average Insight:  limited Reliability fair Judgment:  limited VITALS:    
Patient Vitals for the past 24 hrs: 
 Temp Pulse Resp BP  
07/29/18 0632 97.9 °F (36.6 °C) 64 16 124/68 Wt Readings from Last 3 Encounters:  
07/27/18 72.6 kg (160 lb)  
07/16/18 72.6 kg (160 lb)  
07/31/14 79.4 kg (175 lb) Temp Readings from Last 3 Encounters:  
07/29/18 97.9 °F (36.6 °C)  
07/26/18 98 °F (36.7 °C) BP Readings from Last 3 Encounters:  
07/29/18 124/68  
07/26/18 108/71  
07/31/14 110/69 Pulse Readings from Last 3 Encounters:  
07/29/18 64  
07/26/18 62  
07/31/14 69 DATA LABORATORY DATA: 
Labs Reviewed TSH 3RD GENERATION - Abnormal; Notable for the following:   
    Result Value TSH 0.08 (*) All other components within normal limits LIPID PANEL - Abnormal; Notable for the following:   
 Cholesterol, total 209 (*)   
 LDL, calculated 126.4 (*)  All other components within normal limits GLUCOSE, FASTING Admission on 07/27/2018 Component Date Value Ref Range Status  Glucose 07/28/2018 87  65 - 100 MG/DL Final  
 TSH 07/28/2018 0.08* 0.36 - 3.74 uIU/mL Final  
 LIPID PROFILE 07/28/2018        Final  
 Cholesterol, total 07/28/2018 209* <200 MG/DL Final  
 Triglyceride 07/28/2018 138  <150 MG/DL Final  
 HDL Cholesterol 07/28/2018 55  MG/DL Final  
 LDL, calculated 07/28/2018 126.4* 0 - 100 MG/DL Final  
 VLDL, calculated 07/28/2018 27.6  MG/DL Final  
 CHOL/HDL Ratio 07/28/2018 3.8  0 - 5.0   Final  
 
  
RADIOLOGY REPORTS: 
 
Results from Hospital Encounter encounter on 09/10/12 XR CHEST PA LAT Narrative **Final Report** 
 
 
ICD Codes / Adm. Diagnosis: 786.09   / Other dyspnea and respiratory Examination:  CR CHEST PA AND LATERAL  - [id number] - Sep 10 2012  5:22PM 
Accession No:  [id number] Reason:  786.0 
 
 
REPORT: 
INDICATION:    786.0  
 
COMPARISON: None. FINDINGS: PA and lateral radiographs of the chest demonstrate clear lungs. The cardiac and mediastinal contours and pulmonary vascularity are normal.   
Spondylitic changes are present. [place] IMPRESSION: No acute process Signing/Reading Doctor: Magali Wong ([id number]) Clayton Reynolds ([id number])  09/10/2012 Ct Head Wo Cont Result Date: 7/17/2018 EXAM:  CT HEAD WO CONT INDICATION:  Altered mental status. COMPARISON: None. CONTRAST:  None. TECHNIQUE: Unenhanced CT of the head was performed using 5 mm images. Brain and bone windows were generated. CT dose reduction was achieved through use of a standardized protocol tailored for this examination and automatic exposure control for dose modulation. Adaptive statistical iterative reconstruction (ASIR) was utilized. FINDINGS: The ventricles and sulci are normal in size, shape and configuration and midline. There is no significant white matter disease.  There is no intracranial hemorrhage, extra-axial collection, mass, mass effect or midline shift. The basilar cisterns are open. No acute infarct is identified. The bone windows demonstrate no abnormalities. The visualized portions of the paranasal sinuses and mastoid air cells are clear. IMPRESSION: No acute findings. MEDICATIONS ALL MEDICATIONS Current Facility-Administered Medications Medication Dose Route Frequency  LORazepam (ATIVAN) tablet 1 mg  1 mg Oral Q6H PRN  
 LORazepam (ATIVAN) tablet 0.5 mg  0.5 mg Oral TID WITH MEALS  fluticasone (FLONASE) 50 mcg/actuation nasal spray 2 Spray  2 Spray Both Nostrils BID  sertraline (ZOLOFT) tablet 100 mg  100 mg Oral QPM  
 ziprasidone (GEODON) 20 mg in sterile water (preservative free) 1 mL injection  20 mg IntraMUSCular BID PRN  
 OLANZapine (ZyPREXA) tablet 5 mg  5 mg Oral Q6H PRN  
 benztropine (COGENTIN) tablet 2 mg  2 mg Oral BID PRN  
 benztropine (COGENTIN) injection 2 mg  2 mg IntraMUSCular BID PRN  
 LORazepam (ATIVAN) injection 2 mg  2 mg IntraMUSCular Q4H PRN  
 acetaminophen (TYLENOL) tablet 650 mg  650 mg Oral Q4H PRN  
 ibuprofen (MOTRIN) tablet 400 mg  400 mg Oral Q8H PRN  
 magnesium hydroxide (MILK OF MAGNESIA) 400 mg/5 mL oral suspension 30 mL  30 mL Oral DAILY PRN  
 nicotine (NICODERM CQ) 21 mg/24 hr patch 1 Patch  1 Patch TransDERmal DAILY PRN  
 traZODone (DESYREL) tablet 50 mg  50 mg Oral QHS PRN  
 hydrOXYzine HCl (ATARAX) tablet 25 mg  25 mg Oral Q6H PRN  
 levothyroxine (SYNTHROID) tablet 125 mcg  125 mcg Oral ACB  risperiDONE (RisperDAL) tablet 1 mg  1 mg Oral BID  cholecalciferol (VITAMIN D3) tablet 1,000 Units  1,000 Units Oral DAILY  aspirin (ASPIRIN) tablet 325 mg  325 mg Oral DAILY  
  
SCHEDULED MEDICATIONS Current Facility-Administered Medications Medication Dose Route Frequency  LORazepam (ATIVAN) tablet 0.5 mg  0.5 mg Oral TID WITH MEALS  fluticasone (FLONASE) 50 mcg/actuation nasal spray 2 Spray  2 Spray Both Nostrils BID  sertraline (ZOLOFT) tablet 100 mg  100 mg Oral QPM  
 levothyroxine (SYNTHROID) tablet 125 mcg  125 mcg Oral ACB  risperiDONE (RisperDAL) tablet 1 mg  1 mg Oral BID  cholecalciferol (VITAMIN D3) tablet 1,000 Units  1,000 Units Oral DAILY  aspirin (ASPIRIN) tablet 325 mg  325 mg Oral DAILY ASSESSMENT & PLAN The patient, Blanchie Schwab, is a 59 y.o.  female who presents at this time for treatment of the following diagnoses: 
Patient Active Hospital Problem List: 
 Depression (7/17/2018) Assessment: Depressed and c/o AH Plan: Started by Psych attending on her PTA meds Risperdal 1 mg bid; Sertraline 75 mg daily 7/28- Sertraline increased to 100 mg daily 7/29- Ativan 0.5 mg tid If started on any of these meds,I will continue to monitor blood levels (Depakote, Tegretol, lithium, clozapine---a drug with a narrow therapeutic index= NTI) and associated labs for drug therapy implemented that require intense monitoring for toxicity as deemed appropriate based on current medication side effects and pharmacodynamically determined drug 1/2 lives. A coordinated, multidisplinary treatment team (includes the nurse, unit pharmcist,  and writer) round was conducted for this initial evaluation with the patient present. The following regarding medications was addressed during rounds with patient:  
the risks and benefits of the proposed medication. The patient was given the opportunity to ask questions. Informed consent given to the use of the above medications. I will continue to adjust psychiatric and non-psychiatric medications (see above \"medication\" section and orders section for details) as deemed appropriate & based upon diagnoses and response to treatment. I have reviewed admission (and previous/old) labs and medical tests in the EHR and or transferring hospital documents.  I will continue to order blood tests/labs and diagnostic tests as deemed appropriate and review results as they become available (see orders for details). I have reviewed old psychiatric and medical records available in the EHR. I Will order additional psychiatric records from other institutions to further elucidate the nature of patient's psychopathology and review once available. I will gather additional collateral information from friends, family and o/p treatment team to further elucidate the nature of patient's psychopathology and baselline level of psychiatric functioning. I certify that this patient's inpatient psychiatric hospital services furnished since the previous certificatIon were, and continue to be, required for treatment that could reasonably be expected to improve the patient's condition, or for diagnostic study, and that the patient continues to need, on a daily basis, active treatment furnished directly by or requiring the supervision of inpatient psychiatric facility personnel. In addition, the hospital records show that services furnished were intensive treatment services, admission or related services, or equivalent services. ESTIMATED LENGTH OF STAY:   
tbd  
 
 
STRENGTHS: 
Exercising self-direction/Resourceful, Access to housing/residential stability and Knowledge of medications SIGNED:   
Millie Daly MD 
7/29/2018

## 2018-07-30 NOTE — BH NOTES
GROUP THERAPY PROGRESS NOTE The patient Sahara herron 59 y.o. female is participating in [address] [place] [address]. Group time: 45 minutes Personal goal for participation: To develop a personal plan for success Goal orientation:  personal 
 
Group therapy participation: active Therapeutic interventions reviewed and discussed: positive coping strategies/goals Impression of participation:  The patient was attentive. [facility], [place] 7/30/2018  1:57 PM

## 2018-07-30 NOTE — PROGRESS NOTES
Problem: Depressed Mood (Adult/Pediatric) Goal: *STG: Remains safe in hospital 
Outcome: Progressing Towards Goal 
Pt slept 7 hours. No med/beh concerns overnight. Staff will continue to monitor for health and safety.

## 2018-07-30 NOTE — BH NOTES
The patient in bed with eyes closed asleep with light snoring noted. Continue with 1;1 observation for safety to self.

## 2018-07-30 NOTE — BH NOTES
Patient stated that she was having thoughts of harming herself. Patient stated that she had a plan to harm herself but refused to disclose with staff. Patient contracted for safety and stated she would notify staff before attempting to harm herself. Patient asked to go into the dayroom and remain visible.

## 2018-07-30 NOTE — BH NOTES
GROUP THERAPY PROGRESS NOTE The patient Daniele Damico a 59 y.o. female is participating in [employer]. Group time: 1 hour Personal goal for participation: To concentrate on selected task Goal orientation: social 
 
Group therapy participation: minimal 
 
Therapeutic interventions reviewed and discussed: Crafts, games, music Impression of participation: The patient was present-elected to watch [employer] 7/30/2018  5:48 PM

## 2018-07-30 NOTE — BH NOTES
Patient stated that she was actively feeling suicidal . Patient stated her plan was to swallow something sharp. Patient stated that she has been feeling this way for a long time. Nothing precipitated the feeling she said. Patient stated that she has attempted suicide before, but her mom being alive helped her get through the incident. Patient was instructed to stay in the milieu and if she has to go to the bathroom a staff member would have to go with her. Patient stated she understood.
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
## 2018-07-30 NOTE — PROGRESS NOTES
Problem: Depressed Mood (Adult/Pediatric) Goal: *STG: Demonstrates reduction in symptoms and increase in insight into coping skills/future focused Outcome: Progressing Towards Goal 
Pt alert, oriented and visible on the unit. Pt received visit from father. Pt verbalized feelings of guilt and stress to staff. Pt provided with therapeutic communication and coping strategies. Pt is meal and medication compliant. Denies S/I or intent to self harm. Staff will continue to support and monitor for health and safety.

## 2018-07-30 NOTE — BH NOTES
PSYCHIATRIC PROGRESS NOTE Patient Name  Sally York Date of Birth 1960 [facility] [id number] Medical Record Number  [id number] Age  59 y.o. PCP Kerri Cyr MD  
Admit date:  7/27/2018 Room Number  323/02  @ [facility]  
Date of Service  7/30/2018 PSYCHOTHERAPY SESSION NOTE: 
Length of psychotherapy session: 20 minutes Main condition/diagnosis/issues treated during session today, 7/30/2018 : depression, grief, AH, thoughts of self harm I employed Cognitive Behavioral therapy techniques, Reality-Oriented psychotherapy, as well as supportive psychotherapy in regards to various ongoing psychosocial stressors, including the following: pre-admission and current problems; housing issues; occupational issues; academic issues; legal issues; medical issues; and stress of hospitalization. Interpersonal relationship issues and psychodynamic conflicts explored. Attempts made to alleviate maladaptive patterns. Overall, patient is not progressing Treatment Plan Update (reviewed an updated 7/30/2018) : I will modify psychotherapy tx plan by implementing more stress management strategies, building upon cognitive behavioral techniques, increasing coping skills, as well as shoring up psychological defenses). An extended energy and skill set was needed to engage pt in psychotherapy due to some of the following: resistiveness, complexity, negativity, confrontational nature, hostile behaviors, and/or severe abnormalities in thought processes/psychosis resulting in the loss of expressive/receptive language communication skills. E & M PROGRESS NOTE: 
  
 
 
HISTORY  
   
CC:  \"Depressed\" HISTORY OF PRESENT ILLNESS/INTERVAL HISTORY:  (reviewed/updated 7/30/2018). per initial evaluation: The patient, Sally York, is a 59 y.o.   WHITE [place]  female with a past psychiatric history significant for Depression, who presents at this time with complaints of (and/or evidence of) the following emotional symptoms: depression, suicidal thoughts/threats, self mutilation and anxiety. Additional symptomatology include anxiety, depression worse, difficulty sleeping, fear of impending doom, fearfulness, feeling depressed and feeling suicidal.  The above symptoms have been present for two weeks. These symptoms are of high severity. These symptoms are constant  in nature. The patient's condition has been precipitated by multiple psychosocial stressors . Patient's condition made worse by unresolved grief issues and Borderline tendencies. UDS:negative; BAL=0. Pt was discharged from [facility] [place] to [facility] on 18. She c/o increased suicidal ideations. Pt's mother passed away in April. Reports, she keeps hearing her voice to come join her. Contracts for safety. Presents with Depressed mood, flat affect, poor eye contact. She was seen by the Psych attending on Friday AM and started on her PTA meds. Jada Terencemelyssa presents/reports/evidences the following emotional symptoms today, 2018:depression and suicidal thoughts/threats. The above symptoms have been present for several weeks. Over the weekend she scratched her wrists with a plastic knife. These symptoms are of moderate to high severity. The symptoms are constant  in nature. Additional symptomatology and features include low mood, AH/ vh of her mother telling her to be with her (). Compliant with medications. Cara Metz SIDE EFFECTS: (reviewed/updated 2018) None reported or admitted to. ALLERGIES:(reviewed/updated 2018) Allergies Allergen Reactions  Other Food Other (comments) \"Bad chest pain\" with walnuts. Coke - asthma/stuffy head. Fish sticks causes an asthma attack.  Astepro [Azelastine] Other (comments) Violent headaches.  Bactrim [Sulfamethoprim] Other (comments) Difficulty breathing, chills, fever.  Banana Other (comments)   Diffculty breathing, wheezing, asthma attack.  Coconut Other (comments) Difficulty breathing, asthma attack, SOB.  Cortisone Hives Difficulty breathing.  Peanut Other (comments) Wheezing, asthma attack, SOB.  Prednisone Hives Difficulty breathing, kidney stones. MEDICATIONS PRIOR TO ADMISSION:(reviewed/updated 7/30/2018) Prescriptions Prior to Admission Medication Sig  levothyroxine (SYNTHROID) 125 mcg tablet Take 1 Tab by mouth Daily (before breakfast). Indications: hypothyroidism  risperiDONE (RISPERDAL) 1 mg tablet Take 1 Tab by mouth two (2) times a day. Indications: delusion  sertraline (ZOLOFT) 25 mg tablet Take 3 Tabs by mouth every evening. Indications: major depressive disorder  traZODone (DESYREL) 50 mg tablet Take 1 Tab by mouth nightly as needed for Sleep. Indications: insomnia associated with depression  hydrOXYzine HCl (ATARAX) 25 mg tablet Take 1 Tab by mouth every six (6) hours as needed (Anxiety) for up to 10 days. Indications: anxiety, Anxiety  therapeutic multivitamin (THERA) tablet Take 1 Tab by mouth daily.  fluticasone (FLONASE) 50 mcg/actuation nasal spray 2 Sprays by Both Nostrils route two (2) times a day.  Cholecalciferol, Vitamin D3, (VITAMIN D3) 1,000 unit cap Take 1,000 Units by mouth daily.  cyanocobalamin, vitamin B-12, (VITAMIN B-12) 5,000 mcg subl 5,000 mcg by SubLINGual route daily.  aspirin (ASPIRIN) 325 mg tablet Take 325-650 mg by mouth as needed for Pain. PAST MEDICAL HISTORY: Past medical history from the initial psychiatric evaluation has been reviewed (reviewed/updated 7/30/2018) with no additional updates (I asked patient and no additional past medical history provided). Past Medical History:  
Diagnosis Date  Asthma 1967  
 hospitalized for asthma attack x 2  Chronic kidney disease   
 kidney stones x 2-3 times  Ill-defined condition   
 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   
 blood in stool  Other ill-defined conditions(799.89)   
 hx low BP - 90's/60's-70's  PUD (peptic ulcer disease)  Thyroid disease  Unspecified adverse effect of anesthesia   
 nasal growth and misalignment and cannot breath through nose Past Surgical History:  
Procedure Laterality Date  HX HEENT    
 T & A  
 HX HEENT    
 turbinate surgery SOCIAL HISTORY: Social history from the initial psychiatric evaluation has been reviewed (reviewed/updated 7/30/2018) with no additional updates (I asked patient and no additional social history provided). Social History Social History  Marital status: SINGLE Spouse name: N/A  
 Number of children: N/A  
 Years of education: N/A Occupational History  Not on file. Social History Main Topics  Smoking status: Never Smoker  Smokeless tobacco: Never Used  Alcohol use No  
 Drug use: No  
 Sexual activity: Not Currently Other Topics Concern  Not on file Social History Narrative FAMILY HISTORY: Family history from the initial psychiatric evaluation has been reviewed (reviewed/updated 7/30/2018) with no additional updates (I asked patient and no additional family history provided). Family History Problem Relation Age of Onset  Stroke Mother  Other Mother   
  erosion of esophagus  Cancer Mother   
  melanoma/squamous  Heart Surgery Father x2  Delayed Awakening Father  Pacemaker Father  Other Father   
  carotid endartarectomy/polio  Cataract Father REVIEW OF SYSTEMS: (reviewed/updated 7/30/2018) Appetite:good Sleep: good All other Review of Systems: depressed mood, AH/ VH  
 
 
 
MENTAL STATUS EXAM & VITALS MENTAL STATUS EXAM (MSE): MSE FINDINGS ARE WITHIN NORMAL LIMITS (WNL) UNLESS OTHERWISE STATED BELOW. ( ALL OF THE BELOW CATEGORIES OF THE MSE HAVE BEEN REVIEWED (reviewed 7/30/2018) AND UPDATED AS DEEMED APPROPRIATE ) General Presentation age appropriate and casually dressed, cooperative Orientation oriented to time, place and person Vital Signs  See below (reviewed 7/30/2018); Vital Signs (BP, Pulse, & Temp) are within normal limits if not listed below. Gait and Station Stable/steady, no ataxia Musculoskeletal System No extrapyramidal symptoms (EPS); no abnormal muscular movements or Tardive Dyskinesia (TD); muscle strength and tone are within normal limits Language No aphasia or dysarthria Speech:  normal pitch and normal volume Thought Processes logical; normal rate of thoughts; good abstract reasoning/computation Thought Associations goal directed Thought Content free of delusions and free of hallucinations Suicidal Ideations intention; (+)SI Homicidal Ideations none Mood:  depressed Affect:  depressed Memory recent  fair Memory remote:  fair Concentration/Attention:  wnl  
Fund of Knowledge wnl Insight:  fair Reliability fair Judgment:  fair VITALS:    
Patient Vitals for the past 24 hrs: 
 Temp Pulse Resp BP  
07/30/18 0708 98 °F (36.7 °C) 74 16 110/65 Wt Readings from Last 3 Encounters:  
07/27/18 72.6 kg (160 lb)  
07/16/18 72.6 kg (160 lb)  
07/31/14 79.4 kg (175 lb) Temp Readings from Last 3 Encounters:  
07/30/18 98 °F (36.7 °C)  
07/26/18 98 °F (36.7 °C) BP Readings from Last 3 Encounters:  
07/30/18 110/65  
07/26/18 108/71  
07/31/14 110/69 Pulse Readings from Last 3 Encounters:  
07/30/18 74  
07/26/18 62  
07/31/14 69 DATA LABORATORY DATA:(reviewed/updated 7/30/2018) No results found for this or any previous visit (from the past 24 hour(s)). No results found for: VALF2, VALAC, VALP, VALPR, DS6, CRBAM, CRBAMP, CARB2, XCRBAM 
No results found for: LITHM  
RADIOLOGY REPORTS:(reviewed/updated 7/30/2018) Ct Head Wo Cont Result Date: 7/17/2018 EXAM: CT HEAD WO CONT INDICATION:  Altered mental status. COMPARISON: None. CONTRAST:  None. TECHNIQUE: Unenhanced CT of the head was performed using 5 mm images. Brain and bone windows were generated. CT dose reduction was achieved through use of a standardized protocol tailored for this examination and automatic exposure control for dose modulation. Adaptive statistical iterative reconstruction (ASIR) was utilized. FINDINGS: The ventricles and sulci are normal in size, shape and configuration and midline. There is no significant white matter disease. There is no intracranial hemorrhage, extra-axial collection, mass, mass effect or midline shift. The basilar cisterns are open. No acute infarct is identified. The bone windows demonstrate no abnormalities. The visualized portions of the paranasal sinuses and mastoid air cells are clear. IMPRESSION: No acute findings. MEDICATIONS ALL MEDICATIONS:  
Current Facility-Administered Medications Medication Dose Route Frequency  LORazepam (ATIVAN) tablet 1 mg  1 mg Oral Q6H PRN  
 LORazepam (ATIVAN) tablet 0.5 mg  0.5 mg Oral TID WITH MEALS  fluticasone (FLONASE) 50 mcg/actuation nasal spray 2 Spray  2 Spray Both Nostrils BID  sertraline (ZOLOFT) tablet 100 mg  100 mg Oral QPM  
 ziprasidone (GEODON) 20 mg in sterile water (preservative free) 1 mL injection  20 mg IntraMUSCular BID PRN  
 OLANZapine (ZyPREXA) tablet 5 mg  5 mg Oral Q6H PRN  
 benztropine (COGENTIN) tablet 2 mg  2 mg Oral BID PRN  
 benztropine (COGENTIN) injection 2 mg  2 mg IntraMUSCular BID PRN  
 LORazepam (ATIVAN) injection 2 mg  2 mg IntraMUSCular Q4H PRN  
 acetaminophen (TYLENOL) tablet 650 mg  650 mg Oral Q4H PRN  
 ibuprofen (MOTRIN) tablet 400 mg  400 mg Oral Q8H PRN  
 magnesium hydroxide (MILK OF MAGNESIA) 400 mg/5 mL oral suspension 30 mL  30 mL Oral DAILY PRN  
 nicotine (NICODERM CQ) 21 mg/24 hr patch 1 Patch  1 Patch TransDERmal DAILY PRN  
 traZODone (DESYREL) tablet 50 mg  50 mg Oral QHS PRN  
 hydrOXYzine HCl (ATARAX) tablet 25 mg  25 mg Oral Q6H PRN  
 levothyroxine (SYNTHROID) tablet 125 mcg  125 mcg Oral ACB  risperiDONE (RisperDAL) tablet 1 mg  1 mg Oral BID  cholecalciferol (VITAMIN D3) tablet 1,000 Units  1,000 Units Oral DAILY  aspirin (ASPIRIN) tablet 325 mg  325 mg Oral DAILY  
  
SCHEDULED MEDICATIONS:  
Current Facility-Administered Medications Medication Dose Route Frequency  LORazepam (ATIVAN) tablet 0.5 mg  0.5 mg Oral TID WITH MEALS  fluticasone (FLONASE) 50 mcg/actuation nasal spray 2 Spray  2 Spray Both Nostrils BID  sertraline (ZOLOFT) tablet 100 mg  100 mg Oral QPM  
 levothyroxine (SYNTHROID) tablet 125 mcg  125 mcg Oral ACB  risperiDONE (RisperDAL) tablet 1 mg  1 mg Oral BID  cholecalciferol (VITAMIN D3) tablet 1,000 Units  1,000 Units Oral DAILY  aspirin (ASPIRIN) tablet 325 mg  325 mg Oral DAILY ASSESSMENT & PLAN  
 
DIAGNOSES REQUIRING ACTIVE TREATMENT AND MONITORING: (reviewed/updated 7/30/2018) Patient Active Hospital Problem List: MDD with psychosis Assessment: moderate to severe; exacerbated by borderline pd, Grief, lack of resources, lack of social support Plan: Agree with inpatient hospitalization for further stabilization, safety monitoring and medication management Close observation due to self harm Titrate medications In summary, Arcadio Kumar, is a 59 y.o.  female who presents with a severe exacerbation of the principal diagnosis of MDD with psychosis. Patient's condition is not improving. Patient requires continued inpatient hospitalization for further stabilization, safety monitoring and medication management. I will continue to coordinate the provision of individual, milieu, occupational, group, and substance abuse therapies to address target symptoms/diagnoses as deemed appropriate for the individual patient.   A coordinated, multidisplinary treatment team round was conducted with the patient (this team consists of the nurse, psychiatric unit pharmcist,  and writer). Complete current electronic health record for patient has been reviewed today including consultant notes, ancillary staff notes, nurses and psychiatric tech notes. Suicide risk assessment completed and patient deemed to be of low risk for suicide at this time. The following regarding medications was addressed during rounds with patient:  
the risks and benefits of the proposed medication. The patient was given the opportunity to ask questions. Informed consent given to the use of the above medications. Will continue to adjust psychiatric and non-psychiatric medications (see above \"medication\" section and orders section for details) as deemed appropriate & based upon diagnoses and response to treatment. I will continue to order blood tests/labs and diagnostic tests as deemed appropriate and review results as they become available (see orders for details and above listed lab/test results). I will order psychiatric records from previous [facility] hospitals to further elucidate the nature of patient's psychopathology and review once available. I will gather additional collateral information from friends, family and o/p treatment team to further elucidate the nature of patient's psychopathology and baselline level of psychiatric functioning. I certify that this patient's inpatient psychiatric hospital services furnished since the previous certification were, and continue to be, required for treatment that could reasonably be expected to improve the patient's condition, or for diagnostic study, and that the patient continues to need, on a daily basis, active treatment furnished directly by or requiring the supervision of inpatient psychiatric facility personnel.  In addition, the hospital records show that services furnished were intensive treatment services, admission or related services, or equivalent services. EXPECTED DISCHARGE DATE/DAY: TBD  
 
DISPOSITION: Home Signed By:  
Luis Alberto Lance MD 
7/30/2018

## 2018-07-31 PROCEDURE — 74011250637 HC RX REV CODE- 250/637: Performed by: PSYCHIATRY & NEUROLOGY

## 2018-07-31 PROCEDURE — 65220000003 HC RM SEMIPRIVATE PSYCH

## 2018-07-31 RX ORDER — TRAZODONE HYDROCHLORIDE 100 MG/1
100 TABLET ORAL
Status: DISCONTINUED | OUTPATIENT
Start: 2018-07-31 | End: 2018-08-01

## 2018-07-31 RX ADMIN — LEVOTHYROXINE SODIUM 125 MCG: 50 TABLET ORAL at 06:35

## 2018-07-31 RX ADMIN — LORAZEPAM 0.5 MG: 0.5 TABLET ORAL at 09:43

## 2018-07-31 RX ADMIN — LORAZEPAM 0.5 MG: 0.5 TABLET ORAL at 17:27

## 2018-07-31 RX ADMIN — FLUTICASONE PROPIONATE 2 SPRAY: 50 SPRAY, METERED NASAL at 17:27

## 2018-07-31 RX ADMIN — RISPERIDONE 1 MG: 1 TABLET ORAL at 21:37

## 2018-07-31 RX ADMIN — OLANZAPINE 5 MG: 5 TABLET, FILM COATED ORAL at 21:37

## 2018-07-31 RX ADMIN — TRAZODONE HYDROCHLORIDE 100 MG: 100 TABLET ORAL at 21:37

## 2018-07-31 RX ADMIN — FLUTICASONE PROPIONATE 2 SPRAY: 50 SPRAY, METERED NASAL at 09:44

## 2018-07-31 RX ADMIN — RISPERIDONE 1 MG: 1 TABLET ORAL at 09:43

## 2018-07-31 RX ADMIN — ASPIRIN 325 MG ORAL TABLET 325 MG: 325 PILL ORAL at 09:43

## 2018-07-31 RX ADMIN — VITAMIN D 1000 UNITS: 25 TAB ORAL at 09:43

## 2018-07-31 RX ADMIN — SERTRALINE HYDROCHLORIDE 100 MG: 50 TABLET ORAL at 17:27

## 2018-07-31 NOTE — PROGRESS NOTES
Spiritual Care Assessment/Progress Note [facility] 
 
 
NAME: Naty Segura      MRN: [id number] AGE: 59 y.o. SEX: female Caodaism Affiliation: Mosque  
Language: [place] 7/31/2018     Total Time (in minutes): 5 Spiritual Assessment begun in [place] [id number] through conversation with: 
  
    [x]Patient        [] Family    [] Friend(s) Reason for Consult: Request by staff Spiritual beliefs: (Please include comment if needed) 
   [] Identifies with a brian tradition:     
   [] Supported by a brian community:        
   [] Claims no spiritual orientation:       
   [] Seeking spiritual identity:            
   [] Adheres to an individual form of spirituality:       
   [x] Not able to assess:                   
 
    
Identified resources for coping:  
   [] Prayer                           
   [] Music                  [] Guided Imagery 
   [] Family/friends                 [] Pet visits [] Devotional reading                         [x] Unknown 
   [] Other:                                          
 
 
Interventions offered during this visit: (See comments for more details) Patient Interventions: Iconic (affirming the presence of God/Higher Power) Plan of Care: 
 
 [] Support spiritual and/or cultural needs  
 [] Support AMD and/or advance care planning process    
 [] Support grieving process 
 [] Coordinate Rites and/or Rituals  
 [] Coordination with community clergy 
 [x] No spiritual needs identified at this time 
 [] Detailed Plan of Care below (See Comments)  [] Make referral to Music Therapy 
[] Make referral to Pet Therapy    
[] Make referral to Addiction services 
[] Make referral to [facility] 
[] Make referral to Spiritual Care Partner 
[] No future visits requested       
[] Follow up visits as needed Comments: Responded to request from Care Mgmt to visit with patient on [address] [facility].  At time of visit patient appeared to be resting and did not respond to her name being called or knock on the door. Chaplains remain available as needed and desired. Chaplain Frandy Delgado M.Div.  Paging Service [phone])

## 2018-07-31 NOTE — BH NOTES
Per Jazmin Loyola, Nurse Manager, patient's 1:1 status was discontinued earlier today by Dr. Alice Calloway. Per Ena Riddle, patient is to be closely monitored, should not be allowed to have plastic ware, and should not be allowed to enter the bathroom unattended.

## 2018-07-31 NOTE — H&P
History and Physical 
 
Subjective:  
 
Eugenie Rabago is a 59 y.o. female with past medical hx significant for Asthma, CKD, Kidney stone, PUD, thyroid disease not specified, blood in stool, Vitamin D def. Pt is hospitalized with prinicipal diagnosis of bipolar disorder. She has little insight into her health issues. Showing no signs of acute distress. Very poor historian. Noted to be on multiple medications for which patient had no knowledge. Pt will be continued on her T4 supplement. Pt will be monitored with her Cr. Pt will be started on rescue inhaler. Pt denies any acute medical issues. Pt is showing no signs of acute distress. Past Medical History:  
Diagnosis Date  Asthma 1967  
 hospitalized for asthma attack x 2  Chronic kidney disease   
 kidney stones x 2-3 times  Ill-defined condition   
 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   
 blood in stool  Other ill-defined conditions(799.89)   
 hx low BP - 90's/60's-70's  PUD (peptic ulcer disease)  Thyroid disease  Unspecified adverse effect of anesthesia   
 nasal growth and misalignment and cannot breath through nose Past Surgical History:  
Procedure Laterality Date  HX HEENT    
 T & A  
 HX HEENT    
 turbinate surgery Family History Problem Relation Age of Onset  Stroke Mother  Other Mother   
  erosion of esophagus  Cancer Mother   
  melanoma/squamous  Heart Surgery Father x2  Delayed Awakening Father  Pacemaker Father  Other Father   
  carotid endartarectomy/polio  Cataract Father Social History Substance Use Topics  Smoking status: Never Smoker  Smokeless tobacco: Never Used  Alcohol use No  
   
Prior to Admission medications Medication Sig Start Date End Date Taking?  Authorizing Provider levothyroxine (SYNTHROID) 125 mcg tablet Take 1 Tab by mouth Daily (before breakfast). Indications: hypothyroidism 7/27/18   Pablo Storm MD  
risperiDONE (RISPERDAL) 1 mg tablet Take 1 Tab by mouth two (2) times a day. Indications: delusion 7/26/18   Pablo Storm MD  
sertraline (ZOLOFT) 25 mg tablet Take 3 Tabs by mouth every evening. Indications: major depressive disorder 7/26/18   Pablo Storm MD  
traZODone (DESYREL) 50 mg tablet Take 1 Tab by mouth nightly as needed for Sleep. Indications: insomnia associated with depression 7/26/18   Pablo Storm MD  
hydrOXYzine HCl (ATARAX) 25 mg tablet Take 1 Tab by mouth every six (6) hours as needed (Anxiety) for up to 10 days. Indications: anxiety, Anxiety 7/26/18 8/5/18  Pablo Storm MD  
therapeutic multivitamin (THERA) tablet Take 1 Tab by mouth daily. Historical Provider  
fluticasone (FLONASE) 50 mcg/actuation nasal spray 2 Sprays by Both Nostrils route two (2) times a day. Historical Provider Cholecalciferol, Vitamin D3, (VITAMIN D3) 1,000 unit cap Take 1,000 Units by mouth daily. Historical Provider  
cyanocobalamin, vitamin B-12, (VITAMIN B-12) 5,000 mcg subl 5,000 mcg by SubLINGual route daily. Historical Provider  
aspirin (ASPIRIN) 325 mg tablet Take 325-650 mg by mouth as needed for Pain. Historical Provider Allergies Allergen Reactions  Other Food Other (comments) \"Bad chest pain\" with walnuts. Coke - asthma/stuffy head. Fish sticks causes an asthma attack.  Astepro [Azelastine] Other (comments) Violent headaches.  Bactrim [Sulfamethoprim] Other (comments) Difficulty breathing, chills, fever.  Banana Other (comments) Diffculty breathing, wheezing, asthma attack.  Coconut Other (comments) Difficulty breathing, asthma attack, SOB.  Cortisone Hives Difficulty breathing.  Peanut Other (comments) Wheezing, asthma attack, SOB.  Prednisone Hives Difficulty breathing, kidney stones. Review of Systems: 
Constitutional: negative Eyes: negative Ears, Nose, Mouth, Throat, and Face: negative Respiratory: negative Cardiovascular: negative Gastrointestinal: negative Genitourinary:negative Integument/Breast: negative Hematologic/Lymphatic: negative Musculoskeletal:negative Neurological: negative Behavioral/Psychiatric: bipolar disorder. Endocrine: negative Allergic/Immunologic: negative Objective: Intake and Output:   
  
  
 
Physical Exam:  
Visit Vitals  /81 (BP 1 Location: Left arm, BP Patient Position: At rest;Sitting)  Pulse 62  Temp 98.4 °F (36.9 °C)  Resp 16  
 Ht 5' 4\" (1.626 m)  Wt 72.6 kg (160 lb)  SpO2 100%  Breastfeeding No  
 BMI 27.46 kg/m2 General:  Alert, cooperative, no distress, appears stated age. Head:  Normocephalic, without obvious abnormality, atraumatic. Eyes:  Conjunctivae/corneas clear. PERRL, EOMs intact. Ears:  Normal external ear canals both ears. Nose: Nares normal. Septum midline. Mucosa normal. No drainage or sinus tenderness. Throat: Lips, mucosa, and tongue normal. Teeth and gums normal.  
Neck: Supple, symmetrical, trachea midline, no adenopathy, thyroid: no enlargement/tenderness/nodules. Back:   Symmetric, no curvature. ROM normal. No CVA tenderness. Lungs:   Clear to auscultation bilaterally. Chest wall:  No tenderness or deformity. Heart:  Regular rate and rhythm, S1, S2 normal, no murmur, click, rub or gallop. Abdomen:   Soft, non-tender. Bowel sounds normal. No masses,  No organomegaly. Extremities: Extremities normal, atraumatic, no cyanosis or edema. Pulses: 2+ distally. Skin: Skin color, texture, turgor normal. No rashes or lesions Lymph nodes: Cervical and supraclavicular nodes not enlarged. Neurologic: CNII-XII intact. Normal strength, sensation intact, reflexes distally intact. Data Review: No results found for this or any previous visit (from the past 24 hour(s)). Assessment:  
 
Active Problems: 
  Depression (7/17/2018) Asthma CKD Kidney stone Hypothyroidism Blood in stool Environmental allergies Plan:  
 
Restart Synthroid. check free T4 and TSH Restart Flonase Monitor Cr Check hgb No VTE prophylaxis indicated or necessary at this time. Signed By: Laure Stone MD   
 July 27, 2018 Never

## 2018-07-31 NOTE — BH NOTES
Patient stayed in bed the entire shift. Patient refused to meals. Patient was instructed by doctor to take a shower. Patient went back to bed after treatment team. Affect flat, mood depressed. Patient continues to say she wants to be with her mom and her mom is telling her to come with her. Patient stated that her father is coping with the loss of her mother. Patient denied having a support system. Patient had no eye contact during treatment rounds. Staff will continue to encourage patient to come out of room.

## 2018-08-01 PROCEDURE — 74011250637 HC RX REV CODE- 250/637: Performed by: PSYCHIATRY & NEUROLOGY

## 2018-08-01 PROCEDURE — 65220000003 HC RM SEMIPRIVATE PSYCH

## 2018-08-01 RX ORDER — ZOLPIDEM TARTRATE 10 MG/1
10 TABLET ORAL
Status: DISCONTINUED | OUTPATIENT
Start: 2018-08-01 | End: 2018-08-13 | Stop reason: HOSPADM

## 2018-08-01 RX ORDER — SERTRALINE HYDROCHLORIDE 50 MG/1
150 TABLET, FILM COATED ORAL EVERY EVENING
Status: DISCONTINUED | OUTPATIENT
Start: 2018-08-02 | End: 2018-08-04

## 2018-08-01 RX ADMIN — LORAZEPAM 0.5 MG: 0.5 TABLET ORAL at 17:31

## 2018-08-01 RX ADMIN — FLUTICASONE PROPIONATE 2 SPRAY: 50 SPRAY, METERED NASAL at 17:29

## 2018-08-01 RX ADMIN — RISPERIDONE 1 MG: 1 TABLET ORAL at 22:12

## 2018-08-01 RX ADMIN — RISPERIDONE 1 MG: 1 TABLET ORAL at 09:02

## 2018-08-01 RX ADMIN — LORAZEPAM 0.5 MG: 0.5 TABLET ORAL at 09:02

## 2018-08-01 RX ADMIN — VITAMIN D 1000 UNITS: 25 TAB ORAL at 09:02

## 2018-08-01 RX ADMIN — LEVOTHYROXINE SODIUM 125 MCG: 50 TABLET ORAL at 06:41

## 2018-08-01 RX ADMIN — SERTRALINE HYDROCHLORIDE 100 MG: 50 TABLET ORAL at 17:29

## 2018-08-01 RX ADMIN — FLUTICASONE PROPIONATE 2 SPRAY: 50 SPRAY, METERED NASAL at 09:04

## 2018-08-01 RX ADMIN — ZOLPIDEM TARTRATE 10 MG: 10 TABLET ORAL at 22:12

## 2018-08-01 RX ADMIN — OLANZAPINE 5 MG: 5 TABLET, FILM COATED ORAL at 22:11

## 2018-08-01 RX ADMIN — ASPIRIN 325 MG ORAL TABLET 325 MG: 325 PILL ORAL at 09:02

## 2018-08-01 NOTE — BH NOTES
GROUP THERAPY PROGRESS NOTE The patient Zac Arredondo a 59 y.o. female is participating in Creative Expression Group. Group time: 1 hour Personal goal for participation: To concentrate on selected task Goal orientation: social 
 
Group therapy participation: minimal 
 
Therapeutic interventions reviewed and discussed: Crafts, games, music Impression of participation: The patient was present-arrived late Keshawn Falcon 8/1/2018  5:12 PM

## 2018-08-01 NOTE — BH NOTES
Patient is calm and cooperative on the unit, denies SI/HI, denies auditory or visual hallucination at this time. Patient has been compliant with medications, and there are noticeable distress at this time. Staff will continue to monitor patient every 15 minutes for safety.

## 2018-08-01 NOTE — BH NOTES
REFLECTIONS GROUP THERAPY PROGRESS NOTE The patient Susana Ponec is participating in Reflections Group Therapy. Group time: 30 minutes Personal goal for participation: Share with group about feelings and concerns throughout the course of the day. Goal orientation: personal 
 
Group therapy participation: active Therapeutic interventions reviewed and discussed: Yes Impression of participation:   Positive input. Ariana Wilson 7/31/2018

## 2018-08-01 NOTE — BH NOTES
PSYCHIATRIC PROGRESS NOTE Patient Name  Natalee Houser Date of Birth 1960 [facility] [id number] Medical Record Number  [id number] Age  59 y.o. PCP Don Encinas MD  
Admit date:  7/27/2018 Room Number  830/42  @ [facility]  
Date of Service  7/31/2018 PSYCHOTHERAPY SESSION NOTE: 
Length of psychotherapy session: 20 minutes Main condition/diagnosis/issues treated during session today, 7/31/2018 : depression, grief, AH, thoughts of self harm I employed Cognitive Behavioral therapy techniques, Reality-Oriented psychotherapy, as well as supportive psychotherapy in regards to various ongoing psychosocial stressors, including the following: pre-admission and current problems; housing issues; occupational issues; academic issues; legal issues; medical issues; and stress of hospitalization. Interpersonal relationship issues and psychodynamic conflicts explored. Attempts made to alleviate maladaptive patterns. Overall, patient is not progressing Treatment Plan Update (reviewed an updated 7/31/2018) : I will modify psychotherapy tx plan by implementing more stress management strategies, building upon cognitive behavioral techniques, increasing coping skills, as well as shoring up psychological defenses). An extended energy and skill set was needed to engage pt in psychotherapy due to some of the following: resistiveness, complexity, negativity, confrontational nature, hostile behaviors, and/or severe abnormalities in thought processes/psychosis resulting in the loss of expressive/receptive language communication skills. E & M PROGRESS NOTE: 
  
 
 
HISTORY  
   
CC:  \"Depressed\" HISTORY OF PRESENT ILLNESS/INTERVAL HISTORY:  (reviewed/updated 7/31/2018). per initial evaluation: The patient, Natalee Houser, is a 59 y.o.   WHITE [place]  female with a past psychiatric history significant for Depression, who presents at this time with complaints of (and/or evidence of) the following emotional symptoms: depression, suicidal thoughts/threats, self mutilation and anxiety. Additional symptomatology include anxiety, depression worse, difficulty sleeping, fear of impending doom, fearfulness, feeling depressed and feeling suicidal.  The above symptoms have been present for two weeks. These symptoms are of high severity. These symptoms are constant  in nature. The patient's condition has been precipitated by multiple psychosocial stressors . Patient's condition made worse by unresolved grief issues and Borderline tendencies. UDS:negative; BAL=0. Pt was discharged from [facility] [place] to [facility] on 18. She c/o increased suicidal ideations. Pt's mother passed away in April. Reports, she keeps hearing her voice to come join her. Contracts for safety. Presents with Depressed mood, flat affect, poor eye contact. She was seen by the Psych attending on Friday AM and started on her PTA meds. Tri Scheuermann presents/reports/evidences the following emotional symptoms today, 2018:depression and suicidal thoughts/threats. The above symptoms have been present for several weeks. No incidents of agitation or self harm. She described severe grief and desire to be with her  mother, difficulty facing life without he rmother is overwhelming. She also describes fears of going to hell if she discusses certain topics. (+)AH of her mother telling her to kill herself. (+)del. Of ref- special significance of certain numbers. She responded well to suggestions of increasing support system. Manuel Garza SIDE EFFECTS: (reviewed/updated 2018) None reported or admitted to. ALLERGIES:(reviewed/updated 2018) Allergies Allergen Reactions  Other Food Other (comments) \"Bad chest pain\" with walnuts. Coke - asthma/stuffy head. Fish sticks causes an asthma attack.  Astepro [Azelastine] Other (comments) Violent headaches.   
 Bactrim [Sulfamethoprim] Other (comments) Difficulty breathing, chills, fever.  Banana Other (comments) Diffculty breathing, wheezing, asthma attack.  Coconut Other (comments) Difficulty breathing, asthma attack, SOB.  Cortisone Hives Difficulty breathing.  Peanut Other (comments) Wheezing, asthma attack, SOB.  Prednisone Hives Difficulty breathing, kidney stones. MEDICATIONS PRIOR TO ADMISSION:(reviewed/updated 7/31/2018) Prescriptions Prior to Admission Medication Sig  levothyroxine (SYNTHROID) 125 mcg tablet Take 1 Tab by mouth Daily (before breakfast). Indications: hypothyroidism  risperiDONE (RISPERDAL) 1 mg tablet Take 1 Tab by mouth two (2) times a day. Indications: delusion  sertraline (ZOLOFT) 25 mg tablet Take 3 Tabs by mouth every evening. Indications: major depressive disorder  traZODone (DESYREL) 50 mg tablet Take 1 Tab by mouth nightly as needed for Sleep. Indications: insomnia associated with depression  hydrOXYzine HCl (ATARAX) 25 mg tablet Take 1 Tab by mouth every six (6) hours as needed (Anxiety) for up to 10 days. Indications: anxiety, Anxiety  therapeutic multivitamin (THERA) tablet Take 1 Tab by mouth daily.  fluticasone (FLONASE) 50 mcg/actuation nasal spray 2 Sprays by Both Nostrils route two (2) times a day.  Cholecalciferol, Vitamin D3, (VITAMIN D3) 1,000 unit cap Take 1,000 Units by mouth daily.  cyanocobalamin, vitamin B-12, (VITAMIN B-12) 5,000 mcg subl 5,000 mcg by SubLINGual route daily.  aspirin (ASPIRIN) 325 mg tablet Take 325-650 mg by mouth as needed for Pain. PAST MEDICAL HISTORY: Past medical history from the initial psychiatric evaluation has been reviewed (reviewed/updated 7/31/2018) with no additional updates (I asked patient and no additional past medical history provided). Past Medical History:  
Diagnosis Date  Asthma 1969  
 hospitalized for asthma attack x 2  Chronic kidney disease   
 kidney stones x 2-3 times  Ill-defined condition   
 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   
 blood in stool  Other ill-defined conditions(799.89)   
 hx low BP - 90's/60's-70's  PUD (peptic ulcer disease)  Thyroid disease  Unspecified adverse effect of anesthesia   
 nasal growth and misalignment and cannot breath through nose Past Surgical History:  
Procedure Laterality Date  HX HEENT    
 T & A  
 HX HEENT    
 turbinate surgery SOCIAL HISTORY: Social history from the initial psychiatric evaluation has been reviewed (reviewed/updated 7/31/2018) with no additional updates (I asked patient and no additional social history provided). Social History Social History  Marital status: SINGLE Spouse name: N/A  
 Number of children: N/A  
 Years of education: N/A Occupational History  Not on file. Social History Main Topics  Smoking status: Never Smoker  Smokeless tobacco: Never Used  Alcohol use No  
 Drug use: No  
 Sexual activity: Not Currently Other Topics Concern  Not on file Social History Narrative FAMILY HISTORY: Family history from the initial psychiatric evaluation has been reviewed (reviewed/updated 7/31/2018) with no additional updates (I asked patient and no additional family history provided). Family History Problem Relation Age of Onset  Stroke Mother  Other Mother   
  erosion of esophagus  Cancer Mother   
  melanoma/squamous  Heart Surgery Father x2  Delayed Awakening Father  Pacemaker Father  Other Father   
  carotid endartarectomy/polio  Cataract Father REVIEW OF SYSTEMS: (reviewed/updated 7/31/2018) Appetite:good Sleep: good All other Review of Systems: depressed mood, AH/ VH  
 
 
 
MENTAL STATUS EXAM & VITALS MENTAL STATUS EXAM (MSE): MSE FINDINGS ARE WITHIN NORMAL LIMITS (WNL) UNLESS OTHERWISE STATED BELOW. ( ALL OF THE BELOW CATEGORIES OF THE MSE HAVE BEEN REVIEWED (reviewed 7/31/2018) AND UPDATED AS DEEMED APPROPRIATE ) General Presentation age appropriate and casually dressed, cooperative Orientation oriented to time, place and person Vital Signs  See below (reviewed 7/31/2018); Vital Signs (BP, Pulse, & Temp) are within normal limits if not listed below. Gait and Station Stable/steady, no ataxia Musculoskeletal System No extrapyramidal symptoms (EPS); no abnormal muscular movements or Tardive Dyskinesia (TD); muscle strength and tone are within normal limits Language No aphasia or dysarthria Speech:  normal pitch and normal volume Thought Processes logical; normal rate of thoughts; good abstract reasoning/computation Thought Associations goal directed Thought Content (+)delusions of reference; disorganized Suicidal Ideations intention; (+)SI Homicidal Ideations none Mood:  depressed Affect:  depressed Memory recent  fair Memory remote:  fair Concentration/Attention:  wnl  
Fund of Knowledge wnl Insight:  fair Reliability fair Judgment:  fair VITALS:    
Patient Vitals for the past 24 hrs: 
 Temp Pulse Resp BP  
07/31/18 0638 97.7 °F (36.5 °C) 62 16 109/60 Wt Readings from Last 3 Encounters:  
07/27/18 72.6 kg (160 lb)  
07/16/18 72.6 kg (160 lb)  
07/31/14 79.4 kg (175 lb) Temp Readings from Last 3 Encounters:  
07/31/18 97.7 °F (36.5 °C)  
07/26/18 98 °F (36.7 °C) BP Readings from Last 3 Encounters:  
07/31/18 109/60  
07/26/18 108/71  
07/31/14 110/69 Pulse Readings from Last 3 Encounters:  
07/31/18 62  
07/26/18 62  
07/31/14 69 DATA LABORATORY DATA:(reviewed/updated 7/31/2018) No results found for this or any previous visit (from the past 24 hour(s)).  
No results found for: VALF2, VALAC, VALP, VALPR, DS6, CRBAM, CRBAMP, CARB2, XCRBAM 
No results found for: LITHRENATA  
RADIOLOGY REPORTS:(reviewed/updated 7/31/2018) Ct Head Wo Cont Result Date: 7/17/2018 EXAM:  CT HEAD WO CONT INDICATION:  Altered mental status. COMPARISON: None. CONTRAST:  None. TECHNIQUE: Unenhanced CT of the head was performed using 5 mm images. Brain and bone windows were generated. CT dose reduction was achieved through use of a standardized protocol tailored for this examination and automatic exposure control for dose modulation. Adaptive statistical iterative reconstruction (ASIR) was utilized. FINDINGS: The ventricles and sulci are normal in size, shape and configuration and midline. There is no significant white matter disease. There is no intracranial hemorrhage, extra-axial collection, mass, mass effect or midline shift. The basilar cisterns are open. No acute infarct is identified. The bone windows demonstrate no abnormalities. The visualized portions of the paranasal sinuses and mastoid air cells are clear. IMPRESSION: No acute findings. MEDICATIONS ALL MEDICATIONS:  
Current Facility-Administered Medications Medication Dose Route Frequency  traZODone (DESYREL) tablet 100 mg  100 mg Oral QHS  LORazepam (ATIVAN) tablet 1 mg  1 mg Oral Q6H PRN  
 LORazepam (ATIVAN) tablet 0.5 mg  0.5 mg Oral TID WITH MEALS  fluticasone (FLONASE) 50 mcg/actuation nasal spray 2 Spray  2 Spray Both Nostrils BID  sertraline (ZOLOFT) tablet 100 mg  100 mg Oral QPM  
 ziprasidone (GEODON) 20 mg in sterile water (preservative free) 1 mL injection  20 mg IntraMUSCular BID PRN  
 OLANZapine (ZyPREXA) tablet 5 mg  5 mg Oral Q6H PRN  
 benztropine (COGENTIN) tablet 2 mg  2 mg Oral BID PRN  
 benztropine (COGENTIN) injection 2 mg  2 mg IntraMUSCular BID PRN  
 LORazepam (ATIVAN) injection 2 mg  2 mg IntraMUSCular Q4H PRN  
 acetaminophen (TYLENOL) tablet 650 mg  650 mg Oral Q4H PRN  
 ibuprofen (MOTRIN) tablet 400 mg  400 mg Oral Q8H PRN  
 magnesium hydroxide (MILK OF MAGNESIA) 400 mg/5 mL oral suspension 30 mL  30 mL Oral DAILY PRN  
 nicotine (NICODERM CQ) 21 mg/24 hr patch 1 Patch  1 Patch TransDERmal DAILY PRN  
 hydrOXYzine HCl (ATARAX) tablet 25 mg  25 mg Oral Q6H PRN  
 levothyroxine (SYNTHROID) tablet 125 mcg  125 mcg Oral ACB  risperiDONE (RisperDAL) tablet 1 mg  1 mg Oral BID  cholecalciferol (VITAMIN D3) tablet 1,000 Units  1,000 Units Oral DAILY  aspirin (ASPIRIN) tablet 325 mg  325 mg Oral DAILY  
  
SCHEDULED MEDICATIONS:  
Current Facility-Administered Medications Medication Dose Route Frequency  traZODone (DESYREL) tablet 100 mg  100 mg Oral QHS  LORazepam (ATIVAN) tablet 0.5 mg  0.5 mg Oral TID WITH MEALS  fluticasone (FLONASE) 50 mcg/actuation nasal spray 2 Spray  2 Spray Both Nostrils BID  sertraline (ZOLOFT) tablet 100 mg  100 mg Oral QPM  
 levothyroxine (SYNTHROID) tablet 125 mcg  125 mcg Oral ACB  risperiDONE (RisperDAL) tablet 1 mg  1 mg Oral BID  cholecalciferol (VITAMIN D3) tablet 1,000 Units  1,000 Units Oral DAILY  aspirin (ASPIRIN) tablet 325 mg  325 mg Oral DAILY ASSESSMENT & PLAN  
 
DIAGNOSES REQUIRING ACTIVE TREATMENT AND MONITORING: (reviewed/updated 7/31/2018) Patient Active Hospital Problem List: MDD with psychosis Assessment: moderate to severe; exacerbated by borderline pd, Grief, lack of resources, lack of social support Plan: Agree with inpatient hospitalization for further stabilization, safety monitoring and medication management Close observation due to self harm Titrate medications In summary, Gallo Edmonds, is a 59 y.o.  female who presents with a severe exacerbation of the principal diagnosis of MDD with psychosis. Patient's condition is not improving. Patient requires continued inpatient hospitalization for further stabilization, safety monitoring and medication management.   I will continue to coordinate the provision of individual, milieu, occupational, group, and substance abuse therapies to address target symptoms/diagnoses as deemed appropriate for the individual patient. A coordinated, multidisplinary treatment team round was conducted with the patient (this team consists of the nurse, psychiatric unit pharmcist,  and writer). Complete current electronic health record for patient has been reviewed today including consultant notes, ancillary staff notes, nurses and psychiatric tech notes. Suicide risk assessment completed and patient deemed to be of low risk for suicide at this time. The following regarding medications was addressed during rounds with patient:  
the risks and benefits of the proposed medication. The patient was given the opportunity to ask questions. Informed consent given to the use of the above medications. Will continue to adjust psychiatric and non-psychiatric medications (see above \"medication\" section and orders section for details) as deemed appropriate & based upon diagnoses and response to treatment. I will continue to order blood tests/labs and diagnostic tests as deemed appropriate and review results as they become available (see orders for details and above listed lab/test results). I will order psychiatric records from previous [facility] hospitals to further elucidate the nature of patient's psychopathology and review once available. I will gather additional collateral information from friends, family and o/p treatment team to further elucidate the nature of patient's psychopathology and baselline level of psychiatric functioning.  
  
 
 
I certify that this patient's inpatient psychiatric hospital services furnished since the previous certification were, and continue to be, required for treatment that could reasonably be expected to improve the patient's condition, or for diagnostic study, and that the patient continues to need, on a daily basis, active treatment furnished directly by or requiring the supervision of inpatient psychiatric facility personnel. In addition, the hospital records show that services furnished were intensive treatment services, admission or related services, or equivalent services. EXPECTED DISCHARGE DATE/DAY: TBD  
 
DISPOSITION: Home Signed By:  
Jc Butt MD 
7/31/2018

## 2018-08-01 NOTE — PROGRESS NOTES
Problem: Depressed Mood (Adult/Pediatric) Goal: *STG: Remains safe in hospital 
Outcome: Progressing Towards Goal 
Pt slept 6 hours. No med/beh concerns overnight. No self harm attempts witnessed. Staff will continue to support and monitor for health and safety.

## 2018-08-01 NOTE — BH NOTES
Patient was encouraged to be seen in the milieu. Patient has been pacing in the hallway and sitting in the dayroom this shift. Patient has also bee noted interacting with staff. Patient was encouraged to eat lunch after refusing to eat breakfast. Patient ate about 10% of meal. Patient stated that she still wants to be with her [] mom. Patient continues to be under constant surveillance.

## 2018-08-01 NOTE — PROGRESS NOTES
Initial visit with patient in behavioral health unit at patient request.  Jacobo Hu lost her mother on April 18th of this past year. She shared of her intense pain and her desire to go be with her mother. Patient shared brief description of her relationship with her mother - which appeared very fused and mother was very dependent upon Jacobo Hu. They lived together. Pt's mother was raised with a strict Spiritism upbringing and Jacobo Hu has some Spiritism fixations which were distracting to her. Guided Prudence's thoughts as able to God's love and viewing life as a gift. Jacobo Hu does have a father and was eventually able to redirect some of her thoughts to being able to reach out to her father for support during this difficult time. Jacobo Hu was able to share that she would like to just put her head on her father's chest and have him hold her. We prayed together. Encouraged Jacobo Hu to reach out to her father and maybe he would come to visit her. Assured her of availability of spiritual care staff. Jacobo Hu appeared to benefit from having some one listen and was encouraged by care and support. Pastoral care is available to continue to follow. Please page as needed, [phone]. Visit by: Bonita Junior. Nanette Santos. Andry Wagner MA, [facility] Lead  Prof[employer] Development & Advancement

## 2018-08-02 LAB — T4 FREE SERPL-MCNC: 1.2 NG/DL (ref 0.8–1.5)

## 2018-08-02 PROCEDURE — 74011250637 HC RX REV CODE- 250/637: Performed by: PSYCHIATRY & NEUROLOGY

## 2018-08-02 PROCEDURE — 65220000003 HC RM SEMIPRIVATE PSYCH

## 2018-08-02 PROCEDURE — 36415 COLL VENOUS BLD VENIPUNCTURE: CPT | Performed by: PSYCHIATRY & NEUROLOGY

## 2018-08-02 PROCEDURE — 84439 ASSAY OF FREE THYROXINE: CPT | Performed by: PSYCHIATRY & NEUROLOGY

## 2018-08-02 RX ORDER — LORAZEPAM 0.5 MG/1
0.25 TABLET ORAL
Status: DISCONTINUED | OUTPATIENT
Start: 2018-08-02 | End: 2018-08-04

## 2018-08-02 RX ORDER — RISPERIDONE 1 MG/1
2 TABLET, FILM COATED ORAL
Status: DISCONTINUED | OUTPATIENT
Start: 2018-08-02 | End: 2018-08-06

## 2018-08-02 RX ORDER — RISPERIDONE 1 MG/1
1 TABLET, FILM COATED ORAL DAILY
Status: DISCONTINUED | OUTPATIENT
Start: 2018-08-03 | End: 2018-08-05

## 2018-08-02 RX ADMIN — SERTRALINE HYDROCHLORIDE 150 MG: 50 TABLET ORAL at 17:03

## 2018-08-02 RX ADMIN — FLUTICASONE PROPIONATE 2 SPRAY: 50 SPRAY, METERED NASAL at 08:31

## 2018-08-02 RX ADMIN — LEVOTHYROXINE SODIUM 125 MCG: 50 TABLET ORAL at 05:50

## 2018-08-02 RX ADMIN — LORAZEPAM 0.25 MG: 0.5 TABLET ORAL at 17:03

## 2018-08-02 RX ADMIN — FLUTICASONE PROPIONATE 2 SPRAY: 50 SPRAY, METERED NASAL at 17:05

## 2018-08-02 RX ADMIN — IBUPROFEN 400 MG: 400 TABLET ORAL at 11:17

## 2018-08-02 RX ADMIN — RISPERIDONE 2 MG: 1 TABLET ORAL at 21:08

## 2018-08-02 RX ADMIN — LORAZEPAM 0.25 MG: 0.5 TABLET ORAL at 11:16

## 2018-08-02 RX ADMIN — ZOLPIDEM TARTRATE 10 MG: 10 TABLET ORAL at 21:08

## 2018-08-02 RX ADMIN — RISPERIDONE 1 MG: 1 TABLET ORAL at 08:32

## 2018-08-02 RX ADMIN — ASPIRIN 325 MG ORAL TABLET 325 MG: 325 PILL ORAL at 08:31

## 2018-08-02 RX ADMIN — LORAZEPAM 0.5 MG: 0.5 TABLET ORAL at 08:32

## 2018-08-02 RX ADMIN — VITAMIN D 1000 UNITS: 25 TAB ORAL at 08:31

## 2018-08-02 NOTE — BH NOTES
GROUP THERAPY PROGRESS NOTE The patient Yehuda Sheth a 59 y.o. female is participating in [address] [place] [address]. Group time: 45 minutes Personal goal for participation: To participate in chair yoga routine Goal orientation:  relaxation Group therapy participation: minimal 
 
Therapeutic interventions reviewed and discussed: benefits of yoga Impression of participation:  The patient was present-elected to watch Dory Gambino 8/2/2018  1:19 PM

## 2018-08-02 NOTE — BH NOTES
PSYCHIATRIC PROGRESS NOTE Patient Name  Jada Peña Date of Birth 1960 [facility] [id number] Medical Record Number  [id number] Age  59 y.o. PCP Ciro Martinez MD  
Admit date:  7/27/2018 Room Number  932/66  @ [facility]  
Date of Service  8/1/2018 PSYCHOTHERAPY SESSION NOTE: 
Length of psychotherapy session: 20 minutes Main condition/diagnosis/issues treated during session today, 8/1/2018 : depression, grief, AH, thoughts of self harm I employed Cognitive Behavioral therapy techniques, Reality-Oriented psychotherapy, as well as supportive psychotherapy in regards to various ongoing psychosocial stressors, including the following: pre-admission and current problems; housing issues; occupational issues; academic issues; legal issues; medical issues; and stress of hospitalization. Interpersonal relationship issues and psychodynamic conflicts explored. Attempts made to alleviate maladaptive patterns. Overall, patient is not progressing Treatment Plan Update (reviewed an updated 8/1/2018) : I will modify psychotherapy tx plan by implementing more stress management strategies, building upon cognitive behavioral techniques, increasing coping skills, as well as shoring up psychological defenses). An extended energy and skill set was needed to engage pt in psychotherapy due to some of the following: resistiveness, complexity, negativity, confrontational nature, hostile behaviors, and/or severe abnormalities in thought processes/psychosis resulting in the loss of expressive/receptive language communication skills. E & M PROGRESS NOTE: 
  
 
 
HISTORY  
   
CC:  \"Depressed\" HISTORY OF PRESENT ILLNESS/INTERVAL HISTORY:  (reviewed/updated 8/1/2018). per initial evaluation: The patient, Jada Peña, is a 59 y.o.   WHITE [place]  female with a past psychiatric history significant for Depression, who presents at this time with complaints of (and/or evidence of) the following emotional symptoms: depression, suicidal thoughts/threats, self mutilation and anxiety. Additional symptomatology include anxiety, depression worse, difficulty sleeping, fear of impending doom, fearfulness, feeling depressed and feeling suicidal.  The above symptoms have been present for two weeks. These symptoms are of high severity. These symptoms are constant  in nature. The patient's condition has been precipitated by multiple psychosocial stressors . Patient's condition made worse by unresolved grief issues and Borderline tendencies. UDS:negative; BAL=0. Pt was discharged from [facility] to [facility] on 18. She c/o increased suicidal ideations. Pt's mother passed away in April. Reports, she keeps hearing her voice to come join her. Contracts for safety. Presents with Depressed mood, flat affect, poor eye contact. She was seen by the Psych attending on Friday AM and started on her PTA meds. Kristy Patel presents/reports/evidences the following emotional symptoms today, 2018:depression and suicidal thoughts/threats. The above symptoms have been present for several weeks. No incidents of agitation or self harm. Continued feelings to be with her  mother. Severe depression, delusions. No self harm. She responded well to her session with . Compliant with medications. She reports limited sleep, but nursing noted 6 hours of sleep. Pt. Would like trial of ambien. Josemanuel Torres SIDE EFFECTS: (reviewed/updated 2018) None reported or admitted to. ALLERGIES:(reviewed/updated 2018) Allergies Allergen Reactions  Other Food Other (comments) \"Bad chest pain\" with walnuts. Coke - asthma/stuffy head. Fish sticks causes an asthma attack.  Astepro [Azelastine] Other (comments) Violent headaches.  Bactrim [Sulfamethoprim] Other (comments) Difficulty breathing, chills, fever.  Banana Other (comments)   Diffculty breathing, wheezing, asthma attack.  Coconut Other (comments) Difficulty breathing, asthma attack, SOB.  Cortisone Hives Difficulty breathing.  Peanut Other (comments) Wheezing, asthma attack, SOB.  Prednisone Hives Difficulty breathing, kidney stones. MEDICATIONS PRIOR TO ADMISSION:(reviewed/updated 8/1/2018) Prescriptions Prior to Admission Medication Sig  levothyroxine (SYNTHROID) 125 mcg tablet Take 1 Tab by mouth Daily (before breakfast). Indications: hypothyroidism  risperiDONE (RISPERDAL) 1 mg tablet Take 1 Tab by mouth two (2) times a day. Indications: delusion  sertraline (ZOLOFT) 25 mg tablet Take 3 Tabs by mouth every evening. Indications: major depressive disorder  traZODone (DESYREL) 50 mg tablet Take 1 Tab by mouth nightly as needed for Sleep. Indications: insomnia associated with depression  hydrOXYzine HCl (ATARAX) 25 mg tablet Take 1 Tab by mouth every six (6) hours as needed (Anxiety) for up to 10 days. Indications: anxiety, Anxiety  therapeutic multivitamin (THERA) tablet Take 1 Tab by mouth daily.  fluticasone (FLONASE) 50 mcg/actuation nasal spray 2 Sprays by Both Nostrils route two (2) times a day.  Cholecalciferol, Vitamin D3, (VITAMIN D3) 1,000 unit cap Take 1,000 Units by mouth daily.  cyanocobalamin, vitamin B-12, (VITAMIN B-12) 5,000 mcg subl 5,000 mcg by SubLINGual route daily.  aspirin (ASPIRIN) 325 mg tablet Take 325-650 mg by mouth as needed for Pain. PAST MEDICAL HISTORY: Past medical history from the initial psychiatric evaluation has been reviewed (reviewed/updated 8/1/2018) with no additional updates (I asked patient and no additional past medical history provided). Past Medical History:  
Diagnosis Date  Asthma 1967  
 hospitalized for asthma attack x 2  Chronic kidney disease   
 kidney stones x 2-3 times  Ill-defined condition   
 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   
 blood in stool  Other ill-defined conditions(799.89)   
 hx low BP - 90's/60's-70's  PUD (peptic ulcer disease)  Thyroid disease  Unspecified adverse effect of anesthesia   
 nasal growth and misalignment and cannot breath through nose Past Surgical History:  
Procedure Laterality Date  HX HEENT    
 T & A  
 HX HEENT    
 turbinate surgery SOCIAL HISTORY: Social history from the initial psychiatric evaluation has been reviewed (reviewed/updated 8/1/2018) with no additional updates (I asked patient and no additional social history provided). Social History Social History  Marital status: SINGLE Spouse name: N/A  
 Number of children: N/A  
 Years of education: N/A Occupational History  Not on file. Social History Main Topics  Smoking status: Never Smoker  Smokeless tobacco: Never Used  Alcohol use No  
 Drug use: No  
 Sexual activity: Not Currently Other Topics Concern  Not on file Social History Narrative FAMILY HISTORY: Family history from the initial psychiatric evaluation has been reviewed (reviewed/updated 8/1/2018) with no additional updates (I asked patient and no additional family history provided). Family History Problem Relation Age of Onset  Stroke Mother  Other Mother   
  erosion of esophagus  Cancer Mother   
  melanoma/squamous  Heart Surgery Father x2  Delayed Awakening Father  Pacemaker Father  Other Father   
  carotid endartarectomy/polio  Cataract Father REVIEW OF SYSTEMS: (reviewed/updated 8/1/2018) Appetite:good Sleep: good All other Review of Systems: depressed mood, AH/ VH  
 
 
 
MENTAL STATUS EXAM & VITALS MENTAL STATUS EXAM (MSE): MSE FINDINGS ARE WITHIN NORMAL LIMITS (WNL) UNLESS OTHERWISE STATED BELOW. ( ALL OF THE BELOW CATEGORIES OF THE MSE HAVE BEEN REVIEWED (reviewed 8/1/2018) AND UPDATED AS DEEMED APPROPRIATE ) General Presentation age appropriate and casually dressed, cooperative Orientation oriented to time, place and person Vital Signs  See below (reviewed 8/1/2018); Vital Signs (BP, Pulse, & Temp) are within normal limits if not listed below. Gait and Station Stable/steady, no ataxia Musculoskeletal System No extrapyramidal symptoms (EPS); no abnormal muscular movements or Tardive Dyskinesia (TD); muscle strength and tone are within normal limits Language No aphasia or dysarthria Speech:  normal pitch and normal volume Thought Processes logical; normal rate of thoughts; good abstract reasoning/computation Thought Associations goal directed Thought Content (+)delusions of reference; disorganized Suicidal Ideations intention; (+)SI Homicidal Ideations none Mood:  depressed Affect:  depressed Memory recent  fair Memory remote:  fair Concentration/Attention:  wnl  
Fund of Knowledge wnl Insight:  fair Reliability fair Judgment:  fair VITALS:    
Patient Vitals for the past 24 hrs: 
 Temp Pulse Resp BP  
08/01/18 0630 97.8 °F (36.6 °C) 66 18 105/57 Wt Readings from Last 3 Encounters:  
07/27/18 72.6 kg (160 lb)  
07/16/18 72.6 kg (160 lb)  
07/31/14 79.4 kg (175 lb) Temp Readings from Last 3 Encounters:  
08/01/18 97.8 °F (36.6 °C)  
07/26/18 98 °F (36.7 °C) BP Readings from Last 3 Encounters:  
08/01/18 105/57  
07/26/18 108/71  
07/31/14 110/69 Pulse Readings from Last 3 Encounters:  
08/01/18 66  
07/26/18 62  
07/31/14 69 DATA LABORATORY DATA:(reviewed/updated 8/1/2018) No results found for this or any previous visit (from the past 24 hour(s)). No results found for: VALF2, VALAC, VALP, VALPR, DS6, CRBAM, CRBAMP, CARB2, XCRBAM 
No results found for: LITHM  
RADIOLOGY REPORTS:(reviewed/updated 8/1/2018) Ct Head Wo Cont Result Date: 7/17/2018 EXAM:  CT HEAD WO CONT INDICATION:  Altered mental status. COMPARISON: None. CONTRAST:  None. TECHNIQUE: Unenhanced CT of the head was performed using 5 mm images. Brain and bone windows were generated. CT dose reduction was achieved through use of a standardized protocol tailored for this examination and automatic exposure control for dose modulation. Adaptive statistical iterative reconstruction (ASIR) was utilized. FINDINGS: The ventricles and sulci are normal in size, shape and configuration and midline. There is no significant white matter disease. There is no intracranial hemorrhage, extra-axial collection, mass, mass effect or midline shift. The basilar cisterns are open. No acute infarct is identified. The bone windows demonstrate no abnormalities. The visualized portions of the paranasal sinuses and mastoid air cells are clear. IMPRESSION: No acute findings. MEDICATIONS ALL MEDICATIONS:  
Current Facility-Administered Medications Medication Dose Route Frequency  zolpidem (AMBIEN) tablet 10 mg  10 mg Oral QHS  [START ON 8/2/2018] sertraline (ZOLOFT) tablet 150 mg  150 mg Oral QPM  
 LORazepam (ATIVAN) tablet 1 mg  1 mg Oral Q6H PRN  
 LORazepam (ATIVAN) tablet 0.5 mg  0.5 mg Oral TID WITH MEALS  fluticasone (FLONASE) 50 mcg/actuation nasal spray 2 Spray  2 Spray Both Nostrils BID  ziprasidone (GEODON) 20 mg in sterile water (preservative free) 1 mL injection  20 mg IntraMUSCular BID PRN  
 OLANZapine (ZyPREXA) tablet 5 mg  5 mg Oral Q6H PRN  
 benztropine (COGENTIN) tablet 2 mg  2 mg Oral BID PRN  
 benztropine (COGENTIN) injection 2 mg  2 mg IntraMUSCular BID PRN  
 LORazepam (ATIVAN) injection 2 mg  2 mg IntraMUSCular Q4H PRN  
 acetaminophen (TYLENOL) tablet 650 mg  650 mg Oral Q4H PRN  
 ibuprofen (MOTRIN) tablet 400 mg  400 mg Oral Q8H PRN  
 magnesium hydroxide (MILK OF MAGNESIA) 400 mg/5 mL oral suspension 30 mL  30 mL Oral DAILY PRN  
 nicotine (NICODERM CQ) 21 mg/24 hr patch 1 Patch  1 Patch TransDERmal DAILY PRN  
 hydrOXYzine HCl (ATARAX) tablet 25 mg  25 mg Oral Q6H PRN  
 levothyroxine (SYNTHROID) tablet 125 mcg  125 mcg Oral ACB  risperiDONE (RisperDAL) tablet 1 mg  1 mg Oral BID  cholecalciferol (VITAMIN D3) tablet 1,000 Units  1,000 Units Oral DAILY  aspirin (ASPIRIN) tablet 325 mg  325 mg Oral DAILY  
  
SCHEDULED MEDICATIONS:  
Current Facility-Administered Medications Medication Dose Route Frequency  zolpidem (AMBIEN) tablet 10 mg  10 mg Oral QHS  [START ON 8/2/2018] sertraline (ZOLOFT) tablet 150 mg  150 mg Oral QPM  
 LORazepam (ATIVAN) tablet 0.5 mg  0.5 mg Oral TID WITH MEALS  fluticasone (FLONASE) 50 mcg/actuation nasal spray 2 Spray  2 Spray Both Nostrils BID  levothyroxine (SYNTHROID) tablet 125 mcg  125 mcg Oral ACB  risperiDONE (RisperDAL) tablet 1 mg  1 mg Oral BID  cholecalciferol (VITAMIN D3) tablet 1,000 Units  1,000 Units Oral DAILY  aspirin (ASPIRIN) tablet 325 mg  325 mg Oral DAILY ASSESSMENT & PLAN  
 
DIAGNOSES REQUIRING ACTIVE TREATMENT AND MONITORING: (reviewed/updated 8/1/2018) Patient Active Hospital Problem List: MDD with psychosis Assessment: moderate to severe; exacerbated by borderline pd, Grief, lack of resources, lack of social support Plan: Agree with inpatient hospitalization for further stabilization, safety monitoring and medication management Close observation due to self harm Titrate medications, Increase zoloft to 150mg. Add [place] In summary, Jennifer Lozada, is a 59 y.o.  female who presents with a severe exacerbation of the principal diagnosis of MDD with psychosis. Patient's condition is not improving. Patient requires continued inpatient hospitalization for further stabilization, safety monitoring and medication management.   I will continue to coordinate the provision of individual, milieu, occupational, group, and substance abuse therapies to address target symptoms/diagnoses as deemed appropriate for the individual patient. A coordinated, multidisplinary treatment team round was conducted with the patient (this team consists of the nurse, psychiatric unit pharmcist,  and writer). Complete current electronic health record for patient has been reviewed today including consultant notes, ancillary staff notes, nurses and psychiatric tech notes. Suicide risk assessment completed and patient deemed to be of low risk for suicide at this time. The following regarding medications was addressed during rounds with patient:  
the risks and benefits of the proposed medication. The patient was given the opportunity to ask questions. Informed consent given to the use of the above medications. Will continue to adjust psychiatric and non-psychiatric medications (see above \"medication\" section and orders section for details) as deemed appropriate & based upon diagnoses and response to treatment. I will continue to order blood tests/labs and diagnostic tests as deemed appropriate and review results as they become available (see orders for details and above listed lab/test results). I will order psychiatric records from previous [facility] hospitals to further elucidate the nature of patient's psychopathology and review once available. I will gather additional collateral information from friends, family and o/p treatment team to further elucidate the nature of patient's psychopathology and baselline level of psychiatric functioning.  
  
 
 
I certify that this patient's inpatient psychiatric hospital services furnished since the previous certification were, and continue to be, required for treatment that could reasonably be expected to improve the patient's condition, or for diagnostic study, and that the patient continues to need, on a daily basis, active treatment furnished directly by or requiring the supervision of inpatient psychiatric facility personnel. In addition, the hospital records show that services furnished were intensive treatment services, admission or related services, or equivalent services. EXPECTED DISCHARGE DATE/DAY: TBD  
 
DISPOSITION: Home Signed By:  
John Paul Hameed MD 
8/1/2018

## 2018-08-02 NOTE — BH NOTES
Patient is alert and oriented x 3, denies SI/HI, denies auditory or visual hallucination. Patient has been visible in the milieu, participated in group session, compliant with medications. Staff will continue to monitor patient every 15 minutes for safety.

## 2018-08-02 NOTE — BH NOTES
PSYCHIATRIC PROGRESS NOTE Patient Name  Jada Peña Date of Birth 1960 [facility] [id number] Medical Record Number  [id number] Age  59 y.o. PCP Ciro Martinez MD  
Admit date:  7/27/2018 Room Number  826/03  @ [facility]  
Date of Service  8/2/2018 PSYCHOTHERAPY SESSION NOTE: 
Length of psychotherapy session: 20 minutes Main condition/diagnosis/issues treated during session today, 8/2/2018 : depression, grief, AH, thoughts of self harm I employed Cognitive Behavioral therapy techniques, Reality-Oriented psychotherapy, as well as supportive psychotherapy in regards to various ongoing psychosocial stressors, including the following: pre-admission and current problems; housing issues; occupational issues; academic issues; legal issues; medical issues; and stress of hospitalization. Interpersonal relationship issues and psychodynamic conflicts explored. Attempts made to alleviate maladaptive patterns. Overall, patient is not progressing Treatment Plan Update (reviewed an updated 8/2/2018) : I will modify psychotherapy tx plan by implementing more stress management strategies, building upon cognitive behavioral techniques, increasing coping skills, as well as shoring up psychological defenses). An extended energy and skill set was needed to engage pt in psychotherapy due to some of the following: resistiveness, complexity, negativity, confrontational nature, hostile behaviors, and/or severe abnormalities in thought processes/psychosis resulting in the loss of expressive/receptive language communication skills. E & M PROGRESS NOTE: 
  
 
 
HISTORY  
   
CC:  \"Depressed\" HISTORY OF PRESENT ILLNESS/INTERVAL HISTORY:  (reviewed/updated 8/2/2018). per initial evaluation: The patient, Jada Peña, is a 59 y.o.   WHITE [place]  female with a past psychiatric history significant for Depression, who presents at this time with complaints of (and/or evidence of) the following emotional symptoms: depression, suicidal thoughts/threats, self mutilation and anxiety. Additional symptomatology include anxiety, depression worse, difficulty sleeping, fear of impending doom, fearfulness, feeling depressed and feeling suicidal.  The above symptoms have been present for two weeks. These symptoms are of high severity. These symptoms are constant  in nature. The patient's condition has been precipitated by multiple psychosocial stressors . Patient's condition made worse by unresolved grief issues and Borderline tendencies. UDS:negative; BAL=0. Pt was discharged from [address] [place] [address] to [facility] on 18. She c/o increased suicidal ideations. Pt's mother passed away in April. Reports, she keeps hearing her voice to come join her. Contracts for safety. Presents with Depressed mood, flat affect, poor eye contact. She was seen by the Psych attending on Friday AM and started on her PTA meds. Naty Mekaimer presents/reports/evidences the following emotional symptoms today, 2018:depression and suicidal thoughts/threats. The above symptoms have been present for several weeks. No incidents of agitation or self harm. Continued feelings to be with her  mother. Severe depression, delusions. No self harm. She responded well to her session with . Improved sleep with Ambien. She continues to report that there is a thought in her head that she cannot talk about, because it could lead to harm to her health. She says that she has had this thought for many years . Nursing notes some improvements in affect, engagement. Responding well to encouragement. SIDE EFFECTS: (reviewed/updated 2018) None reported or admitted to. ALLERGIES:(reviewed/updated 2018) Allergies Allergen Reactions  Other Food Other (comments) \"Bad chest pain\" with walnuts. Coke - asthma/stuffy head. Fish sticks causes an asthma attack.   
[facility] [Azelastine] Other (comments) Violent headaches.  Bactrim [Sulfamethoprim] Other (comments) Difficulty breathing, chills, fever.  Banana Other (comments) Diffculty breathing, wheezing, asthma attack.  Coconut Other (comments) Difficulty breathing, asthma attack, SOB.  Cortisone Hives Difficulty breathing.  Peanut Other (comments) Wheezing, asthma attack, SOB.  Prednisone Hives Difficulty breathing, kidney stones. MEDICATIONS PRIOR TO ADMISSION:(reviewed/updated 8/2/2018) Prescriptions Prior to Admission Medication Sig  levothyroxine (SYNTHROID) 125 mcg tablet Take 1 Tab by mouth Daily (before breakfast). Indications: hypothyroidism  risperiDONE (RISPERDAL) 1 mg tablet Take 1 Tab by mouth two (2) times a day. Indications: delusion  sertraline (ZOLOFT) 25 mg tablet Take 3 Tabs by mouth every evening. Indications: major depressive disorder  traZODone (DESYREL) 50 mg tablet Take 1 Tab by mouth nightly as needed for Sleep. Indications: insomnia associated with depression  hydrOXYzine HCl (ATARAX) 25 mg tablet Take 1 Tab by mouth every six (6) hours as needed (Anxiety) for up to 10 days. Indications: anxiety, Anxiety  therapeutic multivitamin (THERA) tablet Take 1 Tab by mouth daily.  fluticasone (FLONASE) 50 mcg/actuation nasal spray 2 Sprays by Both Nostrils route two (2) times a day.  Cholecalciferol, Vitamin D3, (VITAMIN D3) 1,000 unit cap Take 1,000 Units by mouth daily.  cyanocobalamin, vitamin B-12, (VITAMIN B-12) 5,000 mcg subl 5,000 mcg by SubLINGual route daily.  aspirin (ASPIRIN) 325 mg tablet Take 325-650 mg by mouth as needed for Pain. PAST MEDICAL HISTORY: Past medical history from the initial psychiatric evaluation has been reviewed (reviewed/updated 8/2/2018) with no additional updates (I asked patient and no additional past medical history provided). Past Medical History:  
Diagnosis Date [address] hospitalized for asthma attack x 2  Chronic kidney disease   
 kidney stones x 2-3 times  Ill-defined condition   
 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   
 blood in stool  Other ill-defined conditions(799.89)   
 hx low BP - 90's/60's-70's  PUD (peptic ulcer disease)  Thyroid disease  Unspecified adverse effect of anesthesia   
 nasal growth and misalignment and cannot breath through nose Past Surgical History:  
Procedure Laterality Date  HX HEENT    
 T & A  
 HX HEENT    
 turbinate surgery SOCIAL HISTORY: Social history from the initial psychiatric evaluation has been reviewed (reviewed/updated 8/2/2018) with no additional updates (I asked patient and no additional social history provided). Social History Social History  Marital status: SINGLE Spouse name: N/A  
 Number of children: N/A  
 Years of education: N/A Occupational History  Not on file. Social History Main Topics  Smoking status: Never Smoker  Smokeless tobacco: Never Used  Alcohol use No  
 Drug use: No  
 Sexual activity: Not Currently Other Topics Concern  Not on file Social History Narrative FAMILY HISTORY: Family history from the initial psychiatric evaluation has been reviewed (reviewed/updated 8/2/2018) with no additional updates (I asked patient and no additional family history provided). Family History Problem Relation Age of Onset  Stroke Mother  Other Mother   
  erosion of esophagus  Cancer Mother   
  melanoma/squamous  Heart Surgery Father x2  Delayed Awakening Father  Pacemaker Father  Other Father   
  carotid endartarectomy/polio  Cataract Father REVIEW OF SYSTEMS: (reviewed/updated 8/2/2018) Appetite:good Sleep: good All other Review of Systems: depressed mood, AH/ VH  
 
 
 
MENTAL STATUS EXAM & VITALS MENTAL STATUS EXAM (MSE): MSE FINDINGS ARE WITHIN NORMAL LIMITS (WNL) UNLESS OTHERWISE STATED BELOW. ( ALL OF THE BELOW CATEGORIES OF THE MSE HAVE BEEN REVIEWED (reviewed 8/2/2018) AND UPDATED AS DEEMED APPROPRIATE ) General Presentation age appropriate and casually dressed, cooperative Orientation oriented to time, place and person Vital Signs  See below (reviewed 8/2/2018); Vital Signs (BP, Pulse, & Temp) are within normal limits if not listed below. Gait and Station Stable/steady, no ataxia Musculoskeletal System No extrapyramidal symptoms (EPS); no abnormal muscular movements or Tardive Dyskinesia (TD); muscle strength and tone are within normal limits Language No aphasia or dysarthria Speech:  normal pitch and normal volume Thought Processes logical; normal rate of thoughts; good abstract reasoning/computation Thought Associations goal directed Thought Content (+)delusions of reference; disorganized Suicidal Ideations intention; (+)SI Homicidal Ideations none Mood:  depressed Affect:  depressed Memory recent  fair Memory remote:  fair Concentration/Attention:  wnl  
Fund of Knowledge wnl Insight:  fair Reliability fair Judgment:  fair VITALS:    
Patient Vitals for the past 24 hrs: 
 Temp Pulse Resp BP  
08/02/18 0721 98.1 °F (36.7 °C) 61 16 111/59 Wt Readings from Last 3 Encounters:  
07/27/18 72.6 kg (160 lb)  
07/16/18 72.6 kg (160 lb)  
07/31/14 79.4 kg (175 lb) Temp Readings from Last 3 Encounters:  
08/02/18 98.1 °F (36.7 °C)  
07/26/18 98 °F (36.7 °C) BP Readings from Last 3 Encounters:  
08/02/18 111/59  
07/26/18 108/71  
07/31/14 110/69 Pulse Readings from Last 3 Encounters:  
08/02/18 61  
07/26/18 62  
07/31/14 69 DATA LABORATORY DATA:(reviewed/updated 8/2/2018) No results found for this or any previous visit (from the past 24 hour(s)).  
No results found for: VALF2, VALAC, VALP, VALPR, DS6, CRBAM, CRBAMP, CARB2, XCRBAM 
No results found for: LITHM  
RADIOLOGY REPORTS:(reviewed/updated 8/2/2018) Ct Head Wo Cont Result Date: 7/17/2018 EXAM:  CT HEAD WO CONT INDICATION:  Altered mental status. COMPARISON: None. CONTRAST:  None. TECHNIQUE: Unenhanced CT of the head was performed using 5 mm images. Brain and bone windows were generated. CT dose reduction was achieved through use of a standardized protocol tailored for this examination and automatic exposure control for dose modulation. Adaptive statistical iterative reconstruction (ASIR) was utilized. FINDINGS: The ventricles and sulci are normal in size, shape and configuration and midline. There is no significant white matter disease. There is no intracranial hemorrhage, extra-axial collection, mass, mass effect or midline shift. The basilar cisterns are open. No acute infarct is identified. The bone windows demonstrate no abnormalities. The visualized portions of the paranasal sinuses and mastoid air cells are clear. IMPRESSION: No acute findings. MEDICATIONS ALL MEDICATIONS:  
Current Facility-Administered Medications Medication Dose Route Frequency  LORazepam (ATIVAN) tablet 0.25 mg  0.25 mg Oral TID WITH MEALS  zolpidem (AMBIEN) tablet 10 mg  10 mg Oral QHS  sertraline (ZOLOFT) tablet 150 mg  150 mg Oral QPM  
 LORazepam (ATIVAN) tablet 1 mg  1 mg Oral Q6H PRN  
 fluticasone (FLONASE) 50 mcg/actuation nasal spray 2 Spray  2 Spray Both Nostrils BID  ziprasidone (GEODON) 20 mg in sterile water (preservative free) 1 mL injection  20 mg IntraMUSCular BID PRN  
 OLANZapine (ZyPREXA) tablet 5 mg  5 mg Oral Q6H PRN  
 benztropine (COGENTIN) tablet 2 mg  2 mg Oral BID PRN  
 benztropine (COGENTIN) injection 2 mg  2 mg IntraMUSCular BID PRN  
 LORazepam (ATIVAN) injection 2 mg  2 mg IntraMUSCular Q4H PRN  
 acetaminophen (TYLENOL) tablet 650 mg  650 mg Oral Q4H PRN  
 ibuprofen (MOTRIN) tablet 400 mg  400 mg Oral Q8H PRN  
 magnesium hydroxide (MILK OF MAGNESIA) 400 mg/5 mL oral suspension 30 mL  30 mL Oral DAILY PRN  
 nicotine (NICODERM CQ) 21 mg/24 hr patch 1 Patch  1 Patch TransDERmal DAILY PRN  
 hydrOXYzine HCl (ATARAX) tablet 25 mg  25 mg Oral Q6H PRN  
 levothyroxine (SYNTHROID) tablet 125 mcg  125 mcg Oral ACB  risperiDONE (RisperDAL) tablet 1 mg  1 mg Oral BID  cholecalciferol (VITAMIN D3) tablet 1,000 Units  1,000 Units Oral DAILY  aspirin (ASPIRIN) tablet 325 mg  325 mg Oral DAILY  
  
SCHEDULED MEDICATIONS:  
Current Facility-Administered Medications Medication Dose Route Frequency  LORazepam (ATIVAN) tablet 0.25 mg  0.25 mg Oral TID WITH MEALS  zolpidem (AMBIEN) tablet 10 mg  10 mg Oral QHS  sertraline (ZOLOFT) tablet 150 mg  150 mg Oral QPM  
 fluticasone (FLONASE) 50 mcg/actuation nasal spray 2 Spray  2 Spray Both Nostrils BID  levothyroxine (SYNTHROID) tablet 125 mcg  125 mcg Oral ACB  risperiDONE (RisperDAL) tablet 1 mg  1 mg Oral BID  cholecalciferol (VITAMIN D3) tablet 1,000 Units  1,000 Units Oral DAILY  aspirin (ASPIRIN) tablet 325 mg  325 mg Oral DAILY ASSESSMENT & PLAN  
 
DIAGNOSES REQUIRING ACTIVE TREATMENT AND MONITORING: (reviewed/updated 8/2/2018) Patient Active Hospital Problem List: MDD with psychosis Assessment: moderate to severe; exacerbated by borderline pd, Grief, lack of resources, lack of social support Plan: Agree with inpatient hospitalization for further stabilization, safety monitoring and medication management Close observation due to self harm Titrate medications, Increase zoloft to 150mg. Add ambien, continue Risperdal  
Reduce dose of ativan to 0.25mg three times daily In summary, Gordon Foster, is a 59 y.o.  female who presents with a severe exacerbation of the principal diagnosis of MDD with psychosis. Patient's condition is not improving.   Patient requires continued inpatient hospitalization for further stabilization, safety monitoring and medication management. I will continue to coordinate the provision of individual, milieu, occupational, group, and substance abuse therapies to address target symptoms/diagnoses as deemed appropriate for the individual patient. A coordinated, multidisplinary treatment team round was conducted with the patient (this team consists of the nurse, psychiatric unit pharmcist,  and writer). Complete current electronic health record for patient has been reviewed today including consultant notes, ancillary staff notes, nurses and psychiatric tech notes. Suicide risk assessment completed and patient deemed to be of low risk for suicide at this time. The following regarding medications was addressed during rounds with patient:  
the risks and benefits of the proposed medication. The patient was given the opportunity to ask questions. Informed consent given to the use of the above medications. Will continue to adjust psychiatric and non-psychiatric medications (see above \"medication\" section and orders section for details) as deemed appropriate & based upon diagnoses and response to treatment. I will continue to order blood tests/labs and diagnostic tests as deemed appropriate and review results as they become available (see orders for details and above listed lab/test results). I will order psychiatric records from previous [facility] hospitals to further elucidate the nature of patient's psychopathology and review once available. I will gather additional collateral information from friends, family and o/p treatment team to further elucidate the nature of patient's psychopathology and baselline level of psychiatric functioning.  
  
 
 
I certify that this patient's inpatient psychiatric hospital services furnished since the previous certification were, and continue to be, required for treatment that could reasonably be expected to improve the patient's condition, or for diagnostic study, and that the patient continues to need, on a daily basis, active treatment furnished directly by or requiring the supervision of inpatient psychiatric facility personnel. In addition, the hospital records show that services furnished were intensive treatment services, admission or related services, or equivalent services. EXPECTED DISCHARGE DATE/DAY: TBD  
 
DISPOSITION: Home Signed By:  
Brett Monroy MD 
8/2/2018

## 2018-08-02 NOTE — PROGRESS NOTES
GROUP THERAPY PROGRESS NOTE Eugenie Rabago was not present for medication group Mima Reza, PHARMD, BCPS

## 2018-08-03 PROCEDURE — 65220000003 HC RM SEMIPRIVATE PSYCH

## 2018-08-03 PROCEDURE — 74011250637 HC RX REV CODE- 250/637: Performed by: PSYCHIATRY & NEUROLOGY

## 2018-08-03 RX ADMIN — LORAZEPAM 0.25 MG: 0.5 TABLET ORAL at 13:11

## 2018-08-03 RX ADMIN — RISPERIDONE 2 MG: 1 TABLET ORAL at 21:04

## 2018-08-03 RX ADMIN — OLANZAPINE 5 MG: 5 TABLET, FILM COATED ORAL at 17:22

## 2018-08-03 RX ADMIN — ASPIRIN 325 MG ORAL TABLET 325 MG: 325 PILL ORAL at 07:48

## 2018-08-03 RX ADMIN — LEVOTHYROXINE SODIUM 125 MCG: 50 TABLET ORAL at 05:57

## 2018-08-03 RX ADMIN — LORAZEPAM 0.25 MG: 0.5 TABLET ORAL at 07:48

## 2018-08-03 RX ADMIN — HYDROXYZINE HYDROCHLORIDE 25 MG: 25 TABLET ORAL at 07:48

## 2018-08-03 RX ADMIN — VITAMIN D 1000 UNITS: 25 TAB ORAL at 07:48

## 2018-08-03 RX ADMIN — SERTRALINE HYDROCHLORIDE 150 MG: 50 TABLET ORAL at 17:20

## 2018-08-03 RX ADMIN — ZOLPIDEM TARTRATE 10 MG: 10 TABLET ORAL at 21:04

## 2018-08-03 RX ADMIN — FLUTICASONE PROPIONATE 2 SPRAY: 50 SPRAY, METERED NASAL at 07:49

## 2018-08-03 RX ADMIN — RISPERIDONE 1 MG: 1 TABLET ORAL at 07:48

## 2018-08-03 RX ADMIN — FLUTICASONE PROPIONATE 2 SPRAY: 50 SPRAY, METERED NASAL at 17:24

## 2018-08-03 RX ADMIN — LORAZEPAM 0.25 MG: 0.5 TABLET ORAL at 17:19

## 2018-08-03 NOTE — BH NOTES
GROUP THERAPY PROGRESS NOTE The patient Zac Arredondo a 59 y.o. female is participating in [employer]. Group time: 1 hour Personal goal for participation: To concentrate on selected task Goal orientation: social 
 
Group therapy participation: active Therapeutic interventions reviewed and discussed: Crafts, games, music Impression of participation: The patient was attentive. Keshawn Falcon 8/3/2018  5:46 PM

## 2018-08-03 NOTE — BH NOTES
PSYCHIATRIC PROGRESS NOTE Patient Name  Neal Boo Date of Birth 1960 [facility] [id number] Medical Record Number  [id number] Age  59 y.o. PCP Bryant Saldana MD  
Admit date:  7/27/2018 Room Number  758/34  @ [facility]  
Date of Service  8/3/2018 PSYCHOTHERAPY SESSION NOTE: 
Length of psychotherapy session: 20 minutes Main condition/diagnosis/issues treated during session today, 8/3/2018 : depression, grief, AH, thoughts of self harm I employed Cognitive Behavioral therapy techniques, Reality-Oriented psychotherapy, as well as supportive psychotherapy in regards to various ongoing psychosocial stressors, including the following: pre-admission and current problems; housing issues; occupational issues; academic issues; legal issues; medical issues; and stress of hospitalization. Interpersonal relationship issues and psychodynamic conflicts explored. Attempts made to alleviate maladaptive patterns. Overall, patient is not progressing Treatment Plan Update (reviewed an updated 8/3/2018) : I will modify psychotherapy tx plan by implementing more stress management strategies, building upon cognitive behavioral techniques, increasing coping skills, as well as shoring up psychological defenses). An extended energy and skill set was needed to engage pt in psychotherapy due to some of the following: resistiveness, complexity, negativity, confrontational nature, hostile behaviors, and/or severe abnormalities in thought processes/psychosis resulting in the loss of expressive/receptive language communication skills. E & M PROGRESS NOTE: 
  
 
 
HISTORY  
   
CC:  \"Depressed\" HISTORY OF PRESENT ILLNESS/INTERVAL HISTORY:  (reviewed/updated 8/3/2018). per initial evaluation: The patient, Neal Boo, is a 59 y.o.   WHITE [place]  female with a past psychiatric history significant for Depression, who presents at this time with complaints of (and/or evidence of) the following emotional symptoms: depression, suicidal thoughts/threats, self mutilation and anxiety. Additional symptomatology include anxiety, depression worse, difficulty sleeping, fear of impending doom, fearfulness, feeling depressed and feeling suicidal.  The above symptoms have been present for two weeks. These symptoms are of high severity. These symptoms are constant  in nature. The patient's condition has been precipitated by multiple psychosocial stressors . Patient's condition made worse by unresolved grief issues and Borderline tendencies. UDS:negative; BAL=0. Pt was discharged from [facility] [place] to [facility] on 18. She c/o increased suicidal ideations. Pt's mother passed away in April. Reports, she keeps hearing her voice to come join her. Contracts for safety. Presents with Depressed mood, flat affect, poor eye contact. She was seen by the Psych attending on Friday AM and started on her PTA meds. Jennifer Kierra presents/reports/evidences the following emotional symptoms today, 8/3/2018:depression and suicidal thoughts/threats. The above symptoms have been present for several weeks. No incidents of agitation or self harm. Continued feelings to be with her  mother. Severe depression, delusions. Eating meals, coming out of room more regularly. SIDE EFFECTS: (reviewed/updated 8/3/2018) None reported or admitted to. ALLERGIES:(reviewed/updated 8/3/2018) Allergies Allergen Reactions  Other Food Other (comments) \"Bad chest pain\" with walnuts. Coke - asthma/stuffy head. Fish sticks causes an asthma attack.  Astepro [Azelastine] Other (comments) Violent headaches.  Bactrim [Sulfamethoprim] Other (comments) Difficulty breathing, chills, fever.  Banana Other (comments) Diffculty breathing, wheezing, asthma attack.  Coconut Other (comments) Difficulty breathing, asthma attack, SOB.  Cortisone Hives   Difficulty breathing.  Peanut Other (comments) Wheezing, asthma attack, SOB.  Prednisone Hives Difficulty breathing, kidney stones. MEDICATIONS PRIOR TO ADMISSION:(reviewed/updated 8/3/2018) Prescriptions Prior to Admission Medication Sig  levothyroxine (SYNTHROID) 125 mcg tablet Take 1 Tab by mouth Daily (before breakfast). Indications: hypothyroidism  risperiDONE (RISPERDAL) 1 mg tablet Take 1 Tab by mouth two (2) times a day. Indications: delusion  sertraline (ZOLOFT) 25 mg tablet Take 3 Tabs by mouth every evening. Indications: major depressive disorder  traZODone (DESYREL) 50 mg tablet Take 1 Tab by mouth nightly as needed for Sleep. Indications: insomnia associated with depression  hydrOXYzine HCl (ATARAX) 25 mg tablet Take 1 Tab by mouth every six (6) hours as needed (Anxiety) for up to 10 days. Indications: anxiety, Anxiety  therapeutic multivitamin (THERA) tablet Take 1 Tab by mouth daily.  fluticasone (FLONASE) 50 mcg/actuation nasal spray 2 Sprays by Both Nostrils route two (2) times a day.  Cholecalciferol, Vitamin D3, (VITAMIN D3) 1,000 unit cap Take 1,000 Units by mouth daily.  cyanocobalamin, vitamin B-12, (VITAMIN B-12) 5,000 mcg subl 5,000 mcg by SubLINGual route daily.  aspirin (ASPIRIN) 325 mg tablet Take 325-650 mg by mouth as needed for Pain. PAST MEDICAL HISTORY: Past medical history from the initial psychiatric evaluation has been reviewed (reviewed/updated 8/3/2018) with no additional updates (I asked patient and no additional past medical history provided). Past Medical History:  
Diagnosis Date  Asthma 1967  
 hospitalized for asthma attack x 2  Chronic kidney disease   
 kidney stones x 2-3 times  Ill-defined condition   
 nasal blockage from growth and misalignment - cannot breath out of nose - surgery in the future/cleared for colonoscopy 7/31/2014 - will bring in letter  Other ill-defined conditions(806.82)   
 blood in stool  Other ill-defined conditions(799.89)   
 hx low BP - 90's/60's-70's  PUD (peptic ulcer disease)  Thyroid disease  Unspecified adverse effect of anesthesia   
 nasal growth and misalignment and cannot breath through nose Past Surgical History:  
Procedure Laterality Date  HX HEENT    
 T & A  
 HX HEENT    
 turbinate surgery SOCIAL HISTORY: Social history from the initial psychiatric evaluation has been reviewed (reviewed/updated 8/3/2018) with no additional updates (I asked patient and no additional social history provided). Social History Social History  Marital status: SINGLE Spouse name: N/A  
 Number of children: N/A  
 Years of education: N/A Occupational History  Not on file. Social History Main Topics  Smoking status: Never Smoker  Smokeless tobacco: Never Used  Alcohol use No  
 Drug use: No  
 Sexual activity: Not Currently Other Topics Concern  Not on file Social History Narrative FAMILY HISTORY: Family history from the initial psychiatric evaluation has been reviewed (reviewed/updated 8/3/2018) with no additional updates (I asked patient and no additional family history provided). Family History Problem Relation Age of Onset  Stroke Mother  Other Mother   
  erosion of esophagus  Cancer Mother   
  melanoma/squamous  Heart Surgery Father x2  Delayed Awakening Father  Pacemaker Father  Other Father   
  carotid endartarectomy/polio  Cataract Father REVIEW OF SYSTEMS: (reviewed/updated 8/3/2018) Appetite:good Sleep: good All other Review of Systems: depressed mood, AH/ VH  
 
 
 
MENTAL STATUS EXAM & VITALS MENTAL STATUS EXAM (MSE): MSE FINDINGS ARE WITHIN NORMAL LIMITS (WNL) UNLESS OTHERWISE STATED BELOW. ( ALL OF THE BELOW CATEGORIES OF THE MSE HAVE BEEN REVIEWED (reviewed 8/3/2018) AND UPDATED AS DEEMED APPROPRIATE ) General Presentation age appropriate and casually dressed, cooperative Orientation oriented to time, place and person Vital Signs  See below (reviewed 8/3/2018); Vital Signs (BP, Pulse, & Temp) are within normal limits if not listed below. Gait and Station Stable/steady, no ataxia Musculoskeletal System No extrapyramidal symptoms (EPS); no abnormal muscular movements or Tardive Dyskinesia (TD); muscle strength and tone are within normal limits Language No aphasia or dysarthria Speech:  normal pitch and normal volume Thought Processes logical; normal rate of thoughts; good abstract reasoning/computation Thought Associations goal directed Thought Content (+)delusions of reference; disorganized Suicidal Ideations intention; (+)SI Homicidal Ideations none Mood:  depressed Affect:  depressed Memory recent  fair Memory remote:  fair Concentration/Attention:  wnl  
Fund of Knowledge wnl Insight:  fair Reliability fair Judgment:  fair VITALS:    
Patient Vitals for the past 24 hrs: 
 Temp Pulse Resp BP  
08/03/18 0622 98.1 °F (36.7 °C) 65 16 112/70 Wt Readings from Last 3 Encounters:  
07/27/18 72.6 kg (160 lb)  
07/16/18 72.6 kg (160 lb)  
07/31/14 79.4 kg (175 lb) Temp Readings from Last 3 Encounters:  
08/03/18 98.1 °F (36.7 °C)  
07/26/18 98 °F (36.7 °C) BP Readings from Last 3 Encounters:  
08/03/18 112/70  
07/26/18 108/71  
07/31/14 110/69 Pulse Readings from Last 3 Encounters:  
08/03/18 65  
07/26/18 62  
07/31/14 69 DATA LABORATORY DATA:(reviewed/updated 8/3/2018) No results found for this or any previous visit (from the past 24 hour(s)). No results found for: VALF2, VALAC, VALP, VALPR, DS6, CRBAM, CRBAMP, CARB2, XCRBAM 
No results found for: LITHM  
RADIOLOGY REPORTS:(reviewed/updated 8/3/2018) Ct Head Wo Cont Result Date: 7/17/2018 EXAM:  CT HEAD WO CONT INDICATION:  Altered mental status. COMPARISON: None. CONTRAST:  None.  TECHNIQUE: Unenhanced CT of the head was performed using 5 mm images. Brain and bone windows were generated. CT dose reduction was achieved through use of a standardized protocol tailored for this examination and automatic exposure control for dose modulation. Adaptive statistical iterative reconstruction (ASIR) was utilized. FINDINGS: The ventricles and sulci are normal in size, shape and configuration and midline. There is no significant white matter disease. There is no intracranial hemorrhage, extra-axial collection, mass, mass effect or midline shift. The basilar cisterns are open. No acute infarct is identified. The bone windows demonstrate no abnormalities. The visualized portions of the paranasal sinuses and mastoid air cells are clear. IMPRESSION: No acute findings. MEDICATIONS ALL MEDICATIONS:  
Current Facility-Administered Medications Medication Dose Route Frequency  LORazepam (ATIVAN) tablet 0.25 mg  0.25 mg Oral TID WITH MEALS  risperiDONE (RisperDAL) tablet 1 mg  1 mg Oral DAILY  risperiDONE (RisperDAL) tablet 2 mg  2 mg Oral QHS  zolpidem (AMBIEN) tablet 10 mg  10 mg Oral QHS  sertraline (ZOLOFT) tablet 150 mg  150 mg Oral QPM  
 LORazepam (ATIVAN) tablet 1 mg  1 mg Oral Q6H PRN  
 fluticasone (FLONASE) 50 mcg/actuation nasal spray 2 Spray  2 Spray Both Nostrils BID  ziprasidone (GEODON) 20 mg in sterile water (preservative free) 1 mL injection  20 mg IntraMUSCular BID PRN  
 OLANZapine (ZyPREXA) tablet 5 mg  5 mg Oral Q6H PRN  
 benztropine (COGENTIN) tablet 2 mg  2 mg Oral BID PRN  
 benztropine (COGENTIN) injection 2 mg  2 mg IntraMUSCular BID PRN  
 LORazepam (ATIVAN) injection 2 mg  2 mg IntraMUSCular Q4H PRN  
 acetaminophen (TYLENOL) tablet 650 mg  650 mg Oral Q4H PRN  
 ibuprofen (MOTRIN) tablet 400 mg  400 mg Oral Q8H PRN  
 magnesium hydroxide (MILK OF MAGNESIA) 400 mg/5 mL oral suspension 30 mL  30 mL Oral DAILY PRN  
 nicotine (NICODERM CQ) 21 mg/24 hr patch 1 Patch  1 Patch TransDERmal DAILY PRN  
 hydrOXYzine HCl (ATARAX) tablet 25 mg  25 mg Oral Q6H PRN  
 levothyroxine (SYNTHROID) tablet 125 mcg  125 mcg Oral ACB  cholecalciferol (VITAMIN D3) tablet 1,000 Units  1,000 Units Oral DAILY  aspirin (ASPIRIN) tablet 325 mg  325 mg Oral DAILY  
  
SCHEDULED MEDICATIONS:  
Current Facility-Administered Medications Medication Dose Route Frequency  LORazepam (ATIVAN) tablet 0.25 mg  0.25 mg Oral TID WITH MEALS  risperiDONE (RisperDAL) tablet 1 mg  1 mg Oral DAILY  risperiDONE (RisperDAL) tablet 2 mg  2 mg Oral QHS  zolpidem (AMBIEN) tablet 10 mg  10 mg Oral QHS  sertraline (ZOLOFT) tablet 150 mg  150 mg Oral QPM  
 fluticasone (FLONASE) 50 mcg/actuation nasal spray 2 Spray  2 Spray Both Nostrils BID  levothyroxine (SYNTHROID) tablet 125 mcg  125 mcg Oral ACB  cholecalciferol (VITAMIN D3) tablet 1,000 Units  1,000 Units Oral DAILY  aspirin (ASPIRIN) tablet 325 mg  325 mg Oral DAILY ASSESSMENT & PLAN  
 
DIAGNOSES REQUIRING ACTIVE TREATMENT AND MONITORING: (reviewed/updated 8/3/2018) Patient Active Hospital Problem List: MDD with psychosis Assessment: moderate to severe; exacerbated by borderline pd, Grief, lack of resources, lack of social support Plan: Agree with inpatient hospitalization for further stabilization, safety monitoring and medication management Close observation due to self harm Titrate medications, Increase zoloft to 150mg. Add ambien, continue Risperdal but increase to 1mg/ 2mg hs Reduce dose of ativan to 0.25mg three times daily In summary, Lizeth Morris, is a 59 y.o.  female who presents with a severe exacerbation of the principal diagnosis of MDD with psychosis. Patient's condition is not improving. Patient requires continued inpatient hospitalization for further stabilization, safety monitoring and medication management.   I will continue to coordinate the provision of individual, milieu, occupational, group, and substance abuse therapies to address target symptoms/diagnoses as deemed appropriate for the individual patient. A coordinated, multidisplinary treatment team round was conducted with the patient (this team consists of the nurse, psychiatric unit pharmcist,  and writer). Complete current electronic health record for patient has been reviewed today including consultant notes, ancillary staff notes, nurses and psychiatric tech notes. Suicide risk assessment completed and patient deemed to be of low risk for suicide at this time. The following regarding medications was addressed during rounds with patient:  
the risks and benefits of the proposed medication. The patient was given the opportunity to ask questions. Informed consent given to the use of the above medications. Will continue to adjust psychiatric and non-psychiatric medications (see above \"medication\" section and orders section for details) as deemed appropriate & based upon diagnoses and response to treatment. I will continue to order blood tests/labs and diagnostic tests as deemed appropriate and review results as they become available (see orders for details and above listed lab/test results). I will order psychiatric records from previous [facility] hospitals to further elucidate the nature of patient's psychopathology and review once available. I will gather additional collateral information from friends, family and o/p treatment team to further elucidate the nature of patient's psychopathology and baselline level of psychiatric functioning.  
  
 
 
I certify that this patient's inpatient psychiatric hospital services furnished since the previous certification were, and continue to be, required for treatment that could reasonably be expected to improve the patient's condition, or for diagnostic study, and that the patient continues to need, on a daily basis, active treatment furnished directly by or requiring the supervision of inpatient psychiatric facility personnel. In addition, the hospital records show that services furnished were intensive treatment services, admission or related services, or equivalent services. EXPECTED DISCHARGE DATE/DAY: TBD  
 
DISPOSITION: Home Signed By:  
Renae Dinero MD 
8/3/2018

## 2018-08-03 NOTE — BH NOTES
7-11 Pt has not been visible in milieu, isolated to room. Calm, quiet and cooperative with staff. Pt is medication and meal compliant. Will continue to monitor Q 15 minutes for safety and provide support.

## 2018-08-03 NOTE — BH NOTES
Patient stated that she still has SI. Patient isolates in her room until staff request that she enters the milieu. Patient then, mostly loiters in the hallway. Minimum interaction with peers. Affect brighter when talking to staff, mood still depressed.
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
## 2018-08-04 PROCEDURE — 74011250637 HC RX REV CODE- 250/637: Performed by: PSYCHIATRY & NEUROLOGY

## 2018-08-04 PROCEDURE — 65220000003 HC RM SEMIPRIVATE PSYCH

## 2018-08-04 RX ORDER — LORAZEPAM 0.5 MG/1
0.25 TABLET ORAL
Status: DISCONTINUED | OUTPATIENT
Start: 2018-08-04 | End: 2018-08-09

## 2018-08-04 RX ORDER — SERTRALINE HYDROCHLORIDE 50 MG/1
175 TABLET, FILM COATED ORAL EVERY EVENING
Status: DISCONTINUED | OUTPATIENT
Start: 2018-08-04 | End: 2018-08-05

## 2018-08-04 RX ADMIN — HYDROXYZINE HYDROCHLORIDE 25 MG: 25 TABLET ORAL at 08:33

## 2018-08-04 RX ADMIN — FLUTICASONE PROPIONATE 2 SPRAY: 50 SPRAY, METERED NASAL at 08:35

## 2018-08-04 RX ADMIN — FLUTICASONE PROPIONATE 2 SPRAY: 50 SPRAY, METERED NASAL at 16:57

## 2018-08-04 RX ADMIN — LORAZEPAM 0.25 MG: 0.5 TABLET ORAL at 21:37

## 2018-08-04 RX ADMIN — RISPERIDONE 1 MG: 1 TABLET ORAL at 08:33

## 2018-08-04 RX ADMIN — VITAMIN D 1000 UNITS: 25 TAB ORAL at 08:33

## 2018-08-04 RX ADMIN — LEVOTHYROXINE SODIUM 125 MCG: 50 TABLET ORAL at 05:57

## 2018-08-04 RX ADMIN — ZOLPIDEM TARTRATE 10 MG: 10 TABLET ORAL at 21:37

## 2018-08-04 RX ADMIN — LORAZEPAM 0.25 MG: 0.5 TABLET ORAL at 08:33

## 2018-08-04 RX ADMIN — LORAZEPAM 0.25 MG: 0.5 TABLET ORAL at 17:03

## 2018-08-04 RX ADMIN — LORAZEPAM 0.25 MG: 0.5 TABLET ORAL at 12:34

## 2018-08-04 RX ADMIN — RISPERIDONE 2 MG: 1 TABLET ORAL at 21:36

## 2018-08-04 RX ADMIN — ASPIRIN 325 MG ORAL TABLET 325 MG: 325 PILL ORAL at 08:33

## 2018-08-04 RX ADMIN — SERTRALINE HYDROCHLORIDE 175 MG: 50 TABLET ORAL at 17:03

## 2018-08-04 NOTE — BH NOTES
PSYCHIATRIC PROGRESS NOTE Patient Name  Vitaly Kemp Date of Birth 1960 [facility] [id number] Medical Record Number  [id number] Age  59 y.o. PCP Ramona Sanchez MD  
Admit date:  7/27/2018 Room Number  257/53  @ [facility]  
Date of Service  8/4/2018 PSYCHOTHERAPY SESSION NOTE: 
Length of psychotherapy session: 20 minutes Main condition/diagnosis/issues treated during session today, 8/4/2018 : depression, grief, AH, thoughts of self harm I employed Cognitive Behavioral therapy techniques, Reality-Oriented psychotherapy, as well as supportive psychotherapy in regards to various ongoing psychosocial stressors, including the following: pre-admission and current problems; housing issues; occupational issues; academic issues; legal issues; medical issues; and stress of hospitalization. Interpersonal relationship issues and psychodynamic conflicts explored. Attempts made to alleviate maladaptive patterns. Overall, patient is not progressing Treatment Plan Update (reviewed an updated 8/4/2018) : I will modify psychotherapy tx plan by implementing more stress management strategies, building upon cognitive behavioral techniques, increasing coping skills, as well as shoring up psychological defenses). An extended energy and skill set was needed to engage pt in psychotherapy due to some of the following: resistiveness, complexity, negativity, confrontational nature, hostile behaviors, and/or severe abnormalities in thought processes/psychosis resulting in the loss of expressive/receptive language communication skills. E & M PROGRESS NOTE: 
  
 
 
HISTORY  
   
CC:  \"Depressed\" HISTORY OF PRESENT ILLNESS/INTERVAL HISTORY:  (reviewed/updated 8/4/2018). per initial evaluation: The patient, Vitaly Kemp, is a 59 y.o.   WHITE [place]  female with a past psychiatric history significant for Depression, who presents at this time with complaints of (and/or evidence of) the following emotional symptoms: depression, suicidal thoughts/threats, self mutilation and anxiety. Additional symptomatology include anxiety, depression worse, difficulty sleeping, fear of impending doom, fearfulness, feeling depressed and feeling suicidal.  The above symptoms have been present for two weeks. These symptoms are of high severity. These symptoms are constant  in nature. The patient's condition has been precipitated by multiple psychosocial stressors . Patient's condition made worse by unresolved grief issues and Borderline tendencies. UDS:negative; BAL=0. Pt was discharged from [facility] to [facility] on 18. She c/o increased suicidal ideations. Pt's mother passed away in April. Reports, she keeps hearing her voice to come join her. Contracts for safety. Presents with Depressed mood, flat affect, poor eye contact. She was seen by the Psych attending on Friday AM and started on her PTA meds. Brook Scheuermann presents/reports/evidences the following emotional symptoms today, 2018:depression and suicidal thoughts/threats. The above symptoms have been present for several weeks. No incidents of agitation or self harm. Continued feelings to be with her  mother. Severe depression, delusions. Eating meals, coming out of room more regularly. C/O periods with increased depressed mood and high anxiety. Still hearing  mother's voice periodically, telling to join her. No self mutilation. Increase Sertraline to 175 mg/day and increase Ativan to 0.25 mg qid. SIDE EFFECTS: (reviewed/updated 2018) None reported or admitted to. ALLERGIES:(reviewed/updated 2018) Allergies Allergen Reactions  Other Food Other (comments) \"Bad chest pain\" with walnuts. Coke - asthma/stuffy head. Fish sticks causes an asthma attack.  Astepro [Azelastine] Other (comments) Violent headaches.  Bactrim [Sulfamethoprim] Other (comments) Difficulty breathing, chills, fever.  Banana Other (comments) Diffculty breathing, wheezing, asthma attack.  Coconut Other (comments) Difficulty breathing, asthma attack, SOB.  Cortisone Hives Difficulty breathing.  Peanut Other (comments) Wheezing, asthma attack, SOB.  Prednisone Hives Difficulty breathing, kidney stones. MEDICATIONS PRIOR TO ADMISSION:(reviewed/updated 8/4/2018) Prescriptions Prior to Admission Medication Sig  levothyroxine (SYNTHROID) 125 mcg tablet Take 1 Tab by mouth Daily (before breakfast). Indications: hypothyroidism  risperiDONE (RISPERDAL) 1 mg tablet Take 1 Tab by mouth two (2) times a day. Indications: delusion  sertraline (ZOLOFT) 25 mg tablet Take 3 Tabs by mouth every evening. Indications: major depressive disorder  traZODone (DESYREL) 50 mg tablet Take 1 Tab by mouth nightly as needed for Sleep. Indications: insomnia associated with depression  hydrOXYzine HCl (ATARAX) 25 mg tablet Take 1 Tab by mouth every six (6) hours as needed (Anxiety) for up to 10 days. Indications: anxiety, Anxiety  therapeutic multivitamin (THERA) tablet Take 1 Tab by mouth daily.  fluticasone (FLONASE) 50 mcg/actuation nasal spray 2 Sprays by Both Nostrils route two (2) times a day.  Cholecalciferol, Vitamin D3, (VITAMIN D3) 1,000 unit cap Take 1,000 Units by mouth daily.  cyanocobalamin, vitamin B-12, (VITAMIN B-12) 5,000 mcg subl 5,000 mcg by SubLINGual route daily.  aspirin (ASPIRIN) 325 mg tablet Take 325-650 mg by mouth as needed for Pain. PAST MEDICAL HISTORY: Past medical history from the initial psychiatric evaluation has been reviewed (reviewed/updated 8/4/2018) with no additional updates (I asked patient and no additional past medical history provided). Past Medical History:  
Diagnosis Date  Asthma 1969  
 hospitalized for asthma attack x 2  Chronic kidney disease   
 kidney stones x 2-3 times  Ill-defined condition   
 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   
 blood in stool  Other ill-defined conditions(799.89)   
 hx low BP - 90's/60's-70's  PUD (peptic ulcer disease)  Thyroid disease  Unspecified adverse effect of anesthesia   
 nasal growth and misalignment and cannot breath through nose Past Surgical History:  
Procedure Laterality Date  HX HEENT    
 T & A  
 HX HEENT    
 turbinate surgery SOCIAL HISTORY: Social history from the initial psychiatric evaluation has been reviewed (reviewed/updated 8/4/2018) with no additional updates (I asked patient and no additional social history provided). Social History Social History  Marital status: SINGLE Spouse name: N/A  
 Number of children: N/A  
 Years of education: N/A Occupational History  Not on file. Social History Main Topics  Smoking status: Never Smoker  Smokeless tobacco: Never Used  Alcohol use No  
 Drug use: No  
 Sexual activity: Not Currently Other Topics Concern  Not on file Social History Narrative FAMILY HISTORY: Family history from the initial psychiatric evaluation has been reviewed (reviewed/updated 8/4/2018) with no additional updates (I asked patient and no additional family history provided). Family History Problem Relation Age of Onset  Stroke Mother  Other Mother   
  erosion of esophagus  Cancer Mother   
  melanoma/squamous  Heart Surgery Father x2  Delayed Awakening Father  Pacemaker Father  Other Father   
  carotid endartarectomy/polio  Cataract Father REVIEW OF SYSTEMS: (reviewed/updated 8/4/2018) Appetite:good Sleep: good All other Review of Systems: depressed mood, AH/ VH  
 
 
 
MENTAL STATUS EXAM & VITALS MENTAL STATUS EXAM (MSE): MSE FINDINGS ARE WITHIN NORMAL LIMITS (WNL) UNLESS OTHERWISE STATED BELOW. ( ALL OF THE BELOW CATEGORIES OF THE MSE HAVE BEEN REVIEWED (reviewed 8/4/2018) AND UPDATED AS DEEMED APPROPRIATE ) General Presentation age appropriate and casually dressed, cooperative Orientation oriented to time, place and person Vital Signs  See below (reviewed 8/4/2018); Vital Signs (BP, Pulse, & Temp) are within normal limits if not listed below. Gait and Station Stable/steady, no ataxia Musculoskeletal System No extrapyramidal symptoms (EPS); no abnormal muscular movements or Tardive Dyskinesia (TD); muscle strength and tone are within normal limits Language No aphasia or dysarthria Speech:  normal pitch and normal volume Thought Processes logical; normal rate of thoughts; good abstract reasoning/computation Thought Associations goal directed Thought Content (+)delusions of reference; disorganized Suicidal Ideations intention; (+)SI Homicidal Ideations none Mood:  depressed Affect:  depressed Memory recent  fair Memory remote:  fair Concentration/Attention:  wnl  
Fund of Knowledge wnl Insight:  fair Reliability fair Judgment:  fair VITALS:    
Patient Vitals for the past 24 hrs: 
 Temp Pulse Resp BP SpO2  
08/04/18 0629 97.8 °F (36.6 °C) 61 16 115/63 100 % Wt Readings from Last 3 Encounters:  
07/27/18 72.6 kg (160 lb)  
07/16/18 72.6 kg (160 lb)  
07/31/14 79.4 kg (175 lb) Temp Readings from Last 3 Encounters:  
08/04/18 97.8 °F (36.6 °C)  
07/26/18 98 °F (36.7 °C) BP Readings from Last 3 Encounters:  
08/04/18 115/63  
07/26/18 108/71  
07/31/14 110/69 Pulse Readings from Last 3 Encounters:  
08/04/18 61  
07/26/18 62  
07/31/14 69 DATA LABORATORY DATA:(reviewed/updated 8/4/2018) No results found for this or any previous visit (from the past 24 hour(s)).  
No results found for: VALF2, VALAC, VALP, VALPR, DS6, CRBAM, CRBAMP, CARB2, XCRBAM 
No results found for: [facility] RADIOLOGY REPORTS:(reviewed/updated 8/4/2018) Ct Head Wo Cont Result Date: 7/17/2018 EXAM:  CT HEAD WO CONT INDICATION:  Altered mental status. COMPARISON: None. CONTRAST:  None. TECHNIQUE: Unenhanced CT of the head was performed using 5 mm images. Brain and bone windows were generated. CT dose reduction was achieved through use of a standardized protocol tailored for this examination and automatic exposure control for dose modulation. Adaptive statistical iterative reconstruction (ASIR) was utilized. FINDINGS: The ventricles and sulci are normal in size, shape and configuration and midline. There is no significant white matter disease. There is no intracranial hemorrhage, extra-axial collection, mass, mass effect or midline shift. The basilar cisterns are open. No acute infarct is identified. The bone windows demonstrate no abnormalities. The visualized portions of the paranasal sinuses and mastoid air cells are clear. IMPRESSION: No acute findings. MEDICATIONS ALL MEDICATIONS:  
Current Facility-Administered Medications Medication Dose Route Frequency  LORazepam (ATIVAN) tablet 0.25 mg  0.25 mg Oral QID WITH MEALS  sertraline (ZOLOFT) tablet 175 mg  175 mg Oral QPM  
 risperiDONE (RisperDAL) tablet 1 mg  1 mg Oral DAILY  risperiDONE (RisperDAL) tablet 2 mg  2 mg Oral QHS  zolpidem (AMBIEN) tablet 10 mg  10 mg Oral QHS  LORazepam (ATIVAN) tablet 1 mg  1 mg Oral Q6H PRN  
 fluticasone (FLONASE) 50 mcg/actuation nasal spray 2 Spray  2 Spray Both Nostrils BID  ziprasidone (GEODON) 20 mg in sterile water (preservative free) 1 mL injection  20 mg IntraMUSCular BID PRN  
 OLANZapine (ZyPREXA) tablet 5 mg  5 mg Oral Q6H PRN  
 benztropine (COGENTIN) tablet 2 mg  2 mg Oral BID PRN  
 benztropine (COGENTIN) injection 2 mg  2 mg IntraMUSCular BID PRN  
 LORazepam (ATIVAN) injection 2 mg  2 mg IntraMUSCular Q4H PRN  
 acetaminophen (TYLENOL) tablet 650 mg  650 mg Oral Q4H PRN  
 ibuprofen (MOTRIN) tablet 400 mg  400 mg Oral Q8H PRN  
 magnesium hydroxide (MILK OF MAGNESIA) 400 mg/5 mL oral suspension 30 mL  30 mL Oral DAILY PRN  
 nicotine (NICODERM CQ) 21 mg/24 hr patch 1 Patch  1 Patch TransDERmal DAILY PRN  
 hydrOXYzine HCl (ATARAX) tablet 25 mg  25 mg Oral Q6H PRN  
 levothyroxine (SYNTHROID) tablet 125 mcg  125 mcg Oral ACB  cholecalciferol (VITAMIN D3) tablet 1,000 Units  1,000 Units Oral DAILY  aspirin (ASPIRIN) tablet 325 mg  325 mg Oral DAILY  
  
SCHEDULED MEDICATIONS:  
Current Facility-Administered Medications Medication Dose Route Frequency  LORazepam (ATIVAN) tablet 0.25 mg  0.25 mg Oral QID WITH MEALS  sertraline (ZOLOFT) tablet 175 mg  175 mg Oral QPM  
 risperiDONE (RisperDAL) tablet 1 mg  1 mg Oral DAILY  risperiDONE (RisperDAL) tablet 2 mg  2 mg Oral QHS  zolpidem (AMBIEN) tablet 10 mg  10 mg Oral QHS  fluticasone (FLONASE) 50 mcg/actuation nasal spray 2 Spray  2 Spray Both Nostrils BID  levothyroxine (SYNTHROID) tablet 125 mcg  125 mcg Oral ACB  cholecalciferol (VITAMIN D3) tablet 1,000 Units  1,000 Units Oral DAILY  aspirin (ASPIRIN) tablet 325 mg  325 mg Oral DAILY ASSESSMENT & PLAN  
 
DIAGNOSES REQUIRING ACTIVE TREATMENT AND MONITORING: (reviewed/updated 8/4/2018) Patient Active Hospital Problem List: MDD with psychosis Assessment: moderate to severe; exacerbated by borderline pd, Grief, lack of resources, lack of social support Plan: Agree with inpatient hospitalization for further stabilization, safety monitoring and medication management Close observation due to self harm Titrate medications, Increase zoloft to 150mg. Add ambien, continue Risperdal but increase to 1mg/ 2mg hs Reduce dose of ativan to 0.25mg three times daily 8/4- Zoloft increased to 175 mg/day. Ativan to 0.25 qid In summary, Gallo Edmonds, is a 59 y.o.  female who presents with a severe exacerbation of the principal diagnosis of MDD with psychosis. Patient's condition is not improving. Patient requires continued inpatient hospitalization for further stabilization, safety monitoring and medication management. I will continue to coordinate the provision of individual, milieu, occupational, group, and substance abuse therapies to address target symptoms/diagnoses as deemed appropriate for the individual patient. A coordinated, multidisplinary treatment team round was conducted with the patient (this team consists of the nurse, psychiatric unit pharmcist,  and writer). Complete current electronic health record for patient has been reviewed today including consultant notes, ancillary staff notes, nurses and psychiatric tech notes. Suicide risk assessment completed and patient deemed to be of low risk for suicide at this time. The following regarding medications was addressed during rounds with patient:  
the risks and benefits of the proposed medication. The patient was given the opportunity to ask questions. Informed consent given to the use of the above medications. Will continue to adjust psychiatric and non-psychiatric medications (see above \"medication\" section and orders section for details) as deemed appropriate & based upon diagnoses and response to treatment. I will continue to order blood tests/labs and diagnostic tests as deemed appropriate and review results as they become available (see orders for details and above listed lab/test results). I will order psychiatric records from previous [facility] hospitals to further elucidate the nature of patient's psychopathology and review once available. I will gather additional collateral information from friends, family and o/p treatment team to further elucidate the nature of patient's psychopathology and baselline level of psychiatric functioning.  
  
 
 
I certify that this patient's inpatient psychiatric hospital services furnished since the previous certification were, and continue to be, required for treatment that could reasonably be expected to improve the patient's condition, or for diagnostic study, and that the patient continues to need, on a daily basis, active treatment furnished directly by or requiring the supervision of inpatient psychiatric facility personnel. In addition, the hospital records show that services furnished were intensive treatment services, admission or related services, or equivalent services. EXPECTED DISCHARGE DATE/DAY: TBD  
 
DISPOSITION: Home Signed By:  
Marquise Vernon MD 
8/4/2018

## 2018-08-04 NOTE — BH NOTES
Problem: Depressed Mood (Adult/Pediatric) Goal: *STG: Complies with medication therapy Outcome: Progressing Towards Goal 
Pt is visible in the milieu. Pt presents as labile and anxious but cooperative. Pt is meds/meals compliant. Pt has participated in her treatment team meeting today. Pt voiced no complaints. Will continue to monitor q 15 for safety, mood and behavior changes.

## 2018-08-04 NOTE — BH NOTES
Patient is alert and oriented. Visited by her , visitation went uneventful. Pt continues to be with flat affect, has not voiced any suicidal ideation this evening. Medication and meal compliant. Continue to monitor the patient's status and assess needs.

## 2018-08-05 PROCEDURE — 65220000003 HC RM SEMIPRIVATE PSYCH

## 2018-08-05 PROCEDURE — 74011250637 HC RX REV CODE- 250/637: Performed by: PSYCHIATRY & NEUROLOGY

## 2018-08-05 RX ORDER — SERTRALINE HYDROCHLORIDE 50 MG/1
175 TABLET, FILM COATED ORAL
Status: DISCONTINUED | OUTPATIENT
Start: 2018-08-05 | End: 2018-08-09

## 2018-08-05 RX ORDER — RISPERIDONE 1 MG/1
2 TABLET, FILM COATED ORAL DAILY
Status: DISCONTINUED | OUTPATIENT
Start: 2018-08-06 | End: 2018-08-06

## 2018-08-05 RX ORDER — RISPERIDONE 1 MG/1
1 TABLET, FILM COATED ORAL ONCE
Status: COMPLETED | OUTPATIENT
Start: 2018-08-05 | End: 2018-08-05

## 2018-08-05 RX ADMIN — SERTRALINE HYDROCHLORIDE 175 MG: 50 TABLET ORAL at 16:35

## 2018-08-05 RX ADMIN — FLUTICASONE PROPIONATE 2 SPRAY: 50 SPRAY, METERED NASAL at 08:05

## 2018-08-05 RX ADMIN — RISPERIDONE 1 MG: 1 TABLET ORAL at 08:05

## 2018-08-05 RX ADMIN — LEVOTHYROXINE SODIUM 125 MCG: 50 TABLET ORAL at 05:44

## 2018-08-05 RX ADMIN — RISPERIDONE 2 MG: 1 TABLET ORAL at 21:13

## 2018-08-05 RX ADMIN — RISPERIDONE 1 MG: 1 TABLET ORAL at 12:07

## 2018-08-05 RX ADMIN — VITAMIN D 1000 UNITS: 25 TAB ORAL at 08:05

## 2018-08-05 RX ADMIN — LORAZEPAM 0.25 MG: 0.5 TABLET ORAL at 12:07

## 2018-08-05 RX ADMIN — ASPIRIN 325 MG ORAL TABLET 325 MG: 325 PILL ORAL at 08:05

## 2018-08-05 RX ADMIN — LORAZEPAM 0.25 MG: 0.5 TABLET ORAL at 21:13

## 2018-08-05 RX ADMIN — LORAZEPAM 0.25 MG: 0.5 TABLET ORAL at 16:35

## 2018-08-05 RX ADMIN — FLUTICASONE PROPIONATE 2 SPRAY: 50 SPRAY, METERED NASAL at 16:36

## 2018-08-05 RX ADMIN — ZOLPIDEM TARTRATE 10 MG: 10 TABLET ORAL at 21:13

## 2018-08-05 RX ADMIN — LORAZEPAM 0.25 MG: 0.5 TABLET ORAL at 08:05

## 2018-08-05 RX ADMIN — HYDROXYZINE HYDROCHLORIDE 25 MG: 25 TABLET ORAL at 08:05

## 2018-08-05 NOTE — BH NOTES
Approximately 9:15pm 
 
Patient approached staff and whispered to staff that she need to show staff something. Patient pulled out a plastic knife that had blood around the edges. Staff immediately informed the nurse of the situation. Staff asked patient where did the blood come from. Patient showed staff the cuts on her wrist (right) that she made with the (plastic) knife. Patient admitted to staff that she felt suicidal when she cut her wrist. Staff asked patient where did she get the knife from. Patient stated that she had the knife for some time now (couple of days). Staff asked patient if she had a plan and patient replied \"yes\". Nurse called the physician to get an order for a 1:1 for safety/suicidal precautions. Patient requests to speak with the  tomorrow.

## 2018-08-05 NOTE — BH NOTES
Pt made superficial self inflicted skin abrasions on her Right wrist with a plastic knife, on assessment skin breakdown (3 longitudinal skin abrasions noted), no bleeding was observed. Area cleaned and dressed with bandage. Knife was confiscated from her, body, belongings and room search was performed by writejennifer and BRUCE Sawyer. No other contrabands were found, pencil was removed from the room. Dr. Hubbard Lennox was notified and received an order for suicidal precautions with 1:1 staff. Pt still reports feeling suicidal. Scheduled medication given. 1;1 staff monitoring continued.

## 2018-08-05 NOTE — BH NOTES
Problem: Depressed Mood (Adult/Pediatric)  Goal: *STG: Complies with medication therapy  Outcome: Progressing Towards Goal  Pt is visible in the milieu this evening. Pt is within sight of staff in the dayroom. Pt presents as labile and anxious but cooperative. Pt is meds/meals compliant. Pt presently is not expressing SI. Pt will go into seclusion room if she has SI per order. Will continue to monitor q 15 for safety, mood and behavior changes. Detail Level: Zone Quality 226: Preventive Care And Screening: Tobacco Use: Screening And Cessation Intervention: Patient screened for tobacco and never smoked

## 2018-08-05 NOTE — BH NOTES
Problem: Depressed Mood (Adult/Pediatric)  Goal: *STG: Complies with medication therapy  Outcome: Progressing Towards Goal  Pt is visible in the milieu. Pt is within sight of staff in the dayroom. Pt presents as labile and anxious but cooperative. Pt is meds/meals compliant. Pt has participated in her treatment team meeting today. Pt presently is not expressing SI. Pt will go into seclusion room if she has SI per order. Will continue to monitor q 15 for safety, mood and behavior changes.

## 2018-08-05 NOTE — BH NOTES
Patient was observed to be sleeping for 6 hours without any distress and even respirations. Close monitoring continued with 1:1 staff.

## 2018-08-05 NOTE — BH NOTES
COMMUNITY GROUP THERAPY PROGRESS NOTE    The patient Natalee Houser is participating in the Community Therapy Group. Group time: 30 minutes    Personal goal for participation: To review unit guidelines and treatment plan    Goal orientation: personal    Group therapy participation: active    Therapeutic interventions reviewed and discussed: Yes    Impression of participation: Positive input.     Ariana Sauceda  8/5/2018

## 2018-08-05 NOTE — BH NOTES
BEHAVIORAL HEALTH RESTRAINT/SECLUSION PROGRESS NOTE TERMINATION OF RESTRAINT/SECLUSION    1. Current mental status/behavior: Calm and Cooperative    2. Criteria for release met: No longer verbalizing threats of harm to self and others, Acute signs and symptoms necessitating restraint/seclusion have decreased substantially to the level where patient can safely function in least restrictive environment. and Substantial reduction in level of agitation/anxiety as indicated by reduction in motor over activity, ability to focus, maintian attention and decrease in hostility    3. Additional interventions to prevent recurrence of dangerous behaviors include the following in addition to the debriefing process by the team.   Finger food only, no plastic on her tray.       RN Signature__________________________________ Date_______________      MD Signature__________________________________ Date_______________

## 2018-08-06 PROCEDURE — 74011250637 HC RX REV CODE- 250/637: Performed by: PSYCHIATRY & NEUROLOGY

## 2018-08-06 PROCEDURE — 65220000003 HC RM SEMIPRIVATE PSYCH

## 2018-08-06 RX ORDER — RISPERIDONE 1 MG/1
2 TABLET, FILM COATED ORAL 2 TIMES DAILY
Status: DISCONTINUED | OUTPATIENT
Start: 2018-08-06 | End: 2018-08-13 | Stop reason: HOSPADM

## 2018-08-06 RX ADMIN — RISPERIDONE 2 MG: 1 TABLET ORAL at 08:28

## 2018-08-06 RX ADMIN — VITAMIN D 1000 UNITS: 25 TAB ORAL at 08:28

## 2018-08-06 RX ADMIN — RISPERIDONE 2 MG: 1 TABLET ORAL at 21:30

## 2018-08-06 RX ADMIN — LEVOTHYROXINE SODIUM 125 MCG: 50 TABLET ORAL at 05:51

## 2018-08-06 RX ADMIN — LORAZEPAM 0.25 MG: 0.5 TABLET ORAL at 16:16

## 2018-08-06 RX ADMIN — FLUTICASONE PROPIONATE 2 SPRAY: 50 SPRAY, METERED NASAL at 16:15

## 2018-08-06 RX ADMIN — LORAZEPAM 0.25 MG: 0.5 TABLET ORAL at 14:12

## 2018-08-06 RX ADMIN — ZOLPIDEM TARTRATE 10 MG: 10 TABLET ORAL at 21:30

## 2018-08-06 RX ADMIN — SERTRALINE HYDROCHLORIDE 175 MG: 50 TABLET ORAL at 16:15

## 2018-08-06 RX ADMIN — LORAZEPAM 0.25 MG: 0.5 TABLET ORAL at 08:28

## 2018-08-06 RX ADMIN — ASPIRIN 325 MG ORAL TABLET 325 MG: 325 PILL ORAL at 08:29

## 2018-08-06 RX ADMIN — FLUTICASONE PROPIONATE 2 SPRAY: 50 SPRAY, METERED NASAL at 08:27

## 2018-08-06 RX ADMIN — LORAZEPAM 0.25 MG: 0.5 TABLET ORAL at 21:30

## 2018-08-06 NOTE — PROGRESS NOTES
Pt tolerating diet, may benefit from CC diet as A1x 6.1 and BMI c/w overweight. Hx notable for prediabetes;THS noted 0.05. Pt noted to be meal compliant.   Ht: 5'4\"  Wt: 160 lb  BMI: 27.46 kg/(m^2) c/w overweight  Est energy needs: 1665 kcal, 65 g protein, 1 mL/kcal fluids  Pt will consume > 75% of meals at follow up 5-7 days

## 2018-08-06 NOTE — BH NOTES
Social Work     Pt was seen in treatment team this morning. Pt is alert and oriented. Pt denied HI but continues endorsing SI. Pt's mood is depressed, affect is sad. Pt's thought process is delusional. Pt shared about weekend in which she cut self superficially with plastic knife from meal tray. Alec Oates will continue trying to obtain CRI for father. Pt's insight and judgment are limited to poor, reliability is limited to poor. Social work department will continue to coordinate discharge plans.

## 2018-08-06 NOTE — BH NOTES
GROUP THERAPY PROGRESS NOTE    The patient Lito herron 59 y.o. female is participating in [employer] Group. Group time: 1 hour    Personal goal for participation:  To concentrate on selected task    Goal orientation: social    Group therapy participation: minimal    Therapeutic interventions reviewed and discussed: Crafts, games, music    Impression of participation: The patient was present-elected to watch    Brittany Miss  8/6/2018  5:20 PM

## 2018-08-06 NOTE — BH NOTES
GROUP THERAPY PROGRESS NOTE    The patient Zac Arredondo a 59 y.o. female is participating in [employer]. Group time: 45 minutes    Personal goal for participation: To participate in mental health journey game  Goal orientation:  personal    Group therapy participation: active    Therapeutic interventions reviewed and discussed: choices in recovery    Impression of participation:  The patient was attentive.     Keshawn Falcon  8/6/2018  1:34 PM

## 2018-08-06 NOTE — BH NOTES
Patient has been visible in the milieu by request. Patient was reading her journal to staff in the hallway at intervals. Patient also noted pacing at times. Patient was more informative during treatment rounds. Affect still flat and mood flat. Patient stated that she still wants to be with her mom. Patient has contracted for safety and she has made no gestures to harm herself.

## 2018-08-06 NOTE — BH NOTES
PSYCHIATRIC PROGRESS NOTE         Patient Name  Kane Huntley   Date of Birth 1960   [facility] [id number]   Medical Record Number  [id number]      Age  59 y.o. PCP Micheline Robertson MD   Admit date:  7/27/2018    Room Number  321/01  @ [facility]   Date of Service  8/6/2018          PSYCHOTHERAPY SESSION NOTE:  Length of psychotherapy session: 20 minutes    Main condition/diagnosis/issues treated during session today, 8/6/2018 : depression, grief, AH, thoughts of self harm    I employed Cognitive Behavioral therapy techniques, Reality-Oriented psychotherapy, as well as supportive psychotherapy in regards to various ongoing psychosocial stressors, including the following: pre-admission and current problems; housing issues; occupational issues; academic issues; legal issues; medical issues; and stress of hospitalization. Interpersonal relationship issues and psychodynamic conflicts explored. Attempts made to alleviate maladaptive patterns. Overall, patient is not progressing    Treatment Plan Update (reviewed an updated 8/6/2018) : I will modify psychotherapy tx plan by implementing more stress management strategies, building upon cognitive behavioral techniques, increasing coping skills, as well as shoring up psychological defenses). An extended energy and skill set was needed to engage pt in psychotherapy due to some of the following: resistiveness, complexity, negativity, confrontational nature, hostile behaviors, and/or severe abnormalities in thought processes/psychosis resulting in the loss of expressive/receptive language communication skills. E & M PROGRESS NOTE:         HISTORY       CC:  \"Depressed\"  HISTORY OF PRESENT ILLNESS/INTERVAL HISTORY:  (reviewed/updated 8/6/2018). per initial evaluation:   The patient, Kane Huntley, is a 59 y.o.   WHITE [place]  female with a past psychiatric history significant for Depression, who presents at this time with complaints of (and/or evidence of) the following emotional symptoms: depression, suicidal thoughts/threats, self mutilation and anxiety. Additional symptomatology include anxiety, depression worse, difficulty sleeping, fear of impending doom, fearfulness, feeling depressed and feeling suicidal.  The above symptoms have been present for two weeks. These symptoms are of high severity. These symptoms are constant  in nature. The patient's condition has been precipitated by multiple psychosocial stressors . Patient's condition made worse by unresolved grief issues and Borderline tendencies. UDS:negative; BAL=0. Pt was discharged from [facility] [place] to [facility] on 18. She c/o increased suicidal ideations. Pt's mother passed away in April. Reports, she keeps hearing her voice to come join her. Contracts for safety. Presents with Depressed mood, flat affect, poor eye contact. She was seen by the Psych attending on Friday AM and started on her PTA meds. Demetria Lennox presents/reports/evidences the following emotional symptoms today, 2018:depression and suicidal thoughts/threats. The above symptoms have been present for several weeks. No incidents of agitation or self harm. Continued feelings to be with her  mother.   (+)intrusive thoughts, anxiety, thoughts of self harm. SIDE EFFECTS: (reviewed/updated 2018)  None reported or admitted to. ALLERGIES:(reviewed/updated 2018)  Allergies   Allergen Reactions    Other Food Other (comments)     \"Bad chest pain\" with walnuts. Coke - asthma/stuffy head. Fish sticks causes an asthma attack.  Astepro [Azelastine] Other (comments)     Violent headaches.  Bactrim [Sulfamethoprim] Other (comments)     Difficulty breathing, chills, fever.  Banana Other (comments)     Diffculty breathing, wheezing, asthma attack.  Coconut Other (comments)     Difficulty breathing, asthma attack, SOB.  Cortisone Hives     Difficulty breathing.     Peanut Other (comments)     Wheezing, asthma attack, SOB.  Prednisone Hives     Difficulty breathing, kidney stones. MEDICATIONS PRIOR TO ADMISSION:(reviewed/updated 2018)  Prescriptions Prior to Admission   Medication Sig    levothyroxine (SYNTHROID) 125 mcg tablet Take 1 Tab by mouth Daily (before breakfast). Indications: hypothyroidism    risperiDONE (RISPERDAL) 1 mg tablet Take 1 Tab by mouth two (2) times a day. Indications: delusion    sertraline (ZOLOFT) 25 mg tablet Take 3 Tabs by mouth every evening. Indications: major depressive disorder    traZODone (DESYREL) 50 mg tablet Take 1 Tab by mouth nightly as needed for Sleep. Indications: insomnia associated with depression    [] hydrOXYzine HCl (ATARAX) 25 mg tablet Take 1 Tab by mouth every six (6) hours as needed (Anxiety) for up to 10 days. Indications: anxiety, Anxiety    therapeutic multivitamin (THERA) tablet Take 1 Tab by mouth daily.  fluticasone (FLONASE) 50 mcg/actuation nasal spray 2 Sprays by Both Nostrils route two (2) times a day.  Cholecalciferol, Vitamin D3, (VITAMIN D3) 1,000 unit cap Take 1,000 Units by mouth daily.  cyanocobalamin, vitamin B-12, (VITAMIN B-12) 5,000 mcg subl 5,000 mcg by SubLINGual route daily.  aspirin (ASPIRIN) 325 mg tablet Take 325-650 mg by mouth as needed for Pain. PAST MEDICAL HISTORY: Past medical history from the initial psychiatric evaluation has been reviewed (reviewed/updated 2018) with no additional updates (I asked patient and no additional past medical history provided).    Past Medical History:   Diagnosis Date    Asthma 1967    hospitalized for asthma attack x 2    Chronic kidney disease     kidney stones x 2-3 times    Ill-defined condition     nasal blockage from growth and misalignment - cannot breath out of nose - surgery in the future/cleared for colonoscopy 2014 - will bring in letter    Other ill-defined conditions(340.86)     blood in stool    Other ill-defined conditions(799.89)     hx low BP - 90's/60's-70's    PUD (peptic ulcer disease)     Thyroid disease     Unspecified adverse effect of anesthesia     nasal growth and misalignment and cannot breath through nose     Past Surgical History:   Procedure Laterality Date    HX HEENT      T & A    HX HEENT      turbinate surgery      SOCIAL HISTORY: Social history from the initial psychiatric evaluation has been reviewed (reviewed/updated 8/6/2018) with no additional updates (I asked patient and no additional social history provided). Social History     Social History    Marital status: SINGLE     Spouse name: N/A    Number of children: N/A    Years of education: N/A     Occupational History    Not on file. Social History Main Topics    Smoking status: Never Smoker    Smokeless tobacco: Never Used    Alcohol use No    Drug use: No    Sexual activity: Not Currently     Other Topics Concern    Not on file     Social History Narrative      FAMILY HISTORY: Family history from the initial psychiatric evaluation has been reviewed (reviewed/updated 8/6/2018) with no additional updates (I asked patient and no additional family history provided).    Family History   Problem Relation Age of Onset    Stroke Mother     Other Mother      erosion of esophagus    Cancer Mother      melanoma/squamous    Heart Surgery Father      x2    Delayed Awakening Father     Pacemaker Father     Other Father      carotid endartarectomy/polio    Cataract Father        REVIEW OF SYSTEMS: (reviewed/updated 8/6/2018)  Appetite:good   Sleep: good   All other Review of Systems: depressed mood, AH/ VH         MENTAL STATUS EXAM & VITALS     MENTAL STATUS EXAM (MSE):    MSE FINDINGS ARE WITHIN NORMAL LIMITS (WNL) UNLESS OTHERWISE STATED BELOW. ( ALL OF THE BELOW CATEGORIES OF THE MSE HAVE BEEN REVIEWED (reviewed 8/6/2018) AND UPDATED AS DEEMED APPROPRIATE )  General Presentation age appropriate and casually dressed, cooperative   Orientation oriented to time, place and person   Vital Signs  See below (reviewed 8/6/2018); Vital Signs (BP, Pulse, & Temp) are within normal limits if not listed below. Gait and Station Stable/steady, no ataxia   Musculoskeletal System No extrapyramidal symptoms (EPS); no abnormal muscular movements or Tardive Dyskinesia (TD); muscle strength and tone are within normal limits   Language No aphasia or dysarthria   Speech:  normal pitch and normal volume   Thought Processes logical; normal rate of thoughts; good abstract reasoning/computation   Thought Associations goal directed   Thought Content (+)delusions of reference; disorganized   Suicidal Ideations intention; (+)SI   Homicidal Ideations none   Mood:  depressed   Affect:  depressed   Memory recent  fair   Memory remote:  fair   Concentration/Attention:  wnl   Fund of Knowledge wnl   Insight:  fair   Reliability fair   Judgment:  fair          VITALS:     Patient Vitals for the past 24 hrs:   Temp Pulse Resp BP   08/06/18 0654 98.3 °F (36.8 °C) 60 16 107/60     Wt Readings from Last 3 Encounters:   07/27/18 72.6 kg (160 lb)   07/16/18 72.6 kg (160 lb)   07/31/14 79.4 kg (175 lb)     Temp Readings from Last 3 Encounters:   08/06/18 98.3 °F (36.8 °C)   07/26/18 98 °F (36.7 °C)     BP Readings from Last 3 Encounters:   08/06/18 107/60   07/26/18 108/71   07/31/14 110/69     Pulse Readings from Last 3 Encounters:   08/06/18 60   07/26/18 62   07/31/14 69            DATA     LABORATORY DATA:(reviewed/updated 8/6/2018)  No results found for this or any previous visit (from the past 24 hour(s)). No results found for: VALF2, VALAC, VALP, VALPR, DS6, CRBAM, CRBAMP, CARB2, XCRBAM  No results found for: LITHM   RADIOLOGY REPORTS:(reviewed/updated 8/6/2018)  Ct Head Wo Cont    Result Date: 7/17/2018  EXAM:  CT HEAD WO CONT INDICATION:  Altered mental status. COMPARISON: None. CONTRAST:  None.  TECHNIQUE: Unenhanced CT of the head was performed using 5 mm images. Brain and bone windows were generated. CT dose reduction was achieved through use of a standardized protocol tailored for this examination and automatic exposure control for dose modulation. Adaptive statistical iterative reconstruction (ASIR) was utilized. FINDINGS: The ventricles and sulci are normal in size, shape and configuration and midline. There is no significant white matter disease. There is no intracranial hemorrhage, extra-axial collection, mass, mass effect or midline shift. The basilar cisterns are open. No acute infarct is identified. The bone windows demonstrate no abnormalities. The visualized portions of the paranasal sinuses and mastoid air cells are clear. IMPRESSION: No acute findings.          MEDICATIONS     ALL MEDICATIONS:   Current Facility-Administered Medications   Medication Dose Route Frequency    risperiDONE (RisperDAL) tablet 2 mg  2 mg Oral DAILY    sertraline (ZOLOFT) tablet 175 mg  175 mg Oral DAILY WITH DINNER    LORazepam (ATIVAN) tablet 0.25 mg  0.25 mg Oral QID WITH MEALS    risperiDONE (RisperDAL) tablet 2 mg  2 mg Oral QHS    zolpidem (AMBIEN) tablet 10 mg  10 mg Oral QHS    LORazepam (ATIVAN) tablet 1 mg  1 mg Oral Q6H PRN    fluticasone (FLONASE) 50 mcg/actuation nasal spray 2 Spray  2 Spray Both Nostrils BID    ziprasidone (GEODON) 20 mg in sterile water (preservative free) 1 mL injection  20 mg IntraMUSCular BID PRN    OLANZapine (ZyPREXA) tablet 5 mg  5 mg Oral Q6H PRN    benztropine (COGENTIN) tablet 2 mg  2 mg Oral BID PRN    benztropine (COGENTIN) injection 2 mg  2 mg IntraMUSCular BID PRN    LORazepam (ATIVAN) injection 2 mg  2 mg IntraMUSCular Q4H PRN    acetaminophen (TYLENOL) tablet 650 mg  650 mg Oral Q4H PRN    ibuprofen (MOTRIN) tablet 400 mg  400 mg Oral Q8H PRN    magnesium hydroxide (MILK OF MAGNESIA) 400 mg/5 mL oral suspension 30 mL  30 mL Oral DAILY PRN    nicotine (NICODERM CQ) 21 mg/24 hr patch 1 Patch  1 Patch TransDERmal DAILY PRN    hydrOXYzine HCl (ATARAX) tablet 25 mg  25 mg Oral Q6H PRN    levothyroxine (SYNTHROID) tablet 125 mcg  125 mcg Oral ACB    cholecalciferol (VITAMIN D3) tablet 1,000 Units  1,000 Units Oral DAILY    aspirin (ASPIRIN) tablet 325 mg  325 mg Oral DAILY      SCHEDULED MEDICATIONS:   Current Facility-Administered Medications   Medication Dose Route Frequency    risperiDONE (RisperDAL) tablet 2 mg  2 mg Oral DAILY    sertraline (ZOLOFT) tablet 175 mg  175 mg Oral DAILY WITH DINNER    LORazepam (ATIVAN) tablet 0.25 mg  0.25 mg Oral QID WITH MEALS    risperiDONE (RisperDAL) tablet 2 mg  2 mg Oral QHS    zolpidem (AMBIEN) tablet 10 mg  10 mg Oral QHS    fluticasone (FLONASE) 50 mcg/actuation nasal spray 2 Spray  2 Spray Both Nostrils BID    levothyroxine (SYNTHROID) tablet 125 mcg  125 mcg Oral ACB    cholecalciferol (VITAMIN D3) tablet 1,000 Units  1,000 Units Oral DAILY    aspirin (ASPIRIN) tablet 325 mg  325 mg Oral DAILY          ASSESSMENT & PLAN     DIAGNOSES REQUIRING ACTIVE TREATMENT AND MONITORING: (reviewed/updated 8/6/2018)  Patient Active Hospital Problem List:     MDD with psychosis   Assessment: moderate to severe; exacerbated by borderline pd, Grief, lack of resources, lack of social support   Plan: Agree with inpatient hospitalization for further stabilization, safety monitoring and medication management  Close observation due to self harm   Titrate medications, Increase zoloft to 150mg. Add ambien, continue Risperdal but increase to 1mg/ 2mg hs  Reduce dose of ativan to 0.25mg three times daily  8/4- Zoloft increased to 175 mg/day. Ativan to 0.25 qid  8/5- Risperdal increased to 2 mg bid  In summary, Daniele Damico, is a 59 y.o.  female who presents with a severe exacerbation of the principal diagnosis of MDD with psychosis. Patient's condition is not improving.   Patient requires continued inpatient hospitalization for further stabilization, safety monitoring and medication management. I will continue to coordinate the provision of individual, milieu, occupational, group, and substance abuse therapies to address target symptoms/diagnoses as deemed appropriate for the individual patient. A coordinated, multidisplinary treatment team round was conducted with the patient (this team consists of the nurse, psychiatric unit pharmcist,  and writer). Complete current electronic health record for patient has been reviewed today including consultant notes, ancillary staff notes, nurses and psychiatric tech notes. Suicide risk assessment completed and patient deemed to be of low risk for suicide at this time. The following regarding medications was addressed during rounds with patient:   the risks and benefits of the proposed medication. The patient was given the opportunity to ask questions. Informed consent given to the use of the above medications. Will continue to adjust psychiatric and non-psychiatric medications (see above \"medication\" section and orders section for details) as deemed appropriate & based upon diagnoses and response to treatment. I will continue to order blood tests/labs and diagnostic tests as deemed appropriate and review results as they become available (see orders for details and above listed lab/test results). I will order psychiatric records from previous [facility] hospitals to further elucidate the nature of patient's psychopathology and review once available. I will gather additional collateral information from friends, family and o/p treatment team to further elucidate the nature of patient's psychopathology and baselline level of psychiatric functioning.          I certify that this patient's inpatient psychiatric hospital services furnished since the previous certification were, and continue to be, required for treatment that could reasonably be expected to improve the patient's condition, or for diagnostic study, and that the patient continues to need, on a daily basis, active treatment furnished directly by or requiring the supervision of inpatient psychiatric facility personnel. In addition, the hospital records show that services furnished were intensive treatment services, admission or related services, or equivalent services.     EXPECTED DISCHARGE DATE/DAY: TBD     DISPOSITION: Home       Signed By:   Laurent August MD  8/6/2018

## 2018-08-06 NOTE — BH NOTES
PSYCHIATRIC PROGRESS NOTE         Patient Name  Lennox Aye   Date of Birth 1960   [facility] [id number]   Medical Record Number  [id number]      Age  59 y.o. PCP Dee Walters MD   Admit date:  7/27/2018    Room Number  321/01  @ [facility]   Date of Service  8/5/2018          PSYCHOTHERAPY SESSION NOTE:  Length of psychotherapy session: 20 minutes    Main condition/diagnosis/issues treated during session today, 8/5/2018 : depression, grief, AH, thoughts of self harm    I employed Cognitive Behavioral therapy techniques, Reality-Oriented psychotherapy, as well as supportive psychotherapy in regards to various ongoing psychosocial stressors, including the following: pre-admission and current problems; housing issues; occupational issues; academic issues; legal issues; medical issues; and stress of hospitalization. Interpersonal relationship issues and psychodynamic conflicts explored. Attempts made to alleviate maladaptive patterns. Overall, patient is not progressing    Treatment Plan Update (reviewed an updated 8/5/2018) : I will modify psychotherapy tx plan by implementing more stress management strategies, building upon cognitive behavioral techniques, increasing coping skills, as well as shoring up psychological defenses). An extended energy and skill set was needed to engage pt in psychotherapy due to some of the following: resistiveness, complexity, negativity, confrontational nature, hostile behaviors, and/or severe abnormalities in thought processes/psychosis resulting in the loss of expressive/receptive language communication skills. E & M PROGRESS NOTE:         HISTORY       CC:  \"Depressed\"  HISTORY OF PRESENT ILLNESS/INTERVAL HISTORY:  (reviewed/updated 8/5/2018). per initial evaluation:   The patient, Lennox Aye, is a 59 y.o.   WHITE [place]  female with a past psychiatric history significant for Depression, who presents at this time with complaints of (and/or evidence of) the following emotional symptoms: depression, suicidal thoughts/threats, self mutilation and anxiety. Additional symptomatology include anxiety, depression worse, difficulty sleeping, fear of impending doom, fearfulness, feeling depressed and feeling suicidal.  The above symptoms have been present for two weeks. These symptoms are of high severity. These symptoms are constant  in nature. The patient's condition has been precipitated by multiple psychosocial stressors . Patient's condition made worse by unresolved grief issues and Borderline tendencies. UDS:negative; BAL=0. Pt was discharged from [facility] to [facility] on 18. She c/o increased suicidal ideations. Pt's mother passed away in April. Reports, she keeps hearing her voice to come join her. Contracts for safety. Presents with Depressed mood, flat affect, poor eye contact. She was seen by the Psych attending on Friday AM and started on her PTA meds. Lito Li presents/reports/evidences the following emotional symptoms today, 2018:depression and suicidal thoughts/threats. The above symptoms have been present for several weeks. No incidents of agitation or self harm. Continued feelings to be with her  mother. Severe depression, delusions. Eating meals, coming out of room more regularly. C/O periods with increased depressed mood and high anxiety. Still hearing  mother's voice periodically, telling to join her. Another episode of cutting on her wrist with a plastic knife and stating she was suicidal, was placed on 1 to 1. Pt is psychotically obsessed with her mother's death and that she is wanting her to join her. Showed a picture of her mother. Continues to display borderline tendencies of abandonment and  attention seeking. Pt was counseled, placed on finger food and warned of going into seclusion instead of 1 to1 if she would repeat self harming behaviors. Risperdal increased to 2 mg bid. SIDE EFFECTS: (reviewed/updated 8/5/2018)  None reported or admitted to. ALLERGIES:(reviewed/updated 8/5/2018)  Allergies   Allergen Reactions    Other Food Other (comments)     \"Bad chest pain\" with walnuts. Coke - asthma/stuffy head. Fish sticks causes an asthma attack.  Astepro [Azelastine] Other (comments)     Violent headaches.  Bactrim [Sulfamethoprim] Other (comments)     Difficulty breathing, chills, fever.  Banana Other (comments)     Diffculty breathing, wheezing, asthma attack.  Coconut Other (comments)     Difficulty breathing, asthma attack, SOB.  Cortisone Hives     Difficulty breathing.  Peanut Other (comments)     Wheezing, asthma attack, SOB.  Prednisone Hives     Difficulty breathing, kidney stones. MEDICATIONS PRIOR TO ADMISSION:(reviewed/updated 8/5/2018)  Prescriptions Prior to Admission   Medication Sig    levothyroxine (SYNTHROID) 125 mcg tablet Take 1 Tab by mouth Daily (before breakfast). Indications: hypothyroidism    risperiDONE (RISPERDAL) 1 mg tablet Take 1 Tab by mouth two (2) times a day. Indications: delusion    sertraline (ZOLOFT) 25 mg tablet Take 3 Tabs by mouth every evening. Indications: major depressive disorder    traZODone (DESYREL) 50 mg tablet Take 1 Tab by mouth nightly as needed for Sleep. Indications: insomnia associated with depression    hydrOXYzine HCl (ATARAX) 25 mg tablet Take 1 Tab by mouth every six (6) hours as needed (Anxiety) for up to 10 days. Indications: anxiety, Anxiety    therapeutic multivitamin (THERA) tablet Take 1 Tab by mouth daily.  fluticasone (FLONASE) 50 mcg/actuation nasal spray 2 Sprays by Both Nostrils route two (2) times a day.  Cholecalciferol, Vitamin D3, (VITAMIN D3) 1,000 unit cap Take 1,000 Units by mouth daily.  cyanocobalamin, vitamin B-12, (VITAMIN B-12) 5,000 mcg subl 5,000 mcg by SubLINGual route daily.  aspirin (ASPIRIN) 325 mg tablet Take 325-650 mg by mouth as needed for Pain. PAST MEDICAL HISTORY: Past medical history from the initial psychiatric evaluation has been reviewed (reviewed/updated 8/5/2018) with no additional updates (I asked patient and no additional past medical history provided). Past Medical History:   Diagnosis Date    Asthma 1967    hospitalized for asthma attack x 2    Chronic kidney disease     kidney stones x 2-3 times    Ill-defined condition     nasal blockage from growth and misalignment - cannot breath out of nose - surgery in the future/cleared for colonoscopy 7/31/2014 - will bring in letter    Other ill-defined conditions(799.89)     blood in stool    Other ill-defined conditions(799.89)     hx low BP - 90's/60's-70's    PUD (peptic ulcer disease)     Thyroid disease     Unspecified adverse effect of anesthesia     nasal growth and misalignment and cannot breath through nose     Past Surgical History:   Procedure Laterality Date    HX HEENT      T & A    HX HEENT      turbinate surgery      SOCIAL HISTORY: Social history from the initial psychiatric evaluation has been reviewed (reviewed/updated 8/5/2018) with no additional updates (I asked patient and no additional social history provided). Social History     Social History    Marital status: SINGLE     Spouse name: N/A    Number of children: N/A    Years of education: N/A     Occupational History    Not on file. Social History Main Topics    Smoking status: Never Smoker    Smokeless tobacco: Never Used    Alcohol use No    Drug use: No    Sexual activity: Not Currently     Other Topics Concern    Not on file     Social History Narrative      FAMILY HISTORY: Family history from the initial psychiatric evaluation has been reviewed (reviewed/updated 8/5/2018) with no additional updates (I asked patient and no additional family history provided).    Family History   Problem Relation Age of Onset    Stroke Mother     Other Mother      erosion of esophagus    Cancer Mother melanoma/squamous    Heart Surgery Father      x2    Delayed Awakening Father     Pacemaker Father     Other Father      carotid endartarectomy/polio    Cataract Father        REVIEW OF SYSTEMS: (reviewed/updated 8/5/2018)  Appetite:good   Sleep: good   All other Review of Systems: depressed mood, AH/ VH         MENTAL STATUS EXAM & VITALS     MENTAL STATUS EXAM (MSE):    MSE FINDINGS ARE WITHIN NORMAL LIMITS (WNL) UNLESS OTHERWISE STATED BELOW. ( ALL OF THE BELOW CATEGORIES OF THE MSE HAVE BEEN REVIEWED (reviewed 8/5/2018) AND UPDATED AS DEEMED APPROPRIATE )  General Presentation age appropriate and casually dressed, cooperative   Orientation oriented to time, place and person   Vital Signs  See below (reviewed 8/5/2018); Vital Signs (BP, Pulse, & Temp) are within normal limits if not listed below.    Gait and Station Stable/steady, no ataxia   Musculoskeletal System No extrapyramidal symptoms (EPS); no abnormal muscular movements or Tardive Dyskinesia (TD); muscle strength and tone are within normal limits   Language No aphasia or dysarthria   Speech:  normal pitch and normal volume   Thought Processes logical; normal rate of thoughts; good abstract reasoning/computation   Thought Associations goal directed   Thought Content (+)delusions of reference; disorganized   Suicidal Ideations intention; (+)SI   Homicidal Ideations none   Mood:  depressed   Affect:  depressed   Memory recent  fair   Memory remote:  fair   Concentration/Attention:  wnl   Fund of Knowledge wnl   Insight:  fair   Reliability fair   Judgment:  fair          VITALS:     Patient Vitals for the past 24 hrs:   Temp Pulse Resp BP SpO2   08/05/18 0600 97.9 °F (36.6 °C) (!) 59 16 105/60 100 %     Wt Readings from Last 3 Encounters:   07/27/18 72.6 kg (160 lb)   07/16/18 72.6 kg (160 lb)   07/31/14 79.4 kg (175 lb)     Temp Readings from Last 3 Encounters:   08/05/18 97.9 °F (36.6 °C)   07/26/18 98 °F (36.7 °C)     BP Readings from Last 3 Encounters:   08/05/18 105/60   07/26/18 108/71   07/31/14 110/69     Pulse Readings from Last 3 Encounters:   08/05/18 (!) 59   07/26/18 62   07/31/14 69            DATA     LABORATORY DATA:(reviewed/updated 8/5/2018)  No results found for this or any previous visit (from the past 24 hour(s)). No results found for: VALF2, VALAC, VALP, VALPR, DS6, CRBAM, CRBAMP, CARB2, XCRBAM  No results found for: LITHM   RADIOLOGY REPORTS:(reviewed/updated 8/5/2018)  Ct Head Wo Cont    Result Date: 7/17/2018  EXAM:  CT HEAD WO CONT INDICATION:  Altered mental status. COMPARISON: None. CONTRAST:  None. TECHNIQUE: Unenhanced CT of the head was performed using 5 mm images. Brain and bone windows were generated. CT dose reduction was achieved through use of a standardized protocol tailored for this examination and automatic exposure control for dose modulation. Adaptive statistical iterative reconstruction (ASIR) was utilized. FINDINGS: The ventricles and sulci are normal in size, shape and configuration and midline. There is no significant white matter disease. There is no intracranial hemorrhage, extra-axial collection, mass, mass effect or midline shift. The basilar cisterns are open. No acute infarct is identified. The bone windows demonstrate no abnormalities. The visualized portions of the paranasal sinuses and mastoid air cells are clear. IMPRESSION: No acute findings.          MEDICATIONS     ALL MEDICATIONS:   Current Facility-Administered Medications   Medication Dose Route Frequency    [START ON 8/6/2018] risperiDONE (RisperDAL) tablet 2 mg  2 mg Oral DAILY    sertraline (ZOLOFT) tablet 175 mg  175 mg Oral DAILY WITH DINNER    LORazepam (ATIVAN) tablet 0.25 mg  0.25 mg Oral QID WITH MEALS    risperiDONE (RisperDAL) tablet 2 mg  2 mg Oral QHS    zolpidem (AMBIEN) tablet 10 mg  10 mg Oral QHS    LORazepam (ATIVAN) tablet 1 mg  1 mg Oral Q6H PRN    fluticasone (FLONASE) 50 mcg/actuation nasal spray 2 Spray  2 Spray Both Nostrils BID    ziprasidone (GEODON) 20 mg in sterile water (preservative free) 1 mL injection  20 mg IntraMUSCular BID PRN    OLANZapine (ZyPREXA) tablet 5 mg  5 mg Oral Q6H PRN    benztropine (COGENTIN) tablet 2 mg  2 mg Oral BID PRN    benztropine (COGENTIN) injection 2 mg  2 mg IntraMUSCular BID PRN    LORazepam (ATIVAN) injection 2 mg  2 mg IntraMUSCular Q4H PRN    acetaminophen (TYLENOL) tablet 650 mg  650 mg Oral Q4H PRN    ibuprofen (MOTRIN) tablet 400 mg  400 mg Oral Q8H PRN    magnesium hydroxide (MILK OF MAGNESIA) 400 mg/5 mL oral suspension 30 mL  30 mL Oral DAILY PRN    nicotine (NICODERM CQ) 21 mg/24 hr patch 1 Patch  1 Patch TransDERmal DAILY PRN    hydrOXYzine HCl (ATARAX) tablet 25 mg  25 mg Oral Q6H PRN    levothyroxine (SYNTHROID) tablet 125 mcg  125 mcg Oral ACB    cholecalciferol (VITAMIN D3) tablet 1,000 Units  1,000 Units Oral DAILY    aspirin (ASPIRIN) tablet 325 mg  325 mg Oral DAILY      SCHEDULED MEDICATIONS:   Current Facility-Administered Medications   Medication Dose Route Frequency    [START ON 8/6/2018] risperiDONE (RisperDAL) tablet 2 mg  2 mg Oral DAILY    sertraline (ZOLOFT) tablet 175 mg  175 mg Oral DAILY WITH DINNER    LORazepam (ATIVAN) tablet 0.25 mg  0.25 mg Oral QID WITH MEALS    risperiDONE (RisperDAL) tablet 2 mg  2 mg Oral QHS    zolpidem (AMBIEN) tablet 10 mg  10 mg Oral QHS    fluticasone (FLONASE) 50 mcg/actuation nasal spray 2 Spray  2 Spray Both Nostrils BID    levothyroxine (SYNTHROID) tablet 125 mcg  125 mcg Oral ACB    cholecalciferol (VITAMIN D3) tablet 1,000 Units  1,000 Units Oral DAILY    aspirin (ASPIRIN) tablet 325 mg  325 mg Oral DAILY          ASSESSMENT & PLAN     DIAGNOSES REQUIRING ACTIVE TREATMENT AND MONITORING: (reviewed/updated 8/5/2018)  Patient Active Hospital Problem List:     MDD with psychosis   Assessment: moderate to severe; exacerbated by borderline pd, Grief, lack of resources, lack of social support   Plan: Agree with inpatient hospitalization for further stabilization, safety monitoring and medication management  Close observation due to self harm   Titrate medications, Increase zoloft to 150mg. Add ambien, continue Risperdal but increase to 1mg/ 2mg hs  Reduce dose of ativan to 0.25mg three times daily  8/4- Zoloft increased to 175 mg/day. Ativan to 0.25 qid  8/5- Risperdal increased to 2 mg bid  In summary, Cheryle Mariscal, is a 59 y.o.  female who presents with a severe exacerbation of the principal diagnosis of MDD with psychosis. Patient's condition is not improving. Patient requires continued inpatient hospitalization for further stabilization, safety monitoring and medication management. I will continue to coordinate the provision of individual, milieu, occupational, group, and substance abuse therapies to address target symptoms/diagnoses as deemed appropriate for the individual patient. A coordinated, multidisplinary treatment team round was conducted with the patient (this team consists of the nurse, psychiatric unit pharmcist,  and writer). Complete current electronic health record for patient has been reviewed today including consultant notes, ancillary staff notes, nurses and psychiatric tech notes. Suicide risk assessment completed and patient deemed to be of low risk for suicide at this time. The following regarding medications was addressed during rounds with patient:   the risks and benefits of the proposed medication. The patient was given the opportunity to ask questions. Informed consent given to the use of the above medications. Will continue to adjust psychiatric and non-psychiatric medications (see above \"medication\" section and orders section for details) as deemed appropriate & based upon diagnoses and response to treatment.      I will continue to order blood tests/labs and diagnostic tests as deemed appropriate and review results as they become available (see orders for details and above listed lab/test results). I will order psychiatric records from previous [facility] hospitals to further elucidate the nature of patient's psychopathology and review once available. I will gather additional collateral information from friends, family and o/p treatment team to further elucidate the nature of patient's psychopathology and baselline level of psychiatric functioning. I certify that this patient's inpatient psychiatric hospital services furnished since the previous certification were, and continue to be, required for treatment that could reasonably be expected to improve the patient's condition, or for diagnostic study, and that the patient continues to need, on a daily basis, active treatment furnished directly by or requiring the supervision of inpatient psychiatric facility personnel. In addition, the hospital records show that services furnished were intensive treatment services, admission or related services, or equivalent services.     EXPECTED DISCHARGE DATE/DAY: TBD     DISPOSITION: Home       Signed By:   Millie Daly MD  8/5/2018

## 2018-08-07 PROCEDURE — 65220000003 HC RM SEMIPRIVATE PSYCH

## 2018-08-07 PROCEDURE — 74011250637 HC RX REV CODE- 250/637: Performed by: PSYCHIATRY & NEUROLOGY

## 2018-08-07 RX ADMIN — LEVOTHYROXINE SODIUM 125 MCG: 50 TABLET ORAL at 05:40

## 2018-08-07 RX ADMIN — VITAMIN D 1000 UNITS: 25 TAB ORAL at 08:51

## 2018-08-07 RX ADMIN — FLUTICASONE PROPIONATE 2 SPRAY: 50 SPRAY, METERED NASAL at 08:51

## 2018-08-07 RX ADMIN — LORAZEPAM 0.25 MG: 0.5 TABLET ORAL at 21:20

## 2018-08-07 RX ADMIN — SERTRALINE HYDROCHLORIDE 175 MG: 50 TABLET ORAL at 16:43

## 2018-08-07 RX ADMIN — RISPERIDONE 2 MG: 1 TABLET ORAL at 08:52

## 2018-08-07 RX ADMIN — LORAZEPAM 0.25 MG: 0.5 TABLET ORAL at 16:43

## 2018-08-07 RX ADMIN — RISPERIDONE 2 MG: 1 TABLET ORAL at 21:20

## 2018-08-07 RX ADMIN — FLUTICASONE PROPIONATE 2 SPRAY: 50 SPRAY, METERED NASAL at 16:45

## 2018-08-07 RX ADMIN — LORAZEPAM 0.25 MG: 0.5 TABLET ORAL at 08:52

## 2018-08-07 RX ADMIN — ACETAMINOPHEN 650 MG: 325 TABLET, FILM COATED ORAL at 04:29

## 2018-08-07 RX ADMIN — ASPIRIN 325 MG ORAL TABLET 325 MG: 325 PILL ORAL at 08:51

## 2018-08-07 RX ADMIN — ZOLPIDEM TARTRATE 10 MG: 10 TABLET ORAL at 21:20

## 2018-08-07 NOTE — BH NOTES
PSYCHIATRIC PROGRESS NOTE         Patient Name  Gallo Edmonds   Date of Birth 1960   [facility] [id number]   Medical Record Number  [id number]      Age  59 y.o. PCP Arneta Apgar, MD   Admit date:  2018    Room Number  321/01  @ [facility]   Date of Service  2018          PSYCHOTHERAPY SESSION NOTE:  Length of psychotherapy session: 20 minutes    Main condition/diagnosis/issues treated during session today, 2018 : depression, grief, AH, thoughts of self harm    I employed Cognitive Behavioral therapy techniques, Reality-Oriented psychotherapy, as well as supportive psychotherapy in regards to various ongoing psychosocial stressors, including the following: pre-admission and current problems; housing issues; occupational issues; academic issues; legal issues; medical issues; and stress of hospitalization. Interpersonal relationship issues and psychodynamic conflicts explored. Attempts made to alleviate maladaptive patterns. Overall, patient is not progressing    Treatment Plan Update (reviewed an updated 2018) : I will modify psychotherapy tx plan by implementing more stress management strategies, building upon cognitive behavioral techniques, increasing coping skills, as well as shoring up psychological defenses). An extended energy and skill set was needed to engage pt in psychotherapy due to some of the following: resistiveness, complexity, negativity, confrontational nature, hostile behaviors, and/or severe abnormalities in thought processes/psychosis resulting in the loss of expressive/receptive language communication skills. E & M PROGRESS NOTE:         HISTORY       CC:  \"Depressed\"  HISTORY OF PRESENT ILLNESS/INTERVAL HISTORY:  (reviewed/updated 2018). per initial evaluation:   The patient, Gallo Edmonds, is a 59 y.o.   WHITE [place]  female with a past psychiatric history significant for Depression, who presents at this time with complaints of (and/or evidence of) the following emotional symptoms: depression, suicidal thoughts/threats, self mutilation and anxiety. Additional symptomatology include anxiety, depression worse, difficulty sleeping, fear of impending doom, fearfulness, feeling depressed and feeling suicidal.  The above symptoms have been present for two weeks. These symptoms are of high severity. These symptoms are constant  in nature. The patient's condition has been precipitated by multiple psychosocial stressors . Patient's condition made worse by unresolved grief issues and Borderline tendencies. UDS:negative; BAL=0. Pt was discharged from [facility] [place] to [facility] on 18. She c/o increased suicidal ideations. Pt's mother passed away in April. Reports, she keeps hearing her voice to come join her. Contracts for safety. Presents with Depressed mood, flat affect, poor eye contact. She was seen by the Psych attending on Friday AM and started on her PTA meds. Justin García presents/reports/evidences the following emotional symptoms today, 2018:depression and suicidal thoughts/threats. The above symptoms have been present for several weeks. No incidents of agitation or self harm. Continued feelings to be with her  mother, but she was able to express more ambivalence and need to be present for her father. Nursing notes more smiling, easily coming out of room  (+)intrusive thoughts, anxiety, thoughts of self harm. SIDE EFFECTS: (reviewed/updated 2018)  None reported or admitted to. ALLERGIES:(reviewed/updated 2018)  Allergies   Allergen Reactions    Other Food Other (comments)     \"Bad chest pain\" with walnuts. Coke - asthma/stuffy head. Fish sticks causes an asthma attack.  Astepro [Azelastine] Other (comments)     Violent headaches.  Bactrim [Sulfamethoprim] Other (comments)     Difficulty breathing, chills, fever.     Banana Other (comments)     Diffculty breathing, wheezing, asthma attack.  Coconut Other (comments)     Difficulty breathing, asthma attack, SOB.  Cortisone Hives     Difficulty breathing.  Peanut Other (comments)     Wheezing, asthma attack, SOB.  Prednisone Hives     Difficulty breathing, kidney stones. MEDICATIONS PRIOR TO ADMISSION:(reviewed/updated 2018)  Prescriptions Prior to Admission   Medication Sig    levothyroxine (SYNTHROID) 125 mcg tablet Take 1 Tab by mouth Daily (before breakfast). Indications: hypothyroidism    risperiDONE (RISPERDAL) 1 mg tablet Take 1 Tab by mouth two (2) times a day. Indications: delusion    sertraline (ZOLOFT) 25 mg tablet Take 3 Tabs by mouth every evening. Indications: major depressive disorder    traZODone (DESYREL) 50 mg tablet Take 1 Tab by mouth nightly as needed for Sleep. Indications: insomnia associated with depression    [] hydrOXYzine HCl (ATARAX) 25 mg tablet Take 1 Tab by mouth every six (6) hours as needed (Anxiety) for up to 10 days. Indications: anxiety, Anxiety    therapeutic multivitamin (THERA) tablet Take 1 Tab by mouth daily.  fluticasone (FLONASE) 50 mcg/actuation nasal spray 2 Sprays by Both Nostrils route two (2) times a day.  Cholecalciferol, Vitamin D3, (VITAMIN D3) 1,000 unit cap Take 1,000 Units by mouth daily.  cyanocobalamin, vitamin B-12, (VITAMIN B-12) 5,000 mcg subl 5,000 mcg by SubLINGual route daily.  aspirin (ASPIRIN) 325 mg tablet Take 325-650 mg by mouth as needed for Pain. PAST MEDICAL HISTORY: Past medical history from the initial psychiatric evaluation has been reviewed (reviewed/updated 2018) with no additional updates (I asked patient and no additional past medical history provided).    Past Medical History:   Diagnosis Date    Asthma 1967    hospitalized for asthma attack x 2    Chronic kidney disease     kidney stones x 2-3 times    Ill-defined condition     nasal blockage from growth and misalignment - cannot breath out of nose - surgery in the future/cleared for colonoscopy 7/31/2014 - will bring in letter    Other ill-defined conditions(799.89)     blood in stool    Other ill-defined conditions(799.89)     hx low BP - 90's/60's-70's    PUD (peptic ulcer disease)     Thyroid disease     Unspecified adverse effect of anesthesia     nasal growth and misalignment and cannot breath through nose     Past Surgical History:   Procedure Laterality Date    HX HEENT      T & A    HX HEENT      turbinate surgery      SOCIAL HISTORY: Social history from the initial psychiatric evaluation has been reviewed (reviewed/updated 8/7/2018) with no additional updates (I asked patient and no additional social history provided). Social History     Social History    Marital status: SINGLE     Spouse name: N/A    Number of children: N/A    Years of education: N/A     Occupational History    Not on file. Social History Main Topics    Smoking status: Never Smoker    Smokeless tobacco: Never Used    Alcohol use No    Drug use: No    Sexual activity: Not Currently     Other Topics Concern    Not on file     Social History Narrative      FAMILY HISTORY: Family history from the initial psychiatric evaluation has been reviewed (reviewed/updated 8/7/2018) with no additional updates (I asked patient and no additional family history provided).    Family History   Problem Relation Age of Onset    Stroke Mother     Other Mother      erosion of esophagus    Cancer Mother      melanoma/squamous    Heart Surgery Father      x2    Delayed Awakening Father     Pacemaker Father     Other Father      carotid endartarectomy/polio    Cataract Father        REVIEW OF SYSTEMS: (reviewed/updated 8/7/2018)  Appetite:good   Sleep: good   All other Review of Systems: depressed mood, AH/ VH         MENTAL STATUS EXAM & VITALS     MENTAL STATUS EXAM (MSE):    MSE FINDINGS ARE WITHIN NORMAL LIMITS (WNL) UNLESS OTHERWISE STATED BELOW. ( ALL OF THE BELOW CATEGORIES OF THE MSE HAVE BEEN REVIEWED (reviewed 8/7/2018) AND UPDATED AS DEEMED APPROPRIATE )  General Presentation age appropriate and casually dressed, cooperative   Orientation oriented to time, place and person   Vital Signs  See below (reviewed 8/7/2018); Vital Signs (BP, Pulse, & Temp) are within normal limits if not listed below. Gait and Station Stable/steady, no ataxia   Musculoskeletal System No extrapyramidal symptoms (EPS); no abnormal muscular movements or Tardive Dyskinesia (TD); muscle strength and tone are within normal limits   Language No aphasia or dysarthria   Speech:  normal pitch and normal volume   Thought Processes logical; normal rate of thoughts; good abstract reasoning/computation   Thought Associations goal directed   Thought Content (+)delusions of reference; disorganized   Suicidal Ideations intention; (+)SI   Homicidal Ideations none   Mood:  depressed   Affect:  depressed   Memory recent  fair   Memory remote:  fair   Concentration/Attention:  wnl   Fund of Knowledge wnl   Insight:  fair   Reliability fair   Judgment:  fair          VITALS:     Patient Vitals for the past 24 hrs:   Temp Pulse Resp BP SpO2   08/07/18 0610 98.4 °F (36.9 °C) 67 18 101/57 96 %     Wt Readings from Last 3 Encounters:   07/27/18 72.6 kg (160 lb)   07/16/18 72.6 kg (160 lb)   07/31/14 79.4 kg (175 lb)     Temp Readings from Last 3 Encounters:   08/07/18 98.4 °F (36.9 °C)   07/26/18 98 °F (36.7 °C)     BP Readings from Last 3 Encounters:   08/07/18 101/57   07/26/18 108/71   07/31/14 110/69     Pulse Readings from Last 3 Encounters:   08/07/18 67   07/26/18 62   07/31/14 69            DATA     LABORATORY DATA:(reviewed/updated 8/7/2018)  No results found for this or any previous visit (from the past 24 hour(s)).   No results found for: VALF2, VALAC, VALP, VALPR, DS6, CRBAM, CRBAMP, CARB2, XCRBAM  No results found for: LITHM   RADIOLOGY REPORTS:(reviewed/updated 8/7/2018)  Ct Head Wo Cont    Result Date: 7/17/2018  EXAM:  CT HEAD WO CONT INDICATION:  Altered mental status. COMPARISON: None. CONTRAST:  None. TECHNIQUE: Unenhanced CT of the head was performed using 5 mm images. Brain and bone windows were generated. CT dose reduction was achieved through use of a standardized protocol tailored for this examination and automatic exposure control for dose modulation. Adaptive statistical iterative reconstruction (ASIR) was utilized. FINDINGS: The ventricles and sulci are normal in size, shape and configuration and midline. There is no significant white matter disease. There is no intracranial hemorrhage, extra-axial collection, mass, mass effect or midline shift. The basilar cisterns are open. No acute infarct is identified. The bone windows demonstrate no abnormalities. The visualized portions of the paranasal sinuses and mastoid air cells are clear. IMPRESSION: No acute findings.          MEDICATIONS     ALL MEDICATIONS:   Current Facility-Administered Medications   Medication Dose Route Frequency    risperiDONE (RisperDAL) tablet 2 mg  2 mg Oral BID    sertraline (ZOLOFT) tablet 175 mg  175 mg Oral DAILY WITH DINNER    LORazepam (ATIVAN) tablet 0.25 mg  0.25 mg Oral QID WITH MEALS    zolpidem (AMBIEN) tablet 10 mg  10 mg Oral QHS    LORazepam (ATIVAN) tablet 1 mg  1 mg Oral Q6H PRN    fluticasone (FLONASE) 50 mcg/actuation nasal spray 2 Spray  2 Spray Both Nostrils BID    ziprasidone (GEODON) 20 mg in sterile water (preservative free) 1 mL injection  20 mg IntraMUSCular BID PRN    OLANZapine (ZyPREXA) tablet 5 mg  5 mg Oral Q6H PRN    benztropine (COGENTIN) tablet 2 mg  2 mg Oral BID PRN    benztropine (COGENTIN) injection 2 mg  2 mg IntraMUSCular BID PRN    LORazepam (ATIVAN) injection 2 mg  2 mg IntraMUSCular Q4H PRN    acetaminophen (TYLENOL) tablet 650 mg  650 mg Oral Q4H PRN    ibuprofen (MOTRIN) tablet 400 mg  400 mg Oral Q8H PRN    magnesium hydroxide (MILK OF MAGNESIA) 400 mg/5 mL oral suspension 30 mL  30 mL Oral DAILY PRN    nicotine (NICODERM CQ) 21 mg/24 hr patch 1 Patch  1 Patch TransDERmal DAILY PRN    hydrOXYzine HCl (ATARAX) tablet 25 mg  25 mg Oral Q6H PRN    levothyroxine (SYNTHROID) tablet 125 mcg  125 mcg Oral ACB    cholecalciferol (VITAMIN D3) tablet 1,000 Units  1,000 Units Oral DAILY    aspirin (ASPIRIN) tablet 325 mg  325 mg Oral DAILY      SCHEDULED MEDICATIONS:   Current Facility-Administered Medications   Medication Dose Route Frequency    risperiDONE (RisperDAL) tablet 2 mg  2 mg Oral BID    sertraline (ZOLOFT) tablet 175 mg  175 mg Oral DAILY WITH DINNER    LORazepam (ATIVAN) tablet 0.25 mg  0.25 mg Oral QID WITH MEALS    zolpidem (AMBIEN) tablet 10 mg  10 mg Oral QHS    fluticasone (FLONASE) 50 mcg/actuation nasal spray 2 Spray  2 Spray Both Nostrils BID    levothyroxine (SYNTHROID) tablet 125 mcg  125 mcg Oral ACB    cholecalciferol (VITAMIN D3) tablet 1,000 Units  1,000 Units Oral DAILY    aspirin (ASPIRIN) tablet 325 mg  325 mg Oral DAILY          ASSESSMENT & PLAN     DIAGNOSES REQUIRING ACTIVE TREATMENT AND MONITORING: (reviewed/updated 8/7/2018)  Patient Active Hospital Problem List:     MDD with psychosis   Assessment: moderate to severe; exacerbated by borderline pd, Grief, lack of resources, lack of social support   Plan: Agree with inpatient hospitalization for further stabilization, safety monitoring and medication management  Close observation due to self harm   Titrate medications, Increase zoloft to 150mg. Add ambien, continue Risperdal but increase to 1mg/ 2mg hs  Reduce dose of ativan to 0.25mg three times daily  8/4- Zoloft increased to 175 mg/day. Ativan to 0.25 qid  8/5- Risperdal increased to 2 mg bid    In summary, Luis Fernando Tenorio, is a 59 y.o.  female who presents with a severe exacerbation of the principal diagnosis of MDD with psychosis. Patient's condition is not improving.   Patient requires continued inpatient hospitalization for further stabilization, safety monitoring and medication management. I will continue to coordinate the provision of individual, milieu, occupational, group, and substance abuse therapies to address target symptoms/diagnoses as deemed appropriate for the individual patient. A coordinated, multidisplinary treatment team round was conducted with the patient (this team consists of the nurse, psychiatric unit pharmcist,  and writer). Complete current electronic health record for patient has been reviewed today including consultant notes, ancillary staff notes, nurses and psychiatric tech notes. Suicide risk assessment completed and patient deemed to be of low risk for suicide at this time. The following regarding medications was addressed during rounds with patient:   the risks and benefits of the proposed medication. The patient was given the opportunity to ask questions. Informed consent given to the use of the above medications. Will continue to adjust psychiatric and non-psychiatric medications (see above \"medication\" section and orders section for details) as deemed appropriate & based upon diagnoses and response to treatment. I will continue to order blood tests/labs and diagnostic tests as deemed appropriate and review results as they become available (see orders for details and above listed lab/test results). I will order psychiatric records from previous [facility] hospitals to further elucidate the nature of patient's psychopathology and review once available. I will gather additional collateral information from friends, family and o/p treatment team to further elucidate the nature of patient's psychopathology and baselline level of psychiatric functioning.          I certify that this patient's inpatient psychiatric hospital services furnished since the previous certification were, and continue to be, required for treatment that could reasonably be expected to improve the patient's condition, or for diagnostic study, and that the patient continues to need, on a daily basis, active treatment furnished directly by or requiring the supervision of inpatient psychiatric facility personnel. In addition, the hospital records show that services furnished were intensive treatment services, admission or related services, or equivalent services.     EXPECTED DISCHARGE DATE/DAY: TBD     DISPOSITION: Home       Signed By:   Roberto Strickland MD  8/7/2018

## 2018-08-07 NOTE — BH NOTES
Patient isolated in room until she was encouraged to come out. Patient paced back and forth in the hallway. Affect is blunt, but she was noted smiling at intervals when talking to staff. Patient shared more of her thoughts in team today. Patient stated that she saw her mother again last night and that her mom is still saying she wants her to be her. Patient stated that she wanted to be with her mom, but she didn't want to leave her father. Staff will continue to observe patient closely to assure her safety and will offer emotional support.

## 2018-08-07 NOTE — BH NOTES
Pt observed resting quietly, respirations even and unlabored. No s/s distress noted, c/o headache and received Tylenol. Pt slept 6 hours. Will continue Q 15 minute monitoring for safety.

## 2018-08-07 NOTE — BH NOTES
7-11 Pt has been visible in milieu. Interacts appropriately with peers with encouragement. Calm, quiet and cooperative with staff. Pt is medication and meal compliant. Pt visited with her father. Will continue to monitor Q 15 minutes for safety and provide support.

## 2018-08-07 NOTE — BH NOTES
GROUP THERAPY PROGRESS NOTE    The patient Kristy herron 59 y.o. female is participating in [address] [place] [address]. Group time: 45 minutes    Personal goal for participation: To identify people and things most grateful for    Goal orientation:  personal    Group therapy participation: active    Therapeutic interventions reviewed and discussed: worksheet    Impression of participation:  The patient was attentive.     Duy Romero  8/7/2018  1:30 PM

## 2018-08-07 NOTE — BH NOTES
GROUP THERAPY PROGRESS NOTE    The patient Kristy Patel a 59 y.o. female is participating in [employer]. Group time: 1 hour    Personal goal for participation: To concentrate on selected task    Goal orientation: social    Group therapy participation: active     Therapeutic interventions reviewed and discussed: Crafts, games, music    Impression of participation: The patient was attentive.     Meryl Severance  8/7/2018  5:23 PM

## 2018-08-07 NOTE — BH NOTES
Problem: Depressed Mood (Adult/Pediatric)  Goal: *STG: Complies with medication therapy  Outcome: Progressing Towards Goal  Pt is visible in the milieu this evening. Pt is more pleasant and more verbal in the unit tonight. Pt is meds/meals compliant. Pt presently is not expressing SI. Will continue to monitor q 15 for safety, mood and behavior changes.

## 2018-08-08 PROCEDURE — 74011250637 HC RX REV CODE- 250/637: Performed by: PSYCHIATRY & NEUROLOGY

## 2018-08-08 PROCEDURE — 65220000003 HC RM SEMIPRIVATE PSYCH

## 2018-08-08 RX ADMIN — LORAZEPAM 0.25 MG: 0.5 TABLET ORAL at 21:52

## 2018-08-08 RX ADMIN — SERTRALINE HYDROCHLORIDE 175 MG: 50 TABLET ORAL at 17:11

## 2018-08-08 RX ADMIN — VITAMIN D 1000 UNITS: 25 TAB ORAL at 07:58

## 2018-08-08 RX ADMIN — LORAZEPAM 0.25 MG: 0.5 TABLET ORAL at 12:00

## 2018-08-08 RX ADMIN — RISPERIDONE 2 MG: 1 TABLET ORAL at 21:52

## 2018-08-08 RX ADMIN — LORAZEPAM 0.25 MG: 0.5 TABLET ORAL at 12:37

## 2018-08-08 RX ADMIN — LEVOTHYROXINE SODIUM 125 MCG: 50 TABLET ORAL at 06:16

## 2018-08-08 RX ADMIN — LORAZEPAM 0.25 MG: 0.5 TABLET ORAL at 17:10

## 2018-08-08 RX ADMIN — RISPERIDONE 2 MG: 1 TABLET ORAL at 07:57

## 2018-08-08 RX ADMIN — ASPIRIN 325 MG ORAL TABLET 325 MG: 325 PILL ORAL at 07:58

## 2018-08-08 RX ADMIN — FLUTICASONE PROPIONATE 2 SPRAY: 50 SPRAY, METERED NASAL at 17:14

## 2018-08-08 RX ADMIN — ZOLPIDEM TARTRATE 10 MG: 10 TABLET ORAL at 21:52

## 2018-08-08 RX ADMIN — FLUTICASONE PROPIONATE 2 SPRAY: 50 SPRAY, METERED NASAL at 07:59

## 2018-08-08 RX ADMIN — LORAZEPAM 0.25 MG: 0.5 TABLET ORAL at 07:56

## 2018-08-08 NOTE — BH NOTES
GROUP THERAPY PROGRESS NOTE    The patient Bob herron 59 y.o. female is participating in [employer]. Group time: 1 hour    Personal goal for participation:  To concentrate on selected task    Goal orientation: social    Group therapy participation: minimal    Therapeutic interventions reviewed and discussed: Crafts, games, music    Impression of participation: The patient was present-elected to watch    Tra Merritt  8/8/2018  5:28 PM

## 2018-08-08 NOTE — BH NOTES
Patient refused to get up this morning, except for medications and meals. Patient ate 100% of meals. Patient stayed in bed with covers pulled over her head. Patient had little interaction with staff except when she had a request to see her SW and .

## 2018-08-08 NOTE — PROGRESS NOTES
Ms. Ninoska Tyler actively participated in [address] about [facility] on [address] [facility] unit    [address] [facility], M.Div.   [facility] [id number] [id number])

## 2018-08-08 NOTE — BH NOTES
PSYCHIATRIC PROGRESS NOTE         Patient Name  Jada Peña   Date of Birth 1960   [facility] [id number]   Medical Record Number  [id number]      Age  59 y.o. PCP Ciro Martinez MD   Admit date:  7/27/2018    Room Number  321/01  @ [facility]   Date of Service  8/8/2018          PSYCHOTHERAPY SESSION NOTE:  Length of psychotherapy session: 20 minutes    Main condition/diagnosis/issues treated during session today, 8/8/2018 : depression, grief, AH, thoughts of self harm    I employed Cognitive Behavioral therapy techniques, Reality-Oriented psychotherapy, as well as supportive psychotherapy in regards to various ongoing psychosocial stressors, including the following: pre-admission and current problems; housing issues; occupational issues; academic issues; legal issues; medical issues; and stress of hospitalization. Interpersonal relationship issues and psychodynamic conflicts explored. Attempts made to alleviate maladaptive patterns. Overall, patient is not progressing    Treatment Plan Update (reviewed an updated 8/8/2018) : I will modify psychotherapy tx plan by implementing more stress management strategies, building upon cognitive behavioral techniques, increasing coping skills, as well as shoring up psychological defenses). An extended energy and skill set was needed to engage pt in psychotherapy due to some of the following: resistiveness, complexity, negativity, confrontational nature, hostile behaviors, and/or severe abnormalities in thought processes/psychosis resulting in the loss of expressive/receptive language communication skills. E & M PROGRESS NOTE:         HISTORY       CC:  \"Depressed\"  HISTORY OF PRESENT ILLNESS/INTERVAL HISTORY:  (reviewed/updated 8/8/2018). per initial evaluation:   The patient, Jada Peña, is a 59 y.o.   WHITE [place]  female with a past psychiatric history significant for Depression, who presents at this time with complaints of (and/or evidence of) the following emotional symptoms: depression, suicidal thoughts/threats, self mutilation and anxiety. Additional symptomatology include anxiety, depression worse, difficulty sleeping, fear of impending doom, fearfulness, feeling depressed and feeling suicidal.  The above symptoms have been present for two weeks. These symptoms are of high severity. These symptoms are constant  in nature. The patient's condition has been precipitated by multiple psychosocial stressors . Patient's condition made worse by unresolved grief issues and Borderline tendencies. UDS:negative; BAL=0. Pt was [facility] on 18. She c/o increased suicidal ideations. Pt's mother passed away in April. Reports, she keeps hearing her voice to come join her. Contracts for safety. Presents with Depressed mood, flat affect, poor eye contact. She was seen by the Psych attending on Friday AM and started on her PTA meds. Ssuana Ponce presents/reports/evidences the following emotional symptoms today, 2018:depression and suicidal thoughts/threats. The above symptoms have been present for several weeks. No incidents of agitation or self harm. Continued improvements in her mood, affect and overall engagement with others. She enjoyed a visit with her father yesterday. She is looking forward to discharge but she finds it difficult to be in the home where her mother . Compliant with medications. SIDE EFFECTS: (reviewed/updated 2018)  None reported or admitted to. ALLERGIES:(reviewed/updated 2018)  Allergies   Allergen Reactions    Other Food Other (comments)     \"Bad chest pain\" with walnuts. Coke - asthma/stuffy head. Fish sticks causes an asthma attack.  Astepro [Azelastine] Other (comments)     Violent headaches.  Bactrim [Sulfamethoprim] Other (comments)     Difficulty breathing, chills, fever.     Banana Other (comments)     Diffculty breathing, wheezing, asthma attack.  Coconut Other (comments)     Difficulty breathing, asthma attack, SOB.  Cortisone Hives     Difficulty breathing.  Peanut Other (comments)     Wheezing, asthma attack, SOB.  Prednisone Hives     Difficulty breathing, kidney stones. MEDICATIONS PRIOR TO ADMISSION:(reviewed/updated 2018)  Prescriptions Prior to Admission   Medication Sig    levothyroxine (SYNTHROID) 125 mcg tablet Take 1 Tab by mouth Daily (before breakfast). Indications: hypothyroidism    risperiDONE (RISPERDAL) 1 mg tablet Take 1 Tab by mouth two (2) times a day. Indications: delusion    sertraline (ZOLOFT) 25 mg tablet Take 3 Tabs by mouth every evening. Indications: major depressive disorder    traZODone (DESYREL) 50 mg tablet Take 1 Tab by mouth nightly as needed for Sleep. Indications: insomnia associated with depression    [] hydrOXYzine HCl (ATARAX) 25 mg tablet Take 1 Tab by mouth every six (6) hours as needed (Anxiety) for up to 10 days. Indications: anxiety, Anxiety    therapeutic multivitamin (THERA) tablet Take 1 Tab by mouth daily.  fluticasone (FLONASE) 50 mcg/actuation nasal spray 2 Sprays by Both Nostrils route two (2) times a day.  Cholecalciferol, Vitamin D3, (VITAMIN D3) 1,000 unit cap Take 1,000 Units by mouth daily.  cyanocobalamin, vitamin B-12, (VITAMIN B-12) 5,000 mcg subl 5,000 mcg by SubLINGual route daily.  aspirin (ASPIRIN) 325 mg tablet Take 325-650 mg by mouth as needed for Pain. PAST MEDICAL HISTORY: Past medical history from the initial psychiatric evaluation has been reviewed (reviewed/updated 2018) with no additional updates (I asked patient and no additional past medical history provided).    Past Medical History:   Diagnosis Date    Asthma 1967    hospitalized for asthma attack x 2    Chronic kidney disease     kidney stones x 2-3 times    Ill-defined condition     nasal blockage from growth and misalignment - cannot breath out of nose - surgery in the future/cleared for colonoscopy 7/31/2014 - will bring in letter    Other ill-defined conditions(799.89)     blood in stool    Other ill-defined conditions(799.89)     hx low BP - 90's/60's-70's    PUD (peptic ulcer disease)     Thyroid disease     Unspecified adverse effect of anesthesia     nasal growth and misalignment and cannot breath through nose     Past Surgical History:   Procedure Laterality Date    HX HEENT      T & A    HX HEENT      turbinate surgery      SOCIAL HISTORY: Social history from the initial psychiatric evaluation has been reviewed (reviewed/updated 8/8/2018) with no additional updates (I asked patient and no additional social history provided). Social History     Social History    Marital status: SINGLE     Spouse name: N/A    Number of children: N/A    Years of education: N/A     Occupational History    Not on file. Social History Main Topics    Smoking status: Never Smoker    Smokeless tobacco: Never Used    Alcohol use No    Drug use: No    Sexual activity: Not Currently     Other Topics Concern    Not on file     Social History Narrative      FAMILY HISTORY: Family history from the initial psychiatric evaluation has been reviewed (reviewed/updated 8/8/2018) with no additional updates (I asked patient and no additional family history provided).    Family History   Problem Relation Age of Onset    Stroke Mother     Other Mother      erosion of esophagus    Cancer Mother      melanoma/squamous    Heart Surgery Father      x2    Delayed Awakening Father     Pacemaker Father     Other Father      carotid endartarectomy/polio    Cataract Father        REVIEW OF SYSTEMS: (reviewed/updated 8/8/2018)  Appetite:good   Sleep: good   All other Review of Systems: depressed mood, AH/ VH         MENTAL STATUS EXAM & VITALS     MENTAL STATUS EXAM (MSE):    MSE FINDINGS ARE WITHIN NORMAL LIMITS (WNL) UNLESS OTHERWISE STATED BELOW. ( ALL OF THE BELOW CATEGORIES OF THE MSE HAVE BEEN REVIEWED (reviewed 8/8/2018) AND UPDATED AS DEEMED APPROPRIATE )  General Presentation age appropriate and casually dressed, cooperative   Orientation oriented to time, place and person   Vital Signs  See below (reviewed 8/8/2018); Vital Signs (BP, Pulse, & Temp) are within normal limits if not listed below. Gait and Station Stable/steady, no ataxia   Musculoskeletal System No extrapyramidal symptoms (EPS); no abnormal muscular movements or Tardive Dyskinesia (TD); muscle strength and tone are within normal limits   Language No aphasia or dysarthria   Speech:  normal pitch and normal volume   Thought Processes logical; normal rate of thoughts; good abstract reasoning/computation   Thought Associations goal directed   Thought Content (+)delusions of reference; disorganized   Suicidal Ideations intention; (+)SI   Homicidal Ideations none   Mood:  depressed   Affect:  Improving, increased range of affect; smiling more   Memory recent  fair   Memory remote:  fair   Concentration/Attention:  wnl   Fund of Knowledge wnl   Insight:  fair   Reliability fair   Judgment:  fair          VITALS:     Patient Vitals for the past 24 hrs:   Temp Pulse Resp BP SpO2   08/08/18 0619 98.3 °F (36.8 °C) 62 16 101/69 98 %     Wt Readings from Last 3 Encounters:   07/27/18 72.6 kg (160 lb)   07/16/18 72.6 kg (160 lb)   07/31/14 79.4 kg (175 lb)     Temp Readings from Last 3 Encounters:   08/08/18 98.3 °F (36.8 °C)   07/26/18 98 °F (36.7 °C)     BP Readings from Last 3 Encounters:   08/08/18 101/69   07/26/18 108/71   07/31/14 110/69     Pulse Readings from Last 3 Encounters:   08/08/18 62   07/26/18 62   07/31/14 69            DATA     LABORATORY DATA:(reviewed/updated 8/8/2018)  No results found for this or any previous visit (from the past 24 hour(s)).   No results found for: VALF2, VALAC, VALP, VALPR, DS6, CRBAM, CRBAMP, CARB2, XCRBAM  No results found for: [address] [place] [address] Danita Durand REPORTS:(reviewed/updated 8/8/2018)  Ct Head Wo Cont    Result Date: 7/17/2018  EXAM:  CT HEAD WO CONT INDICATION:  Altered mental status. COMPARISON: None. CONTRAST:  None. TECHNIQUE: Unenhanced CT of the head was performed using 5 mm images. Brain and bone windows were generated. CT dose reduction was achieved through use of a standardized protocol tailored for this examination and automatic exposure control for dose modulation. Adaptive statistical iterative reconstruction (ASIR) was utilized. FINDINGS: The ventricles and sulci are normal in size, shape and configuration and midline. There is no significant white matter disease. There is no intracranial hemorrhage, extra-axial collection, mass, mass effect or midline shift. The basilar cisterns are open. No acute infarct is identified. The bone windows demonstrate no abnormalities. The visualized portions of the paranasal sinuses and mastoid air cells are clear. IMPRESSION: No acute findings.          MEDICATIONS     ALL MEDICATIONS:   Current Facility-Administered Medications   Medication Dose Route Frequency    risperiDONE (RisperDAL) tablet 2 mg  2 mg Oral BID    sertraline (ZOLOFT) tablet 175 mg  175 mg Oral DAILY WITH DINNER    LORazepam (ATIVAN) tablet 0.25 mg  0.25 mg Oral QID WITH MEALS    zolpidem (AMBIEN) tablet 10 mg  10 mg Oral QHS    LORazepam (ATIVAN) tablet 1 mg  1 mg Oral Q6H PRN    fluticasone (FLONASE) 50 mcg/actuation nasal spray 2 Spray  2 Spray Both Nostrils BID    ziprasidone (GEODON) 20 mg in sterile water (preservative free) 1 mL injection  20 mg IntraMUSCular BID PRN    OLANZapine (ZyPREXA) tablet 5 mg  5 mg Oral Q6H PRN    benztropine (COGENTIN) tablet 2 mg  2 mg Oral BID PRN    benztropine (COGENTIN) injection 2 mg  2 mg IntraMUSCular BID PRN    LORazepam (ATIVAN) injection 2 mg  2 mg IntraMUSCular Q4H PRN    acetaminophen (TYLENOL) tablet 650 mg  650 mg Oral Q4H PRN    ibuprofen (MOTRIN) tablet 400 mg  400 mg Oral Q8H PRN    magnesium hydroxide (MILK OF MAGNESIA) 400 mg/5 mL oral suspension 30 mL  30 mL Oral DAILY PRN    nicotine (NICODERM CQ) 21 mg/24 hr patch 1 Patch  1 Patch TransDERmal DAILY PRN    hydrOXYzine HCl (ATARAX) tablet 25 mg  25 mg Oral Q6H PRN    levothyroxine (SYNTHROID) tablet 125 mcg  125 mcg Oral ACB    cholecalciferol (VITAMIN D3) tablet 1,000 Units  1,000 Units Oral DAILY    aspirin (ASPIRIN) tablet 325 mg  325 mg Oral DAILY      SCHEDULED MEDICATIONS:   Current Facility-Administered Medications   Medication Dose Route Frequency    risperiDONE (RisperDAL) tablet 2 mg  2 mg Oral BID    sertraline (ZOLOFT) tablet 175 mg  175 mg Oral DAILY WITH DINNER    LORazepam (ATIVAN) tablet 0.25 mg  0.25 mg Oral QID WITH MEALS    zolpidem (AMBIEN) tablet 10 mg  10 mg Oral QHS    fluticasone (FLONASE) 50 mcg/actuation nasal spray 2 Spray  2 Spray Both Nostrils BID    levothyroxine (SYNTHROID) tablet 125 mcg  125 mcg Oral ACB    cholecalciferol (VITAMIN D3) tablet 1,000 Units  1,000 Units Oral DAILY    aspirin (ASPIRIN) tablet 325 mg  325 mg Oral DAILY          ASSESSMENT & PLAN     DIAGNOSES REQUIRING ACTIVE TREATMENT AND MONITORING: (reviewed/updated 8/8/2018)  Patient Active Hospital Problem List:     MDD with psychosis   Assessment: moderate to severe; exacerbated by borderline pd, Grief, lack of resources, lack of social support   Plan: Agree with inpatient hospitalization for further stabilization, safety monitoring and medication management  Close observation due to self harm   Titrate medications, Increase zoloft to 150mg. Add ambien, continue Risperdal but increase to 1mg/ 2mg hs  Reduce dose of ativan to 0.25mg three times daily  8/4- Zoloft increased to 175 mg/day. Ativan to 0.25 qid  8/5- Risperdal increased to 2 mg bid    In summary, Arcadio Kumar, is a 59 y.o.  female who presents with a severe exacerbation of the principal diagnosis of MDD with psychosis. Patient's condition is not improving.   Patient requires continued inpatient hospitalization for further stabilization, safety monitoring and medication management. I will continue to coordinate the provision of individual, milieu, occupational, group, and substance abuse therapies to address target symptoms/diagnoses as deemed appropriate for the individual patient. A coordinated, multidisplinary treatment team round was conducted with the patient (this team consists of the nurse, psychiatric unit pharmcist,  and writer). Complete current electronic health record for patient has been reviewed today including consultant notes, ancillary staff notes, nurses and psychiatric tech notes. Suicide risk assessment completed and patient deemed to be of low risk for suicide at this time. The following regarding medications was addressed during rounds with patient:   the risks and benefits of the proposed medication. The patient was given the opportunity to ask questions. Informed consent given to the use of the above medications. Will continue to adjust psychiatric and non-psychiatric medications (see above \"medication\" section and orders section for details) as deemed appropriate & based upon diagnoses and response to treatment. I will continue to order blood tests/labs and diagnostic tests as deemed appropriate and review results as they become available (see orders for details and above listed lab/test results). I will order psychiatric records from previous [facility] hospitals to further elucidate the nature of patient's psychopathology and review once available. I will gather additional collateral information from friends, family and o/p treatment team to further elucidate the nature of patient's psychopathology and baselline level of psychiatric functioning.          I certify that this patient's inpatient psychiatric hospital services furnished since the previous certification were, and continue to be, required for treatment that could reasonably be expected to improve the patient's condition, or for diagnostic study, and that the patient continues to need, on a daily basis, active treatment furnished directly by or requiring the supervision of inpatient psychiatric facility personnel. In addition, the hospital records show that services furnished were intensive treatment services, admission or related services, or equivalent services.     EXPECTED DISCHARGE DATE/DAY: TBD     DISPOSITION: Home       Signed By:   Dale Arita MD  8/8/2018

## 2018-08-08 NOTE — PROGRESS NOTES
Follow-up visit with patient on [address] [facility] per patient's request. Patient is still focused on being with her mother again and talked at length about the possibility of seeking out a psychic medium to perhaps provide her with the opportunity to talk to her mom again, which she believes would be very helpful to her. Explored with patient the possibility of this making her grief more difficult if it is not successful which the patient acknowledged and discussed appropriately. Patient was also much more open and candid during this visit about her frustration and anger towards her mother because she did not take proper care of herself, something the patient views as a betrayal of her mother's claims of love and dedication. Patent also appears to be struggling with her desire to honor her mother's memory and see her again, with her own desires to move forward. She talked extensively about the things she gave up in her life in order to care for her mother and the difficulty she sometimes has being in the house where she and her father have not moved or changed anything since her mother's death. Explored the possibility of making small changes and steps in the house, including rearranging some of the rooms or furniture, and working through her mother's room one step at a time. Patient indicated she would find this much easier than trying to do everything at once and shared ideas she had for how she could accomplish this. Patient also connected to and responded positively to the idea that she is a part of her mother and her mother's legacy, an idea she had not previously considered. Time was spent discussing the things she would want to do with her life, including travel, and how she can honor her mother's memory through her actions, behaviors, and enjoyment of life. Focused on the fact that the patient has given up many things to care for her mother and now has the opportunity to create a life for herself. Patient still desires concrete answers about death, heaven, ghost and other supernatural aspects. Reiterated that concrete answers in these areas are not possible and empowered patient to decide what she believes and make choices each day for herself and that would make her mother proud. Patient is aware of  availability as needed and desired. Chaplain Janet Coe M.Div.    Paging Service [phone])

## 2018-08-09 PROCEDURE — 74011250637 HC RX REV CODE- 250/637: Performed by: PSYCHIATRY & NEUROLOGY

## 2018-08-09 PROCEDURE — 65220000003 HC RM SEMIPRIVATE PSYCH

## 2018-08-09 RX ORDER — SERTRALINE HYDROCHLORIDE 50 MG/1
200 TABLET, FILM COATED ORAL
Status: DISCONTINUED | OUTPATIENT
Start: 2018-08-09 | End: 2018-08-13 | Stop reason: HOSPADM

## 2018-08-09 RX ORDER — LORAZEPAM 0.5 MG/1
0.25 TABLET ORAL 3 TIMES DAILY
Status: DISCONTINUED | OUTPATIENT
Start: 2018-08-09 | End: 2018-08-13 | Stop reason: HOSPADM

## 2018-08-09 RX ADMIN — FLUTICASONE PROPIONATE 2 SPRAY: 50 SPRAY, METERED NASAL at 08:27

## 2018-08-09 RX ADMIN — RISPERIDONE 2 MG: 1 TABLET ORAL at 08:27

## 2018-08-09 RX ADMIN — FLUTICASONE PROPIONATE 2 SPRAY: 50 SPRAY, METERED NASAL at 17:13

## 2018-08-09 RX ADMIN — ASPIRIN 325 MG ORAL TABLET 325 MG: 325 PILL ORAL at 08:27

## 2018-08-09 RX ADMIN — ZOLPIDEM TARTRATE 10 MG: 10 TABLET ORAL at 21:21

## 2018-08-09 RX ADMIN — RISPERIDONE 2 MG: 1 TABLET ORAL at 21:21

## 2018-08-09 RX ADMIN — LORAZEPAM 0.25 MG: 0.5 TABLET ORAL at 08:28

## 2018-08-09 RX ADMIN — SERTRALINE HYDROCHLORIDE 200 MG: 50 TABLET ORAL at 17:12

## 2018-08-09 RX ADMIN — VITAMIN D 1000 UNITS: 25 TAB ORAL at 08:27

## 2018-08-09 RX ADMIN — LORAZEPAM 0.25 MG: 0.5 TABLET ORAL at 11:43

## 2018-08-09 RX ADMIN — LEVOTHYROXINE SODIUM 125 MCG: 50 TABLET ORAL at 06:13

## 2018-08-09 RX ADMIN — LORAZEPAM 0.25 MG: 0.5 TABLET ORAL at 17:12

## 2018-08-09 NOTE — PROGRESS NOTES
GROUP THERAPY PROGRESS NOTE      Roxane Bias was present for medication group. GROUP TIME: 45 minutes, Thursdays 2pm    PERSONAL GOAL FOR PARTICIPATION: To be present for group, participate in discussion, and answer patient-directed questions. GOAL ORIENTATION: Personal    THERAPEUTIC INTERVENTIONS REVIEWED AND DISCUSSED: The following topics were presented: storage of medications, how to remember to refill medications and keep up with doctor appointments, relapse prevention, keeping a record of all medication including prescription and non-prescription drugs, and who to contact with medication questions. Patients were given time to ask questions regarding their current therapy. IMPRESSION OF PARTICIPATION: Patient required frequent prompting to answer discussion questions. She participated in medication bingo and was provided with a pill box.      JULIET GuzmánD, BCPS

## 2018-08-09 NOTE — BH NOTES
SOCIAL WORK    Writer spoke with Lyndsay [facility]([phone] or [phone]) who requested pt be kept until Monday until [facility] referral(paid by regional project funding) can be made to provide in home crisis (6 hours daily/7 days a week for 2 weeks) if pt agrees to service in which pt was in agreement during treatment team today. Plan is to have family meeting with pt's father at noon tomorrow to discuss what aftercare will look like for pt in which pt is also in agreement to meeting with father also. Writer contacted pt's father who shared he will be at meeting tomorrow with pt and Dr. Yanna Ballard at 15 noon. Pt's father shared his version of what he thought led pt to hospital. Mr. Sim Cheung reported he thought his daughter had been pulled over by a  and it \"startled\" her so badly she had to come to [facility] to have her heart checked. Mr. Sim Cheung stated that his daughter has been his and her mother's  Main care taker for years and that she pays the bills. Mr. Sim Cheung reported he is now struggling to get bills paid but voiced he is \"managing. \" Social Work Department will continue supporting pt towards discharge goals. Addendum: Pt inquired about Crisis coming into home and wondered if she could meet them some place as opposed to in pt's home. Pt shared she does not want her father to know the things she has shared in treatment team with Dr. Yanna Ballard in which writer advised that those things would not be shared with her father. Pt already voicing concerns about outpatient at [facility] which we discussed. Writer advised pt to ask questions tomorrow at family meeting which she agreed to do. Social Work Department will continue supporting pt towards discharge goals.

## 2018-08-09 NOTE — PROGRESS NOTES
Problem: Depressed Mood (Adult/Pediatric)  Goal: *STG: Attends activities and groups  Outcome: Progressing Towards Goal  Pt is alert and oriented, affect flat, brightens on approach. Pt attends groups and interacts with peers, still reports hearing her mothers voice during the night. Pt ate all her meals. Pt contracts for safety. Observed for self harm. Observe on q15' rounds. Assess mood and behavior. Assist to reality test. Encourage compliance with meds and groups.

## 2018-08-09 NOTE — BH NOTES
Patient continued to isolate more to her room this shift. Patient reminded that she must keep her door open. Patient's affect is brighter. Patient is displaying some paranoid tendencies. Patient is somewhat hyper-vengilant of her surroundings. Writer noted that patient taped her journal closed. Patient continues to say that she sees her mom at night. Will continue to observe patient for destructive behavior.

## 2018-08-09 NOTE — BH NOTES
PSYCHIATRIC PROGRESS NOTE         Patient Name  Mylene Conway   Date of Birth 1960   [facility] [id number]   Medical Record Number  [id number]      Age  59 y.o. PCP Karen Ramirez MD   Admit date:  7/27/2018    Room Number  321/01  @ [facility]   Date of Service  8/9/2018          PSYCHOTHERAPY SESSION NOTE:  Length of psychotherapy session: 20 minutes    Main condition/diagnosis/issues treated during session today, 8/9/2018 : depression, grief, AH, thoughts of self harm    I employed Cognitive Behavioral therapy techniques, Reality-Oriented psychotherapy, as well as supportive psychotherapy in regards to various ongoing psychosocial stressors, including the following: pre-admission and current problems; housing issues; occupational issues; academic issues; legal issues; medical issues; and stress of hospitalization. Interpersonal relationship issues and psychodynamic conflicts explored. Attempts made to alleviate maladaptive patterns. Overall, patient is not progressing    Treatment Plan Update (reviewed an updated 8/9/2018) : I will modify psychotherapy tx plan by implementing more stress management strategies, building upon cognitive behavioral techniques, increasing coping skills, as well as shoring up psychological defenses). An extended energy and skill set was needed to engage pt in psychotherapy due to some of the following: resistiveness, complexity, negativity, confrontational nature, hostile behaviors, and/or severe abnormalities in thought processes/psychosis resulting in the loss of expressive/receptive language communication skills. E & M PROGRESS NOTE:         HISTORY       CC:  \"Depressed\"  HISTORY OF PRESENT ILLNESS/INTERVAL HISTORY:  (reviewed/updated 8/9/2018). per initial evaluation:   The patient, Mylene Conway, is a 59 y.o.   WHITE [place]  female with a past psychiatric history significant for Depression, who presents at this time with complaints of (and/or evidence of) the following emotional symptoms: depression, suicidal thoughts/threats, self mutilation and anxiety. Additional symptomatology include anxiety, depression worse, difficulty sleeping, fear of impending doom, fearfulness, feeling depressed and feeling suicidal.  The above symptoms have been present for two weeks. These symptoms are of high severity. These symptoms are constant  in nature. The patient's condition has been precipitated by multiple psychosocial stressors . Patient's condition made worse by unresolved grief issues and Borderline tendencies. UDS:negative; BAL=0. Pt was discharged from [facility] to [facility] on 7/26/18. She c/o increased suicidal ideations. Pt's mother passed away in April. Reports, she keeps hearing her voice to come join her. Contracts for safety. Presents with Depressed mood, flat affect, poor eye contact. She was seen by the Psych attending on Friday AM and started on her PTA meds. Kimberley Cervantes presents/reports/evidences the following emotional symptoms today, 8/9/2018:depression and suicidal thoughts/threats. The above symptoms have been present for several weeks. No incidents of agitation or self harm. Continued improvements in her mood, affect and overall engagement with others. SIDE EFFECTS: (reviewed/updated 8/9/2018)  None reported or admitted to. ALLERGIES:(reviewed/updated 8/9/2018)  Allergies   Allergen Reactions    Other Food Other (comments)     \"Bad chest pain\" with walnuts. Coke - asthma/stuffy head. Fish sticks causes an asthma attack.  Astepro [Azelastine] Other (comments)     Violent headaches.  Bactrim [Sulfamethoprim] Other (comments)     Difficulty breathing, chills, fever.  Banana Other (comments)     Diffculty breathing, wheezing, asthma attack.  Coconut Other (comments)     Difficulty breathing, asthma attack, SOB.  Cortisone Hives     Difficulty breathing.     Peanut Other (comments)     Wheezing, asthma attack, SOB.  Prednisone Hives     Difficulty breathing, kidney stones. MEDICATIONS PRIOR TO ADMISSION:(reviewed/updated 2018)  Prescriptions Prior to Admission   Medication Sig    levothyroxine (SYNTHROID) 125 mcg tablet Take 1 Tab by mouth Daily (before breakfast). Indications: hypothyroidism    risperiDONE (RISPERDAL) 1 mg tablet Take 1 Tab by mouth two (2) times a day. Indications: delusion    sertraline (ZOLOFT) 25 mg tablet Take 3 Tabs by mouth every evening. Indications: major depressive disorder    traZODone (DESYREL) 50 mg tablet Take 1 Tab by mouth nightly as needed for Sleep. Indications: insomnia associated with depression    [] hydrOXYzine HCl (ATARAX) 25 mg tablet Take 1 Tab by mouth every six (6) hours as needed (Anxiety) for up to 10 days. Indications: anxiety, Anxiety    therapeutic multivitamin (THERA) tablet Take 1 Tab by mouth daily.  fluticasone (FLONASE) 50 mcg/actuation nasal spray 2 Sprays by Both Nostrils route two (2) times a day.  Cholecalciferol, Vitamin D3, (VITAMIN D3) 1,000 unit cap Take 1,000 Units by mouth daily.  cyanocobalamin, vitamin B-12, (VITAMIN B-12) 5,000 mcg subl 5,000 mcg by SubLINGual route daily.  aspirin (ASPIRIN) 325 mg tablet Take 325-650 mg by mouth as needed for Pain. PAST MEDICAL HISTORY: Past medical history from the initial psychiatric evaluation has been reviewed (reviewed/updated 2018) with no additional updates (I asked patient and no additional past medical history provided).    Past Medical History:   Diagnosis Date    Asthma 1967    hospitalized for asthma attack x 2    Chronic kidney disease     kidney stones x 2-3 times    Ill-defined condition     nasal blockage from growth and misalignment - cannot breath out of nose - surgery in the future/cleared for colonoscopy 2014 - will bring in letter    Other ill-defined conditions(329.89)     blood in stool    Other ill-defined conditions(799.89) hx low BP - 90's/60's-70's    PUD (peptic ulcer disease)     Thyroid disease     Unspecified adverse effect of anesthesia     nasal growth and misalignment and cannot breath through nose     Past Surgical History:   Procedure Laterality Date    HX HEENT      T & A    HX HEENT      turbinate surgery      SOCIAL HISTORY: Social history from the initial psychiatric evaluation has been reviewed (reviewed/updated 8/9/2018) with no additional updates (I asked patient and no additional social history provided). Social History     Social History    Marital status: SINGLE     Spouse name: N/A    Number of children: N/A    Years of education: N/A     Occupational History    Not on file. Social History Main Topics    Smoking status: Never Smoker    Smokeless tobacco: Never Used    Alcohol use No    Drug use: No    Sexual activity: Not Currently     Other Topics Concern    Not on file     Social History Narrative      FAMILY HISTORY: Family history from the initial psychiatric evaluation has been reviewed (reviewed/updated 8/9/2018) with no additional updates (I asked patient and no additional family history provided).    Family History   Problem Relation Age of Onset    Stroke Mother     Other Mother      erosion of esophagus    Cancer Mother      melanoma/squamous    Heart Surgery Father      x2    Delayed Awakening Father     Pacemaker Father     Other Father      carotid endartarectomy/polio    Cataract Father        REVIEW OF SYSTEMS: (reviewed/updated 8/9/2018)  Appetite:good   Sleep: good   All other Review of Systems: depressed mood, AH/ VH         MENTAL STATUS EXAM & VITALS     MENTAL STATUS EXAM (MSE):    MSE FINDINGS ARE WITHIN NORMAL LIMITS (WNL) UNLESS OTHERWISE STATED BELOW. ( ALL OF THE BELOW CATEGORIES OF THE MSE HAVE BEEN REVIEWED (reviewed 8/9/2018) AND UPDATED AS DEEMED APPROPRIATE )  General Presentation age appropriate and casually dressed, cooperative   Orientation oriented to time, place and person   Vital Signs  See below (reviewed 8/9/2018); Vital Signs (BP, Pulse, & Temp) are within normal limits if not listed below. Gait and Station Stable/steady, no ataxia   Musculoskeletal System No extrapyramidal symptoms (EPS); no abnormal muscular movements or Tardive Dyskinesia (TD); muscle strength and tone are within normal limits   Language No aphasia or dysarthria   Speech:  normal pitch and normal volume   Thought Processes logical; normal rate of thoughts; good abstract reasoning/computation   Thought Associations goal directed   Thought Content (+)delusions of reference; disorganized   Suicidal Ideations intention; (+)SI   Homicidal Ideations none   Mood:  depressed   Affect:  Improving, increased range of affect; smiling more   Memory recent  fair   Memory remote:  fair   Concentration/Attention:  wnl   Fund of Knowledge wnl   Insight:  fair   Reliability fair   Judgment:  fair          VITALS:     Patient Vitals for the past 24 hrs:   Temp Pulse Resp BP   08/09/18 0551 98.1 °F (36.7 °C) (!) 56 16 98/59     Wt Readings from Last 3 Encounters:   07/27/18 72.6 kg (160 lb)   07/16/18 72.6 kg (160 lb)   07/31/14 79.4 kg (175 lb)     Temp Readings from Last 3 Encounters:   08/09/18 98.1 °F (36.7 °C)   07/26/18 98 °F (36.7 °C)     BP Readings from Last 3 Encounters:   08/09/18 98/59   07/26/18 108/71   07/31/14 110/69     Pulse Readings from Last 3 Encounters:   08/09/18 (!) 56   07/26/18 62   07/31/14 69            DATA     LABORATORY DATA:(reviewed/updated 8/9/2018)  No results found for this or any previous visit (from the past 24 hour(s)). No results found for: VALF2, VALAC, VALP, VALPR, DS6, CRBAM, CRBAMP, CARB2, XCRBAM  No results found for: LITHM   RADIOLOGY REPORTS:(reviewed/updated 8/9/2018)  Ct Head Wo Cont    Result Date: 7/17/2018  EXAM:  CT HEAD WO CONT INDICATION:  Altered mental status. COMPARISON: None. CONTRAST:  None.  TECHNIQUE: Unenhanced CT of the head was performed using 5 mm images. Brain and bone windows were generated. CT dose reduction was achieved through use of a standardized protocol tailored for this examination and automatic exposure control for dose modulation. Adaptive statistical iterative reconstruction (ASIR) was utilized. FINDINGS: The ventricles and sulci are normal in size, shape and configuration and midline. There is no significant white matter disease. There is no intracranial hemorrhage, extra-axial collection, mass, mass effect or midline shift. The basilar cisterns are open. No acute infarct is identified. The bone windows demonstrate no abnormalities. The visualized portions of the paranasal sinuses and mastoid air cells are clear. IMPRESSION: No acute findings.          MEDICATIONS     ALL MEDICATIONS:   Current Facility-Administered Medications   Medication Dose Route Frequency    risperiDONE (RisperDAL) tablet 2 mg  2 mg Oral BID    sertraline (ZOLOFT) tablet 175 mg  175 mg Oral DAILY WITH DINNER    LORazepam (ATIVAN) tablet 0.25 mg  0.25 mg Oral QID WITH MEALS    zolpidem (AMBIEN) tablet 10 mg  10 mg Oral QHS    LORazepam (ATIVAN) tablet 1 mg  1 mg Oral Q6H PRN    fluticasone (FLONASE) 50 mcg/actuation nasal spray 2 Spray  2 Spray Both Nostrils BID    ziprasidone (GEODON) 20 mg in sterile water (preservative free) 1 mL injection  20 mg IntraMUSCular BID PRN    OLANZapine (ZyPREXA) tablet 5 mg  5 mg Oral Q6H PRN    benztropine (COGENTIN) tablet 2 mg  2 mg Oral BID PRN    benztropine (COGENTIN) injection 2 mg  2 mg IntraMUSCular BID PRN    LORazepam (ATIVAN) injection 2 mg  2 mg IntraMUSCular Q4H PRN    acetaminophen (TYLENOL) tablet 650 mg  650 mg Oral Q4H PRN    ibuprofen (MOTRIN) tablet 400 mg  400 mg Oral Q8H PRN    magnesium hydroxide (MILK OF MAGNESIA) 400 mg/5 mL oral suspension 30 mL  30 mL Oral DAILY PRN    nicotine (NICODERM CQ) 21 mg/24 hr patch 1 Patch  1 Patch TransDERmal DAILY PRN    hydrOXYzine HCl (ATARAX) tablet 25 mg  25 mg Oral Q6H PRN    levothyroxine (SYNTHROID) tablet 125 mcg  125 mcg Oral ACB    cholecalciferol (VITAMIN D3) tablet 1,000 Units  1,000 Units Oral DAILY    aspirin (ASPIRIN) tablet 325 mg  325 mg Oral DAILY      SCHEDULED MEDICATIONS:   Current Facility-Administered Medications   Medication Dose Route Frequency    risperiDONE (RisperDAL) tablet 2 mg  2 mg Oral BID    sertraline (ZOLOFT) tablet 175 mg  175 mg Oral DAILY WITH DINNER    LORazepam (ATIVAN) tablet 0.25 mg  0.25 mg Oral QID WITH MEALS    zolpidem (AMBIEN) tablet 10 mg  10 mg Oral QHS    fluticasone (FLONASE) 50 mcg/actuation nasal spray 2 Spray  2 Spray Both Nostrils BID    levothyroxine (SYNTHROID) tablet 125 mcg  125 mcg Oral ACB    cholecalciferol (VITAMIN D3) tablet 1,000 Units  1,000 Units Oral DAILY    aspirin (ASPIRIN) tablet 325 mg  325 mg Oral DAILY          ASSESSMENT & PLAN     DIAGNOSES REQUIRING ACTIVE TREATMENT AND MONITORING: (reviewed/updated 8/9/2018)  Patient Active Hospital Problem List:     MDD with psychosis   Assessment: moderate to severe; exacerbated by borderline pd, Grief, lack of resources, lack of social support   Plan: Agree with inpatient hospitalization for further stabilization, safety monitoring and medication management  Close observation due to self harm   Titrate medications, Increase zoloft to 150mg. Add ambien, continue Risperdal but increase to 1mg/ 2mg hs  Reduce dose of ativan to 0.25mg three times daily  8/4- Zoloft increased to 175 mg/day. Ativan to 0.25 qid  8/5- Risperdal increased to 2 mg bid   8/9- Plan for family meeting tomorrow; reduce dose of ativan, increase zoloft to 200mg     In summary, Justin García, is a 59 y.o.  female who presents with a severe exacerbation of the principal diagnosis of MDD with psychosis. Patient's condition is not improving. Patient requires continued inpatient hospitalization for further stabilization, safety monitoring and medication management.   I will continue to coordinate the provision of individual, milieu, occupational, group, and substance abuse therapies to address target symptoms/diagnoses as deemed appropriate for the individual patient. A coordinated, multidisplinary treatment team round was conducted with the patient (this team consists of the nurse, psychiatric unit pharmcist,  and writer). Complete current electronic health record for patient has been reviewed today including consultant notes, ancillary staff notes, nurses and psychiatric tech notes. Suicide risk assessment completed and patient deemed to be of low risk for suicide at this time. The following regarding medications was addressed during rounds with patient:   the risks and benefits of the proposed medication. The patient was given the opportunity to ask questions. Informed consent given to the use of the above medications. Will continue to adjust psychiatric and non-psychiatric medications (see above \"medication\" section and orders section for details) as deemed appropriate & based upon diagnoses and response to treatment. I will continue to order blood tests/labs and diagnostic tests as deemed appropriate and review results as they become available (see orders for details and above listed lab/test results). I will order psychiatric records from previous [facility] hospitals to further elucidate the nature of patient's psychopathology and review once available. I will gather additional collateral information from friends, family and o/p treatment team to further elucidate the nature of patient's psychopathology and baselline level of psychiatric functioning.          I certify that this patient's inpatient psychiatric hospital services furnished since the previous certification were, and continue to be, required for treatment that could reasonably be expected to improve the patient's condition, or for diagnostic study, and that the patient continues to need, on a daily basis, active treatment furnished directly by or requiring the supervision of inpatient psychiatric facility personnel. In addition, the hospital records show that services furnished were intensive treatment services, admission or related services, or equivalent services.     EXPECTED DISCHARGE DATE/DAY: TBD     DISPOSITION: Home       Signed By:   Patricia Galarza MD  8/9/2018

## 2018-08-09 NOTE — BH NOTES
7-11 Pt has been visible in milieu. Interacts appropriately with peers. Calm, quiet and cooperative with staff. Pt's father visited with her this evening. Pt is medication and meal compliant. Will continue to monitor Q 15 minutes for safety and provide support.

## 2018-08-09 NOTE — BH NOTES
GROUP THERAPY PROGRESS NOTE    The patient Lakisha herron 59 y.o. female is participating in [employer]. Group time: 1 hour    Personal goal for participation: To concentrate on selected task    Goal orientation: social    Group therapy participation: active    Therapeutic interventions reviewed and discussed: Crafts, games, music    Impression of participation: The patient was attentive.     Keshawn Garner  8/9/2018  5:20 PM

## 2018-08-10 PROCEDURE — 74011250637 HC RX REV CODE- 250/637: Performed by: PSYCHIATRY & NEUROLOGY

## 2018-08-10 PROCEDURE — 65220000003 HC RM SEMIPRIVATE PSYCH

## 2018-08-10 RX ADMIN — SERTRALINE HYDROCHLORIDE 200 MG: 50 TABLET ORAL at 17:28

## 2018-08-10 RX ADMIN — RISPERIDONE 2 MG: 1 TABLET ORAL at 08:30

## 2018-08-10 RX ADMIN — HYDROXYZINE HYDROCHLORIDE 25 MG: 25 TABLET ORAL at 18:43

## 2018-08-10 RX ADMIN — ZOLPIDEM TARTRATE 10 MG: 10 TABLET ORAL at 21:04

## 2018-08-10 RX ADMIN — RISPERIDONE 2 MG: 1 TABLET ORAL at 21:04

## 2018-08-10 RX ADMIN — LORAZEPAM 0.25 MG: 0.5 TABLET ORAL at 08:30

## 2018-08-10 RX ADMIN — LORAZEPAM 0.25 MG: 0.5 TABLET ORAL at 11:13

## 2018-08-10 RX ADMIN — FLUTICASONE PROPIONATE 2 SPRAY: 50 SPRAY, METERED NASAL at 08:29

## 2018-08-10 RX ADMIN — VITAMIN D 1000 UNITS: 25 TAB ORAL at 08:30

## 2018-08-10 RX ADMIN — LEVOTHYROXINE SODIUM 125 MCG: 50 TABLET ORAL at 06:40

## 2018-08-10 RX ADMIN — LORAZEPAM 0.25 MG: 0.5 TABLET ORAL at 17:28

## 2018-08-10 RX ADMIN — ASPIRIN 325 MG ORAL TABLET 325 MG: 325 PILL ORAL at 08:30

## 2018-08-10 RX ADMIN — FLUTICASONE PROPIONATE 2 SPRAY: 50 SPRAY, METERED NASAL at 17:28

## 2018-08-10 NOTE — BH NOTES
PSYCHIATRIC PROGRESS NOTE         Patient Name  Kane Huntley   Date of Birth 1960   [facility] [id number]   Medical Record Number  [id number]      Age  59 y.o. PCP Micheline Robertson MD   Admit date:  7/27/2018    Room Number  321/01  @ [facility]   Date of Service  8/10/2018          PSYCHOTHERAPY SESSION NOTE:  Length of psychotherapy session: 20 minutes    Main condition/diagnosis/issues treated during session today, 8/10/2018 : depression, grief, AH, thoughts of self harm    I employed Cognitive Behavioral therapy techniques, Reality-Oriented psychotherapy, as well as supportive psychotherapy in regards to various ongoing psychosocial stressors, including the following: pre-admission and current problems; housing issues; occupational issues; academic issues; legal issues; medical issues; and stress of hospitalization. Interpersonal relationship issues and psychodynamic conflicts explored. Attempts made to alleviate maladaptive patterns. Overall, patient is not progressing    Treatment Plan Update (reviewed an updated 8/10/2018) : I will modify psychotherapy tx plan by implementing more stress management strategies, building upon cognitive behavioral techniques, increasing coping skills, as well as shoring up psychological defenses). An extended energy and skill set was needed to engage pt in psychotherapy due to some of the following: resistiveness, complexity, negativity, confrontational nature, hostile behaviors, and/or severe abnormalities in thought processes/psychosis resulting in the loss of expressive/receptive language communication skills. E & M PROGRESS NOTE:         HISTORY       CC:  \"Depressed\"  HISTORY OF PRESENT ILLNESS/INTERVAL HISTORY:  (reviewed/updated 8/10/2018). per initial evaluation:   The patient, Kane Huntley, is a 59 y.o.   WHITE [place]  female with a past psychiatric history significant for Depression, who presents at this time with complaints of (and/or evidence of) the following emotional symptoms: depression, suicidal thoughts/threats, self mutilation and anxiety. Additional symptomatology include anxiety, depression worse, difficulty sleeping, fear of impending doom, fearfulness, feeling depressed and feeling suicidal.  The above symptoms have been present for two weeks. These symptoms are of high severity. These symptoms are constant  in nature. The patient's condition has been precipitated by multiple psychosocial stressors . Patient's condition made worse by unresolved grief issues and Borderline tendencies. UDS:negative; BAL=0. Pt was discharged from [facility] [place] [facility] on 7/26/18. She c/o increased suicidal ideations. Pt's mother passed away in April. Reports, she keeps hearing her voice to come join her. Contracts for safety. Presents with Depressed mood, flat affect, poor eye contact. She was seen by the Psych attending on Friday AM and started on her PTA meds. Moris Torres presents/reports/evidences the following emotional symptoms today, 8/10/2018:depression and suicidal thoughts/threats. The above symptoms have been present for several weeks. No incidents of agitation or self harm. Continued improvements in her mood, affect and overall engagement with others. Her father attended a family meeting and the patient was able to honestly talk about how he interferes with her emotional processing of her grief. Increased affect, smiling at times. More visible on the unit. More hopeful      SIDE EFFECTS: (reviewed/updated 8/10/2018)  None reported or admitted to. ALLERGIES:(reviewed/updated 8/10/2018)  Allergies   Allergen Reactions    Other Food Other (comments)     \"Bad chest pain\" with walnuts. Coke - asthma/stuffy head. Fish sticks causes an asthma attack.  Astepro [Azelastine] Other (comments)     Violent headaches.  Bactrim [Sulfamethoprim] Other (comments)     Difficulty breathing, chills, fever.     Banana Other (comments)     Diffculty breathing, wheezing, asthma attack.  Coconut Other (comments)     Difficulty breathing, asthma attack, SOB.  Cortisone Hives     Difficulty breathing.  Peanut Other (comments)     Wheezing, asthma attack, SOB.  Prednisone Hives     Difficulty breathing, kidney stones. MEDICATIONS PRIOR TO ADMISSION:(reviewed/updated 8/10/2018)  Prescriptions Prior to Admission   Medication Sig    levothyroxine (SYNTHROID) 125 mcg tablet Take 1 Tab by mouth Daily (before breakfast). Indications: hypothyroidism    risperiDONE (RISPERDAL) 1 mg tablet Take 1 Tab by mouth two (2) times a day. Indications: delusion    sertraline (ZOLOFT) 25 mg tablet Take 3 Tabs by mouth every evening. Indications: major depressive disorder    traZODone (DESYREL) 50 mg tablet Take 1 Tab by mouth nightly as needed for Sleep. Indications: insomnia associated with depression    [] hydrOXYzine HCl (ATARAX) 25 mg tablet Take 1 Tab by mouth every six (6) hours as needed (Anxiety) for up to 10 days. Indications: anxiety, Anxiety    therapeutic multivitamin (THERA) tablet Take 1 Tab by mouth daily.  fluticasone (FLONASE) 50 mcg/actuation nasal spray 2 Sprays by Both Nostrils route two (2) times a day.  Cholecalciferol, Vitamin D3, (VITAMIN D3) 1,000 unit cap Take 1,000 Units by mouth daily.  cyanocobalamin, vitamin B-12, (VITAMIN B-12) 5,000 mcg subl 5,000 mcg by SubLINGual route daily.  aspirin (ASPIRIN) 325 mg tablet Take 325-650 mg by mouth as needed for Pain. PAST MEDICAL HISTORY: Past medical history from the initial psychiatric evaluation has been reviewed (reviewed/updated 8/10/2018) with no additional updates (I asked patient and no additional past medical history provided).    Past Medical History:   Diagnosis Date    Asthma 1967    hospitalized for asthma attack x 2    Chronic kidney disease     kidney stones x 2-3 times    Ill-defined condition     nasal blockage from growth and misalignment - cannot breath out of nose - surgery in the future/cleared for colonoscopy 7/31/2014 - will bring in letter    Other ill-defined conditions(799.89)     blood in stool    Other ill-defined conditions(799.89)     hx low BP - 90's/60's-70's    PUD (peptic ulcer disease)     Thyroid disease     Unspecified adverse effect of anesthesia     nasal growth and misalignment and cannot breath through nose     Past Surgical History:   Procedure Laterality Date    HX HEENT      T & A    HX HEENT      turbinate surgery      SOCIAL HISTORY: Social history from the initial psychiatric evaluation has been reviewed (reviewed/updated 8/10/2018) with no additional updates (I asked patient and no additional social history provided). Social History     Social History    Marital status: SINGLE     Spouse name: N/A    Number of children: N/A    Years of education: N/A     Occupational History    Not on file. Social History Main Topics    Smoking status: Never Smoker    Smokeless tobacco: Never Used    Alcohol use No    Drug use: No    Sexual activity: Not Currently     Other Topics Concern    Not on file     Social History Narrative      FAMILY HISTORY: Family history from the initial psychiatric evaluation has been reviewed (reviewed/updated 8/10/2018) with no additional updates (I asked patient and no additional family history provided).    Family History   Problem Relation Age of Onset    Stroke Mother     Other Mother      erosion of esophagus    Cancer Mother      melanoma/squamous    Heart Surgery Father      x2    Delayed Awakening Father     Pacemaker Father     Other Father      carotid endartarectomy/polio    Cataract Father        REVIEW OF SYSTEMS: (reviewed/updated 8/10/2018)  Appetite:good   Sleep: good   All other Review of Systems: depressed mood, AH/ VH         MENTAL STATUS EXAM & VITALS     MENTAL STATUS EXAM (MSE):    MSE FINDINGS ARE WITHIN NORMAL LIMITS (WNL) UNLESS OTHERWISE STATED BELOW. ( ALL OF THE BELOW CATEGORIES OF THE MSE HAVE BEEN REVIEWED (reviewed 8/10/2018) AND UPDATED AS DEEMED APPROPRIATE )  General Presentation age appropriate and casually dressed, cooperative   Orientation oriented to time, place and person   Vital Signs  See below (reviewed 8/10/2018); Vital Signs (BP, Pulse, & Temp) are within normal limits if not listed below. Gait and Station Stable/steady, no ataxia   Musculoskeletal System No extrapyramidal symptoms (EPS); no abnormal muscular movements or Tardive Dyskinesia (TD); muscle strength and tone are within normal limits   Language No aphasia or dysarthria   Speech:  normal pitch and normal volume   Thought Processes logical; normal rate of thoughts; good abstract reasoning/computation   Thought Associations goal directed   Thought Content (+)less delusional, but continued odd thinking   Suicidal Ideations (+)passive SI   Homicidal Ideations none   Mood:  depressed improving   Affect:  Improving, increased range of affect; smiling more   Memory recent  fair   Memory remote:  fair   Concentration/Attention:  wnl   Fund of Knowledge wnl   Insight:  fair   Reliability fair   Judgment:  fair          VITALS:     Patient Vitals for the past 24 hrs:   Temp Pulse Resp BP   08/10/18 0645 98.1 °F (36.7 °C) 66 16 104/57   08/09/18 2100 - (!) 58 - 121/66     Wt Readings from Last 3 Encounters:   07/27/18 72.6 kg (160 lb)   07/16/18 72.6 kg (160 lb)   07/31/14 79.4 kg (175 lb)     Temp Readings from Last 3 Encounters:   08/10/18 98.1 °F (36.7 °C)   07/26/18 98 °F (36.7 °C)     BP Readings from Last 3 Encounters:   08/10/18 104/57   07/26/18 108/71   07/31/14 110/69     Pulse Readings from Last 3 Encounters:   08/10/18 66   07/26/18 62   07/31/14 69            DATA     LABORATORY DATA:(reviewed/updated 8/10/2018)  No results found for this or any previous visit (from the past 24 hour(s)).   No results found for: VALF2, VALAC, VALP, VALPR, DS6, CRBAM, CRBAMP, [place]  No results found for: [facility]   RADIOLOGY REPORTS:(reviewed/updated 8/10/2018)  Ct Head Wo Cont    Result Date: 7/17/2018  EXAM:  CT HEAD WO CONT INDICATION:  Altered mental status. COMPARISON: None. CONTRAST:  None. TECHNIQUE: Unenhanced CT of the head was performed using 5 mm images. Brain and bone windows were generated. CT dose reduction was achieved through use of a standardized protocol tailored for this examination and automatic exposure control for dose modulation. Adaptive statistical iterative reconstruction (ASIR) was utilized. FINDINGS: The ventricles and sulci are normal in size, shape and configuration and midline. There is no significant white matter disease. There is no intracranial hemorrhage, extra-axial collection, mass, mass effect or midline shift. The basilar cisterns are open. No acute infarct is identified. The bone windows demonstrate no abnormalities. The visualized portions of the paranasal sinuses and mastoid air cells are clear. IMPRESSION: No acute findings.          MEDICATIONS     ALL MEDICATIONS:   Current Facility-Administered Medications   Medication Dose Route Frequency    sertraline (ZOLOFT) tablet 200 mg  200 mg Oral DAILY WITH DINNER    LORazepam (ATIVAN) tablet 0.25 mg  0.25 mg Oral TID    risperiDONE (RisperDAL) tablet 2 mg  2 mg Oral BID    zolpidem (AMBIEN) tablet 10 mg  10 mg Oral QHS    LORazepam (ATIVAN) tablet 1 mg  1 mg Oral Q6H PRN    fluticasone (FLONASE) 50 mcg/actuation nasal spray 2 Spray  2 Spray Both Nostrils BID    ziprasidone (GEODON) 20 mg in sterile water (preservative free) 1 mL injection  20 mg IntraMUSCular BID PRN    OLANZapine (ZyPREXA) tablet 5 mg  5 mg Oral Q6H PRN    benztropine (COGENTIN) tablet 2 mg  2 mg Oral BID PRN    benztropine (COGENTIN) injection 2 mg  2 mg IntraMUSCular BID PRN    LORazepam (ATIVAN) injection 2 mg  2 mg IntraMUSCular Q4H PRN    acetaminophen (TYLENOL) tablet 650 mg  650 mg Oral Q4H PRN    ibuprofen (MOTRIN) tablet 400 mg  400 mg Oral Q8H PRN    magnesium hydroxide (MILK OF MAGNESIA) 400 mg/5 mL oral suspension 30 mL  30 mL Oral DAILY PRN    nicotine (NICODERM CQ) 21 mg/24 hr patch 1 Patch  1 Patch TransDERmal DAILY PRN    hydrOXYzine HCl (ATARAX) tablet 25 mg  25 mg Oral Q6H PRN    levothyroxine (SYNTHROID) tablet 125 mcg  125 mcg Oral ACB    cholecalciferol (VITAMIN D3) tablet 1,000 Units  1,000 Units Oral DAILY    aspirin (ASPIRIN) tablet 325 mg  325 mg Oral DAILY      SCHEDULED MEDICATIONS:   Current Facility-Administered Medications   Medication Dose Route Frequency    sertraline (ZOLOFT) tablet 200 mg  200 mg Oral DAILY WITH DINNER    LORazepam (ATIVAN) tablet 0.25 mg  0.25 mg Oral TID    risperiDONE (RisperDAL) tablet 2 mg  2 mg Oral BID    zolpidem (AMBIEN) tablet 10 mg  10 mg Oral QHS    fluticasone (FLONASE) 50 mcg/actuation nasal spray 2 Spray  2 Spray Both Nostrils BID    levothyroxine (SYNTHROID) tablet 125 mcg  125 mcg Oral ACB    cholecalciferol (VITAMIN D3) tablet 1,000 Units  1,000 Units Oral DAILY    aspirin (ASPIRIN) tablet 325 mg  325 mg Oral DAILY          ASSESSMENT & PLAN     DIAGNOSES REQUIRING ACTIVE TREATMENT AND MONITORING: (reviewed/updated 8/10/2018)  Patient Active Hospital Problem List:     MDD with psychosis   Assessment: moderate to severe; exacerbated by borderline pd, Grief, lack of resources, lack of social support   Plan: Agree with inpatient hospitalization for further stabilization, safety monitoring and medication management  Close observation due to self harm   Titrate medications, Increase zoloft to 150mg. Add ambien, continue Risperdal but increase to 1mg/ 2mg hs  Reduce dose of ativan to 0.25mg three times daily  8/4- Zoloft increased to 175 mg/day.  Ativan to 0.25 qid  8/5- Risperdal increased to 2 mg bid   8/9- Plan for family meeting tomorrow; reduce dose of ativan, increase zoloft to 200mg     In summary, Lennox Aye, is a 59 y.o.  female who presents with a severe exacerbation of the principal diagnosis of MDD with psychosis. Patient's condition is not improving. Patient requires continued inpatient hospitalization for further stabilization, safety monitoring and medication management. I will continue to coordinate the provision of individual, milieu, occupational, group, and substance abuse therapies to address target symptoms/diagnoses as deemed appropriate for the individual patient. A coordinated, multidisplinary treatment team round was conducted with the patient (this team consists of the nurse, psychiatric unit pharmcist,  and writer). Complete current electronic health record for patient has been reviewed today including consultant notes, ancillary staff notes, nurses and psychiatric tech notes. Suicide risk assessment completed and patient deemed to be of low risk for suicide at this time. The following regarding medications was addressed during rounds with patient:   the risks and benefits of the proposed medication. The patient was given the opportunity to ask questions. Informed consent given to the use of the above medications. Will continue to adjust psychiatric and non-psychiatric medications (see above \"medication\" section and orders section for details) as deemed appropriate & based upon diagnoses and response to treatment. I will continue to order blood tests/labs and diagnostic tests as deemed appropriate and review results as they become available (see orders for details and above listed lab/test results). I will order psychiatric records from previous [facility] hospitals to further elucidate the nature of patient's psychopathology and review once available. I will gather additional collateral information from friends, family and o/p treatment team to further elucidate the nature of patient's psychopathology and baselline level of psychiatric functioning.          I certify that this patient's inpatient psychiatric hospital services furnished since the previous certification were, and continue to be, required for treatment that could reasonably be expected to improve the patient's condition, or for diagnostic study, and that the patient continues to need, on a daily basis, active treatment furnished directly by or requiring the supervision of inpatient psychiatric facility personnel. In addition, the hospital records show that services furnished were intensive treatment services, admission or related services, or equivalent services.     EXPECTED DISCHARGE DATE/DAY: TBD     DISPOSITION: Home       Signed By:   Jc Butt MD  8/10/2018

## 2018-08-10 NOTE — BH NOTES
Social Work     Pt was seen in treatment team this morning. Pt is alert and oriented. Pt denied SI/HI. Pt's mood is depressed but with some improvement, affect is congruent to mood but with intermittent smiling. Pt's thought process is obsessed still with her mother and her grief over the loss of her parent. Plan for pt is to wear her sunglasses less over weekend. Dr. Monisha Mullins, pt, Elizabeth Peters and pt's father had a family meeting to discuss what discharge would look like and this also allowed pt to share her feelings about her grief. Writer left voicemail with Erica MONTALVO/Bridgett Brown to notify of next week being discharge goal for pt. Pt visited by Jenae Machuca for regional funding & voicemail from [place]ordon Steve. From [place] she sent referral to [facility] for mobile crisis services. Pt's insight fair and judgment is fair, reliability is fair. Social work department will continue to coordinate discharge plans.

## 2018-08-10 NOTE — BH NOTES
GROUP THERAPY PROGRESS NOTE    The patient Vitaly herron 59 y.o. female is participating in [employer]. Group time: 1 hour    Personal goal for participation:  To concentrate on selected task    Goal orientation: social    Group therapy participation: minimal    Therapeutic interventions reviewed and discussed: Crafts, games, music    Impression of participation: The patient was present-arrived late    Emerald Churchill  8/10/2018  5:24 PM

## 2018-08-10 NOTE — BH NOTES
Patient complained of Chest pain. No obvious distress noted: vs @ 97.9 Temp. 67 pulse, RR 16, /67. Given Atarax for anxiety. Stated she was thinking about her mom, during her death. Encouraged her to sit in DR, watching calming station.

## 2018-08-10 NOTE — BH NOTES
GROUP THERAPY PROGRESS NOTE    The patient Milan herron 59 y.o. female is participating in [employer]. Group time: 45 minutes    Personal goal for participation: To participate in mental health [employer] game    Goal orientation:  personal    Group therapy participation: active    Therapeutic interventions reviewed and discussed: choices in recovery    Impression of participation:  The patient was attentive.     Wanda Romero  8/10/2018  1:47 PM

## 2018-08-10 NOTE — BH NOTES
Patient is calm, quiet. Patient slept for 6.7 hours. Patient breathing is unlabored. q15 minutes check maintained for safety.

## 2018-08-10 NOTE — PROGRESS NOTES
Patient currently sitting quietly in her room. Alert and oriented. Calm and cooperative. Medication and meal compliant. Patient visible in the milieu and attends groups. Denies thoughts of harming self or others. Denies auditory, visual and tactile hallucinations. Will continue to assess patient and complete safety checks.

## 2018-08-10 NOTE — PROGRESS NOTES
Problem: Falls - Risk of  Goal: *Absence of Falls  Document Samanta Fall Risk and appropriate interventions in the flowsheet. Fall Risk Interventions:  Medication Interventions: Teach patient to arise slowly  History of Falls Interventions: Door open when patient unattended     Patient received visit from her father this evening. Patient remained visible in the dayroom until after receiving her nighttime medication.

## 2018-08-11 PROCEDURE — 74011250637 HC RX REV CODE- 250/637: Performed by: PSYCHIATRY & NEUROLOGY

## 2018-08-11 PROCEDURE — 65220000003 HC RM SEMIPRIVATE PSYCH

## 2018-08-11 RX ADMIN — VITAMIN D 1000 UNITS: 25 TAB ORAL at 08:29

## 2018-08-11 RX ADMIN — LORAZEPAM 0.25 MG: 0.5 TABLET ORAL at 11:57

## 2018-08-11 RX ADMIN — RISPERIDONE 2 MG: 1 TABLET ORAL at 21:02

## 2018-08-11 RX ADMIN — ASPIRIN 325 MG ORAL TABLET 325 MG: 325 PILL ORAL at 08:29

## 2018-08-11 RX ADMIN — ZOLPIDEM TARTRATE 10 MG: 10 TABLET ORAL at 21:02

## 2018-08-11 RX ADMIN — SERTRALINE HYDROCHLORIDE 200 MG: 50 TABLET ORAL at 16:56

## 2018-08-11 RX ADMIN — RISPERIDONE 2 MG: 1 TABLET ORAL at 08:30

## 2018-08-11 RX ADMIN — FLUTICASONE PROPIONATE 2 SPRAY: 50 SPRAY, METERED NASAL at 16:56

## 2018-08-11 RX ADMIN — LORAZEPAM 0.25 MG: 0.5 TABLET ORAL at 08:29

## 2018-08-11 RX ADMIN — FLUTICASONE PROPIONATE 2 SPRAY: 50 SPRAY, METERED NASAL at 08:30

## 2018-08-11 RX ADMIN — LEVOTHYROXINE SODIUM 125 MCG: 50 TABLET ORAL at 05:47

## 2018-08-11 RX ADMIN — LORAZEPAM 0.25 MG: 0.5 TABLET ORAL at 16:55

## 2018-08-11 NOTE — BH NOTES
Pt has been observed throughout the night sleeping in bed with even respirations. Total hours slept approximately  7 - 8. No s/s of distress noted. Patient has remained safe. Will continue to monitor.

## 2018-08-11 NOTE — BH NOTES
PSYCHIATRIC PROGRESS NOTE         Patient Name  Gordon Foster   Date of Birth 1960   [facility] [id number]   Medical Record Number  [id number]      Age  59 y.o. PCP Dara Adams MD   Admit date:  7/27/2018    Room Number  321/01  @ [facility]   Date of Service  8/11/2018          PSYCHOTHERAPY SESSION NOTE:  Length of psychotherapy session: 20 minutes    Main condition/diagnosis/issues treated during session today, 8/11/2018 : depression, grief, AH, thoughts of self harm    I employed Cognitive Behavioral therapy techniques, Reality-Oriented psychotherapy, as well as supportive psychotherapy in regards to various ongoing psychosocial stressors, including the following: pre-admission and current problems; housing issues; occupational issues; academic issues; legal issues; medical issues; and stress of hospitalization. Interpersonal relationship issues and psychodynamic conflicts explored. Attempts made to alleviate maladaptive patterns. Overall, patient is not progressing    Treatment Plan Update (reviewed an updated 8/11/2018) : I will modify psychotherapy tx plan by implementing more stress management strategies, building upon cognitive behavioral techniques, increasing coping skills, as well as shoring up psychological defenses). An extended energy and skill set was needed to engage pt in psychotherapy due to some of the following: resistiveness, complexity, negativity, confrontational nature, hostile behaviors, and/or severe abnormalities in thought processes/psychosis resulting in the loss of expressive/receptive language communication skills. E & M PROGRESS NOTE:         HISTORY       CC:  \"Depressed\"  HISTORY OF PRESENT ILLNESS/INTERVAL HISTORY:  (reviewed/updated 8/11/2018). per initial evaluation:   The patient, Gordon Foster, is a 59 y.o.   WHITE [place]  female with a past psychiatric history significant for Depression, who presents at this time with complaints of (and/or evidence of) the following emotional symptoms: depression, suicidal thoughts/threats, self mutilation and anxiety. Additional symptomatology include anxiety, depression worse, difficulty sleeping, fear of impending doom, fearfulness, feeling depressed and feeling suicidal.  The above symptoms have been present for two weeks. These symptoms are of high severity. These symptoms are constant  in nature. The patient's condition has been precipitated by multiple psychosocial stressors . Patient's condition made worse by unresolved grief issues and Borderline tendencies. UDS:negative; BAL=0. Pt was discharged from [facility] to [facility] on 7/26/18. She c/o increased suicidal ideations. Pt's mother passed away in April. Reports, she keeps hearing her voice to come join her. Contracts for safety. Presents with Depressed mood, flat affect, poor eye contact. She was seen by the Psych attending on Friday AM and started on her PTA meds. Alvy Frankel presents/reports/evidences the following emotional symptoms today, 8/11/2018:depression and suicidal thoughts/threats. The above symptoms have been present for several weeks. No incidents of agitation or self harm. Continued improvements in her mood, affect and overall engagement with others. Her father attended a family meeting and the patient was able to honestly talk about how he interferes with her emotional processing of her grief. Increased affect, smiling at times. More visible on the unit. More hopeful  8/11- anxiety +. Some somatic preoccupation. Accepting medication and sleep is improved. Denies SI/HI/AVH. SIDE EFFECTS: (reviewed/updated 8/11/2018)  None reported or admitted to. ALLERGIES:(reviewed/updated 8/11/2018)  Allergies   Allergen Reactions    Other Food Other (comments)     \"Bad chest pain\" with walnuts. Coke - asthma/stuffy head. Fish sticks causes an asthma attack.     Astepro [Azelastine] Other (comments)     Violent headaches.  Bactrim [Sulfamethoprim] Other (comments)     Difficulty breathing, chills, fever.  Banana Other (comments)     Diffculty breathing, wheezing, asthma attack.  Coconut Other (comments)     Difficulty breathing, asthma attack, SOB.  Cortisone Hives     Difficulty breathing.  Peanut Other (comments)     Wheezing, asthma attack, SOB.  Prednisone Hives     Difficulty breathing, kidney stones. MEDICATIONS PRIOR TO ADMISSION:(reviewed/updated 2018)  Prescriptions Prior to Admission   Medication Sig    levothyroxine (SYNTHROID) 125 mcg tablet Take 1 Tab by mouth Daily (before breakfast). Indications: hypothyroidism    risperiDONE (RISPERDAL) 1 mg tablet Take 1 Tab by mouth two (2) times a day. Indications: delusion    sertraline (ZOLOFT) 25 mg tablet Take 3 Tabs by mouth every evening. Indications: major depressive disorder    traZODone (DESYREL) 50 mg tablet Take 1 Tab by mouth nightly as needed for Sleep. Indications: insomnia associated with depression    [] hydrOXYzine HCl (ATARAX) 25 mg tablet Take 1 Tab by mouth every six (6) hours as needed (Anxiety) for up to 10 days. Indications: anxiety, Anxiety    therapeutic multivitamin (THERA) tablet Take 1 Tab by mouth daily.  fluticasone (FLONASE) 50 mcg/actuation nasal spray 2 Sprays by Both Nostrils route two (2) times a day.  Cholecalciferol, Vitamin D3, (VITAMIN D3) 1,000 unit cap Take 1,000 Units by mouth daily.  cyanocobalamin, vitamin B-12, (VITAMIN B-12) 5,000 mcg subl 5,000 mcg by SubLINGual route daily.  aspirin (ASPIRIN) 325 mg tablet Take 325-650 mg by mouth as needed for Pain. PAST MEDICAL HISTORY: Past medical history from the initial psychiatric evaluation has been reviewed (reviewed/updated 2018) with no additional updates (I asked patient and no additional past medical history provided).    Past Medical History:   Diagnosis Date    Asthma 1967    hospitalized for asthma attack x 2    Chronic kidney disease     kidney stones x 2-3 times    Ill-defined condition     nasal blockage from growth and misalignment - cannot breath out of nose - surgery in the future/cleared for colonoscopy 7/31/2014 - will bring in letter    Other ill-defined conditions(799.89)     blood in stool    Other ill-defined conditions(799.89)     hx low BP - 90's/60's-70's    PUD (peptic ulcer disease)     Thyroid disease     Unspecified adverse effect of anesthesia     nasal growth and misalignment and cannot breath through nose     Past Surgical History:   Procedure Laterality Date    HX HEENT      T & A    HX HEENT      turbinate surgery      SOCIAL HISTORY: Social history from the initial psychiatric evaluation has been reviewed (reviewed/updated 8/11/2018) with no additional updates (I asked patient and no additional social history provided). Social History     Social History    Marital status: SINGLE     Spouse name: N/A    Number of children: N/A    Years of education: N/A     Occupational History    Not on file. Social History Main Topics    Smoking status: Never Smoker    Smokeless tobacco: Never Used    Alcohol use No    Drug use: No    Sexual activity: Not Currently     Other Topics Concern    Not on file     Social History Narrative      FAMILY HISTORY: Family history from the initial psychiatric evaluation has been reviewed (reviewed/updated 8/11/2018) with no additional updates (I asked patient and no additional family history provided).    Family History   Problem Relation Age of Onset    Stroke Mother     Other Mother      erosion of esophagus    Cancer Mother      melanoma/squamous    Heart Surgery Father      x2    Delayed Awakening Father    [facility] Pacemaker Father     Other Father      carotid endartarectomy/polio    Cataract Father        REVIEW OF SYSTEMS: (reviewed/updated 8/11/2018)  Appetite:good   Sleep: good   All other Review of Systems: depressed mood, / [facility]         MENTAL STATUS EXAM & VITALS     MENTAL STATUS EXAM (MSE):    MSE FINDINGS ARE WITHIN NORMAL LIMITS (WNL) UNLESS OTHERWISE STATED BELOW. ( ALL OF THE BELOW CATEGORIES OF THE MSE HAVE BEEN REVIEWED (reviewed 8/11/2018) AND UPDATED AS DEEMED APPROPRIATE )  General Presentation age appropriate and casually dressed, cooperative   Orientation oriented to time, place and person   Vital Signs  See below (reviewed 8/11/2018); Vital Signs (BP, Pulse, & Temp) are within normal limits if not listed below. Gait and Station Stable/steady, no ataxia   Musculoskeletal System No extrapyramidal symptoms (EPS); no abnormal muscular movements or Tardive Dyskinesia (TD); muscle strength and tone are within normal limits   Language No aphasia or dysarthria   Speech:  normal pitch and normal volume   Thought Processes logical; normal rate of thoughts; good abstract reasoning/computation   Thought Associations goal directed   Thought Content (+)less delusional, but continued odd thinking   Suicidal Ideations (+)passive SI   Homicidal Ideations none   Mood:  depressed improving   Affect:  Improving, increased range of affect; smiling more   Memory recent  fair   Memory remote:  fair   Concentration/Attention:  wnl   Fund of Knowledge wnl   Insight:  fair   Reliability fair   Judgment:  fair          VITALS:     Patient Vitals for the past 24 hrs:   Temp Pulse Resp BP   08/11/18 0640 98 °F (36.7 °C) 64 14 104/61     Wt Readings from Last 3 Encounters:   07/27/18 72.6 kg (160 lb)   07/16/18 72.6 kg (160 lb)   07/31/14 79.4 kg (175 lb)     Temp Readings from Last 3 Encounters:   08/11/18 98 °F (36.7 °C)   07/26/18 98 °F (36.7 °C)     BP Readings from Last 3 Encounters:   08/11/18 104/61   07/26/18 108/71   07/31/14 110/69     Pulse Readings from Last 3 Encounters:   08/11/18 64   07/26/18 62   07/31/14 69            DATA     LABORATORY DATA:(reviewed/updated 8/11/2018)  No results found for this or any previous visit (from the past 24 hour(s)).   No results found for: VALF2, VALAC, VALP, VALPR, DS6, CRBAM, CRBAMP, CARB2, XCRBAM  No results found for: LITHM   RADIOLOGY REPORTS:(reviewed/updated 8/11/2018)  Ct Head Wo Cont    Result Date: 7/17/2018  EXAM:  CT HEAD WO CONT INDICATION:  Altered mental status. COMPARISON: None. CONTRAST:  None. TECHNIQUE: Unenhanced CT of the head was performed using 5 mm images. Brain and bone windows were generated. CT dose reduction was achieved through use of a standardized protocol tailored for this examination and automatic exposure control for dose modulation. Adaptive statistical iterative reconstruction (ASIR) was utilized. FINDINGS: The ventricles and sulci are normal in size, shape and configuration and midline. There is no significant white matter disease. There is no intracranial hemorrhage, extra-axial collection, mass, mass effect or midline shift. The basilar cisterns are open. No acute infarct is identified. The bone windows demonstrate no abnormalities. The visualized portions of the paranasal sinuses and mastoid air cells are clear. IMPRESSION: No acute findings.          MEDICATIONS     ALL MEDICATIONS:   Current Facility-Administered Medications   Medication Dose Route Frequency    sertraline (ZOLOFT) tablet 200 mg  200 mg Oral DAILY WITH DINNER    LORazepam (ATIVAN) tablet 0.25 mg  0.25 mg Oral TID    risperiDONE (RisperDAL) tablet 2 mg  2 mg Oral BID    zolpidem (AMBIEN) tablet 10 mg  10 mg Oral QHS    LORazepam (ATIVAN) tablet 1 mg  1 mg Oral Q6H PRN    fluticasone (FLONASE) 50 mcg/actuation nasal spray 2 Spray  2 Spray Both Nostrils BID    ziprasidone (GEODON) 20 mg in sterile water (preservative free) 1 mL injection  20 mg IntraMUSCular BID PRN    OLANZapine (ZyPREXA) tablet 5 mg  5 mg Oral Q6H PRN    benztropine (COGENTIN) tablet 2 mg  2 mg Oral BID PRN    benztropine (COGENTIN) injection 2 mg  2 mg IntraMUSCular BID PRN    LORazepam (ATIVAN) injection 2 mg  2 mg IntraMUSCular Q4H PRN    acetaminophen (TYLENOL) tablet 650 mg  650 mg Oral Q4H PRN    ibuprofen (MOTRIN) tablet 400 mg  400 mg Oral Q8H PRN    magnesium hydroxide (MILK OF MAGNESIA) 400 mg/5 mL oral suspension 30 mL  30 mL Oral DAILY PRN    nicotine (NICODERM CQ) 21 mg/24 hr patch 1 Patch  1 Patch TransDERmal DAILY PRN    hydrOXYzine HCl (ATARAX) tablet 25 mg  25 mg Oral Q6H PRN    levothyroxine (SYNTHROID) tablet 125 mcg  125 mcg Oral ACB    cholecalciferol (VITAMIN D3) tablet 1,000 Units  1,000 Units Oral DAILY    aspirin (ASPIRIN) tablet 325 mg  325 mg Oral DAILY      SCHEDULED MEDICATIONS:   Current Facility-Administered Medications   Medication Dose Route Frequency    sertraline (ZOLOFT) tablet 200 mg  200 mg Oral DAILY WITH DINNER    LORazepam (ATIVAN) tablet 0.25 mg  0.25 mg Oral TID    risperiDONE (RisperDAL) tablet 2 mg  2 mg Oral BID    zolpidem (AMBIEN) tablet 10 mg  10 mg Oral QHS    fluticasone (FLONASE) 50 mcg/actuation nasal spray 2 Spray  2 Spray Both Nostrils BID    levothyroxine (SYNTHROID) tablet 125 mcg  125 mcg Oral ACB    cholecalciferol (VITAMIN D3) tablet 1,000 Units  1,000 Units Oral DAILY    aspirin (ASPIRIN) tablet 325 mg  325 mg Oral DAILY          ASSESSMENT & PLAN     DIAGNOSES REQUIRING ACTIVE TREATMENT AND MONITORING: (reviewed/updated 8/11/2018)  Patient Active Hospital Problem List:     MDD with psychosis   Assessment: moderate to severe; exacerbated by borderline pd, Grief, lack of resources, lack of social support   Plan: Agree with inpatient hospitalization for further stabilization, safety monitoring and medication management  Close observation due to self harm   Titrate medications, Increase zoloft to 150mg. Add ambien, continue Risperdal but increase to 1mg/ 2mg hs  Reduce dose of ativan to 0.25mg three times daily  8/4- Zoloft increased to 175 mg/day.  Ativan to 0.25 qid  8/5- Risperdal increased to 2 mg bid   8/9- Plan for family meeting tomorrow; reduce dose of ativan, increase zoloft to 200mg   8/11- ct with current med      In summary, Lakisha Thorne, is a 59 y.o.  female who presents with a severe exacerbation of the principal diagnosis of MDD with psychosis. Patient's condition is not improving. Patient requires continued inpatient hospitalization for further stabilization, safety monitoring and medication management. I will continue to coordinate the provision of individual, milieu, occupational, group, and substance abuse therapies to address target symptoms/diagnoses as deemed appropriate for the individual patient. A coordinated, multidisplinary treatment team round was conducted with the patient (this team consists of the nurse, psychiatric unit pharmcist,  and writer). Complete current electronic health record for patient has been reviewed today including consultant notes, ancillary staff notes, nurses and psychiatric tech notes. Suicide risk assessment completed and patient deemed to be of low risk for suicide at this time. The following regarding medications was addressed during rounds with patient:   the risks and benefits of the proposed medication. The patient was given the opportunity to ask questions. Informed consent given to the use of the above medications. Will continue to adjust psychiatric and non-psychiatric medications (see above \"medication\" section and orders section for details) as deemed appropriate & based upon diagnoses and response to treatment. I will continue to order blood tests/labs and diagnostic tests as deemed appropriate and review results as they become available (see orders for details and above listed lab/test results). I will order psychiatric records from previous [facility] hospitals to further elucidate the nature of patient's psychopathology and review once available.     I will gather additional collateral information from friends, family and o/p treatment team to further elucidate the nature of patient's psychopathology and baselline level of psychiatric functioning. I certify that this patient's inpatient psychiatric hospital services furnished since the previous certification were, and continue to be, required for treatment that could reasonably be expected to improve the patient's condition, or for diagnostic study, and that the patient continues to need, on a daily basis, active treatment furnished directly by or requiring the supervision of inpatient psychiatric facility personnel. In addition, the hospital records show that services furnished were intensive treatment services, admission or related services, or equivalent services.     EXPECTED DISCHARGE DATE/DAY: TBD     DISPOSITION: Home       Signed By:   Jonathan Lamb MD  8/11/2018

## 2018-08-11 NOTE — BH NOTES
Problem: Suicide/Homicide (Adult/Pediatric)  Goal: *STG: Remains safe in hospital  Outcome: Progressing Towards Goal  Pt is mostly isolative from the milieu. Pt is alert and oriented x 4. Pt presents as flat but cooperative. Pt is meds/meals compliant, able to come to groups in the unit. Pt has participated in her treatment team meeting today. Pt voiced no complaints.  Will continue to monitor q 15 for safety, mood and behavior changes.

## 2018-08-12 PROCEDURE — 65220000003 HC RM SEMIPRIVATE PSYCH

## 2018-08-12 PROCEDURE — 74011250637 HC RX REV CODE- 250/637: Performed by: PSYCHIATRY & NEUROLOGY

## 2018-08-12 RX ADMIN — ASPIRIN 325 MG ORAL TABLET 325 MG: 325 PILL ORAL at 08:14

## 2018-08-12 RX ADMIN — RISPERIDONE 2 MG: 1 TABLET ORAL at 21:18

## 2018-08-12 RX ADMIN — LORAZEPAM 0.25 MG: 0.5 TABLET ORAL at 08:14

## 2018-08-12 RX ADMIN — SERTRALINE HYDROCHLORIDE 200 MG: 50 TABLET ORAL at 17:28

## 2018-08-12 RX ADMIN — LEVOTHYROXINE SODIUM 125 MCG: 50 TABLET ORAL at 06:34

## 2018-08-12 RX ADMIN — RISPERIDONE 2 MG: 1 TABLET ORAL at 08:14

## 2018-08-12 RX ADMIN — ZOLPIDEM TARTRATE 10 MG: 10 TABLET ORAL at 21:17

## 2018-08-12 RX ADMIN — LORAZEPAM 0.25 MG: 0.5 TABLET ORAL at 17:27

## 2018-08-12 RX ADMIN — VITAMIN D 1000 UNITS: 25 TAB ORAL at 08:14

## 2018-08-12 RX ADMIN — FLUTICASONE PROPIONATE 2 SPRAY: 50 SPRAY, METERED NASAL at 08:15

## 2018-08-12 RX ADMIN — FLUTICASONE PROPIONATE 2 SPRAY: 50 SPRAY, METERED NASAL at 17:28

## 2018-08-12 RX ADMIN — LORAZEPAM 0.25 MG: 0.5 TABLET ORAL at 12:41

## 2018-08-12 NOTE — BH NOTES
PSYCHIATRIC PROGRESS NOTE         Patient Name  Mylene Conway   Date of Birth 1960   [facility] [id number]   Medical Record Number  [id number]      Age  59 y.o. PCP Karen Ramirez MD   Admit date:  7/27/2018    Room Number  321/01  @ [facility]   Date of Service  8/12/2018          PSYCHOTHERAPY SESSION NOTE:  Length of psychotherapy session: 20 minutes    Main condition/diagnosis/issues treated during session today, 8/12/2018 : depression, grief, AH, thoughts of self harm    I employed Cognitive Behavioral therapy techniques, Reality-Oriented psychotherapy, as well as supportive psychotherapy in regards to various ongoing psychosocial stressors, including the following: pre-admission and current problems; housing issues; occupational issues; academic issues; legal issues; medical issues; and stress of hospitalization. Interpersonal relationship issues and psychodynamic conflicts explored. Attempts made to alleviate maladaptive patterns. Overall, patient is not progressing    Treatment Plan Update (reviewed an updated 8/12/2018) : I will modify psychotherapy tx plan by implementing more stress management strategies, building upon cognitive behavioral techniques, increasing coping skills, as well as shoring up psychological defenses). An extended energy and skill set was needed to engage pt in psychotherapy due to some of the following: resistiveness, complexity, negativity, confrontational nature, hostile behaviors, and/or severe abnormalities in thought processes/psychosis resulting in the loss of expressive/receptive language communication skills. E & M PROGRESS NOTE:         HISTORY       CC:  \"Depressed\"  HISTORY OF PRESENT ILLNESS/INTERVAL HISTORY:  (reviewed/updated 8/12/2018). per initial evaluation:   The patient, Mylene Conway, is a 59 y.o.   WHITE [place]  female with a past psychiatric history significant for Depression, who presents at this time with complaints of (and/or evidence of) the following emotional symptoms: depression, suicidal thoughts/threats, self mutilation and anxiety. Additional symptomatology include anxiety, depression worse, difficulty sleeping, fear of impending doom, fearfulness, feeling depressed and feeling suicidal.  The above symptoms have been present for two weeks. These symptoms are of high severity. These symptoms are constant  in nature. The patient's condition has been precipitated by multiple psychosocial stressors . Patient's condition made worse by unresolved grief issues and Borderline tendencies. UDS:negative; BAL=0. Pt was discharged from [facility] to [facility] on 7/26/18. She c/o increased suicidal ideations. Pt's mother passed away in April. Reports, she keeps hearing her voice to come join her. Contracts for safety. Presents with Depressed mood, flat affect, poor eye contact. She was seen by the Psych attending on Friday AM and started on her PTA meds. Sahara Randall presents/reports/evidences the following emotional symptoms today, 8/12/2018:depression and suicidal thoughts/threats. The above symptoms have been present for several weeks. No incidents of agitation or self harm. Continued improvements in her mood, affect and overall engagement with others. Her father attended a family meeting and the patient was able to honestly talk about how he interferes with her emotional processing of her grief. Increased affect, smiling at times. More visible on the unit. More hopeful  8/11- anxiety +. Some somatic preoccupation. Accepting medication and sleep is improved. Denies SI/HI/AVH. 8/12- affect is better and still reports anxiety stating \"I think I need more than the ativan\". Sleep is better and denies Keyla and psychosis. SIDE EFFECTS: (reviewed/updated 8/12/2018)  None reported or admitted to.      ALLERGIES:(reviewed/updated 8/12/2018)  Allergies   Allergen Reactions    Other Food Other (comments)     \"Bad chest pain\" with walnuts. Coke - asthma/stuffy head. Fish sticks causes an asthma attack.  Astepro [Azelastine] Other (comments)     Violent headaches.  Bactrim [Sulfamethoprim] Other (comments)     Difficulty breathing, chills, fever.  Banana Other (comments)     Diffculty breathing, wheezing, asthma attack.  Coconut Other (comments)     Difficulty breathing, asthma attack, SOB.  Cortisone Hives     Difficulty breathing.  Peanut Other (comments)     Wheezing, asthma attack, SOB.  Prednisone Hives     Difficulty breathing, kidney stones. MEDICATIONS PRIOR TO ADMISSION:(reviewed/updated 2018)  Prescriptions Prior to Admission   Medication Sig    levothyroxine (SYNTHROID) 125 mcg tablet Take 1 Tab by mouth Daily (before breakfast). Indications: hypothyroidism    risperiDONE (RISPERDAL) 1 mg tablet Take 1 Tab by mouth two (2) times a day. Indications: delusion    sertraline (ZOLOFT) 25 mg tablet Take 3 Tabs by mouth every evening. Indications: major depressive disorder    traZODone (DESYREL) 50 mg tablet Take 1 Tab by mouth nightly as needed for Sleep. Indications: insomnia associated with depression    [] hydrOXYzine HCl (ATARAX) 25 mg tablet Take 1 Tab by mouth every six (6) hours as needed (Anxiety) for up to 10 days. Indications: anxiety, Anxiety    therapeutic multivitamin (THERA) tablet Take 1 Tab by mouth daily.  fluticasone (FLONASE) 50 mcg/actuation nasal spray 2 Sprays by Both Nostrils route two (2) times a day.  Cholecalciferol, Vitamin D3, (VITAMIN D3) 1,000 unit cap Take 1,000 Units by mouth daily.  cyanocobalamin, vitamin B-12, (VITAMIN B-12) 5,000 mcg subl 5,000 mcg by SubLINGual route daily.  aspirin (ASPIRIN) 325 mg tablet Take 325-650 mg by mouth as needed for Pain.       PAST MEDICAL HISTORY: Past medical history from the initial psychiatric evaluation has been reviewed (reviewed/updated 2018) with no additional updates (I asked patient and no additional past medical history provided). Past Medical History:   Diagnosis Date    Asthma 1967    hospitalized for asthma attack x 2    Chronic kidney disease     kidney stones x 2-3 times    Ill-defined condition     nasal blockage from growth and misalignment - cannot breath out of nose - surgery in the future/cleared for colonoscopy 7/31/2014 - will bring in letter    Other ill-defined conditions(799.89)     blood in stool    Other ill-defined conditions(799.89)     hx low BP - 90's/60's-70's    PUD (peptic ulcer disease)     Thyroid disease     Unspecified adverse effect of anesthesia     nasal growth and misalignment and cannot breath through nose     Past Surgical History:   Procedure Laterality Date    HX HEENT      T & A    HX HEENT      turbinate surgery      SOCIAL HISTORY: Social history from the initial psychiatric evaluation has been reviewed (reviewed/updated 8/12/2018) with no additional updates (I asked patient and no additional social history provided). Social History     Social History    Marital status: SINGLE     Spouse name: N/A    Number of children: N/A    Years of education: N/A     Occupational History    Not on file. Social History Main Topics    Smoking status: Never Smoker    Smokeless tobacco: Never Used    Alcohol use No    Drug use: No    Sexual activity: Not Currently     Other Topics Concern    Not on file     Social History Narrative      FAMILY HISTORY: Family history from the initial psychiatric evaluation has been reviewed (reviewed/updated 8/12/2018) with no additional updates (I asked patient and no additional family history provided).    Family History   Problem Relation Age of Onset    Stroke Mother     Other Mother      erosion of esophagus    Cancer Mother      melanoma/squamous    Heart Surgery Father      x2    Delayed Awakening Father    [facility] Pacemaker Father     Other Father      carotid endartarectomy/polio    Cataract Father REVIEW OF SYSTEMS: (reviewed/updated 8/12/2018)  Appetite:good   Sleep: good   All other Review of Systems: depressed mood, AH/ VH         MENTAL STATUS EXAM & VITALS     MENTAL STATUS EXAM (MSE):    MSE FINDINGS ARE WITHIN NORMAL LIMITS (WNL) UNLESS OTHERWISE STATED BELOW. ( ALL OF THE BELOW CATEGORIES OF THE MSE HAVE BEEN REVIEWED (reviewed 8/12/2018) AND UPDATED AS DEEMED APPROPRIATE )  General Presentation age appropriate and casually dressed, cooperative   Orientation oriented to time, place and person   Vital Signs  See below (reviewed 8/12/2018); Vital Signs (BP, Pulse, & Temp) are within normal limits if not listed below.    Gait and Station Stable/steady, no ataxia   Musculoskeletal System No extrapyramidal symptoms (EPS); no abnormal muscular movements or Tardive Dyskinesia (TD); muscle strength and tone are within normal limits   Language No aphasia or dysarthria   Speech:  normal pitch and normal volume   Thought Processes logical; normal rate of thoughts; good abstract reasoning/computation   Thought Associations goal directed   Thought Content (+)less delusional, but continued odd thinking   Suicidal Ideations (+)passive SI   Homicidal Ideations none   Mood:  depressed improving   Affect:  Improving, increased range of affect; smiling more   Memory recent  fair   Memory remote:  fair   Concentration/Attention:  wnl   Fund of Knowledge wnl   Insight:  fair   Reliability fair   Judgment:  fair          VITALS:     Patient Vitals for the past 24 hrs:   Temp Pulse Resp BP   08/12/18 0626 98 °F (36.7 °C) (!) 54 16 100/66     Wt Readings from Last 3 Encounters:   07/27/18 72.6 kg (160 lb)   07/16/18 72.6 kg (160 lb)   07/31/14 79.4 kg (175 lb)     Temp Readings from Last 3 Encounters:   08/12/18 98 °F (36.7 °C)   07/26/18 98 °F (36.7 °C)     BP Readings from Last 3 Encounters:   08/12/18 100/66   07/26/18 108/71   07/31/14 110/69     Pulse Readings from Last 3 Encounters:   08/12/18 (!) 54   07/26/18 62 07/31/14 69            DATA     LABORATORY DATA:(reviewed/updated 8/12/2018)  No results found for this or any previous visit (from the past 24 hour(s)). No results found for: VALF2, VALAC, VALP, VALPR, DS6, CRBAM, CRBAMP, CARB2, XCRBAM  No results found for: LITHM   RADIOLOGY REPORTS:(reviewed/updated 8/12/2018)  Ct Head Wo Cont    Result Date: 7/17/2018  EXAM:  CT HEAD WO CONT INDICATION:  Altered mental status. COMPARISON: None. CONTRAST:  None. TECHNIQUE: Unenhanced CT of the head was performed using 5 mm images. Brain and bone windows were generated. CT dose reduction was achieved through use of a standardized protocol tailored for this examination and automatic exposure control for dose modulation. Adaptive statistical iterative reconstruction (ASIR) was utilized. FINDINGS: The ventricles and sulci are normal in size, shape and configuration and midline. There is no significant white matter disease. There is no intracranial hemorrhage, extra-axial collection, mass, mass effect or midline shift. The basilar cisterns are open. No acute infarct is identified. The bone windows demonstrate no abnormalities. The visualized portions of the paranasal sinuses and mastoid air cells are clear. IMPRESSION: No acute findings.          MEDICATIONS     ALL MEDICATIONS:   Current Facility-Administered Medications   Medication Dose Route Frequency    sertraline (ZOLOFT) tablet 200 mg  200 mg Oral DAILY WITH DINNER    LORazepam (ATIVAN) tablet 0.25 mg  0.25 mg Oral TID    risperiDONE (RisperDAL) tablet 2 mg  2 mg Oral BID    zolpidem (AMBIEN) tablet 10 mg  10 mg Oral QHS    LORazepam (ATIVAN) tablet 1 mg  1 mg Oral Q6H PRN    fluticasone (FLONASE) 50 mcg/actuation nasal spray 2 Spray  2 Spray Both Nostrils BID    ziprasidone (GEODON) 20 mg in sterile water (preservative free) 1 mL injection  20 mg IntraMUSCular BID PRN    OLANZapine (ZyPREXA) tablet 5 mg  5 mg Oral Q6H PRN    benztropine (COGENTIN) tablet 2 mg 2 mg Oral BID PRN    benztropine (COGENTIN) injection 2 mg  2 mg IntraMUSCular BID PRN    LORazepam (ATIVAN) injection 2 mg  2 mg IntraMUSCular Q4H PRN    acetaminophen (TYLENOL) tablet 650 mg  650 mg Oral Q4H PRN    ibuprofen (MOTRIN) tablet 400 mg  400 mg Oral Q8H PRN    magnesium hydroxide (MILK OF MAGNESIA) 400 mg/5 mL oral suspension 30 mL  30 mL Oral DAILY PRN    nicotine (NICODERM CQ) 21 mg/24 hr patch 1 Patch  1 Patch TransDERmal DAILY PRN    hydrOXYzine HCl (ATARAX) tablet 25 mg  25 mg Oral Q6H PRN    levothyroxine (SYNTHROID) tablet 125 mcg  125 mcg Oral ACB    cholecalciferol (VITAMIN D3) tablet 1,000 Units  1,000 Units Oral DAILY    aspirin (ASPIRIN) tablet 325 mg  325 mg Oral DAILY      SCHEDULED MEDICATIONS:   Current Facility-Administered Medications   Medication Dose Route Frequency    sertraline (ZOLOFT) tablet 200 mg  200 mg Oral DAILY WITH DINNER    LORazepam (ATIVAN) tablet 0.25 mg  0.25 mg Oral TID    risperiDONE (RisperDAL) tablet 2 mg  2 mg Oral BID    zolpidem (AMBIEN) tablet 10 mg  10 mg Oral QHS    fluticasone (FLONASE) 50 mcg/actuation nasal spray 2 Spray  2 Spray Both Nostrils BID    levothyroxine (SYNTHROID) tablet 125 mcg  125 mcg Oral ACB    cholecalciferol (VITAMIN D3) tablet 1,000 Units  1,000 Units Oral DAILY    aspirin (ASPIRIN) tablet 325 mg  325 mg Oral DAILY          ASSESSMENT & PLAN     DIAGNOSES REQUIRING ACTIVE TREATMENT AND MONITORING: (reviewed/updated 8/12/2018)  Patient Active Hospital Problem List:     MDD with psychosis   Assessment: moderate to severe; exacerbated by borderline pd, Grief, lack of resources, lack of social support   Plan: Agree with inpatient hospitalization for further stabilization, safety monitoring and medication management  Close observation due to self harm   Titrate medications, Increase zoloft to 150mg.  Add ambien, continue Risperdal but increase to 1mg/ 2mg hs  Reduce dose of ativan to 0.25mg three times daily  8/4- Zoloft increased to 175 mg/day. Ativan to 0.25 qid  8/5- Risperdal increased to 2 mg bid   8/9- Plan for family meeting tomorrow; reduce dose of ativan, increase zoloft to 200mg   8/11- ct with current med  8/12- ct with tx plan    In summary, Hamilton Irving, is a 59 y.o.  female who presents with a severe exacerbation of the principal diagnosis of MDD with psychosis. Patient's condition is not improving. Patient requires continued inpatient hospitalization for further stabilization, safety monitoring and medication management. I will continue to coordinate the provision of individual, milieu, occupational, group, and substance abuse therapies to address target symptoms/diagnoses as deemed appropriate for the individual patient. A coordinated, multidisplinary treatment team round was conducted with the patient (this team consists of the nurse, psychiatric unit pharmcist,  and writer). Complete current electronic health record for patient has been reviewed today including consultant notes, ancillary staff notes, nurses and psychiatric tech notes. Suicide risk assessment completed and patient deemed to be of low risk for suicide at this time. The following regarding medications was addressed during rounds with patient:   the risks and benefits of the proposed medication. The patient was given the opportunity to ask questions. Informed consent given to the use of the above medications. Will continue to adjust psychiatric and non-psychiatric medications (see above \"medication\" section and orders section for details) as deemed appropriate & based upon diagnoses and response to treatment. I will continue to order blood tests/labs and diagnostic tests as deemed appropriate and review results as they become available (see orders for details and above listed lab/test results).     I will order psychiatric records from previous [facility] hospitals to further elucidate the nature of patient's psychopathology and review once available. I will gather additional collateral information from friends, family and o/p treatment team to further elucidate the nature of patient's psychopathology and baselline level of psychiatric functioning. I certify that this patient's inpatient psychiatric hospital services furnished since the previous certification were, and continue to be, required for treatment that could reasonably be expected to improve the patient's condition, or for diagnostic study, and that the patient continues to need, on a daily basis, active treatment furnished directly by or requiring the supervision of inpatient psychiatric facility personnel. In addition, the hospital records show that services furnished were intensive treatment services, admission or related services, or equivalent services.     EXPECTED DISCHARGE DATE/DAY: TBD     DISPOSITION: Home       Signed By:   Oneyda Batista MD  8/12/2018

## 2018-08-12 NOTE — BH NOTES
Patient had no complaints throughout the night, remained in room, monitored q 15minutes for safety, and slept for 8 hours. No labs. Will continue to monitor for remainder of shift for changes/issues/complaints. AM medications taken with no issues.

## 2018-08-12 NOTE — BH NOTES
Pt was visited by her father this evening and was very happy. Accepted all scheduled medications. Spent most of the evening resting in bed.

## 2018-08-13 VITALS
DIASTOLIC BLOOD PRESSURE: 53 MMHG | SYSTOLIC BLOOD PRESSURE: 91 MMHG | HEIGHT: 64 IN | RESPIRATION RATE: 16 BRPM | HEART RATE: 57 BPM | WEIGHT: 160 LBS | BODY MASS INDEX: 27.31 KG/M2 | TEMPERATURE: 98.5 F | OXYGEN SATURATION: 98 %

## 2018-08-13 PROCEDURE — 74011250637 HC RX REV CODE- 250/637: Performed by: PSYCHIATRY & NEUROLOGY

## 2018-08-13 RX ORDER — LEVOTHYROXINE SODIUM 125 UG/1
125 TABLET ORAL
Qty: 15 TAB | Refills: 0 | Status: SHIPPED | OUTPATIENT
Start: 2018-08-14 | End: 2018-11-09

## 2018-08-13 RX ORDER — FLUTICASONE PROPIONATE 50 MCG
2 SPRAY, SUSPENSION (ML) NASAL DAILY
Qty: 1 BOTTLE | Refills: 0 | Status: SHIPPED | OUTPATIENT
Start: 2018-08-13 | End: 2018-11-09

## 2018-08-13 RX ORDER — SERTRALINE HYDROCHLORIDE 100 MG/1
200 TABLET, FILM COATED ORAL
Qty: 30 TAB | Refills: 0 | Status: SHIPPED | OUTPATIENT
Start: 2018-08-13 | End: 2018-11-09

## 2018-08-13 RX ORDER — ASPIRIN 325 MG
325 TABLET ORAL DAILY
Qty: 15 TAB | Refills: 0 | Status: SHIPPED | OUTPATIENT
Start: 2018-08-14 | End: 2018-11-09

## 2018-08-13 RX ORDER — MELATONIN
1000 DAILY
Qty: 15 TAB | Refills: 0 | Status: SHIPPED | OUTPATIENT
Start: 2018-08-14 | End: 2018-11-09

## 2018-08-13 RX ORDER — LORAZEPAM 0.5 MG/1
0.25 TABLET ORAL 3 TIMES DAILY
Qty: 45 TAB | Refills: 0 | Status: ON HOLD | OUTPATIENT
Start: 2018-08-13 | End: 2018-10-29

## 2018-08-13 RX ORDER — RISPERIDONE 2 MG/1
2 TABLET, FILM COATED ORAL 2 TIMES DAILY
Qty: 30 TAB | Refills: 0 | Status: ON HOLD | OUTPATIENT
Start: 2018-08-13 | End: 2018-10-29

## 2018-08-13 RX ORDER — ZOLPIDEM TARTRATE 10 MG/1
10 TABLET ORAL
Qty: 15 TAB | Refills: 0 | Status: SHIPPED | OUTPATIENT
Start: 2018-08-13 | End: 2018-11-09

## 2018-08-13 RX ADMIN — LORAZEPAM 0.25 MG: 0.5 TABLET ORAL at 07:44

## 2018-08-13 RX ADMIN — RISPERIDONE 2 MG: 1 TABLET ORAL at 07:44

## 2018-08-13 RX ADMIN — FLUTICASONE PROPIONATE 2 SPRAY: 50 SPRAY, METERED NASAL at 07:44

## 2018-08-13 RX ADMIN — VITAMIN D 1000 UNITS: 25 TAB ORAL at 07:44

## 2018-08-13 RX ADMIN — ASPIRIN 325 MG ORAL TABLET 325 MG: 325 PILL ORAL at 07:44

## 2018-08-13 RX ADMIN — LEVOTHYROXINE SODIUM 125 MCG: 50 TABLET ORAL at 05:42

## 2018-08-13 NOTE — BH NOTES
Patient was discharged today. Discharge forms were signed and given to patient after being verified by second nurse and patient. Discharge instructions were explained to patient and patient indicated understanding verbally. Patient has been compliant with taking medications and stated understanding the importance of taking medications upon discharge. Patient was given medications and belongings, and was escorted from the unit by staff.

## 2018-08-13 NOTE — BH NOTES
Behavioral Health Transition Record to Provider    Patient Name: Arcadio Kumar  YOB: 1960  Medical Record Number: [id number]  Date of Admission: 2018  Date of Discharge: 2018    Attending Provider: Frederick Dueñas MD  Discharging Provider: Frederick Dueañs MD  To contact this individual call [phone] and ask the  to page. If unavailable, ask to be transferred to [facility] Provider on call. [facility] Provider will be available on call  and during holidays. Primary Care Provider: Edwina De La Paz MD    Allergies   Allergen Reactions    Other Food Other (comments)     \"Bad chest pain\" with walnuts. Coke - asthma/stuffy head. Fish sticks causes an asthma attack.  Astepro [Azelastine] Other (comments)     Violent headaches.  Bactrim [Sulfamethoprim] Other (comments)     Difficulty breathing, chills, fever.  Banana Other (comments)     Diffculty breathing, wheezing, asthma attack.  Coconut Other (comments)     Difficulty breathing, asthma attack, SOB.  Cortisone Hives     Difficulty breathing.  Peanut Other (comments)     Wheezing, asthma attack, SOB.  Prednisone Hives     Difficulty breathing, kidney stones. Reason for Admission: Pt admitted on TDO basis with SI reporting she wants to be with her  mother. Pt reporting depression and SI. Pt denied HI. Admission Diagnosis: bipolar  Depression    Social Work-Pt will be discharged today. Pt is alert and oriented. Pt denied SI/HI. Pt is free of delusions. Pt's mood is euthymic with affect being smiling intermittently. Pt's thought process is goal oriented as she talks about wanting to work on her concerns and issues at hand such as coping skills, hospital bill and grief. Pt will be provided with Good Help prescriptions for two weeks. Pt referred to [facility] for crisis mobile in-home counseling.  Pt will follow-up with [facility]Shayla Shab[facility] Adult Re[facility]itative [facility] on 8/21/2018 @ 1:00pm to establish outpatient therapy/case management and psychiatry services. Pt provided with [facility] information for support groups. Pt's father will pick her up at discharge. Pt is in agreement with the plan. [facility] care plan will be faxed electronically via [facility] . * No surgery found *    Results for orders placed or performed during the hospital encounter of 07/27/18   GLUCOSE, FASTING   Result Value Ref Range    Glucose 87 65 - 100 MG/DL   TSH 3RD GENERATION   Result Value Ref Range    TSH 0.08 (L) 0.36 - 3.74 uIU/mL   LIPID PANEL   Result Value Ref Range    LIPID PROFILE          Cholesterol, total 209 (H) <200 MG/DL    Triglyceride 138 <150 MG/DL    HDL Cholesterol 55 MG/DL    LDL, calculated 126.4 (H) 0 - 100 MG/DL    VLDL, calculated 27.6 MG/DL    CHOL/HDL Ratio 3.8 0 - 5.0     T4, FREE   Result Value Ref Range    T4, Free 1.2 0.8 - 1.5 NG/DL       Immunizations administered during this encounter: There is no immunization history on file for this patient. Screening for Metabolic Disorders for Patients on Antipsychotic Medications  (Data obtained from the EMR)    Estimated Body Mass Index  Estimated body mass index is 27.46 kg/(m^2) as calculated from the following:    Height as of this encounter: 5' 4\" (1.626 m). Weight as of this encounter: 72.6 kg (160 lb).      Vital Signs/Blood Pressure  Visit Vitals    BP 91/53 (BP 1 Location: Left arm, BP Patient Position: Sitting)    Pulse (!) 57    Temp 98.5 °F (36.9 °C)  Comment: 05:54am    Resp 16    Ht 5' 4\" (1.626 m)    Wt 72.6 kg (160 lb)    SpO2 98%    Breastfeeding No    BMI 27.46 kg/m2       Blood Glucose/Hemoglobin A1c  Lab Results   Component Value Date/Time    Glucose 87 07/28/2018 04:44 AM       Lab Results   Component Value Date/Time    Hemoglobin A1c 6.1 07/19/2018 06:03 AM        Lipid Panel  Lab Results   Component Value Date/Time    Cholesterol, total 209 (H) 07/28/2018 04:44 AM    HDL Cholesterol 55 07/28/2018 04:44 AM    LDL, calculated 126.4 (H) 07/28/2018 04:44 AM    Triglyceride 138 07/28/2018 04:44 AM    CHOL/HDL Ratio 3.8 07/28/2018 04:44 AM        Discharge Diagnosis: Please refer to psychiatrist's note. Discharge Plan: Pt will follow-up with [address] [place] [address] for outpatient therapy and psychiatry services. [facility] will provide in-home crisis mobil services through regional [place]. Discharge Medication List and Instructions:   Current Discharge Medication List          Unresulted Labs     None        To obtain results of studies pending at discharge, please contact [phone]    Follow-up Information     Follow up With Details Comments [address] to Follow up for open acess (M-W/7:30-3:00pm; Thurs./7:30-5:30pm/Fri 8-11:00am) to establish outpatient services (grief) & psychiatry services. *please bring id, insurance card & medications [address]  [phone]    [facility]  Follow up for mobile crisis stabilization services referred by through [address] [facility], [address] [place] [address]  [phone]    [facility]  Follow up for intake to attend [facility] grief support group. Ananda Thompson 29  [address]  [phone] ext. [phone]            Advanced Directive:   Does the patient have an appointed surrogate decision maker? Yes  Does the patient have a Medical Advance Directive? No  Does the patient have a Psychiatric Advance Directive?  No  If the patient does not have a surrogate or Medical Advance Directive AND Psychiatric Advance Directive, the patient was offered information on these advance directives Patient will complete at a later time    Patient Instructions: Please continue all medications until otherwise directed by physician. Tobacco Cessation Discharge Plan:   Is the patient a smoker and needs referral for smoking cessation? Not applicable  Patient referred to the following for smoking cessation with an appointment? Not applicable     Patient was offered medication to assist with smoking cessation at discharge? Not applicable  Was education for smoking cessation added to the discharge instructions? Not applicable    Alcohol/Substance Abuse Discharge Plan:   Does the patient have a history of substance/alcohol abuse and requires a referral for treatment? Not applicable  Patient referred to the following for substance/alcohol abuse treatment with an appointment? Not applicable  Patient was offered medication to assist with alcohol cessation at discharge? Not applicable  Was education for substance/alcohol abuse added to discharge instructions? Not applicable     Patient discharged to Home; provided to the patient/caregiver either in hard copy or electronically.

## 2018-08-13 NOTE — BH NOTES
SOCIAL WORK    Per Kamini Doll [address]/[phone], referral note to be written in [facility] for pt to receive [facility] in-home crisis mobile services.

## 2018-08-13 NOTE — BH NOTES
Patient had no complaints throughout the night, remained in room, monitored q 15minutes for safety, and slept for 7.5 hours. No labs. Morning medications taken out issue. Will continue to monitor for remainder of shift for changes/issues/complaints. Patient found to have pencils in room, removed.

## 2018-08-13 NOTE — DISCHARGE INSTRUCTIONS
DISCHARGE SUMMARY from Nurse    The following personal items are in your possession at time of discharge:       Visual Aid: Glasses, With patient                        PATIENT INSTRUCTIONS:    If I feel that I can not keep these promises and I am at risk of hurting myself or others, I will call the crisis office and speak with a crisis worker who will assist me during my crisis. [facility] Marcell[address] Howard [address]  [address]  [address]       Lifestyle Tips:  Appointment after discharge and follow up phone call:   After being discharged, if your appointment does not work for you, please feel free to contact the physician's office to change the appointment. Medications: Take your medicines exactly as instructed. Ask your doctor or nurse if you have questions about your medicines. Tell your doctor right away if you have problems with your medicines. These are general instructions for a healthy lifestyle:    No smoking/ No tobacco products/ Avoid exposure to second hand smoke    Surgeon General's Warning:  Quitting smoking now greatly reduces serious risk to your health. Obesity, smoking, and sedentary lifestyle greatly increases your risk for illness    A healthy diet, regular physical exercise & weight monitoring are important for maintaining a healthy lifestyle      Recognize signs and symptoms of STROKE:    F-face looks uneven    A-arms unable to move or move unevenly    S-speech slurred or non-existent    T-time-call 911 as soon as signs and symptoms begin-DO NOT go       Back to bed or wait to see if you get better-TIME IS BRAIN. Warning Signs of HEART ATTACK     Call 911 if you have these symptoms:   Chest discomfort. Most heart attacks involve discomfort in the center of the chest that lasts more than a few minutes, or that goes away and comes back. It can feel like uncomfortable pressure, squeezing, fullness, or pain.  Discomfort in other areas of the upper body. Symptoms can include pain or discomfort in one or both arms, the back, neck, jaw, or stomach.  Shortness of breath with or without chest discomfort.  Other signs may include breaking out in a cold sweat, nausea, or lightheadedness. Don't wait more than five minutes to call 911 - MINUTES MATTER! Fast action can save your life. Calling 911 is almost always the fastest way to get lifesaving treatment. Emergency Medical Services staff can begin treatment when they arrive -- up to an hour sooner than if someone gets to the hospital by car. Myself and/or my family have received education about my diagnosis throughout my hospital stay. During my hospital stay, I and/or my family/caregiver were included in planning my care upon discharge. My needs were taken into consideration and I was included in my discharge planning. I had an opportunity to ask questions. The discharge information has been reviewed with the patient. The patient verbalized understanding. Discharge medications reviewed with the patient and appropriate educational materials and side effects teaching were provided.

## 2018-08-13 NOTE — PROGRESS NOTES
Problem: Depressed Mood (Adult/Pediatric)  Goal: *STG: Attends activities and groups  Outcome: Progressing Towards Goal  Patient was visible in day room during shift. Positive interaction with staff and peers during shift. No suicidal ideations noted. Will continue to monitor.

## 2018-08-13 NOTE — DISCHARGE SUMMARY
PSYCHIATRIC DISCHARGE SUMMARY         IDENTIFICATION:    Patient Name  Eugenie Rabago   Date of Birth 1960   [facility] [id number]   Medical Record Number  [id number]      Age  59 y.o. PCP Carson Guerrier MD   Admit date:  7/27/2018    Discharge date: 8/13/2018   Room Number  321/01  @ [facility]   Date of Service  8/13/2018            TYPE OF DISCHARGE: REGULAR               CONDITION AT DISCHARGE: improved       PROVISIONAL & DISCHARGE DIAGNOSES:    Problem List  Never Reviewed          Codes Class    * (Principal)Severe major depression with psychotic features, mood-congruent ([facility] ICD-10-CM: F32.3  ICD-9-CM: 296.24         Depression ICD-10-CM: F32.9  ICD-9-CM: [address] Problems    *Severe major depression with psychotic features, mood-congruent (HCC)      Depression        DISCHARGE DIAGNOSIS:   Axis I:  SEE ABOVE  Axis II: SEE ABOVE  Axis III: SEE ABOVE  Axis IV:  Complicated bereavement; unemployed; lack of structure  Axis V:  25 on admission, 55 on discharge 65(baseline)       CC & HISTORY OF PRESENT ILLNESS:  The patient, Eugenie Rabago, is a 59 y.o. WHITE OR  female with a past psychiatric history significant for Depression, who presents at this time with complaints of (and/or evidence of) the following emotional symptoms: depression, suicidal thoughts/threats, self mutilation and anxiety. Additional symptomatology include anxiety, depression worse, difficulty sleeping, fear of impending doom, fearfulness, feeling depressed and feeling suicidal.  The above symptoms have been present for two weeks. These symptoms are of high severity. These symptoms are constant  in nature. The patient's condition has been precipitated by multiple psychosocial stressors . Patient's condition made worse by unresolved grief issues and Borderline tendencies. UDS:negative; BAL=0. Pt was [facility] from [facility] to [facility] on 7/26/18. She c/o increased suicidal ideations. Pt's mother passed away in April. Reports, she keeps hearing her voice to come join her. Contracts for safety. Presents with Depressed mood, flat affect, poor eye contact. She was seen by the Psych attending on Friday AM and started on her PTA meds     SOCIAL HISTORY:    Social History     Social History    Marital status: SINGLE     Spouse name: N/A    Number of children: N/A    Years of education: N/A     Occupational History    Not on file. Social History Main Topics    Smoking status: Never Smoker    Smokeless tobacco: Never Used    Alcohol use No    Drug use: No    Sexual activity: Not Currently     Other Topics Concern    Not on file     Social History Narrative      FAMILY HISTORY:   Family History   Problem Relation Age of Onset    Stroke Mother     Other Mother      erosion of esophagus    Cancer Mother      melanoma/squamous    Heart Surgery Father      x2    Delayed Awakening Father     Pacemaker Father     Other Father      carotid endartarectomy/polio    Cataract Father              HOSPITALIZATION COURSE:    Sally York was admitted to the inpatient psychiatric unit [facility] for acute psychiatric stabilization in regards to symptomatology as described in the HPI above. The differential diagnosis at time of admission included:MDD with psychosis vs. Cluster A personality disorder vs. Schizoaffective disorder vs. .  While on the unit Sally York was involved in individual, group, occupational and milieu therapy. Psychiatric medications were adjusted during this hospitalization including maximized dose of zoloft, Added risperdal and minimized dose of ativan and ambien. Sally York demonstrated a slow, but progressive improvement in overall condition. Much of patient's depression appeared to be related to situational stressors, and psychological factors.   Please see individual progress notes for more specific details regarding patient's hospitalization course. At time of discharge, Johanna Lagos is without significant problems of depression, self harm or psychosis. Patient free of suicidal and homicidal ideations (appears to be at very low risk of suicide or homicide) and reports many positive predictive factors in terms of not attempting suicide or homicide. Overall presentation at time of discharge is most consistent with the diagnosis of MDD with psychosis. Patient with request for discharge today. There are no grounds to seek a TDO. Patient has maximized benefit to be derived from acute inpatient psychiatric treatment. All members of the treatment team concur with each other in regards to plans for discharge today as per patient's request.  Patient and family are aware and in agreement with discharge and discharge plan.               LABS AND IMAGING:    Labs Reviewed   TSH 3RD GENERATION - Abnormal; Notable for the following:        Result Value    TSH 0.08 (*)     All other components within normal limits   LIPID PANEL - Abnormal; Notable for the following:     Cholesterol, total 209 (*)     LDL, calculated 126.4 (*)     All other components within normal limits   GLUCOSE, FASTING   T4, FREE     No results found for: DS35, PHEN, PHENO, PHENT, DILF, DS39, PHENY, PTN, VALF2, VALAC, VALP, VALPR, DS6, CRBAM, CRBAMP, CARB2, XCRBAM  Admission on 07/27/2018   Component Date Value Ref Range Status    Glucose 07/28/2018 87  65 - 100 MG/DL Final    TSH 07/28/2018 0.08* 0.36 - 3.74 uIU/mL Final    LIPID PROFILE 07/28/2018        Final    Cholesterol, total 07/28/2018 209* <200 MG/DL Final    Triglyceride 07/28/2018 138  <150 MG/DL Final    HDL Cholesterol 07/28/2018 55  MG/DL Final    LDL, calculated 07/28/2018 126.4* 0 - 100 MG/DL Final    VLDL, calculated 07/28/2018 27.6  MG/DL Final    CHOL/HDL Ratio 07/28/2018 3.8  0 - 5.0   Final    T4, Free 08/02/2018 1.2  0.8 - 1.5 NG/DL Final     Ct Head Wo Cont    Result Date: 7/17/2018  EXAM:  CT HEAD WO CONT INDICATION:  Altered mental status. COMPARISON: None. CONTRAST:  None. TECHNIQUE: Unenhanced CT of the head was performed using 5 mm images. Brain and bone windows were generated. CT dose reduction was achieved through use of a standardized protocol tailored for this examination and automatic exposure control for dose modulation. Adaptive statistical iterative reconstruction (ASIR) was utilized. FINDINGS: The ventricles and sulci are normal in size, shape and configuration and midline. There is no significant white matter disease. There is no intracranial hemorrhage, extra-axial collection, mass, mass effect or midline shift. The basilar cisterns are open. No acute infarct is identified. The bone windows demonstrate no abnormalities. The visualized portions of the paranasal sinuses and mastoid air cells are clear. IMPRESSION: No acute findings. DISPOSITION:    Home. Patient to f/u with psychiatric, and psychotherapy appointments. Patient is to f/u with internist as directed. FOLLOW-UP CARE:    Activity as tolerated  Regular Diet  Wound Care: none needed. Follow-up Information     Follow up With Details Comments [address] [place] [address] on 8/21/2018 Follow up on 8/21/2018 @ 1:00pm with [facility] to establish outpatient, case management and psychiatry services. *please bring id, insurance cards & medications [facility] [address]  [phone]    [facility]  Follow up referral for mobile crisis stabilization services. [facility], [address] [place] [address] Avenue  [phone]    [facility]  Follow up for intake to attend [facility] grief support group. Ananda Thompson 29  [address] [facility], [address] [place] [address]  [phone] ext.  [address], [place]   [address] [facility]                   PROGNOSIS:   [address] [facility] [address]- based on nature of patient's pathology/ies and treatment compliance issues. Prognosis is greatly dependent upon patient's ability to remain sober and to follow up with drug/etoh rehabilitation and psychiatric/psychotherapy appointments as well as to comply with psychiatric medications as prescribed. DISCHARGE MEDICATIONS:     Informed consent given for the use of following psychotropic medications:  Current Discharge Medication List      START taking these medications    Details   cholecalciferol (VITAMIN D3) 1,000 unit tablet Take 1 Tab by mouth daily. Indications: PREVENTION OF VITAMIN D DEFICIENCY, Vitamin D Deficiency  Qty: 15 Tab, Refills: 0      LORazepam (ATIVAN) 0.5 mg tablet Take 0.5 Tabs by mouth three (3) times daily. Max Daily Amount: 0.75 mg. Indications: anxiety  Qty: 45 Tab, Refills: 0    Associated Diagnoses: Anxiety      zolpidem (AMBIEN) 10 mg tablet Take 1 Tab by mouth nightly. Max Daily Amount: 10 mg. Indications: SLEEP-ONSET INSOMNIA  Qty: 15 Tab, Refills: 0    Associated Diagnoses: Psychophysiological insomnia         CONTINUE these medications which have CHANGED    Details   aspirin (ASPIRIN) 325 mg tablet Take 1 Tab by mouth daily. Indications: myocardial infarction prevention  Qty: 15 Tab, Refills: 0      fluticasone (FLONASE) 50 mcg/actuation nasal spray 2 Sprays by Both Nostrils route daily. Indications: Allergic Rhinitis  Qty: 1 Bottle, Refills: 0      levothyroxine (SYNTHROID) 125 mcg tablet Take 1 Tab by mouth Daily (before breakfast). Indications: hypothyroidism  Qty: 15 Tab, Refills: 0      risperiDONE (RISPERDAL) 2 mg tablet Take 1 Tab by mouth two (2) times a day. Indications: DEPRESSION TREATMENT ADJUNCT  Qty: 30 Tab, Refills: 0      sertraline (ZOLOFT) 100 mg tablet Take 2 Tabs by mouth daily (with dinner).  Indications: Generalized Anxiety Disorder, major depressive disorder  Qty: 30 Tab, Refills: 0         CONTINUE these medications which have NOT CHANGED    Details therapeutic multivitamin (THERA) tablet Take 1 Tab by mouth daily. cyanocobalamin, vitamin B-12, (VITAMIN B-12) 5,000 mcg subl 5,000 mcg by SubLINGual route daily. STOP taking these medications       traZODone (DESYREL) 50 mg tablet Comments:   Reason for Stopping:         hydrOXYzine HCl (ATARAX) 25 mg tablet Comments:   Reason for Stopping:         Cholecalciferol, Vitamin D3, (VITAMIN D3) 1,000 unit cap Comments:   Reason for Stopping:                      A coordinated, multidisplinary treatment team round was conducted with Rosanne Samuels is done daily here at [facility]. This team consists of the nurse, psychiatric unit pharmcist,  and writer. I have spent greater than 35 minutes on discharge work.     Signed:  Laurent August MD  8/13/2018

## 2018-08-30 ENCOUNTER — ED HISTORICAL/CONVERTED ENCOUNTER (OUTPATIENT)
Dept: OTHER | Age: 58
End: 2018-08-30

## 2018-09-02 ENCOUNTER — ED HISTORICAL/CONVERTED ENCOUNTER (OUTPATIENT)
Dept: OTHER | Age: 58
End: 2018-09-02

## 2018-10-27 ENCOUNTER — HOSPITAL ENCOUNTER (INPATIENT)
Age: 58
LOS: 13 days | Discharge: HOME | DRG: 885 | End: 2018-11-09
Attending: PSYCHIATRY & NEUROLOGY | Admitting: PSYCHIATRY & NEUROLOGY
Payer: COMMERCIAL

## 2018-10-27 PROBLEM — F32.3 MAJOR DEPRESSION WITH PSYCHOTIC FEATURES (HCC): Status: ACTIVE | Noted: 2018-10-27

## 2018-10-27 PROCEDURE — 74011250636 HC RX REV CODE- 250/636: Performed by: PSYCHIATRY & NEUROLOGY

## 2018-10-27 PROCEDURE — 74011000250 HC RX REV CODE- 250: Performed by: PSYCHIATRY & NEUROLOGY

## 2018-10-27 PROCEDURE — 74011250637 HC RX REV CODE- 250/637: Performed by: NURSE PRACTITIONER

## 2018-10-27 PROCEDURE — 74011250636 HC RX REV CODE- 250/636: Performed by: NURSE PRACTITIONER

## 2018-10-27 PROCEDURE — 90714 TD VACC NO PRESV 7 YRS+ IM: CPT | Performed by: NURSE PRACTITIONER

## 2018-10-27 PROCEDURE — 65220000003 HC RM SEMIPRIVATE PSYCH

## 2018-10-27 PROCEDURE — 74011250637 HC RX REV CODE- 250/637: Performed by: PSYCHIATRY & NEUROLOGY

## 2018-10-27 RX ORDER — ZOLPIDEM TARTRATE 10 MG/1
10 TABLET ORAL
Status: DISCONTINUED | OUTPATIENT
Start: 2018-10-27 | End: 2018-11-02

## 2018-10-27 RX ORDER — MELATONIN
1000 DAILY
Status: DISCONTINUED | OUTPATIENT
Start: 2018-10-27 | End: 2018-11-09 | Stop reason: HOSPADM

## 2018-10-27 RX ORDER — HYDROXYZINE 50 MG/1
50 TABLET, FILM COATED ORAL
Status: DISCONTINUED | OUTPATIENT
Start: 2018-10-27 | End: 2018-11-09 | Stop reason: HOSPADM

## 2018-10-27 RX ORDER — CLONAZEPAM 1 MG/1
1 TABLET ORAL
COMMUNITY
End: 2018-11-09

## 2018-10-27 RX ORDER — OLANZAPINE 5 MG/1
5 TABLET ORAL
Status: DISCONTINUED | OUTPATIENT
Start: 2018-10-27 | End: 2018-10-28

## 2018-10-27 RX ORDER — LORAZEPAM 1 MG/1
1 TABLET ORAL
Status: DISCONTINUED | OUTPATIENT
Start: 2018-10-27 | End: 2018-11-09 | Stop reason: HOSPADM

## 2018-10-27 RX ORDER — DIPHENHYDRAMINE HYDROCHLORIDE 50 MG/ML
25 INJECTION, SOLUTION INTRAMUSCULAR; INTRAVENOUS ONCE
Status: COMPLETED | OUTPATIENT
Start: 2018-10-27 | End: 2018-10-27

## 2018-10-27 RX ORDER — IBUPROFEN 400 MG/1
400 TABLET ORAL
Status: DISCONTINUED | OUTPATIENT
Start: 2018-10-27 | End: 2018-11-09 | Stop reason: HOSPADM

## 2018-10-27 RX ORDER — ACETAMINOPHEN 325 MG/1
650 TABLET ORAL
Status: DISCONTINUED | OUTPATIENT
Start: 2018-10-27 | End: 2018-11-09 | Stop reason: HOSPADM

## 2018-10-27 RX ORDER — SERTRALINE HYDROCHLORIDE 50 MG/1
50 TABLET, FILM COATED ORAL EVERY EVENING
Status: DISCONTINUED | OUTPATIENT
Start: 2018-10-27 | End: 2018-10-28

## 2018-10-27 RX ORDER — LORAZEPAM 2 MG/ML
2 INJECTION INTRAMUSCULAR
Status: DISCONTINUED | OUTPATIENT
Start: 2018-10-27 | End: 2018-11-09 | Stop reason: HOSPADM

## 2018-10-27 RX ORDER — BENZTROPINE MESYLATE 1 MG/ML
2 INJECTION INTRAMUSCULAR; INTRAVENOUS
Status: DISCONTINUED | OUTPATIENT
Start: 2018-10-27 | End: 2018-11-09 | Stop reason: HOSPADM

## 2018-10-27 RX ORDER — ADHESIVE BANDAGE
30 BANDAGE TOPICAL DAILY PRN
Status: DISCONTINUED | OUTPATIENT
Start: 2018-10-27 | End: 2018-11-09 | Stop reason: HOSPADM

## 2018-10-27 RX ORDER — OLANZAPINE 20 MG/1
20 TABLET ORAL
COMMUNITY
End: 2018-11-09

## 2018-10-27 RX ORDER — OLANZAPINE 5 MG/1
5 TABLET ORAL
Status: DISCONTINUED | OUTPATIENT
Start: 2018-10-28 | End: 2018-10-30

## 2018-10-27 RX ORDER — ASPIRIN 325 MG
325 TABLET ORAL DAILY
Status: DISCONTINUED | OUTPATIENT
Start: 2018-10-27 | End: 2018-11-09 | Stop reason: HOSPADM

## 2018-10-27 RX ORDER — IPRATROPIUM BROMIDE AND ALBUTEROL SULFATE 2.5; .5 MG/3ML; MG/3ML
3 SOLUTION RESPIRATORY (INHALATION)
Status: DISCONTINUED | OUTPATIENT
Start: 2018-10-27 | End: 2018-11-09 | Stop reason: HOSPADM

## 2018-10-27 RX ORDER — HYDROXYZINE 50 MG/1
50 TABLET, FILM COATED ORAL
COMMUNITY
End: 2018-11-09

## 2018-10-27 RX ORDER — PRAZOSIN HYDROCHLORIDE 1 MG/1
1 CAPSULE ORAL
COMMUNITY
End: 2018-11-09

## 2018-10-27 RX ORDER — THERA TABS 400 MCG
1 TAB ORAL DAILY
Status: DISCONTINUED | OUTPATIENT
Start: 2018-10-27 | End: 2018-11-09 | Stop reason: HOSPADM

## 2018-10-27 RX ORDER — OLANZAPINE 5 MG/1
5 TABLET ORAL
COMMUNITY
End: 2018-11-09

## 2018-10-27 RX ORDER — BUSPIRONE HYDROCHLORIDE 15 MG/1
15 TABLET ORAL 3 TIMES DAILY
COMMUNITY
End: 2018-11-09

## 2018-10-27 RX ORDER — OLANZAPINE 5 MG/1
5 TABLET ORAL
Status: DISCONTINUED | OUTPATIENT
Start: 2018-10-27 | End: 2018-11-09 | Stop reason: HOSPADM

## 2018-10-27 RX ORDER — QUETIAPINE FUMARATE 50 MG/1
50 TABLET, FILM COATED ORAL
COMMUNITY
End: 2018-11-09

## 2018-10-27 RX ORDER — BENZTROPINE MESYLATE 2 MG/1
2 TABLET ORAL
Status: DISCONTINUED | OUTPATIENT
Start: 2018-10-27 | End: 2018-11-09 | Stop reason: HOSPADM

## 2018-10-27 RX ORDER — IBUPROFEN 200 MG
1 TABLET ORAL
Status: DISCONTINUED | OUTPATIENT
Start: 2018-10-27 | End: 2018-11-09 | Stop reason: HOSPADM

## 2018-10-27 RX ORDER — BUSPIRONE HYDROCHLORIDE 10 MG/1
5 TABLET ORAL 2 TIMES DAILY
Status: DISCONTINUED | OUTPATIENT
Start: 2018-10-27 | End: 2018-10-28

## 2018-10-27 RX ADMIN — LORAZEPAM 1 MG: 1 TABLET ORAL at 17:03

## 2018-10-27 RX ADMIN — OLANZAPINE 5 MG: 5 TABLET, FILM COATED ORAL at 21:13

## 2018-10-27 RX ADMIN — WATER 10 MG: 1 INJECTION INTRAMUSCULAR; INTRAVENOUS; SUBCUTANEOUS at 17:56

## 2018-10-27 RX ADMIN — ASPIRIN 325 MG: 325 TABLET ORAL at 10:49

## 2018-10-27 RX ADMIN — DIPHENHYDRAMINE HYDROCHLORIDE 25 MG: 50 INJECTION INTRAMUSCULAR; INTRAVENOUS at 17:56

## 2018-10-27 RX ADMIN — SERTRALINE HYDROCHLORIDE 50 MG: 50 TABLET ORAL at 17:03

## 2018-10-27 RX ADMIN — LORAZEPAM 1 MG: 1 TABLET ORAL at 02:23

## 2018-10-27 RX ADMIN — THERA TABS 1 TABLET: TAB at 10:49

## 2018-10-27 RX ADMIN — LORAZEPAM 1 MG: 1 TABLET ORAL at 13:03

## 2018-10-27 RX ADMIN — ZOLPIDEM TARTRATE 10 MG: 10 TABLET ORAL at 02:23

## 2018-10-27 RX ADMIN — VITAMIN D, TAB 1000IU (100/BT) 1000 UNITS: 25 TAB at 10:49

## 2018-10-27 RX ADMIN — LEVOTHYROXINE SODIUM 125 MCG: 25 TABLET ORAL at 06:33

## 2018-10-27 RX ADMIN — TETANUS AND DIPHTHERIA TOXOIDS ADSORBED 0.5 ML: 2; 2 INJECTION INTRAMUSCULAR at 17:57

## 2018-10-27 RX ADMIN — LEVOTHYROXINE SODIUM 125 MCG: 25 TABLET ORAL at 10:49

## 2018-10-27 RX ADMIN — BUSPIRONE HYDROCHLORIDE 5 MG: 10 TABLET ORAL at 17:03

## 2018-10-27 NOTE — BH NOTES
Pt alert oriented. Soiled gowns. Staff assist pt in changing gowns. Staff with pt. Primary Nurse Sammi Salas and Norma Boo RN performed a dual skin assessment on this patient Impairment noted-   Lacerations to right forearm. .  Site cleaned with normal saline, sterile guaze applied.      Dejon score is 23

## 2018-10-27 NOTE — BH NOTES
PRN Medication Documentation    Specific patient behavior that led to need for PRN medication: \"I have anxiety, I just talked with my dad on thev phone\"  Staff interventions attempted prior to PRN being given:  Discussed coping skills  PRN medication given: ativan 1 mg po  Patient response/effectiveness of PRN medication:  Will continue to monitor    1415  Pt. Found in the bathroom  By Paul Segundo  Sitting on the floor  Cutting her right arm  With exacto knife   Multiple  lacerations to right inner  forearm   \"I planned  It\" ( earlier pt. Looked thru papers from her purse to find her medication list per nurse request   With nurse supervision )  Dr. Rickey Crigler notified   Nursing supervisor notified      Naveed Rodriges  left message      Triage hospitalist  Maynor Walton  Notified     [place] [address]  Management notified   Left message     Pt. Placed on 1:1 observation with staff    Catalina Walton NP  In to see pt. Cleaned  area   Steri strips applied   Pt.  On 1:1 observation  In her room

## 2018-10-27 NOTE — H&P
INITIAL PSYCHIATRIC EVALUATION            IDENTIFICATION:    Patient Name  Kelli Hdez   Date of Birth 1960   [facility] [id number]   Medical Record Number  [id number]      Age  59 y.o. PCP Phuc Scanlon MD   Admit date:  10/27/2018    Room Number  726/02  @ [facility]   Date of Service  10/27/2018            HISTORY         REASON FOR HOSPITALIZATION:  CC: \". Pt admitted under a temporary group home order (TDO) [place] tdo oco for severe depression with suicidal ideations  proving to be an imminent danger to self and an inability to care for self. HISTORY OF PRESENT ILLNESS:    The patient, Kelli Hdez, is a 59 y.o. WHITE [place]  female with a past psychiatric history significant for depression, anxiety, psychosis, hearing voices ho presents at this time with complaints of (and/or evidence of) the following emotional symptoms: suicidal thoughts/threats, delusions, psychotic behavior and paranoid behavior. Additional symptomatology include difficulty swallowing and feeling suicidal.  The above symptoms have been present for Pt reports her mother passed away in April and she feels she could have done more, reports she has not been taking her meds for one and half week. Pt was on zoloft 200 at night and zyprexa po bid as per reports. Pt reports she is getting thoughts of cutting herself, she feels responsible for mother's death, pt ha s ntense guilt, decrease appetite, difficulty sleeping, poor eye contact and auditory hallucinations. These symptoms are of intense severity. These symptoms are constant in nature. ALLERGIES:   Allergies   Allergen Reactions    Other Food Other (comments)     \"Bad chest pain\" with walnuts. Coke - asthma/stuffy head. Fish sticks causes an asthma attack.  Astepro [Azelastine] Other (comments)     Violent headaches.  Bactrim [Sulfamethoprim] Other (comments)     Difficulty breathing, chills, fever.     Banana Other (comments)     Diffculty breathing, wheezing, asthma attack.  Coconut Other (comments)     Difficulty breathing, asthma attack, SOB.  Cortisone Hives     Difficulty breathing.  Peanut Other (comments)     Wheezing, asthma attack, SOB.  Prednisone Hives     Difficulty breathing, kidney stones. MEDICATIONS PRIOR TO ADMISSION:   Medications Prior to Admission   Medication Sig    busPIRone (BUSPAR) 15 mg tablet Take 15 mg by mouth three (3) times daily.  QUEtiapine (SEROQUEL) 50 mg tablet Take 50 mg by mouth nightly.  OLANZapine (ZYPREXA) 20 mg tablet Take 20 mg by mouth nightly.  hydrOXYzine HCl (ATARAX) 50 mg tablet Take 50 mg by mouth three (3) times daily as needed for Itching.  prazosin (MINIPRESS) 2 mg capsule Take 2 mg by mouth nightly.  clonazePAM (KLONOPIN) 1 mg tablet Take 1 mg by mouth nightly.  OLANZapine (ZYPREXA) 5 mg tablet Take 5 mg by mouth daily (after breakfast).  aspirin (ASPIRIN) 325 mg tablet Take 1 Tab by mouth daily. Indications: myocardial infarction prevention    levothyroxine (SYNTHROID) 125 mcg tablet Take 1 Tab by mouth Daily (before breakfast). Indications: hypothyroidism    risperiDONE (RISPERDAL) 2 mg tablet Take 1 Tab by mouth two (2) times a day. Indications: DEPRESSION TREATMENT ADJUNCT    sertraline (ZOLOFT) 100 mg tablet Take 2 Tabs by mouth daily (with dinner). Indications: Generalized Anxiety Disorder, major depressive disorder    zolpidem (AMBIEN) 10 mg tablet Take 1 Tab by mouth nightly. Max Daily Amount: 10 mg. Indications: SLEEP-ONSET INSOMNIA    cholecalciferol (VITAMIN D3) 1,000 unit tablet Take 1 Tab by mouth daily. Indications: PREVENTION OF VITAMIN D DEFICIENCY, Vitamin D Deficiency    fluticasone (FLONASE) 50 mcg/actuation nasal spray 2 Sprays by Both Nostrils route daily. Indications: Allergic Rhinitis    LORazepam (ATIVAN) 0.5 mg tablet Take 0.5 Tabs by mouth three (3) times daily. Max Daily Amount: 0.75 mg.  Indications: anxiety    therapeutic multivitamin (THERA) tablet Take 1 Tab by mouth daily.  cyanocobalamin, vitamin B-12, (VITAMIN B-12) 5,000 mcg subl 5,000 mcg by SubLINGual route daily.       PAST MEDICAL HISTORY:   Past Medical History:   Diagnosis Date    Asthma 1967    hospitalized for asthma attack x 2    Chronic kidney disease     kidney stones x 2-3 times    Ill-defined condition     nasal blockage from growth and misalignment - cannot breath out of nose - surgery in the future/cleared for colonoscopy 7/31/2014 - will bring in letter    Other ill-defined conditions(799.89)     blood in stool    Other ill-defined conditions(799.89)     hx low BP - 90's/60's-70's    PUD (peptic ulcer disease)     Thyroid disease     Unspecified adverse effect of anesthesia     nasal growth and misalignment and cannot breath through nose     Past Surgical History:   Procedure Laterality Date    HX HEENT      T & A    HX HEENT      turbinate surgery      SOCIAL HISTORY: Pt lives alone   Social History     Socioeconomic History    Marital status: SINGLE     Spouse name: Not on file    Number of children: Not on file    Years of education: Not on file    Highest education level: Not on file   Social Needs    Financial resource strain: Not on file    Food insecurity - worry: Not on file    Food insecurity - inability: Not on file   [employer] needs - medical: Not on file   [employer] needs - non-medical: Not on file   Occupational History    Not on file   Tobacco Use    Smoking status: Never Smoker    Smokeless tobacco: Never Used   Substance and Sexual Activity    Alcohol use: No    Drug use: No    Sexual activity: Not Currently   Other Topics Concern     Service Not Asked    Blood Transfusions Not Asked    Caffeine Concern Not Asked    Occupational Exposure Not Asked    Hobby Hazards Not Asked    Sleep Concern Not Asked    Stress Concern Not Asked    Weight Concern Not Asked    Special Diet Not Asked    Back Care Not Asked    Exercise Not Asked    Bike Helmet Not Asked   [address] [facility] [facility] Not Asked    Self-Exams Not Asked   Social History Narrative    Not on file      FAMILY HISTORY: History reviewed. No pertinent family history. Family History   Problem Relation Age of Onset    Stroke Mother     Other Mother         erosion of esophagus    Cancer Mother         melanoma/squamous    Heart Surgery Father         x2    Delayed Awakening Father    Tomlinson Pacemaker Father     Other Father         carotid endartarectomy/polio    Cataract Father        REVIEW OF SYSTEMS:   Negative   Pertinent items are noted in the History of Present Illness. All other Systems reviewed and are considered negative. MENTAL STATUS EXAM & VITALS     MENTAL STATUS EXAM (MSE):    MSE FINDINGS ARE WITHIN NORMAL LIMITS (WNL) UNLESS OTHERWISE STATED BELOW. ( ALL OF THE BELOW CATEGORIES OF THE MSE HAVE BEEN REVIEWED (reviewed 10/27/2018) AND UPDATED AS DEEMED APPROPRIATE )  General Presentation age appropriate and disheveled, evasive and guarded   Orientation oriented to time, place and person   Vital Signs  See below (reviewed 10/27/2018); Vital Signs (BP, Pulse, & Temp) are within normal limits if not listed below.    Gait and Station Stable/steady, no ataxia   Musculoskeletal System No extrapyramidal symptoms (EPS); no abnormal muscular movements or Tardive Dyskinesia (TD); muscle strength and tone are within normal limits   Language No aphasia or dysarthria   Speech:  soft   Thought Processes logical; slow rate of thoughts; poor abstract reasoning/computation   Thought Associations blocked    Thought Content paranoid delusions   Suicidal Ideations intention   Homicidal Ideations no plan  and no intention   Mood:  depressed and anhedonia   Affect:  constricted   Memory recent  intact   Memory remote:  intact   Concentration/Attention:  distractable   Fund of Knowledge average   Insight:  poor   Reliability poor   Judgment:  poor VITALS:     Patient Vitals for the past 24 hrs:   Temp Pulse Resp BP SpO2   10/27/18 1313 98.2 °F (36.8 °C) 75 16 96/59 --   10/27/18 0819 98.4 °F (36.9 °C) 72 16 91/57 92 %   10/27/18 0135 97.8 °F (36.6 °C) (!) 56 18 129/84 97 %     Wt Readings from Last 3 Encounters:   10/27/18 70.3 kg (155 lb)   07/27/18 72.6 kg (160 lb)   07/16/18 72.6 kg (160 lb)     Temp Readings from Last 3 Encounters:   10/27/18 98.2 °F (36.8 °C)   08/13/18 98.5 °F (36.9 °C)   07/26/18 98 °F (36.7 °C)     BP Readings from Last 3 Encounters:   10/27/18 96/59   08/13/18 91/53   07/26/18 108/71     Pulse Readings from Last 3 Encounters:   10/27/18 75   08/13/18 (!) 57   07/26/18 62            DATA     LABORATORY DATA:  Labs Reviewed - No data to display  No visits with results within 2 Day(s) from this visit. Latest known visit with results is:   Admission on 07/27/2018, Discharged on 08/13/2018   Component Date Value Ref Range Status    Glucose 07/28/2018 87  65 - 100 MG/DL Final    TSH 07/28/2018 0.08* 0.36 - 3.74 uIU/mL Final    LIPID PROFILE 07/28/2018        Final    Cholesterol, total 07/28/2018 209* <200 MG/DL Final    Triglyceride 07/28/2018 138  <150 MG/DL Final    HDL Cholesterol 07/28/2018 55  MG/DL Final    LDL, calculated 07/28/2018 126.4* 0 - 100 MG/DL Final    VLDL, calculated 07/28/2018 27.6  MG/DL Final    CHOL/HDL Ratio 07/28/2018 3.8  0 - 5.0   Final    T4, Free 08/02/2018 1.2  0.8 - 1.5 NG/DL Final        RADIOLOGY REPORTS:  Results from Hospital Encounter encounter on 09/10/12   XR CHEST PA LAT    Narrative **Final Report**       ICD Codes / Adm. Diagnosis: 786.09   / Other dyspnea and respiratory    Examination:  CR CHEST PA AND LATERAL  - [id number] - Sep 10 2012  5:22PM  Accession No:  [id number]  Reason:  786.0      REPORT:  INDICATION:    786.0     COMPARISON: None. FINDINGS: PA and lateral radiographs of the chest demonstrate clear lungs.    The cardiac and mediastinal contours and pulmonary vascularity are normal.    Spondylitic changes are present. .         IMPRESSION: No acute process              Signing/Reading Doctor: Lizeth Danielson ([id number])    Approved: Lizeth Danielson ([id number])  09/10/2012                                  No results found.            MEDICATIONS       ALL MEDICATIONS  Current Facility-Administered Medications   Medication Dose Route Frequency    ziprasidone (GEODON) 20 mg in sterile water (preservative free) 1 mL injection  20 mg IntraMUSCular BID PRN    OLANZapine (ZyPREXA) tablet 5 mg  5 mg Oral Q6H PRN    benztropine (COGENTIN) tablet 2 mg  2 mg Oral BID PRN    benztropine (COGENTIN) injection 2 mg  2 mg IntraMUSCular BID PRN    LORazepam (ATIVAN) injection 2 mg  2 mg IntraMUSCular Q4H PRN    LORazepam (ATIVAN) tablet 1 mg  1 mg Oral Q4H PRN    zolpidem (AMBIEN) tablet 10 mg  10 mg Oral QHS PRN    acetaminophen (TYLENOL) tablet 650 mg  650 mg Oral Q4H PRN    ibuprofen (MOTRIN) tablet 400 mg  400 mg Oral Q8H PRN    magnesium hydroxide (MILK OF MAGNESIA) 400 mg/5 mL oral suspension 30 mL  30 mL Oral DAILY PRN    nicotine (NICODERM CQ) 21 mg/24 hr patch 1 Patch  1 Patch TransDERmal DAILY PRN    aspirin tablet 325 mg  325 mg Oral DAILY    cholecalciferol (VITAMIN D3) tablet 1,000 Units  1,000 Units Oral DAILY    levothyroxine (SYNTHROID) tablet 125 mcg  125 mcg Oral ACB    therapeutic multivitamin (THERAGRAN) tablet 1 Tab  1 Tab Oral DAILY      SCHEDULED MEDICATIONS  Current Facility-Administered Medications   Medication Dose Route Frequency    aspirin tablet 325 mg  325 mg Oral DAILY    cholecalciferol (VITAMIN D3) tablet 1,000 Units  1,000 Units Oral DAILY    levothyroxine (SYNTHROID) tablet 125 mcg  125 mcg Oral ACB    therapeutic multivitamin (THERAGRAN) tablet 1 Tab  1 Tab Oral DAILY                ASSESSMENT & PLAN        The patient, Zofia Segura, is a 59 y.o.  female who presents at this time for treatment of the following diagnoses:  Patient Active Hospital Problem List:   [facility] (10/27/2018)    Assessment: depression, suicidal ideations anxiety, auditory hallucination     Plan: restarting meds zoloft 25 mg po hs, zyprexa 5 mg po bid, buspar 5 mg po bid. Indiv/milue therapy  Get collaterals, safety, suicidal precautions        I will continue to monitor blood levels (Depakote, Tegretol, lithium, clozapine---a drug with a narrow therapeutic index= NTI) and associated labs for drug therapy implemented that require intense monitoring for toxicity as deemed appropriate based on current medication side effects and pharmacodynamically determined drug 1/2 lives. A coordinated, multidisplinary treatment team (includes the nurse, unit pharmcist,  and writer) round was conducted for this initial evaluation with the patient present. The following regarding medications was addressed during rounds with patient:   the risks and benefits of the proposed medication. The patient was given the opportunity to ask questions. Informed consent given to the use of the above medications. I will continue to adjust psychiatric and non-psychiatric medications (see above \"medication\" section and orders section for details) as deemed appropriate & based upon diagnoses and response to treatment. I have reviewed admission (and previous/old) labs and medical tests in the EHR and or transferring hospital documents. I will continue to order blood tests/labs and diagnostic tests as deemed appropriate and review results as they become available (see orders for details). I have reviewed old psychiatric and medical records available in the EHR. I Will order additional psychiatric records from other institutions to further elucidate the nature of patient's psychopathology and review once available.     I will gather additional collateral information from nds, family and o/p treatment team to further elucidate the nature of patient's psychopathology and baselline level of psychiatric functioning.       ESTIMATED LENGTH OF STAY:    3-5 DAYS       STRENGTHS:  Access to housing/residential stability                                        SIGNED:    Sam Gao MD  10/27/2018

## 2018-10-27 NOTE — BH NOTES
1530 received pt up in room with NP Adali Walton having steri strips applied. Pt remains 1:1 with staff. 1630 pt appears to be resting quietly in bed. Staff remains 1:1 with staff. 1700 pt is sitting at dinner table eating. Remains 1:1 with staff. [id number] staff observes during meal time pt spoon is missing from tray. Staff Scar Narayanan and Georges Merlin searched pt and pt room thoroughly despite pt denying to have taken spoon. Pt belongings have been removed from room and bathroom and placed inside nurses station. Remains 1:1 with staff. 1815 pt appears to be resting in bed. Remains 1:1 with staff. 1915 pt appears to be resting in bed. Remains 1:1 with staff. 2015 pt appears to be resting quietly in bed. Remains 1:1 with staff. 2120 pt appears to be resting quietly in bed. Remains 1:1 with staff. 2015 pt appears to be resting quietly in bed. Remains 1:1 with staff. 2115 pt appears to be resting quietly in bed. Remains 1:1 with staff. 2215 pt appears to be resting quietly in bed. Remains 1:1 with staff.

## 2018-10-27 NOTE — BH NOTES
Blocked Bed Documentation:    Room number: 726  Type: Behavior  Rationale: Impulsive  Anticipated duration: unknown  Additional comments: remains 1:1 with staff for closer observation.

## 2018-10-27 NOTE — BH NOTES
1515-received pt laying quietly in bed NAD. Remains on 1:1 for safety    1600-out of bed to dining room, remains on 1:1 for safetu    1630: ate 100% of dinner, remains on 1: 1 for sagety      PRN Medication Documentation    1703-Specific patient behavior that led to need for PRN medication: pt reports feeling nervous requested medication for anxiety  Staff interventions attempted prior to PRN being given: listening presence, reasurance, remains on 1:1 observation for safety, additionally, skin search completed by Charge RN and this writer, room searched for any items that could be dangerous, marker removed from underneath patient's mattress, pt denied having any items on her person or in room, pt verbalized understanding of the rationale for skin search  PRN medication given: ativan 1mg po prn at 1703  Patient response/effectiveness of PRN medication: 35 minutes post adminsitration pt reports she still feels like hurting herself and reports she does not feel safe    1738-Dr. Oliver notified that 35 minutes post administration pt reports continued anxiety, pt reports she still feels like hurting herself and reports she does not feel safe  Reviewed medications administered thus far today  Orders obtained from Dr. Mario Bustillos for Geodon 10mg IM once and benadryl 25mg IM once    1756-Pt verbalized understanding of the indication for both geodon and benadryl, pt readily exposed skin and received benadryl 25mg IM and geodon 10mg IM at 1756    1800-Remains on 1:1 for safety    1900-pt resting quietly in bed. NAD. Remains on 1:! For safety    2000-pt lay quietly in bed, remains on 1:1 for safety    2045-Vital signs taken, pt reports feeling better, denies feeling unsafe or denies having feels to harm self. Ate hs snack in dining room, remains on 1:1 for safety    2130-patient lay quielty in bed.  Remains on 1:1 for safety

## 2018-10-27 NOTE — BH NOTES
Admission Note:    Patient admitted under the care of Dr. Leonel Stewart For SI and AVH    Admission Status: TDO    Reason for Admission: Pt feeling suicidal and depressed    UDS Neg BAL Neg    Pt's Condition on Arrival: Pt anxious, restless, and agitated. Pt unable to stay in room calmly so housed in isolation room voluntarily until morning. Pt's Statements/Questions/Concerns: Pt wants to be out in the milieu and appears restless and anxious. PRN Ativan 1 mg PO and Ambien given. Skin Assessment completed by Lurdes . See \"Skin assessment note. \"

## 2018-10-27 NOTE — BH NOTES
PSYCHOSOCIAL ASSESSMENT  :Patient identifying info:  Rashaad London is a 59 y.o., female admitted 10/27/2018  1:54 AM     Presenting problem and precipitating factors: pt admitted to acute psych unit under TDO due to depression and anxiety and unable to care for herself. Pt told Team that her father is her caretaker but she was closer to her mother who  last April. Pt c/o of poor sleep, depression and SI thoughts. Pt off meds for 1/2 weeks thus s/s of depression returned     Mental status assessment: Sad affect, depressed mood and making no eye contact with team.     Collateral information:     Current psychiatric /substance abuse providers and contact info:  Momo Pacheco     Previous psychiatric/substance abuse providers and response to treatment:     Family history of mental illness or substance abuse:     Substance abuse history:  Denied any history or use of any drugs. Pt.'s UDS was negative for tested drugs  Social History     Tobacco Use    Smoking status: Never Smoker    Smokeless tobacco: Never Used   Substance Use Topics    Alcohol use: No       History of biomedical complications associated with substance abuse : Denied     Patient's current acceptance of treatment or motivation for change:    Family constellation:     Is significant other involved? Describe support system: Mother provides greatest support.     Describe living arrangements and home environment:    Health issues:   Hospital Problems  Never Reviewed          Codes Class Noted POA    Major depression with psychotic features [facility]) ICD-10-CM: F32.3  ICD-9-CM: 296.24  10/27/2018 Unknown              Trauma history:     Legal issues:     History of  service:     Financial status:     Jewish/cultural factors:  [place]    Education/work history:     Have you been licensed as a health care professional (current or ):  No    Leisure and recreation preferences:     Describe coping skills:  TBD     [employer] Marcela Murdock  10/27/2018

## 2018-10-27 NOTE — CONSULTS
Hospitalist H&P Note          Leisa Catalan, NP  Call physician on-call through the  7pm-7am    Primary Care Provider: Yaya Rosario MD  Source of Information: Patient, medical record    History of Presenting Illness:   Ms. Keshawn Bentley is a 60 y/o female with PMH of bipolar, Asthma, CKD, kidney stone, PUD, hypothyroidism who was seen by [facility] [place] team and brought over to [facility] EMERGENC[facility] on [address] under a TDO for major depression with psychotic features. She is now admitted to the behavioral health unit at [facility] for the same. Pt reports having SI, auditory hallucinations and visual hallucinations. She does not wish to elaborate on these. Pt reports her mom recently  in April and this is one major factor for her depression. She expressed wishes of wanting to die to go to be with her mother and she wants to take her father with her. Labs reviewed. No EKG done. No imaging to review. UDS negative BAL = 0. Hospitalist team consulted for medical H&P. Prior to this writer's arrival pt cut her right forearm several times with a  from home. All the wounds are superficial, no longer bleeding. Pt does not know when her last tetanus shot was.       Review of Systems:  General: negative for fever, chills, sweats, weakness, weight loss  Eyes: negative for changes or loss in vision, eye pain  Ear Nose and Throat: negative for rhinorrhea, otalgia, speech or swallowing difficulties  Respiratory:  negative for cough, sputum production, SOB, wheezing, FRANK  Cardiology:  negative for chest pain, palpitations, orthopnea, edema, syncope   Gastrointestinal: negative for abdominal pain, N/V, change in bowel habits, bleeding  Genitourinary: negative for frequency, urgency, dysuria, hematuria, incontinence  Musculoskeletal : negative for arthralgia, myalgia  Skin: + several self inflicted cuts to right forearm  Endocrine: negative for hot flashes or polydipsia  Neurological: negative for headache, dizziness, confusion or memory loss, focal weakness, paresthesia, gait disturbance  Psychological: + depression        Past Medical History:   Diagnosis Date    Asthma 1967    hospitalized for asthma attack x 2    Chronic kidney disease     kidney stones x 2-3 times    Ill-defined condition     nasal blockage from growth and misalignment - cannot breath out of nose - surgery in the future/cleared for colonoscopy 7/31/2014 - will bring in letter    Other ill-defined conditions(799.89)     blood in stool    Other ill-defined conditions(799.89)     hx low BP - 90's/60's-70's    PUD (peptic ulcer disease)     Thyroid disease     Unspecified adverse effect of anesthesia     nasal growth and misalignment and cannot breath through nose      Past Surgical History:   Procedure Laterality Date    HX HEENT      T & A    HX HEENT      turbinate surgery     Prior to Admission medications    Medication Sig Start Date End Date Taking? Authorizing Provider   busPIRone (BUSPAR) 15 mg tablet Take 15 mg by mouth three (3) times daily. Yes Provider, Historical   QUEtiapine (SEROQUEL) 50 mg tablet Take 50 mg by mouth nightly. Yes Provider, Historical   OLANZapine (ZYPREXA) 20 mg tablet Take 20 mg by mouth nightly. Yes Provider, Historical   hydrOXYzine HCl (ATARAX) 50 mg tablet Take 50 mg by mouth three (3) times daily as needed for Itching. Yes Provider, Historical   prazosin (MINIPRESS) 2 mg capsule Take 2 mg by mouth nightly. Yes Provider, Historical   clonazePAM (KLONOPIN) 1 mg tablet Take 1 mg by mouth nightly. Yes Provider, Historical   OLANZapine (ZYPREXA) 5 mg tablet Take 5 mg by mouth daily (after breakfast). Yes Provider, Historical   aspirin (ASPIRIN) 325 mg tablet Take 1 Tab by mouth daily. Indications: myocardial infarction prevention 8/14/18  Yes Neeraj Jack MD   levothyroxine (SYNTHROID) 125 mcg tablet Take 1 Tab by mouth Daily (before breakfast).  Indications: hypothyroidism 8/14/18  Yes Clara Quach Griselda Ro, MD   risperiDONE (RISPERDAL) 2 mg tablet Take 1 Tab by mouth two (2) times a day. Indications: DEPRESSION TREATMENT ADJUNCT 8/13/18  Yes Neeraj Jack MD   sertraline (ZOLOFT) 100 mg tablet Take 2 Tabs by mouth daily (with dinner). Indications: Generalized Anxiety Disorder, major depressive disorder 8/13/18  Yes Neeraj Jack MD   zolpidem (AMBIEN) 10 mg tablet Take 1 Tab by mouth nightly. Max Daily Amount: 10 mg. Indications: SLEEP-ONSET INSOMNIA 8/13/18  Yes Neeraj Jack MD   cholecalciferol (VITAMIN D3) 1,000 unit tablet Take 1 Tab by mouth daily. Indications: PREVENTION OF VITAMIN D DEFICIENCY, Vitamin D Deficiency 8/14/18   Neeraj Jack MD   fluticasone Kathleendanielle Pate) 50 mcg/actuation nasal spray 2 Sprays by Both Nostrils route daily. Indications: Allergic Rhinitis 8/13/18   Neeraj Jack MD   LORazepam (ATIVAN) 0.5 mg tablet Take 0.5 Tabs by mouth three (3) times daily. Max Daily Amount: 0.75 mg. Indications: anxiety 8/13/18   Neeraj Jack MD   therapeutic multivitamin (THERA) tablet Take 1 Tab by mouth daily. Provider, Historical   cyanocobalamin, vitamin B-12, (VITAMIN B-12) 5,000 mcg subl 5,000 mcg by SubLINGual route daily. Provider, Historical     Allergies   Allergen Reactions    Other Food Other (comments)     \"Bad chest pain\" with walnuts. Coke - asthma/stuffy head. Fish sticks causes an asthma attack.  Astepro [Azelastine] Other (comments)     Violent headaches.  Bactrim [Sulfamethoprim] Other (comments)     Difficulty breathing, chills, fever.  Banana Other (comments)     Diffculty breathing, wheezing, asthma attack.  Coconut Other (comments)     Difficulty breathing, asthma attack, SOB.  Cortisone Hives     Difficulty breathing.  Peanut Other (comments)     Wheezing, asthma attack, SOB.  Prednisone Hives     Difficulty breathing, kidney stones.       Family History   Problem Relation Age of Onset    Stroke Mother    Michail Schwab Other Mother erosion of esophagus    Cancer Mother         melanoma/squamous    Heart Surgery Father         x2   Tomlinson Delayed Awakening Father    Tomlinson Pacemaker Father     Other Father         carotid endartarectomy/polio    Cataract Father         SOCIAL HISTORY:  Patient resides:  Independently x   Assisted Living    SNF    With family care       Smoking history:   None x   Former    Chronic      Alcohol history:   None x   Social    Chronic      Ambulates:   Independently x   w/cane    w/walker    w/wc      CODE STATUS:  DNR    Full x   Other      Objective:   Physical Exam:   Visit Vitals  BP 96/59   Pulse 75   Temp 98.2 °F (36.8 °C)   Resp 16   Ht 5' 4\" (1.626 m)   Wt 70.3 kg (155 lb)   SpO2 92%   BMI 26.61 kg/m²           General:  Alert, cooperative, no distress, appears stated age. HEENT:  Normocephalic, atraumatic. Conjunctivae/corneas clear. PERRL, EOMs intact. Nares nl. Septum midline. Mucosa nl. No drainage or sinus tenderness. Lips, mucosa, and tongue nl. Teeth and gums nl. Neck: Supple, symmetrical, trachea midline, no adenopathy, thyroid: no enlargement/tenderness/nodules, no carotid bruit and no JVD. Back:   Symmetric, no curvature. ROM nl. No CVA tenderness. Lungs:   Clear to auscultation bilaterally. No Wheezing/Rhonchi/Rales. No SOB, no accessory muscle use    Heart:  Regular rate and rhythm, S1, S2 nl, no murmur, click, rub or gallop. Abdomen:   Soft, non-tender. Bowel sounds nl. No HSM. Extremities: Extremities nl, atraumatic, no clubbing, cyanosis or edema. Pulses 2+ and symmetric    Skin: Skin color, texture, turgor nl. Cap refill <3 sec. Several self inflicted cuts to right forearm. Image below    Psych: Cooperative, not anxious or agitated. A/O x 3. Neurologic: CNII-XII grossly intact.  no focal neurological deficits,  moving all extremities, speech clear                EKG:  none    Data Review:   Recent Days:  No results for input(s): WBC, HGB, HCT, PLT, HGBEXT, HCTEXT, PLTEXT in the last 72 hours. No results for input(s): NA, K, CL, CO2, GLU, BUN, CREA, CA, MG, PHOS, ALB, TBIL, SGOT, ALT, INR in the last 72 hours. No lab exists for component: INREXT  No results for input(s): PH, PCO2, PO2, HCO3, FIO2 in the last 72 hours. Imaging: none    Assessment & Plan     Active Problems:    Major depression with psychotic features ([facility] [address] (10/27/2018)      Major depression with psychotic features - per primary team    Self inflicted trauma to Right forearm by cutting with a   - several wounds cleansed with copious amounts of normal saline, bleeding stopped, skin glue applied, TD ordered. See image below    Asthma - duo neb prn    CKD III - Cr 1.14, stable    Hypothyroidism - c/w synthroid. TSH 0.095, should have this rechecked with her PCP as OP. No recent med adjustments    Vitamin D deficiency - c/w supplemenation    Environmental allergies    No acute medical issues, will sign off and be available as needed.         Signed By: Spring Loera NP     October 27, 2018

## 2018-10-27 NOTE — INTERDISCIPLINARY ROUNDS
Behavioral Health Interdisciplinary Rounds     Patient Name: Pradip Ferguson  Age: 59 y.o. Room/Bed:  726/02  Primary Diagnosis: <principal problem not specified>   Admission Status: TDO     Readmission within 30 days: no  Power of  in place: no  Patient requires a blocked bed: no          Reason for blocked bed:   Sleep hours:  3 (first night, arrived @ 0230)      Participation in Care/Groups:  *New admission*  Medication Compliant?: *New admission*  PRNS (last 24 hours):  Antianxiety and Sleep Aid    Restraints (last 24 hours):  no     Alcohol screening (AUDIT) completed -     If applicable, date SBIRT discussed in treatment team AND documented:    Tobacco - patient is a smoker:    Illegal Drugs use:      24 hour chart check complete: yes     Patient goal(s) for today: meet with Tx team to discuss TDO and course of tx until final disposition   Treatment team focus/goals: Complete Psych Social Assessment  Progress note     LOS:  0  Expected LOS:     Participating treatment team members: Pradip Ferguson, Dr Harry Villareal RN and Bessy MÉNDEZ

## 2018-10-27 NOTE — ROUTINE PROCESS
TRANSFER - IN REPORT:    Verbal report received from Montse Hammer on Joli Mcardle  being received from ED(unit) for routine progression of care      Report consisted of patients Situation, Background, Assessment and   Recommendations(SBAR). Information from the following report(s) SBAR was reviewed with the receiving nurse. Opportunity for questions and clarification was provided. Assessment completed upon patients arrival to unit and care assumed.

## 2018-10-27 NOTE — PROGRESS NOTES
Problem: Falls - Risk of  Goal: *Absence of Falls  Document Samanta Fall Risk and appropriate interventions in the flowsheet. Outcome: Progressing Towards Goal  Fall Risk Interventions:     Non slip socks  Bed in low position       Medication Interventions: Assess postural VS orthostatic hypotension, Teach patient to arise slowly                  Problem: Depressed Mood (Adult/Pediatric)  Goal met by 11/10/2018    Goal: *STG: Participates in treatment plan  Outcome: Not Progressing Towards Goal  Pt. Met with Dr. Rickey Crigler in treatment team  Distracted  Disheveled  Appearance  Poor eye contact  Minimal interaction  Pt. majo its been a couple of weeks since she took any medications   \"I caused my mother to die\"  Pt. Erica CORDOVAO  Scheduled for 10/29/2018 pt. Informed   Goal: *STG: Attends activities and groups  Outcome: Not Progressing Towards Goal  Pt. Isolating to her room  Not attending groups  Goal: *STG: Complies with medication therapy  Outcome: Not Progressing Towards Goal  \"I haven't taken any  Medications for several weeks\"  Goal: Interventions  Outcome: Not Progressing Towards Goal  Will continue to monitor  On 15 min.  Checks for safety   Assess depression  Medication compliance  Effectiveness  Encourage groups    [place]        Date Treatment Plan Initiated: 10/27/2018      Treatment Plan Modalities:    Type of Modality Amount  (x minutes) Frequency (x/week) Duration (x days) Name of Responsible Staff   Community & wrap-up meetings to encourage peer interactions    15    7    1     Carlis Nageotte, BHT   Group psychotherapy to assist in building coping skills and internal controls    60    7    1    Davin Almonte LCSW   Therapeutic activity groups to build coping skills    60    7    1    Davin Almonte LCSW   Psychoeducation in group setting to address:   Medication education    15    7    1    Eugene Tang RN   Coping skills    20    7    1    Suzan Fuentes Chu, RN   Relaxation techniques           Symptom management           Discharge planning    15    7    1    Nikki Ro,    Spirituality     60    7    1     Ileana WHALEN    60    7    1    Volunteer from [facility]/AA/NA    60    7    1    Volunteer from [address] [place] [address] medication management    15    7    1    Dr. Lacho Smith meeting/discharge planning

## 2018-10-27 NOTE — ROUTINE PROCESS
Primary Nurse Elvira Scanlon and Grand terri RN performed a dual skin assessment on this patient No impairment noted  Dejon score is 23

## 2018-10-27 NOTE — PROGRESS NOTES
Problem: Risk of Harm to Self or Others  Goal: *LTG: Denial of suicidal ideation or intent for at least 2 days  Outcome: Not Progressing Towards Goal  Pt reported not feeling safe, Dr. Isamar Kumari notified.  Indications for geodon and benadryl explained to patient, pateint readily exposed skin for medication administraiton

## 2018-10-27 NOTE — FORENSIC NURSE
Patient consulted for forensics after self-harm. MANPREET spoke with Josue Ly RN who states that patient had boxcutter and cut her right forearm. Marjan Doty confirmed that boxcutter was removed from patient and no staff were injured. MANPREET spoke with Ale Basurto who states that patient had boxcutter in between papers. DOROTHY Mares states she left a message for Risk, contacted the nursing supervisor and Dr. Brandi Montenegro was notified and patient may need sutures. CHATAE verbalized understanding.

## 2018-10-27 NOTE — BH NOTES
PRN Medication Documentation    Specific patient behavior that led to need for PRN medication: 4838:  Increasing anxiety, restlessness  Staff interventions attempted prior to PRN being given: Therapeutic communication, reassurance  PRN medication given: Ambien 10/Ativan 1mg PO at 0225  Patient response/effectiveness of PRN medication: Patient observed to be sleeping/ no further complaints

## 2018-10-28 LAB
CHOLEST SERPL-MCNC: 326 MG/DL
GLUCOSE P FAST SERPL-MCNC: 94 MG/DL (ref 65–100)
HDLC SERPL-MCNC: 55 MG/DL
HDLC SERPL: 5.9 {RATIO} (ref 0–5)
LDLC SERPL CALC-MCNC: 229 MG/DL (ref 0–100)
LIPID PROFILE,FLP: ABNORMAL
TRIGL SERPL-MCNC: 210 MG/DL (ref ?–150)
TSH SERPL DL<=0.05 MIU/L-ACNC: 0.1 UIU/ML (ref 0.36–3.74)
VLDLC SERPL CALC-MCNC: 42 MG/DL

## 2018-10-28 PROCEDURE — 36415 COLL VENOUS BLD VENIPUNCTURE: CPT | Performed by: PSYCHIATRY & NEUROLOGY

## 2018-10-28 PROCEDURE — 84480 ASSAY TRIIODOTHYRONINE (T3): CPT | Performed by: NURSE PRACTITIONER

## 2018-10-28 PROCEDURE — 84443 ASSAY THYROID STIM HORMONE: CPT | Performed by: PSYCHIATRY & NEUROLOGY

## 2018-10-28 PROCEDURE — 65220000003 HC RM SEMIPRIVATE PSYCH

## 2018-10-28 PROCEDURE — 74011250637 HC RX REV CODE- 250/637: Performed by: NURSE PRACTITIONER

## 2018-10-28 PROCEDURE — 84439 ASSAY OF FREE THYROXINE: CPT | Performed by: NURSE PRACTITIONER

## 2018-10-28 PROCEDURE — 82947 ASSAY GLUCOSE BLOOD QUANT: CPT | Performed by: PSYCHIATRY & NEUROLOGY

## 2018-10-28 PROCEDURE — 74011250637 HC RX REV CODE- 250/637: Performed by: PSYCHIATRY & NEUROLOGY

## 2018-10-28 PROCEDURE — 74011250636 HC RX REV CODE- 250/636: Performed by: PSYCHIATRY & NEUROLOGY

## 2018-10-28 PROCEDURE — 80061 LIPID PANEL: CPT | Performed by: PSYCHIATRY & NEUROLOGY

## 2018-10-28 RX ORDER — OLANZAPINE 5 MG/1
10 TABLET ORAL
Status: DISCONTINUED | OUTPATIENT
Start: 2018-10-28 | End: 2018-10-29

## 2018-10-28 RX ORDER — SERTRALINE HYDROCHLORIDE 50 MG/1
75 TABLET, FILM COATED ORAL EVERY EVENING
Status: DISCONTINUED | OUTPATIENT
Start: 2018-10-28 | End: 2018-10-30

## 2018-10-28 RX ORDER — DIPHENHYDRAMINE HYDROCHLORIDE 50 MG/ML
25 INJECTION, SOLUTION INTRAMUSCULAR; INTRAVENOUS ONCE
Status: COMPLETED | OUTPATIENT
Start: 2018-10-28 | End: 2018-10-28

## 2018-10-28 RX ORDER — BUSPIRONE HYDROCHLORIDE 5 MG/1
7.5 TABLET ORAL 2 TIMES DAILY
Status: DISCONTINUED | OUTPATIENT
Start: 2018-10-28 | End: 2018-10-29

## 2018-10-28 RX ADMIN — LORAZEPAM 1 MG: 1 TABLET ORAL at 20:33

## 2018-10-28 RX ADMIN — OLANZAPINE 5 MG: 5 TABLET, FILM COATED ORAL at 09:55

## 2018-10-28 RX ADMIN — VITAMIN D, TAB 1000IU (100/BT) 1000 UNITS: 25 TAB at 09:54

## 2018-10-28 RX ADMIN — BUSPIRONE HYDROCHLORIDE 7.5 MG: 5 TABLET ORAL at 19:52

## 2018-10-28 RX ADMIN — OLANZAPINE 10 MG: 5 TABLET, FILM COATED ORAL at 20:33

## 2018-10-28 RX ADMIN — LORAZEPAM 1 MG: 1 TABLET ORAL at 11:20

## 2018-10-28 RX ADMIN — SERTRALINE HYDROCHLORIDE 75 MG: 50 TABLET ORAL at 17:01

## 2018-10-28 RX ADMIN — LEVOTHYROXINE SODIUM 125 MCG: 25 TABLET ORAL at 06:45

## 2018-10-28 RX ADMIN — OLANZAPINE 5 MG: 5 TABLET, FILM COATED ORAL at 11:20

## 2018-10-28 RX ADMIN — BUSPIRONE HYDROCHLORIDE 5 MG: 10 TABLET ORAL at 09:54

## 2018-10-28 RX ADMIN — LORAZEPAM 1 MG: 1 TABLET ORAL at 16:20

## 2018-10-28 RX ADMIN — THERA TABS 1 TABLET: TAB at 09:55

## 2018-10-28 RX ADMIN — DIPHENHYDRAMINE HYDROCHLORIDE 25 MG: 50 INJECTION INTRAMUSCULAR; INTRAVENOUS at 22:04

## 2018-10-28 RX ADMIN — ASPIRIN 325 MG: 325 TABLET ORAL at 09:54

## 2018-10-28 NOTE — INTERDISCIPLINARY ROUNDS
Behavioral Health Interdisciplinary Rounds     Patient Name: Jez White  Age: 59 y.o. Room/Bed:  726/02  Primary Diagnosis: <principal problem not specified>   Admission Status: TDO     Readmission within 30 days: no  Power of  in place: no  Patient requires a blocked bed: yes          Reason for blocked bed: 1:1 status    VTE Prophylaxis: No    Mobility needs/Fall risk: no  Flu Vaccine : no   Nutritional Plan: no  Consults:          Labs/Testing due today?: yes    Sleep hours:  10.30  . Participation in Care/Groups:  no  Medication Compliant?: Yes  PRNS (last 24 hours):  Antipsychotic (IM) and Antianxiety    Restraints (last 24 hours):  no     CIWA (range last 24 hours):     COWS (range last 24 hours):      Alcohol screening (AUDIT) completed -   AUDIT Score: 0     If applicable, date SBIRT discussed in treatment team AND documented:     Tobacco - patient is a smoker: Have You Used Tobacco in the Past 30 Days: No  Illegal Drugs use: Have You Used Any Illegal Substances Over the Past 12 Months: No    24 hour chart check complete: yes     Patient goal(s) for today:   Treatment team focus/goals:   Progress note     LOS:  1  Expected LOS:     Financial concerns/prescription coverage:  no  Date of last family contact:       Family requesting physician contact today:  no  Discharge plan:   Guns in the home:         Outpatient provider(s):     Participating treatment team members: Jez Cindy, * (assigned SW),

## 2018-10-28 NOTE — PROGRESS NOTES
Problem: Depressed Mood (Adult/Pediatric)  Goal: *STG: Remains safe in hospital  Outcome: Not Progressing Towards Goal  Patient is currently on a 1:1 status. She cut herself in several places on her right forearm. She  has not demonstrated that she can be left alone for even short periods of time.

## 2018-10-28 NOTE — BH NOTES
0700 pt. On 1:1 observation  Laying quietly in bed     0800 pt. Remains on 1:1 observation  Up washed her face  Brushed her teeth   sitting in day area   With peers  No interaction    Eating breakfast  Watching TV   0900 pt. On  1:1 observation   Sitting at  Dining table  1000 pt. Remains on 1:1 observation    Sitting at dining table      1020 pt. Remains on 1:1 observation  Met with Dr. Randee Hurst in treatment team  Flat  Affect  Poor eye contact   Stated  She cut herself to relieve her pain        1100 pt. On 1:1 observation   Standing in the [place]  Staring  1120 pt. Remains on 1:1 observation  Tried to push thru unit door    Did not respond to verbal re-direction   Pt. Agreed to take Prn medication  Escorted to her room  PRN Medication Documentation    Specific patient behavior that led to need for PRN medication: pt. Labile angry   Attempting to leave unit  push thru door  Staff interventions attempted prior to PRN being given:  Verbal re-direction  PRN medication given: zyprexa 5 mg po  Ativan 1 mg po  Patient response/effectiveness of PRN medication:  Will continue to monitor    1200 pt. On 1:1 observation  In bed resting   Pt. Did not eat lunch    1300 pt. On 1:1 observation with staff  Resting in bed     1400 pt.  On 1:1 observation  With staff  Resting in bed

## 2018-10-28 NOTE — BH NOTES
2300- received patient. In bed sleeping. Currently remains on 1:1.     2400- Patient in bed sleeping, remains on 1:1 with staff monitoring continuously. 0100-Patient sleeping. Remains on 1:1 with staff.    0200- Remains on 1:1 with staff. Patient sleeping    0300-Remains on 1:1 with staff. Patient continues to sleep    0400-Patient continues to sleep and remain on 1: with stall monitoring continuously. 0500-Patient continues on 1:1. She is sleeping.

## 2018-10-28 NOTE — BH NOTES
PRN Medication Documentation    Specific patient behavior that led to need for PRN medication: pt reporting increasing anxiety, restless, guarded  Staff interventions attempted prior to PRN being given: decreased stimuli, provided foods and fluids  PRN medication given: ativan 1mg po prn at 1620  Patient response/effectiveness of PRN medication: 25 minutes later pt is less anxious remains on 1:1 eating dinner at present

## 2018-10-28 NOTE — BH NOTES
GROUP THERAPY PROGRESS NOTE    Yolie Morales is participating in [place]. Group time: 15 minutes    Personal goal for participation: To have a positive day. Goal orientation: personal    Group therapy participation: active    Therapeutic interventions reviewed and discussed: Writer listened attentively and explained the unit routine. Impression of participation: Patient participated actively.

## 2018-10-28 NOTE — PROGRESS NOTES
Problem: Depressed Mood (Adult/Pediatric)  Goal: *STG: Complies with medication therapy  Outcome: Progressing Towards Goal  Pt remains on 1:1 took scheduled medication, reported anxiety, received prn po ativan. Mood subdued.

## 2018-10-28 NOTE — PROGRESS NOTES
Problem: Falls - Risk of  Goal: *Absence of Falls  Document Samanta Fall Risk and appropriate interventions in the flowsheet. Outcome: Progressing Towards Goal  Fall Risk Interventions:     Non slip socks  Bed in low position   Mentation Interventions: Adequate sleep, hydration, pain control, Door open when patient unattended, Reorient patient    Medication Interventions: Assess postural VS orthostatic hypotension, Teach patient to arise slowly         History of Falls Interventions: Door open when patient unattended        Problem: Depressed Mood (Adult/Pediatric)  Goal: *STG: Participates in treatment plan  Outcome: Not Progressing Towards Goal  Pt. Met with Dr. Ciro Cabral in treatment team   Remains on 1:1 observation  Due to cutting her right inner forearm yesterday with an exacto knife   Pt. Blunted   Poor eye contact  Stated she planned it and just needed to relieve the pain    Pt. Continues to random  Suicidal  Ideation  Without plan today  Goal: *STG: Demonstrates reduction in symptoms and increase in insight into coping skills/future focused  Outcome: Not Progressing Towards Goal  Pt. Remains  Depressed   States it is her fault her mother  of CHF  Poor insight    states she is guilty  Goal: *STG: Complies with medication therapy  Outcome: Progressing Towards Goal  Medication compliant  Reviewed in treatment team    Required prn zyprexa and ativan po  Attempting to leave the unit  Pushing doors   Pt. Erica RUSSO scheduled for tomorrow   Goal: Interventions  Outcome: Not Progressing Towards Goal  Will continue to monitor  On 15 min.  Checks for safety  Assess depression   Medication compliance  Effectiveness  Encourage groups    And being able to discontinue  1:1 observation

## 2018-10-28 NOTE — BH NOTES
1515 received pt resting quietly in room. Remains 1:1 with staff. 1615 pt resting quietly in room. Remains 1:1 with staff. 1700 pt is out in dining room sitting and eating dinner with peers. Remains 1:1 with staff. 1800 pt is resting quietly in bed. Remains 1:1 with staff. 1900  Pt is resting quietly in bed. Remains 1:1 with staff. 2000 staff offers for pt to sign CHRIS form for staff to speak with pt father. Pt refuses at this time. Remains 1:1 with staff. 2015 pt talking on phone with father. Remains 1:1 with staff. 2107 pt is sitting out in dayroom with peers watching tv and eating snacks. Remains 1:1 with staff.     2220 pt appears to be resting quietly after receiving IM benadryl. Remains 1:1 with staff.

## 2018-10-28 NOTE — BH NOTES
Blocked Bed Documentation:    Room number: 726  Type: Behavior  Rationale: Poor Self-Control  Anticipated duration: reevaluate in 24 hrs  Additional comments:pt on 1:1 for safety

## 2018-10-29 LAB — T4 FREE SERPL-MCNC: 1 NG/DL (ref 0.8–1.5)

## 2018-10-29 PROCEDURE — 74011250637 HC RX REV CODE- 250/637: Performed by: PSYCHIATRY & NEUROLOGY

## 2018-10-29 PROCEDURE — 74011250637 HC RX REV CODE- 250/637: Performed by: NURSE PRACTITIONER

## 2018-10-29 PROCEDURE — 74011250636 HC RX REV CODE- 250/636: Performed by: PSYCHIATRY & NEUROLOGY

## 2018-10-29 PROCEDURE — 74011000250 HC RX REV CODE- 250: Performed by: PSYCHIATRY & NEUROLOGY

## 2018-10-29 PROCEDURE — 65220000003 HC RM SEMIPRIVATE PSYCH

## 2018-10-29 PROCEDURE — 74011000250 HC RX REV CODE- 250: Performed by: NURSE PRACTITIONER

## 2018-10-29 PROCEDURE — 94640 AIRWAY INHALATION TREATMENT: CPT

## 2018-10-29 RX ORDER — LEVOTHYROXINE SODIUM 100 UG/1
100 TABLET ORAL
Status: DISCONTINUED | OUTPATIENT
Start: 2018-10-30 | End: 2018-11-09 | Stop reason: HOSPADM

## 2018-10-29 RX ORDER — BUSPIRONE HYDROCHLORIDE 10 MG/1
15 TABLET ORAL 2 TIMES DAILY
Status: DISCONTINUED | OUTPATIENT
Start: 2018-10-29 | End: 2018-10-29

## 2018-10-29 RX ORDER — BUDESONIDE 0.5 MG/2ML
500 INHALANT ORAL
Status: DISCONTINUED | OUTPATIENT
Start: 2018-10-29 | End: 2018-11-09 | Stop reason: HOSPADM

## 2018-10-29 RX ORDER — PRAVASTATIN SODIUM 40 MG/1
40 TABLET ORAL
Status: DISCONTINUED | OUTPATIENT
Start: 2018-10-29 | End: 2018-11-09 | Stop reason: HOSPADM

## 2018-10-29 RX ORDER — ATORVASTATIN CALCIUM 10 MG/1
20 TABLET, FILM COATED ORAL DAILY
Status: DISCONTINUED | OUTPATIENT
Start: 2018-10-29 | End: 2018-10-29

## 2018-10-29 RX ORDER — ALBUTEROL SULFATE 90 UG/1
2 AEROSOL, METERED RESPIRATORY (INHALATION)
Status: ON HOLD | COMMUNITY
End: 2018-11-09 | Stop reason: SDUPTHER

## 2018-10-29 RX ORDER — BUSPIRONE HYDROCHLORIDE 10 MG/1
15 TABLET ORAL 2 TIMES DAILY
Status: DISCONTINUED | OUTPATIENT
Start: 2018-10-29 | End: 2018-11-09 | Stop reason: HOSPADM

## 2018-10-29 RX ORDER — DIPHENHYDRAMINE HYDROCHLORIDE 50 MG/ML
25 INJECTION, SOLUTION INTRAMUSCULAR; INTRAVENOUS ONCE
Status: COMPLETED | OUTPATIENT
Start: 2018-10-29 | End: 2018-10-29

## 2018-10-29 RX ORDER — HALOPERIDOL 2 MG/1
2 TABLET ORAL
Status: DISCONTINUED | OUTPATIENT
Start: 2018-10-29 | End: 2018-10-30

## 2018-10-29 RX ADMIN — ATORVASTATIN CALCIUM 20 MG: 10 TABLET, FILM COATED ORAL at 10:11

## 2018-10-29 RX ADMIN — LEVOTHYROXINE SODIUM 125 MCG: 25 TABLET ORAL at 06:21

## 2018-10-29 RX ADMIN — OLANZAPINE 5 MG: 5 TABLET, FILM COATED ORAL at 10:00

## 2018-10-29 RX ADMIN — DIPHENHYDRAMINE HYDROCHLORIDE 25 MG: 50 INJECTION INTRAMUSCULAR; INTRAVENOUS at 21:01

## 2018-10-29 RX ADMIN — PRAVASTATIN SODIUM 40 MG: 40 TABLET ORAL at 21:03

## 2018-10-29 RX ADMIN — BUSPIRONE HYDROCHLORIDE 15 MG: 10 TABLET ORAL at 17:06

## 2018-10-29 RX ADMIN — ASPIRIN 325 MG: 325 TABLET ORAL at 10:00

## 2018-10-29 RX ADMIN — BUDESONIDE 500 MCG: 0.5 INHALANT RESPIRATORY (INHALATION) at 21:30

## 2018-10-29 RX ADMIN — VITAMIN D, TAB 1000IU (100/BT) 1000 UNITS: 25 TAB at 10:00

## 2018-10-29 RX ADMIN — THERA TABS 1 TABLET: TAB at 10:00

## 2018-10-29 RX ADMIN — BUSPIRONE HYDROCHLORIDE 15 MG: 10 TABLET ORAL at 10:14

## 2018-10-29 RX ADMIN — LORAZEPAM 2 MG: 2 INJECTION INTRAMUSCULAR; INTRAVENOUS at 16:45

## 2018-10-29 RX ADMIN — SERTRALINE HYDROCHLORIDE 75 MG: 50 TABLET ORAL at 17:06

## 2018-10-29 RX ADMIN — WATER 20 MG: 1 INJECTION INTRAMUSCULAR; INTRAVENOUS; SUBCUTANEOUS at 21:02

## 2018-10-29 NOTE — PROGRESS NOTES
Problem: Risk of Harm to Self or Others  Goal: *STG: Patient will identify 2 alternative ways to cope with suicidal or self-harm feelings  Outcome: Not Progressing Towards Goal  Pt continues to verbalize a desire to harm self. Pt was asked to state two alternate coping mechanisms, pt provided no answer, changed topic to perseverating about when her father could visit.

## 2018-10-29 NOTE — BH NOTES
1530  Received pt resting quietly in room. Remains 1:1 with staff. 80 pt speaking to father on phone. Signs CHRIS for dad. Remains 1:1 with staff. 1640 pt appears increasingly restless and concerned about dad knowing about her recently cutting herself. Pt begins to ruminate stating \" I don't feel safe. I feel like I may do something bad again\" staff offers emotional support but pt continues to increase with anxiety and restlessness. Pt is administered IM 2 mg of ativan. Remains 1:1 with staff. 1708 pt appears to resting quietly in bed. Remains 1:1 with staff. 1810 pt is up walking some anxiety still noted. Remains 1:1 with staff. 1830 pt requesting for staff to revoke CHRIS added to chart. Remains restless and anxious about dad knowing she attempted to hurt self over weekend. Staff revokes CHRIS . Pt remains 1:1 with staff. 1900 pt sitting out in milieu watching tv. Remains 1:1 with staff. 2000 pt talking on phone to dad. Remains 1:1 with staff. 2050 pt approaches staff stating \" I don't trust myself! \" I have these urges right now that won't go away! \" I know what I would like to use but I can't get a hold of it right now! \" staff asks pt to explain more specific details. Pt is slow to respond with some hesitancy as she continues to express anxiety surrounding dad knowing her recent behaviors. Staff speaks with MD on call and obtains order to hold scheduled PO 2 mg of haldol QHS and to administer IM 20 mg of geodon. Remains 1:1 with staff. 2130 pt resting in bed. Remains 1:1 with staff. 2230 pt resting quietly in bed. Remains 1:1 with staff.

## 2018-10-29 NOTE — BH NOTES
GROUP THERAPY PROGRESS NOTE    Faheem Danielle participated in the Acute Unit's afternoon Process Group with a focus   on identifying feelings, planning for the rest of the day, and preparing for discharge. Group time: 20 minutes. Personal goal for participation: To increase the capacity to improve ones mood and structure. Goal orientation: The patient will be able to identify their feelings, develop a plan for structuring their day, and discharge planning. Group therapy participation: With prompting, this patient  participated in the group. Therapeutic interventions reviewed and discussed: The group members were asked to introduce   themselves to each other and to see if they could identify an emotion they are having and/or let the group know what they want to focus on for the day as they continue to make discharge plans. Impression of participation: The patient said she was feeling, \"Not good. \" When asked about the cuts on her inner right forearm, she replied she did that [place] couple of days ago. ..because I miss my mother and brother. \"  The patient was alert and generally oriented. The patient expressed no current HI. She admitted to harboring suicidal ideation, with intension, but without a specified plan. She did not want to elaborate further and kept looking at the floor. She displayed no overt psychotic symptoms in this group and may have been responding to internal stimuli that she did not admit to. Her affect was largely depressed, with anxiety. Her mood matched her affect. This was the patient's first process group with the undersigned in this re-hospitalization.

## 2018-10-29 NOTE — PROGRESS NOTES
Laboratory Monitoring for Antipsychotics: This patient is currently prescribed the following medication(s):   Current Facility-Administered Medications   Medication Dose Route Frequency    atorvastatin (LIPITOR) tablet 20 mg  20 mg Oral DAILY    haloperidol (HALDOL) tablet 2 mg  2 mg Oral QHS    busPIRone (BUSPAR) tablet 15 mg  15 mg Oral BID    sertraline (ZOLOFT) tablet 75 mg  75 mg Oral QPM    aspirin tablet 325 mg  325 mg Oral DAILY    cholecalciferol (VITAMIN D3) tablet 1,000 Units  1,000 Units Oral DAILY    levothyroxine (SYNTHROID) tablet 125 mcg  125 mcg Oral ACB    therapeutic multivitamin (THERAGRAN) tablet 1 Tab  1 Tab Oral DAILY    OLANZapine (ZyPREXA) tablet 5 mg  5 mg Oral DAILY AFTER BREAKFAST     The following labs have been completed for monitoring of antipsychotics and/or mood stabilizers:    Height, Weight, BMI Estimation  Estimated body mass index is 26.61 kg/m² as calculated from the following:    Height as of this encounter: 162.6 cm (64\"). Weight as of this encounter: 70.3 kg (155 lb). Vital Signs/Blood Pressure  Visit Vitals  /82   Pulse 86   Temp 97.8 °F (36.6 °C)   Resp 16   Ht 162.6 cm (64\")   Wt 70.3 kg (155 lb)   SpO2 95%   BMI 26.61 kg/m²     Renal Function, Hepatic Function and Chemistry  Estimated Creatinine Clearance: 54.9 mL/min (A) (based on SCr of 1.06 mg/dL (H)). Lab Results   Component Value Date/Time    Sodium 140 07/16/2018 11:55 PM    Potassium 3.6 07/16/2018 11:55 PM    Chloride 107 07/16/2018 11:55 PM    CO2 23 07/16/2018 11:55 PM    Anion gap 10 07/16/2018 11:55 PM    BUN 13 07/16/2018 11:55 PM    Creatinine 1.06 (H) 07/16/2018 11:55 PM    BUN/Creatinine ratio 12 07/16/2018 11:55 PM    Bilirubin, total 0.4 07/16/2018 11:55 PM    Protein, total 8.2 07/16/2018 11:55 PM    Albumin 4.0 07/16/2018 11:55 PM    Globulin 4.2 (H) 07/16/2018 11:55 PM    A-G Ratio 1.0 (L) 07/16/2018 11:55 PM    ALT (SGPT) 24 07/16/2018 11:55 PM    Alk.  phosphatase 77 07/16/2018 11:55 PM     Lab Results   Component Value Date/Time    Glucose 94 10/28/2018 06:07 AM     Lab Results   Component Value Date/Time    Hemoglobin A1c 6.1 07/19/2018 06:03 AM     Hematology  Lab Results   Component Value Date/Time    WBC 8.4 07/16/2018 11:55 PM    RBC 4.72 07/16/2018 11:55 PM    HGB 13.0 07/16/2018 11:55 PM    HCT 40.2 07/16/2018 11:55 PM    MCV 85.2 07/16/2018 11:55 PM    MCH 27.5 07/16/2018 11:55 PM    MCHC 32.3 07/16/2018 11:55 PM    RDW 12.8 07/16/2018 11:55 PM    PLATELET 931 51/97/6822 11:55 PM     Lipids  Lab Results   Component Value Date/Time    Cholesterol, total 326 (H) 10/28/2018 06:07 AM    HDL Cholesterol 55 10/28/2018 06:07 AM    LDL, calculated 229 (H) 10/28/2018 06:07 AM    Triglyceride 210 (H) 10/28/2018 06:07 AM    CHOL/HDL Ratio 5.9 (H) 10/28/2018 06:07 AM     Thyroid Function  Lab Results   Component Value Date/Time    TSH 0.10 (L) 10/28/2018 06:07 AM    T4, Free 1.0 10/28/2018 06:07 AM     Assessment/Plan:  Recommended baseline laboratory monitoring has been completed based on this patient's current medication regimen. The patient's lipid panel shows elevated total cholesterol, LDL, and triglyceride. This is significant change since her last lipid panel drawn 3 months ago. The patient has been on multiple second-generation antipsychotics since 7/2018 (including trials of risperidone, quetiapine, and olanzapine). Recommend starting atorvastatin with goal dose of 40mg (high-intensity statin) and change antipsychotic therapy to a first generation agent (such as haloperidol). Recommend follow-up lipid panel and hemoglobin A1c in 3 months.       Gt Mckinnon, PharmD, BCPP, [facility] Specialist, [facility]

## 2018-10-29 NOTE — PROGRESS NOTES
Admission Medication Reconciliation:    Information obtained from:  RxQuery    Comments/Recommendations: Updated PTA meds/reviewed patient's allergies. 1)  Medication changes (since last review): Added  - albuterol inhaler - 2 puffs every 6 hours PRN wheezing  - beclomethasone 40mcg - 2 puffs BID  - clonidine 0.05mg QHS (1/2 of 0.1mg tablet)    Adjusted  - prazosin 1mg QHS (from 2mg QHS)  - hydroxyzine 50mg q4h PRN anxiety (from TID PRN itching)    Removed  - lorazepam - last filled 8/13 for 15 day supply - now clonazepam  - risperidone - last filled 8/13    2)  Recent hospitalizations at [facility] (discharge medications listed below):     7/16-7/26 [facility])      - risperidone 1mg BID      - sertraline 75mg daily      - trazodone 50mg QHS PRN      - hydroxyzine 25mg every 6 hours PRN anxiety     7/27-8/13 [facility])      - lorazepam 0.5mg TID      - zolpidem 10mg QHS      - risperidone 2mg BID      - sertraline 200mg daily     Allergies: Other food; Astepro [azelastine]; Bactrim [sulfamethoprim]; Banana; Coconut; Cortisone; Nut - unspecified; Peanut; and Prednisone    Significant PMH/Disease States:   Past Medical History:   Diagnosis Date    Asthma 1967    hospitalized for asthma attack x 2    Chronic kidney disease     kidney stones x 2-3 times    Ill-defined condition     nasal blockage from growth and misalignment - cannot breath out of nose - surgery in the future/cleared for colonoscopy 7/31/2014 - will bring in letter    Other ill-defined conditions(799.89)     blood in stool    Other ill-defined conditions(799.89)     hx low BP - 90's/60's-70's    PUD (peptic ulcer disease)     Thyroid disease     Unspecified adverse effect of anesthesia     nasal growth and misalignment and cannot breath through nose     Chief Complaint for this Admission:  No chief complaint on file. Prior to Admission Medications:   Prior to Admission Medications   Prescriptions Last Dose Informant Patient Reported? Taking? CLONIDINE HCL PO   Yes Yes   Sig: Take 1/2 of 0.1mg tablet QHS   OLANZapine (ZYPREXA) 20 mg tablet 2018 at Unknown time  Yes Yes   Sig: Take 20 mg by mouth nightly. OLANZapine (ZYPREXA) 5 mg tablet 2018 at Unknown time  Yes Yes   Sig: Take 5 mg by mouth daily (after breakfast). QUEtiapine (SEROQUEL) 50 mg tablet 2018 at Unknown time  Yes Yes   Sig: Take 50 mg by mouth nightly. albuterol (PROVENTIL HFA, VENTOLIN HFA, PROAIR HFA) 90 mcg/actuation inhaler   Yes Yes   Sig: Take 2 Puffs by inhalation every six (6) hours as needed for Wheezing. aspirin (ASPIRIN) 325 mg tablet 10/26/2018 at Unknown time  No Yes   Sig: Take 1 Tab by mouth daily. Indications: myocardial infarction prevention   beclomethasone (QVAR) 40 mcg/actuation aero   Yes Yes   Sig: Take 2 Puffs by inhalation two (2) times a day. busPIRone (BUSPAR) 15 mg tablet 2018 at Unknown time  Yes Yes   Sig: Take 15 mg by mouth three (3) times daily. cholecalciferol (VITAMIN D3) 1,000 unit tablet Unknown at Unknown time  No No   Sig: Take 1 Tab by mouth daily. Indications: PREVENTION OF VITAMIN D DEFICIENCY, Vitamin D Deficiency   clonazePAM (KLONOPIN) 1 mg tablet 2018 at Unknown time  Yes Yes   Sig: Take 1 mg by mouth nightly. cyanocobalamin, vitamin B-12, (VITAMIN B-12) 5,000 mcg subl Unknown at Unknown time  Yes No   Si,000 mcg by SubLINGual route daily. fluticasone (FLONASE) 50 mcg/actuation nasal spray Unknown at Unknown time  No No   Si Sprays by Both Nostrils route daily. Indications: Allergic Rhinitis   hydrOXYzine HCl (ATARAX) 50 mg tablet 2018 at Unknown time  Yes Yes   Sig: Take 50 mg by mouth every four (4) hours as needed for Anxiety. levothyroxine (SYNTHROID) 125 mcg tablet 10/26/2018 at Unknown time  No Yes   Sig: Take 1 Tab by mouth Daily (before breakfast). Indications: hypothyroidism   prazosin (MINIPRESS) 1 mg capsule 2018 at Unknown time  Yes Yes   Sig: Take 1 mg by mouth nightly. sertraline (ZOLOFT) 100 mg tablet 10/26/2018 at Unknown time  No Yes   Sig: Take 2 Tabs by mouth daily (with dinner). Indications: Generalized Anxiety Disorder, major depressive disorder   therapeutic multivitamin (THERA) tablet Unknown at Unknown time  Yes No   Sig: Take 1 Tab by mouth daily. zolpidem (AMBIEN) 10 mg tablet 10/27/2018 at Unknown time  No Yes   Sig: Take 1 Tab by mouth nightly. Max Daily Amount: 10 mg.  Indications: SLEEP-ONSET INSOMNIA      Facility-Administered Medications: None     Alejandro Chanel, PharmD, BCPP, [facility] Specialist, [facility]

## 2018-10-29 NOTE — INTERDISCIPLINARY ROUNDS
Behavioral Health Interdisciplinary Rounds     Patient Name: Kimberly Burnham  Age: 59 y.o. Room/Bed:  726/02  Primary Diagnosis: <principal problem not specified>   Admission Status: Involuntary Commitment     Readmission within 30 days: no  Power of  in place: no  Patient requires a blocked bed: yes          Reason for blocked bed: patient on 1:1    VTE Prophylaxis: No    Mobility needs/Fall risk: no  Flu Vaccine : no   Nutritional Plan: no  Consults:          Labs/Testing due today?: no    Sleep hours:  6.0      Participation in Care/Groups:  yes  Medication Compliant?: Yes  PRNS (last 24 hours): Antianxiety    Restraints (last 24 hours):  no     CIWA (range last 24 hours):     COWS (range last 24 hours):      Alcohol screening (AUDIT) completed -   AUDIT Score: 0     If applicable, date SBIRT discussed in treatment team AND documented:     Tobacco - patient is a smoker: Have You Used Tobacco in the Past 30 Days: No  Illegal Drugs use: Have You Used Any Illegal Substances Over the Past 12 Months: No    24 hour chart check complete: yes     Patient goal(s) for today:   Treatment team focus/goals: Plan to add Haldol , plan to increase her Zoloft   Progress note : she continues to hear voices     LOS:  2  Expected LOS: TBD     Financial concerns/prescription coverage: Tracee Riddle   Date of last family contact:       Family requesting physician contact today:  no  Discharge plan: she will return home with her father   Guns in the home:  No       Outpatient provider(s): [address] [place] [address]     Participating treatment team members: Markel Vargas, PharmD.  Kerwin Serrano, RN

## 2018-10-29 NOTE — BH NOTES
PSYCHIATRIC PROGRESS NOTE         Patient Name  Samuel Munoz   Date of Birth 1960   [facility] [id number]   Medical Record Number  [id number]      Age  59 y.o. PCP Sudeep Looney MD   Admit date:  10/27/2018    Room Number  726/02  @ [facility]   Date of Service  10/29/2018           E & M PROGRESS NOTE:         HISTORY         REASON FOR HOSPITALIZATION:  CC: \". Pt admitted under a temporary retirement order (TDO) [place] t[facility] oco for severe depression with suicidal ideations  proving to be an imminent danger to self and an inability to care for self. HISTORY OF PRESENT ILLNESS:    The patient, Samuel Munoz, is a 59 y.o. WHITE [place]  female with a past psychiatric history significant for depression, anxiety, psychosis, hearing voices ho presents at this time with complaints of (and/or evidence of) the following emotional symptoms: suicidal thoughts/threats, delusions, psychotic behavior and paranoid behavior. Additional symptomatology include difficulty swallowing and feeling suicidal.  The above symptoms have been present for Pt reports her mother passed away in April and she feels she could have done more, reports she has not been taking her meds for one and half week. Pt was on zoloft 200 at night and zyprexa po bid as per reports. Pt reports she is getting thoughts of cutting herself, she feels responsible for mother's death, pt ha s ntense guilt, decrease appetite, difficulty sleeping, poor eye contact and auditory hallucinations. These symptoms are of intense severity. These symptoms are constant in nature. 10/28/18: As per staff, pt sneaked in X-acto knife while nurse was looking for med lsit from her bag,. Pt made multiple superficial cuts on her rt arm, needing medical consult. Knife was found with pt and wound were treated, no stiches were needed. Pt continues to be dystaymic, having suicidal ideation.   10/29/18: Pt continues to be very anxious and depressed, report suicidal ideations and urges to cut. Pt is on 1:1 observation. Pts cholesterol is 326 and tsh 0.5. Pt slept well. Pt received ativan 1mg twice yesterday and pt also tried to elope yesterday. SIDE EFFECTS: (reviewed/updated 10/29/2018)  None reported or admitted to. No noted toxicity with use of epakote/Tegretol/lithium/Clozaril/TCAs   ALLERGIES:(reviewed/updated 10/29/2018)  Allergies   Allergen Reactions    Other Food Other (comments)     \"Bad chest pain\" with walnuts. Coke - asthma/stuffy head. Fish sticks causes an asthma attack.  Astepro [Azelastine] Other (comments)     Violent headaches.  Bactrim [Sulfamethoprim] Other (comments)     Difficulty breathing, chills, fever.  Banana Other (comments)     Diffculty breathing, wheezing, asthma attack.  Coconut Other (comments)     Difficulty breathing, asthma attack, SOB.  Cortisone Hives     Difficulty breathing.  Nut - Unspecified Other (comments)     \"Bad chest pain\" with walnuts    Peanut Other (comments)     Wheezing, asthma attack, SOB.  Prednisone Hives     Difficulty breathing, kidney stones. MEDICATIONS PRIOR TO ADMISSION:(reviewed/updated 10/29/2018)  Medications Prior to Admission   Medication Sig    albuterol (PROVENTIL HFA, VENTOLIN HFA, PROAIR HFA) 90 mcg/actuation inhaler Take 2 Puffs by inhalation every six (6) hours as needed for Wheezing.  beclomethasone (QVAR) 40 mcg/actuation aero Take 2 Puffs by inhalation two (2) times a day.  CLONIDINE HCL PO Take 1/2 of 0.1mg tablet QHS    busPIRone (BUSPAR) 15 mg tablet Take 15 mg by mouth three (3) times daily.  QUEtiapine (SEROQUEL) 50 mg tablet Take 50 mg by mouth nightly.  OLANZapine (ZYPREXA) 20 mg tablet Take 20 mg by mouth nightly.  hydrOXYzine HCl (ATARAX) 50 mg tablet Take 50 mg by mouth every four (4) hours as needed for Anxiety.  prazosin (MINIPRESS) 1 mg capsule Take 1 mg by mouth nightly.     clonazePAM (KLONOPIN) 1 mg tablet Take 1 mg by mouth nightly.  OLANZapine (ZYPREXA) 5 mg tablet Take 5 mg by mouth daily (after breakfast).  aspirin (ASPIRIN) 325 mg tablet Take 1 Tab by mouth daily. Indications: myocardial infarction prevention    levothyroxine (SYNTHROID) 125 mcg tablet Take 1 Tab by mouth Daily (before breakfast). Indications: hypothyroidism    sertraline (ZOLOFT) 100 mg tablet Take 2 Tabs by mouth daily (with dinner). Indications: Generalized Anxiety Disorder, major depressive disorder    zolpidem (AMBIEN) 10 mg tablet Take 1 Tab by mouth nightly. Max Daily Amount: 10 mg. Indications: SLEEP-ONSET INSOMNIA    cholecalciferol (VITAMIN D3) 1,000 unit tablet Take 1 Tab by mouth daily. Indications: PREVENTION OF VITAMIN D DEFICIENCY, Vitamin D Deficiency    fluticasone (FLONASE) 50 mcg/actuation nasal spray 2 Sprays by Both Nostrils route daily. Indications: Allergic Rhinitis    therapeutic multivitamin (THERA) tablet Take 1 Tab by mouth daily.  cyanocobalamin, vitamin B-12, (VITAMIN B-12) 5,000 mcg subl 5,000 mcg by SubLINGual route daily. PAST MEDICAL HISTORY: Past medical history from the initial psychiatric evaluation has been reviewed (reviewed/updated 10/29/2018) with no additional updates (I asked patient and no additional past medical history provided).    Past Medical History:   Diagnosis Date    Asthma 1967    hospitalized for asthma attack x 2    Chronic kidney disease     kidney stones x 2-3 times    Ill-defined condition     nasal blockage from growth and misalignment - cannot breath out of nose - surgery in the future/cleared for colonoscopy 7/31/2014 - will bring in letter    Other ill-defined conditions(799.89)     blood in stool    Other ill-defined conditions(799.89)     hx low BP - 90's/60's-70's    PUD (peptic ulcer disease)     Thyroid disease     Unspecified adverse effect of anesthesia     nasal growth and misalignment and cannot breath through nose     Past Surgical History:   Procedure Laterality Date  HX HEENT      T & A    HX HEENT      turbinate surgery      SOCIAL HISTORY: Social history from the initial psychiatric evaluation has been reviewed (reviewed/updated 10/29/2018) with no additional updates (I asked patient and no additional social history provided). Social History     Socioeconomic History    Marital status: SINGLE     Spouse name: Not on file    Number of children: Not on file    Years of education: Not on file    Highest education level: Not on file   Social Needs    Financial resource strain: Not on file    Food insecurity - worry: Not on file    Food insecurity - inability: Not on file    Transportation needs - medical: Not on file   [employer] needs - non-medical: Not on file   Occupational History    Not on file   Tobacco Use    Smoking status: Never Smoker    Smokeless tobacco: Never Used   Substance and Sexual Activity    Alcohol use: No    Drug use: No    Sexual activity: Not Currently   Other Topics Concern     Service Not Asked    Blood Transfusions Not Asked    Caffeine Concern Not Asked    Occupational Exposure Not Asked    Hobby Hazards Not Asked    Sleep Concern Not Asked    Stress Concern Not Asked    Weight Concern Not Asked    Special Diet Not Asked    Back Care Not Asked    Exercise Not Asked    Bike Helmet Not Asked   [address] Not Asked    Self-Exams Not Asked   Social History Narrative    Not on file      FAMILY HISTORY: Family history from the initial psychiatric evaluation has been reviewed (reviewed/updated 10/29/2018) with no additional updates (I asked patient and no additional family history provided).    Family History   Problem Relation Age of Onset    Stroke Mother     Other Mother         erosion of esophagus    Cancer Mother         melanoma/squamous    Heart Surgery Father         x2    Delayed Awakening Father    Arvil Fitch Pacemaker Father     Other Father         carotid endartarectomy/polio    Cataract Father REVIEW OF SYSTEMS: (reviewed/updated 10/29/2018)  Appetite:good   Sleep: improved   All other Review of Systems: Negative except   Multiple cuts on rt arm       [address] (MSE):    MSE FINDINGS ARE WITHIN NORMAL LIMITS (WNL) UNLESS OTHERWISE STATED BELOW. ( ALL OF THE BELOW CATEGORIES OF THE MSE HAVE BEEN REVIEWED (reviewed 10/27/2018) AND UPDATED AS DEEMED APPROPRIATE )  General Presentation age appropriate and disheveled, evasive and guarded   Orientation oriented to time, place and person   Vital Signs  See below (reviewed 10/27/2018); Vital Signs (BP, Pulse, & Temp) are within normal limits if not listed below.    Gait and Station Stable/steady, no ataxia   Musculoskeletal System No extrapyramidal symptoms (EPS); no abnormal muscular movements or Tardive Dyskinesia (TD); muscle strength and tone are within normal limits   Language No aphasia or dysarthria   Speech:  soft   Thought Processes logical; slow rate of thoughts; poor abstract reasoning/computation   Thought Associations blocked    Thought Content paranoid delusions   Suicidal Ideations intention   Homicidal Ideations no plan  and no intention   Mood:  depressed and anhedonia   Affect:  constricted   Memory recent  intact   Memory remote:  intact   Concentration/Attention:  distractable   Fund of Knowledge average   Insight:  poor   Reliability poor   Judgment:  poor                      VITALS:     Patient Vitals for the past 24 hrs:   Temp Pulse Resp BP SpO2   10/29/18 1554 98.4 °F (36.9 °C) 69 16 110/68 95 %   10/29/18 1200 97.8 °F (36.6 °C) 86 16 123/82 --   10/29/18 0859 97.7 °F (36.5 °C) 92 16 107/70 95 %   10/28/18 2012 98.3 °F (36.8 °C) 75 18 108/63 95 %     Wt Readings from Last 3 Encounters:   10/27/18 70.3 kg (155 lb)   07/27/18 72.6 kg (160 lb)   07/16/18 72.6 kg (160 lb)     Temp Readings from Last 3 Encounters:   10/29/18 98.4 °F (36.9 °C)   08/13/18 98.5 °F (36.9 °C)   07/26/18 98 °F (36.7 °C) BP Readings from Last 3 Encounters:   10/29/18 110/68   08/13/18 91/53   07/26/18 108/71     Pulse Readings from Last 3 Encounters:   10/29/18 69   08/13/18 (!) 57   07/26/18 62            DATA     LABORATORY DATA:(reviewed/updated 10/29/2018)  No results found for this or any previous visit (from the past 24 hour(s)). No results found for: VALF2, VALAC, VALP, VALPR, DS6, CRBAM, CRBAMP, CARB2, XCRBAM  No results found for: LITHM   RADIOLOGY REPORTS:(reviewed/updated 10/29/2018)  No results found.        MEDICATIONS     ALL MEDICATIONS:   Current Facility-Administered Medications   Medication Dose Route Frequency    haloperidol (HALDOL) tablet 2 mg  2 mg Oral QHS    busPIRone (BUSPAR) tablet 15 mg  15 mg Oral BID    [START ON 10/30/2018] levothyroxine (SYNTHROID) tablet 100 mcg  100 mcg Oral ACB    pravastatin (PRAVACHOL) tablet 40 mg  40 mg Oral QHS    budesonide (PULMICORT) 500 mcg/2 ml nebulizer suspension  500 mcg Nebulization BID RT    sertraline (ZOLOFT) tablet 75 mg  75 mg Oral QPM    ziprasidone (GEODON) 20 mg in sterile water (preservative free) 1 mL injection  20 mg IntraMUSCular BID PRN    OLANZapine (ZyPREXA) tablet 5 mg  5 mg Oral Q6H PRN    benztropine (COGENTIN) tablet 2 mg  2 mg Oral BID PRN    benztropine (COGENTIN) injection 2 mg  2 mg IntraMUSCular BID PRN    LORazepam (ATIVAN) injection 2 mg  2 mg IntraMUSCular Q4H PRN    LORazepam (ATIVAN) tablet 1 mg  1 mg Oral Q4H PRN    zolpidem (AMBIEN) tablet 10 mg  10 mg Oral QHS PRN    acetaminophen (TYLENOL) tablet 650 mg  650 mg Oral Q4H PRN    ibuprofen (MOTRIN) tablet 400 mg  400 mg Oral Q8H PRN    magnesium hydroxide (MILK OF MAGNESIA) 400 mg/5 mL oral suspension 30 mL  30 mL Oral DAILY PRN    nicotine (NICODERM CQ) 21 mg/24 hr patch 1 Patch  1 Patch TransDERmal DAILY PRN    aspirin tablet 325 mg  325 mg Oral DAILY    cholecalciferol (VITAMIN D3) tablet 1,000 Units  1,000 Units Oral DAILY    therapeutic multivitamin (THERAGRAN) tablet 1 Tab  1 Tab Oral DAILY    hydrOXYzine HCl (ATARAX) tablet 50 mg  50 mg Oral TID PRN    OLANZapine (ZyPREXA) tablet 5 mg  5 mg Oral DAILY AFTER BREAKFAST    albuterol-ipratropium (DUO-NEB) 2.5 MG-0.5 MG/3 ML  3 mL Nebulization Q6H PRN      SCHEDULED MEDICATIONS:   Current Facility-Administered Medications   Medication Dose Route Frequency    haloperidol (HALDOL) tablet 2 mg  2 mg Oral QHS    busPIRone (BUSPAR) tablet 15 mg  15 mg Oral BID    [START ON 10/30/2018] levothyroxine (SYNTHROID) tablet 100 mcg  100 mcg Oral ACB    pravastatin (PRAVACHOL) tablet 40 mg  40 mg Oral QHS    budesonide (PULMICORT) 500 mcg/2 ml nebulizer suspension  500 mcg Nebulization BID RT    sertraline (ZOLOFT) tablet 75 mg  75 mg Oral QPM    aspirin tablet 325 mg  325 mg Oral DAILY    cholecalciferol (VITAMIN D3) tablet 1,000 Units  1,000 Units Oral DAILY    therapeutic multivitamin (THERAGRAN) tablet 1 Tab  1 Tab Oral DAILY    OLANZapine (ZyPREXA) tablet 5 mg  5 mg Oral DAILY AFTER BREAKFAST          ASSESSMENT & PLAN     DIAGNOSES REQUIRING ACTIVE TREATMENT AND MONITORING: (reviewed/updated 10/29/2018)  Patient Active Hospital Problem List:   The patient, Yogi Carlisle, is a 59 y.o.  female who presents at this time for treatment of the following diagnoses:  Patient Active Hospital Problem List:   Major depression with psychotic features ([facility] [address] (10/27/2018)    Assessment: depression, suicidal ideations anxiety, auditory hallucination     Plan: restarting meds zoloft 25 mg po hs, zyprexa 5 mg po bid, buspar 5 mg po bid. Indiv/milue therapy  Get collaterals, safety, suicidal precautions  10/28/18: Pt was put on 1:! Observation as soon as writer heard about cutting.   Increasing zoloft and zyprexa 10 mg po hs., indiv milue therapy, suicide precautions, check for contrabands  10/29/18: high cholesterol, will change zyprexa to haldol first generation antipsychotic., increasing buspar 15 mg po bid  Hyprelipidemia: starting atrovastatin 20 mg daily            I will continue to monitor blood levels (Depakote, Tegretol, lithium, clozapine---a drug with a narrow therapeutic index= NTI) and associated labs for drug therapy implemented that require intense monitoring for toxicity as deemed appropriate based on current medication side effects and pharmacodynamically determined drug 1/2 lives. In summary, Pradip Ferguson, is a 59 y.o.  female who presents with a severe exacerbation of the principal diagnosis of Major depression with psychotic features ([facility] [address]  Patient's condition is orsening/not improving/not stable improving. Patient requires continued inpatient hospitalization for further stabilization, safety monitoring and medication management. I will continue to coordinate the provision of individual, milieu, occupational, group, and substance abuse therapies to address target symptoms/diagnoses as deemed appropriate for the individual patient. A coordinated, multidisplinary treatment team round was conducted with the patient (this team consists of the nurse, psychiatric unit pharmcist,  and writer). Complete current electronic health record for patient has been reviewed today including consultant notes, ancillary staff notes, nurses and psychiatric tech notes. icide risk assessment completed and patient deemed to be of low risk for suicide at this time. The following regarding medications was addressed during rounds with patient:   the risks and benefits of the proposed medication. The patient was given the opportunity to ask questions. Informed consent given to the use of the above medications. Will continue to adjust psychiatric and non-psychiatric medications (see above \"medication\" section and orders section for details) as deemed appropriate & based upon diagnoses and response to treatment.      I will continue to order blood tests/labs and diagnostic tests as deemed appropriate and review results as they become available (see orders for details and above listed lab/test results). I will order psychiatric records from previous [facility] hospitals to further elucidate the nature of patient's psychopathology and review once available. I will gather additional collateral information from riends, family and o/p treatment team to further elucidate the nature of patient's psychopathology and baselline level of psychiatric functioning. I certify that this patient's inpatient psychiatric hospital services furnished since the previous certification were, and continue to be, required for treatment that could reasonably be expected to improve the patient's condition, or for diagnostic study, and that the patient continues to need, on a daily basis, active treatment furnished directly by or requiring the supervision of inpatient psychiatric facility personnel. In addition, the hospital records show that services furnished were intensive treatment services, admission or related services, or equivalent services.     EXPECTED DISCHARGE DATE/DAY: BD     DISPOSITION:[place]       Signed By:   Aliyah Sanford MD  10/29/2018

## 2018-10-29 NOTE — BH NOTES
0730:  Patient lying in bed quietly, resting, awake, NAD.  0830:  Patient is out in dining room, sitting quietly watching TV, in NAD.  0930:  Patient is in DR, quiet, coloring, resting. 1030:  Patient is in [place], quiety, resting.   1130:  Patient is eating lunch in DI, NAD, no complaints  1230:  Patient is awake in bed, lying quietly, NAD

## 2018-10-29 NOTE — PROGRESS NOTES
Problem: Discharge Planning  Goal: *Discharge to safe environment  Outcome: Progressing Towards Goal  She will return to her fathers home   She has support of [facility] and her father   Goal: *Knowledge of medication management  Outcome: Progressing Towards Goal  She is compliant with her medications   Goal: *Knowledge of discharge instructions  Outcome: Progressing Towards Goal  Patient is able to verbalize discharge instructions     Problem: Patient Education: Go to Patient Education Activity  Goal: Patient/Family Education  Outcome: Progressing Towards Goal  SW will educate patient and family regarding discharge needs

## 2018-10-29 NOTE — BH NOTES
2300--Patient continues on 1:1 with staff. She is currently sleeping, no stress noted. 2400--Patient sleeping, continues on 1:1 with staff. 0100--Patient continues on 1:1.  Currently sleeping    0200--Patient continues on 1:1.  Currently sleeping, no stress noted. 0300--Patient continues on 1:1, with staff. 0400--Patient continues on 1:1, resting quietly in her room with staff.

## 2018-10-29 NOTE — BH NOTES
PSYCHIATRIC PROGRESS NOTE         Patient Name  Faheem Danielle   Date of Birth 1960   [facility] [id number]   Medical Record Number  [id number]      Age  59 y.o. PCP May Butts MD   Admit date:  10/27/2018    Room Number  726/02  @ [facility]   Date of Service  10/28/2018           E & M PROGRESS NOTE:         HISTORY         REASON FOR HOSPITALIZATION:  CC: \". Pt admitted under a temporary group home order (TDO) [place] [facility] for severe depression with suicidal ideations  proving to be an imminent danger to self and an inability to care for self. HISTORY OF PRESENT ILLNESS:    The patient, Faheem Danielle, is a 59 y.o. WHITE [place]  female with a past psychiatric history significant for depression, anxiety, psychosis, hearing voices ho presents at this time with complaints of (and/or evidence of) the following emotional symptoms: suicidal thoughts/threats, delusions, psychotic behavior and paranoid behavior. Additional symptomatology include difficulty swallowing and feeling suicidal.  The above symptoms have been present for Pt reports her mother passed away in April and she feels she could have done more, reports she has not been taking her meds for one and half week. Pt was on zoloft 200 at night and zyprexa po bid as per reports. Pt reports she is getting thoughts of cutting herself, she feels responsible for mother's death, pt ha s ntense guilt, decrease appetite, difficulty sleeping, poor eye contact and auditory hallucinations. These symptoms are of intense severity. These symptoms are constant in nature. 10/28/18: As per staff, pt sneaked in exacto knife while nurse was looking for med lsit from her bag,. Pt made multiple superficial cuts on her rt arm, needing medical consult. Knife was found with pt and wound were treated, no stiches were needed. Pt continues to be dystaymic, having suicidal ideations,.             SIDE EFFECTS: (reviewed/updated 10/28/2018)  None reported or admitted to. No noted toxicity with use of epakote/Tegretol/lithium/Clozaril/TCAs   ALLERGIES:(reviewed/updated 10/28/2018)  Allergies   Allergen Reactions    Other Food Other (comments)     \"Bad chest pain\" with walnuts. Coke - asthma/stuffy head. Fish sticks causes an asthma attack.  Astepro [Azelastine] Other (comments)     Violent headaches.  Bactrim [Sulfamethoprim] Other (comments)     Difficulty breathing, chills, fever.  Banana Other (comments)     Diffculty breathing, wheezing, asthma attack.  Coconut Other (comments)     Difficulty breathing, asthma attack, SOB.  Cortisone Hives     Difficulty breathing.  Peanut Other (comments)     Wheezing, asthma attack, SOB.  Prednisone Hives     Difficulty breathing, kidney stones. MEDICATIONS PRIOR TO ADMISSION:(reviewed/updated 10/28/2018)  Medications Prior to Admission   Medication Sig    busPIRone (BUSPAR) 15 mg tablet Take 15 mg by mouth three (3) times daily.  QUEtiapine (SEROQUEL) 50 mg tablet Take 50 mg by mouth nightly.  OLANZapine (ZYPREXA) 20 mg tablet Take 20 mg by mouth nightly.  hydrOXYzine HCl (ATARAX) 50 mg tablet Take 50 mg by mouth three (3) times daily as needed for Itching.  prazosin (MINIPRESS) 2 mg capsule Take 2 mg by mouth nightly.  clonazePAM (KLONOPIN) 1 mg tablet Take 1 mg by mouth nightly.  OLANZapine (ZYPREXA) 5 mg tablet Take 5 mg by mouth daily (after breakfast).  aspirin (ASPIRIN) 325 mg tablet Take 1 Tab by mouth daily. Indications: myocardial infarction prevention    levothyroxine (SYNTHROID) 125 mcg tablet Take 1 Tab by mouth Daily (before breakfast). Indications: hypothyroidism    risperiDONE (RISPERDAL) 2 mg tablet Take 1 Tab by mouth two (2) times a day. Indications: DEPRESSION TREATMENT ADJUNCT    sertraline (ZOLOFT) 100 mg tablet Take 2 Tabs by mouth daily (with dinner).  Indications: Generalized Anxiety Disorder, major depressive disorder    zolpidem (AMBIEN) 10 mg tablet Take 1 Tab by mouth nightly. Max Daily Amount: 10 mg. Indications: SLEEP-ONSET INSOMNIA    cholecalciferol (VITAMIN D3) 1,000 unit tablet Take 1 Tab by mouth daily. Indications: PREVENTION OF VITAMIN D DEFICIENCY, Vitamin D Deficiency    fluticasone (FLONASE) 50 mcg/actuation nasal spray 2 Sprays by Both Nostrils route daily. Indications: Allergic Rhinitis    LORazepam (ATIVAN) 0.5 mg tablet Take 0.5 Tabs by mouth three (3) times daily. Max Daily Amount: 0.75 mg. Indications: anxiety    therapeutic multivitamin (THERA) tablet Take 1 Tab by mouth daily.  cyanocobalamin, vitamin B-12, (VITAMIN B-12) 5,000 mcg subl 5,000 mcg by SubLINGual route daily. PAST MEDICAL HISTORY: Past medical history from the initial psychiatric evaluation has been reviewed (reviewed/updated 10/28/2018) with no additional updates (I asked patient and no additional past medical history provided). Past Medical History:   Diagnosis Date    Asthma 1967    hospitalized for asthma attack x 2    Chronic kidney disease     kidney stones x 2-3 times    Ill-defined condition     nasal blockage from growth and misalignment - cannot breath out of nose - surgery in the future/cleared for colonoscopy 7/31/2014 - will bring in letter    Other ill-defined conditions(799.89)     blood in stool    Other ill-defined conditions(799.89)     hx low BP - 90's/60's-70's    PUD (peptic ulcer disease)     Thyroid disease     Unspecified adverse effect of anesthesia     nasal growth and misalignment and cannot breath through nose     Past Surgical History:   Procedure Laterality Date    HX HEENT      T & A    HX HEENT      turbinate surgery      SOCIAL HISTORY: Social history from the initial psychiatric evaluation has been reviewed (reviewed/updated 10/28/2018) with no additional updates (I asked patient and no additional social history provided).    Social History     Socioeconomic History    Marital status: SINGLE     Spouse name: Not on file  Number of children: Not on file    Years of education: Not on file    Highest education level: Not on file   Social Needs    Financial resource strain: Not on file    Food insecurity - worry: Not on file    Food insecurity - inability: Not on file    Transportation needs - medical: Not on file    Transportation needs - non-medical: Not on file   Occupational History    Not on file   Tobacco Use    Smoking status: Never Smoker    Smokeless tobacco: Never Used   Substance and Sexual Activity    Alcohol use: No    Drug use: No    Sexual activity: Not Currently   Other Topics Concern     Service Not Asked    Blood Transfusions Not Asked    Caffeine Concern Not Asked    Occupational Exposure Not Asked    Hobby Hazards Not Asked    Sleep Concern Not Asked    Stress Concern Not Asked    Weight Concern Not Asked    Special Diet Not Asked    Back Care Not Asked    Exercise Not Asked    Bike Helmet Not Asked   [address] Not Asked    Self-Exams Not Asked   Social History Narrative    Not on file      FAMILY HISTORY: Family history from the initial psychiatric evaluation has been reviewed (reviewed/updated 10/28/2018) with no additional updates (I asked patient and no additional family history provided).    Family History   Problem Relation Age of Onset    Stroke Mother     Other Mother         erosion of esophagus    Cancer Mother         melanoma/squamous    Heart Surgery Father         x2    Delayed Awakening Father    [facility] Pacemaker Father     Other Father         carotid endartarectomy/polio    Cataract Father        REVIEW OF SYSTEMS: (reviewed/updated 10/28/2018)  Appetite:good   Sleep: improved   All other Review of Systems: Negative except   Multiple cuts on rt arm       MENTAL STATUS EXAM & VITALS      MENTAL STATUS EXAM (MSE):    MSE FINDINGS ARE WITHIN NORMAL LIMITS (WNL) UNLESS OTHERWISE STATED BELOW. ( ALL OF THE BELOW CATEGORIES OF THE MSE HAVE BEEN REVIEWED (reviewed 10/27/2018) AND UPDATED AS DEEMED APPROPRIATE )  General Presentation age appropriate and disheveled, evasive and guarded   Orientation oriented to time, place and person   Vital Signs  See below (reviewed 10/27/2018); Vital Signs (BP, Pulse, & Temp) are within normal limits if not listed below.    Gait and Station Stable/steady, no ataxia   Musculoskeletal System No extrapyramidal symptoms (EPS); no abnormal muscular movements or Tardive Dyskinesia (TD); muscle strength and tone are within normal limits   Language No aphasia or dysarthria   Speech:  soft   Thought Processes logical; slow rate of thoughts; poor abstract reasoning/computation   Thought Associations blocked    Thought Content paranoid delusions   Suicidal Ideations intention   Homicidal Ideations no plan  and no intention   Mood:  depressed and anhedonia   Affect:  constricted   Memory recent  intact   Memory remote:  intact   Concentration/Attention:  distractable   Fund of Knowledge average   Insight:  poor   Reliability poor   Judgment:  poor                      VITALS:     Patient Vitals for the past 24 hrs:   Temp Pulse Resp BP SpO2   10/28/18 2012 98.3 °F (36.8 °C) 75 18 108/63 95 %   10/28/18 1600 98.2 °F (36.8 °C) 72 16 106/72 95 %   10/28/18 1235 98.2 °F (36.8 °C) 72 16 121/74 --   10/28/18 0800 99.3 °F (37.4 °C) 82 16 113/80 97 %     Wt Readings from Last 3 Encounters:   10/27/18 70.3 kg (155 lb)   07/27/18 72.6 kg (160 lb)   07/16/18 72.6 kg (160 lb)     Temp Readings from Last 3 Encounters:   10/28/18 98.3 °F (36.8 °C)   08/13/18 98.5 °F (36.9 °C)   07/26/18 98 °F (36.7 °C)     BP Readings from Last 3 Encounters:   10/28/18 108/63   08/13/18 91/53   07/26/18 108/71     Pulse Readings from Last 3 Encounters:   10/28/18 75   08/13/18 (!) 57   07/26/18 62            DATA     LABORATORY DATA:(reviewed/updated 10/28/2018)  Recent Results (from the past 24 hour(s))   GLUCOSE, FASTING    Collection Time: 10/28/18  6:07 AM   Result Value Ref Range Glucose 94 65 - 100 MG/DL   TSH 3RD GENERATION    Collection Time: 10/28/18  6:07 AM   Result Value Ref Range    TSH 0.10 (L) 0.36 - 3.74 uIU/mL   LIPID PANEL    Collection Time: 10/28/18  6:07 AM   Result Value Ref Range    LIPID PROFILE          Cholesterol, total 326 (H) <200 MG/DL    Triglyceride 210 (H) <150 MG/DL    HDL Cholesterol 55 MG/DL    LDL, calculated 229 (H) 0 - 100 MG/DL    VLDL, calculated 42 MG/DL    CHOL/HDL Ratio 5.9 (H) 0 - 5.0       No results found for: VALF2, VALAC, VALP, VALPR, DS6, CRBAM, CRBAMP, CARB2, XCRBAM  No results found for: LITHM   RADIOLOGY REPORTS:(reviewed/updated 10/28/2018)  No results found.        MEDICATIONS     ALL MEDICATIONS:   Current Facility-Administered Medications   Medication Dose Route Frequency    OLANZapine (ZyPREXA) tablet 10 mg  10 mg Oral QHS    sertraline (ZOLOFT) tablet 75 mg  75 mg Oral QPM    busPIRone (BUSPAR) tablet 7.5 mg  7.5 mg Oral BID    ziprasidone (GEODON) 20 mg in sterile water (preservative free) 1 mL injection  20 mg IntraMUSCular BID PRN    OLANZapine (ZyPREXA) tablet 5 mg  5 mg Oral Q6H PRN    benztropine (COGENTIN) tablet 2 mg  2 mg Oral BID PRN    benztropine (COGENTIN) injection 2 mg  2 mg IntraMUSCular BID PRN    LORazepam (ATIVAN) injection 2 mg  2 mg IntraMUSCular Q4H PRN    LORazepam (ATIVAN) tablet 1 mg  1 mg Oral Q4H PRN    zolpidem (AMBIEN) tablet 10 mg  10 mg Oral QHS PRN    acetaminophen (TYLENOL) tablet 650 mg  650 mg Oral Q4H PRN    ibuprofen (MOTRIN) tablet 400 mg  400 mg Oral Q8H PRN    magnesium hydroxide (MILK OF MAGNESIA) 400 mg/5 mL oral suspension 30 mL  30 mL Oral DAILY PRN    nicotine (NICODERM CQ) 21 mg/24 hr patch 1 Patch  1 Patch TransDERmal DAILY PRN    aspirin tablet 325 mg  325 mg Oral DAILY    cholecalciferol (VITAMIN D3) tablet 1,000 Units  1,000 Units Oral DAILY    levothyroxine (SYNTHROID) tablet 125 mcg  125 mcg Oral ACB    therapeutic multivitamin (THERAGRAN) tablet 1 Tab  1 Tab Oral DAILY    hydrOXYzine HCl (ATARAX) tablet 50 mg  50 mg Oral TID PRN    OLANZapine (ZyPREXA) tablet 5 mg  5 mg Oral DAILY AFTER BREAKFAST    albuterol-ipratropium (DUO-NEB) 2.5 MG-0.5 MG/3 ML  3 mL Nebulization Q6H PRN      SCHEDULED MEDICATIONS:   Current Facility-Administered Medications   Medication Dose Route Frequency    OLANZapine (ZyPREXA) tablet 10 mg  10 mg Oral QHS    sertraline (ZOLOFT) tablet 75 mg  75 mg Oral QPM    busPIRone (BUSPAR) tablet 7.5 mg  7.5 mg Oral BID    aspirin tablet 325 mg  325 mg Oral DAILY    cholecalciferol (VITAMIN D3) tablet 1,000 Units  1,000 Units Oral DAILY    levothyroxine (SYNTHROID) tablet 125 mcg  125 mcg Oral ACB    therapeutic multivitamin (THERAGRAN) tablet 1 Tab  1 Tab Oral DAILY    OLANZapine (ZyPREXA) tablet 5 mg  5 mg Oral DAILY AFTER BREAKFAST          ASSESSMENT & PLAN     DIAGNOSES REQUIRING ACTIVE TREATMENT AND MONITORING: (reviewed/updated 10/28/2018)  Patient Active Hospital Problem List:   The patient, Lachelle Peterson, is a 59 y.o.  female who presents at this time for treatment of the following diagnoses:  Patient C/Angelica Qureshi 1106 Problem List:   Major depression with psychotic features ([facility] [address] (10/27/2018)    Assessment: depression, suicidal ideations anxiety, auditory hallucination     Plan: restarting meds zoloft 25 mg po hs, zyprexa 5 mg po bid, buspar 5 mg po bid. Indiv/milue therapy  Get collaterals, safety, suicidal precautions  10/28/18: Pt was put on 1:! Observation as soon as writer heard about cutting.   Increasing zoloft and zyprexa 10 mg po hs., indiv milue therapy, suicide precautions, check for contrabands            I will continue to monitor blood levels (Depakote, Tegretol, lithium, clozapine---a drug with a narrow therapeutic index= NTI) and associated labs for drug therapy implemented that require intense monitoring for toxicity as deemed appropriate based on current medication side effects and pharmacodynamically determined drug 1/2 lives. In summary, Yelena Kramer, is a 59 y.o.  female who presents with a severe exacerbation of the principal diagnosis of <principal problem not specified>  Patient's condition is orsening/not improving/not stable improving. Patient requires continued inpatient hospitalization for further stabilization, safety monitoring and medication management. I will continue to coordinate the provision of individual, milieu, occupational, group, and substance abuse therapies to address target symptoms/diagnoses as deemed appropriate for the individual patient. A coordinated, multidisplinary treatment team round was conducted with the patient (this team consists of the nurse, psychiatric unit pharmcist,  and writer). Complete current electronic health record for patient has been reviewed today including consultant notes, ancillary staff notes, nurses and psychiatric tech notes. icide risk assessment completed and patient deemed to be of low risk for suicide at this time. The following regarding medications was addressed during rounds with patient:   the risks and benefits of the proposed medication. The patient was given the opportunity to ask questions. Informed consent given to the use of the above medications. Will continue to adjust psychiatric and non-psychiatric medications (see above \"medication\" section and orders section for details) as deemed appropriate & based upon diagnoses and response to treatment. I will continue to order blood tests/labs and diagnostic tests as deemed appropriate and review results as they become available (see orders for details and above listed lab/test results). I will order psychiatric records from previous [facility] hospitals to further elucidate the nature of patient's psychopathology and review once available.     I will gather additional collateral information from riends, family and o/p treatment team to further elucidate the nature of patient's psychopathology and baselline level of psychiatric functioning. I certify that this patient's inpatient psychiatric hospital services furnished since the previous certification were, and continue to be, required for treatment that could reasonably be expected to improve the patient's condition, or for diagnostic study, and that the patient continues to need, on a daily basis, active treatment furnished directly by or requiring the supervision of inpatient psychiatric facility personnel. In addition, the hospital records show that services furnished were intensive treatment services, admission or related services, or equivalent services.     EXPECTED DISCHARGE DATE/DAY: BD     DISPOSITION:[place]       Signed By:   Tiffanie Lynn MD  10/28/2018

## 2018-10-29 NOTE — CONSULTS
Hospitalist Consult Note  Kashif King NP  Phone [phone]  Call physician on-call through the  7pm-7am    Primary Care Provider: Aleksandra Krishnamurthy MD  Source of Information: Patient/Chart    History of Presenting Illness:   Pt was admitted to [facility] psychiatry services under TDO for major depression with psychotic features. Pt had been having SI, auditory hallucinations and visual hallucinations. Reported her mother recently  in April contributing to her depression. She expressed wishes of wanting to die to go to be with her mother and she wants to take her father with her. Hospitalist team initially consulted for medical H&P. Today, we were consulted for abnormal TSH of 0.10. Pt reports she follows up with her PCP for her thyroid medication and indicated she was recently on 137 mcg (unsure of time frame) and admits to not regularly taking her medication. Was able to describe how to take (early am prior to eating breakfast). PMH of bipolar, Asthma, CKD, kidney stone, PUD, hypothyroidism      Review of Systems:  Denies HA. No chest pain (at present says she has this intermittently relate to her anxiety), not associated with pressure. No shortness of breath, no GI complaints.  No N/V    Past Medical History:   Diagnosis Date    Asthma 1967    hospitalized for asthma attack x 2    Chronic kidney disease     kidney stones x 2-3 times    Ill-defined condition     nasal blockage from growth and misalignment - cannot breath out of nose - surgery in the future/cleared for colonoscopy 2014 - will bring in letter    Other ill-defined conditions(539.89)     blood in stool    Other ill-defined conditions(799.89)     hx low BP - 90's/60's-70's    PUD (peptic ulcer disease)     Thyroid disease     Unspecified adverse effect of anesthesia     nasal growth and misalignment and cannot breath through nose      Past Surgical History:   Procedure Laterality Date    HX HEENT      T & A    HX HEENT      turbinate surgery     Prior to Admission medications    Medication Sig Start Date End Date Taking? Authorizing Provider   albuterol (PROVENTIL HFA, VENTOLIN HFA, PROAIR HFA) 90 mcg/actuation inhaler Take 2 Puffs by inhalation every six (6) hours as needed for Wheezing. Yes Provider, Historical   beclomethasone (QVAR) 40 mcg/actuation aero Take 2 Puffs by inhalation two (2) times a day. Yes Provider, Historical   CLONIDINE HCL PO Take 1/2 of 0.1mg tablet QHS   Yes Provider, Historical   busPIRone (BUSPAR) 15 mg tablet Take 15 mg by mouth three (3) times daily. Yes Provider, Historical   QUEtiapine (SEROQUEL) 50 mg tablet Take 50 mg by mouth nightly. Yes Provider, Historical   OLANZapine (ZYPREXA) 20 mg tablet Take 20 mg by mouth nightly. Yes Provider, Historical   hydrOXYzine HCl (ATARAX) 50 mg tablet Take 50 mg by mouth every four (4) hours as needed for Anxiety. Yes Provider, Historical   prazosin (MINIPRESS) 1 mg capsule Take 1 mg by mouth nightly. Yes Provider, Historical   clonazePAM (KLONOPIN) 1 mg tablet Take 1 mg by mouth nightly. Yes Provider, Historical   OLANZapine (ZYPREXA) 5 mg tablet Take 5 mg by mouth daily (after breakfast). Yes Provider, Historical   aspirin (ASPIRIN) 325 mg tablet Take 1 Tab by mouth daily. Indications: myocardial infarction prevention 8/14/18  Yes Prabha Bergeron MD   levothyroxine (SYNTHROID) 125 mcg tablet Take 1 Tab by mouth Daily (before breakfast). Indications: hypothyroidism 8/14/18  Yes Prabha Bergeron MD   sertraline (ZOLOFT) 100 mg tablet Take 2 Tabs by mouth daily (with dinner). Indications: Generalized Anxiety Disorder, major depressive disorder 8/13/18  Yes Prabha Bergeron MD   zolpidem (AMBIEN) 10 mg tablet Take 1 Tab by mouth nightly. Max Daily Amount: 10 mg. Indications: SLEEP-ONSET INSOMNIA 8/13/18  Yes Prabha Bergeron MD   cholecalciferol (VITAMIN D3) 1,000 unit tablet Take 1 Tab by mouth daily. Indications: PREVENTION OF VITAMIN D DEFICIENCY, Vitamin D Deficiency 8/14/18   Neeraj Jack MD   fluticasone [facility]) 50 mcg/actuation nasal spray 2 Sprays by Both Nostrils route daily. Indications: Allergic Rhinitis 8/13/18   Neeraj Jack MD   therapeutic multivitamin (THERA) tablet Take 1 Tab by mouth daily. Provider, Historical   cyanocobalamin, vitamin B-12, (VITAMIN B-12) 5,000 mcg subl 5,000 mcg by SubLINGual route daily. Provider, Historical     Allergies   Allergen Reactions    Other Food Other (comments)     \"Bad chest pain\" with walnuts. Coke - asthma/stuffy head. Fish sticks causes an asthma attack.  Astepro [Azelastine] Other (comments)     Violent headaches.  Bactrim [Sulfamethoprim] Other (comments)     Difficulty breathing, chills, fever.  Banana Other (comments)     Diffculty breathing, wheezing, asthma attack.  Coconut Other (comments)     Difficulty breathing, asthma attack, SOB.  Cortisone Hives     Difficulty breathing.  Nut - Unspecified Other (comments)     \"Bad chest pain\" with walnuts    Peanut Other (comments)     Wheezing, asthma attack, SOB.  Prednisone Hives     Difficulty breathing, kidney stones. CODE STATUS:  DNR    Full xx   Other      Objective:   Physical Exam:   Visit Vitals  /68 (BP 1 Location: Left arm, BP Patient Position: Sitting)   Pulse 69   Temp 98.4 °F (36.9 °C)   Resp 16   Ht 5' 4\" (1.626 m)   Wt 70.3 kg (155 lb)   SpO2 95%   BMI 26.61 kg/m²      O2 Device: Room air    General:  Alert, cooperative, no distress, appears stated age. HEENT:  Normocephalic, atraumatic. EOMs intact. Nares nl. Mucosa nl. No drainage or sinus tenderness. Lips, mucosa, and tongue nl. Teeth and gums nl. Back:   Symmetric, no curvature. ROM nl. No CVA tenderness. Lungs:   No SOB, no accessory muscle use    Abdomen:   Obese. No diarrhea/constipation    Extremities: Cuts to R inner forearm    Skin: Skin color, texture, turgor nl.      Psych: Cooperative, not anxious or agitated. A/O x 3. Neurologic: CNII-XII grossly intact. No focal neurological deficits,  moving all extremities, speech clear    Data Review:   24 Hour Results:  No results found for this or any previous visit (from the past 24 hour(s)). Assessment/Plan     Major depression with psychotic features:  - treatment as per primary team     Self inflicted trauma to R forearm by cutting with a :  - stable, no drainage     Hypothyroidism:  - TSH 0.10 Free T4 1.0 and total T3 pending. Note historically, TSH 0.05-0.08 (7/17-7/28)  - notes from 7/2018 indicate pt was on 125 mcg of synthroid and back in 2014 was taking 137 mcg  - plan: decrease synthroid to 100 mcg daily. She should follow up for lab recheck in 6-8 weeks with her PCP. Hyperlipidemia:   - Chol 326 Ldl 229 Hdl 55 Tg 210  - agree with starting a statin. This was discussed with pt - she prefers to take pravachol versus lipitor. Discussed with pharmacist, changed as per pts request.  - start cardiac diet once finger food restriction is lifted  - baseline liver function normal, should have repeat enzymes in ~ 6 weeks    Hx Asthma  - Qvar daily (or as per pharmacy substitution protocol)  - duo neb prn     CKD III: Cr 1.14, stable      Vitamin D deficiency: c/w supplemenation     Environmental allergies    Thank you for giving us opportunity to participate in this patients care.  Will sign off at this time, please re-consult if there are any further medical management needs or questions         Signed By: Audrey Mike NP     October 29, 2018

## 2018-10-29 NOTE — PROGRESS NOTES
Problem: Depressed Mood (Adult/Pediatric)  Goal: *STG: Complies with medication therapy  Outcome: Progressing Towards Goal  Patient complies with medication therapy.   Patient participated in treatment plan, she stated she continues to have auditory hallucinations, continues to have urge for self harm, remains on 1:1.

## 2018-10-29 NOTE — BH NOTES
1330- pt on 1:1 for safety. Pt alert calm. Depressed. 1430- pt resting in bed. pt on 1:1 for safety. Pt alert calm. Depressed.        Blocked Bed Documentation:    Room number: 726  Type: Behavior  Rationale: Poor Self-Control  Anticipated duration: reassess in 24 hrs  Additional comments:on 1:1 for safety

## 2018-10-29 NOTE — PROGRESS NOTES
Problem: Falls - Risk of  Goal: *Absence of Falls  Document Samanta Fall Risk and appropriate interventions in the flowsheet. Outcome: Progressing Towards Goal  Fall Risk Interventions:  Patient remains fall free since arriving on the Acute Unit. She remains on 1:1 with staff as she cannot contract for safety and follow through. She is currently sleeping, no stress noted.        Mentation Interventions: Adequate sleep, hydration, pain control    Medication Interventions: Teach patient to arise slowly         History of Falls Interventions: Door open when patient unattended

## 2018-10-29 NOTE — BH NOTES
PRN Medication Documentation    Specific patient behavior that led to need for PRN medication: pt pacing unit, starting blankly, reporting increasing anxiety  Staff interventions attempted prior to PRN being given: listening presence, reassurance, decreased stimuli, provided fluids and food  PRN medication given: ativan 1mg po prn at 2033  Patient response/effectiveness of PRN medication: 45 minutes post administration pt reports stil feeling anxious, and reports have a strong urge to harm self, pt does not have a plan, however is tracking room  2126-Dr. Debria Rubinstein paged to inform of above and inquire if any additional medication may assist patient with feelings of harming self  2150-spoke with Dr. Debria Rubinstein regarding patient continuing to verbalize feelings to harm self, reviewed medications received, one time dose of benadryl 25mg IM received  2204-pt readily exposed skin, verbalized understanding of indication for benadryl and received 25mg Im benadryl  2245-pt resting quietly in bed, NAD

## 2018-10-30 PROCEDURE — 74011250637 HC RX REV CODE- 250/637: Performed by: NURSE PRACTITIONER

## 2018-10-30 PROCEDURE — 74011000250 HC RX REV CODE- 250: Performed by: NURSE PRACTITIONER

## 2018-10-30 PROCEDURE — 65220000003 HC RM SEMIPRIVATE PSYCH

## 2018-10-30 PROCEDURE — 74011250637 HC RX REV CODE- 250/637: Performed by: PSYCHIATRY & NEUROLOGY

## 2018-10-30 PROCEDURE — 94640 AIRWAY INHALATION TREATMENT: CPT

## 2018-10-30 RX ORDER — SERTRALINE HYDROCHLORIDE 50 MG/1
100 TABLET, FILM COATED ORAL EVERY EVENING
Status: DISCONTINUED | OUTPATIENT
Start: 2018-10-30 | End: 2018-10-31

## 2018-10-30 RX ORDER — HALOPERIDOL 5 MG/1
5 TABLET ORAL
Status: DISCONTINUED | OUTPATIENT
Start: 2018-10-30 | End: 2018-11-01

## 2018-10-30 RX ADMIN — VITAMIN D, TAB 1000IU (100/BT) 1000 UNITS: 25 TAB at 08:24

## 2018-10-30 RX ADMIN — ASPIRIN 325 MG: 325 TABLET ORAL at 08:24

## 2018-10-30 RX ADMIN — ZOLPIDEM TARTRATE 10 MG: 10 TABLET ORAL at 23:20

## 2018-10-30 RX ADMIN — BUSPIRONE HYDROCHLORIDE 15 MG: 10 TABLET ORAL at 08:24

## 2018-10-30 RX ADMIN — SERTRALINE HYDROCHLORIDE 100 MG: 50 TABLET ORAL at 17:24

## 2018-10-30 RX ADMIN — BUDESONIDE 500 MCG: 0.5 INHALANT RESPIRATORY (INHALATION) at 13:30

## 2018-10-30 RX ADMIN — HALOPERIDOL 5 MG: 5 TABLET ORAL at 21:11

## 2018-10-30 RX ADMIN — BUSPIRONE HYDROCHLORIDE 15 MG: 10 TABLET ORAL at 17:24

## 2018-10-30 RX ADMIN — BUDESONIDE 500 MCG: 0.5 INHALANT RESPIRATORY (INHALATION) at 22:46

## 2018-10-30 RX ADMIN — OLANZAPINE 5 MG: 5 TABLET, FILM COATED ORAL at 08:24

## 2018-10-30 RX ADMIN — HYDROXYZINE HYDROCHLORIDE 50 MG: 50 TABLET, FILM COATED ORAL at 18:38

## 2018-10-30 RX ADMIN — ACETAMINOPHEN 650 MG: 325 TABLET ORAL at 17:24

## 2018-10-30 RX ADMIN — PRAVASTATIN SODIUM 40 MG: 40 TABLET ORAL at 21:11

## 2018-10-30 RX ADMIN — LEVOTHYROXINE SODIUM 100 MCG: 100 TABLET ORAL at 06:21

## 2018-10-30 RX ADMIN — THERA TABS 1 TABLET: TAB at 08:24

## 2018-10-30 NOTE — BH NOTES
PSYCHIATRIC PROGRESS NOTE         Patient Name  Kimberly Burnham   Date of Birth 1960   [facility] [id number]   Medical Record Number  [id number]      Age  59 y.o. PCP Chantelle Curran MD   Admit date:  10/27/2018    Room Number  726/02  @ [facility]   Date of Service  10/30/2018           E & M PROGRESS NOTE:         HISTORY         REASON FOR HOSPITALIZATION:  CC: \". Pt admitted under a temporary senior living order (TDO) [facility] for severe depression with suicidal ideations  proving to be an imminent danger to self and an inability to care for self. HISTORY OF PRESENT ILLNESS:    The patient, Kimberly Burnham, is a 59 y.o. WHITE [place]  female with a past psychiatric history significant for depression, anxiety, psychosis, hearing voices ho presents at this time with complaints of (and/or evidence of) the following emotional symptoms: suicidal thoughts/threats, delusions, psychotic behavior and paranoid behavior. Additional symptomatology include difficulty swallowing and feeling suicidal.  The above symptoms have been present for Pt reports her mother passed away in April and she feels she could have done more, reports she has not been taking her meds for one and half week. Pt was on zoloft 200 at night and zyprexa po bid as per reports. Pt reports she is getting thoughts of cutting herself, she feels responsible for mother's death, pt ha s ntense guilt, decrease appetite, difficulty sleeping, poor eye contact and auditory hallucinations. These symptoms are of intense severity. These symptoms are constant in nature. 10/28/18: As per staff, pt sneaked in X-acto knife while nurse was looking for med lsit from her bag,. Pt made multiple superficial cuts on her rt arm, needing medical consult. Knife was found with pt and wound were treated, no stiches were needed. Pt continues to be dystaymic, having suicidal ideation.   10/29/18: Pt continues to be very anxious and depressed, report suicidal ideations and urges to cut. Pt is on 1:1 observation. Pts cholesterol is 326 and tsh 0.5. Pt slept well. Pt received ativan 1mg twice yesterday and pt also tried to elope yesterday. 10/30/18: Pt reports she slept better. Pt reports she has an appointment with social security. Pt reports she feels little better but still has suicidal thoughts and impulsivity. SIDE EFFECTS: (reviewed/updated 10/30/2018)  None reported or admitted to. No noted toxicity with use of epakote/Tegretol/lithium/Clozaril/TCAs   ALLERGIES:(reviewed/updated 10/30/2018)  Allergies   Allergen Reactions    Other Food Other (comments)     \"Bad chest pain\" with walnuts. Coke - asthma/stuffy head. Fish sticks causes an asthma attack.  Astepro [Azelastine] Other (comments)     Violent headaches.  Bactrim [Sulfamethoprim] Other (comments)     Difficulty breathing, chills, fever.  Banana Other (comments)     Diffculty breathing, wheezing, asthma attack.  Coconut Other (comments)     Difficulty breathing, asthma attack, SOB.  Cortisone Hives     Difficulty breathing.  Nut - Unspecified Other (comments)     \"Bad chest pain\" with walnuts    Peanut Other (comments)     Wheezing, asthma attack, SOB.  Prednisone Hives     Difficulty breathing, kidney stones. MEDICATIONS PRIOR TO ADMISSION:(reviewed/updated 10/30/2018)  Medications Prior to Admission   Medication Sig    albuterol (PROVENTIL HFA, VENTOLIN HFA, PROAIR HFA) 90 mcg/actuation inhaler Take 2 Puffs by inhalation every six (6) hours as needed for Wheezing.  beclomethasone (QVAR) 40 mcg/actuation aero Take 2 Puffs by inhalation two (2) times a day.  CLONIDINE HCL PO Take 1/2 of 0.1mg tablet QHS    busPIRone (BUSPAR) 15 mg tablet Take 15 mg by mouth three (3) times daily.  QUEtiapine (SEROQUEL) 50 mg tablet Take 50 mg by mouth nightly.  OLANZapine (ZYPREXA) 20 mg tablet Take 20 mg by mouth nightly.     hydrOXYzine HCl (ATARAX) 50 mg tablet Take 50 mg by mouth every four (4) hours as needed for Anxiety.  prazosin (MINIPRESS) 1 mg capsule Take 1 mg by mouth nightly.  clonazePAM (KLONOPIN) 1 mg tablet Take 1 mg by mouth nightly.  OLANZapine (ZYPREXA) 5 mg tablet Take 5 mg by mouth daily (after breakfast).  aspirin (ASPIRIN) 325 mg tablet Take 1 Tab by mouth daily. Indications: myocardial infarction prevention    levothyroxine (SYNTHROID) 125 mcg tablet Take 1 Tab by mouth Daily (before breakfast). Indications: hypothyroidism    sertraline (ZOLOFT) 100 mg tablet Take 2 Tabs by mouth daily (with dinner). Indications: Generalized Anxiety Disorder, major depressive disorder    zolpidem (AMBIEN) 10 mg tablet Take 1 Tab by mouth nightly. Max Daily Amount: 10 mg. Indications: SLEEP-ONSET INSOMNIA    cholecalciferol (VITAMIN D3) 1,000 unit tablet Take 1 Tab by mouth daily. Indications: PREVENTION OF VITAMIN D DEFICIENCY, Vitamin D Deficiency    fluticasone (FLONASE) 50 mcg/actuation nasal spray 2 Sprays by Both Nostrils route daily. Indications: Allergic Rhinitis    therapeutic multivitamin (THERA) tablet Take 1 Tab by mouth daily.  cyanocobalamin, vitamin B-12, (VITAMIN B-12) 5,000 mcg subl 5,000 mcg by SubLINGual route daily. PAST MEDICAL HISTORY: Past medical history from the initial psychiatric evaluation has been reviewed (reviewed/updated 10/30/2018) with no additional updates (I asked patient and no additional past medical history provided).    Past Medical History:   Diagnosis Date    Asthma 1967    hospitalized for asthma attack x 2    Chronic kidney disease     kidney stones x 2-3 times    Ill-defined condition     nasal blockage from growth and misalignment - cannot breath out of nose - surgery in the future/cleared for colonoscopy 7/31/2014 - will bring in letter    Other ill-defined conditions(799.89)     blood in stool    Other ill-defined conditions(799.89)     hx low BP - 90's/60's-70's    PUD (peptic ulcer disease)     Thyroid disease     Unspecified adverse effect of anesthesia     nasal growth and misalignment and cannot breath through nose     Past Surgical History:   Procedure Laterality Date    HX HEENT      T & A    HX HEENT      turbinate surgery      SOCIAL HISTORY: Social history from the initial psychiatric evaluation has been reviewed (reviewed/updated 10/30/2018) with no additional updates (I asked patient and no additional social history provided). Social History     Socioeconomic History    Marital status: SINGLE     Spouse name: Not on file    Number of children: Not on file    Years of education: Not on file    Highest education level: Not on file   Social Needs    Financial resource strain: Not on file    Food insecurity - worry: Not on file    Food insecurity - inability: Not on file    Transportation needs - medical: Not on file   [employer] needs - non-medical: Not on file   Occupational History    Not on file   Tobacco Use    Smoking status: Never Smoker    Smokeless tobacco: Never Used   Substance and Sexual Activity    Alcohol use: No    Drug use: No    Sexual activity: Not Currently   Other Topics Concern     Service Not Asked    Blood Transfusions Not Asked    Caffeine Concern Not Asked    Occupational Exposure Not Asked    Hobby Hazards Not Asked    Sleep Concern Not Asked    Stress Concern Not Asked    Weight Concern Not Asked    Special Diet Not Asked    Back Care Not Asked    Exercise Not Asked    Bike Helmet Not Asked   [address] Not Asked    Self-Exams Not Asked   Social History Narrative    Not on file      FAMILY HISTORY: Family history from the initial psychiatric evaluation has been reviewed (reviewed/updated 10/30/2018) with no additional updates (I asked patient and no additional family history provided).    Family History   Problem Relation Age of Onset    Stroke Mother     Other Mother         erosion of esophagus    Cancer Mother melanoma/squamous    Heart Surgery Father         x2    Delayed Awakening Father     Pacemaker Father     Other Father         carotid endartarectomy/polio    Cataract Father        REVIEW OF SYSTEMS: (reviewed/updated 10/30/2018)  Appetite:good   Sleep: improved   All other Review of Systems: Negative except   Multiple cuts on rt arm       [address] (MSE):    MSE FINDINGS ARE WITHIN NORMAL LIMITS (WNL) UNLESS OTHERWISE STATED BELOW. ( ALL OF THE BELOW CATEGORIES OF THE MSE HAVE BEEN REVIEWED (reviewed 10/27/2018) AND UPDATED AS DEEMED APPROPRIATE )  General Presentation age appropriate and disheveled, evasive and guarded   Orientation oriented to time, place and person   Vital Signs  See below (reviewed 10/27/2018); Vital Signs (BP, Pulse, & Temp) are within normal limits if not listed below.    Gait and Station Stable/steady, no ataxia   Musculoskeletal System No extrapyramidal symptoms (EPS); no abnormal muscular movements or Tardive Dyskinesia (TD); muscle strength and tone are within normal limits   Language No aphasia or dysarthria   Speech:  soft   Thought Processes logical; slow rate of thoughts; poor abstract reasoning/computation   Thought Associations blocked    Thought Content paranoid delusions   Suicidal Ideations intention   Homicidal Ideations no plan  and no intention   Mood:  depressed and anhedonia   Affect:  constricted   Memory recent  intact   Memory remote:  intact   Concentration/Attention:  distractable   Fund of Knowledge average   Insight:  poor   Reliability poor   Judgment:  poor                      VITALS:     Patient Vitals for the past 24 hrs:   Temp Pulse Resp BP SpO2   10/30/18 0817 98.8 °F (37.1 °C) 69 16 104/68 95 %   10/29/18 2134 -- -- -- -- 96 %   10/29/18 2103 97.9 °F (36.6 °C) 66 16 107/69 96 %   10/29/18 1554 98.4 °F (36.9 °C) 69 16 110/68 95 %   10/29/18 1200 97.8 °F (36.6 °C) 86 16 123/82 --     Wt Readings from Last 3 Encounters:   10/27/18 70.3 kg (155 lb)   07/27/18 72.6 kg (160 lb)   07/16/18 72.6 kg (160 lb)     Temp Readings from Last 3 Encounters:   10/30/18 98.8 °F (37.1 °C)   08/13/18 98.5 °F (36.9 °C)   07/26/18 98 °F (36.7 °C)     BP Readings from Last 3 Encounters:   10/30/18 104/68   08/13/18 91/53   07/26/18 108/71     Pulse Readings from Last 3 Encounters:   10/30/18 69   08/13/18 (!) 57   07/26/18 62            DATA     LABORATORY DATA:(reviewed/updated 10/30/2018)  No results found for this or any previous visit (from the past 24 hour(s)). No results found for: VALF2, VALAC, VALP, VALPR, DS6, CRBAM, CRBAMP, CARB2, XCRBAM  No results found for: LITHM   RADIOLOGY REPORTS:(reviewed/updated 10/30/2018)  No results found.        MEDICATIONS     ALL MEDICATIONS:   Current Facility-Administered Medications   Medication Dose Route Frequency    haloperidol (HALDOL) tablet 2 mg  2 mg Oral QHS    busPIRone (BUSPAR) tablet 15 mg  15 mg Oral BID    levothyroxine (SYNTHROID) tablet 100 mcg  100 mcg Oral ACB    pravastatin (PRAVACHOL) tablet 40 mg  40 mg Oral QHS    budesonide (PULMICORT) 500 mcg/2 ml nebulizer suspension  500 mcg Nebulization BID RT    sertraline (ZOLOFT) tablet 75 mg  75 mg Oral QPM    ziprasidone (GEODON) 20 mg in sterile water (preservative free) 1 mL injection  20 mg IntraMUSCular BID PRN    OLANZapine (ZyPREXA) tablet 5 mg  5 mg Oral Q6H PRN    benztropine (COGENTIN) tablet 2 mg  2 mg Oral BID PRN    benztropine (COGENTIN) injection 2 mg  2 mg IntraMUSCular BID PRN    LORazepam (ATIVAN) injection 2 mg  2 mg IntraMUSCular Q4H PRN    LORazepam (ATIVAN) tablet 1 mg  1 mg Oral Q4H PRN    zolpidem (AMBIEN) tablet 10 mg  10 mg Oral QHS PRN    acetaminophen (TYLENOL) tablet 650 mg  650 mg Oral Q4H PRN    ibuprofen (MOTRIN) tablet 400 mg  400 mg Oral Q8H PRN    magnesium hydroxide (MILK OF MAGNESIA) 400 mg/5 mL oral suspension 30 mL  30 mL Oral DAILY PRN    nicotine (NICODERM CQ) 21 mg/24 hr patch 1 Patch  1 Patch TransDERmal DAILY PRN    aspirin tablet 325 mg  325 mg Oral DAILY    cholecalciferol (VITAMIN D3) tablet 1,000 Units  1,000 Units Oral DAILY    therapeutic multivitamin (THERAGRAN) tablet 1 Tab  1 Tab Oral DAILY    hydrOXYzine HCl (ATARAX) tablet 50 mg  50 mg Oral TID PRN    OLANZapine (ZyPREXA) tablet 5 mg  5 mg Oral DAILY AFTER BREAKFAST    albuterol-ipratropium (DUO-NEB) 2.5 MG-0.5 MG/3 ML  3 mL Nebulization Q6H PRN      SCHEDULED MEDICATIONS:   Current Facility-Administered Medications   Medication Dose Route Frequency    haloperidol (HALDOL) tablet 2 mg  2 mg Oral QHS    busPIRone (BUSPAR) tablet 15 mg  15 mg Oral BID    levothyroxine (SYNTHROID) tablet 100 mcg  100 mcg Oral ACB    pravastatin (PRAVACHOL) tablet 40 mg  40 mg Oral QHS    budesonide (PULMICORT) 500 mcg/2 ml nebulizer suspension  500 mcg Nebulization BID RT    sertraline (ZOLOFT) tablet 75 mg  75 mg Oral QPM    aspirin tablet 325 mg  325 mg Oral DAILY    cholecalciferol (VITAMIN D3) tablet 1,000 Units  1,000 Units Oral DAILY    therapeutic multivitamin (THERAGRAN) tablet 1 Tab  1 Tab Oral DAILY    OLANZapine (ZyPREXA) tablet 5 mg  5 mg Oral DAILY AFTER BREAKFAST          ASSESSMENT & PLAN     DIAGNOSES REQUIRING ACTIVE TREATMENT AND MONITORING: (reviewed/updated 10/30/2018)  Patient Active Hospital Problem List:   The patient, Irvin Saavedra, is a 59 y.o.  female who presents at this time for treatment of the following diagnoses:  Patient Active Hospital Problem List:   Major depression with psychotic features ([facility] (10/27/2018)    Assessment: depression, suicidal ideations anxiety, auditory hallucination     Plan: restarting meds zoloft 25 mg po hs, zyprexa 5 mg po bid, buspar 5 mg po bid. Indiv/milue therapy  Get collaterals, safety, suicidal precautions  10/28/18: Pt was put on 1:! Observation as soon as writer heard about cutting.   Increasing zoloft and zyprexa 10 mg po hs., indiv milue therapy, suicide precautions, check for contrabands  10/29/18: high cholesterol, will change zyprexa to haldol first generation antipsychotic., increasing buspar 15 mg po bid  Hyprelipidemia: starting atrovastatin 20 mg daily  10/30/19, stop zyprexa, increase haldol 5 mg po hs, plan on starting decoanate, increasing zoloft 100mg po hs. I will continue to monitor blood levels (Depakote, Tegretol, lithium, clozapine---a drug with a narrow therapeutic index= NTI) and associated labs for drug therapy implemented that require intense monitoring for toxicity as deemed appropriate based on current medication side effects and pharmacodynamically determined drug 1/2 lives. In summary, Lisa Linares, is a 59 y.o.  female who presents with a severe exacerbation of the principal diagnosis of Major depression with psychotic features ([facility] [address]  Patient's condition is orsening/not improving/not stable improving. Patient requires continued inpatient hospitalization for further stabilization, safety monitoring and medication management. I will continue to coordinate the provision of individual, milieu, occupational, group, and substance abuse therapies to address target symptoms/diagnoses as deemed appropriate for the individual patient. A coordinated, multidisplinary treatment team round was conducted with the patient (this team consists of the nurse, psychiatric unit pharmcist,  and writer). Complete current electronic health record for patient has been reviewed today including consultant notes, ancillary staff notes, nurses and psychiatric tech notes. icide risk assessment completed and patient deemed to be of low risk for suicide at this time. The following regarding medications was addressed during rounds with patient:   the risks and benefits of the proposed medication. The patient was given the opportunity to ask questions. Informed consent given to the use of the above medications.  Will continue to adjust psychiatric and non-psychiatric medications (see above \"medication\" section and orders section for details) as deemed appropriate & based upon diagnoses and response to treatment. I will continue to order blood tests/labs and diagnostic tests as deemed appropriate and review results as they become available (see orders for details and above listed lab/test results). I will order psychiatric records from previous [facility] hospitals to further elucidate the nature of patient's psychopathology and review once available. I will gather additional collateral information from riends, family and o/p treatment team to further elucidate the nature of patient's psychopathology and baselline level of psychiatric functioning. I certify that this patient's inpatient psychiatric hospital services furnished since the previous certification were, and continue to be, required for treatment that could reasonably be expected to improve the patient's condition, or for diagnostic study, and that the patient continues to need, on a daily basis, active treatment furnished directly by or requiring the supervision of inpatient psychiatric facility personnel. In addition, the hospital records show that services furnished were intensive treatment services, admission or related services, or equivalent services.     EXPECTED DISCHARGE DATE/DAY: BD     DISPOSITION:ome       Signed By:   Slime Ramos MD  10/30/2018

## 2018-10-30 NOTE — BH NOTES
0715 pt. On 1:1 observation with staff   Resting in bed  0815 pt. On 1:1 observation  Awake in bed  Asking to use the phone to call social security  Informed  Pt. Will need to ask Dr. Yi Guerrero today as that is not a privilege when on 1:1 \"okay\"  Blocked Bed Documentation:    Room number: 726  Type: Behavior  Rationale: Impulsive  Anticipated duration:  Re evaluate daily    Additional comments: pt. Unable to contract for safety    0915 pt. Remains on 1:1 observation  Resting in bed  1015  Pt. On 1:1 observation  In her room resting   1030  Pt. On 1:1 with staff   Pt. Met with Dr. Yi Guerrero in treatment team discussed having a meeting with [employer] tomorrow   Informed staff will notify her  with [address] to get in touch with SS    \"okay\"  Pt. states she continues to have some suicidal  ideation  1100 pt. On 1:1 observation   Out in day area    Participating In group  1200 pt. On 1:1 observation ate 25% of her lunch  Watching Jeopardy   1300 pt. On 1:1 observation with staff in day area  Annette interaction with peers   1330 pt. On 1:1 observation in her bed resting  Did not attend process group  1415 pt.  On 1:1 observation with staff  Resting in bed

## 2018-10-30 NOTE — INTERDISCIPLINARY ROUNDS
Behavioral Health Interdisciplinary Rounds     Patient Name: Joli Mcardle  Age: 59 y.o. Room/Bed:  726/02  Primary Diagnosis: Major depression with psychotic features ([facility]   Admission Status: Involuntary Commitment     Readmission within 30 days: no  Power of  in place: no  Patient requires a blocked bed: yes          Reason for blocked bed: pt on 1:1    VTE Prophylaxis: Not indicated    Mobility needs/Fall risk: no  Flu Vaccine : yes - PTA  Nutritional Plan: no  Consults: no         Labs/Testing due today?: no    Sleep hours: 8.0       Participation in Care/Groups:  yes  Medication Compliant?: Yes  PRNS (last 24 hours): Antipsychotic (IM) and Antianxiety    Restraints (last 24 hours):  no     CIWA (range last 24 hours):     COWS (range last 24 hours):      Alcohol screening (AUDIT) completed -   AUDIT Score: 0     If applicable, date SBIRT discussed in treatment team AND documented:     Tobacco - patient is a smoker: Have You Used Tobacco in the Past 30 Days: No  Illegal Drugs use: Have You Used Any Illegal Substances Over the Past 12 Months: No    24 hour chart check complete: yes     Patient goal(s) for today:   Treatment team focus/goals: Plan to titrate her medications   Progress note : She still feels unsafe at times and continues to be on 1:1 due to her suicidal thoughts     LOS:  3  Expected LOS: TBD    Financial concerns/prescription coverage:  Erica SOTO   Date of last family contact:       Family requesting physician contact today:    Discharge plan:  She will return home when ready for discharge  Guns in the home:  no       Outpatient provider(s): Damaso Limon     Participating treatment team members: Joli Mcardle, Valencia Sage Dr. Leeta Pyle - Madelynn Bye -

## 2018-10-30 NOTE — BH NOTES
Patient continues to report feeling unsafe, and not trusting self, pt is tracking room, elizabeth Zhang informed patient reports impulsive regarding hurting herself, reviewed medications received.  Ordered obtained for benadryl 25mg IM one time order, and give standing geodon 20mg IM  2103-pt readily exposed skin, verbalizing understanding of indication for Geodon and benadryl IM

## 2018-10-30 NOTE — BH NOTES
GROUP THERAPY PROGRESS NOTE    Nicole Arita participated in the Acute Unit's afternoon Process Group with a focus   on identifying feelings, planning for the rest of the day, and preparing for discharge. Group time: 50 minutes. Personal goal for participation: To increase the capacity to improve ones mood and structure. Goal orientation: The patient will be able to identify their feelings, develop a plan for structuring their day, and discharge planning. Group therapy participation: With prompting, this patient partially participated in the group. Therapeutic interventions reviewed and discussed: The group members were asked to introduce   themselves to each other and to see if they could identify an emotion they are having and/or let the group know what they want to focus on for the day as they continue to make discharge plans. Impression of participation: The patient joined the group in the last fifteen minutes. She almost immediately joined in the group's conversation around alcoholism and described how she had given up alcohol several years ago on her own. She also shared part of her own experience and knowledge about college in response to a peer who was looking to attend a local community college. She was alert and generally oriented. She expressed no current SI/HI and displayed no overt psychotic symptoms in this group. Her affect was slightly less depressed and anxious than she was reported to have been on admission. Her mood reflected her affect. She appeared to be continuing to expand her coping skills and work on the plans and her needs for aftercare.

## 2018-10-30 NOTE — BH NOTES
1530 received pt resting quietly in bed. Remains 1: 1 with staff. 1630 pt up in room sitting quietly. Remains 1:1 with staff. 1715 pt is in room eating dinner. Remains 1:1 with staff. 1800 pt sitting quietly in room. Remains 1:1 with staff. 1900 pt sitting out in milieu with peers watching tv. Remains 1:1 with staff. 2000 pt resting quietly in bed. Remains 1:1 with staff. 2100 pt resting quietly in bed. Remains 1:1 with staff.    2200 pt resting quietly in bed. Remains 1:1 with staff.

## 2018-10-30 NOTE — BH NOTES
Blocked Bed Documentation:    Room number: 726  Type: Behavior  Rationale: Poor Self-Control  Anticipated duration: reassess in 24 hrs  Additional comments:1:1 for safety

## 2018-10-30 NOTE — PROGRESS NOTES
Problem: Depressed Mood (Adult/Pediatric)  Goal: *STG: Remains safe in hospital  Outcome: Progressing Towards Goal   Affect flat and depressed. Occasionally expressing she doesn't \"feel safe\". Seen at times tracking room when out in milieu with peers. Remains 1:1 with staff.

## 2018-10-30 NOTE — BH NOTES
GROUP THERAPY PROGRESS NOTE    Zofia Geraldos is participating in Reflections. Group time: 15 minutes    Personal goal for participation: unit orientation and daily progress    Goal orientation: personal    Group therapy participation: passive    Therapeutic interventions reviewed and discussed: yes    Impression of participation: Seems in fair spirits. Remains on 1:1 observation for safety.

## 2018-10-30 NOTE — BH NOTES
Blocked Bed Documentation:    Room number: 728  Type: Behavior  Rationale: Poor Self-Control  Anticipated duration: reassess in 24 hrs  Additional comments:1:1 for safety

## 2018-10-30 NOTE — PROGRESS NOTES
Problem: Falls - Risk of  Goal: *Absence of Falls  Document Samanta Fall Risk and appropriate interventions in the flowsheet. Outcome: Progressing Towards Goal  Fall Risk Interventions:   non slip socks  Bed in low position    Mentation Interventions: Adequate sleep, hydration, pain control    Medication Interventions: Teach patient to arise slowly, Patient to call before getting OOB    Elimination Interventions: Patient to call for help with toileting needs    History of Falls Interventions: Door open when patient unattended        Problem: Depressed Mood (Adult/Pediatric)  Goal: *STG: Participates in treatment plan  Outcome: Not Progressing Towards Goal  Pt. Met with Dr. Randee Hurst in treatment team     Remains on 1:1 observation  States feeling some better   Voices continue    Discussed her meeting tomorrow with Social security   Less suicidal ideation today  Goal: *STG: Attends activities and groups  Outcome: Progressing Towards Goal  Attending select groups  Minimal interaction   Goal: *STG: Complies with medication therapy  Outcome: Progressing Towards Goal  Medication compliant   Reviewed in treatment team   Increasing zoloft  Goal: Interventions  Outcome: Not Progressing Towards Goal  Will continue to monitor  On 15 min.  Checks for safety  Assess depression    Hopeless   Medication compliance  Effectiveness  Encourage groups  Assess need for 1:1

## 2018-10-31 LAB — T3 SERPL-MCNC: 84 NG/DL (ref 71–180)

## 2018-10-31 PROCEDURE — 74011250637 HC RX REV CODE- 250/637: Performed by: PSYCHIATRY & NEUROLOGY

## 2018-10-31 PROCEDURE — 65220000003 HC RM SEMIPRIVATE PSYCH

## 2018-10-31 PROCEDURE — 94640 AIRWAY INHALATION TREATMENT: CPT

## 2018-10-31 PROCEDURE — 74011000250 HC RX REV CODE- 250: Performed by: NURSE PRACTITIONER

## 2018-10-31 PROCEDURE — 74011250637 HC RX REV CODE- 250/637: Performed by: NURSE PRACTITIONER

## 2018-10-31 RX ORDER — SERTRALINE HYDROCHLORIDE 50 MG/1
150 TABLET, FILM COATED ORAL EVERY EVENING
Status: DISCONTINUED | OUTPATIENT
Start: 2018-10-31 | End: 2018-11-02

## 2018-10-31 RX ADMIN — ASPIRIN 325 MG: 325 TABLET ORAL at 08:15

## 2018-10-31 RX ADMIN — LEVOTHYROXINE SODIUM 100 MCG: 100 TABLET ORAL at 06:26

## 2018-10-31 RX ADMIN — BUSPIRONE HYDROCHLORIDE 15 MG: 10 TABLET ORAL at 17:05

## 2018-10-31 RX ADMIN — HALOPERIDOL 5 MG: 5 TABLET ORAL at 21:12

## 2018-10-31 RX ADMIN — THERA TABS 1 TABLET: TAB at 08:15

## 2018-10-31 RX ADMIN — BUSPIRONE HYDROCHLORIDE 15 MG: 10 TABLET ORAL at 08:15

## 2018-10-31 RX ADMIN — BUDESONIDE 500 MCG: 0.5 INHALANT RESPIRATORY (INHALATION) at 11:27

## 2018-10-31 RX ADMIN — PRAVASTATIN SODIUM 40 MG: 40 TABLET ORAL at 21:12

## 2018-10-31 RX ADMIN — VITAMIN D, TAB 1000IU (100/BT) 1000 UNITS: 25 TAB at 08:15

## 2018-10-31 RX ADMIN — ZOLPIDEM TARTRATE 10 MG: 10 TABLET ORAL at 21:13

## 2018-10-31 RX ADMIN — SERTRALINE HYDROCHLORIDE 150 MG: 50 TABLET ORAL at 17:05

## 2018-10-31 RX ADMIN — LORAZEPAM 1 MG: 1 TABLET ORAL at 16:14

## 2018-10-31 NOTE — BH NOTES
2330 Pt appears asleep in bed. Respirations even and unlabored. Remains on 1:1 for continuous observation. 0030 Pt appears asleep. Remains on 1:1 for continuous observation. 0130 Pt appears. Remains on 1:1 for continuous observation. 0230 Pt resting quietly. Remains on 1:1 for continuous observation. 0330 Pt resting quietly. Remains on 1:1 for continuous observation. 0430 Pt resting quietly. Remains on 1:1 for continuous observation. 0530 Pt resting quietly. Remains on 1:1 for continuous observation. 0630 Pt resting quietly. Remains on 1:1 for continuous observation.

## 2018-10-31 NOTE — BH NOTES
Blocked Bed Documentation:    Room number: 728/2  Type: Behavior  Rationale: Impulsive  Anticipated duration: re-assess pt in 24 hrs  Additional comments:

## 2018-10-31 NOTE — BH NOTES
PRN Medication Documentation    Specific patient behavior that led to need for PRN medication: difficulty sleeping  Staff interventions attempted prior to PRN being given:decreased stimuli, repositioning  PRN medication given:PO Ambien  Patient response/effectiveness of PRN medication: pt resting quietly in bed.

## 2018-10-31 NOTE — PROGRESS NOTES
Problem: Falls - Risk of  Goal: *Absence of Falls  Document Samanta Fall Risk and appropriate interventions in the flowsheet. Outcome: Progressing Towards Goal  Fall Risk Interventions  Mentation Interventions: Adequate sleep, hydration, pain control  Medication Interventions: Teach patient to arise slowly, Patient to call before getting OOB  Elimination Interventions: Patient to call for help with toileting needs  History of Falls Interventions: Door open when patient unattended    2330 Pt resting quietly in bed. Given Ambien to promote rest. No other needs expressed. Remains on continuous 1:1 observation. 0030 Pt appears asleep in bed. Respirations even and unlabored. Remains on continuous 1:1 observation. 0130 Pt appears asleep in bed. Remains on continuous 1:1 observation. 0230 Pt appears asleep in bed. Remains on continuous 1:1 observation. 0330 Pt appears asleep in bed. Remains on continuous 1:1 observation. 0430 Pt appears asleep in bed. Remains on continuous 1:1 observation. 0530 Pt appears asleep in bed. Remains on continuous 1:1 observation. 0630 Pt appears asleep in bed. Remains on continuous 1:1 observation.

## 2018-10-31 NOTE — BH NOTES
GROUP THERAPY PROGRESS NOTE    Lisa Linares did not participate in a 50 minute Acute Unit Process Group, with a focus identifying feelings, planning for the rest of the day, and discussing discharge.

## 2018-10-31 NOTE — BH NOTES
0715 pt. On 1:1 observation  With staff  Resting in bed  0815 pt. On 1:1 observation with staff  Laying in bed  Refusing breakfast  0915  Pt. On 1:1 observation with staff   Resting in bed   23155 pt. On 1:1 observation  With staff   21  pt. On 1:1 observation  Met with Dr. Emanuel Sender  Poor eye contact  Disheveled appearance  Pt. Discussed social security meeting to apply for SSI   States continues not feel safe  Unable to contract for safety  Plan to allow 2 supervised phone calls today to her father and  Social security  Plan to continue 1:1 observation to attend groups and shower today   1115 pt. On 1:1 observation  Siting in day area  1215 pt.  On 1:1 observation  Ate small amount of lunch   1315 on 1:1 with staff  In bed resting   1415 on 1:1 observation with staff  In bed resting

## 2018-10-31 NOTE — BH NOTES
Blocked Bed Documentation:    Room number: 726/A  Type: Behavior  Rationale: Poor Self-Control  Anticipated duration: Reassess in 24 hours  Additional comments: Staff present. 1:1 continues for safety.

## 2018-10-31 NOTE — INTERDISCIPLINARY ROUNDS
Behavioral Health Interdisciplinary Rounds     Patient Name: Delroy Jimenez  Age: 59 y.o. Room/Bed:  726/02  Primary Diagnosis: Major depression with psychotic features ([facility] [address]   Admission Status: Involuntary Commitment     Readmission within 30 days: no  Power of  in place: no  Patient requires a blocked bed: yes          Reason for blocked bed: Pt on 1:1 for self harm    VTE Prophylaxis: Not indicated    Mobility needs/Fall risk: no  Flu Vaccine : yes- PTA  Nutritional Plan: no  Consults: no         Labs/Testing due today?: no    Sleep hours: 9.0       Participation in Care/Groups:  yes  Medication Compliant?: Yes  PRNS (last 24 hours): Antianxiety, Sleep Aid and Pain    Restraints (last 24 hours):  no     CIWA (range last 24 hours):     COWS (range last 24 hours):      Alcohol screening (AUDIT) completed -   AUDIT Score: 0     If applicable, date SBIRT discussed in treatment team AND documented:     Tobacco - patient is a smoker: Have You Used Tobacco in the Past 30 Days: No  Illegal Drugs use: Have You Used Any Illegal Substances Over the Past 12 Months: No    24 hour chart check complete: yes     Patient goal(s) for today:   Treatment team focus/goals: Plan to titrate her medications. She will call her father , SS and take a shower. Progress note : She reports she is still hearing voices and continues to have thoughts to cut herself. LOS:  4  Expected LOS: TBD     Financial concerns/prescription coverage:  BRITTANY   Date of last family contact:       Family requesting physician contact today:    Discharge plan: she will return home   Guns in the home:  no     Outpatient provider(s): [facility]     Participating treatment team members: Devan Shin, DOROTHY - Guadalupe Thomas, PharmD.

## 2018-10-31 NOTE — WOUND CARE
WOCN Note:     New consult placed by RN for lacerations to arm. Chart shows:  Admitted with temporary detaining order, depression, suicidal ideation    Assessment:   Patient is communicative, continent and mobile. Sitter in room. Patient reports no pain. Right forearm with multiple, drying, linear cuts some of which have surgical glue. There is no exudate or erythema. No need for dressing at this time. Keep arm dry. Discussed above plan with patient & RN.     Transition of Care: Plan to follow weekly and as needed while admitted to hospital.     AMAN HuertaN, RN, [facility] [place]  Certified Wound, Ostomy, Continence Nurse  office [phone]  pager [phone] or call  to page

## 2018-10-31 NOTE — PROGRESS NOTES
Problem: Falls - Risk of  Goal: *Absence of Falls  Document Samanta Fall Risk and appropriate interventions in the flowsheet. Outcome: Progressing Towards Goal  Fall Risk Interventions:   non slip socks  Bed susan low position    Mentation Interventions: Adequate sleep, hydration, pain control    Medication Interventions: Teach patient to arise slowly    Elimination Interventions: Patient to call for help with toileting needs    History of Falls Interventions: Door open when patient unattended        Problem: Depressed Mood (Adult/Pediatric)  Goal: *STG: Participates in treatment plan  Outcome: Not Progressing Towards Goal  Pt. Met in treatment team with Dr. Rigo Danielle  Poor eye contact  Remains on 1:1 observation     Unable to contract for safety  Discussed having 2 phone calls today to father and Social security office     To attend groups  And shower  No response from pt. Goal: *STG: Attends activities and groups  Outcome: Progressing Towards Goal  Attending select groups with encouragement  Goal: *STG: Complies with medication therapy  Outcome: Progressing Towards Goal  Medication compliant   Reviewed in treatment team  Increasing zoloft  Goal: Interventions  Outcome: Not Progressing Towards Goal  Will continue to monitor  On 1:1 observation    Assess depression   Suicidal ideation   Medication  Compliance  Effectiveness  Encourage shower  groups    Blocked Bed Documentation:    Room number: 7262  Type: Behavior  Rationale: Poor Self-Control  Anticipated duration: assess tomorrow  Additional comments: pt.  Refuses to contract for safety

## 2018-10-31 NOTE — BH NOTES
PRN Medication Documentation    Specific patient behavior that led to need for PRN medication: pt reporting increasing anxiety and requesting medication for this  Staff interventions attempted prior to PRN being given: decreased stimuli, offered fluids and food, reassurance  PRN medication given: ativan 1mg po prn at 1614  Patient response/effectiveness of PRN medication: 45 minutes post administration patient ate dinner with peers, and now lay quietly in bed. NAD    PRN Medication Documentation    Specific patient behavior that led to need for PRN medication: pt requesting Ambien for sleep  Staff interventions attempted prior to PRN being given: listening presence, reassurance, provided food and fludis  PRN medication given: Ambien po prn at 2113  Patient response/effectiveness of PRN medication: 45 minutes post administration pt lay quietly in bed.  NAD

## 2018-10-31 NOTE — PROGRESS NOTES
Problem: Depressed Mood (Adult/Pediatric)  Goal: *STG: Attends activities and groups  Outcome: Progressing Towards Goal  1515 - Patient is resting quietly in bed. Appears to be asleep. No respiratory distress noted. Patient remains on 1:1.  Staff present. Will continue to monitor. 1615 - Patient remains on 1:1 for safety. Staff present. 1715 - Patient remains on 1:1.  Staff present. Consumed 75% of her dinner tray. Will continue to monitor. 1815 - 1:1 continues. Patient is resting quietly in bed. Alert and calm. No distress noted. 1915 - Patient remains on 1:1. Ambulating in the hallway. No distress noted. Staff present. 2015 - Patient is resting quietly in bed. 1:1 continues. Awake and alert. Staff present. Will continue to monitor. 2115 - Patient remains on 1:1.  Staff present. No distress noted. Will continue to monitor. 2215 - Patient is resting quietly in bed. 1:1 continues. Staff present. Offered snacks, liquids and toileting. Patient refused. Will continue to monitor.

## 2018-10-31 NOTE — BH NOTES
PRN Medication Documentation    Specific patient behavior that led to need for PRN medication: Patient complained of back pain  Staff interventions attempted prior to PRN being given: Offered PRN meds  PRN medication given: Tylenol 650 mg PO   Patient response/effectiveness of PRN medication: Will continue to monitor.

## 2018-10-31 NOTE — BH NOTES
PSYCHIATRIC PROGRESS NOTE         Patient Name  Basil Peterson   Date of Birth 1960   [facility] [id number]   Medical Record Number  [id number]      Age  59 y.o. PCP Bianca Mcfarland MD   Admit date:  10/27/2018    Room Number  726/02  @ [facility]   Date of Service  10/31/2018           E & M PROGRESS NOTE:         HISTORY         REASON FOR HOSPITALIZATION:  CC: \". Pt admitted under a temporary [facility] order (TDO) [place] for severe depression with suicidal ideations  proving to be an imminent danger to self and an inability to care for self. HISTORY OF PRESENT ILLNESS:    The patient, Basil Peterson, is a 59 y.o. WHITE [place]  female with a past psychiatric history significant for depression, anxiety, psychosis, hearing voices ho presents at this time with complaints of (and/or evidence of) the following emotional symptoms: suicidal thoughts/threats, delusions, psychotic behavior and paranoid behavior. Additional symptomatology include difficulty swallowing and feeling suicidal.  The above symptoms have been present for Pt reports her mother passed away in April and she feels she could have done more, reports she has not been taking her meds for one and half week. Pt was on zoloft 200 at night and zyprexa po bid as per reports. Pt reports she is getting thoughts of cutting herself, she feels responsible for mother's death, pt ha s ntense guilt, decrease appetite, difficulty sleeping, poor eye contact and auditory hallucinations. These symptoms are of intense severity. These symptoms are constant in nature. 10/28/18: As per staff, pt sneaked in X-acto knife while nurse was looking for med lsit from her bag,. Pt made multiple superficial cuts on her rt arm, needing medical consult. Knife was found with pt and wound were treated, no stiches were needed. Pt continues to be dystaymic, having suicidal ideation.   10/29/18: Pt continues to be very anxious and depressed, report suicidal ideations and urges to cut. Pt is on 1:1 observation. Pts cholesterol is 326 and tsh 0.5. Pt slept well. Pt received ativan 1mg twice yesterday and pt also tried to elope yesterday. 10/30/18: Pt reports she slept better. Pt reports she has an appointment with social security. Pt reports she feels little better but still has suicidal thoughts and impulsivity. 10/31/18:Pt is still dysthymic, verbalizing urges to harm herself, not romulo for safety,still hearing voices, poor ADL's. SIDE EFFECTS: (reviewed/updated 10/31/2018)  None reported or admitted to. No noted toxicity with use of epakote/Tegretol/lithium/Clozaril/TCAs   ALLERGIES:(reviewed/updated 10/31/2018)  Allergies   Allergen Reactions    Other Food Other (comments)     \"Bad chest pain\" with walnuts. Coke - asthma/stuffy head. Fish sticks causes an asthma attack.  Astepro [Azelastine] Other (comments)     Violent headaches.  Bactrim [Sulfamethoprim] Other (comments)     Difficulty breathing, chills, fever.  Banana Other (comments)     Diffculty breathing, wheezing, asthma attack.  Coconut Other (comments)     Difficulty breathing, asthma attack, SOB.  Cortisone Hives     Difficulty breathing.  Nut - Unspecified Other (comments)     \"Bad chest pain\" with walnuts    Peanut Other (comments)     Wheezing, asthma attack, SOB.  Prednisone Hives     Difficulty breathing, kidney stones. MEDICATIONS PRIOR TO ADMISSION:(reviewed/updated 10/31/2018)  Medications Prior to Admission   Medication Sig    albuterol (PROVENTIL HFA, VENTOLIN HFA, PROAIR HFA) 90 mcg/actuation inhaler Take 2 Puffs by inhalation every six (6) hours as needed for Wheezing.  beclomethasone (QVAR) 40 mcg/actuation aero Take 2 Puffs by inhalation two (2) times a day.  CLONIDINE HCL PO Take 1/2 of 0.1mg tablet QHS    busPIRone (BUSPAR) 15 mg tablet Take 15 mg by mouth three (3) times daily.     QUEtiapine (SEROQUEL) 50 mg tablet Take 50 mg by mouth nightly.  OLANZapine (ZYPREXA) 20 mg tablet Take 20 mg by mouth nightly.  hydrOXYzine HCl (ATARAX) 50 mg tablet Take 50 mg by mouth every four (4) hours as needed for Anxiety.  prazosin (MINIPRESS) 1 mg capsule Take 1 mg by mouth nightly.  clonazePAM (KLONOPIN) 1 mg tablet Take 1 mg by mouth nightly.  OLANZapine (ZYPREXA) 5 mg tablet Take 5 mg by mouth daily (after breakfast).  aspirin (ASPIRIN) 325 mg tablet Take 1 Tab by mouth daily. Indications: myocardial infarction prevention    levothyroxine (SYNTHROID) 125 mcg tablet Take 1 Tab by mouth Daily (before breakfast). Indications: hypothyroidism    sertraline (ZOLOFT) 100 mg tablet Take 2 Tabs by mouth daily (with dinner). Indications: Generalized Anxiety Disorder, major depressive disorder    zolpidem (AMBIEN) 10 mg tablet Take 1 Tab by mouth nightly. Max Daily Amount: 10 mg. Indications: SLEEP-ONSET INSOMNIA    cholecalciferol (VITAMIN D3) 1,000 unit tablet Take 1 Tab by mouth daily. Indications: PREVENTION OF VITAMIN D DEFICIENCY, Vitamin D Deficiency    fluticasone (FLONASE) 50 mcg/actuation nasal spray 2 Sprays by Both Nostrils route daily. Indications: Allergic Rhinitis    therapeutic multivitamin (THERA) tablet Take 1 Tab by mouth daily.  cyanocobalamin, vitamin B-12, (VITAMIN B-12) 5,000 mcg subl 5,000 mcg by SubLINGual route daily. PAST MEDICAL HISTORY: Past medical history from the initial psychiatric evaluation has been reviewed (reviewed/updated 10/31/2018) with no additional updates (I asked patient and no additional past medical history provided).    Past Medical History:   Diagnosis Date    Asthma 1967    hospitalized for asthma attack x 2    Chronic kidney disease     kidney stones x 2-3 times    Ill-defined condition     nasal blockage from growth and misalignment - cannot breath out of nose - surgery in the future/cleared for colonoscopy 7/31/2014 - will bring in letter    Other ill-defined conditions(799.89)     blood in stool    Other ill-defined conditions(799.89)     hx low BP - 90's/60's-70's    PUD (peptic ulcer disease)     Thyroid disease     Unspecified adverse effect of anesthesia     nasal growth and misalignment and cannot breath through nose     Past Surgical History:   Procedure Laterality Date    HX HEENT      T & A    HX HEENT      turbinate surgery      SOCIAL HISTORY: Social history from the initial psychiatric evaluation has been reviewed (reviewed/updated 10/31/2018) with no additional updates (I asked patient and no additional social history provided).    Social History     Socioeconomic History    Marital status: SINGLE     Spouse name: Not on file    Number of children: Not on file    Years of education: Not on file    Highest education level: Not on file   Social Needs    Financial resource strain: Not on file    Food insecurity - worry: Not on file    Food insecurity - inability: Not on file    Transportation needs - medical: Not on file   [employer] needs - non-medical: Not on file   Occupational History    Not on file   Tobacco Use    Smoking status: Never Smoker    Smokeless tobacco: Never Used   Substance and Sexual Activity    Alcohol use: No    Drug use: No    Sexual activity: Not Currently   Other Topics Concern     Service Not Asked    Blood Transfusions Not Asked    Caffeine Concern Not Asked    Occupational Exposure Not Asked    Hobby Hazards Not Asked    Sleep Concern Not Asked    Stress Concern Not Asked    Weight Concern Not Asked    Special Diet Not Asked    Back Care Not Asked    Exercise Not Asked    Bike Helmet Not Asked   [address] Not Asked    Self-Exams Not Asked   Social History Narrative    Not on file      FAMILY HISTORY: Family history from the initial psychiatric evaluation has been reviewed (reviewed/updated 10/31/2018) with no additional updates (I asked patient and no additional family history provided). Family History   Problem Relation Age of Onset    Stroke Mother     Other Mother         erosion of esophagus    Cancer Mother         melanoma/squamous    Heart Surgery Father         x2    Delayed Awakening Father     Pacemaker Father     Other Father         carotid endartarectomy/polio    Cataract Father        REVIEW OF SYSTEMS: (reviewed/updated 10/31/2018)  Appetite:good   Sleep: improved   All other Review of Systems: Negative except   Multiple cuts on rt arm       MENTAL STATUS EXAM & VITALS      MENTAL STATUS EXAM (MSE):    MSE FINDINGS ARE WITHIN NORMAL LIMITS (WNL) UNLESS OTHERWISE STATED BELOW. ( ALL OF THE BELOW CATEGORIES OF THE MSE HAVE BEEN REVIEWED (reviewed 10/27/2018) AND UPDATED AS DEEMED APPROPRIATE )  General Presentation age appropriate and disheveled, evasive and guarded   Orientation oriented to time, place and person   Vital Signs  See below (reviewed 10/27/2018); Vital Signs (BP, Pulse, & Temp) are within normal limits if not listed below.    Gait and Station Stable/steady, no ataxia   Musculoskeletal System No extrapyramidal symptoms (EPS); no abnormal muscular movements or Tardive Dyskinesia (TD); muscle strength and tone are within normal limits   Language No aphasia or dysarthria   Speech:  soft   Thought Processes logical; slow rate of thoughts; poor abstract reasoning/computation   Thought Associations blocked    Thought Content paranoid delusions   Suicidal Ideations Intention, still have urges   Homicidal Ideations no plan  and no intention   Mood:  depressed and anhedonia   Affect:  constricted   Memory recent  intact   Memory remote:  intact   Concentration/Attention:  distractable   Fund of Knowledge average   Insight:  poor   Reliability poor   Judgment:  poor                      VITALS:     Patient Vitals for the past 24 hrs:   Temp Pulse Resp BP SpO2   10/31/18 0800 -- -- 16 -- --   10/30/18 2114 98.7 °F (37.1 °C) 64 16 99/64 --   10/30/18 1614 99.1 °F (37.3 °C) 77 18 101/67 95 %     Wt Readings from Last 3 Encounters:   10/27/18 70.3 kg (155 lb)   07/27/18 72.6 kg (160 lb)   07/16/18 72.6 kg (160 lb)     Temp Readings from Last 3 Encounters:   08/13/18 98.5 °F (36.9 °C)   07/26/18 98 °F (36.7 °C)     BP Readings from Last 3 Encounters:   10/30/18 99/64   08/13/18 91/53   07/26/18 108/71     Pulse Readings from Last 3 Encounters:   10/30/18 64   08/13/18 (!) 57   07/26/18 62            DATA     LABORATORY DATA:(reviewed/updated 10/31/2018)  No results found for this or any previous visit (from the past 24 hour(s)). No results found for: VALF2, VALAC, VALP, VALPR, DS6, CRBAM, CRBAMP, CARB2, XCRBAM  No results found for: LITHM   RADIOLOGY REPORTS:(reviewed/updated 10/31/2018)  No results found.        MEDICATIONS     ALL MEDICATIONS:   Current Facility-Administered Medications   Medication Dose Route Frequency    sertraline (ZOLOFT) tablet 100 mg  100 mg Oral QPM    haloperidol (HALDOL) tablet 5 mg  5 mg Oral QHS    busPIRone (BUSPAR) tablet 15 mg  15 mg Oral BID    levothyroxine (SYNTHROID) tablet 100 mcg  100 mcg Oral ACB    pravastatin (PRAVACHOL) tablet 40 mg  40 mg Oral QHS    budesonide (PULMICORT) 500 mcg/2 ml nebulizer suspension  500 mcg Nebulization BID RT    ziprasidone (GEODON) 20 mg in sterile water (preservative free) 1 mL injection  20 mg IntraMUSCular BID PRN    OLANZapine (ZyPREXA) tablet 5 mg  5 mg Oral Q6H PRN    benztropine (COGENTIN) tablet 2 mg  2 mg Oral BID PRN    benztropine (COGENTIN) injection 2 mg  2 mg IntraMUSCular BID PRN    LORazepam (ATIVAN) injection 2 mg  2 mg IntraMUSCular Q4H PRN    LORazepam (ATIVAN) tablet 1 mg  1 mg Oral Q4H PRN    zolpidem (AMBIEN) tablet 10 mg  10 mg Oral QHS PRN    acetaminophen (TYLENOL) tablet 650 mg  650 mg Oral Q4H PRN    ibuprofen (MOTRIN) tablet 400 mg  400 mg Oral Q8H PRN    magnesium hydroxide (MILK OF MAGNESIA) 400 mg/5 mL oral suspension 30 mL  30 mL Oral DAILY PRN    nicotine (NICODERM CQ) 21 mg/24 hr patch 1 Patch  1 Patch TransDERmal DAILY PRN    aspirin tablet 325 mg  325 mg Oral DAILY    cholecalciferol (VITAMIN D3) tablet 1,000 Units  1,000 Units Oral DAILY    therapeutic multivitamin (THERAGRAN) tablet 1 Tab  1 Tab Oral DAILY    hydrOXYzine HCl (ATARAX) tablet 50 mg  50 mg Oral TID PRN    albuterol-ipratropium (DUO-NEB) 2.5 MG-0.5 MG/3 ML  3 mL Nebulization Q6H PRN      SCHEDULED MEDICATIONS:   Current Facility-Administered Medications   Medication Dose Route Frequency    sertraline (ZOLOFT) tablet 100 mg  100 mg Oral QPM    haloperidol (HALDOL) tablet 5 mg  5 mg Oral QHS    busPIRone (BUSPAR) tablet 15 mg  15 mg Oral BID    levothyroxine (SYNTHROID) tablet 100 mcg  100 mcg Oral ACB    pravastatin (PRAVACHOL) tablet 40 mg  40 mg Oral QHS    budesonide (PULMICORT) 500 mcg/2 ml nebulizer suspension  500 mcg Nebulization BID RT    aspirin tablet 325 mg  325 mg Oral DAILY    cholecalciferol (VITAMIN D3) tablet 1,000 Units  1,000 Units Oral DAILY    therapeutic multivitamin (THERAGRAN) tablet 1 Tab  1 Tab Oral DAILY          ASSESSMENT & PLAN     DIAGNOSES REQUIRING ACTIVE TREATMENT AND MONITORING: (reviewed/updated 10/31/2018)  Patient Active Hospital Problem List:   The patient, Nicole Arita, is a 59 y.o.  female who presents at this time for treatment of the following diagnoses:  Patient C/Angelica Qureshi 1106 Problem List:   Major depression with psychotic features ([facility] [address] (10/27/2018)    Assessment: depression, suicidal ideations anxiety, auditory hallucination     Plan: restarting meds zoloft 25 mg po hs, zyprexa 5 mg po bid, buspar 5 mg po bid. Indiv/milue therapy  Get collaterals, safety, suicidal precautions  10/28/18: Pt was put on 1:! Observation as soon as writer heard about cutting.   Increasing zoloft and zyprexa 10 mg po hs., indiv milue therapy, suicide precautions, check for contrabands  10/29/18: high cholesterol, will change zyprexa to haldol first generation antipsychotic., increasing buspar 15 mg po bid  Hyprelipidemia: starting atrovastatin 20 mg daily  10/30/18, stop zyprexa, increase haldol 5 mg po hs, plan on starting decoanate, increasing zoloft 100mg po hs.  10/31/18: Increasing zoloft 150 mg po hs, continue meds/therapy nad 1:1 observation, encourage to go to groups and ADL's          I will continue to monitor blood levels (Depakote, Tegretol, lithium, clozapine---a drug with a narrow therapeutic index= NTI) and associated labs for drug therapy implemented that require intense monitoring for toxicity as deemed appropriate based on current medication side effects and pharmacodynamically determined drug 1/2 lives. In summary, Susana Calvin, is a 59 y.o.  female who presents with a severe exacerbation of the principal diagnosis of Major depression with psychotic features ([facility] [address]  Patient's condition is orsening/not improving/not stable improving. Patient requires continued inpatient hospitalization for further stabilization, safety monitoring and medication management. I will continue to coordinate the provision of individual, milieu, occupational, group, and substance abuse therapies to address target symptoms/diagnoses as deemed appropriate for the individual patient. A coordinated, multidisplinary treatment team round was conducted with the patient (this team consists of the nurse, psychiatric unit pharmcist,  and writer). Complete current electronic health record for patient has been reviewed today including consultant notes, ancillary staff notes, nurses and psychiatric tech notes. icide risk assessment completed and patient deemed to be of low risk for suicide at this time. The following regarding medications was addressed during rounds with patient:   the risks and benefits of the proposed medication. The patient was given the opportunity to ask questions. Informed consent given to the use of the above medications.  Will continue to adjust psychiatric and non-psychiatric medications (see above \"medication\" section and orders section for details) as deemed appropriate & based upon diagnoses and response to treatment. I will continue to order blood tests/labs and diagnostic tests as deemed appropriate and review results as they become available (see orders for details and above listed lab/test results). I will order psychiatric records from previous [facility] hospitals to further elucidate the nature of patient's psychopathology and review once available. I will gather additional collateral information from riends, family and o/p treatment team to further elucidate the nature of patient's psychopathology and baselline level of psychiatric functioning. I certify that this patient's inpatient psychiatric hospital services furnished since the previous certification were, and continue to be, required for treatment that could reasonably be expected to improve the patient's condition, or for diagnostic study, and that the patient continues to need, on a daily basis, active treatment furnished directly by or requiring the supervision of inpatient psychiatric facility personnel. In addition, the hospital records show that services furnished were intensive treatment services, admission or related services, or equivalent services.     EXPECTED DISCHARGE DATE/DAY: BD     DISPOSITION:[place]       Signed By:   Estrellita Kim MD  10/31/2018

## 2018-11-01 PROCEDURE — 94640 AIRWAY INHALATION TREATMENT: CPT

## 2018-11-01 PROCEDURE — 74011250637 HC RX REV CODE- 250/637: Performed by: NURSE PRACTITIONER

## 2018-11-01 PROCEDURE — 65220000003 HC RM SEMIPRIVATE PSYCH

## 2018-11-01 PROCEDURE — 74011000250 HC RX REV CODE- 250: Performed by: NURSE PRACTITIONER

## 2018-11-01 PROCEDURE — 74011250637 HC RX REV CODE- 250/637: Performed by: PSYCHIATRY & NEUROLOGY

## 2018-11-01 PROCEDURE — 74011250636 HC RX REV CODE- 250/636: Performed by: PSYCHIATRY & NEUROLOGY

## 2018-11-01 RX ORDER — HALOPERIDOL 10 MG/1
10 TABLET ORAL
Status: DISCONTINUED | OUTPATIENT
Start: 2018-11-01 | End: 2018-11-09 | Stop reason: HOSPADM

## 2018-11-01 RX ADMIN — HALOPERIDOL 10 MG: 10 TABLET ORAL at 21:09

## 2018-11-01 RX ADMIN — LEVOTHYROXINE SODIUM 100 MCG: 100 TABLET ORAL at 06:38

## 2018-11-01 RX ADMIN — ASPIRIN 325 MG: 325 TABLET ORAL at 08:19

## 2018-11-01 RX ADMIN — ZOLPIDEM TARTRATE 10 MG: 10 TABLET ORAL at 23:14

## 2018-11-01 RX ADMIN — THERA TABS 1 TABLET: TAB at 08:19

## 2018-11-01 RX ADMIN — LORAZEPAM 2 MG: 2 INJECTION INTRAMUSCULAR; INTRAVENOUS at 20:21

## 2018-11-01 RX ADMIN — SERTRALINE HYDROCHLORIDE 150 MG: 50 TABLET ORAL at 17:07

## 2018-11-01 RX ADMIN — VITAMIN D, TAB 1000IU (100/BT) 1000 UNITS: 25 TAB at 08:19

## 2018-11-01 RX ADMIN — LORAZEPAM 1 MG: 1 TABLET ORAL at 15:20

## 2018-11-01 RX ADMIN — PRAVASTATIN SODIUM 40 MG: 40 TABLET ORAL at 21:09

## 2018-11-01 RX ADMIN — BUSPIRONE HYDROCHLORIDE 15 MG: 10 TABLET ORAL at 08:19

## 2018-11-01 RX ADMIN — BUDESONIDE 500 MCG: 0.5 INHALANT RESPIRATORY (INHALATION) at 08:25

## 2018-11-01 RX ADMIN — BUSPIRONE HYDROCHLORIDE 15 MG: 10 TABLET ORAL at 17:07

## 2018-11-01 NOTE — BH NOTES
GROUP THERAPY PROGRESS NOTE    Pradip Ferguson did not participate in a 25 minute Acute Unit Process Group, with a focus identifying feelings, planning for the rest of the day, and discussing discharge.

## 2018-11-01 NOTE — PROGRESS NOTES
Problem: Discharge Planning  Goal: *Discharge to safe environment  Outcome: Not Progressing Towards Goal  She will return home with her dad when discharged   She is a [employer]   She remains on 1:1   Goal: *Knowledge of medication management  Outcome: Progressing Towards Goal  She is compliant with her medications   Goal: *Knowledge of discharge instructions  Outcome: Progressing Towards Goal  She is able to verbalize discharge instructions     Problem: Patient Education: Go to Patient Education Activity  Goal: Patient/Family Education  Outcome: Progressing Towards Goal  Sw will educate patient on discharge instructions

## 2018-11-01 NOTE — PROGRESS NOTES
Problem: Depressed Mood (Adult/Pediatric)  Goal: *STG: Participates in treatment plan  Outcome: Progressing Towards Goal  Med and meal compliant. Suicide risk and is 1:1 for prevention. No self-harm behaviors noted. Up ad samson. Mood labile, Cooperative this morning. Does not state goal. Staff goal: to prevent harm. Goal: *STG: Complies with medication therapy  Outcome: Progressing Towards Goal  Compliant  Goal: Interventions  Outcome: Progressing Towards Goal  Medication management. Q 15 min safety rounds. Group therapy. Problem: Suicide/Homicide (Adult/Pediatric)  Goal: *STG: Seeks staff when feelings of self harm or harm towards others arise  Outcome: Progressing Towards Goal  1:1 with staff.

## 2018-11-01 NOTE — BH NOTES
PRN Medication Documentation    Specific patient behavior that led to need for PRN medication: pt reporting increasing anxiety, \"I can't stop having bad thoughts\"  Staff interventions attempted prior to PRN being given: listening presence, reassurance, decreased stimuli, remains on 1:1 for safety  PRN medication given: ativan 1mg po prn at 1521  Patient response/effectiveness of PRN medication: 40 minutes post administration pt lay quietly in bed.

## 2018-11-01 NOTE — BH NOTES
GROUP THERAPY PROGRESS NOTE    Rashaad London is participating in Discharge Planning Group    Group time: 1 hour    Personal goal for participation: Readiness for discharge     Goal orientation: Develop - Recovery Resources: [facility]    Group therapy participation: disruptive    Therapeutic interventions reviewed and discussed: No    Impression of participation: Pt sat quietly for a short period time but she did not participate in group.  Pt remained on 1:1 observation

## 2018-11-01 NOTE — PROGRESS NOTES
Problem: Depressed Mood (Adult/Pediatric)  Goal: *STG: Remains safe in hospital  Outcome: Progressing Towards Goal  Patient is resting quietly in bed. Awake and alert. Calm and cooperative. No distress noted. Will continue to monitor.

## 2018-11-01 NOTE — BH NOTES
2330 - Patient is resting quietly in bed. 1:1 continues. Staff present. Will continue to monitor. 2430 - Patients remains on 1:1. Resting quietly in bed. Appears to be asleep. Staff present. 0130 - Patient is resting quietly in bed. Appears to be asleep. 1:1 continues. Staff present. 0230 - Patient remains on 1:1. No distress noted. Staff present  [phone] - Patient remains on 1:1.  Staff present. No distress noted. 0430 - Patient is resting quietly in bed. Appears to be asleep. 1:1 continues. Staff present. 0530 - Patient remains on 1:1. Resting quietly in bed. Staff present. 0630 - Patient remains on 1:1. Resting quietly in bed. Appears to be asleep. No distress noted. Staff present.

## 2018-11-01 NOTE — BH NOTES
PSYCHIATRIC PROGRESS NOTE         Patient Name  Helga Rich   Date of Birth 1960   [facility] [id number]   Medical Record Number  [id number]      Age  59 y.o. PCP Gerald Wallace MD   Admit date:  10/27/2018    Room Number  726/02  @ [facility]   Date of Service  11/1/2018           E & M PROGRESS NOTE:         HISTORY         REASON FOR HOSPITALIZATION:  CC: \". Pt admitted under a temporary skilled nursing order (TDO) [place] tdo oco for severe depression with suicidal ideations  proving to be an imminent danger to self and an inability to care for self. HISTORY OF PRESENT ILLNESS:    The patient, Helga Rich, is a 59 y.o. WHITE [place]  female with a past psychiatric history significant for depression, anxiety, psychosis, hearing voices ho presents at this time with complaints of (and/or evidence of) the following emotional symptoms: suicidal thoughts/threats, delusions, psychotic behavior and paranoid behavior. Additional symptomatology include difficulty swallowing and feeling suicidal.  The above symptoms have been present for Pt reports her mother passed away in April and she feels she could have done more, reports she has not been taking her meds for one and half week. Pt was on zoloft 200 at night and zyprexa po bid as per reports. Pt reports she is getting thoughts of cutting herself, she feels responsible for mother's death, pt ha s ntense guilt, decrease appetite, difficulty sleeping, poor eye contact and auditory hallucinations. These symptoms are of intense severity. These symptoms are constant in nature. 10/28/18: As per staff, pt sneaked in X-acto knife while nurse was looking for med lsit from her bag,. Pt made multiple superficial cuts on her rt arm, needing medical consult. Knife was found with pt and wound were treated, no stiches were needed. Pt continues to be dystaymic, having suicidal ideation.   10/29/18: Pt continues to be very anxious and depressed, report suicidal ideations and urges to cut. Pt is on 1:1 observation. Pts cholesterol is 326 and tsh 0.5. Pt slept well. Pt received ativan 1mg twice yesterday and pt also tried to elope yesterday. 10/30/18: Pt reports she slept better. Pt reports she has an appointment with social security. Pt reports she feels little better but still has suicidal thoughts and impulsivity. 10/31/18:Pt is still dysthymic, verbalizing urges to harm herself, not romulo for safety,still hearing voices, poor ADL's.  11/1/18: Pt still ahs poor eue contact, si depressed, reports hearing voices and having urges to cut. SIDE EFFECTS: (reviewed/updated 11/1/2018)  None reported or admitted to. No noted toxicity with use of epakote/Tegretol/lithium/Clozaril/TCAs   ALLERGIES:(reviewed/updated 11/1/2018)  Allergies   Allergen Reactions    Other Food Other (comments)     \"Bad chest pain\" with walnuts. Coke - asthma/stuffy head. Fish sticks causes an asthma attack.  Astepro [Azelastine] Other (comments)     Violent headaches.  Bactrim [Sulfamethoprim] Other (comments)     Difficulty breathing, chills, fever.  Banana Other (comments)     Diffculty breathing, wheezing, asthma attack.  Coconut Other (comments)     Difficulty breathing, asthma attack, SOB.  Cortisone Hives     Difficulty breathing.  Nut - Unspecified Other (comments)     \"Bad chest pain\" with walnuts    Peanut Other (comments)     Wheezing, asthma attack, SOB.  Prednisone Hives     Difficulty breathing, kidney stones. MEDICATIONS PRIOR TO ADMISSION:(reviewed/updated 11/1/2018)  Medications Prior to Admission   Medication Sig    albuterol (PROVENTIL HFA, VENTOLIN HFA, PROAIR HFA) 90 mcg/actuation inhaler Take 2 Puffs by inhalation every six (6) hours as needed for Wheezing.  beclomethasone (QVAR) 40 mcg/actuation aero Take 2 Puffs by inhalation two (2) times a day.     CLONIDINE HCL PO Take 1/2 of 0.1mg tablet QHS    busPIRone (BUSPAR) 15 mg tablet Take 15 mg by mouth three (3) times daily.  QUEtiapine (SEROQUEL) 50 mg tablet Take 50 mg by mouth nightly.  OLANZapine (ZYPREXA) 20 mg tablet Take 20 mg by mouth nightly.  hydrOXYzine HCl (ATARAX) 50 mg tablet Take 50 mg by mouth every four (4) hours as needed for Anxiety.  prazosin (MINIPRESS) 1 mg capsule Take 1 mg by mouth nightly.  clonazePAM (KLONOPIN) 1 mg tablet Take 1 mg by mouth nightly.  OLANZapine (ZYPREXA) 5 mg tablet Take 5 mg by mouth daily (after breakfast).  aspirin (ASPIRIN) 325 mg tablet Take 1 Tab by mouth daily. Indications: myocardial infarction prevention    levothyroxine (SYNTHROID) 125 mcg tablet Take 1 Tab by mouth Daily (before breakfast). Indications: hypothyroidism    sertraline (ZOLOFT) 100 mg tablet Take 2 Tabs by mouth daily (with dinner). Indications: Generalized Anxiety Disorder, major depressive disorder    zolpidem (AMBIEN) 10 mg tablet Take 1 Tab by mouth nightly. Max Daily Amount: 10 mg. Indications: SLEEP-ONSET INSOMNIA    cholecalciferol (VITAMIN D3) 1,000 unit tablet Take 1 Tab by mouth daily. Indications: PREVENTION OF VITAMIN D DEFICIENCY, Vitamin D Deficiency    fluticasone (FLONASE) 50 mcg/actuation nasal spray 2 Sprays by Both Nostrils route daily. Indications: Allergic Rhinitis    therapeutic multivitamin (THERA) tablet Take 1 Tab by mouth daily.  cyanocobalamin, vitamin B-12, (VITAMIN B-12) 5,000 mcg subl 5,000 mcg by SubLINGual route daily. PAST MEDICAL HISTORY: Past medical history from the initial psychiatric evaluation has been reviewed (reviewed/updated 11/1/2018) with no additional updates (I asked patient and no additional past medical history provided).    Past Medical History:   Diagnosis Date    Asthma 1967    hospitalized for asthma attack x 2    Chronic kidney disease     kidney stones x 2-3 times    Ill-defined condition     nasal blockage from growth and misalignment - cannot breath out of nose - surgery in the future/cleared for colonoscopy 7/31/2014 - will bring in letter    Other ill-defined conditions(799.89)     blood in stool    Other ill-defined conditions(799.89)     hx low BP - 90's/60's-70's    PUD (peptic ulcer disease)     Thyroid disease     Unspecified adverse effect of anesthesia     nasal growth and misalignment and cannot breath through nose     Past Surgical History:   Procedure Laterality Date    HX HEENT      T & A    HX HEENT      turbinate surgery      SOCIAL HISTORY: Social history from the initial psychiatric evaluation has been reviewed (reviewed/updated 11/1/2018) with no additional updates (I asked patient and no additional social history provided).    Social History     Socioeconomic History    Marital status: SINGLE     Spouse name: Not on file    Number of children: Not on file    Years of education: Not on file    Highest education level: Not on file   Social Needs    Financial resource strain: Not on file    Food insecurity - worry: Not on file    Food insecurity - inability: Not on file    Transportation needs - medical: Not on file   [place] needs - non-medical: Not on file   Occupational History    Not on file   Tobacco Use    Smoking status: Never Smoker    Smokeless tobacco: Never Used   Substance and Sexual Activity    Alcohol use: No    Drug use: No    Sexual activity: Not Currently   Other Topics Concern     Service Not Asked    Blood Transfusions Not Asked    Caffeine Concern Not Asked    Occupational Exposure Not Asked    Hobby Hazards Not Asked    Sleep Concern Not Asked    Stress Concern Not Asked    Weight Concern Not Asked    Special Diet Not Asked    Back Care Not Asked    Exercise Not Asked    Bike Helmet Not Asked   [address] Not Asked    Self-Exams Not Asked   Social History Narrative    Not on file      FAMILY HISTORY: Family history from the initial psychiatric evaluation has been reviewed (reviewed/updated 11/1/2018) with no additional updates (I asked patient and no additional family history provided). Family History   Problem Relation Age of Onset    Stroke Mother     Other Mother         erosion of esophagus    Cancer Mother         melanoma/squamous    Heart Surgery Father         x2    Delayed Awakening Father     Pacemaker Father     Other Father         carotid endartarectomy/polio    Cataract Father        REVIEW OF SYSTEMS: (reviewed/updated 11/1/2018)  Appetite:good   Sleep: improved   All other Review of Systems: Negative except   Multiple cuts on rt arm       MENTAL STATUS EXAM & VITALS      MENTAL STATUS EXAM (MSE):    MSE FINDINGS ARE WITHIN NORMAL LIMITS (WNL) UNLESS OTHERWISE STATED BELOW. ( ALL OF THE BELOW CATEGORIES OF THE MSE HAVE BEEN REVIEWED (reviewed 10/27/2018) AND UPDATED AS DEEMED APPROPRIATE )  General Presentation age appropriate and disheveled, evasive and guarded   Orientation oriented to time, place and person   Vital Signs  See below (reviewed 10/27/2018); Vital Signs (BP, Pulse, & Temp) are within normal limits if not listed below.    Gait and Station Stable/steady, no ataxia   Musculoskeletal System No extrapyramidal symptoms (EPS); no abnormal muscular movements or Tardive Dyskinesia (TD); muscle strength and tone are within normal limits   Language No aphasia or dysarthria   Speech:  soft   Thought Processes logical; slow rate of thoughts; poor abstract reasoning/computation   Thought Associations blocked    Thought Content paranoid delusions   Suicidal Ideations Intention, still have urges   Homicidal Ideations no plan  and no intention   Mood:  depressed and anhedonia   Affect:  constricted   Memory recent  intact   Memory remote:  intact   Concentration/Attention:  distractable   Fund of Knowledge average   Insight:  poor   Reliability poor   Judgment:  poor                      VITALS:     Patient Vitals for the past 24 hrs:   Temp Pulse Resp BP SpO2   11/01/18 0825 -- -- -- -- 99 %   11/01/18 0749 97.8 °F (36.6 °C) 65 18 104/66 97 %   10/31/18 2117 -- -- 16 -- --   10/31/18 2045 98.1 °F (36.7 °C) 75 16 108/69 99 %   10/31/18 1555 98.3 °F (36.8 °C) 80 16 105/72 94 %   10/31/18 1230 98.2 °F (36.8 °C) 80 16 111/76 --   10/31/18 1128 -- -- -- -- 95 %     Wt Readings from Last 3 Encounters:   10/27/18 70.3 kg (155 lb)   07/27/18 72.6 kg (160 lb)   07/16/18 72.6 kg (160 lb)     Temp Readings from Last 3 Encounters:   11/01/18 97.8 °F (36.6 °C)   08/13/18 98.5 °F (36.9 °C)   07/26/18 98 °F (36.7 °C)     BP Readings from Last 3 Encounters:   11/01/18 104/66   08/13/18 91/53   07/26/18 108/71     Pulse Readings from Last 3 Encounters:   11/01/18 65   08/13/18 (!) 57   07/26/18 62            DATA     LABORATORY DATA:(reviewed/updated 11/1/2018)  No results found for this or any previous visit (from the past 24 hour(s)). No results found for: VALF2, VALAC, VALP, VALPR, DS6, CRBAM, CRBAMP, CARB2, XCRBAM  No results found for: LITHM   RADIOLOGY REPORTS:(reviewed/updated 11/1/2018)  No results found.        MEDICATIONS     ALL MEDICATIONS:   Current Facility-Administered Medications   Medication Dose Route Frequency    sertraline (ZOLOFT) tablet 150 mg  150 mg Oral QPM    haloperidol (HALDOL) tablet 5 mg  5 mg Oral QHS    busPIRone (BUSPAR) tablet 15 mg  15 mg Oral BID    levothyroxine (SYNTHROID) tablet 100 mcg  100 mcg Oral ACB    pravastatin (PRAVACHOL) tablet 40 mg  40 mg Oral QHS    budesonide (PULMICORT) 500 mcg/2 ml nebulizer suspension  500 mcg Nebulization BID RT    ziprasidone (GEODON) 20 mg in sterile water (preservative free) 1 mL injection  20 mg IntraMUSCular BID PRN    OLANZapine (ZyPREXA) tablet 5 mg  5 mg Oral Q6H PRN    benztropine (COGENTIN) tablet 2 mg  2 mg Oral BID PRN    benztropine (COGENTIN) injection 2 mg  2 mg IntraMUSCular BID PRN    LORazepam (ATIVAN) injection 2 mg  2 mg IntraMUSCular Q4H PRN    LORazepam (ATIVAN) tablet 1 mg  1 mg Oral Q4H PRN    zolpidem (AMBIEN) tablet 10 mg  10 mg Oral QHS PRN    acetaminophen (TYLENOL) tablet 650 mg  650 mg Oral Q4H PRN    ibuprofen (MOTRIN) tablet 400 mg  400 mg Oral Q8H PRN    magnesium hydroxide (MILK OF MAGNESIA) 400 mg/5 mL oral suspension 30 mL  30 mL Oral DAILY PRN    nicotine (NICODERM CQ) 21 mg/24 hr patch 1 Patch  1 Patch TransDERmal DAILY PRN    aspirin tablet 325 mg  325 mg Oral DAILY    cholecalciferol (VITAMIN D3) tablet 1,000 Units  1,000 Units Oral DAILY    therapeutic multivitamin (THERAGRAN) tablet 1 Tab  1 Tab Oral DAILY    hydrOXYzine HCl (ATARAX) tablet 50 mg  50 mg Oral TID PRN    albuterol-ipratropium (DUO-NEB) 2.5 MG-0.5 MG/3 ML  3 mL Nebulization Q6H PRN      SCHEDULED MEDICATIONS:   Current Facility-Administered Medications   Medication Dose Route Frequency    sertraline (ZOLOFT) tablet 150 mg  150 mg Oral QPM    haloperidol (HALDOL) tablet 5 mg  5 mg Oral QHS    busPIRone (BUSPAR) tablet 15 mg  15 mg Oral BID    levothyroxine (SYNTHROID) tablet 100 mcg  100 mcg Oral ACB    pravastatin (PRAVACHOL) tablet 40 mg  40 mg Oral QHS    budesonide (PULMICORT) 500 mcg/2 ml nebulizer suspension  500 mcg Nebulization BID RT    aspirin tablet 325 mg  325 mg Oral DAILY    cholecalciferol (VITAMIN D3) tablet 1,000 Units  1,000 Units Oral DAILY    therapeutic multivitamin (THERAGRAN) tablet 1 Tab  1 Tab Oral DAILY          ASSESSMENT & PLAN     DIAGNOSES REQUIRING ACTIVE TREATMENT AND MONITORING: (reviewed/updated 11/1/2018)  Patient Active Hospital Problem List:   The patient, Jeanine Joshi, is a 59 y.o.  female who presents at this time for treatment of the following diagnoses:  Patient Active Hospital Problem List:   Major depression with psychotic features ([facility] (10/27/2018)    Assessment: depression, suicidal ideations anxiety, auditory hallucination     Plan: restarting meds zoloft 25 mg po hs, zyprexa 5 mg po bid, buspar 5 mg po bid.   Indiv/milue therapy  Get collaterals, safety, suicidal precautions  10/28/18: Pt was put on 1:! Observation as soon as writer heard about cutting. Increasing zoloft and zyprexa 10 mg po hs., indiv milue therapy, suicide precautions, check for contrabands  10/29/18: high cholesterol, will change zyprexa to haldol first generation antipsychotic., increasing buspar 15 mg po bid  Hyprelipidemia: starting atrovastatin 20 mg daily  10/30/18, stop zyprexa, increase haldol 5 mg po hs, plan on starting decoanate, increasing zoloft 100mg po hs.  10/31/18: Increasing zoloft 150 mg po hs, continue meds/therapy nad 1:1 observation, encourage to go to groups and ADL's  11/1/18: continue meds, increasing haldol 10 mg po hs, continue 1:1 observation for self injury. I will continue to monitor blood levels (Depakote, Tegretol, lithium, clozapine---a drug with a narrow therapeutic index= NTI) and associated labs for drug therapy implemented that require intense monitoring for toxicity as deemed appropriate based on current medication side effects and pharmacodynamically determined drug 1/2 lives. In summary, John Forde, is a 59 y.o.  female who presents with a severe exacerbation of the principal diagnosis of Major depression with psychotic features ([facility]  Patient's condition is orsening/not improving/not stable improving. Patient requires continued inpatient hospitalization for further stabilization, safety monitoring and medication management. I will continue to coordinate the provision of individual, milieu, occupational, group, and substance abuse therapies to address target symptoms/diagnoses as deemed appropriate for the individual patient. A coordinated, multidisplinary treatment team round was conducted with the patient (this team consists of the nurse, psychiatric unit pharmcist,  and writer).      Complete current electronic health record for patient has been reviewed today including consultant notes, ancillary staff notes, nurses and psychiatric tech notes. icide risk assessment completed and patient deemed to be of low risk for suicide at this time. The following regarding medications was addressed during rounds with patient:   the risks and benefits of the proposed medication. The patient was given the opportunity to ask questions. Informed consent given to the use of the above medications. Will continue to adjust psychiatric and non-psychiatric medications (see above \"medication\" section and orders section for details) as deemed appropriate & based upon diagnoses and response to treatment. I will continue to order blood tests/labs and diagnostic tests as deemed appropriate and review results as they become available (see orders for details and above listed lab/test results). I will order psychiatric records from previous [facility] hospitals to further elucidate the nature of patient's psychopathology and review once available. I will gather additional collateral information from riends, family and o/p treatment team to further elucidate the nature of patient's psychopathology and baselline level of psychiatric functioning. I certify that this patient's inpatient psychiatric hospital services furnished since the previous certification were, and continue to be, required for treatment that could reasonably be expected to improve the patient's condition, or for diagnostic study, and that the patient continues to need, on a daily basis, active treatment furnished directly by or requiring the supervision of inpatient psychiatric facility personnel. In addition, the hospital records show that services furnished were intensive treatment services, admission or related services, or equivalent services.     EXPECTED DISCHARGE DATE/DAY: BD     DISPOSITION:[place]       Signed By:   Sylvester Mina MD  11/1/2018

## 2018-11-01 NOTE — BH NOTES
Behavioral Health Interdisciplinary Rounds     Patient Name: Elliot Client  Age: 59 y.o. Room/Bed:  726/02  Primary Diagnosis: Major depression with psychotic features [facility]   Admission Status: Involuntary Commitment     Readmission within 30 days: no  Power of  in place: no  Patient requires a blocked bed: yes          Reason for blocked bed: Self harm. Patient on 1:1    VTE Prophylaxis: Not indicated    Mobility needs/Fall risk: no  Flu Vaccine : no   Nutritional Plan: no  Consults:          Labs/Testing due today?: no    Sleep hours: 6.25       Participation in Care/Groups:  yes  Medication Compliant?: Yes  PRNS (last 24 hours): Antianxiety and Sleep Aid    Restraints (last 24 hours):  no     CIWA (range last 24 hours):     COWS (range last 24 hours):      Alcohol screening (AUDIT) completed -   AUDIT Score: 0     If applicable, date SBIRT discussed in treatment team AND documented:     Tobacco - patient is a smoker: Have You Used Tobacco in the Past 30 Days: No  Illegal Drugs use: Have You Used Any Illegal Substances Over the Past 12 Months: No    24 hour chart check complete: yes     Patient goal(s) for today:   Treatment team focus/goals: Plan to titrate her medications   Progress note :  She continues to feel unsafe here on the unit. Unable to contact for safety. LOS:  5  Expected LOS: TBD    Financial concerns/prescription coverage:  BRITTANY   Date of last family contact:      Family requesting physician contact today:   Discharge plan: she will return home when ready for discharge   Guns in the home:   TBD      Outpatient provider(s): [facility]    Participating treatment team members: Elliot Client, Ida Mondragon , PharmD.

## 2018-11-01 NOTE — BH NOTES
0715 pt. On 1:1 observation   In bed resting   0815 pt. On 1:1 observation   In bed  Came out to dining area   Ate 10% of breakfast  0915  Pt. On 1:1 observation   In bed quiet  1015 pt. On 1:1 observation   Up on unit   1115 pt. On 1:1 observation  Awake in bed  Met with Dr. Karley Mckee states she doesn't feel safe  \"Mr. Elizabeth Patterson is out to  [facility] don't know why\"  Medications reviewed  Haldol increased   Pt. Refused lunch   1215 pt. On 1:1 observation  Pt. In day area  Told nurse she didn't like her lunch   Asked for grilled cheese  Chocolate chip cookie  Ordered by nurse  [phone] pt. . On 1:1 observation   Pt. In bed resting quietly  1415 pt.  On 1:1 observation  Ate late lunch

## 2018-11-02 PROCEDURE — 74011250637 HC RX REV CODE- 250/637: Performed by: NURSE PRACTITIONER

## 2018-11-02 PROCEDURE — 74011250637 HC RX REV CODE- 250/637: Performed by: PSYCHIATRY & NEUROLOGY

## 2018-11-02 PROCEDURE — 94640 AIRWAY INHALATION TREATMENT: CPT

## 2018-11-02 PROCEDURE — 65220000003 HC RM SEMIPRIVATE PSYCH

## 2018-11-02 PROCEDURE — 74011000250 HC RX REV CODE- 250: Performed by: NURSE PRACTITIONER

## 2018-11-02 RX ORDER — SERTRALINE HYDROCHLORIDE 50 MG/1
200 TABLET, FILM COATED ORAL EVERY EVENING
Status: DISCONTINUED | OUTPATIENT
Start: 2018-11-02 | End: 2018-11-09 | Stop reason: HOSPADM

## 2018-11-02 RX ORDER — CLONIDINE HYDROCHLORIDE 0.1 MG/1
0.05 TABLET ORAL
Status: DISCONTINUED | OUTPATIENT
Start: 2018-11-02 | End: 2018-11-09 | Stop reason: HOSPADM

## 2018-11-02 RX ORDER — HALOPERIDOL 5 MG/1
5 TABLET ORAL DAILY
Status: DISCONTINUED | OUTPATIENT
Start: 2018-11-02 | End: 2018-11-09 | Stop reason: HOSPADM

## 2018-11-02 RX ADMIN — IPRATROPIUM BROMIDE AND ALBUTEROL SULFATE 3 ML: .5; 3 SOLUTION RESPIRATORY (INHALATION) at 21:00

## 2018-11-02 RX ADMIN — HALOPERIDOL 10 MG: 10 TABLET ORAL at 21:11

## 2018-11-02 RX ADMIN — VITAMIN D, TAB 1000IU (100/BT) 1000 UNITS: 25 TAB at 09:25

## 2018-11-02 RX ADMIN — BUSPIRONE HYDROCHLORIDE 15 MG: 10 TABLET ORAL at 17:36

## 2018-11-02 RX ADMIN — LEVOTHYROXINE SODIUM 100 MCG: 100 TABLET ORAL at 06:31

## 2018-11-02 RX ADMIN — PRAVASTATIN SODIUM 40 MG: 40 TABLET ORAL at 21:11

## 2018-11-02 RX ADMIN — THERA TABS 1 TABLET: TAB at 09:25

## 2018-11-02 RX ADMIN — LORAZEPAM 1 MG: 1 TABLET ORAL at 19:43

## 2018-11-02 RX ADMIN — BUSPIRONE HYDROCHLORIDE 15 MG: 10 TABLET ORAL at 09:25

## 2018-11-02 RX ADMIN — SERTRALINE HYDROCHLORIDE 200 MG: 50 TABLET ORAL at 17:36

## 2018-11-02 RX ADMIN — HALOPERIDOL 5 MG: 5 TABLET ORAL at 11:48

## 2018-11-02 RX ADMIN — ASPIRIN 325 MG: 325 TABLET ORAL at 09:25

## 2018-11-02 NOTE — PROGRESS NOTES
1520 Problem: Depressed Mood (Adult/Pediatric)  Goal: *STG: Verbalizes anger, guilt, and other feelings in a constructive manor  Outcome: Progressing Towards Goal  Received pt resting in her bed quietly. 1:1 at her side. Staff will continue 1:1 and encourage pt to share feelings. 1609 patient brushed her teeth with 1:1 at her side. She ambulates steadily. When throwing a paper towel in the trash, she is seen to look into trash can and staff monitor her. 1700 pt ate dinner with good appetite. She is now lying down in her room with 1:1 staff at her side. 1800 pt resting in bed quietly. Staff will continue 1:1.  Elisdanielle Vaughn pt up walking in hallway with 1:1 staff at her side. Continue 1:1  2000 patient resting in bed. Continue 1:1.  2104 patient in room with 1:1 staff and RT for her breathing treatment. Continue 1:1.  0042 patient resting in room with 1:1 at her side. 2300 pt resting in room with 1:1 at her side.  Continue 1:1

## 2018-11-02 NOTE — INTERDISCIPLINARY ROUNDS
Behavioral Health Interdisciplinary Rounds     Patient Name: Jez White  Age: 59 y.o. Room/Bed:  726/02  Primary Diagnosis: [facility] psychotic [facility]   Admission Status: Involuntary Commitment     Readmission within 30 days: no  Power of  in place: no  Patient requires a blocked bed: yes          Reason for blocked bed: Patient on 1:1        VTE Prophylaxis: Not indicated    Mobility needs/Fall risk: no  Flu Vaccine : no   Nutritional Plan: no  Consults:          Labs/Testing due today?: no    Sleep hours:  5      Participation in Care/Groups:  yes  Medication Compliant?: Yes  PRNS (last 24 hours): Antianxiety and Sleep Aid    Restraints (last 24 hours):  no     CIWA (range last 24 hours):     COWS (range last 24 hours):      Alcohol screening (AUDIT) completed -   AUDIT Score: 0     If applicable, date SBIRT discussed in treatment team AND documented:     Tobacco - patient is a smoker: Have You Used Tobacco in the Past 30 Days: No  Illegal Drugs use: Have You Used Any Illegal Substances Over the Past 12 Months: No    24 hour chart check complete: yes     Patient goal(s) for today:   Treatment team focus/goals: Plan to keep on 1:1 and titrate his medications   Progress note : continues to hear voices to harm self, unable to contract for safety     LOS:  6  Expected LOS: TBD    Financial concerns/prescription coverage: BRITTANY   Date of last family contact: DEV spoke to her father today.        Family requesting physician contact today:    Discharge plan:she will return home   Guns in the home: no     Outpatient provider(s): [address] [place] [address]     Participating treatment team members: Wiley Javed, PharmD. - Dada Clayton

## 2018-11-02 NOTE — BH NOTES
Blocked Bed Documentation:    Room number: 726  Type: Behavior  Rationale: Poor Self-Control  Anticipated duration: reassess in 24 hours  Additional comments:remains on 1:1 observation

## 2018-11-02 NOTE — BH NOTES
1415-pt lay quielty in bed, remains on 1:1 for safety    1515-pt reporting increasing anxiety, given po prn ativan    1615-pt lay quietly in bed, remains on 1:1 for safety    1630-pt out to dining room with peers for dinner, remains on 1:1 for safety    1730-pt out of bed to bathroom, remains on 1: 1 for safety    1830-pt lay quietly in bed, remains on 1: 1 for safety    1930-pt out to dining room, talking with staff, remains on 1:1 for safety    2020-pt found to have fork under sheets, RN noticed pt was moving arm with fork still wrapped in plastic in hand, fork readily relinquished by patient. Skin search completed by this writer and ARNULFO Ugarte. No new non-intact skin observed. Pt tearful reporting impulsive feelings to hurt self.    PRN Medication Documentation    Specific patient behavior that led to need for PRN medication: pt  teardul reporting impulsive feelings to harm self  Staff interventions attempted prior to PRN being given: listening presence, reassurance  PRN medication given: ativan 2mg IM at 2021  Patient response/effectiveness of PRN medication: 45 minutes post administration pt reports still feeling anxious, hwoever feelings to hurt self had decreased    2109-pt took scheduled meds, remains on 1: 1 for safety

## 2018-11-02 NOTE — BH NOTES
PRN Medication Documentation    Specific patient behavior that led to need for PRN medication: pt asked for sleep aide  Staff interventions attempted prior to PRN being given: assessment, therapeutic listening  PRN medication given: ambien 10mg  Patient response/effectiveness of PRN medication: pt in bed will follow up and continue to monitor

## 2018-11-02 NOTE — BH NOTES
Blocked Bed Documentation:    Room number: 726  Type: Behavior  Rationale: Impulsive  Anticipated duration: unknown

## 2018-11-02 NOTE — BH NOTES
PSYCHIATRIC PROGRESS NOTE         Patient Name  Yelena Kramer   Date of Birth 1960   [facility] [id number]   Medical Record Number  [id number]      Age  59 y.o. PCP Robert Crawley MD   Admit date:  10/27/2018    Room Number  726/02  @ [facility]   Date of Service  11/2/2018           E & M PROGRESS NOTE:         HISTORY         REASON FOR HOSPITALIZATION:  CC: \". Pt admitted under a temporary nursing home order (TDO) [facility] for severe depression with suicidal ideations  proving to be an imminent danger to self and an inability to care for self. HISTORY OF PRESENT ILLNESS:    The patient, Yelena Kramer, is a 59 y.o. WHITE [place]  female with a past psychiatric history significant for depression, anxiety, psychosis, hearing voices ho presents at this time with complaints of (and/or evidence of) the following emotional symptoms: suicidal thoughts/threats, delusions, psychotic behavior and paranoid behavior. Additional symptomatology include difficulty swallowing and feeling suicidal.  The above symptoms have been present for Pt reports her mother passed away in April and she feels she could have done more, reports she has not been taking her meds for one and half week. Pt was on zoloft 200 at night and zyprexa po bid as per reports. Pt reports she is getting thoughts of cutting herself, she feels responsible for mother's death, pt ha s ntense guilt, decrease appetite, difficulty sleeping, poor eye contact and auditory hallucinations. These symptoms are of intense severity. These symptoms are constant in nature. 10/28/18: As per staff, pt sneaked in X-acto knife while nurse was looking for med lsit from her bag,. Pt made multiple superficial cuts on her rt arm, needing medical consult. Knife was found with pt and wound were treated, no stiches were needed. Pt continues to be dystaymic, having suicidal ideation.   10/29/18: Pt continues to be very anxious and depressed, report suicidal ideations and urges to cut. Pt is on 1:1 observation. Pts cholesterol is 326 and tsh 0.5. Pt slept well. Pt received ativan 1mg twice yesterday and pt also tried to elope yesterday. 10/30/18: Pt reports she slept better. Pt reports she has an appointment with social security. Pt reports she feels little better but still has suicidal thoughts and impulsivity. 10/31/18:Pt is still dysthymic, verbalizing urges to harm herself, not romulo for safety,still hearing voices, poor ADL's.  11/2/18: Pt still depressed, sneaked in fork after lunch which was taken back by the nurse. Pt reports gets urges to cut and hear voices, still ahs urges to cut. SIDE EFFECTS: (reviewed/updated 11/2/2018)  None reported or admitted to. No noted toxicity with use of epakote/Tegretol/lithium/Clozaril/TCAs   ALLERGIES:(reviewed/updated 11/2/2018)  Allergies   Allergen Reactions    Other Food Other (comments)     \"Bad chest pain\" with walnuts. Coke - asthma/stuffy head. Fish sticks causes an asthma attack.  Astepro [Azelastine] Other (comments)     Violent headaches.  Bactrim [Sulfamethoprim] Other (comments)     Difficulty breathing, chills, fever.  Banana Other (comments)     Diffculty breathing, wheezing, asthma attack.  Coconut Other (comments)     Difficulty breathing, asthma attack, SOB.  Cortisone Hives     Difficulty breathing.  Nut - Unspecified Other (comments)     \"Bad chest pain\" with walnuts    Peanut Other (comments)     Wheezing, asthma attack, SOB.  Prednisone Hives     Difficulty breathing, kidney stones. MEDICATIONS PRIOR TO ADMISSION:(reviewed/updated 11/2/2018)  Medications Prior to Admission   Medication Sig    albuterol (PROVENTIL HFA, VENTOLIN HFA, PROAIR HFA) 90 mcg/actuation inhaler Take 2 Puffs by inhalation every six (6) hours as needed for Wheezing.  beclomethasone (QVAR) 40 mcg/actuation aero Take 2 Puffs by inhalation two (2) times a day.     CLONIDINE HCL PO Take 1/2 of 0.1mg tablet QHS    busPIRone (BUSPAR) 15 mg tablet Take 15 mg by mouth three (3) times daily.  QUEtiapine (SEROQUEL) 50 mg tablet Take 50 mg by mouth nightly.  OLANZapine (ZYPREXA) 20 mg tablet Take 20 mg by mouth nightly.  hydrOXYzine HCl (ATARAX) 50 mg tablet Take 50 mg by mouth every four (4) hours as needed for Anxiety.  prazosin (MINIPRESS) 1 mg capsule Take 1 mg by mouth nightly.  clonazePAM (KLONOPIN) 1 mg tablet Take 1 mg by mouth nightly.  OLANZapine (ZYPREXA) 5 mg tablet Take 5 mg by mouth daily (after breakfast).  aspirin (ASPIRIN) 325 mg tablet Take 1 Tab by mouth daily. Indications: myocardial infarction prevention    levothyroxine (SYNTHROID) 125 mcg tablet Take 1 Tab by mouth Daily (before breakfast). Indications: hypothyroidism    sertraline (ZOLOFT) 100 mg tablet Take 2 Tabs by mouth daily (with dinner). Indications: Generalized Anxiety Disorder, major depressive disorder    zolpidem (AMBIEN) 10 mg tablet Take 1 Tab by mouth nightly. Max Daily Amount: 10 mg. Indications: SLEEP-ONSET INSOMNIA    cholecalciferol (VITAMIN D3) 1,000 unit tablet Take 1 Tab by mouth daily. Indications: PREVENTION OF VITAMIN D DEFICIENCY, Vitamin D Deficiency    fluticasone (FLONASE) 50 mcg/actuation nasal spray 2 Sprays by Both Nostrils route daily. Indications: Allergic Rhinitis    therapeutic multivitamin (THERA) tablet Take 1 Tab by mouth daily.  cyanocobalamin, vitamin B-12, (VITAMIN B-12) 5,000 mcg subl 5,000 mcg by SubLINGual route daily. PAST MEDICAL HISTORY: Past medical history from the initial psychiatric evaluation has been reviewed (reviewed/updated 11/2/2018) with no additional updates (I asked patient and no additional past medical history provided).    Past Medical History:   Diagnosis Date    Asthma 1967    hospitalized for asthma attack x 2    Chronic kidney disease     kidney stones x 2-3 times    Ill-defined condition     nasal blockage from growth and misalignment - cannot breath out of nose - surgery in the future/cleared for colonoscopy 7/31/2014 - will bring in letter    Other ill-defined conditions(799.89)     blood in stool    Other ill-defined conditions(799.89)     hx low BP - 90's/60's-70's    PUD (peptic ulcer disease)     Thyroid disease     Unspecified adverse effect of anesthesia     nasal growth and misalignment and cannot breath through nose     Past Surgical History:   Procedure Laterality Date    HX HEENT      T & A    HX HEENT      turbinate surgery      SOCIAL HISTORY: Social history from the initial psychiatric evaluation has been reviewed (reviewed/updated 11/2/2018) with no additional updates (I asked patient and no additional social history provided).    Social History     Socioeconomic History    Marital status: SINGLE     Spouse name: Not on file    Number of children: Not on file    Years of education: Not on file    Highest education level: Not on file   Social Needs    Financial resource strain: Not on file    Food insecurity - worry: Not on file    Food insecurity - inability: Not on file    Transportation needs - medical: Not on file   [employer] needs - non-medical: Not on file   Occupational History    Not on file   Tobacco Use    Smoking status: Never Smoker    Smokeless tobacco: Never Used   Substance and Sexual Activity    Alcohol use: No    Drug use: No    Sexual activity: Not Currently   Other Topics Concern     Service Not Asked    Blood Transfusions Not Asked    Caffeine Concern Not Asked    Occupational Exposure Not Asked    Hobby Hazards Not Asked    Sleep Concern Not Asked    Stress Concern Not Asked    Weight Concern Not Asked    Special Diet Not Asked    Back Care Not Asked    Exercise Not Asked    Bike Helmet Not Asked   [address] Not Asked    Self-Exams Not Asked   Social History Narrative    Not on file      FAMILY HISTORY: Family history from the initial psychiatric evaluation has been reviewed (reviewed/updated 11/2/2018) with no additional updates (I asked patient and no additional family history provided). Family History   Problem Relation Age of Onset    Stroke Mother     Other Mother         erosion of esophagus    Cancer Mother         melanoma/squamous    Heart Surgery Father         x2    Delayed Awakening Father     Pacemaker Father     Other Father         carotid endartarectomy/polio    Cataract Father        REVIEW OF SYSTEMS: (reviewed/updated 11/2/2018)  Appetite:good   Sleep: improved   All other Review of Systems: Negative except   Multiple cuts on rt arm       MENTAL STATUS EXAM & VITALS      MENTAL STATUS EXAM (MSE):    MSE FINDINGS ARE WITHIN NORMAL LIMITS (WNL) UNLESS OTHERWISE STATED BELOW. ( ALL OF THE BELOW CATEGORIES OF THE MSE HAVE BEEN REVIEWED (reviewed 10/27/2018) AND UPDATED AS DEEMED APPROPRIATE )  General Presentation age appropriate and disheveled, evasive and guarded   Orientation oriented to time, place and person   Vital Signs  See below (reviewed 10/27/2018); Vital Signs (BP, Pulse, & Temp) are within normal limits if not listed below.    Gait and Station Stable/steady, no ataxia   Musculoskeletal System No extrapyramidal symptoms (EPS); no abnormal muscular movements or Tardive Dyskinesia (TD); muscle strength and tone are within normal limits   Language No aphasia or dysarthria   Speech:  soft   Thought Processes logical; slow rate of thoughts; poor abstract reasoning/computation   Thought Associations blocked    Thought Content paranoid delusions   Suicidal Ideations Intention, still have urges   Homicidal Ideations no plan  and no intention   Mood:  depressed and anhedonia   Affect:  constricted   Memory recent  intact   Memory remote:  intact   Concentration/Attention:  distractable   Fund of Knowledge average   Insight:  poor   Reliability poor   Judgment:  poor                      VITALS:     Patient Vitals for the past 24 hrs:   Temp Pulse Resp BP SpO2   11/02/18 0806 98.1 °F (36.7 °C) 68 16 94/62 97 %   11/01/18 2058 98.3 °F (36.8 °C) 76 18 115/78 96 %   11/01/18 1517 97.6 °F (36.4 °C) 77 18 -- 98 %   11/01/18 1125 97.7 °F (36.5 °C) 60 18 113/79 97 %     Wt Readings from Last 3 Encounters:   10/27/18 70.3 kg (155 lb)   07/27/18 72.6 kg (160 lb)   07/16/18 72.6 kg (160 lb)     Temp Readings from Last 3 Encounters:   11/02/18 98.1 °F (36.7 °C)   08/13/18 98.5 °F (36.9 °C)   07/26/18 98 °F (36.7 °C)     BP Readings from Last 3 Encounters:   11/02/18 94/62   08/13/18 91/53   07/26/18 108/71     Pulse Readings from Last 3 Encounters:   11/02/18 68   08/13/18 (!) 57   07/26/18 62            DATA     LABORATORY DATA:(reviewed/updated 11/2/2018)  No results found for this or any previous visit (from the past 24 hour(s)). No results found for: VALF2, VALAC, VALP, VALPR, DS6, CRBAM, CRBAMP, CARB2, XCRBAM  No results found for: LITHM   RADIOLOGY REPORTS:(reviewed/updated 11/2/2018)  No results found.        MEDICATIONS     ALL MEDICATIONS:   Current Facility-Administered Medications   Medication Dose Route Frequency    haloperidol (HALDOL) tablet 10 mg  10 mg Oral QHS    sertraline (ZOLOFT) tablet 150 mg  150 mg Oral QPM    busPIRone (BUSPAR) tablet 15 mg  15 mg Oral BID    levothyroxine (SYNTHROID) tablet 100 mcg  100 mcg Oral ACB    pravastatin (PRAVACHOL) tablet 40 mg  40 mg Oral QHS    budesonide (PULMICORT) 500 mcg/2 ml nebulizer suspension  500 mcg Nebulization BID RT    ziprasidone (GEODON) 20 mg in sterile water (preservative free) 1 mL injection  20 mg IntraMUSCular BID PRN    OLANZapine (ZyPREXA) tablet 5 mg  5 mg Oral Q6H PRN    benztropine (COGENTIN) tablet 2 mg  2 mg Oral BID PRN    benztropine (COGENTIN) injection 2 mg  2 mg IntraMUSCular BID PRN    LORazepam (ATIVAN) injection 2 mg  2 mg IntraMUSCular Q4H PRN    LORazepam (ATIVAN) tablet 1 mg  1 mg Oral Q4H PRN    zolpidem (AMBIEN) tablet 10 mg  10 mg Oral QHS PRN  acetaminophen (TYLENOL) tablet 650 mg  650 mg Oral Q4H PRN    ibuprofen (MOTRIN) tablet 400 mg  400 mg Oral Q8H PRN    magnesium hydroxide (MILK OF MAGNESIA) 400 mg/5 mL oral suspension 30 mL  30 mL Oral DAILY PRN    nicotine (NICODERM CQ) 21 mg/24 hr patch 1 Patch  1 Patch TransDERmal DAILY PRN    aspirin tablet 325 mg  325 mg Oral DAILY    cholecalciferol (VITAMIN D3) tablet 1,000 Units  1,000 Units Oral DAILY    therapeutic multivitamin (THERAGRAN) tablet 1 Tab  1 Tab Oral DAILY    hydrOXYzine HCl (ATARAX) tablet 50 mg  50 mg Oral TID PRN    albuterol-ipratropium (DUO-NEB) 2.5 MG-0.5 MG/3 ML  3 mL Nebulization Q6H PRN      SCHEDULED MEDICATIONS:   Current Facility-Administered Medications   Medication Dose Route Frequency    haloperidol (HALDOL) tablet 10 mg  10 mg Oral QHS    sertraline (ZOLOFT) tablet 150 mg  150 mg Oral QPM    busPIRone (BUSPAR) tablet 15 mg  15 mg Oral BID    levothyroxine (SYNTHROID) tablet 100 mcg  100 mcg Oral ACB    pravastatin (PRAVACHOL) tablet 40 mg  40 mg Oral QHS    budesonide (PULMICORT) 500 mcg/2 ml nebulizer suspension  500 mcg Nebulization BID RT    aspirin tablet 325 mg  325 mg Oral DAILY    cholecalciferol (VITAMIN D3) tablet 1,000 Units  1,000 Units Oral DAILY    therapeutic multivitamin (THERAGRAN) tablet 1 Tab  1 Tab Oral DAILY          ASSESSMENT & PLAN     DIAGNOSES REQUIRING ACTIVE TREATMENT AND MONITORING: (reviewed/updated 11/2/2018)  Patient Active Hospital Problem List:   The patient, Faheem Danielle, is a 59 y.o.  female who presents at this time for treatment of the following diagnoses:  Patient Active Hospital Problem List:   Major depression with psychotic features ([facility] [address] (10/27/2018)    Assessment: depression, suicidal ideations anxiety, auditory hallucination     Plan: restarting meds zoloft 25 mg po hs, zyprexa 5 mg po bid, buspar 5 mg po bid. Indiv/milue therapy  Get collaterals, safety, suicidal precautions  10/28/18:  Pt was put on 1:! Observation as soon as writer heard about cutting. Increasing zoloft and zyprexa 10 mg po hs., indiv milue therapy, suicide precautions, check for contrabands  10/29/18: high cholesterol, will change zyprexa to haldol first generation antipsychotic., increasing buspar 15 mg po bid  Hyprelipidemia: starting atrovastatin 20 mg daily  10/30/18, stop zyprexa, increase haldol 5 mg po hs, plan on starting decoanate, increasing zoloft 100mg po hs.  10/31/18: Increasing zoloft 150 mg po hs, continue meds/therapy nad 1:1 observation, encourage to go to groups and ADL's.  11/2/18: adding haldol 2 mg po q am continue meds, increasing zoloft 200mg po daily, adding clonidine 0.1 mg half tab. Pt reports that helped her with nightmares in the past          I will continue to monitor blood levels (Depakote, Tegretol, lithium, clozapine---a drug with a narrow therapeutic index= NTI) and associated labs for drug therapy implemented that require intense monitoring for toxicity as deemed appropriate based on current medication side effects and pharmacodynamically determined drug 1/2 lives. In summary, Glenna Knight, is a 59 y.o.  female who presents with a severe exacerbation of the principal diagnosis of Major depression with psychotic features ([facility] [address]  Patient's condition is orsening/not improving/not stable improving. Patient requires continued inpatient hospitalization for further stabilization, safety monitoring and medication management. I will continue to coordinate the provision of individual, milieu, occupational, group, and substance abuse therapies to address target symptoms/diagnoses as deemed appropriate for the individual patient. A coordinated, multidisplinary treatment team round was conducted with the patient (this team consists of the nurse, psychiatric unit pharmcist,  and writer).      Complete current electronic health record for patient has been reviewed today including consultant notes, ancillary staff notes, nurses and psychiatric tech notes. icide risk assessment completed and patient deemed to be of low risk for suicide at this time. The following regarding medications was addressed during rounds with patient:   the risks and benefits of the proposed medication. The patient was given the opportunity to ask questions. Informed consent given to the use of the above medications. Will continue to adjust psychiatric and non-psychiatric medications (see above \"medication\" section and orders section for details) as deemed appropriate & based upon diagnoses and response to treatment. I will continue to order blood tests/labs and diagnostic tests as deemed appropriate and review results as they become available (see orders for details and above listed lab/test results). I will order psychiatric records from previous [facility] hospitals to further elucidate the nature of patient's psychopathology and review once available. I will gather additional collateral information from riends, family and o/p treatment team to further elucidate the nature of patient's psychopathology and baselline level of psychiatric functioning. I certify that this patient's inpatient psychiatric hospital services furnished since the previous certification were, and continue to be, required for treatment that could reasonably be expected to improve the patient's condition, or for diagnostic study, and that the patient continues to need, on a daily basis, active treatment furnished directly by or requiring the supervision of inpatient psychiatric facility personnel. In addition, the hospital records show that services furnished were intensive treatment services, admission or related services, or equivalent services.     EXPECTED DISCHARGE DATE/DAY: BD     DISPOSITION:ome       Signed By:   Mario Hwang MD  11/2/2018

## 2018-11-02 NOTE — BH NOTES
Blocked Bed Documentation:     Room number: 726  Type: Behavior  Rationale: Poor Self-Control  Anticipated duration: reassess in 24 hours  Additional comments:remains on 1:1 for safety

## 2018-11-02 NOTE — PROGRESS NOTES
Problem: Depressed Mood (Adult/Pediatric)  Goal: *STG: Demonstrates reduction in symptoms and increase in insight into coping skills/future focused  Outcome: Not Progressing Towards Goal  Pt reports command hallucinations to hurt self. Pt received ativan prn twice this shift. Pt remains on 1:1 for safety and continues to report not feeling safe.

## 2018-11-02 NOTE — BH NOTES
GROUP THERAPY PROGRESS NOTE    Sharee Mckeon participated in an Afternoon Process Group on the Acute Unit with a focus on identifying feelings, planning for the day, and learning more about Self-nurturance. Group time: 65 minutes. Personal goal for participation: To identify feelings, increase the capacity to manage ones ability to cope through Wellness in several areas of personal life. Goal orientation: The patient will be able to introduce themselves to their peers, identify their feelings, and consider the importance of coping skill development, particularly in seven areas of ones life. Group therapy participation: With prompting, this patient  participated in the group, after joining the session about ten minutes after it started. Therapeutic interventions reviewed and discussed: The patients were encouraged to identify themselves by their first names, speak to their feelings, and define a goal for their day. The group participated in a discussion of a handout that suggested seven areas of one's life that may contribute to self-nurturance: physical nurturing, adult relationships, my voice, creativity, self-compassion, contributions and/or meaning, and limit setting. The group members were provided a copy of the handout to review on their own. Impression of participation: The patient looked like she might have been listening to her peers after joining the group and sitting quietly until called on. She said she wanted \"to talk to my Dad. \" She acknowledged that she was limited in her telephone use as long as she was on \"one on one\" precautions and risk for self harm. She spoke, for example, of the cuts on her arm that still looked red and sore. When asked what she was thinking when she cut herself she said she didn't want to say in group. She was encouraged to obtain a journal, perhaps write about those feelings, and finding someone on staff to share them with.  She looked alert, generally oriented, and timid. She expressed no current SI/HI and no overt psychotic symptoms. Her affect was very depressed and anxious. Her mood matched her affect. She may need to be encouraged to gradually write about and/or speak about the thoughts associated with triggering self-harm.

## 2018-11-02 NOTE — PROGRESS NOTES
Problem: Depressed Mood (Adult/Pediatric)  Goal: *STG: Participates in treatment plan  Outcome: Progressing Towards Goal  Patient remains 1:1 with staff for having suicidal thoughts. Per report she hid a plastic fork under her mattress. Up ad samson. Patient med and meal compliant. Does not state goal. Staff goal: to teach coping skills and remain 1:1 to prevent self-harm. Goal: Interventions  Outcome: Progressing Towards Goal  Medication management. Q15 min safety rounds. Group therapy. 0715: Received patient in room resting with eyes closed, non-labored breathing with 1:1 staff present. No self-harm behavior noted. 0815: Patient in room resting in bed quietly. 1:1 staff present. No self-harm behavior noted. 2114: Patient sitting at the dining room table during morning group. Calm. 1:1 staff present. No self-harm behavior noted. 1010: Patient remains on 1:1 for suicide precaution. No self-harm behavior noted. 1110: Patient remains on 1:1 for suicide precaution. No self-harm behavior noted. 1210: Patient remains on 1:1 for suicide precaution. No self-harm behavior noted. 1310: Patient remains on 1:1 for suicide precaution. No self-harm behavior noted. 1410: Patient remains on 1:1 for suicide precaution. No self-harm behavior noted. 1440: Patient remains on 1:1 for suicide precaution. No self-harm behavior noted.

## 2018-11-02 NOTE — BH NOTES
PRN Medication Documentation    Specific patient behavior that led to need for PRN medication: patient c/o anxiety. Staff interventions attempted prior to PRN being given: decreased stimuli,rest  PRN medication given: Ativan 1 mg per MD orders  Patient response/effectiveness of PRN medication: pending    2035: Follow-up assessment   Patient stated she still has anxiety but has improved some. Patient is lying in the bed awake.  1:1 staff  Patient has scheduled medications Clonidine and Haldol 2100 and 2200

## 2018-11-02 NOTE — BH NOTES
GROUP THERAPY PROGRESS NOTE The patient Sabrina Monique is participating in [employer]. Group time: 30 minutes Personal goal for participation: to orient the patient to the unit. Goal orientation: successful adoption of unit rules Group therapy participation: active Therapeutic interventions reviewed and discussed: Yes Impression of participation:  
 
Ezekiel Schreiber 11/2/2018 9:08 AM

## 2018-11-02 NOTE — BH NOTES
GROUP THERAPY PROGRESS NOTE    John Forde is participating in D/C Planning Group    Group time: 1 hour    Personal goal for participation: Readiness for Discharge    Goal orientation: Balancing Your Wellness Wheel    Group therapy participation: passive    Therapeutic interventions reviewed and discussed:     Impression of participation: Pt was escorted by staff as she remains on 1:1  Close up observation. Pt had very limited interactions with peers or US. She said quietly w/o any input but US observed her sharing or nodding her head but she did not engage group despite numerous prompts by [address].

## 2018-11-02 NOTE — BH NOTES
0715- pt on 1:1 for safety. NAD. Pt resting in bed. 0809- pt on 1:1 for safety. Pt resting. 0915- pt  On 1:1 for safety. Pt calm. Impaired judgement. NAD. 1015- pt on 1:1 for safety. Education provided continuous. Limit setting in place. Pt irritable. 1035- pt on 1:1 for safety. NAD. Pt request breathing treatment. RT contacted at [phone]. RT will see pt.      Blocked Bed Documentation:    Room number: 726  Type: Behavior  Rationale: Poor Self-Control  Anticipated duration: re-evaluation in 24 hrs  Additional comments:pt on 1:1 for safety

## 2018-11-02 NOTE — PROGRESS NOTES
Problem: Falls - Risk of  Goal: *Absence of Falls  Document Samanta Fall Risk and appropriate interventions in the flowsheet. Outcome: Progressing Towards Goal  Fall Risk Interventions:       Mentation Interventions: Adequate sleep, hydration, pain control    Medication Interventions: Patient to call before getting OOB, Teach patient to arise slowly    Elimination Interventions: Patient to call for help with toileting needs    History of Falls Interventions: Door open when patient unattended    Resting in bed with eyes closed, no complaints, no distress noted. Safety measures in place, will continue to monitor.

## 2018-11-03 PROCEDURE — 94640 AIRWAY INHALATION TREATMENT: CPT

## 2018-11-03 PROCEDURE — 65220000003 HC RM SEMIPRIVATE PSYCH

## 2018-11-03 PROCEDURE — 74011250637 HC RX REV CODE- 250/637: Performed by: PSYCHIATRY & NEUROLOGY

## 2018-11-03 PROCEDURE — 74011250637 HC RX REV CODE- 250/637: Performed by: NURSE PRACTITIONER

## 2018-11-03 PROCEDURE — 74011000250 HC RX REV CODE- 250: Performed by: NURSE PRACTITIONER

## 2018-11-03 RX ADMIN — ASPIRIN 325 MG: 325 TABLET ORAL at 09:46

## 2018-11-03 RX ADMIN — HALOPERIDOL 5 MG: 5 TABLET ORAL at 09:46

## 2018-11-03 RX ADMIN — VITAMIN D, TAB 1000IU (100/BT) 1000 UNITS: 25 TAB at 09:46

## 2018-11-03 RX ADMIN — HALOPERIDOL 10 MG: 10 TABLET ORAL at 21:21

## 2018-11-03 RX ADMIN — THERA TABS 1 TABLET: TAB at 09:47

## 2018-11-03 RX ADMIN — SERTRALINE HYDROCHLORIDE 200 MG: 50 TABLET ORAL at 17:40

## 2018-11-03 RX ADMIN — PRAVASTATIN SODIUM 40 MG: 40 TABLET ORAL at 21:20

## 2018-11-03 RX ADMIN — BUSPIRONE HYDROCHLORIDE 15 MG: 10 TABLET ORAL at 09:47

## 2018-11-03 RX ADMIN — BUSPIRONE HYDROCHLORIDE 15 MG: 10 TABLET ORAL at 17:39

## 2018-11-03 RX ADMIN — BUDESONIDE 500 MCG: 0.5 INHALANT RESPIRATORY (INHALATION) at 20:53

## 2018-11-03 RX ADMIN — LEVOTHYROXINE SODIUM 100 MCG: 100 TABLET ORAL at 06:46

## 2018-11-03 NOTE — BH NOTES
PSYCHIATRIC PROGRESS NOTE         Patient Name  Zofia Segura   Date of Birth 1960   [facility] [id number]   Medical Record Number  [id number]      Age  59 y.o. PCP Dhiraj Rodriguez MD   Admit date:  10/27/2018    Room Number  726/02  @ [facility]   Date of Service  11/3/2018           E & M PROGRESS NOTE:         HISTORY         REASON FOR HOSPITALIZATION:  CC: \". Pt admitted under a temporary USP order (TDO) [place] tdo oco for severe depression with suicidal ideations  proving to be an imminent danger to self and an inability to care for self. HISTORY OF PRESENT ILLNESS:    The patient, Zofia Segura, is a 59 y.o. WHITE [place]  female with a past psychiatric history significant for depression, anxiety, psychosis, hearing voices ho presents at this time with complaints of (and/or evidence of) the following emotional symptoms: suicidal thoughts/threats, delusions, psychotic behavior and paranoid behavior. Additional symptomatology include difficulty swallowing and feeling suicidal.  The above symptoms have been present for Pt reports her mother passed away in April and she feels she could have done more, reports she has not been taking her meds for one and half week. Pt was on zoloft 200 at night and zyprexa po bid as per reports. Pt reports she is getting thoughts of cutting herself, she feels responsible for mother's death, pt ha s ntense guilt, decrease appetite, difficulty sleeping, poor eye contact and auditory hallucinations. These symptoms are of intense severity. These symptoms are constant in nature. 10/28/18: As per staff, pt sneaked in X-acto knife while nurse was looking for med lsit from her bag,. Pt made multiple superficial cuts on her rt arm, needing medical consult. Knife was found with pt and wound were treated, no stiches were needed. Pt continues to be dystaymic, having suicidal ideation.   10/29/18: Pt continues to be very anxious and depressed, report suicidal ideations and urges to cut. Pt is on 1:1 observation. Pts cholesterol is 326 and tsh 0.5. Pt slept well. Pt received ativan 1mg twice yesterday and pt also tried to elope yesterday. 10/30/18: Pt reports she slept better. Pt reports she has an appointment with social security. Pt reports she feels little better but still has suicidal thoughts and impulsivity. 10/31/18:Pt is still dysthymic, verbalizing urges to harm herself, not romulo for safety,still hearing voices, poor ADL's.  11/2/18: Pt still depressed, sneaked in fork after lunch which was taken back by the nurse. Pt reports gets urges to cut and hear voices, still ahs urges to cut. 11/3/18- Remains on 1 to 1 for ongoing urges to cut on herself, not contacting for safety. Asking to make a phone call. Assured that she could make a phone call when off one to one. Prn used-Ativan. Slept 8 hrs. SIDE EFFECTS: (reviewed/updated 11/3/2018)  None reported or admitted to. No noted toxicity with use of epakote/Tegretol/lithium/Clozaril/TCAs   ALLERGIES:(reviewed/updated 11/3/2018)  Allergies   Allergen Reactions    Other Food Other (comments)     \"Bad chest pain\" with walnuts. Coke - asthma/stuffy head. Fish sticks causes an asthma attack.  Astepro [Azelastine] Other (comments)     Violent headaches.  Bactrim [Sulfamethoprim] Other (comments)     Difficulty breathing, chills, fever.  Banana Other (comments)     Diffculty breathing, wheezing, asthma attack.  Coconut Other (comments)     Difficulty breathing, asthma attack, SOB.  Cortisone Hives     Difficulty breathing.  Nut - Unspecified Other (comments)     \"Bad chest pain\" with walnuts    Peanut Other (comments)     Wheezing, asthma attack, SOB.  Prednisone Hives     Difficulty breathing, kidney stones.       MEDICATIONS PRIOR TO ADMISSION:(reviewed/updated 11/3/2018)  Medications Prior to Admission   Medication Sig    albuterol (PROVENTIL HFA, VENTOLIN HFA, PROAIR HFA) 90 mcg/actuation inhaler Take 2 Puffs by inhalation every six (6) hours as needed for Wheezing.  beclomethasone (QVAR) 40 mcg/actuation aero Take 2 Puffs by inhalation two (2) times a day.  CLONIDINE HCL PO Take 1/2 of 0.1mg tablet QHS    busPIRone (BUSPAR) 15 mg tablet Take 15 mg by mouth three (3) times daily.  QUEtiapine (SEROQUEL) 50 mg tablet Take 50 mg by mouth nightly.  OLANZapine (ZYPREXA) 20 mg tablet Take 20 mg by mouth nightly.  hydrOXYzine HCl (ATARAX) 50 mg tablet Take 50 mg by mouth every four (4) hours as needed for Anxiety.  prazosin (MINIPRESS) 1 mg capsule Take 1 mg by mouth nightly.  clonazePAM (KLONOPIN) 1 mg tablet Take 1 mg by mouth nightly.  OLANZapine (ZYPREXA) 5 mg tablet Take 5 mg by mouth daily (after breakfast).  aspirin (ASPIRIN) 325 mg tablet Take 1 Tab by mouth daily. Indications: myocardial infarction prevention    levothyroxine (SYNTHROID) 125 mcg tablet Take 1 Tab by mouth Daily (before breakfast). Indications: hypothyroidism    sertraline (ZOLOFT) 100 mg tablet Take 2 Tabs by mouth daily (with dinner). Indications: Generalized Anxiety Disorder, major depressive disorder    zolpidem (AMBIEN) 10 mg tablet Take 1 Tab by mouth nightly. Max Daily Amount: 10 mg. Indications: SLEEP-ONSET INSOMNIA    cholecalciferol (VITAMIN D3) 1,000 unit tablet Take 1 Tab by mouth daily. Indications: PREVENTION OF VITAMIN D DEFICIENCY, Vitamin D Deficiency    fluticasone (FLONASE) 50 mcg/actuation nasal spray 2 Sprays by Both Nostrils route daily. Indications: Allergic Rhinitis    therapeutic multivitamin (THERA) tablet Take 1 Tab by mouth daily.  cyanocobalamin, vitamin B-12, (VITAMIN B-12) 5,000 mcg subl 5,000 mcg by SubLINGual route daily. PAST MEDICAL HISTORY: Past medical history from the initial psychiatric evaluation has been reviewed (reviewed/updated 11/3/2018) with no additional updates (I asked patient and no additional past medical history provided). Past Medical History:   Diagnosis Date    Asthma 1967    hospitalized for asthma attack x 2    Chronic kidney disease     kidney stones x 2-3 times    Ill-defined condition     nasal blockage from growth and misalignment - cannot breath out of nose - surgery in the future/cleared for colonoscopy 7/31/2014 - will bring in letter    Other ill-defined conditions(799.89)     blood in stool    Other ill-defined conditions(799.89)     hx low BP - 90's/60's-70's    PUD (peptic ulcer disease)     Thyroid disease     Unspecified adverse effect of anesthesia     nasal growth and misalignment and cannot breath through nose     Past Surgical History:   Procedure Laterality Date    HX HEENT      T & A    HX HEENT      turbinate surgery      SOCIAL HISTORY: Social history from the initial psychiatric evaluation has been reviewed (reviewed/updated 11/3/2018) with no additional updates (I asked patient and no additional social history provided).    Social History     Socioeconomic History    Marital status: SINGLE     Spouse name: Not on file    Number of children: Not on file    Years of education: Not on file    Highest education level: Not on file   Social Needs    Financial resource strain: Not on file    Food insecurity - worry: Not on file    Food insecurity - inability: Not on file    Transportation needs - medical: Not on file   [employer] needs - non-medical: Not on file   Occupational History    Not on file   Tobacco Use    Smoking status: Never Smoker    Smokeless tobacco: Never Used   Substance and Sexual Activity    Alcohol use: No    Drug use: No    Sexual activity: Not Currently   Other Topics Concern     Service Not Asked    Blood Transfusions Not Asked    Caffeine Concern Not Asked    Occupational Exposure Not Asked    Hobby Hazards Not Asked    Sleep Concern Not Asked    Stress Concern Not Asked    Weight Concern Not Asked    Special Diet Not Asked    Back Care Not Asked    Exercise Not Asked    Bike Helmet Not Asked   [address] [facility] Not Asked    Self-Exams Not Asked   Social History Narrative    Not on file      FAMILY HISTORY: Family history from the initial psychiatric evaluation has been reviewed (reviewed/updated 11/3/2018) with no additional updates (I asked patient and no additional family history provided). Family History   Problem Relation Age of Onset    Stroke Mother     Other Mother         erosion of esophagus    Cancer Mother         melanoma/squamous    Heart Surgery Father         x2    Delayed Awakening Father     Pacemaker Father     Other Father         carotid endartarectomy/polio    Cataract Father        REVIEW OF SYSTEMS: (reviewed/updated 11/3/2018)  Appetite:good   Sleep: improved   All other Review of Systems: Negative except   Multiple cuts on rt arm       [facility] (MSE):    MSE FINDINGS ARE WITHIN NORMAL LIMITS (WNL) UNLESS OTHERWISE STATED BELOW. ( ALL OF THE BELOW CATEGORIES OF THE MSE HAVE BEEN REVIEWED (reviewed 10/27/2018) AND UPDATED AS DEEMED APPROPRIATE )  General Presentation age appropriate and disheveled, evasive and guarded   Orientation oriented to time, place and person   Vital Signs  See below (reviewed 10/27/2018); Vital Signs (BP, Pulse, & Temp) are within normal limits if not listed below.    Gait and Station Stable/steady, no ataxia   Musculoskeletal System No extrapyramidal symptoms (EPS); no abnormal muscular movements or Tardive Dyskinesia (TD); muscle strength and tone are within normal limits   Language No aphasia or dysarthria   Speech:  soft   Thought Processes logical; slow rate of thoughts; poor abstract reasoning/computation   Thought Associations blocked    Thought Content paranoid delusions   Suicidal Ideations Intention, still have urges   Homicidal Ideations no plan  and no intention   Mood:  depressed and anhedonia   Affect:  constricted   Memory recent  intact Memory remote:  intact   Concentration/Attention:  distractable   Fund of Knowledge average   Insight:  poor   Reliability poor   Judgment:  poor                      VITALS:     Patient Vitals for the past 24 hrs:   Temp Pulse Resp BP SpO2   11/03/18 1624 98.3 °F (36.8 °C) 93 16 105/75 --   11/03/18 0820 98.1 °F (36.7 °C) 81 16 106/72 94 %   11/02/18 2100 -- -- -- -- 99 %     Wt Readings from Last 3 Encounters:   11/03/18 78.6 kg (173 lb 3.2 oz)   07/27/18 72.6 kg (160 lb)   07/16/18 72.6 kg (160 lb)     Temp Readings from Last 3 Encounters:   11/03/18 98.3 °F (36.8 °C)   08/13/18 98.5 °F (36.9 °C)   07/26/18 98 °F (36.7 °C)     BP Readings from Last 3 Encounters:   11/03/18 105/75   08/13/18 91/53   07/26/18 108/71     Pulse Readings from Last 3 Encounters:   11/03/18 93   08/13/18 (!) 57   07/26/18 62            DATA     LABORATORY DATA:(reviewed/updated 11/3/2018)  No results found for this or any previous visit (from the past 24 hour(s)). No results found for: VALF2, VALAC, VALP, VALPR, DS6, CRBAM, CRBAMP, CARB2, XCRBAM  No results found for: LITHM   RADIOLOGY REPORTS:(reviewed/updated 11/3/2018)  No results found.        MEDICATIONS     ALL MEDICATIONS:   Current Facility-Administered Medications   Medication Dose Route Frequency    sertraline (ZOLOFT) tablet 200 mg  200 mg Oral QPM    cloNIDine HCl (CATAPRES) tablet 0.05 mg  0.05 mg Oral QHS    haloperidol (HALDOL) tablet 5 mg  5 mg Oral DAILY    haloperidol (HALDOL) tablet 10 mg  10 mg Oral QHS    busPIRone (BUSPAR) tablet 15 mg  15 mg Oral BID    levothyroxine (SYNTHROID) tablet 100 mcg  100 mcg Oral ACB    pravastatin (PRAVACHOL) tablet 40 mg  40 mg Oral QHS    budesonide (PULMICORT) 500 mcg/2 ml nebulizer suspension  500 mcg Nebulization BID RT    ziprasidone (GEODON) 20 mg in sterile water (preservative free) 1 mL injection  20 mg IntraMUSCular BID PRN    OLANZapine (ZyPREXA) tablet 5 mg  5 mg Oral Q6H PRN    benztropine (COGENTIN) tablet 2 mg  2 mg Oral BID PRN    benztropine (COGENTIN) injection 2 mg  2 mg IntraMUSCular BID PRN    LORazepam (ATIVAN) injection 2 mg  2 mg IntraMUSCular Q4H PRN    LORazepam (ATIVAN) tablet 1 mg  1 mg Oral Q4H PRN    acetaminophen (TYLENOL) tablet 650 mg  650 mg Oral Q4H PRN    ibuprofen (MOTRIN) tablet 400 mg  400 mg Oral Q8H PRN    magnesium hydroxide (MILK OF MAGNESIA) 400 mg/5 mL oral suspension 30 mL  30 mL Oral DAILY PRN    nicotine (NICODERM CQ) 21 mg/24 hr patch 1 Patch  1 Patch TransDERmal DAILY PRN    aspirin tablet 325 mg  325 mg Oral DAILY    cholecalciferol (VITAMIN D3) tablet 1,000 Units  1,000 Units Oral DAILY    therapeutic multivitamin (THERAGRAN) tablet 1 Tab  1 Tab Oral DAILY    hydrOXYzine HCl (ATARAX) tablet 50 mg  50 mg Oral TID PRN    albuterol-ipratropium (DUO-NEB) 2.5 MG-0.5 MG/3 ML  3 mL Nebulization Q6H PRN      SCHEDULED MEDICATIONS:   Current Facility-Administered Medications   Medication Dose Route Frequency    sertraline (ZOLOFT) tablet 200 mg  200 mg Oral QPM    cloNIDine HCl (CATAPRES) tablet 0.05 mg  0.05 mg Oral QHS    haloperidol (HALDOL) tablet 5 mg  5 mg Oral DAILY    haloperidol (HALDOL) tablet 10 mg  10 mg Oral QHS    busPIRone (BUSPAR) tablet 15 mg  15 mg Oral BID    levothyroxine (SYNTHROID) tablet 100 mcg  100 mcg Oral ACB    pravastatin (PRAVACHOL) tablet 40 mg  40 mg Oral QHS    budesonide (PULMICORT) 500 mcg/2 ml nebulizer suspension  500 mcg Nebulization BID RT    aspirin tablet 325 mg  325 mg Oral DAILY    cholecalciferol (VITAMIN D3) tablet 1,000 Units  1,000 Units Oral DAILY    therapeutic multivitamin (THERAGRAN) tablet 1 Tab  1 Tab Oral DAILY          ASSESSMENT & PLAN     DIAGNOSES REQUIRING ACTIVE TREATMENT AND MONITORING: (reviewed/updated 11/3/2018)  Patient Active Hospital Problem List:   The patient, Jeanine Joshi, is a 59 y.o.  female who presents at this time for treatment of the following diagnoses:  Patient Active Hospital Problem List:   Major depression with psychotic features ([facility].) (10/27/2018)    Assessment: depression, suicidal ideations anxiety, auditory hallucination     Plan: restarting meds zoloft 25 mg po hs, zyprexa 5 mg po bid, buspar 5 mg po bid. Indiv/milue therapy  Get collaterals, safety, suicidal precautions  10/28/18: Pt was put on 1:! Observation as soon as writer heard about cutting. Increasing zoloft and zyprexa 10 mg po hs., indiv milue therapy, suicide precautions, check for contrabands  10/29/18: high cholesterol, will change zyprexa to haldol first generation antipsychotic., increasing buspar 15 mg po bid  Hyprelipidemia: starting atrovastatin 20 mg daily  10/30/18, stop zyprexa, increase haldol 5 mg po hs, plan on starting decoanate, increasing zoloft 100mg po hs.  10/31/18: Increasing zoloft 150 mg po hs, continue meds/therapy nad 1:1 observation, encourage to go to groups and ADL's.  11/2/18: adding haldol 2 mg po q am continue meds, increasing zoloft 200mg po daily, adding clonidine 0.1 mg half tab. Pt reports that helped her with nightmares in the past          I will continue to monitor blood levels (Depakote, Tegretol, lithium, clozapine---a drug with a narrow therapeutic index= NTI) and associated labs for drug therapy implemented that require intense monitoring for toxicity as deemed appropriate based on current medication side effects and pharmacodynamically determined drug 1/2 lives. In summary, Rashel Machuca, is a 59 y.o.  female who presents with a severe exacerbation of the principal diagnosis of Major depression with psychotic features ([facility].)  Patient's condition is orsening/not improving/not stable improving. Patient requires continued inpatient hospitalization for further stabilization, safety monitoring and medication management.   I will continue to coordinate the provision of individual, milieu, occupational, group, and substance abuse therapies to address target symptoms/diagnoses as deemed appropriate for the individual patient. A coordinated, multidisplinary treatment team round was conducted with the patient (this team consists of the nurse, psychiatric unit pharmcist,  and writer). Complete current electronic health record for patient has been reviewed today including consultant notes, ancillary staff notes, nurses and psychiatric tech notes. icide risk assessment completed and patient deemed to be of low risk for suicide at this time. The following regarding medications was addressed during rounds with patient:   the risks and benefits of the proposed medication. The patient was given the opportunity to ask questions. Informed consent given to the use of the above medications. Will continue to adjust psychiatric and non-psychiatric medications (see above \"medication\" section and orders section for details) as deemed appropriate & based upon diagnoses and response to treatment. I will continue to order blood tests/labs and diagnostic tests as deemed appropriate and review results as they become available (see orders for details and above listed lab/test results). I will order psychiatric records from previous [facility] hospitals to further elucidate the nature of patient's psychopathology and review once available. I will gather additional collateral information from riends, family and o/p treatment team to further elucidate the nature of patient's psychopathology and baselline level of psychiatric functioning. I certify that this patient's inpatient psychiatric hospital services furnished since the previous certification were, and continue to be, required for treatment that could reasonably be expected to improve the patient's condition, or for diagnostic study, and that the patient continues to need, on a daily basis, active treatment furnished directly by or requiring the supervision of inpatient psychiatric facility personnel.  In addition, the hospital records show that services furnished were intensive treatment services, admission or related services, or equivalent services.     EXPECTED DISCHARGE DATE/DAY: BD     DISPOSITION:[place]       Signed By:   Kim Garcia MD  11/3/2018

## 2018-11-03 NOTE — INTERDISCIPLINARY ROUNDS
Behavioral Health Interdisciplinary Rounds     Patient Name: Samuel Munoz  Age: 59 y.o.   Room/Bed:  726/02  Primary Diagnosis: Major depression with psychotic features ([facility].)   Admission Status: Involuntary Commitment     Readmission within 30 days: no  Power of  in place: no  Patient requires a blocked bed: yes          Reason for blocked bed: Patient on 1:1        VTE Prophylaxis: Not indicated    Mobility needs/Fall risk: no  Flu Vaccine : no   Nutritional Plan: no  Consults:          Labs/Testing due today?: no    Sleep hours:  8      Participation in Care/Groups:  yes  Medication Compliant?: Yes  PRNS (last 24 hours): None    Restraints (last 24 hours):  no     CIWA (range last 24 hours):     COWS (range last 24 hours):      Alcohol screening (AUDIT) completed -   AUDIT Score: 0     If applicable, date SBIRT discussed in treatment team AND documented:     Tobacco - patient is a smoker: Have You Used Tobacco in the Past 30 Days: No  Illegal Drugs use: Have You Used Any Illegal Substances Over the Past 12 Months: No    24 hour chart check complete: yes     Patient goal(s) for today:   Treatment team focus/goals:   Progress note     LOS:  7  Expected LOS:     Financial concerns/prescription coverage:   Date of last family contact:      Family requesting physician contact today:    Discharge plan:   Guns in the home:        Outpatient provider(s):     Participating treatment team members: Samuel Munoz, * (assigned SW),

## 2018-11-03 NOTE — BH NOTES
GROUP THERAPY PROGRESS NOTE Devintammi Ferguson is participating in reflection group. Group time: 10 minutes Personal goal for participation: reached goal set in community group Goal orientation: relaxation Group therapy participation: active Therapeutic interventions reviewed and discussed: Tech went over the cleanliness of the unit as well as se who reached goals and encouraged to set goals. Went over tech requirements throughout night to keep door cracked and receive medications before bed. Impression of participation: listened and responded accordingly

## 2018-11-03 NOTE — PROGRESS NOTES
Problem: Depressed Mood (Adult/Pediatric)  Goal: *STG: Participates in treatment plan  Outcome: Progressing Towards Goal  0950: Visible on the unit. Remains on a 1:1 for safety. Compliant with meals and medications. Pt denies SI. Mood is anxious. Continue to encourage and educate. 10:50: NAD noted. Continue to monitor for safety. 11:50: Pt remains on 1:1 for safety. NAD noted. 12:50: NAD noted. 1346: Pt is currently resting. NAD noted. Pt continues to be on a 1:1 for safety. 1434: NAD noted. Pt is currently resting.

## 2018-11-03 NOTE — BH NOTES
GROUP THERAPY PROGRESS NOTE Rosa Navarrete is participating in [place]. Group time: 10 minutes Personal goal for participation: no goal 
 
Goal orientation: personal 
 
Group therapy participation: active Therapeutic intervTech reviewed unit schedules, rules and influenced to set daily goals. nikia reviewed and discussed:  
 
Impression of participation: Remained quiet but appeared to listen

## 2018-11-03 NOTE — PROGRESS NOTES
Problem: Falls - Risk of  Goal: *Absence of Falls  Document Samanta Fall Risk and appropriate interventions in the flowsheet. Outcome: Progressing Towards Goal  Fall Risk Interventions:  Mobility Interventions: Assess mobility with egress test    Mentation Interventions: Adequate sleep, hydration, pain control    Medication Interventions: Teach patient to arise slowly    Elimination Interventions: Toilet paper/wipes in reach    History of Falls Interventions: Room close to nurse's station, Door open when patient unattended    Resting in bed with eyes closed, no complaints, no distress noted. Safety measures in place, will continue to monitor.     Blocked Bed Documentation:     Room number: 726  Type: Behavior  Rationale: Poor Self-Control  Anticipated duration: reassess in 24 hours  Additional comments:remains on 1:1 for safety

## 2018-11-03 NOTE — PROGRESS NOTES
Problem: Depressed Mood (Adult/Pediatric)  Goal: *STG: Verbalizes anger, guilt, and other feelings in a constructive manor  Outcome: Progressing Towards Goal  1543 patient is received quietly resting in bed. 1:1 at her side. 1601 patient resting in bed talking with 1:1 staff at her side. 1700 patient ate dinner and returned to her room. 1:1 at her side. 1800 resting in bed with 1:1 at her side. 1900 resting in bed with 1:1 at her side. 2000 pt quietly resting in bed. 1:1 at her side. 2100 patient ate a bedtime snack. Continue 1:1.  2200 resting quietly. Continue 1:1.  2300 resting quietly.  Continue 1:1.

## 2018-11-03 NOTE — BH NOTES
Blocked Bed Documentation:    Room number: 726  Type: Behavior  Rationale: Poor Self-Control  Anticipated duration: re evaluate in 24 hr  Additional comments:pt on 1:1 for safety     0710- pt resting in bed. NAD. On 1:1 for safety. 9631- 1:1 for safety. Alert resting in bed. NAD.   0850- resting in bed. NAD. 1:1 for safety   0950- pt with saff; 1:1 for safety, NAD. 1045- pt visible on the unit. On 1:1 for safety   1145- pt eating lunch. Pt on 1:1 for safety  1245- pt on 1:1 for safety. NAD  1340- pt resting in bed. NAD. 1:1 for safety  1440- on 1:1 for safety. Pt resting in bed. NAD.

## 2018-11-04 PROCEDURE — 74011250637 HC RX REV CODE- 250/637: Performed by: PSYCHIATRY & NEUROLOGY

## 2018-11-04 PROCEDURE — 65220000003 HC RM SEMIPRIVATE PSYCH

## 2018-11-04 PROCEDURE — 74011250637 HC RX REV CODE- 250/637: Performed by: NURSE PRACTITIONER

## 2018-11-04 RX ADMIN — SERTRALINE HYDROCHLORIDE 200 MG: 50 TABLET ORAL at 17:06

## 2018-11-04 RX ADMIN — ASPIRIN 325 MG: 325 TABLET ORAL at 09:40

## 2018-11-04 RX ADMIN — ACETAMINOPHEN 650 MG: 325 TABLET ORAL at 11:18

## 2018-11-04 RX ADMIN — HALOPERIDOL 5 MG: 5 TABLET ORAL at 09:40

## 2018-11-04 RX ADMIN — ACETAMINOPHEN 650 MG: 325 TABLET ORAL at 17:07

## 2018-11-04 RX ADMIN — VITAMIN D, TAB 1000IU (100/BT) 1000 UNITS: 25 TAB at 09:40

## 2018-11-04 RX ADMIN — BUSPIRONE HYDROCHLORIDE 15 MG: 10 TABLET ORAL at 09:40

## 2018-11-04 RX ADMIN — BUSPIRONE HYDROCHLORIDE 15 MG: 10 TABLET ORAL at 17:06

## 2018-11-04 RX ADMIN — THERA TABS 1 TABLET: TAB at 09:40

## 2018-11-04 RX ADMIN — HYDROXYZINE HYDROCHLORIDE 50 MG: 50 TABLET, FILM COATED ORAL at 17:58

## 2018-11-04 RX ADMIN — HALOPERIDOL 10 MG: 10 TABLET ORAL at 21:09

## 2018-11-04 RX ADMIN — CLONIDINE HYDROCHLORIDE 0.05 MG: 0.1 TABLET ORAL at 21:09

## 2018-11-04 RX ADMIN — PRAVASTATIN SODIUM 40 MG: 40 TABLET ORAL at 21:09

## 2018-11-04 RX ADMIN — LEVOTHYROXINE SODIUM 100 MCG: 100 TABLET ORAL at 06:36

## 2018-11-04 NOTE — INTERDISCIPLINARY ROUNDS
Behavioral Health Interdisciplinary Rounds     Patient Name: Yolie Morales  Age: 59 y.o.   Room/Bed:  726/02  Primary Diagnosis: [facility] with [facility] features [facility]   Admission Status: Involuntary Commitment     Readmission within 30 days: no  Power of  in place: no  Patient requires a blocked bed: yes          Reason for blocked bed:Patient on 1:1, will not contract for safety         VTE Prophylaxis: Not indicated    Mobility needs/Fall risk: no  Flu Vaccine : no   Nutritional Plan: no  Consults:          Labs/Testing due today?: no    Sleep hours:  6.45      Participation in Care/Groups:  yes  Medication Compliant?: Yes  PRNS (last 24 hours): None    Restraints (last 24 hours):  no     CIWA (range last 24 hours):     COWS (range last 24 hours):      Alcohol screening (AUDIT) completed -   AUDIT Score: 0     If applicable, date SBIRT discussed in treatment team AND documented:     Tobacco - patient is a smoker: Have You Used Tobacco in the Past 30 Days: No  Illegal Drugs use: Have You Used Any Illegal Substances Over the Past 12 Months: No    24 hour chart check complete: yes     Patient goal(s) for today:   Treatment team focus/goals:   Progress note     LOS:  8  Expected LOS:     Financial concerns/prescription coverage:    Date of last family contact:     Family requesting physician contact today:    Discharge plan:   Guns in the home:         Outpatient provider(s):     Participating treatment team members: Yolie Morales, * (assigned SW),

## 2018-11-04 NOTE — PROGRESS NOTES
Problem: Depressed Mood (Adult/Pediatric)  Goal: *STG: Complies with medication therapy  Outcome: Progressing Towards Goal  Visible on the unit. Compliant with meals and medications. Pt denies SI. Pt remains on a 1:1 for safety. 1167:  NAD noted. Pt remains on a 1:1 for safety. 10:49: NAD noted. Pt is currently sitting in the dining room interacting to certain peers. Continue 1:1 for safety. 12:20: Pt remains on a 1:1 for safety. NAD noted. 1320:  Pt remains on a 1:1 for safety. NAD noted. 1405:  Pt is sitting in the dining room watching television. Remains on a 1:1 for safety.

## 2018-11-04 NOTE — PROGRESS NOTES
Problem: Falls - Risk of  Goal: *Absence of Falls  Document Samanta Fall Risk and appropriate interventions in the flowsheet. Outcome: Progressing Towards Goal  Fall Risk Interventions:  Mobility Interventions: Assess mobility with egress test    Mentation Interventions: Adequate sleep, hydration, pain control    Medication Interventions: Teach patient to arise slowly    Elimination Interventions: Toilet paper/wipes in reach    History of Falls Interventions: Door open when patient unattended    Resting in bed with eyes closed, no complaints, no distress noted. Safety measures in place, will continue to monitor.     2300 1-1 for safety  0000 1-1 in place, resting quietly  0100 resting with eyes closed, 1-1  0200 resting in bed with eyes closed, 1-1  0300 resting in bed with eyes closed, 1-1  0400-resting in bed with eyes closed, 1-1  0500-resting in bed with eyes closed, 1-1  0600-resting in bed with eyes closed, 1-1  0700-1-1

## 2018-11-04 NOTE — BH NOTES
1530 Received pt as she comes to nurse's station with [facility]. They have been processing ways to help patient. Patient is asking for a journal and marker to write feelings and items she wants to review with doctor such as ways to work toward coming off 1:1 status. Patient smiles as she stands at nurse's station door with 1:1 at her side. Patient has a journal and marker and 1:1/Keira RN is at her side as she uses this to write her feelings. Continue 1:1 as ordered by MD. I: have called Dr Fanny Bonilla who approved use of marker for this. 1626 patient up in room with 1:1 at her side. 1702 patient up in room talking with [facility] RN/1:1 staff. 1719 eating dinner with 1:1 staff at her side. 1819 patient complains of anxiety and is walking in hallway with staff. She talks about thinking positively and working toward coming off 1:1.  1914 pt resting in bed. 1:1 at her side. 2000 pt rested in bed quietly. 1:1 at her side. 2100 pt rested in bed quietly. 1:1 at her side. 2200 pt rested in bed quietly. 1:1 at her side. 2300 resting quietly in bed. 1:1 at her side.

## 2018-11-04 NOTE — BH NOTES
PRN Medication Documentation    Specific patient behavior that led to need for PRN medication: Patient complained of chest discomfort  Staff interventions attempted prior to PRN being given: Offered pain meds  PRN medication given: Tylenol 650 mg PO  Patient response/effectiveness of PRN medication: Will continue to monitor

## 2018-11-04 NOTE — BH NOTES
PRN Medication Documentation    Specific patient behavior that led to need for PRN medication: Patient complained of increased anxiety. Staff interventions attempted prior to PRN being given: Encourage coping skills; walking, coloring  PRN medication given: Atarax 50 mg PO   Patient response/effectiveness of PRN medication: Will continue to monitor.

## 2018-11-04 NOTE — PROGRESS NOTES
Problem: Depressed Mood (Adult/Pediatric)  Goal: *STG: Verbalizes anger, guilt, and other feelings in a constructive manor  Outcome: Progressing Towards Goal  Patient has been smiling upon approach, agreeing to write feelings and plans to come off 1:1 status.  Continue 1:1.

## 2018-11-04 NOTE — BH NOTES
Blocked Bed Documentation:    Room number: 726  Type: Behavior  Rationale: Poor Self-Control  Anticipated duration: re-evaluate in 24 hrs  Additional comments:pt on 1:1 for safety   0710- pt resting in bed. NAD. 1:1 BHT with pt.   0810- NAD. 1:1 for safety. 8947- 1:1 for safety. Pt toileting. BHT with pt.   8734- pt on 1:1 for safety. Medication compliant. Pt stated goal for today: \"think positive\"    Pt challenged to stay out on unit during day shift; verbalize at least one positive thought to staff  Per hour. 1040-  1:1 for safety. NAD. Attending groups and activities. Smiling brighter affect. Positive thoughts focused. Goal: \"reading\" today. 1120- 1:1 for safety. Visible on unit. Positive thought: \"discharge\" from hospital  Pt eating lunch with peers. PRN Medication Documentation    Specific patient behavior that led to need for PRN medication: c/o headache 8/10 onset now anterior   Staff interventions attempted prior to PRN being given: education   PRN medication given: po 650 mg motrin   Patient response/effectiveness of PRN medication: pt alert oriented calm smiling     1220- 1:1 for safety. Pt showered. 1250- 1:1 for safety. NAD.   1350- sitting in the dining room. Reading. Watching TV. 1:1 for safety.

## 2018-11-04 NOTE — BH NOTES
PSYCHIATRIC PROGRESS NOTE         Patient Name  Jeanine Joshi   Date of Birth 1960   [facility] [id number]   Medical Record Number  [id number]      Age  59 y.o. PCP Rashi Blackwell MD   Admit date:  10/27/2018    Room Number  726/02  @ [facility]   Date of Service  11/4/2018           E & M PROGRESS NOTE:         HISTORY         REASON FOR HOSPITALIZATION:  CC: \". Pt admitted under a temporary jail order (TDO) [place] [facility] for severe depression with suicidal ideations  proving to be an imminent danger to self and an inability to care for self. HISTORY OF PRESENT ILLNESS:    The patient, Jeanine Joshi, is a 59 y.o. WHITE [place]  female with a past psychiatric history significant for depression, anxiety, psychosis, hearing voices ho presents at this time with complaints of (and/or evidence of) the following emotional symptoms: suicidal thoughts/threats, delusions, psychotic behavior and paranoid behavior. Additional symptomatology include difficulty swallowing and feeling suicidal.  The above symptoms have been present for Pt reports her mother passed away in April and she feels she could have done more, reports she has not been taking her meds for one and half week. Pt was on zoloft 200 at night and zyprexa po bid as per reports. Pt reports she is getting thoughts of cutting herself, she feels responsible for mother's death, pt ha s ntense guilt, decrease appetite, difficulty sleeping, poor eye contact and auditory hallucinations. These symptoms are of intense severity. These symptoms are constant in nature. 10/28/18: As per staff, pt sneaked in X-acto knife while nurse was looking for med lsit from her bag,. Pt made multiple superficial cuts on her rt arm, needing medical consult. Knife was found with pt and wound were treated, no stiches were needed. Pt continues to be dystaymic, having suicidal ideation.   10/29/18: Pt continues to be very anxious and depressed, report suicidal ideations and urges to cut. Pt is on 1:1 observation. Pts cholesterol is 326 and tsh 0.5. Pt slept well. Pt received ativan 1mg twice yesterday and pt also tried to elope yesterday. 10/30/18: Pt reports she slept better. Pt reports she has an appointment with social security. Pt reports she feels little better but still has suicidal thoughts and impulsivity. 10/31/18:Pt is still dysthymic, verbalizing urges to harm herself, not romulo for safety,still hearing voices, poor ADL's.  11/2/18: Pt still depressed, sneaked in fork after lunch which was taken back by the nurse. Pt reports gets urges to cut and hear voices, still ahs urges to cut. 11/3/18- Remains on 1 to 1 for ongoing urges to cut on herself, not contacting for safety. Asking to make a phone call. Assured that she could make a phone call when off one to one. Prn used-Ativan. Slept 8 hrs. 11/4/18-remains on 1 to 1. Positive thinking re enforced. Slept 6.45 hrs. No prn's were used. Tolerating haldol well. SIDE EFFECTS: (reviewed/updated 11/4/2018)  None reported or admitted to. No noted toxicity with use of epakote/Tegretol/lithium/Clozaril/TCAs   ALLERGIES:(reviewed/updated 11/4/2018)  Allergies   Allergen Reactions    Other Food Other (comments)     \"Bad chest pain\" with walnuts. Coke - asthma/stuffy head. Fish sticks causes an asthma attack.  Astepro [Azelastine] Other (comments)     Violent headaches.  Bactrim [Sulfamethoprim] Other (comments)     Difficulty breathing, chills, fever.  Banana Other (comments)     Diffculty breathing, wheezing, asthma attack.  Coconut Other (comments)     Difficulty breathing, asthma attack, SOB.  Cortisone Hives     Difficulty breathing.  Nut - Unspecified Other (comments)     \"Bad chest pain\" with walnuts    Peanut Other (comments)     Wheezing, asthma attack, SOB.  Prednisone Hives     Difficulty breathing, kidney stones.       MEDICATIONS PRIOR TO ADMISSION:(reviewed/updated 11/4/2018)  Medications Prior to Admission   Medication Sig    albuterol (PROVENTIL HFA, VENTOLIN HFA, PROAIR HFA) 90 mcg/actuation inhaler Take 2 Puffs by inhalation every six (6) hours as needed for Wheezing.  beclomethasone (QVAR) 40 mcg/actuation aero Take 2 Puffs by inhalation two (2) times a day.  CLONIDINE HCL PO Take 1/2 of 0.1mg tablet QHS    busPIRone (BUSPAR) 15 mg tablet Take 15 mg by mouth three (3) times daily.  QUEtiapine (SEROQUEL) 50 mg tablet Take 50 mg by mouth nightly.  OLANZapine (ZYPREXA) 20 mg tablet Take 20 mg by mouth nightly.  hydrOXYzine HCl (ATARAX) 50 mg tablet Take 50 mg by mouth every four (4) hours as needed for Anxiety.  prazosin (MINIPRESS) 1 mg capsule Take 1 mg by mouth nightly.  clonazePAM (KLONOPIN) 1 mg tablet Take 1 mg by mouth nightly.  OLANZapine (ZYPREXA) 5 mg tablet Take 5 mg by mouth daily (after breakfast).  aspirin (ASPIRIN) 325 mg tablet Take 1 Tab by mouth daily. Indications: myocardial infarction prevention    levothyroxine (SYNTHROID) 125 mcg tablet Take 1 Tab by mouth Daily (before breakfast). Indications: hypothyroidism    sertraline (ZOLOFT) 100 mg tablet Take 2 Tabs by mouth daily (with dinner). Indications: Generalized Anxiety Disorder, major depressive disorder    zolpidem (AMBIEN) 10 mg tablet Take 1 Tab by mouth nightly. Max Daily Amount: 10 mg. Indications: SLEEP-ONSET INSOMNIA    cholecalciferol (VITAMIN D3) 1,000 unit tablet Take 1 Tab by mouth daily. Indications: PREVENTION OF VITAMIN D DEFICIENCY, Vitamin D Deficiency    fluticasone (FLONASE) 50 mcg/actuation nasal spray 2 Sprays by Both Nostrils route daily. Indications: Allergic Rhinitis    therapeutic multivitamin (THERA) tablet Take 1 Tab by mouth daily.  cyanocobalamin, vitamin B-12, (VITAMIN B-12) 5,000 mcg subl 5,000 mcg by SubLINGual route daily.       PAST MEDICAL HISTORY: Past medical history from the initial psychiatric evaluation has been reviewed (reviewed/updated 11/4/2018) with no additional updates (I asked patient and no additional past medical history provided). Past Medical History:   Diagnosis Date    Asthma 1967    hospitalized for asthma attack x 2    Chronic kidney disease     kidney stones x 2-3 times    Ill-defined condition     nasal blockage from growth and misalignment - cannot breath out of nose - surgery in the future/cleared for colonoscopy 7/31/2014 - will bring in letter    Other ill-defined conditions(799.89)     blood in stool    Other ill-defined conditions(799.89)     hx low BP - 90's/60's-70's    PUD (peptic ulcer disease)     Thyroid disease     Unspecified adverse effect of anesthesia     nasal growth and misalignment and cannot breath through nose     Past Surgical History:   Procedure Laterality Date    HX HEENT      T & A    HX HEENT      turbinate surgery      SOCIAL HISTORY: Social history from the initial psychiatric evaluation has been reviewed (reviewed/updated 11/4/2018) with no additional updates (I asked patient and no additional social history provided).    Social History     Socioeconomic History    Marital status: SINGLE     Spouse name: Not on file    Number of children: Not on file    Years of education: Not on file    Highest education level: Not on file   Social Needs    Financial resource strain: Not on file    Food insecurity - worry: Not on file    Food insecurity - inability: Not on file    Transportation needs - medical: Not on file   Instinctiv needs - non-medical: Not on file   Occupational History    Not on file   Tobacco Use    Smoking status: Never Smoker    Smokeless tobacco: Never Used   Substance and Sexual Activity    Alcohol use: No    Drug use: No    Sexual activity: Not Currently   Other Topics Concern     Service Not Asked    Blood Transfusions Not Asked    Caffeine Concern Not Asked    Occupational Exposure Not Asked    Hobby Hazards Not Asked    Sleep Concern Not Asked    Stress Concern Not Asked    Weight Concern Not Asked    Special Diet Not Asked    Back Care Not Asked    Exercise Not Asked    Bike Helmet Not Asked   [address] Not Asked    Self-Exams Not Asked   Social History Narrative    Not on file      FAMILY HISTORY: Family history from the initial psychiatric evaluation has been reviewed (reviewed/updated 11/4/2018) with no additional updates (I asked patient and no additional family history provided). Family History   Problem Relation Age of Onset    Stroke Mother     Other Mother         erosion of esophagus    Cancer Mother         melanoma/squamous    Heart Surgery Father         x2    Delayed Awakening Father     Pacemaker Father     Other Father         carotid endartarectomy/polio    Cataract Father        REVIEW OF SYSTEMS: (reviewed/updated 11/4/2018)  Appetite:good   Sleep: improved   All other Review of Systems: Negative except   Multiple cuts on rt arm       [address] (MSE):    MSE FINDINGS ARE WITHIN NORMAL LIMITS (WNL) UNLESS OTHERWISE STATED BELOW. ( ALL OF THE BELOW CATEGORIES OF THE MSE HAVE BEEN REVIEWED (reviewed 10/27/2018) AND UPDATED AS DEEMED APPROPRIATE )  General Presentation age appropriate and disheveled, evasive and guarded   Orientation oriented to time, place and person   Vital Signs  See below (reviewed 10/27/2018); Vital Signs (BP, Pulse, & Temp) are within normal limits if not listed below.    Gait and Station Stable/steady, no ataxia   Musculoskeletal System No extrapyramidal symptoms (EPS); no abnormal muscular movements or Tardive Dyskinesia (TD); muscle strength and tone are within normal limits   Language No aphasia or dysarthria   Speech:  soft   Thought Processes logical; slow rate of thoughts; poor abstract reasoning/computation   Thought Associations blocked    Thought Content paranoid delusions   Suicidal Ideations Intention, still have urges Homicidal Ideations no plan  and no intention   Mood:  depressed and anhedonia   Affect:  constricted   Memory recent  intact   Memory remote:  intact   Concentration/Attention:  distractable   Fund of Knowledge average   Insight:  poor   Reliability poor   Judgment:  poor                      VITALS:     Patient Vitals for the past 24 hrs:   Temp Pulse Resp BP SpO2   11/04/18 0903 98.2 °F (36.8 °C) 67 16 105/63 96 %   11/03/18 2042 98.3 °F (36.8 °C) 82 16 104/72 --   11/03/18 1624 98.3 °F (36.8 °C) 93 16 105/75 --     Wt Readings from Last 3 Encounters:   11/03/18 78.6 kg (173 lb 3.2 oz)   07/27/18 72.6 kg (160 lb)   07/16/18 72.6 kg (160 lb)     Temp Readings from Last 3 Encounters:   11/04/18 98.2 °F (36.8 °C)   08/13/18 98.5 °F (36.9 °C)   07/26/18 98 °F (36.7 °C)     BP Readings from Last 3 Encounters:   11/04/18 105/63   08/13/18 91/53   07/26/18 108/71     Pulse Readings from Last 3 Encounters:   11/04/18 67   08/13/18 (!) 57   07/26/18 62            DATA     LABORATORY DATA:(reviewed/updated 11/4/2018)  No results found for this or any previous visit (from the past 24 hour(s)). No results found for: VALF2, VALAC, VALP, VALPR, DS6, CRBAM, CRBAMP, CARB2, XCRBAM  No results found for: LITHM   RADIOLOGY REPORTS:(reviewed/updated 11/4/2018)  No results found.        MEDICATIONS     ALL MEDICATIONS:   Current Facility-Administered Medications   Medication Dose Route Frequency    sertraline (ZOLOFT) tablet 200 mg  200 mg Oral QPM    cloNIDine HCl (CATAPRES) tablet 0.05 mg  0.05 mg Oral QHS    haloperidol (HALDOL) tablet 5 mg  5 mg Oral DAILY    haloperidol (HALDOL) tablet 10 mg  10 mg Oral QHS    busPIRone (BUSPAR) tablet 15 mg  15 mg Oral BID    levothyroxine (SYNTHROID) tablet 100 mcg  100 mcg Oral ACB    pravastatin (PRAVACHOL) tablet 40 mg  40 mg Oral QHS    budesonide (PULMICORT) 500 mcg/2 ml nebulizer suspension  500 mcg Nebulization BID RT    ziprasidone (GEODON) 20 mg in sterile water (preservative free) 1 mL injection  20 mg IntraMUSCular BID PRN    OLANZapine (ZyPREXA) tablet 5 mg  5 mg Oral Q6H PRN    benztropine (COGENTIN) tablet 2 mg  2 mg Oral BID PRN    benztropine (COGENTIN) injection 2 mg  2 mg IntraMUSCular BID PRN    LORazepam (ATIVAN) injection 2 mg  2 mg IntraMUSCular Q4H PRN    LORazepam (ATIVAN) tablet 1 mg  1 mg Oral Q4H PRN    acetaminophen (TYLENOL) tablet 650 mg  650 mg Oral Q4H PRN    ibuprofen (MOTRIN) tablet 400 mg  400 mg Oral Q8H PRN    magnesium hydroxide (MILK OF MAGNESIA) 400 mg/5 mL oral suspension 30 mL  30 mL Oral DAILY PRN    nicotine (NICODERM CQ) 21 mg/24 hr patch 1 Patch  1 Patch TransDERmal DAILY PRN    aspirin tablet 325 mg  325 mg Oral DAILY    cholecalciferol (VITAMIN D3) tablet 1,000 Units  1,000 Units Oral DAILY    therapeutic multivitamin (THERAGRAN) tablet 1 Tab  1 Tab Oral DAILY    hydrOXYzine HCl (ATARAX) tablet 50 mg  50 mg Oral TID PRN    albuterol-ipratropium (DUO-NEB) 2.5 MG-0.5 MG/3 ML  3 mL Nebulization Q6H PRN      SCHEDULED MEDICATIONS:   Current Facility-Administered Medications   Medication Dose Route Frequency    sertraline (ZOLOFT) tablet 200 mg  200 mg Oral QPM    cloNIDine HCl (CATAPRES) tablet 0.05 mg  0.05 mg Oral QHS    haloperidol (HALDOL) tablet 5 mg  5 mg Oral DAILY    haloperidol (HALDOL) tablet 10 mg  10 mg Oral QHS    busPIRone (BUSPAR) tablet 15 mg  15 mg Oral BID    levothyroxine (SYNTHROID) tablet 100 mcg  100 mcg Oral ACB    pravastatin (PRAVACHOL) tablet 40 mg  40 mg Oral QHS    budesonide (PULMICORT) 500 mcg/2 ml nebulizer suspension  500 mcg Nebulization BID RT    aspirin tablet 325 mg  325 mg Oral DAILY    cholecalciferol (VITAMIN D3) tablet 1,000 Units  1,000 Units Oral DAILY    therapeutic multivitamin (THERAGRAN) tablet 1 Tab  1 Tab Oral DAILY          ASSESSMENT & PLAN     DIAGNOSES REQUIRING ACTIVE TREATMENT AND MONITORING: (reviewed/updated 11/4/2018)  Patient Active Hospital Problem List: The patient, Alicia Simon, is a 59 y.o.  female who presents at this time for treatment of the following diagnoses:  Patient Active Hospital Problem List:   Major depression with psychotic features ([facility] (10/27/2018)    Assessment: depression, suicidal ideations anxiety, auditory hallucination     Plan: restarting meds zoloft 25 mg po hs, zyprexa 5 mg po bid, buspar 5 mg po bid. Indiv/milue therapy  Get collaterals, safety, suicidal precautions  10/28/18: Pt was put on 1:! Observation as soon as writer heard about cutting. Increasing zoloft and zyprexa 10 mg po hs., indiv milue therapy, suicide precautions, check for contrabands  10/29/18: high cholesterol, will change zyprexa to haldol first generation antipsychotic., increasing buspar 15 mg po bid  Hyprelipidemia: starting atrovastatin 20 mg daily  10/30/18, stop zyprexa, increase haldol 5 mg po hs, plan on starting decoanate, increasing zoloft 100mg po hs.  10/31/18: Increasing zoloft 150 mg po hs, continue meds/therapy nad 1:1 observation, encourage to go to groups and ADL's.  11/2/18: adding haldol 2 mg po q am continue meds, increasing zoloft 200mg po daily, adding clonidine 0.1 mg half tab. Pt reports that helped her with nightmares in the past          I will continue to monitor blood levels (Depakote, Tegretol, lithium, clozapine---a drug with a narrow therapeutic index= NTI) and associated labs for drug therapy implemented that require intense monitoring for toxicity as deemed appropriate based on current medication side effects and pharmacodynamically determined drug 1/2 lives. In summary, Alicia Simon, is a 59 y.o.  female who presents with a severe exacerbation of the principal diagnosis of Major depression with psychotic features ([facility]  Patient's condition is orsening/not improving/not stable improving. Patient requires continued inpatient hospitalization for further stabilization, safety monitoring and medication management.   I will continue to coordinate the provision of individual, milieu, occupational, group, and substance abuse therapies to address target symptoms/diagnoses as deemed appropriate for the individual patient. A coordinated, multidisplinary treatment team round was conducted with the patient (this team consists of the nurse, psychiatric unit pharmcist,  and writer). Complete current electronic health record for patient has been reviewed today including consultant notes, ancillary staff notes, nurses and psychiatric tech notes. icide risk assessment completed and patient deemed to be of low risk for suicide at this time. The following regarding medications was addressed during rounds with patient:   the risks and benefits of the proposed medication. The patient was given the opportunity to ask questions. Informed consent given to the use of the above medications. Will continue to adjust psychiatric and non-psychiatric medications (see above \"medication\" section and orders section for details) as deemed appropriate & based upon diagnoses and response to treatment. I will continue to order blood tests/labs and diagnostic tests as deemed appropriate and review results as they become available (see orders for details and above listed lab/test results). I will order psychiatric records from previous [facility] hospitals to further elucidate the nature of patient's psychopathology and review once available. I will gather additional collateral information from riends, family and o/p treatment team to further elucidate the nature of patient's psychopathology and baselline level of psychiatric functioning.          I certify that this patient's inpatient psychiatric hospital services furnished since the previous certification were, and continue to be, required for treatment that could reasonably be expected to improve the patient's condition, or for diagnostic study, and that the patient continues to need, on a daily basis, active treatment furnished directly by or requiring the supervision of inpatient psychiatric facility personnel. In addition, the hospital records show that services furnished were intensive treatment services, admission or related services, or equivalent services.     EXPECTED DISCHARGE DATE/DAY: BD     DISPOSITION:ome       Signed By:   Martha Moseley MD  11/4/2018

## 2018-11-04 NOTE — PROGRESS NOTES
GROUP THERAPY PROGRESS NOTE Lachelle Peterson is participating in Coping Skills Group and Community. Group time: 15 minutes Personal goal for participation: Read more, be positive Goal orientation: personal 
 
Group therapy participation: active Therapeutic interventions reviewed and discussed: Discussed unit rules, visitation and phone times. Discussed treatment team; reviewed members of treatment team and discussed topics to bring up with staff in order to have an effective treatment team. Asked for everyone to share goals for today or to share something they're looking forward to once discharged. Impression of participation: Actively listened, participated in conversation with staff and peers

## 2018-11-05 PROCEDURE — 74011250637 HC RX REV CODE- 250/637: Performed by: NURSE PRACTITIONER

## 2018-11-05 PROCEDURE — 74011000250 HC RX REV CODE- 250: Performed by: NURSE PRACTITIONER

## 2018-11-05 PROCEDURE — 65220000003 HC RM SEMIPRIVATE PSYCH

## 2018-11-05 PROCEDURE — 74011250637 HC RX REV CODE- 250/637: Performed by: PSYCHIATRY & NEUROLOGY

## 2018-11-05 PROCEDURE — 94640 AIRWAY INHALATION TREATMENT: CPT

## 2018-11-05 RX ADMIN — SERTRALINE HYDROCHLORIDE 200 MG: 50 TABLET ORAL at 17:28

## 2018-11-05 RX ADMIN — LORAZEPAM 1 MG: 1 TABLET ORAL at 13:02

## 2018-11-05 RX ADMIN — PRAVASTATIN SODIUM 40 MG: 40 TABLET ORAL at 21:32

## 2018-11-05 RX ADMIN — HALOPERIDOL 10 MG: 10 TABLET ORAL at 21:32

## 2018-11-05 RX ADMIN — HALOPERIDOL 5 MG: 5 TABLET ORAL at 08:36

## 2018-11-05 RX ADMIN — THERA TABS 1 TABLET: TAB at 08:37

## 2018-11-05 RX ADMIN — LEVOTHYROXINE SODIUM 100 MCG: 100 TABLET ORAL at 06:37

## 2018-11-05 RX ADMIN — VITAMIN D, TAB 1000IU (100/BT) 1000 UNITS: 25 TAB at 08:36

## 2018-11-05 RX ADMIN — BUDESONIDE 500 MCG: 0.5 INHALANT RESPIRATORY (INHALATION) at 14:43

## 2018-11-05 RX ADMIN — ASPIRIN 325 MG: 325 TABLET ORAL at 08:36

## 2018-11-05 RX ADMIN — IPRATROPIUM BROMIDE AND ALBUTEROL SULFATE 3 ML: .5; 3 SOLUTION RESPIRATORY (INHALATION) at 14:40

## 2018-11-05 RX ADMIN — BUSPIRONE HYDROCHLORIDE 15 MG: 10 TABLET ORAL at 17:28

## 2018-11-05 RX ADMIN — BUSPIRONE HYDROCHLORIDE 15 MG: 10 TABLET ORAL at 08:36

## 2018-11-05 NOTE — PROGRESS NOTES
Problem: Depressed Mood (Adult/Pediatric)  Goal: *STG: Attends activities and groups  Outcome: Progressing Towards Goal  Pt remains safe in the hospital on q 15 min safety check and 1:1 for safety. Pt encourage to attend groups and activities. Coping skills encouraged. 0715- 1:1 for safety. Resting in bed. 7424- 1:1 for safety. Resting. NAD.     0915- pt on 1:1. Visible on the unit. Medication and meal compliant. Positive focused. 1015- staff with pt for safety. Pt writing in journal visible on the unit. Stated goal: call father during phone times; no self harming behaviors. 1115- pt calm. Sitting in dining. 1215- pt using phone. Calm cooperative. 1315- pt attending groups and activities. Staff monitoring pt and pt behavior continuous. 1415- pt calm cooperative. Pt on 1:1 for safety.

## 2018-11-05 NOTE — BH NOTES
PRN Medication Documentation    Specific patient behavior that led to need for PRN medication: anxiety  Staff interventions attempted prior to PRN being given: calming techniques  PRN medication given: Ativan 1 mg PO  Patient response/effectiveness of PRN medication: decreased anxiety

## 2018-11-05 NOTE — BH NOTES
GROUP THERAPY PROGRESS NOTE Dracut Hdez is participating in [place]. Group time: 15 minutes Personal goal for participation: To not be depressed. Goal orientation: personal 
 
Group therapy participation: active Therapeutic interventions reviewed and discussed: Writer listened attentively and explained the unit routine. Impression of participation: Patient participated actively.

## 2018-11-05 NOTE — PROGRESS NOTES
Problem: Falls - Risk of  Goal: *Absence of Falls  Document Samanta Fall Risk and appropriate interventions in the flowsheet. Fall Risk Interventions:  Patient continues to be on 1:1 with staff. She has someone with her 24/7. She is currently sleeping, no stress noted. Mobility Interventions: Assess mobility with egress test    Mentation Interventions: Adequate sleep, hydration, pain control, Room close to nurse's station, Door open when patient unattended    Medication Interventions: Teach patient to arise slowly    Elimination Interventions:  Toilet paper/wipes in reach    History of Falls Interventions: Room close to nurse's station, Door open when patient unattended

## 2018-11-05 NOTE — BH NOTES
Patient's  from [facility] has called Stefania Whyte and she will come to see patient tomorrow 10am. Message left for SW and this will be passed on to for am shift.

## 2018-11-05 NOTE — BH NOTES
GROUP THERAPY PROGRESS NOTE    Kelli Hdez participated in an Acute Unit Process Group with a focus on identifying feelings, stating a goal for the rest of the day, and reviewing three DBT Emotion Regulation Skills: Building Mastery; Reducing vulnerability by managing ones physical health; and Building positive experiences. Group time: 45 minutes. Personal goal for participation: To increase the capacity to identify feelings, provide structure for the afternoon and evening today, and explore the potential to expand ones coping skills. Goal orientation: The patient will be able to identify feelings and explore additional coping skills, like setting an achievable goal in the short-term or paying attention to pleasure in ones life. Group therapy participation: With prompting, this patient participated in the group. Therapeutic interventions reviewed and discussed: The group members were asked to introduce themselves to each other and to see if they could identify an emotion they are having and/or let the group know what they want to focus on for the day as they continue to make discharge plans. They also reviewed a handout on the DBT Emotion Regulations Skills related to Building mastery, Taking care of ones physical needs, and Building positive experiences. Copies of the handout were distributed to the group members to keep and review on their own time. Impression of participation: The patient said she was feeling \"happier today than the last time in group. \" She was was alert, generally oriented, and speaking in a fashion that seemed more grounded and real than previously in process groups with the undersigned in this hospitalization. She still attributed most of her problems to the death of her mother but says she does not intend to cut on herself anymore and that she is aiming to come off of one-on-one by this coming Wednesday.  She voiced no current SI/HI and expressed no overt psychotic symptoms in group. Her affect was less depressed and anxious than on admission. Her mood reflected her affect. She may still be vulnerable to poor judgment and limited coping skills and may benefit from continued work on the specifics of her treatment plan, after showing that she can manage herself well-enough to come off her one-on-one status.

## 2018-11-05 NOTE — INTERDISCIPLINARY ROUNDS
Behavioral Health Interdisciplinary Rounds     Patient Name: Nicole Arita  Age: 59 y.o. Room/Bed:  726/02  Primary Diagnosis: Major [facility] with psychotic features ([facility]   Admission Status: Involuntary Commitment     Readmission within 30 days: no  Power of  in place: no  Patient requires a blocked bed: yes          Reason for blocked bed: patient on 1:1 with staff    VTE Prophylaxis: No    Mobility needs/Fall risk: yes  Flu Vaccine : no   Nutritional Plan: no  Consults:          Labs/Testing due today?: no    Sleep hours:  6.45      Participation in Care/Groups:  yes  Medication Compliant?: Yes  PRNS (last 24 hours): Antipsychotic (PO)    Restraints (last 24 hours):  no     CIWA (range last 24 hours):     COWS (range last 24 hours):      Alcohol screening (AUDIT) completed -   AUDIT Score: 0     If applicable, date SBIRT discussed in treatment team AND documented:     Tobacco - patient is a smoker: Have You Used Tobacco in the Past 30 Days: No  Illegal Drugs use: Have You Used Any Illegal Substances Over the Past 12 Months: No    24 hour chart check complete: yes     Patient goal(s) for today:   Treatment team focus/goals: Plan to titrate her medications and consider 1:1 for another 24 hours. Progress note : she remains on 1:1. She has been compliant with her medications and treatment.        LOS:  8  Expected LOS: TBD     Financial concerns/prescription coverage: Erica SOTO   Date of last family contact:  DEV smallnina to her dad on Friday      Family requesting physician contact today:  no  Discharge plan: she will return home with her father   Guns in the home:   no      Outpatient provider(s): [address] [place] [address]     Participating treatment team members: Kade Engel Dr. [address]

## 2018-11-05 NOTE — PROGRESS NOTES
1531 Problem: Depressed Mood (Adult/Pediatric)  Goal: *STG: Verbalizes anger, guilt, and other feelings in a constructive manor  Outcome: Progressing Towards Goal  Patient calm/cooperative and received her with 1:1 at her side. Staff will continue q15 checks. 1600 pt calm/cooperative. 1:1 at her side. 1700 pt calm/cooperative. 1:1 at her side. 1800 pt calm/cooperative. 1:1 at her side. 1900 pt calm/cooperative. 1:1 at her side. 2000 pt calm/cooperative. 1:1 at her side. 2100 pt calm/cooperative. 1:1 at her side. 2200 pt calm/cooperative. 1:1 at her side. 2300 pt calm/cooperative. 1:1 at her side.

## 2018-11-05 NOTE — BH NOTES
GROUP THERAPY PROGRESS NOTE    Anum Escamilla is not participating in Reflections/Relaxation group.      Group time: 20 minutes    Personal goal for participation: participate in relaxation technique/ listening to soft music    Goal orientation: relaxation    Group therapy participation: did not attend

## 2018-11-05 NOTE — BH NOTES
PSYCHIATRIC PROGRESS NOTE         Patient Name  Andre Arechiga   Date of Birth 1960   [facility] [id number]   Medical Record Number  [id number]      Age  59 y.o. PCP Drake Vance MD   Admit date:  10/27/2018    Room Number  726/02  @ [facility]   Date of Service  11/5/2018           E & M PROGRESS NOTE:         HISTORY         REASON FOR HOSPITALIZATION:  CC: \". Pt admitted under a temporary assisted order (TDO) [place] tdo oco for severe depression with suicidal ideations  proving to be an imminent danger to self and an inability to care for self. HISTORY OF PRESENT ILLNESS:    The patient, Andre Arechiga, is a 59 y.o. WHITE [place]  female with a past psychiatric history significant for depression, anxiety, psychosis, hearing voices ho presents at this time with complaints of (and/or evidence of) the following emotional symptoms: suicidal thoughts/threats, delusions, psychotic behavior and paranoid behavior. Additional symptomatology include difficulty swallowing and feeling suicidal.  The above symptoms have been present for Pt reports her mother passed away in April and she feels she could have done more, reports she has not been taking her meds for one and half week. Pt was on zoloft 200 at night and zyprexa po bid as per reports. Pt reports she is getting thoughts of cutting herself, she feels responsible for mother's death, pt ha s ntense guilt, decrease appetite, difficulty sleeping, poor eye contact and auditory hallucinations. These symptoms are of intense severity. These symptoms are constant in nature. 10/28/18: As per staff, pt sneaked in X-acto knife while nurse was looking for med lsit from her bag,. Pt made multiple superficial cuts on her rt arm, needing medical consult. Knife was found with pt and wound were treated, no stiches were needed. Pt continues to be dystaymic, having suicidal ideation.   10/29/18: Pt continues to be very anxious and depressed, report suicidal ideations and urges to cut. Pt is on 1:1 observation. Pts cholesterol is 326 and tsh 0.5. Pt slept well. Pt received ativan 1mg twice yesterday and pt also tried to elope yesterday. 10/30/18: Pt reports she slept better. Pt reports she has an appointment with social security. Pt reports she feels little better but still has suicidal thoughts and impulsivity. 10/31/18:Pt is still dysthymic, verbalizing urges to harm herself, not romulo for safety,still hearing voices, poor ADL's.  11/2/18: Pt still depressed, sneaked in fork after lunch which was taken back by the nurse. Pt reports gets urges to cut and hear voices, still ahs urges to cut. 11/3/18- Remains on 1 to 1 for ongoing urges to cut on herself, not contacting for safety. Asking to make a phone call. Assured that she could make a phone call when off one to one. Prn used-Ativan. Slept 8 hrs. 11/4/18-remains on 1 to 1. Positive thinking re enforced. Slept 6.45 hrs. No prn's were used. Tolerating   haldol well. 11/5/18: Pt reports she feels better, is hopefull and reports did not have urges to cut. Pt receiving prn atrax. As per staff pt is manipulative,asking to  speak with dad. Pt attending groups, not hearing voices. SIDE EFFECTS: (reviewed/updated 11/5/2018)  None reported or admitted to. No noted toxicity with use of epakote/Tegretol/lithium/Clozaril/TCAs   ALLERGIES:(reviewed/updated 11/5/2018)  Allergies   Allergen Reactions    Other Food Other (comments)     \"Bad chest pain\" with walnuts. Coke - asthma/stuffy head. Fish sticks causes an asthma attack.  Astepro [Azelastine] Other (comments)     Violent headaches.  Bactrim [Sulfamethoprim] Other (comments)     Difficulty breathing, chills, fever.  Banana Other (comments)     Diffculty breathing, wheezing, asthma attack.  Coconut Other (comments)     Difficulty breathing, asthma attack, SOB.  Cortisone Hives     Difficulty breathing.     Nut - Unspecified Other (comments)     \"Bad chest pain\" with walnuts    Peanut Other (comments)     Wheezing, asthma attack, SOB.  Prednisone Hives     Difficulty breathing, kidney stones. MEDICATIONS PRIOR TO ADMISSION:(reviewed/updated 11/5/2018)  Medications Prior to Admission   Medication Sig    albuterol (PROVENTIL HFA, VENTOLIN HFA, PROAIR HFA) 90 mcg/actuation inhaler Take 2 Puffs by inhalation every six (6) hours as needed for Wheezing.  beclomethasone (QVAR) 40 mcg/actuation aero Take 2 Puffs by inhalation two (2) times a day.  CLONIDINE HCL PO Take 1/2 of 0.1mg tablet QHS    busPIRone (BUSPAR) 15 mg tablet Take 15 mg by mouth three (3) times daily.  QUEtiapine (SEROQUEL) 50 mg tablet Take 50 mg by mouth nightly.  OLANZapine (ZYPREXA) 20 mg tablet Take 20 mg by mouth nightly.  hydrOXYzine HCl (ATARAX) 50 mg tablet Take 50 mg by mouth every four (4) hours as needed for Anxiety.  prazosin (MINIPRESS) 1 mg capsule Take 1 mg by mouth nightly.  clonazePAM (KLONOPIN) 1 mg tablet Take 1 mg by mouth nightly.  OLANZapine (ZYPREXA) 5 mg tablet Take 5 mg by mouth daily (after breakfast).  aspirin (ASPIRIN) 325 mg tablet Take 1 Tab by mouth daily. Indications: myocardial infarction prevention    levothyroxine (SYNTHROID) 125 mcg tablet Take 1 Tab by mouth Daily (before breakfast). Indications: hypothyroidism    sertraline (ZOLOFT) 100 mg tablet Take 2 Tabs by mouth daily (with dinner). Indications: Generalized Anxiety Disorder, major depressive disorder    zolpidem (AMBIEN) 10 mg tablet Take 1 Tab by mouth nightly. Max Daily Amount: 10 mg. Indications: SLEEP-ONSET INSOMNIA    cholecalciferol (VITAMIN D3) 1,000 unit tablet Take 1 Tab by mouth daily. Indications: PREVENTION OF VITAMIN D DEFICIENCY, Vitamin D Deficiency    fluticasone (FLONASE) 50 mcg/actuation nasal spray 2 Sprays by Both Nostrils route daily. Indications:  Allergic Rhinitis    therapeutic multivitamin (THERA) tablet Take 1 Tab by mouth daily.  cyanocobalamin, vitamin B-12, (VITAMIN B-12) 5,000 mcg subl 5,000 mcg by SubLINGual route daily. PAST MEDICAL HISTORY: Past medical history from the initial psychiatric evaluation has been reviewed (reviewed/updated 11/5/2018) with no additional updates (I asked patient and no additional past medical history provided). Past Medical History:   Diagnosis Date    Asthma 1967    hospitalized for asthma attack x 2    Chronic kidney disease     kidney stones x 2-3 times    Ill-defined condition     nasal blockage from growth and misalignment - cannot breath out of nose - surgery in the future/cleared for colonoscopy 7/31/2014 - will bring in letter    Other ill-defined conditions(799.89)     blood in stool    Other ill-defined conditions(799.89)     hx low BP - 90's/60's-70's    PUD (peptic ulcer disease)     Thyroid disease     Unspecified adverse effect of anesthesia     nasal growth and misalignment and cannot breath through nose     Past Surgical History:   Procedure Laterality Date    HX HEENT      T & A    HX HEENT      turbinate surgery      SOCIAL HISTORY: Social history from the initial psychiatric evaluation has been reviewed (reviewed/updated 11/5/2018) with no additional updates (I asked patient and no additional social history provided).    Social History     Socioeconomic History    Marital status: SINGLE     Spouse name: Not on file    Number of children: Not on file    Years of education: Not on file    Highest education level: Not on file   Social Needs    Financial resource strain: Not on file    Food insecurity - worry: Not on file    Food insecurity - inability: Not on file    Transportation needs - medical: Not on file   Travergence needs - non-medical: Not on file   Occupational History    Not on file   Tobacco Use    Smoking status: Never Smoker    Smokeless tobacco: Never Used   Substance and Sexual Activity    Alcohol use: No    Drug use: No    Sexual activity: Not Currently   Other Topics Concern     Service Not Asked    Blood Transfusions Not Asked    Caffeine Concern Not Asked    Occupational Exposure Not Asked    Hobby Hazards Not Asked    Sleep Concern Not Asked    Stress Concern Not Asked    Weight Concern Not Asked    Special Diet Not Asked    Back Care Not Asked    Exercise Not Asked    Bike Helmet Not Asked   [address] Not Asked    Self-Exams Not Asked   Social History Narrative    Not on file      FAMILY HISTORY: Family history from the initial psychiatric evaluation has been reviewed (reviewed/updated 11/5/2018) with no additional updates (I asked patient and no additional family history provided). Family History   Problem Relation Age of Onset    Stroke Mother     Other Mother         erosion of esophagus    Cancer Mother         melanoma/squamous    Heart Surgery Father         x2    Delayed Awakening Father     Pacemaker Father     Other Father         carotid endartarectomy/polio    Cataract Father        REVIEW OF SYSTEMS: (reviewed/updated 11/5/2018)  Appetite:good   Sleep: improved   All other Review of Systems: Negative except   Multiple cuts on rt arm       MENTAL STATUS EXAM & VITALS      MENTAL STATUS EXAM (MSE):    MSE FINDINGS ARE WITHIN NORMAL LIMITS (WNL) UNLESS OTHERWISE STATED BELOW. ( ALL OF THE BELOW CATEGORIES OF THE MSE HAVE BEEN REVIEWED (reviewed 10/27/2018) AND UPDATED AS DEEMED APPROPRIATE )  General Presentation age appropriate and disheveled, evasive and guarded   Orientation oriented to time, place and person   Vital Signs  See below (reviewed 10/27/2018); Vital Signs (BP, Pulse, & Temp) are within normal limits if not listed below.    Gait and Station Stable/steady, no ataxia   Musculoskeletal System No extrapyramidal symptoms (EPS); no abnormal muscular movements or Tardive Dyskinesia (TD); muscle strength and tone are within normal limits   Language No aphasia or dysarthria Speech:  soft   Thought Processes logical; slow rate of thoughts; poor abstract reasoning/computation   Thought Associations blocked    Thought Content paranoid delusions   Suicidal Ideations Ireports not having urges to cut   Homicidal Ideations no plan  and no intention   Mood:  Less depressed   Affect:  constricted   Memory recent  intact   Memory remote:  intact   Concentration/Attention:  distractable   Fund of Knowledge average   Insight:  poor   Reliability poor   Judgment:  poor                      VITALS:     Patient Vitals for the past 24 hrs:   Temp Pulse Resp BP SpO2   11/04/18 1910 97.5 °F (36.4 °C) 68 16 145/82 97 %   11/04/18 1613 97.8 °F (36.6 °C) 61 14 129/82 96 %     Wt Readings from Last 3 Encounters:   11/03/18 78.6 kg (173 lb 3.2 oz)   07/27/18 72.6 kg (160 lb)   07/16/18 72.6 kg (160 lb)     Temp Readings from Last 3 Encounters:   11/04/18 97.5 °F (36.4 °C)   08/13/18 98.5 °F (36.9 °C)   07/26/18 98 °F (36.7 °C)     BP Readings from Last 3 Encounters:   11/04/18 145/82   08/13/18 91/53   07/26/18 108/71     Pulse Readings from Last 3 Encounters:   11/04/18 68   08/13/18 (!) 57   07/26/18 62            DATA     LABORATORY DATA:(reviewed/updated 11/5/2018)  No results found for this or any previous visit (from the past 24 hour(s)). No results found for: VALF2, VALAC, VALP, VALPR, DS6, CRBAM, CRBAMP, CARB2, XCRBAM  No results found for: LITHM   RADIOLOGY REPORTS:(reviewed/updated 11/5/2018)  No results found.        MEDICATIONS     ALL MEDICATIONS:   Current Facility-Administered Medications   Medication Dose Route Frequency    sertraline (ZOLOFT) tablet 200 mg  200 mg Oral QPM    cloNIDine HCl (CATAPRES) tablet 0.05 mg  0.05 mg Oral QHS    haloperidol (HALDOL) tablet 5 mg  5 mg Oral DAILY    haloperidol (HALDOL) tablet 10 mg  10 mg Oral QHS    busPIRone (BUSPAR) tablet 15 mg  15 mg Oral BID    levothyroxine (SYNTHROID) tablet 100 mcg  100 mcg Oral ACB    pravastatin (PRAVACHOL) tablet 40 mg  40 mg Oral QHS    budesonide (PULMICORT) 500 mcg/2 ml nebulizer suspension  500 mcg Nebulization BID RT    ziprasidone (GEODON) 20 mg in sterile water (preservative free) 1 mL injection  20 mg IntraMUSCular BID PRN    OLANZapine (ZyPREXA) tablet 5 mg  5 mg Oral Q6H PRN    benztropine (COGENTIN) tablet 2 mg  2 mg Oral BID PRN    benztropine (COGENTIN) injection 2 mg  2 mg IntraMUSCular BID PRN    LORazepam (ATIVAN) injection 2 mg  2 mg IntraMUSCular Q4H PRN    LORazepam (ATIVAN) tablet 1 mg  1 mg Oral Q4H PRN    acetaminophen (TYLENOL) tablet 650 mg  650 mg Oral Q4H PRN    ibuprofen (MOTRIN) tablet 400 mg  400 mg Oral Q8H PRN    magnesium hydroxide (MILK OF MAGNESIA) 400 mg/5 mL oral suspension 30 mL  30 mL Oral DAILY PRN    nicotine (NICODERM CQ) 21 mg/24 hr patch 1 Patch  1 Patch TransDERmal DAILY PRN    aspirin tablet 325 mg  325 mg Oral DAILY    cholecalciferol (VITAMIN D3) tablet 1,000 Units  1,000 Units Oral DAILY    therapeutic multivitamin (THERAGRAN) tablet 1 Tab  1 Tab Oral DAILY    hydrOXYzine HCl (ATARAX) tablet 50 mg  50 mg Oral TID PRN    albuterol-ipratropium (DUO-NEB) 2.5 MG-0.5 MG/3 ML  3 mL Nebulization Q6H PRN      SCHEDULED MEDICATIONS:   Current Facility-Administered Medications   Medication Dose Route Frequency    sertraline (ZOLOFT) tablet 200 mg  200 mg Oral QPM    cloNIDine HCl (CATAPRES) tablet 0.05 mg  0.05 mg Oral QHS    haloperidol (HALDOL) tablet 5 mg  5 mg Oral DAILY    haloperidol (HALDOL) tablet 10 mg  10 mg Oral QHS    busPIRone (BUSPAR) tablet 15 mg  15 mg Oral BID    levothyroxine (SYNTHROID) tablet 100 mcg  100 mcg Oral ACB    pravastatin (PRAVACHOL) tablet 40 mg  40 mg Oral QHS    budesonide (PULMICORT) 500 mcg/2 ml nebulizer suspension  500 mcg Nebulization BID RT    aspirin tablet 325 mg  325 mg Oral DAILY    cholecalciferol (VITAMIN D3) tablet 1,000 Units  1,000 Units Oral DAILY    therapeutic multivitamin (THERAGRAN) tablet 1 Tab  1 Tab Oral DAILY          ASSESSMENT & PLAN     DIAGNOSES REQUIRING ACTIVE TREATMENT AND MONITORING: (reviewed/updated 11/5/2018)  Patient Active Hospital Problem List:   The patient, Lachelle Peterson, is a 59 y.o.  female who presents at this time for treatment of the following diagnoses:  Patient C/Angelica Qureshi 1106 Problem List:   Major depression with psychotic features ([facility] (10/27/2018)    Assessment: depression, suicidal ideations anxiety, auditory hallucination     Plan: restarting meds zoloft 25 mg po hs, zyprexa 5 mg po bid, buspar 5 mg po bid. Indiv/milue therapy  Get collaterals, safety, suicidal precautions  10/28/18: Pt was put on 1:! Observation as soon as writer heard about cutting. Increasing zoloft and zyprexa 10 mg po hs., indiv milue therapy, suicide precautions, check for contrabands  10/29/18: high cholesterol, will change zyprexa to haldol first generation antipsychotic., increasing buspar 15 mg po bid  Hyprelipidemia: starting atrovastatin 20 mg daily  10/30/18, stop zyprexa, increase haldol 5 mg po hs, plan on starting decoanate, increasing zoloft 100mg po hs.  10/31/18: Increasing zoloft 150 mg po hs, continue meds/therapy nad 1:1 observation, encourage to go to groups and ADL's.  11/2/18: adding haldol 2 mg po q am continue meds, increasing zoloft 200mg po daily, adding clonidine 0.1 mg half tab. Pt reports that helped her with nightmares in the past  11/5/18: continue meds and therapy, 1:1, allow to talk with dad undersuprevision        I will continue to monitor blood levels (Depakote, Tegretol, lithium, clozapine---a drug with a narrow therapeutic index= NTI) and associated labs for drug therapy implemented that require intense monitoring for toxicity as deemed appropriate based on current medication side effects and pharmacodynamically determined drug 1/2 lives.     In summary, Lachelle Peterson, is a 59 y.o.  female who presents with a severe exacerbation of the principal diagnosis of Major depression with psychotic features ([facility]  Patient's condition is orsening/not improving/not stable improving. Patient requires continued inpatient hospitalization for further stabilization, safety monitoring and medication management. I will continue to coordinate the provision of individual, milieu, occupational, group, and substance abuse therapies to address target symptoms/diagnoses as deemed appropriate for the individual patient. A coordinated, multidisplinary treatment team round was conducted with the patient (this team consists of the nurse, psychiatric unit pharmcist,  and writer). Complete current electronic health record for patient has been reviewed today including consultant notes, ancillary staff notes, nurses and psychiatric tech notes. icide risk assessment completed and patient deemed to be of low risk for suicide at this time. The following regarding medications was addressed during rounds with patient:   the risks and benefits of the proposed medication. The patient was given the opportunity to ask questions. Informed consent given to the use of the above medications. Will continue to adjust psychiatric and non-psychiatric medications (see above \"medication\" section and orders section for details) as deemed appropriate & based upon diagnoses and response to treatment. I will continue to order blood tests/labs and diagnostic tests as deemed appropriate and review results as they become available (see orders for details and above listed lab/test results). I will order psychiatric records from previous [facility] hospitals to further elucidate the nature of patient's psychopathology and review once available. I will gather additional collateral information from riends, family and o/p treatment team to further elucidate the nature of patient's psychopathology and baselline level of psychiatric functioning.          I certify that this patient's inpatient psychiatric hospital services furnished since the previous certification were, and continue to be, required for treatment that could reasonably be expected to improve the patient's condition, or for diagnostic study, and that the patient continues to need, on a daily basis, active treatment furnished directly by or requiring the supervision of inpatient psychiatric facility personnel. In addition, the hospital records show that services furnished were intensive treatment services, admission or related services, or equivalent services.     EXPECTED DISCHARGE DATE/DAY: BD     DISPOSITION:[place]       Signed By:   Keren Multani MD  11/5/2018

## 2018-11-06 PROCEDURE — 94640 AIRWAY INHALATION TREATMENT: CPT

## 2018-11-06 PROCEDURE — 74011250637 HC RX REV CODE- 250/637: Performed by: NURSE PRACTITIONER

## 2018-11-06 PROCEDURE — 74011000250 HC RX REV CODE- 250: Performed by: NURSE PRACTITIONER

## 2018-11-06 PROCEDURE — 74011250637 HC RX REV CODE- 250/637: Performed by: PSYCHIATRY & NEUROLOGY

## 2018-11-06 PROCEDURE — 65220000003 HC RM SEMIPRIVATE PSYCH

## 2018-11-06 RX ADMIN — BUSPIRONE HYDROCHLORIDE 15 MG: 10 TABLET ORAL at 08:13

## 2018-11-06 RX ADMIN — SERTRALINE HYDROCHLORIDE 200 MG: 50 TABLET ORAL at 17:02

## 2018-11-06 RX ADMIN — ASPIRIN 325 MG: 325 TABLET ORAL at 08:14

## 2018-11-06 RX ADMIN — BUSPIRONE HYDROCHLORIDE 15 MG: 10 TABLET ORAL at 17:03

## 2018-11-06 RX ADMIN — HALOPERIDOL 5 MG: 5 TABLET ORAL at 08:14

## 2018-11-06 RX ADMIN — THERA TABS 1 TABLET: TAB at 08:14

## 2018-11-06 RX ADMIN — VITAMIN D, TAB 1000IU (100/BT) 1000 UNITS: 25 TAB at 08:14

## 2018-11-06 RX ADMIN — CLONIDINE HYDROCHLORIDE 0.05 MG: 0.1 TABLET ORAL at 21:03

## 2018-11-06 RX ADMIN — LEVOTHYROXINE SODIUM 100 MCG: 100 TABLET ORAL at 06:35

## 2018-11-06 RX ADMIN — PRAVASTATIN SODIUM 40 MG: 40 TABLET ORAL at 21:03

## 2018-11-06 RX ADMIN — LORAZEPAM 1 MG: 1 TABLET ORAL at 16:15

## 2018-11-06 RX ADMIN — BUDESONIDE 500 MCG: 0.5 INHALANT RESPIRATORY (INHALATION) at 11:16

## 2018-11-06 RX ADMIN — HALOPERIDOL 10 MG: 10 TABLET ORAL at 21:04

## 2018-11-06 NOTE — PROGRESS NOTES
Problem: Suicide/Homicide (Adult/Pediatric)  Goal: *STG:  Verbalizes alternative ways of dealing with maladaptive feelings/behaviors  Outcome: Not Progressing Towards Goal  Patient will not agree to a safety contract. She has harmed herself while on the Acute Unit and has been on 1:1 since that event. Subsequently she has tried to hide a fork from someone's tray in her bed. It was found and her 1:1 staff is to stay with her every moment of the 24 hour period. She is currently sleeping, no stress noted. Alyssa Mitchell

## 2018-11-06 NOTE — BH NOTES
Documented for 11/04/18        2300--Patient continues on 1:1, sleeping no stress noted    2400--Continues on 1:1 with staff. 0100--Continues on 1:1 with staff    0200--Patient asleep in bed with 1:1 staff in room    0300--Patient continues on 1:1    0400--Patient continues on 1:1 with staff    0500--Patient continues on 1:1 with staff    0600--Patient continues on 1:1 no negative occurences.     0700-- Patient continues on 1:1 signed off to day shift

## 2018-11-06 NOTE — PROGRESS NOTES
Problem: Depressed Mood (Adult/Pediatric)  Goal: *STG: Complies with medication therapy  Outcome: Progressing Towards Goal  Medication compliant  Reviewed in treatment team  Goal: Interventions  Outcome: Progressing Towards Goal  Will continue to monitor  On 15 min. Checks for safety   Assess depression  Suicidal ideation  Need for 1:1 observation  Medication compliance  Effectiveness  Encourage groups    Problem: Suicide/Homicide (Adult/Pediatric)  Goal: *STG: Attends activities and groups  Outcome: Progressing Towards Goal  Pt.  Attending groups  Remains on 1:1 observation not romulo for safety   Future oriented  Talking with her father and friend on the phone

## 2018-11-06 NOTE — PROGRESS NOTES
Problem: Discharge Planning  Goal: *Discharge to safe environment  Outcome: Progressing Towards Goal  She will return to her fathers home when ready for discharge   She has a supportive father     Goal: *Knowledge of medication management  Outcome: Progressing Towards Goal  She is compliant with her medications and is able to verbalize the need for medications   Goal: *Knowledge of discharge instructions  Outcome: Progressing Towards Goal  She is able to verbalize discharge instructions     Problem: Patient Education: Go to Patient Education Activity  Goal: Patient/Family Education  Outcome: Progressing Towards Goal  SW will educate patient on discharge instructions

## 2018-11-06 NOTE — BH NOTES
Blocked Bed Documentation:    Room number: 726  Type: Behavior  Rationale: Poor Self-Control  Anticipated duration: Unknown  Additional comments:N/A

## 2018-11-06 NOTE — BH NOTES
2300--Patient continues on 1:1 with staff. Sleeping no signs of stress. 2400--Patient continues on 1:1.    0100--Patient asleep in bed. Continues on 1:1 with staff    0200--Patient continues on 1:1 for safety    0300--Patient continues on 1:1 with staff    0400--Patient continues on 1:1    0500-- Patient continues on 1:1 with staff.   Quietly sleeping    0600--Patient continues on 1:1 with staff

## 2018-11-06 NOTE — INTERDISCIPLINARY ROUNDS
Behavioral Health Interdisciplinary Rounds     Patient Name: Sara Johnson  Age: 59 y.o. Room/Bed:  726/02  Primary Diagnosis: Major depression with psychotic features ([facility]   Admission Status: Involuntary Commitment     Readmission within 30 days: no  Power of  in place: no  Patient requires a blocked bed: yes          Reason for blocked bed: 1:1    VTE Prophylaxis: No    Mobility needs/Fall risk: no  Flu Vaccine : no   Nutritional Plan: no  Consults:          Labs/Testing due today?: no    Sleep hours:  10.15      Participation in Care/Groups:  yes  Medication Compliant?: Yes  PRNS (last 24 hours): None    Restraints (last 24 hours):  no     CIWA (range last 24 hours):     COWS (range last 24 hours):      Alcohol screening (AUDIT) completed -   AUDIT Score: 0     If applicable, date SBIRT discussed in treatment team AND documented:     Tobacco - patient is a smoker: Have You Used Tobacco in the Past 30 Days: No  Illegal Drugs use: Have You Used Any Illegal Substances Over the Past 12 Months: No    24 hour chart check complete: yes     Patient goal(s) for today:   Treatment team focus/goals: Plan to take off 1:1.   Plan to adjust her medications  Progress note : Her  is coming to visit today . She is romulo for safety.        LOS:  9  Expected LOS: TBD    Financial concerns/prescription coverage:  Erica SOTO   Date of last family contact:       Family requesting physician contact today:  no  Discharge plan: she will return home with her dad   Guns in the home:  No        Outpatient provider(s): [facility]     Participating treatment team members: Tana Abarca Dr., PharmD. - Kenzie Sutton

## 2018-11-06 NOTE — BH NOTES
GROUP THERAPY PROGRESS NOTE    Yelena Kramer participated in the Acute Unit's afternoon Process Group with a focus   on identifying feelings, planning for the rest of the day, and preparing for discharge. Group time: 25 minutes. Personal goal for participation: To increase the capacity to improve ones mood and structure. Goal orientation: The patient will be able to identify their feelings, develop a plan for structuring their day, and discharge planning. Group therapy participation: With prompting, this patient actively participated in the group. Therapeutic interventions reviewed and discussed: The group members were asked to introduce   themselves to each other and to see if they could identify an emotion they are having and/or let the group know what they want to focus on for the day as they continue to make discharge plans. Impression of participation: The patient said she was feeling, \"Grateful\" and she read a portion of her journal expressing the same sentiment. She was alert and generally oriented. She expressed no current SI/HI and displayed no overt psychotic symptoms in this group. She indicated that she hoped to be able to go back and live with her father after discharge, but at this point, mentioned no other specifics about her aftercare needs and plans. Her affect was anxious but not as intensely depressed as on admission. Her thinking is clearer than on admission. Her mood reflected her affect.

## 2018-11-06 NOTE — BH NOTES
PRN Medication Documentation    Specific patient behavior that led to need for PRN medication: pt reporting  anxiety following phone call  Staff interventions attempted prior to PRN being given: listening presence, reassurance, pt denies SI, agrees to tell staff if self injurious thoughts occur  PRN medication given: ativan 1mg po prn at 1615  Patient response/effectiveness of PRN medication: 40 minutes post a administration pt reports anxiety is lessening pt lay quietly in bed.

## 2018-11-06 NOTE — BH NOTES
0715 pt. On 1:1 observation with staff  Resting in bed  0815  Pt. Remains on 1:1 observation  Out on unit ate breakfast    0845 on 1:1 observation  Talking on phone with friend  Asking to call her father  5 pt. On 1:1 observation  In day area   Smiling   1015 on 1:1 observation   Pt. Met with Dr. Leonel Stewart in treatment team  Read from her journal  Stating short and long term goals   Pt.  Able to contract for safety   1:1 discontinued       Blocked Bed Documentation:    Room number: 726  Type: Behavior  Rationale: Impulsive  Anticipated duration: assessed every shift  Additional comments:  Plan to unblock bed today  When 1:1 discontinued

## 2018-11-06 NOTE — BH NOTES
GROUP THERAPY PROGRESS NOTE    Titus Biswas is participating in relaxation group.      Group time: 20 minutes    Personal goal for participation: quiet time for personal reflection and relaxation    Goal orientation: relaxation    Group therapy participation: passive    Therapeutic interventions reviewed and discussed: relaxation    Impression of participation: patient rested in her room

## 2018-11-06 NOTE — BH NOTES
PSYCHIATRIC PROGRESS NOTE         Patient Name  Yogi Carlisle   Date of Birth 1960   [facility] [id number]   Medical Record Number  [id number]      Age  59 y.o. PCP Renato Haddad MD   Admit date:  10/27/2018    Room Number  726/02  @ [facility]   Date of Service  11/6/2018           E & M PROGRESS NOTE:         HISTORY         REASON FOR HOSPITALIZATION:  CC: \". Pt admitted under a temporary long-term order (TDO) [place] tdo oco for severe depression with suicidal ideations  proving to be an imminent danger to self and an inability to care for self. HISTORY OF PRESENT ILLNESS:    The patient, Yogi Carlisle, is a 59 y.o. WHITE [place]  female with a past psychiatric history significant for depression, anxiety, psychosis, hearing voices ho presents at this time with complaints of (and/or evidence of) the following emotional symptoms: suicidal thoughts/threats, delusions, psychotic behavior and paranoid behavior. Additional symptomatology include difficulty swallowing and feeling suicidal.  The above symptoms have been present for Pt reports her mother passed away in April and she feels she could have done more, reports she has not been taking her meds for one and half week. Pt was on zoloft 200 at night and zyprexa po bid as per reports. Pt reports she is getting thoughts of cutting herself, she feels responsible for mother's death, pt ha s ntense guilt, decrease appetite, difficulty sleeping, poor eye contact and auditory hallucinations. These symptoms are of intense severity. These symptoms are constant in nature. 10/28/18: As per staff, pt sneaked in X-acto knife while nurse was looking for med lsit from her bag,. Pt made multiple superficial cuts on her rt arm, needing medical consult. Knife was found with pt and wound were treated, no stiches were needed. Pt continues to be dystaymic, having suicidal ideation.   10/29/18: Pt continues to be very anxious and depressed, report suicidal ideations and urges to cut. Pt is on 1:1 observation. Pts cholesterol is 326 and tsh 0.5. Pt slept well. Pt received ativan 1mg twice yesterday and pt also tried to elope yesterday. 10/30/18: Pt reports she slept better. Pt reports she has an appointment with social security. Pt reports she feels little better but still has suicidal thoughts and impulsivity. 10/31/18:Pt is still dysthymic, verbalizing urges to harm herself, not romulo for safety,still hearing voices, poor ADL's.  11/2/18: Pt still depressed, sneaked in fork after lunch which was taken back by the nurse. Pt reports gets urges to cut and hear voices, still ahs urges to cut. 11/3/18- Remains on 1 to 1 for ongoing urges to cut on herself, not contacting for safety. Asking to make a phone call. Assured that she could make a phone call when off one to one. Prn used-Ativan. Slept 8 hrs. 11/4/18-remains on 1 to 1. Positive thinking re enforced. Slept 6.45 hrs. No prn's were used. Tolerating   haldol well. 11/5/18: Pt reports she feels better, is hopefull and reports did not have urges to cut. Pt receiving prn atrax. As per staff pt is manipulative,asking to  speak with dad. Pt attending groups, not hearing voices. 11/6/18: Pt is doing better as per staff, no new self injurious behaviors, has brighter affect. Pt report not hearing voices, mood is better, is future oriented and hopeful. Pts father is visiting today and pt will see her  Yee today. SIDE EFFECTS: (reviewed/updated 11/6/2018)  None reported or admitted to. No noted toxicity with use of epakote/Tegretol/lithium/Clozaril/TCAs   ALLERGIES:(reviewed/updated 11/6/2018)  Allergies   Allergen Reactions    Other Food Other (comments)     \"Bad chest pain\" with walnuts. Coke - asthma/stuffy head. Fish sticks causes an asthma attack.  Astepro [Azelastine] Other (comments)     Violent headaches.     Bactrim [Sulfamethoprim] Other (comments)     Difficulty breathing, chills, fever.  Banana Other (comments)     Diffculty breathing, wheezing, asthma attack.  Coconut Other (comments)     Difficulty breathing, asthma attack, SOB.  Cortisone Hives     Difficulty breathing.  Nut - Unspecified Other (comments)     \"Bad chest pain\" with walnuts    Peanut Other (comments)     Wheezing, asthma attack, SOB.  Prednisone Hives     Difficulty breathing, kidney stones. MEDICATIONS PRIOR TO ADMISSION:(reviewed/updated 11/6/2018)  Medications Prior to Admission   Medication Sig    albuterol (PROVENTIL HFA, VENTOLIN HFA, PROAIR HFA) 90 mcg/actuation inhaler Take 2 Puffs by inhalation every six (6) hours as needed for Wheezing.  beclomethasone (QVAR) 40 mcg/actuation aero Take 2 Puffs by inhalation two (2) times a day.  CLONIDINE HCL PO Take 1/2 of 0.1mg tablet QHS    busPIRone (BUSPAR) 15 mg tablet Take 15 mg by mouth three (3) times daily.  QUEtiapine (SEROQUEL) 50 mg tablet Take 50 mg by mouth nightly.  OLANZapine (ZYPREXA) 20 mg tablet Take 20 mg by mouth nightly.  hydrOXYzine HCl (ATARAX) 50 mg tablet Take 50 mg by mouth every four (4) hours as needed for Anxiety.  prazosin (MINIPRESS) 1 mg capsule Take 1 mg by mouth nightly.  clonazePAM (KLONOPIN) 1 mg tablet Take 1 mg by mouth nightly.  OLANZapine (ZYPREXA) 5 mg tablet Take 5 mg by mouth daily (after breakfast).  aspirin (ASPIRIN) 325 mg tablet Take 1 Tab by mouth daily. Indications: myocardial infarction prevention    levothyroxine (SYNTHROID) 125 mcg tablet Take 1 Tab by mouth Daily (before breakfast). Indications: hypothyroidism    sertraline (ZOLOFT) 100 mg tablet Take 2 Tabs by mouth daily (with dinner). Indications: Generalized Anxiety Disorder, major depressive disorder    zolpidem (AMBIEN) 10 mg tablet Take 1 Tab by mouth nightly. Max Daily Amount: 10 mg. Indications: SLEEP-ONSET INSOMNIA    cholecalciferol (VITAMIN D3) 1,000 unit tablet Take 1 Tab by mouth daily.  Indications: PREVENTION OF VITAMIN D DEFICIENCY, Vitamin D Deficiency    fluticasone (FLONASE) 50 mcg/actuation nasal spray 2 Sprays by Both Nostrils route daily. Indications: Allergic Rhinitis    therapeutic multivitamin (THERA) tablet Take 1 Tab by mouth daily.  cyanocobalamin, vitamin B-12, (VITAMIN B-12) 5,000 mcg subl 5,000 mcg by SubLINGual route daily. PAST MEDICAL HISTORY: Past medical history from the initial psychiatric evaluation has been reviewed (reviewed/updated 11/6/2018) with no additional updates (I asked patient and no additional past medical history provided). Past Medical History:   Diagnosis Date    Asthma 1967    hospitalized for asthma attack x 2    Chronic kidney disease     kidney stones x 2-3 times    Ill-defined condition     nasal blockage from growth and misalignment - cannot breath out of nose - surgery in the future/cleared for colonoscopy 7/31/2014 - will bring in letter    Other ill-defined conditions(799.89)     blood in stool    Other ill-defined conditions(799.89)     hx low BP - 90's/60's-70's    PUD (peptic ulcer disease)     Thyroid disease     Unspecified adverse effect of anesthesia     nasal growth and misalignment and cannot breath through nose     Past Surgical History:   Procedure Laterality Date    HX HEENT      T & A    HX HEENT      turbinate surgery      SOCIAL HISTORY: Social history from the initial psychiatric evaluation has been reviewed (reviewed/updated 11/6/2018) with no additional updates (I asked patient and no additional social history provided).    Social History     Socioeconomic History    Marital status: SINGLE     Spouse name: Not on file    Number of children: Not on file    Years of education: Not on file    Highest education level: Not on file   Social Needs    Financial resource strain: Not on file    Food insecurity - worry: Not on file    Food insecurity - inability: Not on file    Transportation needs - medical: Not on file   [address] [place] [address] [place] Transportation needs - non-medical: Not on file   Occupational History    Not on file   Tobacco Use    Smoking status: Never Smoker    Smokeless tobacco: Never Used   Substance and Sexual Activity    Alcohol use: No    Drug use: No    Sexual activity: Not Currently   Other Topics Concern     Service Not Asked    Blood Transfusions Not Asked    Caffeine Concern Not Asked    Occupational Exposure Not Asked    Hobby Hazards Not Asked    Sleep Concern Not Asked    Stress Concern Not Asked    Weight Concern Not Asked    Special Diet Not Asked    Back Care Not Asked    Exercise Not Asked    Bike Helmet Not Asked   [address] Not Asked    Self-Exams Not Asked   Social History Narrative    Not on file      FAMILY HISTORY: Family history from the initial psychiatric evaluation has been reviewed (reviewed/updated 11/6/2018) with no additional updates (I asked patient and no additional family history provided). Family History   Problem Relation Age of Onset    Stroke Mother     Other Mother         erosion of esophagus    Cancer Mother         melanoma/squamous    Heart Surgery Father         x2    Delayed Awakening Father     Pacemaker Father     Other Father         carotid endartarectomy/polio    Cataract Father        REVIEW OF SYSTEMS: (reviewed/updated 11/6/2018)  Appetite:good   Sleep: improved   All other Review of Systems: Negative except   Multiple cuts on rt arm       MENTAL STATUS EXAM & VITALS      MENTAL STATUS EXAM (MSE):    MSE FINDINGS ARE WITHIN NORMAL LIMITS (WNL) UNLESS OTHERWISE STATED BELOW. ( ALL OF THE BELOW CATEGORIES OF THE MSE HAVE BEEN REVIEWED (reviewed 10/27/2018) AND UPDATED AS DEEMED APPROPRIATE )  General Presentation age appropriate and disheveled, evasive and guarded   Orientation oriented to time, place and person   Vital Signs  See below (reviewed 10/27/2018); Vital Signs (BP, Pulse, & Temp) are within normal limits if not listed below.    Gait and Station Stable/steady, no ataxia   Musculoskeletal System No extrapyramidal symptoms (EPS); no abnormal muscular movements or Tardive Dyskinesia (TD); muscle strength and tone are within normal limits   Language No aphasia or dysarthria   Speech:  soft   Thought Processes logical; slow rate of thoughts; poor abstract reasoning/computation   Thought Associations blocked    Thought Content paranoid delusions   Suicidal Ideations Ireports not having urges to cut   Homicidal Ideations no plan  and no intention   Mood:  Less depressed   Affect:  constricted   Memory recent  intact   Memory remote:  intact   Concentration/Attention:  distractable   Fund of Knowledge average   Insight:  poor   Reliability poor   Judgment:  poor                      VITALS:     Patient Vitals for the past 24 hrs:   Temp Pulse Resp BP SpO2   11/06/18 0748 98.6 °F (37 °C) 77 16 133/76 97 %   11/05/18 1559 98.4 °F (36.9 °C) 74 18 105/67 97 %     Wt Readings from Last 3 Encounters:   11/03/18 78.6 kg (173 lb 3.2 oz)   07/27/18 72.6 kg (160 lb)   07/16/18 72.6 kg (160 lb)     Temp Readings from Last 3 Encounters:   11/06/18 98.6 °F (37 °C)   08/13/18 98.5 °F (36.9 °C)   07/26/18 98 °F (36.7 °C)     BP Readings from Last 3 Encounters:   11/06/18 133/76   08/13/18 91/53   07/26/18 108/71     Pulse Readings from Last 3 Encounters:   11/06/18 77   08/13/18 (!) 57   07/26/18 62            DATA     LABORATORY DATA:(reviewed/updated 11/6/2018)  No results found for this or any previous visit (from the past 24 hour(s)). No results found for: VALF2, VALAC, VALP, VALPR, DS6, CRBAM, CRBAMP, CARB2, XCRBAM  No results found for: LITHM   RADIOLOGY REPORTS:(reviewed/updated 11/6/2018)  No results found.        MEDICATIONS     ALL MEDICATIONS:   Current Facility-Administered Medications   Medication Dose Route Frequency    sertraline (ZOLOFT) tablet 200 mg  200 mg Oral QPM    cloNIDine HCl (CATAPRES) tablet 0.05 mg  0.05 mg Oral QHS    haloperidol (HALDOL) tablet 5 mg  5 mg Oral DAILY    haloperidol (HALDOL) tablet 10 mg  10 mg Oral QHS    busPIRone (BUSPAR) tablet 15 mg  15 mg Oral BID    levothyroxine (SYNTHROID) tablet 100 mcg  100 mcg Oral ACB    pravastatin (PRAVACHOL) tablet 40 mg  40 mg Oral QHS    budesonide (PULMICORT) 500 mcg/2 ml nebulizer suspension  500 mcg Nebulization BID RT    ziprasidone (GEODON) 20 mg in sterile water (preservative free) 1 mL injection  20 mg IntraMUSCular BID PRN    OLANZapine (ZyPREXA) tablet 5 mg  5 mg Oral Q6H PRN    benztropine (COGENTIN) tablet 2 mg  2 mg Oral BID PRN    benztropine (COGENTIN) injection 2 mg  2 mg IntraMUSCular BID PRN    LORazepam (ATIVAN) injection 2 mg  2 mg IntraMUSCular Q4H PRN    LORazepam (ATIVAN) tablet 1 mg  1 mg Oral Q4H PRN    acetaminophen (TYLENOL) tablet 650 mg  650 mg Oral Q4H PRN    ibuprofen (MOTRIN) tablet 400 mg  400 mg Oral Q8H PRN    magnesium hydroxide (MILK OF MAGNESIA) 400 mg/5 mL oral suspension 30 mL  30 mL Oral DAILY PRN    nicotine (NICODERM CQ) 21 mg/24 hr patch 1 Patch  1 Patch TransDERmal DAILY PRN    aspirin tablet 325 mg  325 mg Oral DAILY    cholecalciferol (VITAMIN D3) tablet 1,000 Units  1,000 Units Oral DAILY    therapeutic multivitamin (THERAGRAN) tablet 1 Tab  1 Tab Oral DAILY    hydrOXYzine HCl (ATARAX) tablet 50 mg  50 mg Oral TID PRN    albuterol-ipratropium (DUO-NEB) 2.5 MG-0.5 MG/3 ML  3 mL Nebulization Q6H PRN      SCHEDULED MEDICATIONS:   Current Facility-Administered Medications   Medication Dose Route Frequency    sertraline (ZOLOFT) tablet 200 mg  200 mg Oral QPM    cloNIDine HCl (CATAPRES) tablet 0.05 mg  0.05 mg Oral QHS    haloperidol (HALDOL) tablet 5 mg  5 mg Oral DAILY    haloperidol (HALDOL) tablet 10 mg  10 mg Oral QHS    busPIRone (BUSPAR) tablet 15 mg  15 mg Oral BID    levothyroxine (SYNTHROID) tablet 100 mcg  100 mcg Oral ACB    pravastatin (PRAVACHOL) tablet 40 mg  40 mg Oral QHS    budesonide (PULMICORT) 500 mcg/2 ml nebulizer suspension  500 mcg Nebulization BID RT    aspirin tablet 325 mg  325 mg Oral DAILY    cholecalciferol (VITAMIN D3) tablet 1,000 Units  1,000 Units Oral DAILY    therapeutic multivitamin (THERAGRAN) tablet 1 Tab  1 Tab Oral DAILY          ASSESSMENT & PLAN     DIAGNOSES REQUIRING ACTIVE TREATMENT AND MONITORING: (reviewed/updated 11/6/2018)  Patient Active Hospital Problem List:   The patient, Sabrina Monique, is a 59 y.o.  female who presents at this time for treatment of the following diagnoses:  Patient C/Angelica Qureshi 1106 Problem List:   Major depression with psychotic features ([facility] (10/27/2018)    Assessment: depression, suicidal ideations anxiety, auditory hallucination     Plan: restarting meds zoloft 25 mg po hs, zyprexa 5 mg po bid, buspar 5 mg po bid. Indiv/milue therapy  Get collaterals, safety, suicidal precautions  10/28/18: Pt was put on 1:! Observation as soon as writer heard about cutting. Increasing zoloft and zyprexa 10 mg po hs., indiv milue therapy, suicide precautions, check for contrabands  10/29/18: high cholesterol, will change zyprexa to haldol first generation antipsychotic., increasing buspar 15 mg po bid  Hyprelipidemia: starting atrovastatin 20 mg daily  10/30/18, stop zyprexa, increase haldol 5 mg po hs, plan on starting decoanate, increasing zoloft 100mg po hs.  10/31/18: Increasing zoloft 150 mg po hs, continue meds/therapy nad 1:1 observation, encourage to go to groups and ADL's.  11/2/18: adding haldol 2 mg po q am continue meds, increasing zoloft 200mg po daily, adding clonidine 0.1 mg half tab. Pt reports that helped her with nightmares in the past  11/5/18: continue meds and therapy, 1:1, allow to talk with dad undersuprevision  11/6/18: Pt romulo for safety, will remove 1:1 observation, staff to still do close observation, check for contrabands especially after meals. continue meds and therapy.       I will continue to monitor blood levels (Depakote, Tegretol, lithium, clozapine---a drug with a narrow therapeutic index= NTI) and associated labs for drug therapy implemented that require intense monitoring for toxicity as deemed appropriate based on current medication side effects and pharmacodynamically determined drug 1/2 lives. In summary, Sabrina Monique, is a 59 y.o.  female who presents with a severe exacerbation of the principal diagnosis of Major depression with psychotic features ([facility]  Patient's condition is orsening/not improving/not stable improving. Patient requires continued inpatient hospitalization for further stabilization, safety monitoring and medication management. I will continue to coordinate the provision of individual, milieu, occupational, group, and substance abuse therapies to address target symptoms/diagnoses as deemed appropriate for the individual patient. A coordinated, multidisplinary treatment team round was conducted with the patient (this team consists of the nurse, psychiatric unit pharmcist,  and writer). Complete current electronic health record for patient has been reviewed today including consultant notes, ancillary staff notes, nurses and psychiatric tech notes. icide risk assessment completed and patient deemed to be of low risk for suicide at this time. The following regarding medications was addressed during rounds with patient:   the risks and benefits of the proposed medication. The patient was given the opportunity to ask questions. Informed consent given to the use of the above medications. Will continue to adjust psychiatric and non-psychiatric medications (see above \"medication\" section and orders section for details) as deemed appropriate & based upon diagnoses and response to treatment. I will continue to order blood tests/labs and diagnostic tests as deemed appropriate and review results as they become available (see orders for details and above listed lab/test results).     I will order psychiatric records from previous [facility] hospitals to further elucidate the nature of patient's psychopathology and review once available. I will gather additional collateral information from riends, family and o/p treatment team to further elucidate the nature of patient's psychopathology and baselline level of psychiatric functioning. I certify that this patient's inpatient psychiatric hospital services furnished since the previous certification were, and continue to be, required for treatment that could reasonably be expected to improve the patient's condition, or for diagnostic study, and that the patient continues to need, on a daily basis, active treatment furnished directly by or requiring the supervision of inpatient psychiatric facility personnel. In addition, the hospital records show that services furnished were intensive treatment services, admission or related services, or equivalent services.     EXPECTED DISCHARGE DATE/DAY: BD     DISPOSITION:[place]       Signed By:   David Ashton MD  11/6/2018

## 2018-11-06 NOTE — BH NOTES
GROUP THERAPY PROGRESS NOTE Taft Cogan is participating in [place]. Group time: 20 minutes Personal goal for participation: Thankful for the things such as birds chirping and loving someone Goal orientation: personal 
 
Group therapy participation: active Therapeutic interventions reviewed and discussed:  Tech discussed unit schedule, rules and other expectations of patients on unit. Encouraged positivity and goals. Impression of participation: listened and participated in goals.

## 2018-11-07 PROCEDURE — 74011250637 HC RX REV CODE- 250/637: Performed by: NURSE PRACTITIONER

## 2018-11-07 PROCEDURE — 74011250637 HC RX REV CODE- 250/637: Performed by: PSYCHIATRY & NEUROLOGY

## 2018-11-07 PROCEDURE — 65220000003 HC RM SEMIPRIVATE PSYCH

## 2018-11-07 PROCEDURE — 74011000250 HC RX REV CODE- 250: Performed by: NURSE PRACTITIONER

## 2018-11-07 PROCEDURE — 94640 AIRWAY INHALATION TREATMENT: CPT

## 2018-11-07 RX ADMIN — CLONIDINE HYDROCHLORIDE 0.05 MG: 0.1 TABLET ORAL at 21:17

## 2018-11-07 RX ADMIN — LEVOTHYROXINE SODIUM 100 MCG: 100 TABLET ORAL at 06:01

## 2018-11-07 RX ADMIN — BUDESONIDE 500 MCG: 0.5 INHALANT RESPIRATORY (INHALATION) at 22:22

## 2018-11-07 RX ADMIN — VITAMIN D, TAB 1000IU (100/BT) 1000 UNITS: 25 TAB at 08:40

## 2018-11-07 RX ADMIN — HALOPERIDOL 5 MG: 5 TABLET ORAL at 08:40

## 2018-11-07 RX ADMIN — BUSPIRONE HYDROCHLORIDE 15 MG: 10 TABLET ORAL at 17:00

## 2018-11-07 RX ADMIN — HALOPERIDOL 10 MG: 10 TABLET ORAL at 21:17

## 2018-11-07 RX ADMIN — PRAVASTATIN SODIUM 40 MG: 40 TABLET ORAL at 21:17

## 2018-11-07 RX ADMIN — LORAZEPAM 1 MG: 1 TABLET ORAL at 17:25

## 2018-11-07 RX ADMIN — ASPIRIN 325 MG: 325 TABLET ORAL at 08:40

## 2018-11-07 RX ADMIN — BUSPIRONE HYDROCHLORIDE 15 MG: 10 TABLET ORAL at 08:39

## 2018-11-07 RX ADMIN — SERTRALINE HYDROCHLORIDE 200 MG: 50 TABLET ORAL at 17:00

## 2018-11-07 RX ADMIN — THERA TABS 1 TABLET: TAB at 08:40

## 2018-11-07 NOTE — INTERDISCIPLINARY ROUNDS
Behavioral Health Interdisciplinary Rounds     Patient Name: Sharee Mckeon  Age: 59 y.o. Room/Bed:  726/02  Primary Diagnosis: Major depression with psychotic features ([address]   Admission Status: Involuntary Commitment     Readmission within 30 days: no  Power of  in place: no  Patient requires a blocked bed: no          Reason for blocked bed:     VTE Prophylaxis: No    Mobility needs/Fall risk: no  Flu Vaccine : no   Nutritional Plan: no  Consults:          Labs/Testing due today?: no    Sleep hours:  8      Participation in Care/Groups:  no  Medication Compliant?: Yes  PRNS (last 24 hours): Antianxiety    Restraints (last 24 hours):  no     CIWA (range last 24 hours):     COWS (range last 24 hours):      Alcohol screening (AUDIT) completed -   AUDIT Score: 0     If applicable, date SBIRT discussed in treatment team AND documented:     Tobacco - patient is a smoker: Have You Used Tobacco in the Past 30 Days: No  Illegal Drugs use: Have You Used Any Illegal Substances Over the Past 12 Months: No    24 hour chart check complete: yes     Patient goal(s) for today: Plan to get a shower and get dress today. Treatment team focus/goals: Plan to encourage her to participate in unit activities   Progress note\" I feel like smiling\" -  Her father visited last night. She was future oriented . Wants to get a job. LOS:  11  Expected LOS: TBD    Financial concerns/prescription coverage:  BRITTANY   Date of last family contact:      Family requesting physician contact today:    Discharge plan: she will return home with her father when discharge   Guns in the home:  no      Outpatient provider(s): [address] [place] [address]     Participating treatment team members: Aloma Ahle, Joni Jefferson Dr. Debria Rubinstein - Rena Dobbins, RN - [facility],[address].

## 2018-11-07 NOTE — BH NOTES
GROUP THERAPY PROGRESS NOTE    Alicia Simon is participating in [place]. Group time: 15 minutes    Personal goal for participation: To work towards discharge. Goal orientation: personal    Group therapy participation: active    Therapeutic interventions reviewed and discussed: Writer listened attentively and explained the unit routine. Impression of participation: Patient participated actively.

## 2018-11-07 NOTE — PROGRESS NOTES
Problem: Falls - Risk of  Goal: *Absence of Falls  Document Samanta Fall Risk and appropriate interventions in the flowsheet. Outcome: Progressing Towards Goal  Fall Risk Interventions:  Non slip socks  Bed in low position   Mobility Interventions: Bed/chair exit alarm, Patient to call before getting OOB    Mentation Interventions: Adequate sleep, hydration, pain control    Medication Interventions: Teach patient to arise slowly    Elimination Interventions: Patient to call for help with toileting needs, Toilet paper/wipes in reach    History of Falls Interventions: Door open when patient unattended        Problem: Depressed Mood (Adult/Pediatric)  Goal: *STG: Participates in treatment plan  Outcome: Progressing Towards Goal  Pt. Met in treatment team  With Dr. Miah Coppola from her journal   Things she is thankful for  Goals for her future  discused planned MOT hearing and need for her compliance with medications and follow up appts. \"okay\"  Goal: *STG: Attends activities and groups  Outcome: Progressing Towards Goal  Participating in unit activities  Goal: *STG: Complies with medication therapy  Outcome: Progressing Towards Goal  Medication compliance   Reviewed in treatment team  Goal: Interventions  Outcome: Progressing Towards Goal  Will continue to monitor  On 15 min.  Checks for safety  Assess depression     Medication compliance  Effectiveness   Encourage groups

## 2018-11-07 NOTE — BH NOTES
2100-Respiratory paged and informed pt preparing for bed and pulmicort neb   2250-nebulizer remains outstanding.

## 2018-11-07 NOTE — BH NOTES
GROUP THERAPY PROGRESS NOTE    Irvin Saavedra participated in the Acute Unit's afternoon Process Group with a focus on identifying feelings, planning for the rest of the day, and preparing for discharge. Group time: 60 minutes. Personal goal for participation: To increase the capacity to improve ones mood and structure. Goal orientation: The patient will be able to identify their feelings, develop a plan for structuring their day, and discharge planning. Group therapy participation: With prompting, this patient participated in the group. Therapeutic interventions reviewed and discussed: The group members were asked to introduce themselves to each other and to see if they could identify an emotion they are having and/or let the group know what they want to focus on for the day as they continue to make discharge plans. Impression of participation: The patient said was \"doing okay\" and that she was [employer]" and \"positive about her life. She said she had been using her journal and identified her art as something she would like to do more of after leaving the hospital. She offered a broad smile. She was alert, generally oriented, somewhat placating, based on notes from other staff members today. She  expressed no current SI/HI and displayed no overt psychotic symptoms in this group, aside from a touch of suspiciousness and defensiveness, but this might be the residue from some delusional thinking and paranoia before coming into the hospital. Her affect was superficially euphoric, with a deeper sense of sadness and anxiety. Her mood matched her affect. She is still trying to define the needs and plans of an appropriate aftercare plan, while adjusting to her medication.

## 2018-11-07 NOTE — BH NOTES
PRN Medication Documentation    Specific patient behavior that led to need for PRN medication: anxiety,pt request  Staff interventions attempted prior to PRN being given: coping skills  PRN medication given: ativan  Patient response/effectiveness of PRN medication: tl leader aware

## 2018-11-07 NOTE — PROGRESS NOTES
Problem: Depressed Mood (Adult/Pediatric)  Goal: *STG: Demonstrates reduction in symptoms and increase in insight into coping skills/future focused  Outcome: Progressing Towards Goal  Mood brighter, smiling. Denies SI and agrees to inform staff if self injurious thoughts occur.

## 2018-11-07 NOTE — PROGRESS NOTES
Problem: Falls - Risk of  Goal: *Absence of Falls  Document Samanta Fall Risk and appropriate interventions in the flowsheet. Outcome: Progressing Towards Goal  Fall Risk Interventions:  Mobility Interventions: Bed/chair exit alarm, Patient to call before getting OOB    Mentation Interventions: Adequate sleep, hydration, pain control    Medication Interventions: Teach patient to arise slowly    Elimination Interventions: Patient to call for help with toileting needs, Toilet paper/wipes in reach    History of Falls Interventions: Door open when patient unattended    Resting in bed with eyes closed, no complaints, no distress noted. Safety measures in place, will continue to monitor.

## 2018-11-07 NOTE — BH NOTES
PSYCHIATRIC PROGRESS NOTE         Patient Name  Basil Peterson   Date of Birth 1960   [facility] [id number]   Medical Record Number  [id number]      Age  59 y.o. PCP Bianca Mcfarland MD   Admit date:  10/27/2018    Room Number  726/02  @ [facility]   Date of Service  11/7/2018           E & M PROGRESS NOTE:         HISTORY         REASON FOR HOSPITALIZATION:  CC: \". Pt admitted under a temporary jail order (TDO) [place] tdo oco for severe depression with suicidal ideations  proving to be an imminent danger to self and an inability to care for self. HISTORY OF PRESENT ILLNESS:    The patient, Basil Peterson, is a 59 y.o. WHITE [place]  female with a past psychiatric history significant for depression, anxiety, psychosis, hearing voices ho presents at this time with complaints of (and/or evidence of) the following emotional symptoms: suicidal thoughts/threats, delusions, psychotic behavior and paranoid behavior. Additional symptomatology include difficulty swallowing and feeling suicidal.  The above symptoms have been present for Pt reports her mother passed away in April and she feels she could have done more, reports she has not been taking her meds for one and half week. Pt was on zoloft 200 at night and zyprexa po bid as per reports. Pt reports she is getting thoughts of cutting herself, she feels responsible for mother's death, pt ha s ntense guilt, decrease appetite, difficulty sleeping, poor eye contact and auditory hallucinations. These symptoms are of intense severity. These symptoms are constant in nature. 10/28/18: As per staff, pt sneaked in X-acto knife while nurse was looking for med lsit from her bag,. Pt made multiple superficial cuts on her rt arm, needing medical consult. Knife was found with pt and wound were treated, no stiches were needed. Pt continues to be dystaymic, having suicidal ideation.   10/29/18: Pt continues to be very anxious and depressed, report suicidal ideations and urges to cut. Pt is on 1:1 observation. Pts cholesterol is 326 and tsh 0.5. Pt slept well. Pt received ativan 1mg twice yesterday and pt also tried to elope yesterday. 10/30/18: Pt reports she slept better. Pt reports she has an appointment with social security. Pt reports she feels little better but still has suicidal thoughts and impulsivity. 10/31/18:Pt is still dysthymic, verbalizing urges to harm herself, not romulo for safety,still hearing voices, poor ADL's.  11/2/18: Pt still depressed, sneaked in fork after lunch which was taken back by the nurse. Pt reports gets urges to cut and hear voices, still ahs urges to cut. 11/3/18- Remains on 1 to 1 for ongoing urges to cut on herself, not contacting for safety. Asking to make a phone call. Assured that she could make a phone call when off one to one. Prn used-Ativan. Slept 8 hrs. 11/4/18-remains on 1 to 1. Positive thinking re enforced. Slept 6.45 hrs. No prn's were used. Tolerating   haldol well. 11/5/18: Pt reports she feels better, is hopefull and reports did not have urges to cut. Pt receiving prn atrax. As per staff pt is manipulative,asking to  speak with dad. Pt attending groups, not hearing voices. 11/6/18: Pt is doing better as per staff, no new self injurious behaviors, has brighter affect. Pt report not hearing voices, mood is better, is future oriented and hopeful. Pts father is visiting today and pt will see her  Yee today. 11/7/18: Pt is better, brighter in affect, no acute issues. Lisa Linares  currently denied suicidal/homicidal ideations and plans. Pt denied auditory and visual hallucinations, Pt is compliant with the meds, no side effects to meds reported. SIDE EFFECTS: (reviewed/updated 11/7/2018)  None reported or admitted to.   No noted toxicity with use of epakote/Tegretol/lithium/Clozaril/TCAs   ALLERGIES:(reviewed/updated 11/7/2018)  Allergies   Allergen Reactions    Other Food Other (comments)     \"Bad chest pain\" with walnuts. Coke - asthma/stuffy head. Fish sticks causes an asthma attack.  Astepro [Azelastine] Other (comments)     Violent headaches.  Bactrim [Sulfamethoprim] Other (comments)     Difficulty breathing, chills, fever.  Banana Other (comments)     Diffculty breathing, wheezing, asthma attack.  Coconut Other (comments)     Difficulty breathing, asthma attack, SOB.  Cortisone Hives     Difficulty breathing.  Nut - Unspecified Other (comments)     \"Bad chest pain\" with walnuts    Peanut Other (comments)     Wheezing, asthma attack, SOB.  Prednisone Hives     Difficulty breathing, kidney stones. MEDICATIONS PRIOR TO ADMISSION:(reviewed/updated 11/7/2018)  Medications Prior to Admission   Medication Sig    albuterol (PROVENTIL HFA, VENTOLIN HFA, PROAIR HFA) 90 mcg/actuation inhaler Take 2 Puffs by inhalation every six (6) hours as needed for Wheezing.  beclomethasone (QVAR) 40 mcg/actuation aero Take 2 Puffs by inhalation two (2) times a day.  CLONIDINE HCL PO Take 1/2 of 0.1mg tablet QHS    busPIRone (BUSPAR) 15 mg tablet Take 15 mg by mouth three (3) times daily.  QUEtiapine (SEROQUEL) 50 mg tablet Take 50 mg by mouth nightly.  OLANZapine (ZYPREXA) 20 mg tablet Take 20 mg by mouth nightly.  hydrOXYzine HCl (ATARAX) 50 mg tablet Take 50 mg by mouth every four (4) hours as needed for Anxiety.  prazosin (MINIPRESS) 1 mg capsule Take 1 mg by mouth nightly.  clonazePAM (KLONOPIN) 1 mg tablet Take 1 mg by mouth nightly.  OLANZapine (ZYPREXA) 5 mg tablet Take 5 mg by mouth daily (after breakfast).  aspirin (ASPIRIN) 325 mg tablet Take 1 Tab by mouth daily. Indications: myocardial infarction prevention    levothyroxine (SYNTHROID) 125 mcg tablet Take 1 Tab by mouth Daily (before breakfast). Indications: hypothyroidism    sertraline (ZOLOFT) 100 mg tablet Take 2 Tabs by mouth daily (with dinner).  Indications: Generalized Anxiety Disorder, major depressive disorder    zolpidem (AMBIEN) 10 mg tablet Take 1 Tab by mouth nightly. Max Daily Amount: 10 mg. Indications: SLEEP-ONSET INSOMNIA    cholecalciferol (VITAMIN D3) 1,000 unit tablet Take 1 Tab by mouth daily. Indications: PREVENTION OF VITAMIN D DEFICIENCY, Vitamin D Deficiency    fluticasone (FLONASE) 50 mcg/actuation nasal spray 2 Sprays by Both Nostrils route daily. Indications: Allergic Rhinitis    therapeutic multivitamin (THERA) tablet Take 1 Tab by mouth daily.  cyanocobalamin, vitamin B-12, (VITAMIN B-12) 5,000 mcg subl 5,000 mcg by SubLINGual route daily. PAST MEDICAL HISTORY: Past medical history from the initial psychiatric evaluation has been reviewed (reviewed/updated 11/7/2018) with no additional updates (I asked patient and no additional past medical history provided). Past Medical History:   Diagnosis Date    Asthma 1967    hospitalized for asthma attack x 2    Chronic kidney disease     kidney stones x 2-3 times    Ill-defined condition     nasal blockage from growth and misalignment - cannot breath out of nose - surgery in the future/cleared for colonoscopy 7/31/2014 - will bring in letter    Other ill-defined conditions(799.89)     blood in stool    Other ill-defined conditions(799.89)     hx low BP - 90's/60's-70's    PUD (peptic ulcer disease)     Thyroid disease     Unspecified adverse effect of anesthesia     nasal growth and misalignment and cannot breath through nose     Past Surgical History:   Procedure Laterality Date    HX HEENT      T & A    HX HEENT      turbinate surgery      SOCIAL HISTORY: Social history from the initial psychiatric evaluation has been reviewed (reviewed/updated 11/7/2018) with no additional updates (I asked patient and no additional social history provided).    Social History     Socioeconomic History    Marital status: SINGLE     Spouse name: Not on file    Number of children: Not on file    Years of education: Not on file    Highest education level: Not on file   Social Needs    Financial resource strain: Not on file    Food insecurity - worry: Not on file    Food insecurity - inability: Not on file    Transportation needs - medical: Not on file    Transportation needs - non-medical: Not on file   Occupational History    Not on file   Tobacco Use    Smoking status: Never Smoker    Smokeless tobacco: Never Used   Substance and Sexual Activity    Alcohol use: No    Drug use: No    Sexual activity: Not Currently   Other Topics Concern     Service Not Asked    Blood Transfusions Not Asked    Caffeine Concern Not Asked    Occupational Exposure Not Asked    Hobby Hazards Not Asked    Sleep Concern Not Asked    Stress Concern Not Asked    Weight Concern Not Asked    Special Diet Not Asked    Back Care Not Asked    Exercise Not Asked    Bike Helmet Not Asked   [address] Not Asked    Self-Exams Not Asked   Social History Narrative    Not on file      FAMILY HISTORY: Family history from the initial psychiatric evaluation has been reviewed (reviewed/updated 11/7/2018) with no additional updates (I asked patient and no additional family history provided).    Family History   Problem Relation Age of Onset    Stroke Mother     Other Mother         erosion of esophagus    Cancer Mother         melanoma/squamous    Heart Surgery Father         x2    Delayed Awakening Father    [facility] Pacemaker Father     Other Father         carotid endartarectomy/polio    Cataract Father        REVIEW OF SYSTEMS: (reviewed/updated 11/7/2018)  Appetite:good   Sleep: improved   All other Review of Systems: Negative except   Multiple cuts on rt arm       MENTAL STATUS EXAM & VITALS      MENTAL STATUS EXAM (MSE):    MSE FINDINGS ARE WITHIN NORMAL LIMITS (WNL) UNLESS OTHERWISE STATED BELOW. ( ALL OF THE BELOW CATEGORIES OF THE MSE HAVE BEEN REVIEWED (reviewed 10/27/2018) AND UPDATED AS DEEMED APPROPRIATE )  General Presentation age appropriate and disheveled, evasive and guarded   Orientation oriented to time, place and person   Vital Signs  See below (reviewed 10/27/2018); Vital Signs (BP, Pulse, & Temp) are within normal limits if not listed below. Gait and Station Stable/steady, no ataxia   Musculoskeletal System No extrapyramidal symptoms (EPS); no abnormal muscular movements or Tardive Dyskinesia (TD); muscle strength and tone are within normal limits   Language No aphasia or dysarthria   Speech:  soft   Thought Processes logical; slow rate of thoughts; poor abstract reasoning/computation   Thought Associations blocked    Thought Content paranoid delusions   Suicidal Ideations Ireports not having urges to cut   Homicidal Ideations no plan  and no intention   Mood:  Less depressed   Affect:  constricted   Memory recent  intact   Memory remote:  intact   Concentration/Attention:  distractable   Fund of Knowledge average   Insight:  poor   Reliability poor   Judgment:  poor                      VITALS:     Patient Vitals for the past 24 hrs:   Temp Pulse Resp BP SpO2   11/07/18 0800 98.2 °F (36.8 °C) 74 16 99/68 98 %   11/06/18 2058 -- 85 -- 108/71 --   11/06/18 1919 98.3 °F (36.8 °C) 86 18 100/65 96 %   11/06/18 1614 98.2 °F (36.8 °C) 66 16 107/71 96 %   11/06/18 1116 -- -- -- -- 98 %     Wt Readings from Last 3 Encounters:   11/03/18 78.6 kg (173 lb 3.2 oz)   07/27/18 72.6 kg (160 lb)   07/16/18 72.6 kg (160 lb)     Temp Readings from Last 3 Encounters:   11/07/18 98.2 °F (36.8 °C)   08/13/18 98.5 °F (36.9 °C)   07/26/18 98 °F (36.7 °C)     BP Readings from Last 3 Encounters:   11/07/18 99/68   08/13/18 91/53   07/26/18 108/71     Pulse Readings from Last 3 Encounters:   11/07/18 74   08/13/18 (!) 57   07/26/18 62            DATA     LABORATORY DATA:(reviewed/updated 11/7/2018)  No results found for this or any previous visit (from the past 24 hour(s)).   No results found for: VALF2, VALAC, VALP, VALPR, DS6, CRBAM, CRBAMP, CARB2, XCRBAM  No results found for: LITHM   RADIOLOGY REPORTS:(reviewed/updated 11/7/2018)  No results found.        MEDICATIONS     ALL MEDICATIONS:   Current Facility-Administered Medications   Medication Dose Route Frequency    sertraline (ZOLOFT) tablet 200 mg  200 mg Oral QPM    cloNIDine HCl (CATAPRES) tablet 0.05 mg  0.05 mg Oral QHS    haloperidol (HALDOL) tablet 5 mg  5 mg Oral DAILY    haloperidol (HALDOL) tablet 10 mg  10 mg Oral QHS    busPIRone (BUSPAR) tablet 15 mg  15 mg Oral BID    levothyroxine (SYNTHROID) tablet 100 mcg  100 mcg Oral ACB    pravastatin (PRAVACHOL) tablet 40 mg  40 mg Oral QHS    budesonide (PULMICORT) 500 mcg/2 ml nebulizer suspension  500 mcg Nebulization BID RT    ziprasidone (GEODON) 20 mg in sterile water (preservative free) 1 mL injection  20 mg IntraMUSCular BID PRN    OLANZapine (ZyPREXA) tablet 5 mg  5 mg Oral Q6H PRN    benztropine (COGENTIN) tablet 2 mg  2 mg Oral BID PRN    benztropine (COGENTIN) injection 2 mg  2 mg IntraMUSCular BID PRN    LORazepam (ATIVAN) injection 2 mg  2 mg IntraMUSCular Q4H PRN    LORazepam (ATIVAN) tablet 1 mg  1 mg Oral Q4H PRN    acetaminophen (TYLENOL) tablet 650 mg  650 mg Oral Q4H PRN    ibuprofen (MOTRIN) tablet 400 mg  400 mg Oral Q8H PRN    magnesium hydroxide (MILK OF MAGNESIA) 400 mg/5 mL oral suspension 30 mL  30 mL Oral DAILY PRN    nicotine (NICODERM CQ) 21 mg/24 hr patch 1 Patch  1 Patch TransDERmal DAILY PRN    aspirin tablet 325 mg  325 mg Oral DAILY    cholecalciferol (VITAMIN D3) tablet 1,000 Units  1,000 Units Oral DAILY    therapeutic multivitamin (THERAGRAN) tablet 1 Tab  1 Tab Oral DAILY    hydrOXYzine HCl (ATARAX) tablet 50 mg  50 mg Oral TID PRN    albuterol-ipratropium (DUO-NEB) 2.5 MG-0.5 MG/3 ML  3 mL Nebulization Q6H PRN      SCHEDULED MEDICATIONS:   Current Facility-Administered Medications   Medication Dose Route Frequency    sertraline (ZOLOFT) tablet 200 mg  200 mg Oral QPM    cloNIDine HCl (CATAPRES) tablet 0.05 mg  0.05 mg Oral QHS    haloperidol (HALDOL) tablet 5 mg  5 mg Oral DAILY    haloperidol (HALDOL) tablet 10 mg  10 mg Oral QHS    busPIRone (BUSPAR) tablet 15 mg  15 mg Oral BID    levothyroxine (SYNTHROID) tablet 100 mcg  100 mcg Oral ACB    pravastatin (PRAVACHOL) tablet 40 mg  40 mg Oral QHS    budesonide (PULMICORT) 500 mcg/2 ml nebulizer suspension  500 mcg Nebulization BID RT    aspirin tablet 325 mg  325 mg Oral DAILY    cholecalciferol (VITAMIN D3) tablet 1,000 Units  1,000 Units Oral DAILY    therapeutic multivitamin (THERAGRAN) tablet 1 Tab  1 Tab Oral DAILY          ASSESSMENT & PLAN     DIAGNOSES REQUIRING ACTIVE TREATMENT AND MONITORING: (reviewed/updated 11/7/2018)  Patient Active Hospital Problem List:   The patient, Kelli Hdez, is a 59 y.o.  female who presents at this time for treatment of the following diagnoses:  Patient Active Hospital Problem List:   Major depression with psychotic features ([facility] [address] (10/27/2018)    Assessment: depression, suicidal ideations anxiety, auditory hallucination     Plan: restarting meds zoloft 25 mg po hs, zyprexa 5 mg po bid, buspar 5 mg po bid. Indiv/milue therapy  Get collaterals, safety, suicidal precautions  10/28/18: Pt was put on 1:! Observation as soon as writer heard about cutting. Increasing zoloft and zyprexa 10 mg po hs., indiv milue therapy, suicide precautions, check for contrabands  10/29/18: high cholesterol, will change zyprexa to haldol first generation antipsychotic., increasing buspar 15 mg po bid  Hyprelipidemia: starting atrovastatin 20 mg daily  10/30/18, stop zyprexa, increase haldol 5 mg po hs, plan on starting decoanate, increasing zoloft 100mg po hs.  10/31/18: Increasing zoloft 150 mg po hs, continue meds/therapy nad 1:1 observation, encourage to go to groups and ADL's.  11/2/18: adding haldol 2 mg po q am continue meds, increasing zoloft 200mg po daily, adding clonidine 0.1 mg half tab.  Pt reports that helped her with nightmares in the past  11/5/18: continue meds and therapy, 1:1, allow to talk with dad undersuprevision  11/6/18: Pt romulo for safety, will remove 1:1 observation, staff to still do close observation, check for contrabands especially after meals. continue meds and therapy. 11/7/18: continue meds and therapy    I will continue to monitor blood levels (Depakote, Tegretol, lithium, clozapine---a drug with a narrow therapeutic index= NTI) and associated labs for drug therapy implemented that require intense monitoring for toxicity as deemed appropriate based on current medication side effects and pharmacodynamically determined drug 1/2 lives. In summary, Rashel Machuca, is a 59 y.o.  female who presents with a severe exacerbation of the principal diagnosis of Major depression with psychotic features ([facility] [address]  Patient's condition is orsening/not improving/not stable improving. Patient requires continued inpatient hospitalization for further stabilization, safety monitoring and medication management. I will continue to coordinate the provision of individual, milieu, occupational, group, and substance abuse therapies to address target symptoms/diagnoses as deemed appropriate for the individual patient. A coordinated, multidisplinary treatment team round was conducted with the patient (this team consists of the nurse, psychiatric unit pharmcist,  and writer). Complete current electronic health record for patient has been reviewed today including consultant notes, ancillary staff notes, nurses and psychiatric tech notes. icide risk assessment completed and patient deemed to be of low risk for suicide at this time. The following regarding medications was addressed during rounds with patient:   the risks and benefits of the proposed medication. The patient was given the opportunity to ask questions. Informed consent given to the use of the above medications.  Will continue to adjust psychiatric and non-psychiatric medications (see above \"medication\" section and orders section for details) as deemed appropriate & based upon diagnoses and response to treatment. I will continue to order blood tests/labs and diagnostic tests as deemed appropriate and review results as they become available (see orders for details and above listed lab/test results). I will order psychiatric records from previous [facility] hospitals to further elucidate the nature of patient's psychopathology and review once available. I will gather additional collateral information from riends, family and o/p treatment team to further elucidate the nature of patient's psychopathology and baselline level of psychiatric functioning. I certify that this patient's inpatient psychiatric hospital services furnished since the previous certification were, and continue to be, required for treatment that could reasonably be expected to improve the patient's condition, or for diagnostic study, and that the patient continues to need, on a daily basis, active treatment furnished directly by or requiring the supervision of inpatient psychiatric facility personnel. In addition, the hospital records show that services furnished were intensive treatment services, admission or related services, or equivalent services.     EXPECTED DISCHARGE DATE/DAY: BD     DISPOSITION:[place]       Signed By:   Miah Parish MD  11/7/2018

## 2018-11-07 NOTE — WOUND CARE
WOCN Note:     Follow-up visit for right wrist.     Chart shows:  Admitted with temporary detaining order, depression, suicidal ideation     Assessment:   Patient is communicative, continent and mobile. Patient reports no pain.     Right forearm with multiple, drying, linear cuts some of which have surgical glue. There is no exudate or erythema. all areas are minimized from before and show no gross S&S of infections. Keep arm dry.      Discussed above plan with patient & RN. Will sign off.      AMAN BackN, RN, [facility]  Certified Wound, Ostomy, Continence Nurse  office [phone]  pager [phone] or call  to page

## 2018-11-08 PROCEDURE — 65220000003 HC RM SEMIPRIVATE PSYCH

## 2018-11-08 PROCEDURE — 74011250637 HC RX REV CODE- 250/637: Performed by: PSYCHIATRY & NEUROLOGY

## 2018-11-08 PROCEDURE — 74011250637 HC RX REV CODE- 250/637: Performed by: NURSE PRACTITIONER

## 2018-11-08 RX ADMIN — BUSPIRONE HYDROCHLORIDE 15 MG: 10 TABLET ORAL at 17:15

## 2018-11-08 RX ADMIN — HYDROXYZINE HYDROCHLORIDE 50 MG: 50 TABLET, FILM COATED ORAL at 17:22

## 2018-11-08 RX ADMIN — VITAMIN D, TAB 1000IU (100/BT) 1000 UNITS: 25 TAB at 08:49

## 2018-11-08 RX ADMIN — HALOPERIDOL 10 MG: 10 TABLET ORAL at 21:25

## 2018-11-08 RX ADMIN — THERA TABS 1 TABLET: TAB at 08:48

## 2018-11-08 RX ADMIN — LEVOTHYROXINE SODIUM 100 MCG: 100 TABLET ORAL at 06:36

## 2018-11-08 RX ADMIN — HALOPERIDOL 5 MG: 5 TABLET ORAL at 08:48

## 2018-11-08 RX ADMIN — BUSPIRONE HYDROCHLORIDE 15 MG: 10 TABLET ORAL at 08:49

## 2018-11-08 RX ADMIN — PRAVASTATIN SODIUM 40 MG: 40 TABLET ORAL at 21:25

## 2018-11-08 RX ADMIN — SERTRALINE HYDROCHLORIDE 200 MG: 50 TABLET ORAL at 17:16

## 2018-11-08 RX ADMIN — ASPIRIN 325 MG: 325 TABLET ORAL at 08:48

## 2018-11-08 NOTE — BH NOTES
PSYCHIATRIC PROGRESS NOTE         Patient Name  Sanju Peter   Date of Birth 1960   [facility] [id number]   Medical Record Number  [id number]      Age  59 y.o. PCP Bart Jesus MD   Admit date:  10/27/2018    Room Number  726/02  @ [facility]   Date of Service  11/8/2018           E & M PROGRESS NOTE:         HISTORY         REASON FOR HOSPITALIZATION:  CC: \". Pt admitted under a temporary penitentiary order (TDO) [place] [facility] for severe depression with suicidal ideations  proving to be an imminent danger to self and an inability to care for self. HISTORY OF PRESENT ILLNESS:    The patient, Sanju Peter, is a 59 y.o. WHITE [place]  female with a past psychiatric history significant for depression, anxiety, psychosis, hearing voices ho presents at this time with complaints of (and/or evidence of) the following emotional symptoms: suicidal thoughts/threats, delusions, psychotic behavior and paranoid behavior. Additional symptomatology include difficulty swallowing and feeling suicidal.  The above symptoms have been present for Pt reports her mother passed away in April and she feels she could have done more, reports she has not been taking her meds for one and half week. Pt was on zoloft 200 at night and zyprexa po bid as per reports. Pt reports she is getting thoughts of cutting herself, she feels responsible for mother's death, pt ha s ntense guilt, decrease appetite, difficulty sleeping, poor eye contact and auditory hallucinations. These symptoms are of intense severity. These symptoms are constant in nature. 10/28/18: As per staff, pt sneaked in X-acto knife while nurse was looking for med lsit from her bag,. Pt made multiple superficial cuts on her rt arm, needing medical consult. Knife was found with pt and wound were treated, no stiches were needed. Pt continues to be dystaymic, having suicidal ideation.   10/29/18: Pt continues to be very anxious and depressed, report suicidal ideations and urges to cut. Pt is on 1:1 observation. Pts cholesterol is 326 and tsh 0.5. Pt slept well. Pt received ativan 1mg twice yesterday and pt also tried to elope yesterday. 10/30/18: Pt reports she slept better. Pt reports she has an appointment with social security. Pt reports she feels little better but still has suicidal thoughts and impulsivity. 10/31/18:Pt is still dysthymic, verbalizing urges to harm herself, not romulo for safety,still hearing voices, poor ADL's.  11/2/18: Pt still depressed, sneaked in fork after lunch which was taken back by the nurse. Pt reports gets urges to cut and hear voices, still ahs urges to cut. 11/3/18- Remains on 1 to 1 for ongoing urges to cut on herself, not contacting for safety. Asking to make a phone call. Assured that she could make a phone call when off one to one. Prn used-Ativan. Slept 8 hrs. 11/4/18-remains on 1 to 1. Positive thinking re enforced. Slept 6.45 hrs. No prn's were used. Tolerating   haldol well. 11/5/18: Pt reports she feels better, is hopefull and reports did not have urges to cut. Pt receiving prn atrax. As per staff pt is manipulative,asking to  speak with dad. Pt attending groups, not hearing voices. 11/6/18: Pt is doing better as per staff, no new self injurious behaviors, has brighter affect. Pt report not hearing voices, mood is better, is future oriented and hopeful. Pts father is visiting today and pt will see her  Yee today. 11/7/18: Pt is more cheerful, pt is writing her feeling and able to verbalize them. 11/8/18: pt is hopefull, future orinejt Biswas  currently denied suicidal/homicidal ideations and plans. Pt denied auditory and visual hallucinations, Pt is compliant with the meds, no side effects to meds reported. SIDE EFFECTS: (reviewed/updated 11/8/2018)  None reported or admitted to.   No noted toxicity with use of epakote/Tegretol/lithium/Clozaril/TCAs ALLERGIES:(reviewed/updated 11/8/2018)  Allergies   Allergen Reactions    Other Food Other (comments)     \"Bad chest pain\" with walnuts. Coke - asthma/stuffy head. Fish sticks causes an asthma attack.  Astepro [Azelastine] Other (comments)     Violent headaches.  Bactrim [Sulfamethoprim] Other (comments)     Difficulty breathing, chills, fever.  Banana Other (comments)     Diffculty breathing, wheezing, asthma attack.  Coconut Other (comments)     Difficulty breathing, asthma attack, SOB.  Cortisone Hives     Difficulty breathing.  Nut - Unspecified Other (comments)     \"Bad chest pain\" with walnuts    Peanut Other (comments)     Wheezing, asthma attack, SOB.  Prednisone Hives     Difficulty breathing, kidney stones. MEDICATIONS PRIOR TO ADMISSION:(reviewed/updated 11/8/2018)  Medications Prior to Admission   Medication Sig    albuterol (PROVENTIL HFA, VENTOLIN HFA, PROAIR HFA) 90 mcg/actuation inhaler Take 2 Puffs by inhalation every six (6) hours as needed for Wheezing.  beclomethasone (QVAR) 40 mcg/actuation aero Take 2 Puffs by inhalation two (2) times a day.  CLONIDINE HCL PO Take 1/2 of 0.1mg tablet QHS    busPIRone (BUSPAR) 15 mg tablet Take 15 mg by mouth three (3) times daily.  QUEtiapine (SEROQUEL) 50 mg tablet Take 50 mg by mouth nightly.  OLANZapine (ZYPREXA) 20 mg tablet Take 20 mg by mouth nightly.  hydrOXYzine HCl (ATARAX) 50 mg tablet Take 50 mg by mouth every four (4) hours as needed for Anxiety.  prazosin (MINIPRESS) 1 mg capsule Take 1 mg by mouth nightly.  clonazePAM (KLONOPIN) 1 mg tablet Take 1 mg by mouth nightly.  OLANZapine (ZYPREXA) 5 mg tablet Take 5 mg by mouth daily (after breakfast).  aspirin (ASPIRIN) 325 mg tablet Take 1 Tab by mouth daily. Indications: myocardial infarction prevention    levothyroxine (SYNTHROID) 125 mcg tablet Take 1 Tab by mouth Daily (before breakfast).  Indications: hypothyroidism    sertraline (ZOLOFT) 100 mg tablet Take 2 Tabs by mouth daily (with dinner). Indications: Generalized Anxiety Disorder, major depressive disorder    zolpidem (AMBIEN) 10 mg tablet Take 1 Tab by mouth nightly. Max Daily Amount: 10 mg. Indications: SLEEP-ONSET INSOMNIA    cholecalciferol (VITAMIN D3) 1,000 unit tablet Take 1 Tab by mouth daily. Indications: PREVENTION OF VITAMIN D DEFICIENCY, Vitamin D Deficiency    fluticasone (FLONASE) 50 mcg/actuation nasal spray 2 Sprays by Both Nostrils route daily. Indications: Allergic Rhinitis    therapeutic multivitamin (THERA) tablet Take 1 Tab by mouth daily.  cyanocobalamin, vitamin B-12, (VITAMIN B-12) 5,000 mcg subl 5,000 mcg by SubLINGual route daily. PAST MEDICAL HISTORY: Past medical history from the initial psychiatric evaluation has been reviewed (reviewed/updated 11/8/2018) with no additional updates (I asked patient and no additional past medical history provided). Past Medical History:   Diagnosis Date    Asthma 1967    hospitalized for asthma attack x 2    Chronic kidney disease     kidney stones x 2-3 times    Ill-defined condition     nasal blockage from growth and misalignment - cannot breath out of nose - surgery in the future/cleared for colonoscopy 7/31/2014 - will bring in letter    Other ill-defined conditions(799.89)     blood in stool    Other ill-defined conditions(799.89)     hx low BP - 90's/60's-70's    PUD (peptic ulcer disease)     Thyroid disease     Unspecified adverse effect of anesthesia     nasal growth and misalignment and cannot breath through nose     Past Surgical History:   Procedure Laterality Date    HX HEENT      T & A    HX HEENT      turbinate surgery      SOCIAL HISTORY: Social history from the initial psychiatric evaluation has been reviewed (reviewed/updated 11/8/2018) with no additional updates (I asked patient and no additional social history provided).    Social History     Socioeconomic History    Marital status: SINGLE Spouse name: Not on file    Number of children: Not on file    Years of education: Not on file    Highest education level: Not on file   Social Needs    Financial resource strain: Not on file    Food insecurity - worry: Not on file    Food insecurity - inability: Not on file    Transportation needs - medical: Not on file    Transportation needs - non-medical: Not on file   Occupational History    Not on file   Tobacco Use    Smoking status: Never Smoker    Smokeless tobacco: Never Used   Substance and Sexual Activity    Alcohol use: No    Drug use: No    Sexual activity: Not Currently   Other Topics Concern     Service Not Asked    Blood Transfusions Not Asked    Caffeine Concern Not Asked    Occupational Exposure Not Asked    Hobby Hazards Not Asked    Sleep Concern Not Asked    Stress Concern Not Asked    Weight Concern Not Asked    Special Diet Not Asked    Back Care Not Asked    Exercise Not Asked    Bike Helmet Not Asked   [address] Not Asked    Self-Exams Not Asked   Social History Narrative    Not on file      FAMILY HISTORY: Family history from the initial psychiatric evaluation has been reviewed (reviewed/updated 11/8/2018) with no additional updates (I asked patient and no additional family history provided).    Family History   Problem Relation Age of Onset    Stroke Mother     Other Mother         erosion of esophagus    Cancer Mother         melanoma/squamous    Heart Surgery Father         x2    Delayed Awakening Father    [address] Pacemaker Father     Other Father         carotid endartarectomy/polio    Cataract Father        REVIEW OF SYSTEMS: (reviewed/updated 11/8/2018)  Appetite:good   Sleep: improved   All other Review of Systems: Negative except   Multiple cuts on rt arm       MENTAL STATUS EXAM & VITALS      MENTAL STATUS EXAM (MSE):    MSE FINDINGS ARE WITHIN NORMAL LIMITS (WNL) UNLESS OTHERWISE STATED BELOW. ( ALL OF THE BELOW CATEGORIES OF THE MSE HAVE BEEN REVIEWED (reviewed 10/27/2018) AND UPDATED AS DEEMED APPROPRIATE )  General Presentation age appropriate and disheveled, evasive and guarded   Orientation oriented to time, place and person   Vital Signs  See below (reviewed 10/27/2018); Vital Signs (BP, Pulse, & Temp) are within normal limits if not listed below. Gait and Station Stable/steady, no ataxia   Musculoskeletal System No extrapyramidal symptoms (EPS); no abnormal muscular movements or Tardive Dyskinesia (TD); muscle strength and tone are within normal limits   Language No aphasia or dysarthria   Speech:  soft   Thought Processes logical; slow rate of thoughts; poor abstract reasoning/computation   Thought Associations linear   Thought Content Linear, no paranoia   Suicidal Ideations Ireports not having urges to cut   Homicidal Ideations no plan  and no intention   Mood:  Stable mood, cheerful   Affect:  brighter   Memory recent  intact   Memory remote:  intact   Concentration/Attention:  distractable   Fund of Knowledge average   Insight:  poor   Reliability poor   Judgment:  poor                      VITALS:     Patient Vitals for the past 24 hrs:   Temp Pulse Resp BP SpO2   11/08/18 0758 98.6 °F (37 °C) 74 16 107/73 96 %   11/07/18 2006 98.6 °F (37 °C) 68 18 114/73 --   11/07/18 1738 -- 80 -- 114/74 --   11/07/18 1734 99 °F (37.2 °C) 79 18 110/61 97 %     Wt Readings from Last 3 Encounters:   11/03/18 78.6 kg (173 lb 3.2 oz)   07/27/18 72.6 kg (160 lb)   07/16/18 72.6 kg (160 lb)     Temp Readings from Last 3 Encounters:   11/08/18 98.6 °F (37 °C)   08/13/18 98.5 °F (36.9 °C)   07/26/18 98 °F (36.7 °C)     BP Readings from Last 3 Encounters:   11/08/18 107/73   08/13/18 91/53   07/26/18 108/71     Pulse Readings from Last 3 Encounters:   11/08/18 74   08/13/18 (!) 57   07/26/18 62            DATA     LABORATORY DATA:(reviewed/updated 11/8/2018)  No results found for this or any previous visit (from the past 24 hour(s)).   No results found for: VALF2, VALAC, VALP, VALPR, DS6, CRBAM, CRBAMP, CARB2, XCRBAM  No results found for: LITHM   RADIOLOGY REPORTS:(reviewed/updated 11/8/2018)  No results found.        MEDICATIONS     ALL MEDICATIONS:   Current Facility-Administered Medications   Medication Dose Route Frequency    sertraline (ZOLOFT) tablet 200 mg  200 mg Oral QPM    cloNIDine HCl (CATAPRES) tablet 0.05 mg  0.05 mg Oral QHS    haloperidol (HALDOL) tablet 5 mg  5 mg Oral DAILY    haloperidol (HALDOL) tablet 10 mg  10 mg Oral QHS    busPIRone (BUSPAR) tablet 15 mg  15 mg Oral BID    levothyroxine (SYNTHROID) tablet 100 mcg  100 mcg Oral ACB    pravastatin (PRAVACHOL) tablet 40 mg  40 mg Oral QHS    budesonide (PULMICORT) 500 mcg/2 ml nebulizer suspension  500 mcg Nebulization BID RT    ziprasidone (GEODON) 20 mg in sterile water (preservative free) 1 mL injection  20 mg IntraMUSCular BID PRN    OLANZapine (ZyPREXA) tablet 5 mg  5 mg Oral Q6H PRN    benztropine (COGENTIN) tablet 2 mg  2 mg Oral BID PRN    benztropine (COGENTIN) injection 2 mg  2 mg IntraMUSCular BID PRN    LORazepam (ATIVAN) injection 2 mg  2 mg IntraMUSCular Q4H PRN    LORazepam (ATIVAN) tablet 1 mg  1 mg Oral Q4H PRN    acetaminophen (TYLENOL) tablet 650 mg  650 mg Oral Q4H PRN    ibuprofen (MOTRIN) tablet 400 mg  400 mg Oral Q8H PRN    magnesium hydroxide (MILK OF MAGNESIA) 400 mg/5 mL oral suspension 30 mL  30 mL Oral DAILY PRN    nicotine (NICODERM CQ) 21 mg/24 hr patch 1 Patch  1 Patch TransDERmal DAILY PRN    aspirin tablet 325 mg  325 mg Oral DAILY    cholecalciferol (VITAMIN D3) tablet 1,000 Units  1,000 Units Oral DAILY    therapeutic multivitamin (THERAGRAN) tablet 1 Tab  1 Tab Oral DAILY    hydrOXYzine HCl (ATARAX) tablet 50 mg  50 mg Oral TID PRN    albuterol-ipratropium (DUO-NEB) 2.5 MG-0.5 MG/3 ML  3 mL Nebulization Q6H PRN      SCHEDULED MEDICATIONS:   Current Facility-Administered Medications   Medication Dose Route Frequency    sertraline (ZOLOFT) tablet 200 mg  200 mg Oral QPM    cloNIDine HCl (CATAPRES) tablet 0.05 mg  0.05 mg Oral QHS    haloperidol (HALDOL) tablet 5 mg  5 mg Oral DAILY    haloperidol (HALDOL) tablet 10 mg  10 mg Oral QHS    busPIRone (BUSPAR) tablet 15 mg  15 mg Oral BID    levothyroxine (SYNTHROID) tablet 100 mcg  100 mcg Oral ACB    pravastatin (PRAVACHOL) tablet 40 mg  40 mg Oral QHS    budesonide (PULMICORT) 500 mcg/2 ml nebulizer suspension  500 mcg Nebulization BID RT    aspirin tablet 325 mg  325 mg Oral DAILY    cholecalciferol (VITAMIN D3) tablet 1,000 Units  1,000 Units Oral DAILY    therapeutic multivitamin (THERAGRAN) tablet 1 Tab  1 Tab Oral DAILY          ASSESSMENT & PLAN     DIAGNOSES REQUIRING ACTIVE TREATMENT AND MONITORING: (reviewed/updated 11/8/2018)  Patient Active Hospital Problem List:   The patient, Patricia Barker, is a 59 y.o.  female who presents at this time for treatment of the following diagnoses:  Patient Active Hospital Problem List:   Major depression with psychotic features ([facility] [address] (10/27/2018)    Assessment: depression, suicidal ideations anxiety, auditory hallucination     Plan: restarting meds zoloft 25 mg po hs, zyprexa 5 mg po bid, buspar 5 mg po bid. Indiv/milue therapy  Get collaterals, safety, suicidal precautions  10/28/18: Pt was put on 1:! Observation as soon as writer heard about cutting. Increasing zoloft and zyprexa 10 mg po hs., indiv milue therapy, suicide precautions, check for contrabands  10/29/18: high cholesterol, will change zyprexa to haldol first generation antipsychotic., increasing buspar 15 mg po bid  Hyprelipidemia: starting atrovastatin 20 mg daily  10/30/18, stop zyprexa, increase haldol 5 mg po hs, plan on starting decoanate, increasing zoloft 100mg po hs.  10/31/18:   Increasing zoloft 150 mg po hs, continue meds/therapy nad 1:1 observation, encourage to go to groups and ADL's.  11/2/18: adding haldol 2 mg po q am continue meds, increasing zoloft 200mg po daily, adding clonidine 0.1 mg half tab. Pt reports that helped her with nightmares in the past  11/5/18: continue meds and therapy, 1:1, allow to talk with dad undersuprevision  11/6/18: Pt romulo for safety, will remove 1:1 observation, staff to still do close observation, check for contrabands especially after meals. continue meds and therapy. 11/8/18: continue indiv/milue nad meds manegment    I will continue to monitor blood levels (Depakote, Tegretol, lithium, clozapine---a drug with a narrow therapeutic index= NTI) and associated labs for drug therapy implemented that require intense monitoring for toxicity as deemed appropriate based on current medication side effects and pharmacodynamically determined drug 1/2 lives. In summary, Andre Arechiga, is a 59 y.o.  female who presents with a severe exacerbation of the principal diagnosis of Major depression with psychotic features ([address]  Patient's condition is orsening/not improving/not stable improving. Patient requires continued inpatient hospitalization for further stabilization, safety monitoring and medication management. I will continue to coordinate the provision of individual, milieu, occupational, group, and substance abuse therapies to address target symptoms/diagnoses as deemed appropriate for the individual patient. A coordinated, multidisplinary treatment team round was conducted with the patient (this team consists of the nurse, psychiatric unit pharmcist,  and writer). Complete current electronic health record for patient has been reviewed today including consultant notes, ancillary staff notes, nurses and psychiatric tech notes. icide risk assessment completed and patient deemed to be of low risk for suicide at this time. The following regarding medications was addressed during rounds with patient:   the risks and benefits of the proposed medication. The patient was given the opportunity to ask questions.  Informed consent given to the use of the above medications. Will continue to adjust psychiatric and non-psychiatric medications (see above \"medication\" section and orders section for details) as deemed appropriate & based upon diagnoses and response to treatment. I will continue to order blood tests/labs and diagnostic tests as deemed appropriate and review results as they become available (see orders for details and above listed lab/test results). I will order psychiatric records from previous [facility] hospitals to further elucidate the nature of patient's psychopathology and review once available. I will gather additional collateral information from riends, family and o/p treatment team to further elucidate the nature of patient's psychopathology and baselline level of psychiatric functioning. I certify that this patient's inpatient psychiatric hospital services furnished since the previous certification were, and continue to be, required for treatment that could reasonably be expected to improve the patient's condition, or for diagnostic study, and that the patient continues to need, on a daily basis, active treatment furnished directly by or requiring the supervision of inpatient psychiatric facility personnel. In addition, the hospital records show that services furnished were intensive treatment services, admission or related services, or equivalent services.     EXPECTED DISCHARGE DATE/DAY: BD     DISPOSITION:[place]       Signed By:   Aliyah Sanford MD  11/8/2018

## 2018-11-08 NOTE — BH NOTES
GROUP THERAPY PROGRESS NOTE Lachelle Peterson is participating in [place] group Group time: 15 mins Personal goal for participation: remain positive Goal orientation: personal 
 
Group therapy participation: active Therapeutic interventions reviewed and discussed: Tech discussed unit schedule and rules. Also what is expected of patients. Encouraged patients to have daily goals and maintain a therapeutic enviroment Impression of participation: Listened and had positive goal for the day

## 2018-11-08 NOTE — BH NOTES
GROUP THERAPY PROGRESS NOTE Patricia Barker is participating in Reflections. Group time: 15 minutes Personal goal for participation: unit rules and daily progress Goal orientation: personal 
 
Group therapy participation: passive Therapeutic interventions reviewed and discussed: yes Impression of participation: Quiet in group. Voiced no concerns. Seems in fair spirits.

## 2018-11-08 NOTE — BH NOTES
PRN Medication Documentation    Specific patient behavior that led to need for PRN medication: pt c/o anxiety and generalized discomfort   Staff interventions attempted prior to PRN being given: education, coping skills  PRN medication given: po 50 mg Atarax   Patient response/effectiveness of PRN medication: pt visible on the unit participating in activities and groups

## 2018-11-08 NOTE — INTERDISCIPLINARY ROUNDS
Behavioral Health Interdisciplinary Rounds     Patient Name: Helga Rich  Age: 59 y.o. Room/Bed:  726/02  Primary Diagnosis: Major depression with psychotic features ([facility]   Admission Status: Involuntary Commitment     Readmission within 30 days: no  Power of  in place: no  Patient requires a blocked bed: no          Reason for blocked bed:     VTE Prophylaxis: No    Mobility needs/Fall risk: no  Flu Vaccine : no   Nutritional Plan: no  Consults:          Labs/Testing due today?: no    Sleep hours:  6.45      Participation in Care/Groups:  yes  Medication Compliant?: Yes  PRNS (last 24 hours): Antianxiety    Restraints (last 24 hours):  no     CIWA (range last 24 hours):     COWS (range last 24 hours):      Alcohol screening (AUDIT) completed -   AUDIT Score: 0     If applicable, date SBIRT discussed in treatment team AND documented:     Tobacco - patient is a smoker: Have You Used Tobacco in the Past 30 Days: No  Illegal Drugs use: Have You Used Any Illegal Substances Over the Past 12 Months: No    24 hour chart check complete: yes     Patient goal(s) for today:  Treatment team focus/goals: Plan to work with [address] [place] [address] on a MOT   Progress note : she has been pleasant and complaint on the unit    LOS:  12  Expected LOS: TBD    Financial concerns/prescription coverage: BRITTANY   Date of last family contact:       Family requesting physician contact today:    Discharge plan: plan to step down to CSU   Guns in the home: no        Outpatient provider(s): [address] [place] [address]     Participating treatment team members: Cm Espinoza Dr., PharmD.

## 2018-11-08 NOTE — PROGRESS NOTES
Problem: Depressed Mood (Adult/Pediatric)  Goal: *STG: Remains safe in hospital  Outcome: Progressing Towards Goal  Smiling. Denies SI. Visited with father this evening. Agrees to inform staff if self injurious thoughts occur.

## 2018-11-08 NOTE — BH NOTES
GROUP THERAPY PROGRESS NOTE    Rashaad London participated in the Acute Unit's afternoon Process Group with a focus on identifying feelings, planning for the rest of the day, and preparing for discharge. Group time: 45 minutes. Personal goal for participation: To increase the capacity to improve ones mood and structure. Goal orientation: The patient will be able to identify their feelings, develop a plan for structuring their day, and discharge planning. Group therapy participation: With prompting, this patient participated in the group. Therapeutic interventions reviewed and discussed: The group members were asked to introduce themselves to each other and to see if they could identify an emotion they are having and/or let the group know what they want to focus on for the day as they continue to make discharge plans. Impression of participation: The patient said, with a smile, that she was feeling, \"Fine. ..great. ..and happy. 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 \" She was alert, generally oriented, and was still in the process of putting herself back together after her most recent need for hospitalization. She acted as if she were further ahead of herself in terms of her readiness to manage both home and work. She was encouraged to also carve out some time for herself. The patient expressed no current SI/HI and displayed no overt psychotic symptoms in this group. It was like experiencing her first layer of insulation to her soul after having her wires stripped. She is beginning to define her needs and goals for her aftercare. Her affect was largely anxious, with a touch of buried sadness. Her mood was superficially bright and fragile.

## 2018-11-08 NOTE — PROGRESS NOTES
Problem: Discharge Planning  Goal: *Discharge to safe environment  Outcome: Progressing Towards Goal  She will step down to CSU   She is followed by [facility]   Father is supportive   Goal: *Knowledge of medication management  Outcome: Not Progressing Towards Goal  She is complaint with her medications   Goal: *Knowledge of discharge instructions  Outcome: Progressing Towards Goal  She is able to verbalize discharge instructions     Problem: Patient Education: Go to Patient Education Activity  Goal: Patient/Family Education  Outcome: Progressing Towards Goal  SW will educate patient and family regarding discharge instructions

## 2018-11-08 NOTE — PROGRESS NOTES
Problem: Depressed Mood (Adult/Pediatric)  Goal: *STG: Remains safe in hospital  Outcome: Progressing Towards Goal  Patient is resting quietly in bed. Awake and alert. No distress noted. Will continue to monitor.

## 2018-11-08 NOTE — PROGRESS NOTES
Problem: Depressed Mood (Adult/Pediatric)  Goal: *STG: Participates in treatment plan  Outcome: Progressing Towards Goal  Active participation with treatment . Expressive desire for discharge. Mandatory outpatient treatment process reviewed with patient. Goal: *STG: Demonstrates reduction in symptoms and increase in insight into coping skills/future focused  Outcome: Progressing Towards Goal  Patient stated \"I feel happy and have no concerns. \"  Goal: *STG: Remains safe in hospital  Pt denies any suicidal or homicidal thoughts. Contracts for safety. Remains on Q 15 min safety checks. No agitation or aggression observed. Patient has been journaling and expressing \"things that make me happy. \"     Goal: *STG: Complies with medication therapy  Outcome: Progressing Towards Goal  Has been medication compliant and verbalizes understanding by teach back.

## 2018-11-09 VITALS
HEART RATE: 63 BPM | TEMPERATURE: 98.5 F | WEIGHT: 173.2 LBS | BODY MASS INDEX: 29.57 KG/M2 | RESPIRATION RATE: 16 BRPM | OXYGEN SATURATION: 96 % | SYSTOLIC BLOOD PRESSURE: 108 MMHG | HEIGHT: 64 IN | DIASTOLIC BLOOD PRESSURE: 68 MMHG

## 2018-11-09 PROCEDURE — 74011250637 HC RX REV CODE- 250/637: Performed by: PSYCHIATRY & NEUROLOGY

## 2018-11-09 PROCEDURE — 74011250637 HC RX REV CODE- 250/637: Performed by: NURSE PRACTITIONER

## 2018-11-09 RX ORDER — HALOPERIDOL 5 MG/1
TABLET ORAL
Qty: 45 TAB | Refills: 0 | Status: ON HOLD | OUTPATIENT
Start: 2018-11-09 | End: 2021-05-25

## 2018-11-09 RX ORDER — THERA TABS 400 MCG
1 TAB ORAL DAILY
Qty: 15 TAB | Refills: 0 | Status: ON HOLD | OUTPATIENT
Start: 2018-11-10 | End: 2021-05-25

## 2018-11-09 RX ORDER — ASPIRIN 325 MG
325 TABLET ORAL DAILY
Qty: 15 TAB | Refills: 0 | Status: SHIPPED | OUTPATIENT
Start: 2018-11-10 | End: 2022-07-08 | Stop reason: ALTCHOICE

## 2018-11-09 RX ORDER — SERTRALINE HYDROCHLORIDE 100 MG/1
200 TABLET, FILM COATED ORAL EVERY EVENING
Qty: 30 TAB | Refills: 0 | Status: ON HOLD | OUTPATIENT
Start: 2018-11-09 | End: 2021-05-25

## 2018-11-09 RX ORDER — MELATONIN
1000 DAILY
Qty: 15 TAB | Status: ON HOLD | OUTPATIENT
Start: 2018-11-10 | End: 2021-05-25

## 2018-11-09 RX ORDER — LEVOTHYROXINE SODIUM 100 UG/1
100 TABLET ORAL
Qty: 15 TAB | Refills: 0 | Status: SHIPPED | OUTPATIENT
Start: 2018-11-10

## 2018-11-09 RX ORDER — ALBUTEROL SULFATE 90 UG/1
2 AEROSOL, METERED RESPIRATORY (INHALATION)
Qty: 1 INHALER | Refills: 0 | Status: ON HOLD | OUTPATIENT
Start: 2018-11-09 | End: 2021-05-25

## 2018-11-09 RX ORDER — HYDROXYZINE 50 MG/1
50 TABLET, FILM COATED ORAL
Qty: 30 TAB | Refills: 0 | Status: SHIPPED | OUTPATIENT
Start: 2018-11-09 | End: 2018-11-19

## 2018-11-09 RX ORDER — PRAVASTATIN SODIUM 40 MG/1
40 TABLET ORAL
Qty: 15 TAB | Refills: 0 | Status: SHIPPED | OUTPATIENT
Start: 2018-11-09

## 2018-11-09 RX ORDER — BUSPIRONE HYDROCHLORIDE 15 MG/1
15 TABLET ORAL 2 TIMES DAILY
Qty: 30 TAB | Refills: 0 | Status: ON HOLD | OUTPATIENT
Start: 2018-11-09 | End: 2021-05-25

## 2018-11-09 RX ADMIN — BUSPIRONE HYDROCHLORIDE 15 MG: 10 TABLET ORAL at 08:45

## 2018-11-09 RX ADMIN — LEVOTHYROXINE SODIUM 100 MCG: 100 TABLET ORAL at 06:45

## 2018-11-09 RX ADMIN — HALOPERIDOL 5 MG: 5 TABLET ORAL at 08:44

## 2018-11-09 RX ADMIN — VITAMIN D, TAB 1000IU (100/BT) 1000 UNITS: 25 TAB at 08:46

## 2018-11-09 RX ADMIN — THERA TABS 1 TABLET: TAB at 08:46

## 2018-11-09 RX ADMIN — ASPIRIN 325 MG: 325 TABLET ORAL at 08:46

## 2018-11-09 NOTE — BH NOTES
GROUP THERAPY PROGRESS NOTE    Susana Calvin did not attend Reflections Group       Group time: 15 minutes    Personal goal for participation:  Goal orientation:     Group therapy participation:     Therapeutic interventions reviewed and discussed:     Impression of participation:

## 2018-11-09 NOTE — INTERDISCIPLINARY ROUNDS
Behavioral Health Interdisciplinary Rounds     Patient Name: Samuel Munoz  Age: 59 y.o. Room/Bed:  726/02  Primary Diagnosis: [facility]   Admission Status: Involuntary Commitment     Readmission within 30 days: no  Power of  in place: no  Patient requires a blocked bed: no          Reason for blocked bed:    VTE Prophylaxis: No    Mobility needs/Fall risk: no  Flu Vaccine : no   Nutritional Plan: no  Consults:        Labs/Testing due today?: no    Sleep hours: 8      Participation in Care/Groups:  yes  Medication Compliant?: Yes  PRNS (last 24 hours): Antianxiety    Restraints (last 24 hours):  no     CIWA (range last 24 hours):     COWS (range last 24 hours):      Alcohol screening (AUDIT) completed -   AUDIT Score: 0     If applicable, date SBIRT discussed in treatment team AND documented:     Tobacco - patient is a smoker: Have You Used Tobacco in the Past 30 Days: No  Illegal Drugs use: Have You Used Any Illegal Substances Over the Past 12 Months: No    24 hour chart check complete: yes     Patient goal(s) for today:   Treatment team focus/goals: Plan to work on her discharge plan . Progress note : She has been doing better on the unit and has been participating in unit activities. LOS:  13  Expected LOS: TBD  Financial concerns/prescription coverage:  No benefits   Date of last family contact:      Family requesting physician contact today:    Discharge plan:plan for a MOT and to step down to CSU.      Guns in the home:no       Outpatient provider(s): [address] [place] [address]   Participating treatment team members: Samuel CamsolJacqui Dr., PharmD. - Anali Noriega

## 2018-11-09 NOTE — PROGRESS NOTES
Pharmacist Discharge Medication Reconciliation    Discharging Provider: Dr. Jitendra Sung [facility]:   Past Medical History:   Diagnosis Date    Asthma 1967    hospitalized for asthma attack x 2    Chronic kidney disease     kidney stones x 2-3 times    Ill-defined condition     nasal blockage from growth and misalignment - cannot breath out of nose - surgery in the future/cleared for colonoscopy 7/31/2014 - will bring in letter    Other ill-defined conditions(799.89)     blood in stool    Other ill-defined conditions(799.89)     hx low BP - 90's/60's-70's    PUD (peptic ulcer disease)     Thyroid disease     Unspecified adverse effect of anesthesia     nasal growth and misalignment and cannot breath through nose     Chief Complaint for this Admission: No chief complaint on file. Allergies: Other food; Astepro [azelastine]; Bactrim [sulfamethoprim]; Banana; Coconut; Cortisone; Nut - unspecified; Peanut; and Prednisone    Discharge Medications:   Current Discharge Medication List        START taking these medications    Details   haloperidol (HALDOL) 5 mg tablet Take one tab po q am and 2 tabs at bedtime  Qty: 45 Tab, Refills: 0      pravastatin (PRAVACHOL) 40 mg tablet Take 1 Tab by mouth nightly. Qty: 15 Tab, Refills: 0           CONTINUE these medications which have CHANGED    Details   albuterol (PROVENTIL HFA, VENTOLIN HFA, PROAIR HFA) 90 mcg/actuation inhaler Take 2 Puffs by inhalation every six (6) hours as needed for Wheezing. Qty: 1 Inhaler, Refills: 0      aspirin (ASPIRIN) 325 mg tablet Take 1 Tab by mouth daily. Qty: 15 Tab, Refills: 0      beclomethasone (QVAR) 40 mcg/actuation aero Take 2 Puffs by inhalation two (2) times a day. Qty: 1 Inhaler, Refills: 0      busPIRone (BUSPAR) 15 mg tablet Take 1 Tab by mouth two (2) times a day. Qty: 30 Tab, Refills: 0      cholecalciferol (VITAMIN D3) 1,000 unit tablet Take 1 Tab by mouth daily.   Qty: 15 Tab, Refills: o      hydrOXYzine HCl (ATARAX) 50 mg tablet Take 1 Tab by mouth three (3) times daily as needed for Anxiety for up to 10 days. Qty: 30 Tab, Refills: 0      levothyroxine (SYNTHROID) 100 mcg tablet Take 1 Tab by mouth Daily (before breakfast). Qty: 15 Tab, Refills: 0      sertraline (ZOLOFT) 100 mg tablet Take 2 Tabs by mouth every evening. Qty: 30 Tab, Refills: 0      therapeutic multivitamin (THERA) tablet Take 1 Tab by mouth daily. Qty: 15 Tab, Refills: 0           CONTINUE these medications which have NOT CHANGED    Details   cyanocobalamin, vitamin B-12, (VITAMIN B-12) 5,000 mcg subl 5,000 mcg by SubLINGual route daily.            STOP taking these medications       CLONIDINE HCL PO Comments:   Reason for Stopping:         QUEtiapine (SEROQUEL) 50 mg tablet Comments:   Reason for Stopping:         OLANZapine (ZYPREXA) 20 mg tablet Comments:   Reason for Stopping:         prazosin (MINIPRESS) 1 mg capsule Comments:   Reason for Stopping:         clonazePAM (KLONOPIN) 1 mg tablet Comments:   Reason for Stopping:         OLANZapine (ZYPREXA) 5 mg tablet Comments:   Reason for Stopping:         zolpidem (AMBIEN) 10 mg tablet Comments:   Reason for Stopping:         fluticasone (FLONASE) 50 mcg/actuation nasal spray Comments:   Reason for Stopping:               The patient's chart, MAR and AVS were reviewed by Gt Mckinnon PHARMD.

## 2018-11-09 NOTE — BH NOTES
Behavioral Health Transition Record to Provider    Patient Name: John Forde  YOB: 1960  Medical Record Number: [id number]  Date of Admission: 10/27/2018  Date of Discharge: 11/9/2018   Attending Provider: Digna Tom MD  Discharging Provider:Dr. Jeronimo Landaverde   To contact this individual call [phone] and ask the  to page. If unavailable, ask to be transferred to [facility] Provider on call. [facility] Provider will be available on call 24/7 and during holidays. Primary Care Provider: Artie Aponte MD    Allergies   Allergen Reactions    Other Food Other (comments)     \"Bad chest pain\" with walnuts. Coke - asthma/stuffy head. Fish sticks causes an asthma attack.  Astepro [Azelastine] Other (comments)     Violent headaches.  Bactrim [Sulfamethoprim] Other (comments)     Difficulty breathing, chills, fever.  Banana Other (comments)     Diffculty breathing, wheezing, asthma attack.  Coconut Other (comments)     Difficulty breathing, asthma attack, SOB.  Cortisone Hives     Difficulty breathing.  Nut - Unspecified Other (comments)     \"Bad chest pain\" with walnuts    Peanut Other (comments)     Wheezing, asthma attack, SOB.  Prednisone Hives     Difficulty breathing, kidney stones. Reason for Admission: CC: \". Pt admitted under a temporary senior care order (TDO) [facility] for severe depression with suicidal ideations  proving to be an imminent danger to self and an inability to care for self.      Admission Diagnosis: severe major depression with psychotic features  Major depression with psychotic features ([facility]    * No surgery found *    Results for orders placed or performed during the hospital encounter of 10/27/18   GLUCOSE, FASTING   Result Value Ref Range    Glucose 94 65 - 100 MG/DL   TSH 3RD GENERATION   Result Value Ref Range    TSH 0.10 (L) 0.36 - 3.74 uIU/mL   LIPID PANEL   Result Value Ref Range    LIPID PROFILE Cholesterol, total 326 (H) <200 MG/DL    Triglyceride 210 (H) <150 MG/DL    HDL Cholesterol 55 MG/DL    LDL, calculated 229 (H) 0 - 100 MG/DL    VLDL, calculated 42 MG/DL    CHOL/HDL Ratio 5.9 (H) 0 - 5.0     T3 TOTAL   Result Value Ref Range    T3, total 84 71 - 180 ng/dL   T4, FREE   Result Value Ref Range    T4, Free 1.0 0.8 - 1.5 NG/DL       Immunizations administered during this encounter:   Immunization History   Administered Date(s) Administered    Td, Adsorbed 10/27/2018       Screening for Metabolic Disorders for Patients on Antipsychotic Medications  (Data obtained from the EMR)    Estimated Body Mass Index  Estimated body mass index is 29.73 kg/m² as calculated from the following:    Height as of this encounter: 5' 4\" (1.626 m). Weight as of this encounter: 78.6 kg (173 lb 3.2 oz). Vital Signs/Blood Pressure  Visit Vitals  /68 (BP 1 Location: Left arm, BP Patient Position: Supine)   Pulse 63   Temp 98.5 °F (36.9 °C)   Resp 16   Ht 5' 4\" (1.626 m)   Wt 78.6 kg (173 lb 3.2 oz)   SpO2 96%   BMI 29.73 kg/m²       Blood Glucose/Hemoglobin A1c  Lab Results   Component Value Date/Time    Glucose 94 10/28/2018 06:07 AM       Lab Results   Component Value Date/Time    Hemoglobin A1c 6.1 07/19/2018 06:03 AM        Lipid Panel  Lab Results   Component Value Date/Time    Cholesterol, total 326 (H) 10/28/2018 06:07 AM    HDL Cholesterol 55 10/28/2018 06:07 AM    LDL, calculated 229 (H) 10/28/2018 06:07 AM    Triglyceride 210 (H) 10/28/2018 06:07 AM    CHOL/HDL Ratio 5.9 (H) 10/28/2018 06:07 AM        Discharge Diagnosis: Major Depression with psychotic features     Discharge Plan: She will be discharge to [place] [address] . The patient Jorge Britton exhibits the ability to control behavior in a less restrictive environment. Patient's level of functioning is improving. No assaultive/destructive behavior has been observed for the past 24 hours.   No suicidal/homicidal threat or behavior has been observed for the past 24 hours. There is no evidence of serious medication side effects. Patient has not been in physical or protective restraints for at least the past 24 hours. If weapons involved, how are they secured? No weapons involved     Is patient aware of and in agreement with discharge plan? Patient is aware of discharge and is in agreement     Arrangements for medication:  Prescriptions filled at [address]      Copy of discharge instructions to  provider?:  Yes, fax to [facility] for transportation home:   to arrange     Keep all follow up appointments as scheduled, continue to take prescribed medications per physician instructions. Mental health crisis number:  [id number] or your local mental health crisis line number at [phone]         Discharge Medication List and Instructions:   Current Discharge Medication List      START taking these medications    Details   haloperidol (HALDOL) 5 mg tablet Take one tab po q am and 2 tabs at bedtime  Qty: 45 Tab, Refills: 0      pravastatin (PRAVACHOL) 40 mg tablet Take 1 Tab by mouth nightly. Qty: 15 Tab, Refills: 0         CONTINUE these medications which have CHANGED    Details   albuterol (PROVENTIL HFA, VENTOLIN HFA, PROAIR HFA) 90 mcg/actuation inhaler Take 2 Puffs by inhalation every six (6) hours as needed for Wheezing. Qty: 1 Inhaler, Refills: 0      aspirin (ASPIRIN) 325 mg tablet Take 1 Tab by mouth daily. Qty: 15 Tab, Refills: 0      beclomethasone (QVAR) 40 mcg/actuation aero Take 2 Puffs by inhalation two (2) times a day. Qty: 1 Inhaler, Refills: 0      busPIRone (BUSPAR) 15 mg tablet Take 1 Tab by mouth two (2) times a day. Qty: 30 Tab, Refills: 0      cholecalciferol (VITAMIN D3) 1,000 unit tablet Take 1 Tab by mouth daily. Qty: 15 Tab, Refills: o      hydrOXYzine HCl (ATARAX) 50 mg tablet Take 1 Tab by mouth three (3) times daily as needed for Anxiety for up to 10 days.   Qty: 30 Tab, Refills: 0      levothyroxine (SYNTHROID) 100 mcg tablet Take 1 Tab by mouth Daily (before breakfast). Qty: 15 Tab, Refills: 0      sertraline (ZOLOFT) 100 mg tablet Take 2 Tabs by mouth every evening. Qty: 30 Tab, Refills: 0      therapeutic multivitamin (THERA) tablet Take 1 Tab by mouth daily. Qty: 15 Tab, Refills: 0         CONTINUE these medications which have NOT CHANGED    Details   cyanocobalamin, vitamin B-12, (VITAMIN B-12) 5,000 mcg subl 5,000 mcg by SubLINGual route daily. STOP taking these medications       CLONIDINE HCL PO Comments:   Reason for Stopping:         QUEtiapine (SEROQUEL) 50 mg tablet Comments:   Reason for Stopping:         OLANZapine (ZYPREXA) 20 mg tablet Comments:   Reason for Stopping:         prazosin (MINIPRESS) 1 mg capsule Comments:   Reason for Stopping:         clonazePAM (KLONOPIN) 1 mg tablet Comments:   Reason for Stopping:         OLANZapine (ZYPREXA) 5 mg tablet Comments:   Reason for Stopping:         zolpidem (AMBIEN) 10 mg tablet Comments:   Reason for Stopping:         fluticasone (FLONASE) 50 mcg/actuation nasal spray Comments:   Reason for Stopping:               Unresulted Labs (24h ago, onward)    None        To obtain results of studies pending at discharge, please contact [phone]    Follow-up Information     Follow up With Specialties Details Why Contact Info    Plan to discharge to [facility]   On 11/9/2018 plan to dishcarge to [place] [facility]           Advanced Directive:   Does the patient have an appointed surrogate decision maker? No  Does the patient have a Medical Advance Directive? No  Does the patient have a Psychiatric Advance Directive? No  If the patient does not have a surrogate or Medical Advance Directive AND Psychiatric Advance Directive, the patient was offered information on these advance directives Patient declined to complete    Patient Instructions: Please continue all medications until otherwise directed by physician.       Tobacco Cessation Discharge Plan:   Is the patient a smoker and needs referral for smoking cessation? No  Patient referred to the following for smoking cessation with an appointment? No     Patient was offered medication to assist with smoking cessation at discharge? No  Was education for smoking cessation added to the discharge instructions? Yes    Alcohol/Substance Abuse Discharge Plan:   Does the patient have a history of substance/alcohol abuse and requires a referral for treatment? No  Patient referred to the following for substance/alcohol abuse treatment with an appointment? No  Patient was offered medication to assist with alcohol cessation at discharge? No  Was education for substance/alcohol abuse added to discharge instructions? No    Patient discharged to Home; discussed with patient/caregiver and provided to the patient/caregiver either in hard copy or electronically.

## 2018-11-09 NOTE — BH NOTES
Pt verbalized understanding of all discharge instructions. All belongings returned to patient. Patient denies SI. Pt asked what she would if she has self injurious thoughts. Pt reported she would look at nature, talk to her father or call 911. Mood upbeat, smiling.  Discharged at  to [facility], en route to [facility]

## 2018-11-09 NOTE — BH NOTES
PSYCHIATRIC PROGRESS NOTE         Patient Name  Taft Cogan   Date of Birth 1960   [facility] [id number]   Medical Record Number  [id number]      Age  59 y.o. PCP Frandy Kaye MD   Admit date:  10/27/2018    Room Number  726/02  @ [facility]   Date of Service  11/9/2018           E & M PROGRESS NOTE:         HISTORY         REASON FOR HOSPITALIZATION:  CC: \". Pt admitted under a temporary half-way order (TDO) [place] t[facility] oco for severe depression with suicidal ideations  proving to be an imminent danger to self and an inability to care for self. HISTORY OF PRESENT ILLNESS:    The patient, Taft Cogan, is a 59 y.o. WHITE [place]  female with a past psychiatric history significant for depression, anxiety, psychosis, hearing voices ho presents at this time with complaints of (and/or evidence of) the following emotional symptoms: suicidal thoughts/threats, delusions, psychotic behavior and paranoid behavior. Additional symptomatology include difficulty swallowing and feeling suicidal.  The above symptoms have been present for Pt reports her mother passed away in April and she feels she could have done more, reports she has not been taking her meds for one and half week. Pt was on zoloft 200 at night and zyprexa po bid as per reports. Pt reports she is getting thoughts of cutting herself, she feels responsible for mother's death, pt ha s ntense guilt, decrease appetite, difficulty sleeping, poor eye contact and auditory hallucinations. These symptoms are of intense severity. These symptoms are constant in nature. 10/28/18: As per staff, pt sneaked in X-acto knife while nurse was looking for med lsit from her bag,. Pt made multiple superficial cuts on her rt arm, needing medical consult. Knife was found with pt and wound were treated, no stiches were needed. Pt continues to be dystaymic, having suicidal ideation.   10/29/18: Pt continues to be very anxious and depressed, report suicidal ideations and urges to cut. Pt is on 1:1 observation. Pts cholesterol is 326 and tsh 0.5. Pt slept well. Pt received ativan 1mg twice yesterday and pt also tried to elope yesterday. 10/30/18: Pt reports she slept better. Pt reports she has an appointment with social security. Pt reports she feels little better but still has suicidal thoughts and impulsivity. 10/31/18:Pt is still dysthymic, verbalizing urges to harm herself, not romulo for safety,still hearing voices, poor ADL's.  11/2/18: Pt still depressed, sneaked in fork after lunch which was taken back by the nurse. Pt reports gets urges to cut and hear voices, still ahs urges to cut. 11/3/18- Remains on 1 to 1 for ongoing urges to cut on herself, not contacting for safety. Asking to make a phone call. Assured that she could make a phone call when off one to one. Prn used-Ativan. Slept 8 hrs. 11/4/18-remains on 1 to 1. Positive thinking re enforced. Slept 6.45 hrs. No prn's were used. Tolerating   haldol well. 11/5/18: Pt reports she feels better, is hopefull and reports did not have urges to cut. Pt receiving prn atrax. As per staff pt is manipulative,asking to  speak with dad. Pt attending groups, not hearing voices. 11/6/18: Pt is doing better as per staff, no new self injurious behaviors, has brighter affect. Pt report not hearing voices, mood is better, is future oriented and hopeful. Pts father is visiting today and pt will see her  Yee today. 11/7/18: Pt is more cheerful, pt is writing her feeling and able to verbalize them. 11/8/18: pt is hopefull, future merary Cancinoiwonaas  currently denied suicidal/homicidal ideations and plans. Pt denied auditory and visual hallucinations, Pt is compliant with the meds, no side effects to meds reported. 11/9/18Prudence Miles  currently denied suicidal/homicidal ideations and plans.  Pt denied auditory and visual hallucinations, Pt is compliant with the meds, no side effects to meds reported. Pt over all doing better is future oriented. SIDE EFFECTS: (reviewed/updated 11/9/2018)  None reported or admitted to. No noted toxicity with use of epakote/Tegretol/lithium/Clozaril/TCAs   ALLERGIES:(reviewed/updated 11/9/2018)  Allergies   Allergen Reactions    Other Food Other (comments)     \"Bad chest pain\" with walnuts. Coke - asthma/stuffy head. Fish sticks causes an asthma attack.  Astepro [Azelastine] Other (comments)     Violent headaches.  Bactrim [Sulfamethoprim] Other (comments)     Difficulty breathing, chills, fever.  Banana Other (comments)     Diffculty breathing, wheezing, asthma attack.  Coconut Other (comments)     Difficulty breathing, asthma attack, SOB.  Cortisone Hives     Difficulty breathing.  Nut - Unspecified Other (comments)     \"Bad chest pain\" with walnuts    Peanut Other (comments)     Wheezing, asthma attack, SOB.  Prednisone Hives     Difficulty breathing, kidney stones. MEDICATIONS PRIOR TO ADMISSION:(reviewed/updated 11/9/2018)  Medications Prior to Admission   Medication Sig    albuterol (PROVENTIL HFA, VENTOLIN HFA, PROAIR HFA) 90 mcg/actuation inhaler Take 2 Puffs by inhalation every six (6) hours as needed for Wheezing.  beclomethasone (QVAR) 40 mcg/actuation aero Take 2 Puffs by inhalation two (2) times a day.  CLONIDINE HCL PO Take 1/2 of 0.1mg tablet QHS    busPIRone (BUSPAR) 15 mg tablet Take 15 mg by mouth three (3) times daily.  QUEtiapine (SEROQUEL) 50 mg tablet Take 50 mg by mouth nightly.  OLANZapine (ZYPREXA) 20 mg tablet Take 20 mg by mouth nightly.  hydrOXYzine HCl (ATARAX) 50 mg tablet Take 50 mg by mouth every four (4) hours as needed for Anxiety.  prazosin (MINIPRESS) 1 mg capsule Take 1 mg by mouth nightly.  clonazePAM (KLONOPIN) 1 mg tablet Take 1 mg by mouth nightly.  OLANZapine (ZYPREXA) 5 mg tablet Take 5 mg by mouth daily (after breakfast).     aspirin (ASPIRIN) 325 mg tablet Take 1 Tab by mouth daily. Indications: myocardial infarction prevention    levothyroxine (SYNTHROID) 125 mcg tablet Take 1 Tab by mouth Daily (before breakfast). Indications: hypothyroidism    sertraline (ZOLOFT) 100 mg tablet Take 2 Tabs by mouth daily (with dinner). Indications: Generalized Anxiety Disorder, major depressive disorder    zolpidem (AMBIEN) 10 mg tablet Take 1 Tab by mouth nightly. Max Daily Amount: 10 mg. Indications: SLEEP-ONSET INSOMNIA    cholecalciferol (VITAMIN D3) 1,000 unit tablet Take 1 Tab by mouth daily. Indications: PREVENTION OF VITAMIN D DEFICIENCY, Vitamin D Deficiency    fluticasone (FLONASE) 50 mcg/actuation nasal spray 2 Sprays by Both Nostrils route daily. Indications: Allergic Rhinitis    therapeutic multivitamin (THERA) tablet Take 1 Tab by mouth daily.  cyanocobalamin, vitamin B-12, (VITAMIN B-12) 5,000 mcg subl 5,000 mcg by SubLINGual route daily. PAST MEDICAL HISTORY: Past medical history from the initial psychiatric evaluation has been reviewed (reviewed/updated 11/9/2018) with no additional updates (I asked patient and no additional past medical history provided).    Past Medical History:   Diagnosis Date    Asthma 1967    hospitalized for asthma attack x 2    Chronic kidney disease     kidney stones x 2-3 times    Ill-defined condition     nasal blockage from growth and misalignment - cannot breath out of nose - surgery in the future/cleared for colonoscopy 7/31/2014 - will bring in letter    Other ill-defined conditions(799.89)     blood in stool    Other ill-defined conditions(799.89)     hx low BP - 90's/60's-70's    PUD (peptic ulcer disease)     Thyroid disease     Unspecified adverse effect of anesthesia     nasal growth and misalignment and cannot breath through nose     Past Surgical History:   Procedure Laterality Date    HX HEENT      T & A    HX HEENT      turbinate surgery      SOCIAL HISTORY: Social history from the initial psychiatric evaluation has been reviewed (reviewed/updated 11/9/2018) with no additional updates (I asked patient and no additional social history provided). Social History     Socioeconomic History    Marital status: SINGLE     Spouse name: Not on file    Number of children: Not on file    Years of education: Not on file    Highest education level: Not on file   Social Needs    Financial resource strain: Not on file    Food insecurity - worry: Not on file    Food insecurity - inability: Not on file    Transportation needs - medical: Not on file   Epivios needs - non-medical: Not on file   Occupational History    Not on file   Tobacco Use    Smoking status: Never Smoker    Smokeless tobacco: Never Used   Substance and Sexual Activity    Alcohol use: No    Drug use: No    Sexual activity: Not Currently   Other Topics Concern     Service Not Asked    Blood Transfusions Not Asked    Caffeine Concern Not Asked    Occupational Exposure Not Asked    Hobby Hazards Not Asked    Sleep Concern Not Asked    Stress Concern Not Asked    Weight Concern Not Asked    Special Diet Not Asked    Back Care Not Asked    Exercise Not Asked    Bike Helmet Not Asked   [address] Not Asked    Self-Exams Not Asked   Social History Narrative    Not on file      FAMILY HISTORY: Family history from the initial psychiatric evaluation has been reviewed (reviewed/updated 11/9/2018) with no additional updates (I asked patient and no additional family history provided).    Family History   Problem Relation Age of Onset    Stroke Mother     Other Mother         erosion of esophagus    Cancer Mother         melanoma/squamous    Heart Surgery Father         x2    Delayed Awakening Father    [place] Pacemaker Father     Other Father         carotid endartarectomy/polio    Cataract Father        REVIEW OF SYSTEMS: (reviewed/updated 11/9/2018)  Appetite:good   Sleep: improved   All other Review of Systems: Negative except   Multiple cuts on rt arm       MENTAL STATUS EXAM & VITALS      MENTAL STATUS EXAM (MSE):    MSE FINDINGS ARE WITHIN NORMAL LIMITS (WNL) UNLESS OTHERWISE STATED BELOW. ( ALL OF THE BELOW CATEGORIES OF THE MSE HAVE BEEN REVIEWED (reviewed 10/27/2018) AND UPDATED AS DEEMED APPROPRIATE )  General Presentation age appropriate and disheveled, evasive and guarded   Orientation oriented to time, place and person   Vital Signs  See below (reviewed 10/27/2018); Vital Signs (BP, Pulse, & Temp) are within normal limits if not listed below.    Gait and Station Stable/steady, no ataxia   Musculoskeletal System No extrapyramidal symptoms (EPS); no abnormal muscular movements or Tardive Dyskinesia (TD); muscle strength and tone are within normal limits   Language No aphasia or dysarthria   Speech:  soft   Thought Processes logical; slow rate of thoughts; poor abstract reasoning/computation   Thought Associations blocked    Thought Content paranoid delusions   Suicidal Ideations Ireports not having urges to cut   Homicidal Ideations no plan  and no intention   Mood:  Stable euthymic   Affect:  brighter   Memory recent  intact   Memory remote:  intact   Concentration/Attention:  distractable   Fund of Knowledge average   Insight:  improving   Reliability improving   Judgment:  improving                      VITALS:     Patient Vitals for the past 24 hrs:   Temp Pulse Resp BP SpO2   11/09/18 0752 98.5 °F (36.9 °C) 63 16 108/68 96 %   11/08/18 1901 98.5 °F (36.9 °C) 76 16 96/63 --   11/08/18 1539 99.1 °F (37.3 °C) 80 18 93/57 96 %     Wt Readings from Last 3 Encounters:   11/03/18 78.6 kg (173 lb 3.2 oz)   07/27/18 72.6 kg (160 lb)   07/16/18 72.6 kg (160 lb)     Temp Readings from Last 3 Encounters:   11/09/18 98.5 °F (36.9 °C)   08/13/18 98.5 °F (36.9 °C)   07/26/18 98 °F (36.7 °C)     BP Readings from Last 3 Encounters:   11/09/18 108/68   08/13/18 91/53   07/26/18 108/71     Pulse Readings from Last 3 Encounters:   11/09/18 63   08/13/18 (!) 57   07/26/18 62            DATA     LABORATORY DATA:(reviewed/updated 11/9/2018)  No results found for this or any previous visit (from the past 24 hour(s)). No results found for: VALF2, VALAC, VALP, VALPR, DS6, CRBAM, CRBAMP, CARB2, XCRBAM  No results found for: LITHM   RADIOLOGY REPORTS:(reviewed/updated 11/9/2018)  No results found.        MEDICATIONS     ALL MEDICATIONS:   Current Facility-Administered Medications   Medication Dose Route Frequency    sertraline (ZOLOFT) tablet 200 mg  200 mg Oral QPM    cloNIDine HCl (CATAPRES) tablet 0.05 mg  0.05 mg Oral QHS    haloperidol (HALDOL) tablet 5 mg  5 mg Oral DAILY    haloperidol (HALDOL) tablet 10 mg  10 mg Oral QHS    busPIRone (BUSPAR) tablet 15 mg  15 mg Oral BID    levothyroxine (SYNTHROID) tablet 100 mcg  100 mcg Oral ACB    pravastatin (PRAVACHOL) tablet 40 mg  40 mg Oral QHS    budesonide (PULMICORT) 500 mcg/2 ml nebulizer suspension  500 mcg Nebulization BID RT    ziprasidone (GEODON) 20 mg in sterile water (preservative free) 1 mL injection  20 mg IntraMUSCular BID PRN    OLANZapine (ZyPREXA) tablet 5 mg  5 mg Oral Q6H PRN    benztropine (COGENTIN) tablet 2 mg  2 mg Oral BID PRN    benztropine (COGENTIN) injection 2 mg  2 mg IntraMUSCular BID PRN    LORazepam (ATIVAN) injection 2 mg  2 mg IntraMUSCular Q4H PRN    LORazepam (ATIVAN) tablet 1 mg  1 mg Oral Q4H PRN    acetaminophen (TYLENOL) tablet 650 mg  650 mg Oral Q4H PRN    ibuprofen (MOTRIN) tablet 400 mg  400 mg Oral Q8H PRN    magnesium hydroxide (MILK OF MAGNESIA) 400 mg/5 mL oral suspension 30 mL  30 mL Oral DAILY PRN    nicotine (NICODERM CQ) 21 mg/24 hr patch 1 Patch  1 Patch TransDERmal DAILY PRN    aspirin tablet 325 mg  325 mg Oral DAILY    cholecalciferol (VITAMIN D3) tablet 1,000 Units  1,000 Units Oral DAILY    therapeutic multivitamin (THERAGRAN) tablet 1 Tab  1 Tab Oral DAILY    hydrOXYzine HCl (ATARAX) tablet 50 mg  50 mg Oral TID PRN    albuterol-ipratropium (DUO-NEB) 2.5 MG-0.5 MG/3 ML  3 mL Nebulization Q6H PRN      SCHEDULED MEDICATIONS:   Current Facility-Administered Medications   Medication Dose Route Frequency    sertraline (ZOLOFT) tablet 200 mg  200 mg Oral QPM    cloNIDine HCl (CATAPRES) tablet 0.05 mg  0.05 mg Oral QHS    haloperidol (HALDOL) tablet 5 mg  5 mg Oral DAILY    haloperidol (HALDOL) tablet 10 mg  10 mg Oral QHS    busPIRone (BUSPAR) tablet 15 mg  15 mg Oral BID    levothyroxine (SYNTHROID) tablet 100 mcg  100 mcg Oral ACB    pravastatin (PRAVACHOL) tablet 40 mg  40 mg Oral QHS    budesonide (PULMICORT) 500 mcg/2 ml nebulizer suspension  500 mcg Nebulization BID RT    aspirin tablet 325 mg  325 mg Oral DAILY    cholecalciferol (VITAMIN D3) tablet 1,000 Units  1,000 Units Oral DAILY    therapeutic multivitamin (THERAGRAN) tablet 1 Tab  1 Tab Oral DAILY          ASSESSMENT & PLAN     DIAGNOSES REQUIRING ACTIVE TREATMENT AND MONITORING: (reviewed/updated 11/9/2018)  Patient Active Hospital Problem List:   The patient, Andre Arechiga, is a 59 y.o.  female who presents at this time for treatment of the following diagnoses:  Patient C/Angelica Qureshi 1106 Problem List:   Major depression with psychotic features ([facility] [address] (10/27/2018)    Assessment: depression, suicidal ideations anxiety, auditory hallucination     Plan: restarting meds zoloft 25 mg po hs, zyprexa 5 mg po bid, buspar 5 mg po bid. Indiv/milue therapy  Get collaterals, safety, suicidal precautions  10/28/18: Pt was put on 1:! Observation as soon as writer heard about cutting.   Increasing zoloft and zyprexa 10 mg po hs., indiv milue therapy, suicide precautions, check for contrabands  10/29/18: high cholesterol, will change zyprexa to haldol first generation antipsychotic., increasing buspar 15 mg po bid  Hyprelipidemia: starting atrovastatin 20 mg daily  10/30/18, stop zyprexa, increase haldol 5 mg po hs, plan on starting decoanate, increasing zoloft 100mg po hs.  10/31/18: Increasing zoloft 150 mg po hs, continue meds/therapy nad 1:1 observation, encourage to go to groups and ADL's.  11/2/18: adding haldol 2 mg po q am continue meds, increasing zoloft 200mg po daily, adding clonidine 0.1 mg half tab. Pt reports that helped her with nightmares in the past  11/5/18: continue meds and therapy, 1:1, allow to talk with dad undersuprevision  11/6/18: Pt romulo for safety, will remove 1:1 observation, staff to still do close observation, check for contrabands especially after meals. continue meds and therapy. 11/8/18: continue indiv/milue nad meds manegment  11/9/18: Pt will be dc today with after care as per  instructions    I will continue to monitor blood levels (Depakote, Tegretol, lithium, clozapine---a drug with a narrow therapeutic index= NTI) and associated labs for drug therapy implemented that require intense monitoring for toxicity as deemed appropriate based on current medication side effects and pharmacodynamically determined drug 1/2 lives. In summary, Sharee Mckeon, is a 59 y.o.  female who presents with a severe exacerbation of the principal diagnosis of Major depression with psychotic features ([facility] [address]  Patient's condition is orsening/not improving/not stable improving. Patient requires continued inpatient hospitalization for further stabilization, safety monitoring and medication management. I will continue to coordinate the provision of individual, milieu, occupational, group, and substance abuse therapies to address target symptoms/diagnoses as deemed appropriate for the individual patient. A coordinated, multidisplinary treatment team round was conducted with the patient (this team consists of the nurse, psychiatric unit pharmcist,  and writer). Complete current electronic health record for patient has been reviewed today including consultant notes, ancillary staff notes, nurses and psychiatric tech notes.     icide risk assessment completed and patient deemed to be of low risk for suicide at this time. The following regarding medications was addressed during rounds with patient:   the risks and benefits of the proposed medication. The patient was given the opportunity to ask questions. Informed consent given to the use of the above medications. Will continue to adjust psychiatric and non-psychiatric medications (see above \"medication\" section and orders section for details) as deemed appropriate & based upon diagnoses and response to treatment. I will continue to order blood tests/labs and diagnostic tests as deemed appropriate and review results as they become available (see orders for details and above listed lab/test results). I will order psychiatric records from previous [facility] hospitals to further elucidate the nature of patient's psychopathology and review once available. I will gather additional collateral information from riends, family and o/p treatment team to further elucidate the nature of patient's psychopathology and baselline level of psychiatric functioning. I certify that this patient's inpatient psychiatric hospital services furnished since the previous certification were, and continue to be, required for treatment that could reasonably be expected to improve the patient's condition, or for diagnostic study, and that the patient continues to need, on a daily basis, active treatment furnished directly by or requiring the supervision of inpatient psychiatric facility personnel. In addition, the hospital records show that services furnished were intensive treatment services, admission or related services, or equivalent services.     EXPECTED DISCHARGE DATE/DAY: BD     DISPOSITION:[place]       Signed By:   David Ashton MD  11/9/2018

## 2018-11-09 NOTE — PROGRESS NOTES
Problem: Depressed Mood (Adult/Pediatric)  Goal: *STG: Remains safe in hospital  Outcome: Progressing Towards Goal  Lying quietly in bed with eyes closed, respirations even and unlabored .   Q15 min safety rounds continue , night light on in room

## 2018-11-09 NOTE — PROGRESS NOTES
GROUP THERAPY PROGRESS NOTE    Glenna Nephew is participating in Goals Group and Community. Group time: 15 minutes    Personal goal for participation: Be positive, work on gratitude    Goal orientation: personal    Group therapy participation: active    Therapeutic interventions reviewed and discussed: Discussed unit rules and reviewed roles of staff members. Discussed treatment team and suggested topics to discuss with , doctor and nurse. Discussed importance of setting daily goals to stay organized and motivated, shared goals with peers.     Impression of participation: Actively listened, positively contributed to conversation with staff and peers

## 2018-11-09 NOTE — BH NOTES
Patient is lying quietly in bed. Awake and alert. Calm and verbal.  No distress noted. Will continue to monitor.

## 2018-11-09 NOTE — PROGRESS NOTES
Problem: Depressed Mood (Adult/Pediatric)  Goal: *STG: Participates in treatment plan  Outcome: Progressing Towards Goal  Participates in tx plan   Goal: *STG: Attends activities and groups  Outcome: Progressing Towards Goal  Is attending groups and activities. Appropriate with staff and peers on milieu.   Goal: *STG: Demonstrates reduction in symptoms and increase in insight into coping skills/future focused  Outcome: Not Progressing Towards Goal  Verbalized feeling better and desire to go home but has not verbalized coping skills other than writing in her journal.    Goal: *STG: Remains safe in hospital  Outcome: Progressing Towards Goal  Remains safe in hospital  Goal: *STG: Complies with medication therapy  Outcome: Progressing Towards Goal  Is medication and meal compliant

## 2018-11-09 NOTE — BH NOTES
GROUP THERAPY PROGRESS NOTE    Rashaad London participated in the Acute Unit's afternoon Process Group with a focus on identifying feelings, planning for the rest of the day, and preparing for discharge. Group time: 55 minutes. Personal goal for participation: To increase the capacity to improve ones mood and structure. Goal orientation: The patient will be able to identify their feelings, develop a plan for structuring their day, and discharge planning. Group therapy participation: With prompting, this patient actively participated in the group. Therapeutic interventions reviewed and discussed: The group members were asked to introduce themselves to each other and to see if they could identify an emotion they are having and/or let the group know what they want to focus on for the day as they continue to make discharge plans. Impression of participation: The patient said she was feeling, \"Good. \" She added that she wanted \"to deepen her relationships,\" while sharing as if she were almost floating over herself, not fully embodied or grounded. She gave as examples, going out with her father and friends, looking for a place to live, finding a job, and maybe going back to school; \"I want to be thankful. \" The patient was alert and generally oriented. She expressed no current SI/HI and displayed no overt psychotic symptoms in this group. She also shared that she had a couple of dreams in which she saw her mother \"all in white\" and whole. The patient took this to mean that her mother was fine and that it was okay for the patient to begin to reach out for her own life.  She was encouraged to think about what sort of help her [facility] might have to offer and wether she would be followed by an individual therapist or a group, in addition to her outpatient psychiatrist. She admitted that she attended one outpatient group but did not return - she was encouraged to continue to sample the [facility] therapy groups that were offered to see if she can find one that matches her needs and wants. She concluded by saying that she knows it will be important to take her medication and \"not be lazy about it. \" She was encouraged to let her outpatient prescriber and her therapist know when she has any questions or concerns about her medication and when she misses taking a dose more than once. Her affect was very mildly euphoric and guarded. Her mood reflected her affect. She may need some additional time to integrate the elements necessary for her resiliency.

## 2018-11-09 NOTE — DISCHARGE INSTRUCTIONS
DISCHARGE SUMMARY    NAME:Prudence Alexander  : 1960  MRN: [id number]    The patient Garner Nissen exhibits the ability to control behavior in a less restrictive environment. Patient's level of functioning is improving. No assaultive/destructive behavior has been observed for the past 24 hours. No suicidal/homicidal threat or behavior has been observed for the past 24 hours. There is no evidence of serious medication side effects. Patient has not been in physical or protective restraints for at least the past 24 hours. If weapons involved, how are they secured? No weapons involved     Is patient aware of and in agreement with discharge plan? Patient is aware of discharge and is in agreement     Arrangements for medication:  Prescriptions filled at [facility]      Copy of discharge instructions to  provider?:  Yes, fax to [facility] for transportation home:   to arrange     Keep all follow up appointments as scheduled, continue to take prescribed medications per physician instructions. Mental health crisis number:  024 or your local mental health crisis line number at [phone]          Needs repeat TSH in 6-8 weeks  Liver function tests in ~ 6 weeks (started on statin)      DISCHARGE SUMMARY from Nurse    PATIENT INSTRUCTIONS:  Medications filled given to patient              What to do at Home:  Recommended activity: Activity as tolerated,         *  Please give a list of your current medications to your Primary Care Provider. *  Please update this list whenever your medications are discontinued, doses are      changed, or new medications (including over-the-counter products) are added. *  Please carry medication information at all times in case of emergency situations.     These are general instructions for a healthy lifestyle:    No smoking/ No tobacco products/ Avoid exposure to second hand smoke  Surgeon General's Warning:  Quitting smoking now greatly reduces serious risk to your health. Obesity, smoking, and sedentary lifestyle greatly increases your risk for illness    A healthy diet, regular physical exercise & weight monitoring are important for maintaining a healthy lifestyle    You may be retaining fluid if you have a history of heart failure or if you experience any of the following symptoms:  Weight gain of 3 pounds or more overnight or 5 pounds in a week, increased swelling in our hands or feet or shortness of breath while lying flat in bed. Please call your doctor as soon as you notice any of these symptoms; do not wait until your next office visit. Recognize signs and symptoms of STROKE:    F-face looks uneven    A-arms unable to move or move unevenly    S-speech slurred or non-existent    T-time-call 911 as soon as signs and symptoms begin-DO NOT go       Back to bed or wait to see if you get better-TIME IS BRAIN. Warning Signs of HEART ATTACK     Call 911 if you have these symptoms:   Chest discomfort. Most heart attacks involve discomfort in the center of the chest that lasts more than a few minutes, or that goes away and comes back. It can feel like uncomfortable pressure, squeezing, fullness, or pain.  Discomfort in other areas of the upper body. Symptoms can include pain or discomfort in one or both arms, the back, neck, jaw, or stomach.  Shortness of breath with or without chest discomfort.  Other signs may include breaking out in a cold sweat, nausea, or lightheadedness. Don't wait more than five minutes to call 911 - MINUTES MATTER! Fast action can save your life. Calling 911 is almost always the fastest way to get lifesaving treatment. Emergency Medical Services staff can begin treatment when they arrive -- up to an hour sooner than if someone gets to the hospital by car. The discharge information has been reviewed with the patient. The patient verbalized understanding.   Discharge medications reviewed with the patient and appropriate educational materials and side effects teaching were provided. [employer] Activation    Thank you for requesting access to [employer]. Please follow the instructions below to securely access and download your online medical record. [employer] allows you to send messages to your doctor, view your test results, renew your prescriptions, schedule appointments, and more. How Do I Sign Up? 1. In your internet browser, go to [email or web address]  2. Click on the First Time User? Click Here link in the Sign In box. You will be redirect to the New Member Sign Up page. 3. Enter your [employer] Access Code exactly as it appears below. You will not need to use this code after youve completed the sign-up process. If you do not sign up before the expiration date, you must request a new code. [employer] Access Code: [email or web address]  Expires: 2019  9:27 PM (This is the date your [employer] access code will )    4. Enter the last four digits of your Social Security Number (xxxx) and Date of Birth (mm/dd/yyyy) as indicated and click Submit. You will be taken to the next sign-up page. 5. Create a [employer] ID. This will be your [employer] login ID and cannot be changed, so think of one that is secure and easy to remember. 6. Create a [employer] password. You can change your password at any time. 7. Enter your Password Reset Question and Answer. This can be used at a later time if you forget your password. 8. Enter your e-mail address. You will receive e-mail notification when new information is available in [address]. 9. Click Sign Up. You can now view and download portions of your medical record. 10. Click the Download Summary menu link to download a portable copy of your medical information. Additional Information    If you have questions, please visit the Frequently Asked Questions section of the [employer] website at [email or web address] Remember, [employer] is NOT to be used for urgent needs. For medical emergencies, dial 911.        ___________________________________________________________________________________________________________________________________

## 2019-01-26 ENCOUNTER — IP HISTORICAL/CONVERTED ENCOUNTER (OUTPATIENT)
Dept: OTHER | Age: 59
End: 2019-01-26

## 2020-08-13 NOTE — PROGRESS NOTES
Marrian Duverney   1948  8/14/2020     Chief Complaint   Patient presents with    Anemia        INTERVAL HISTORY/HISTORY OF PRESENT ILLNESS:  Diagnosis   Mediastinal adenopathy and hypercalcemia, September 2018   Hypervitaminosis D   Sarcoidosis   CKD stage III   Normocytic anemia 2/2 CKD stage III  Treatment summary  Followed with pulmonary at [facility]   Plaquenil and prednisone 5 mg daily   Anemia CKD- Hb>10, No need for Procrit      Interval history  Mr Sayra Mckinnon has been seen by Dr. Jocy Aguiar and had a bronchoscopy EBUS/FNA biopsy of mediastinal lymph nodes. Dr. Cade Baez diagnosed with sarcoidosis. He was seen at [facility] and was started on low-dose prednisone 5 mg daily, Plaquenil. He has felt a lot better. He is doing well otherwise. He denies any fever. He denies any new symptoms. In addition, the patient has anemia secondary to chronic kidney disease stage III/IV. Has no new complaints. Still taking his prednisone daily for. History of present illness  Mr Marrian Duverney was first seen by me on 9/13/2018 referred by Dr. Henry Lu. He was admitted [facility] in August 2018 with hypercalcemia, acute kidney injury, dehydration, anemia and rectal bleeding. He was found to have elevated calcium (14). He received zoledronic acid 4 mg with normalization of his calcium levels. PTH was low at 13 , suggest secondary hypercalcemia. Vitamin D 1, 25-hydroxy elevated at 103. CBC showed normocytic anemia with normal B12 levels. SPEP showed no M spike. Imaging studies showed mediastinal adenopathy. The following below our pertinent findings. 12/7/2017-PET/CT scan was unremarkable for any neoplastic process   8/28/2018-MRI of the brain was unremarkable for metastatic disease. 8/31/2018-upper EGD/colonoscopy was unrevealing. 9/1/2018-CT scan of the chest showed mediastinal, hilar adenopathy with hilar lymph nodes measuring 1.8 cm. A small 6 mm right upper lobe nodule.    9/10/2018-he was first seen by Problem: Depressed Mood (Adult/Pediatric)  Goal: *STG: Remains safe in hospital  Outcome: Progressing Towards Goal  Received pt in bed asleep, not appearing to be in any distress. Pt remains on q15min safety rounds, will continue to monitor. me. Referred to pulmonary. for EBUS.   9/18/2018-CT of the abdomen pelvis with contrast showed no intra-abdominal adenopathy. Diverticulosis of the colon. 10/4/2018- EBUS/FNA by Dr. Amy Rod was non-diagnostic. 7/15/2020 Ct Head/CTA Neck- No hemorrhage, edema or mass effect. No acute findings. Mild atrophy. The full report of this exam was immediately signed and available to the emergency room. NASCET criterial utilized. There is about 45% diameter narrowing of the distal right common carotid artery and less than 20-30% narrowing of the proximal right internal carotid artery by calcified plaque. There is less than 30-40% diameter stenosis of the proximal left internal carotid artery by calcified plaque. Partially imaged possible parenchymal lung lesion in the right upper lobe. Follow-up nonemergent chest CT with contrast recommended. The patient already had contrast for this examination. The right ophthalmic artery origin is clearly visualized intracranially. The left ophthalmic artery origin is not clearly visualized but the artery is opacified within the left orbit. There is some mild plaque involving the cavernous carotid arteries intracranially bilaterally. The vertebral arteries are patent bilaterally. Emphysema. 7/15/2020 Mri Brain Wo Contrast- No evidence of acute infarct on the diffusion sequence. 2. Mild atrophy. The full report of this exam was immediately signed and available to the emergency room. Hematology history  The patient was found to have normocytic anemia. Of note, he has CKD stage III B. Laboratory workup for anemia. Requested on 9/27/2018. Differential diagnoses include anemia of chronic inflammation, anemia of chronic kidney disease versus others. 9/27/2018, iron saturation 18%, ferritin 68, B12 770, folate 11.8, haptoglobin 149. EPO level 17, reticulocyte count 0.9%, TIBC 295, iron 52. This is consistent with anemia of chronic disease.   On 3/27/2019, creatinine 2.3/EGFR 29 Occupational History    None   Social Needs    Financial resource strain: None    Food insecurity     Worry: None     Inability: None    Transportation needs     Medical: None     Non-medical: None   Tobacco Use    Smoking status: Former Smoker     Types: Cigars     Last attempt to quit: 2018     Years since quittin.5    Smokeless tobacco: Never Used   Substance and Sexual Activity    Alcohol use: No    Drug use: No    Sexual activity: None   Lifestyle    Physical activity     Days per week: None     Minutes per session: None    Stress: None   Relationships    Social connections     Talks on phone: None     Gets together: None     Attends Synagogue service: None     Active member of club or organization: None     Attends meetings of clubs or organizations: None     Relationship status: None    Intimate partner violence     Fear of current or ex partner: None     Emotionally abused: None     Physically abused: None     Forced sexual activity: None   Other Topics Concern    None   Social History Narrative    None       FAMILY HISTORY:  Family History   Problem Relation Age of Onset    Diabetes Mother     Stroke Father     Heart Disease Father     Diabetes Brother         Current Outpatient Medications   Medication Sig Dispense Refill    aspirin EC 81 MG EC tablet Take 1 tablet by mouth daily 60 tablet 0    benzonatate (TESSALON) 200 MG capsule Take 1 capsule by mouth 3 times daily as needed for Cough 30 capsule 0    albuterol sulfate  (90 Base) MCG/ACT inhaler Inhale 2 puffs into the lungs every 6 hours as needed for Wheezing 1 Inhaler 0    acetaminophen (TYLENOL) 325 MG tablet Take 2 tablets by mouth every 4 hours as needed for Pain 120 tablet 0    amLODIPine (NORVASC) 10 MG tablet Take 1 tablet by mouth daily 30 tablet 5    hydrALAZINE (APRESOLINE) 50 MG tablet Take 1 tablet by mouth every 8 hours 90 tablet 5    docusate sodium (COLACE) 100 MG capsule Take 1 capsule by mouth daily To prevent constipation 20 capsule 0    atenolol (TENORMIN) 50 MG tablet Take 50 mg by mouth daily      omeprazole (PRILOSEC) 20 MG delayed release capsule Take 40 mg by mouth daily      polyvinyl alcohol (LIQUIFILM TEARS) 1.4 % ophthalmic solution Place 1 drop into both eyes 4 times daily as needed      Cyanocobalamin 1000 MCG CAPS Take 1,000 mg by mouth daily      traZODone (DESYREL) 100 MG tablet Take 100 mg by mouth nightly      buPROPion (WELLBUTRIN SR) 150 MG extended release tablet Take 150 mg by mouth 2 times daily      melatonin 3 MG TABS tablet Take 9 mg by mouth daily      PARoxetine (PAXIL) 40 MG tablet Take 40 mg by mouth every morning      fluticasone (FLONASE) 50 MCG/ACT nasal spray 1 spray by Nasal route daily 1 Bottle 3    simvastatin (ZOCOR) 10 MG tablet Take 20 mg by mouth nightly Indications: Changes in Cholesterol       terazosin (HYTRIN) 5 MG capsule Take 5 mg by mouth nightly Indications: Enlarged Prostate      zolpidem (AMBIEN) 10 MG tablet Take 10 mg by mouth nightly as needed . No current facility-administered medications for this visit. REVIEW OF SYSTEMS:    Constitutional: no fever, no night sweats,  fatigue;   HEENT: no blurring of vision, no double vision, no hearing difficulty, no tinnitus,no ulceration, no dysphagia  Lungs: no cough, no shortness of breath, no wheeze;   CVS: no palpitation, no chest pain, no shortness of breath;  GI: no abdominal pain, no nausea , no vomiting, no constipation;   ZACHARY: no dysuria, frequency and urgency, no hematuria, no kidney stones;   Musculoskeletal: no joint pain, swelling , stiffness;   Endocrine: no polyuria, polydypsia, no cold or heat intolerence; Hematology/lymphatic: no easy brusing or bleeding, no hx of clotting disorder; no peripheral adenopathy. Dermatology: no skin rash, no eczema, no pruritis;   Psychiatry: no depression, no anxiety,no panic attacks, no suicide ideation;    Neurology: no syncope, no seizures, no numbness or tingling of hands, no numbness or tingling of feet, no paresis;     PHYSICAL EXAM:    Vitals signs:  /68   Pulse 59   Temp 97.1 °F (36.2 °C)   Ht 6' (1.829 m)   Wt 241 lb (109.3 kg)   SpO2 98%   BMI 32.69 kg/m²    Pain scale:  Pain Score:   0 - No pain     CONSTITUTIONAL: Alert, appropriate, no acute distress,   EYES: Non icteric, EOM intact, pupils equal round and reactive to light and accommodation. ENT: Oral mucus membranes moist, no oral pharyngeal lesions. External inspection of ears and nose are normal.   NECK: Supple, no masses. No palpable thyroid mass    CHEST/LUNGS: CTA bilaterally, normal respiratory effort   CARDIOVASCULAR: RRR, no murmurs. No lower extremity edema   ABDOMEN: soft non-tender, active bowel sounds, no hepatosplenomegaly. No palpable masses. EXTREMITIES: warm, Full ROM of all fours extremities. No focal weakness. SKIN: warm, dry with no rashes or lesions  LYMPH: No cervical, clavicular, axillary, or inguinal lymphadenopathy  NEUROLOGIC: follows commands, non focal.   PSYCH: mood and affect appropriate. Alert and oriented to time and place and person. Relevant Lab findings/reviewed by me:  . Recent Labs     08/14/20  1110 07/15/20  1730   WBC 3.15* 4.3*   HGB 9.9* 10.1*   HCT 30.9* 30.2*   MCV 94.5* 93.5    164     Lab Results   Component Value Date    NEUTROABS 1.93 08/14/2020       Lab Results   Component Value Date     07/15/2020    K 3.8 07/15/2020     07/15/2020    CO2 26 07/15/2020    BUN 21 07/15/2020    CREATININE 1.4 (H) 07/15/2020    GLUCOSE 100 07/15/2020    CALCIUM 9.4 07/15/2020    PROT 6.9 07/15/2020    LABALBU 4.8 07/15/2020    BILITOT 0.5 07/15/2020    ALKPHOS 45 07/15/2020    AST 19 07/15/2020    ALT 13 07/15/2020    LABGLOM 49 (A) 07/15/2020    GFRAA >59 07/15/2020       Relevant Imaging studies/reviewed by me:  Ct Head Wo Contrast    Result Date: 7/15/2020  1. No hemorrhage, edema or mass effect.  No acute Standing Expiration Date:   8/14/2021    Folate     Standing Status:   Future     Standing Expiration Date:   8/14/2021    Reticulocytes     Standing Status:   Future     Standing Expiration Date:   8/14/2021    Haptoglobin     Standing Status:   Future     Standing Expiration Date:   8/14/2021    Vitamin B12     Standing Status:   Future     Standing Expiration Date:   8/14/2021    Blood Occult Stool Screen #1     Standing Status:   Future     Standing Expiration Date:   8/14/2021    Blood Occult Stool Screen #2     Standing Status:   Future     Standing Expiration Date:   8/14/2021    Blood Occult Stool Screen #3     Standing Status:   Future     Standing Expiration Date:   8/14/2021      Serina Reese was seen today for anemia. Diagnoses and all orders for this visit:    Anemia, unspecified type  -     Iron and TIBC; Future  -     Ferritin; Future  -     Folate; Future  -     Reticulocytes; Future  -     Haptoglobin; Future  -     Vitamin B12; Future  -     Blood Occult Stool Screen #1; Future  -     Blood Occult Stool Screen #2; Future  -     Blood Occult Stool Screen #3; Future    Healthcare maintenance    Sarcoidosis       Diagnosis   Mediastinal adenopathy and hypercalcemia, September 2018   Hypervitaminosis D   Sarcoidosis   CKD stage III   Normocytic anemia 2/2 CKD stage III/IV  Treatment summary  Followed with pulmonary at [facility]   Plaquenil and prednisone 5 mg daily   Anemia CKD- Hb>10, No need for Procrit  Mediastinal adenopathy c/w Sarcoidosis   Continue Prednisone 5mg PO daily and Plaquenil. Hypercalcemia- elevated 1,25 OH vitamin D. Calcium levels 14. at presentation. Calcium = 9.4  Normocytic anemia-likely anemia of chronic disease/CKD stage III   Recent Labs     08/14/20  1110 07/15/20  1730   WBC 3.15* 4.3*   HGB 9.9* 10.1*   HCT 30.9* 30.2*   MCV 94.5* 93.5    164     Work-up today. Iron profile, B12, folate, copper  LDH, haptoglobin  Occult blood cells.     EGD/colonoscopy August 2018- mild diverticulosis. EGD was unremarkable. Lymphopenic leukopenia-likely secondary to steroids. ALC 0.66  Neutrophils normal 1.93    CKD stage III- recent creatinine 1.5/GFR 49. Weight loss-iImproved. Health Maintenance: The patient is encouraged to follow-up with primary care regularly for further recommendations regarding age appropriated screening for cancer, well-being visit (preventative  measures), follow-up and treatment of other medical comorbidities. Colonoscopy 2018-mild diverticulosis. Plan:  Anemia work up today  Virtual visit in 1 month  Follow-up with pulmonary for sarcoidosis  Occult blood stools. Follow-up with PCP for other medical problems. I have seen, examined and reviewed this patient medication list, appropriate labs and imaging studies. I reviewed relevant medical records and others physicians notes. I discussed the plans of care with the patient. I answered all the questions to the patients satisfaction  (Please note that portions of this note were completed with a voice recognition program. Efforts were made to edit the dictations but occasionally words are mis-transcribed.)      Follow Up:     Return in about 1 month (around 9/14/2020) for an appointment with Dr. Jeny Horton virtual visit. Virtual visit with Dr. Khadijah Arboleda am scribing for Jimmy Hernandez MD. Electronically signed by Samuel Dodd on 8/14/2020 at 11:26 AM CDT. I have seen, examined and reviewed this patient medication list, appropriate labs and imaging studies. I reviewed relevant medical records and others physicians notes. I discussed the plans of care with the patient. I answered all the questions to the patients satisfaction. I have also reviewed the chief complaint (CC) and part of the history (History of Present Illness (HPI), Past Family Social History [facility]), or Review of Systems (ROS) and made changes when appropriated.        (Please note that portions of this note were completed with a voice recognition program. Efforts were made to edit the dictations but occasionally words are mis-transcribed.)  Over 50% of the total visit time of 25 minutes in face to face encounter with the patient, out of which more than 50% of the time was spent in counseling patient or family and coordination of care. Counseling included but was not limited to time spent reviewing labs, imaging studies/ treatment plan and answering questions.

## 2021-05-24 ENCOUNTER — APPOINTMENT (OUTPATIENT)
Dept: CT IMAGING | Age: 61
End: 2021-05-24
Attending: STUDENT IN AN ORGANIZED HEALTH CARE EDUCATION/TRAINING PROGRAM
Payer: MEDICAID

## 2021-05-24 ENCOUNTER — APPOINTMENT (OUTPATIENT)
Dept: GENERAL RADIOLOGY | Age: 61
End: 2021-05-24
Attending: STUDENT IN AN ORGANIZED HEALTH CARE EDUCATION/TRAINING PROGRAM
Payer: MEDICAID

## 2021-05-24 ENCOUNTER — APPOINTMENT (OUTPATIENT)
Dept: GENERAL RADIOLOGY | Age: 61
End: 2021-05-24
Attending: FAMILY MEDICINE
Payer: MEDICAID

## 2021-05-24 ENCOUNTER — HOSPITAL ENCOUNTER (OUTPATIENT)
Age: 61
Setting detail: OBSERVATION
Discharge: HOME HEALTH | End: 2021-05-26
Attending: STUDENT IN AN ORGANIZED HEALTH CARE EDUCATION/TRAINING PROGRAM | Admitting: FAMILY MEDICINE
Payer: MEDICAID

## 2021-05-24 DIAGNOSIS — R42 DIZZINESS: ICD-10-CM

## 2021-05-24 DIAGNOSIS — N39.0 URINARY TRACT INFECTION WITHOUT HEMATURIA, SITE UNSPECIFIED: Primary | ICD-10-CM

## 2021-05-24 DIAGNOSIS — S09.90XA INJURY OF HEAD, INITIAL ENCOUNTER: ICD-10-CM

## 2021-05-24 DIAGNOSIS — R29.6 FREQUENT FALLS: ICD-10-CM

## 2021-05-24 PROBLEM — R79.89 LFT ELEVATION: Status: ACTIVE | Noted: 2021-05-24

## 2021-05-24 LAB
ALBUMIN SERPL-MCNC: 4 G/DL (ref 3.5–5)
ALBUMIN/GLOB SERPL: 0.9 {RATIO} (ref 1.1–2.2)
ALP SERPL-CCNC: 78 U/L (ref 45–117)
ALT SERPL-CCNC: 36 U/L (ref 12–78)
ANION GAP SERPL CALC-SCNC: 7 MMOL/L (ref 5–15)
APPEARANCE UR: CLEAR
AST SERPL-CCNC: 54 U/L (ref 15–37)
ATRIAL RATE: 79 BPM
BACTERIA URNS QL MICRO: NEGATIVE /HPF
BASOPHILS # BLD: 0 K/UL (ref 0–0.1)
BASOPHILS NFR BLD: 0 % (ref 0–1)
BILIRUB SERPL-MCNC: 0.4 MG/DL (ref 0.2–1)
BILIRUB UR QL: NEGATIVE
BUN SERPL-MCNC: 15 MG/DL (ref 6–20)
BUN/CREAT SERPL: 11 (ref 12–20)
CALCIUM SERPL-MCNC: 9.7 MG/DL (ref 8.5–10.1)
CALCULATED P AXIS, ECG09: 53 DEGREES
CALCULATED R AXIS, ECG10: -103 DEGREES
CALCULATED T AXIS, ECG11: 59 DEGREES
CHLORIDE SERPL-SCNC: 100 MMOL/L (ref 97–108)
CO2 SERPL-SCNC: 27 MMOL/L (ref 21–32)
COLOR UR: ABNORMAL
COMMENT, HOLDF: NORMAL
COMMENT, HOLDF: NORMAL
CREAT SERPL-MCNC: 1.38 MG/DL (ref 0.55–1.02)
DIAGNOSIS, 93000: NORMAL
DIFFERENTIAL METHOD BLD: NORMAL
EOSINOPHIL # BLD: 0.1 K/UL (ref 0–0.4)
EOSINOPHIL NFR BLD: 2 % (ref 0–7)
EPITH CASTS URNS QL MICRO: ABNORMAL /LPF
ERYTHROCYTE [DISTWIDTH] IN BLOOD BY AUTOMATED COUNT: 14.2 % (ref 11.5–14.5)
GLOBULIN SER CALC-MCNC: 4.6 G/DL (ref 2–4)
GLUCOSE SERPL-MCNC: 100 MG/DL (ref 65–100)
GLUCOSE UR STRIP.AUTO-MCNC: NEGATIVE MG/DL
HCT VFR BLD AUTO: 42.4 % (ref 35–47)
HGB BLD-MCNC: 13.5 G/DL (ref 11.5–16)
HGB UR QL STRIP: NEGATIVE
HYALINE CASTS URNS QL MICRO: ABNORMAL /LPF (ref 0–5)
IMM GRANULOCYTES # BLD AUTO: 0 K/UL (ref 0–0.04)
IMM GRANULOCYTES NFR BLD AUTO: 0 % (ref 0–0.5)
KETONES UR QL STRIP.AUTO: NEGATIVE MG/DL
LEUKOCYTE ESTERASE UR QL STRIP.AUTO: ABNORMAL
LYMPHOCYTES # BLD: 1.9 K/UL (ref 0.8–3.5)
LYMPHOCYTES NFR BLD: 37 % (ref 12–49)
MAGNESIUM SERPL-MCNC: 2.4 MG/DL (ref 1.6–2.4)
MCH RBC QN AUTO: 29.2 PG (ref 26–34)
MCHC RBC AUTO-ENTMCNC: 31.8 G/DL (ref 30–36.5)
MCV RBC AUTO: 91.6 FL (ref 80–99)
MONOCYTES # BLD: 0.3 K/UL (ref 0–1)
MONOCYTES NFR BLD: 5 % (ref 5–13)
NEUTS SEG # BLD: 2.9 K/UL (ref 1.8–8)
NEUTS SEG NFR BLD: 56 % (ref 32–75)
NITRITE UR QL STRIP.AUTO: NEGATIVE
NRBC # BLD: 0 K/UL (ref 0–0.01)
NRBC BLD-RTO: 0 PER 100 WBC
P-R INTERVAL, ECG05: 144 MS
PH UR STRIP: 6 [PH] (ref 5–8)
PLATELET # BLD AUTO: 277 K/UL (ref 150–400)
PMV BLD AUTO: 10.2 FL (ref 8.9–12.9)
POTASSIUM SERPL-SCNC: 4.9 MMOL/L (ref 3.5–5.1)
PROT SERPL-MCNC: 8.6 G/DL (ref 6.4–8.2)
PROT UR STRIP-MCNC: NEGATIVE MG/DL
Q-T INTERVAL, ECG07: 370 MS
QRS DURATION, ECG06: 84 MS
QTC CALCULATION (BEZET), ECG08: 424 MS
RBC # BLD AUTO: 4.63 M/UL (ref 3.8–5.2)
RBC #/AREA URNS HPF: ABNORMAL /HPF (ref 0–5)
SAMPLES BEING HELD,HOLD: NORMAL
SAMPLES BEING HELD,HOLD: NORMAL
SODIUM SERPL-SCNC: 134 MMOL/L (ref 136–145)
SP GR UR REFRACTOMETRY: 1.01 (ref 1–1.03)
TROPONIN I SERPL-MCNC: <0.05 NG/ML
UROBILINOGEN UR QL STRIP.AUTO: 0.2 EU/DL (ref 0.2–1)
VENTRICULAR RATE, ECG03: 79 BPM
WBC # BLD AUTO: 5.2 K/UL (ref 3.6–11)
WBC URNS QL MICRO: ABNORMAL /HPF (ref 0–4)

## 2021-05-24 PROCEDURE — 74011000258 HC RX REV CODE- 258: Performed by: STUDENT IN AN ORGANIZED HEALTH CARE EDUCATION/TRAINING PROGRAM

## 2021-05-24 PROCEDURE — 74011250636 HC RX REV CODE- 250/636: Performed by: STUDENT IN AN ORGANIZED HEALTH CARE EDUCATION/TRAINING PROGRAM

## 2021-05-24 PROCEDURE — 85025 COMPLETE CBC W/AUTO DIFF WBC: CPT

## 2021-05-24 PROCEDURE — 80053 COMPREHEN METABOLIC PANEL: CPT

## 2021-05-24 PROCEDURE — 99218 HC RM OBSERVATION: CPT

## 2021-05-24 PROCEDURE — 84484 ASSAY OF TROPONIN QUANT: CPT

## 2021-05-24 PROCEDURE — 72170 X-RAY EXAM OF PELVIS: CPT

## 2021-05-24 PROCEDURE — 99285 EMERGENCY DEPT VISIT HI MDM: CPT

## 2021-05-24 PROCEDURE — 73562 X-RAY EXAM OF KNEE 3: CPT

## 2021-05-24 PROCEDURE — 87086 URINE CULTURE/COLONY COUNT: CPT

## 2021-05-24 PROCEDURE — 96365 THER/PROPH/DIAG IV INF INIT: CPT

## 2021-05-24 PROCEDURE — 96361 HYDRATE IV INFUSION ADD-ON: CPT

## 2021-05-24 PROCEDURE — 36415 COLL VENOUS BLD VENIPUNCTURE: CPT

## 2021-05-24 PROCEDURE — 81001 URINALYSIS AUTO W/SCOPE: CPT

## 2021-05-24 PROCEDURE — 93005 ELECTROCARDIOGRAM TRACING: CPT

## 2021-05-24 PROCEDURE — 74011250637 HC RX REV CODE- 250/637: Performed by: FAMILY MEDICINE

## 2021-05-24 PROCEDURE — 72125 CT NECK SPINE W/O DYE: CPT

## 2021-05-24 PROCEDURE — 72100 X-RAY EXAM L-S SPINE 2/3 VWS: CPT

## 2021-05-24 PROCEDURE — 70450 CT HEAD/BRAIN W/O DYE: CPT

## 2021-05-24 PROCEDURE — 83735 ASSAY OF MAGNESIUM: CPT

## 2021-05-24 RX ORDER — LEVOTHYROXINE SODIUM 100 UG/1
100 TABLET ORAL
Status: DISCONTINUED | OUTPATIENT
Start: 2021-05-25 | End: 2021-05-26 | Stop reason: HOSPADM

## 2021-05-24 RX ORDER — SERTRALINE HYDROCHLORIDE 50 MG/1
200 TABLET, FILM COATED ORAL EVERY EVENING
Status: DISCONTINUED | OUTPATIENT
Start: 2021-05-25 | End: 2021-05-26

## 2021-05-24 RX ORDER — HALOPERIDOL 5 MG/1
5 TABLET ORAL 2 TIMES DAILY
Status: DISCONTINUED | OUTPATIENT
Start: 2021-05-25 | End: 2021-05-26 | Stop reason: HOSPADM

## 2021-05-24 RX ORDER — ALBUTEROL SULFATE 90 UG/1
2 AEROSOL, METERED RESPIRATORY (INHALATION)
Status: DISCONTINUED | OUTPATIENT
Start: 2021-05-24 | End: 2021-05-24 | Stop reason: CLARIF

## 2021-05-24 RX ORDER — MELATONIN
1000 DAILY
Status: DISCONTINUED | OUTPATIENT
Start: 2021-05-25 | End: 2021-05-26 | Stop reason: HOSPADM

## 2021-05-24 RX ORDER — SODIUM CHLORIDE 0.9 % (FLUSH) 0.9 %
5-40 SYRINGE (ML) INJECTION EVERY 8 HOURS
Status: DISCONTINUED | OUTPATIENT
Start: 2021-05-24 | End: 2021-05-26 | Stop reason: HOSPADM

## 2021-05-24 RX ORDER — SODIUM CHLORIDE 9 MG/ML
1000 INJECTION, SOLUTION INTRAVENOUS ONCE
Status: COMPLETED | OUTPATIENT
Start: 2021-05-24 | End: 2021-05-24

## 2021-05-24 RX ORDER — SODIUM CHLORIDE 0.9 % (FLUSH) 0.9 %
5-40 SYRINGE (ML) INJECTION AS NEEDED
Status: DISCONTINUED | OUTPATIENT
Start: 2021-05-24 | End: 2021-05-26 | Stop reason: HOSPADM

## 2021-05-24 RX ORDER — ASPIRIN 325 MG
325 TABLET ORAL DAILY
Status: DISCONTINUED | OUTPATIENT
Start: 2021-05-25 | End: 2021-05-26 | Stop reason: HOSPADM

## 2021-05-24 RX ORDER — POLYETHYLENE GLYCOL 3350 17 G/17G
17 POWDER, FOR SOLUTION ORAL DAILY PRN
Status: DISCONTINUED | OUTPATIENT
Start: 2021-05-24 | End: 2021-05-26 | Stop reason: HOSPADM

## 2021-05-24 RX ORDER — THERA TABS 400 MCG
1 TAB ORAL DAILY
Status: DISCONTINUED | OUTPATIENT
Start: 2021-05-25 | End: 2021-05-26 | Stop reason: HOSPADM

## 2021-05-24 RX ORDER — BUSPIRONE HYDROCHLORIDE 10 MG/1
10 TABLET ORAL 2 TIMES DAILY
Status: DISCONTINUED | OUTPATIENT
Start: 2021-05-25 | End: 2021-05-26 | Stop reason: HOSPADM

## 2021-05-24 RX ORDER — ALBUTEROL SULFATE 0.83 MG/ML
2.5 SOLUTION RESPIRATORY (INHALATION)
Status: DISCONTINUED | OUTPATIENT
Start: 2021-05-24 | End: 2021-05-26 | Stop reason: HOSPADM

## 2021-05-24 RX ORDER — PRAVASTATIN SODIUM 40 MG/1
40 TABLET ORAL
Status: DISCONTINUED | OUTPATIENT
Start: 2021-05-24 | End: 2021-05-26 | Stop reason: HOSPADM

## 2021-05-24 RX ADMIN — PRAVASTATIN SODIUM 40 MG: 40 TABLET ORAL at 23:23

## 2021-05-24 RX ADMIN — CEFTRIAXONE SODIUM 1 G: 1 INJECTION, POWDER, FOR SOLUTION INTRAMUSCULAR; INTRAVENOUS at 20:16

## 2021-05-24 RX ADMIN — SODIUM CHLORIDE 1000 ML: 9 INJECTION, SOLUTION INTRAVENOUS at 20:15

## 2021-05-24 NOTE — ED TRIAGE NOTES
Pt c/o dizziness x 2 weeks, pt fell yesterday, denies headache, denies fever or chills, denies n/v, pt c/o left knee and lower back pain, +hit her head, denies loc , some neck tightness

## 2021-05-25 ENCOUNTER — APPOINTMENT (OUTPATIENT)
Dept: MRI IMAGING | Age: 61
End: 2021-05-25
Attending: PSYCHIATRY & NEUROLOGY
Payer: MEDICAID

## 2021-05-25 ENCOUNTER — APPOINTMENT (OUTPATIENT)
Dept: MRI IMAGING | Age: 61
End: 2021-05-25
Attending: INTERNAL MEDICINE
Payer: MEDICAID

## 2021-05-25 ENCOUNTER — APPOINTMENT (OUTPATIENT)
Dept: GENERAL RADIOLOGY | Age: 61
End: 2021-05-25
Attending: FAMILY MEDICINE
Payer: MEDICAID

## 2021-05-25 LAB
ALBUMIN SERPL-MCNC: 3.6 G/DL (ref 3.5–5)
ALBUMIN/GLOB SERPL: 0.9 {RATIO} (ref 1.1–2.2)
ALP SERPL-CCNC: 76 U/L (ref 45–117)
ALT SERPL-CCNC: 42 U/L (ref 12–78)
ANION GAP SERPL CALC-SCNC: 7 MMOL/L (ref 5–15)
APTT PPP: 21.5 SEC (ref 22.1–31)
AST SERPL-CCNC: 66 U/L (ref 15–37)
BACTERIA SPEC CULT: NORMAL
BASOPHILS # BLD: 0 K/UL (ref 0–0.1)
BASOPHILS NFR BLD: 1 % (ref 0–1)
BILIRUB SERPL-MCNC: 0.2 MG/DL (ref 0.2–1)
BUN SERPL-MCNC: 12 MG/DL (ref 6–20)
BUN/CREAT SERPL: 10 (ref 12–20)
CALCIUM SERPL-MCNC: 8.6 MG/DL (ref 8.5–10.1)
CHLORIDE SERPL-SCNC: 105 MMOL/L (ref 97–108)
CO2 SERPL-SCNC: 25 MMOL/L (ref 21–32)
COMMENT, HOLDF: NORMAL
CREAT SERPL-MCNC: 1.2 MG/DL (ref 0.55–1.02)
DIFFERENTIAL METHOD BLD: NORMAL
EOSINOPHIL # BLD: 0.1 K/UL (ref 0–0.4)
EOSINOPHIL NFR BLD: 2 % (ref 0–7)
ERYTHROCYTE [DISTWIDTH] IN BLOOD BY AUTOMATED COUNT: 14 % (ref 11.5–14.5)
GLOBULIN SER CALC-MCNC: 4 G/DL (ref 2–4)
GLUCOSE SERPL-MCNC: 122 MG/DL (ref 65–100)
HCT VFR BLD AUTO: 40.9 % (ref 35–47)
HGB BLD-MCNC: 12.9 G/DL (ref 11.5–16)
IMM GRANULOCYTES # BLD AUTO: 0 K/UL (ref 0–0.04)
IMM GRANULOCYTES NFR BLD AUTO: 0 % (ref 0–0.5)
INR PPP: 1 (ref 0.9–1.1)
LACTATE SERPL-SCNC: 1.9 MMOL/L (ref 0.4–2)
LYMPHOCYTES # BLD: 2.1 K/UL (ref 0.8–3.5)
LYMPHOCYTES NFR BLD: 37 % (ref 12–49)
MAGNESIUM SERPL-MCNC: 2.3 MG/DL (ref 1.6–2.4)
MCH RBC QN AUTO: 29.2 PG (ref 26–34)
MCHC RBC AUTO-ENTMCNC: 31.5 G/DL (ref 30–36.5)
MCV RBC AUTO: 92.5 FL (ref 80–99)
MONOCYTES # BLD: 0.4 K/UL (ref 0–1)
MONOCYTES NFR BLD: 6 % (ref 5–13)
NEUTS SEG # BLD: 3.1 K/UL (ref 1.8–8)
NEUTS SEG NFR BLD: 54 % (ref 32–75)
NRBC # BLD: 0 K/UL (ref 0–0.01)
NRBC BLD-RTO: 0 PER 100 WBC
PLATELET # BLD AUTO: 187 K/UL (ref 150–400)
PMV BLD AUTO: 10.7 FL (ref 8.9–12.9)
POTASSIUM SERPL-SCNC: 3.7 MMOL/L (ref 3.5–5.1)
PROT SERPL-MCNC: 7.6 G/DL (ref 6.4–8.2)
PROTHROMBIN TIME: 10.5 SEC (ref 9–11.1)
RBC # BLD AUTO: 4.42 M/UL (ref 3.8–5.2)
SAMPLES BEING HELD,HOLD: NORMAL
SERVICE CMNT-IMP: NORMAL
SODIUM SERPL-SCNC: 137 MMOL/L (ref 136–145)
THERAPEUTIC RANGE,PTTT: ABNORMAL SECS (ref 58–77)
WBC # BLD AUTO: 5.7 K/UL (ref 3.6–11)

## 2021-05-25 PROCEDURE — 85730 THROMBOPLASTIN TIME PARTIAL: CPT

## 2021-05-25 PROCEDURE — 70544 MR ANGIOGRAPHY HEAD W/O DYE: CPT

## 2021-05-25 PROCEDURE — 99218 HC RM OBSERVATION: CPT

## 2021-05-25 PROCEDURE — 36415 COLL VENOUS BLD VENIPUNCTURE: CPT

## 2021-05-25 PROCEDURE — 96376 TX/PRO/DX INJ SAME DRUG ADON: CPT

## 2021-05-25 PROCEDURE — 97165 OT EVAL LOW COMPLEX 30 MIN: CPT

## 2021-05-25 PROCEDURE — 99204 OFFICE O/P NEW MOD 45 MIN: CPT | Performed by: PSYCHIATRY & NEUROLOGY

## 2021-05-25 PROCEDURE — 97116 GAIT TRAINING THERAPY: CPT

## 2021-05-25 PROCEDURE — 85610 PROTHROMBIN TIME: CPT

## 2021-05-25 PROCEDURE — 83735 ASSAY OF MAGNESIUM: CPT

## 2021-05-25 PROCEDURE — 74011000258 HC RX REV CODE- 258: Performed by: FAMILY MEDICINE

## 2021-05-25 PROCEDURE — 74011250636 HC RX REV CODE- 250/636: Performed by: FAMILY MEDICINE

## 2021-05-25 PROCEDURE — 74011250637 HC RX REV CODE- 250/637: Performed by: FAMILY MEDICINE

## 2021-05-25 PROCEDURE — 83605 ASSAY OF LACTIC ACID: CPT

## 2021-05-25 PROCEDURE — 80053 COMPREHEN METABOLIC PANEL: CPT

## 2021-05-25 PROCEDURE — 70551 MRI BRAIN STEM W/O DYE: CPT

## 2021-05-25 PROCEDURE — 97161 PT EVAL LOW COMPLEX 20 MIN: CPT

## 2021-05-25 PROCEDURE — 73060 X-RAY EXAM OF HUMERUS: CPT

## 2021-05-25 PROCEDURE — 97535 SELF CARE MNGMENT TRAINING: CPT

## 2021-05-25 PROCEDURE — 85025 COMPLETE CBC W/AUTO DIFF WBC: CPT

## 2021-05-25 RX ORDER — HALOPERIDOL 10 MG/1
10 TABLET ORAL 2 TIMES DAILY
COMMUNITY

## 2021-05-25 RX ORDER — ACETAMINOPHEN 325 MG/1
650 TABLET ORAL
Status: DISCONTINUED | OUTPATIENT
Start: 2021-05-25 | End: 2021-05-26 | Stop reason: HOSPADM

## 2021-05-25 RX ORDER — VENLAFAXINE HYDROCHLORIDE 225 MG/1
225 TABLET, EXTENDED RELEASE ORAL DAILY
COMMUNITY

## 2021-05-25 RX ORDER — MIRTAZAPINE 30 MG/1
30 TABLET, FILM COATED ORAL
COMMUNITY

## 2021-05-25 RX ORDER — BUSPIRONE HYDROCHLORIDE 10 MG/1
10 TABLET ORAL 2 TIMES DAILY
COMMUNITY

## 2021-05-25 RX ORDER — OMEGA-3-ACID ETHYL ESTERS 1 G/1
2 CAPSULE, LIQUID FILLED ORAL 2 TIMES DAILY WITH MEALS
COMMUNITY

## 2021-05-25 RX ORDER — BENZTROPINE MESYLATE 1 MG/1
1 TABLET ORAL 2 TIMES DAILY
COMMUNITY

## 2021-05-25 RX ORDER — MECLIZINE HCL 12.5 MG 12.5 MG/1
12.5 TABLET ORAL
Status: DISCONTINUED | OUTPATIENT
Start: 2021-05-25 | End: 2021-05-26 | Stop reason: HOSPADM

## 2021-05-25 RX ADMIN — ASPIRIN 325 MG ORAL TABLET 325 MG: 325 PILL ORAL at 10:46

## 2021-05-25 RX ADMIN — LEVOTHYROXINE SODIUM 100 MCG: 0.1 TABLET ORAL at 07:30

## 2021-05-25 RX ADMIN — ACETAMINOPHEN 650 MG: 325 TABLET ORAL at 11:12

## 2021-05-25 RX ADMIN — ACETAMINOPHEN 650 MG: 325 TABLET ORAL at 03:23

## 2021-05-25 RX ADMIN — Medication 10 ML: at 00:39

## 2021-05-25 RX ADMIN — Medication 10 ML: at 21:42

## 2021-05-25 RX ADMIN — Medication 1000 UNITS: at 10:46

## 2021-05-25 RX ADMIN — THERA TABS 1 TABLET: TAB at 10:49

## 2021-05-25 RX ADMIN — CEFTRIAXONE SODIUM 1 G: 1 INJECTION, POWDER, FOR SOLUTION INTRAMUSCULAR; INTRAVENOUS at 20:43

## 2021-05-25 RX ADMIN — PRAVASTATIN SODIUM 40 MG: 40 TABLET ORAL at 21:42

## 2021-05-25 NOTE — ED PROVIDER NOTES
Patient is a 56-year-old female history of asthma, thyroid disease presenting today secondary to multiple falls. She reports that she is fallen 6 times in the past several days secondary to dizziness. No loss of consciousness but she has sustained multiple injuries throughout these falls. She states that she has hit her head and has a headache, she has pain in her left knee, pain of the back and neck pain. No medications taken prior to arrival.  Denies any focal weakness or numbness. She is not on any blood thinners.            Past Medical History:   Diagnosis Date    Asthma 1967    hospitalized for asthma attack x 2    Chronic kidney disease     kidney stones x 2-3 times    Ill-defined condition     nasal blockage from growth and misalignment - cannot breath out of nose - surgery in the future/cleared for colonoscopy 7/31/2014 - will bring in letter    Other ill-defined conditions(799.89)     blood in stool    Other ill-defined conditions(799.89)     hx low BP - 90's/60's-70's    PUD (peptic ulcer disease)     Thyroid disease     Unspecified adverse effect of anesthesia     nasal growth and misalignment and cannot breath through nose       Past Surgical History:   Procedure Laterality Date    HX HEENT      T & A    HX HEENT      turbinate surgery         Family History:   Problem Relation Age of Onset    Stroke Mother     Other Mother         erosion of esophagus    Cancer Mother         melanoma/squamous    Heart Surgery Father         x2    Delayed Awakening Father     Pacemaker Father     Other Father         carotid endartarectomy/polio    Cataract Father        Social History     Socioeconomic History    Marital status: SINGLE     Spouse name: Not on file    Number of children: Not on file    Years of education: Not on file    Highest education level: Not on file   Occupational History    Not on file   Tobacco Use    Smoking status: Never Smoker    Smokeless tobacco: Never Used Substance and Sexual Activity    Alcohol use: No    Drug use: No    Sexual activity: Not Currently   Other Topics Concern     Service Not Asked    Blood Transfusions Not Asked    Caffeine Concern Not Asked    Occupational Exposure Not Asked    Hobby Hazards Not Asked    Sleep Concern Not Asked    Stress Concern Not Asked    Weight Concern Not Asked    Special Diet Not Asked    Back Care Not Asked    Exercise Not Asked    Bike Helmet Not Asked   [address] Not Asked    Self-Exams Not Asked   Social History Narrative    Not on file     Social Determinants of Health     Financial Resource Strain:     Difficulty of Paying Living Expenses:    Food Insecurity:     Worried About Running Out of Food in the Last Year:     [address] in the Last Year:    Transportation Needs:     Lack of Transportation (Medical):  Lack of Transportation (Non-Medical):    Physical Activity:     Days of Exercise per Week:     Minutes of Exercise per Session:    Stress:     Feeling of Stress :    Social Connections:     Frequency of Communication with Friends and Family:     Frequency of Social Gatherings with Friends and Family:     Attends [employer] Services:     Active Member of Clubs or Organizations:     Attends Club or Organization Meetings:     Marital Status:    Intimate Partner Violence:     Fear of Current or Ex-Partner:     Emotionally Abused:     Physically Abused:     Sexually Abused: ALLERGIES: Other food, Astepro [azelastine], Bactrim [sulfamethoprim], Banana, Coconut, Cortisone, Nut - unspecified, Peanut, and Prednisone    Review of Systems   Constitutional: Negative for chills and fever. HENT: Negative for congestion and rhinorrhea. Eyes: Negative for redness and visual disturbance. Respiratory: Negative for cough and shortness of breath. Cardiovascular: Negative for chest pain and leg swelling.    Gastrointestinal: Negative for abdominal pain, diarrhea, nausea and vomiting. Genitourinary: Negative for dysuria, flank pain, frequency, hematuria and urgency. Musculoskeletal: Positive for arthralgias, back pain and neck pain. Negative for myalgias. Skin: Negative for rash and wound. Allergic/Immunologic: Negative for immunocompromised state. Neurological: Positive for dizziness and headaches. Vitals:    05/24/21 1458 05/24/21 1724 05/24/21 1900 05/24/21 1930   BP: 102/70 114/81 114/81 112/85   Pulse: 75 69     Resp: 16      Temp: 97 °F (36.1 °C)      SpO2: 98% 96% 94% 97%            Physical Exam  Vitals and nursing note reviewed. Constitutional:       General: She is not in acute distress. Appearance: She is well-developed. She is not diaphoretic. HENT:      Head: Normocephalic. Comments: No cephalohematoma  No jones sign or raccoon eyes  No hemotympanum  Midface is stable  No epistaxis/nasal septal hematoma  No dental malocclusion       Mouth/Throat:      Pharynx: No oropharyngeal exudate. Eyes:      General:         Right eye: No discharge. Left eye: No discharge. Pupils: Pupils are equal, round, and reactive to light. Cardiovascular:      Rate and Rhythm: Normal rate and regular rhythm. Heart sounds: Normal heart sounds. No murmur heard. No friction rub. No gallop. Pulmonary:      Effort: Pulmonary effort is normal. No respiratory distress. Breath sounds: Normal breath sounds. No stridor. No wheezing or rales. Abdominal:      General: Bowel sounds are normal. There is no distension. Palpations: Abdomen is soft. Tenderness: There is no abdominal tenderness. There is no guarding or rebound. Musculoskeletal:         General: No deformity. Normal range of motion. Cervical back: Normal range of motion and neck supple.       Comments: Diffuse bilateral C-spine paraspinal tenderness  No thoracic tenderness  Left-sided paraspinal and midline lumbar tenderness  No chest wall tenderness, no crepitus, no flail segment  Upper and lower extremities fully ranged, visualized, and palpated without tenderness or deformity except for bruising and tenderness to the left knee  No snuff box tenderness or pain with axial loading of the thumb. The hips are non-tender with bilateral compression  Pelvis is stable  Ambulating without difficulty     Skin:     General: Skin is warm and dry. Capillary Refill: Capillary refill takes less than 2 seconds. Findings: No rash. Neurological:      General: No focal deficit present. Mental Status: She is alert and oriented to person, place, and time. Cranial Nerves: No cranial nerve deficit. Psychiatric:         Mood and Affect: Mood normal.         Behavior: Behavior normal.       Course:  Ceftriaxone IV given, IV fluids given    Perfect Serve Consult for Admission  8:38 PM    ED Room Number: ER18/18  Patient Name and age:  Soledad Nieves 56 y.o.  female  Working Diagnosis:   1. Urinary tract infection without hematuria, site unspecified    2. Dizziness    3. Frequent falls    4. Injury of head, initial encounter        COVID-19 Suspicion:  no  Sepsis present:  no  Reassessment needed: no  Code Status:  Full Code  Readmission: no  Isolation Requirements:  no  Recommended Level of Care:  telemetry  Department:[facility] Adult ED - 21   Other:  56 y.o. female UTI, 6 falls this week from dizziness. Workup other than UA neg. Getting ivf and ctx. MDM:  67-year-old female presenting today with multiple falls related to dizziness of the past several days. Injuries as above without any findings on imaging. CT head also negative. Lab work shows slightly elevated creatinine with a UTI. IV fluids and ceftriaxone given. She is neurologically intact at this time low suspicion for acute stroke. Will require admission for further management and work-up. Clinical Impression:     ICD-10-CM ICD-9-CM    1.  Urinary tract infection without hematuria, site unspecified  N39.0 599.0    2. Dizziness  R42 780.4    3. Frequent falls  R29.6 V15.88    4.  Injury of head, initial encounter  S09.90XA 959.01            Disposition: [facility], DO

## 2021-05-25 NOTE — H&P
History & Physical    Date of admission: 5/24/2021    Patient name: Starr Mcqueen  MRN: [id number]  YOB: 1960  Age: 56 y.o. Primary care provider:  Shirin, MD [place]     Source of Information: patient, ED, and medical records                                 Chief complaint:  dizziness    History of present illness  Starr Mcqueen is a 56 y.o. female with past medical history of asthma, CKD, PUD, anxiety, depression, hypothyroidism presented to the ED from home with chief complaint of dizziness, falls, and head injury. Patient reportedly had dizziness for the past 2 weeks, with recurrent falls since yesterday, hitting her head during at least one of the falls. She complained on neck pain described as stiffness. On arrival in the ED, workup included CT head and C c spine which were unremarkable. Lumbar spine xray showed mild spondylosis at T11 - T 12.  UA showed UTI. Patient was given Ceftriaxone 1 gram IV x 1 dose. Patient is now seen for admission to the hospitalist service. There were no reports of syncope, loss of consciousness, visual disturbance, numbness, paresthesias, focal weakness, chest pain, palpitations, SOB, cough, abdominal pain, nausea, vomiting, diarrhea, calf pain, increased leg swelling/ edema, fever, chills, rash, or known COVID 19 exposure.     Past Medical History:   Diagnosis Date    Asthma 1967    hospitalized for asthma attack x 2    Chronic kidney disease     kidney stones x 2-3 times    Ill-defined condition     nasal blockage from growth and misalignment - cannot breath out of nose - surgery in the future/cleared for colonoscopy 7/31/2014 - will bring in letter    Other ill-defined conditions(799.89)     blood in stool    Other ill-defined conditions(799.89)     hx low BP - 90's/60's-70's    PUD (peptic ulcer disease)     Thyroid disease     Unspecified adverse effect of anesthesia nasal growth and misalignment and cannot breath through nose      Past Surgical History:   Procedure Laterality Date    HX HEENT      T & A    HX HEENT      turbinate surgery     Prior to Admission medications    Medication Sig Start Date End Date Taking? Authorizing Provider   albuterol (PROVENTIL HFA, VENTOLIN HFA, PROAIR HFA) 90 mcg/actuation inhaler Take 2 Puffs by inhalation every six (6) hours as needed for Wheezing. 11/9/18   Moustapha Stevens MD   aspirin (ASPIRIN) 325 mg tablet Take 1 Tab by mouth daily. 11/10/18   Moustapha Stevens MD   beclomethasone (QVAR) 40 mcg/actuation aero Take 2 Puffs by inhalation two (2) times a day. 11/9/18   Moustapha Stevens MD   busPIRone (BUSPAR) 15 mg tablet Take 1 Tab by mouth two (2) times a day. 11/9/18   Moustapha Stevens MD   cholecalciferol (VITAMIN D3) 1,000 unit tablet Take 1 Tab by mouth daily. 11/10/18   Moustapha Stevens MD   levothyroxine (SYNTHROID) 100 mcg tablet Take 1 Tab by mouth Daily (before breakfast). 11/10/18   Moustapha Stevens MD   sertraline (ZOLOFT) 100 mg tablet Take 2 Tabs by mouth every evening. 11/9/18   Moustapha Stevens MD   therapeutic multivitamin (THERA) tablet Take 1 Tab by mouth daily. 11/10/18   Moustapha Stevens MD   haloperidol (HALDOL) 5 mg tablet Take one tab po q am and 2 tabs at bedtime 11/9/18   Moustapha Stevens MD   pravastatin (PRAVACHOL) 40 mg tablet Take 1 Tab by mouth nightly. 11/9/18   Moustapha Stevens MD   cyanocobalamin, vitamin B-12, (VITAMIN B-12) 5,000 mcg subl 5,000 mcg by SubLINGual route daily. Provider, Historical     Allergies   Allergen Reactions    Other Food Other (comments)     \"Bad chest pain\" with walnuts. Coke - asthma/stuffy head. Fish sticks causes an asthma attack.  Astepro [Azelastine] Other (comments)     Violent headaches.  Bactrim [Sulfamethoprim] Other (comments)     Difficulty breathing, chills, fever.  Banana Other (comments)     Diffculty breathing, wheezing, asthma attack.     Coconut Other (comments)     Difficulty breathing, asthma attack, SOB.  Cortisone Hives     Difficulty breathing.  Nut - Unspecified Other (comments)     \"Bad chest pain\" with walnuts    Peanut Other (comments)     Wheezing, asthma attack, SOB.  Prednisone Hives     Difficulty breathing, kidney stones. Family History   Problem Relation Age of Onset    Stroke Mother     Other Mother         erosion of esophagus    Cancer Mother         melanoma/squamous    Heart Surgery Father         x2    Delayed Awakening Father    Georgianna Olszewski Pacemaker Father     Other Father         carotid endartarectomy/polio    Cataract Father            Social history  Patient resides    Independently      With family care      Assisted living      SNF    Ambulates    Independently      With cane       Assisted walker         Alcohol history     None     Social     Chronic   Smoking history    None     Former smoker     Current smoker     Social History     Tobacco Use   Smoking Status Never Smoker   Smokeless Tobacco Never Used       Code status  x  Full code            Code status discussed with the patient. Review of systems  Pertinent positives as noted in HPI. All other systems were reviewed and were negative    Physical Examination   Visit Vitals  /85   Pulse 69   Temp 97 °F (36.1 °C)   Resp 16   SpO2 97%          O2 Device: None (Room air)    General:  Patient in no acute respiratory distress   Head:  Normocephalic, without obvious abnormality, with mild tenderness of posterior occiput   Eyes:  Conjunctivae/corneas clear. PERRL, EOMs intact   E/N/M/T: Nares normal. Septum midline.  No nasal drainage or sinus tenderness  Tongue midline/ non-edematous  Clear oropharynx   Neck: Normal appearance and movements, symmetrical, trachea midline  No palpable adenopathy  No thyroid enlargement, tenderness or nodules  No carotid bruit   No JVD   Lungs:   Symmetrical chest expansion and respiratory effort  Clear to auscultation bilaterally   Chest wall:  No tenderness or deformity   Heart:  Regular rate and rhythm   Sounds normal; no murmur, click, rub or gallop   Abdomen:   Soft, no tenderness  No rebound, guarding, or rigidity  Non-distended  Bowel sounds normal  No masses or hepatosplenomegaly  No hernias present   Back: No CVA tenderness   Extremities: Left knee superficial skin abrasion with some mild tenderness  No cyanosis or edema     Pulses 2+ radial/ 1+ DP bilateral pulses   Skin: No rashes or ulcers   Musculo-      skeletal: Gait not tested     Neuro: GCS 15. Moves all extremities x 4. No slurred speech. No facial droop. Sensation grossly intact.   No pronator drift   Psych: Alert, oriented x3           Data Review         24 Hour Results:  Recent Results (from the past 24 hour(s))   EKG, 12 LEAD, INITIAL    Collection Time: 05/24/21  3:08 PM   Result Value Ref Range    Ventricular Rate 79 BPM    Atrial Rate 79 BPM    P-R Interval 144 ms    QRS Duration 84 ms    Q-T Interval 370 ms    QTC Calculation (Bezet) 424 ms    Calculated P Axis 53 degrees    Calculated R Axis -103 degrees    Calculated T Axis 59 degrees    Diagnosis       Normal sinus rhythm  When compared with ECG of 17-JUL-2018 06:19,  Nonspecific T wave abnormality, worse in Inferior leads  Nonspecific T wave abnormality now evident in Anterolateral leads  Confirmed by Tawana Remy M.D., Earnstine Crawley ([id number]) on 5/24/2021 3:58:28 PM     CBC WITH AUTOMATED DIFF    Collection Time: 05/24/21  3:13 PM   Result Value Ref Range    WBC 5.2 3.6 - 11.0 K/uL    RBC 4.63 3.80 - 5.20 M/uL    HGB 13.5 11.5 - 16.0 g/dL    HCT 42.4 35.0 - 47.0 %    MCV 91.6 80.0 - 99.0 FL    MCH 29.2 26.0 - 34.0 PG    MCHC 31.8 30.0 - 36.5 g/dL    RDW 14.2 11.5 - 14.5 %    PLATELET 476 077 - 484 K/uL    MPV 10.2 8.9 - 12.9 FL    NRBC 0.0 0  WBC    ABSOLUTE NRBC 0.00 0.00 - 0.01 K/uL    NEUTROPHILS 56 32 - 75 %    LYMPHOCYTES 37 12 - 49 %    MONOCYTES 5 5 - 13 %    EOSINOPHILS 2 0 - 7 % BASOPHILS 0 0 - 1 %    IMMATURE GRANULOCYTES 0 0.0 - 0.5 %    ABS. NEUTROPHILS 2.9 1.8 - 8.0 K/UL    ABS. LYMPHOCYTES 1.9 0.8 - 3.5 K/UL    ABS. MONOCYTES 0.3 0.0 - 1.0 K/UL    ABS. EOSINOPHILS 0.1 0.0 - 0.4 K/UL    ABS. BASOPHILS 0.0 0.0 - 0.1 K/UL    ABS. IMM. GRANS. 0.0 0.00 - 0.04 K/UL    DF AUTOMATED     METABOLIC PANEL, COMPREHENSIVE    Collection Time: 05/24/21  3:13 PM   Result Value Ref Range    Sodium 134 (L) 136 - 145 mmol/L    Potassium 4.9 3.5 - 5.1 mmol/L    Chloride 100 97 - 108 mmol/L    CO2 27 21 - 32 mmol/L    Anion gap 7 5 - 15 mmol/L    Glucose 100 65 - 100 mg/dL    BUN 15 6 - 20 MG/DL    Creatinine 1.38 (H) 0.55 - 1.02 MG/DL    BUN/Creatinine ratio 11 (L) 12 - 20      GFR est AA 47 (L) >60 ml/min/1.73m2    GFR est non-AA 39 (L) >60 ml/min/1.73m2    Calcium 9.7 8.5 - 10.1 MG/DL    Bilirubin, total 0.4 0.2 - 1.0 MG/DL    ALT (SGPT) 36 12 - 78 U/L    AST (SGOT) 54 (H) 15 - 37 U/L    Alk. phosphatase 78 45 - 117 U/L    Protein, total 8.6 (H) 6.4 - 8.2 g/dL    Albumin 4.0 3.5 - 5.0 g/dL    Globulin 4.6 (H) 2.0 - 4.0 g/dL    A-G Ratio 0.9 (L) 1.1 - 2.2     SAMPLES BEING HELD    Collection Time: 05/24/21  3:13 PM   Result Value Ref Range    SAMPLES BEING HELD 1red,1blu     COMMENT        Add-on orders for these samples will be processed based on acceptable specimen integrity and analyte stability, which may vary by analyte.    TROPONIN I    Collection Time: 05/24/21  3:13 PM   Result Value Ref Range    Troponin-I, Qt. <0.05 <0.05 ng/mL   MAGNESIUM    Collection Time: 05/24/21  3:13 PM   Result Value Ref Range    Magnesium 2.4 1.6 - 2.4 mg/dL   URINALYSIS W/ RFLX MICROSCOPIC    Collection Time: 05/24/21  5:52 PM   Result Value Ref Range    Color YELLOW/STRAW      Appearance CLEAR CLEAR      Specific gravity 1.011 1.003 - 1.030      pH (UA) 6.0 5.0 - 8.0      Protein Negative NEG mg/dL    Glucose Negative NEG mg/dL    Ketone Negative NEG mg/dL    Bilirubin Negative NEG      Blood Negative NEG Urobilinogen 0.2 0.2 - 1.0 EU/dL    Nitrites Negative NEG      Leukocyte Esterase MODERATE (A) NEG      WBC 20-50 0 - 4 /hpf    RBC 0-5 0 - 5 /hpf    Epithelial cells FEW FEW /lpf    Bacteria Negative NEG /hpf    Hyaline cast 0-2 0 - 5 /lpf   SAMPLES BEING HELD    Collection Time: 05/24/21  8:22 PM   Result Value Ref Range    SAMPLES BEING HELD 1UC     COMMENT        Add-on orders for these samples will be processed based on acceptable specimen integrity and analyte stability, which may vary by analyte. Recent Labs     05/24/21  1513   WBC 5.2   HGB 13.5   HCT 42.4        Recent Labs     05/24/21  1513   *   K 4.9      CO2 27      BUN 15   CREA 1.38*   CA 9.7   MG 2.4   ALB 4.0   TBILI 0.4   ALT 36       Imaging  CT HEAD WO CONT     INDICATION: fall with head injury     COMPARISON: 7/17/2018.     CONTRAST: None.     TECHNIQUE: Unenhanced CT of the head was performed using 5 mm images. Brain and  bone windows were generated. Coronal and sagittal reformats. CT dose reduction  was achieved through use of a standardized protocol tailored for this  examination and automatic exposure control for dose modulation.       FINDINGS:  The ventricles and sulci are normal in size, shape and configuration. . There is  no significant white matter disease. There is no intracranial hemorrhage,  extra-axial collection, or mass effect. The basilar cisterns are open. No CT  evidence of acute infarct.     The bone windows demonstrate no abnormalities. The visualized portions of the  paranasal sinuses and mastoid air cells are clear.     IMPRESSION  No acute abnormality. CT CERVICAL SPINE WITHOUT CONTRAST     INDICATION: fall with neck pain.     COMPARISON: None.     CONTRAST:  None.     TECHNIQUE: Multislice helical CT of the cervical spine was performed without  intravenous contrast administration. Sagittal and coronal reformats were  generated.   CT dose reduction was achieved through use of a standardized  protocol tailored for this examination and automatic exposure control for dose  modulation.      FINDINGS:     The alignment is within normal limits. There is mild loss of height in C5 and C6  that is likely degenerative. No acute fracture or subluxation. The odontoid  process is intact. The craniocervical junction is within normal limits.     The incidentally imaged soft tissues are within normal limits.     C2-C3: There is no spinal canal or neural foraminal stenosis.     C3-C4: There is no spinal canal or neural foraminal stenosis.     C4-C5: Mild disc space narrowing. There is no spinal canal or neural foraminal  stenosis.     C5-C6: Mild disc space narrowing. There is severe right and moderate left neural  foraminal narrowing.     C6-C7: Mild disc space narrowing. Mild right neural foraminal narrowing. .     C7-T1: There is no spinal canal or neural foraminal stenosis.     IMPRESSION  No acute abnormality. XR SPINE LUMB 2 OR 3 V     INDICATION: fall with low back pain     COMPARISON: None.     FINDINGS: AP, lateral and spot lateral views of the lumbar spine.      The bones are osteopenic. Alignment is anatomic. Mild spondylosis is present at  T11-12. Vertebral body heights are maintained. No acute fracture or subluxation.     IMPRESSION  Mild spondylosis at T11-12      XR KNEE LT 3 V     INDICATION: fall with L knee pain.     COMPARISON: None.     FINDINGS: Three views of the left knee demonstrate no fracture or other acute  osseous or articular abnormality. There is no effusion.     IMPRESSION  No acute abnormality       CR pelvis AP only     INDICATION: Pelvis pain after fall     COMPARISON: Lumbar spine radiographs 5/24/2021.     TECHNIQUE: Portable AP supine pelvis view at 2237 hours     FINDINGS: There is no acute fracture or dislocation. The sacroiliac and hip  joint spaces are maintained.  The soft tissues are unremarkable.     IMPRESSION  No acute abnormality.        Assessment and Plan 1.  Dizziness        - intractable with h/o frequent falls, definite recurrent head injury       - place observation status       - admit to neuro/ stroke floor       - place on neuro checks and fall precautions       - consult neurologist        - consider for MRI brain       - rule out vertebrobasilar insufficiency (VBI)    2.  UTI (urinary tract infection)       - Ceftriaxone 1 gram IV q 24 hours       - check urine culture and adjust antibiotics as needed per results    3. Frequent falls       - plan as above       - consult PT    4. LFT elevation       - repeat CMP in a.m.    5.  Elevated creatinine       - repeat renal panel    6. Head injury       - s/p fall with tenderness but no wound noted on exam       - plan as noted       - ambulate with assistance    7. Left knee pain       - with wound/ skin abrasion of left knee       - skin / wound cares    8.   VTE prophylaxis       - SCDs to BLEs    ADVANCED DIRECTIVE/ CODE STATUS:  FULL CODE as per patient's request       Signed by: Nupur Palma MD    May 24, 2021 at 10:26 PM

## 2021-05-25 NOTE — ED NOTES
Verbal shift change report given to DOROTHY Dela Cruz (oncoming nurse) by Lola Elder RN (offgoing nurse). Report included the following information SBAR, ED Summary, MAR and Recent Results.

## 2021-05-25 NOTE — PROGRESS NOTES
RN receive call from [facility] ICT team following patient.  They will be faxing an updated medication list. Fidel iktchen CM assigned to patient, phone number for [facility] team is [phone]

## 2021-05-25 NOTE — PROGRESS NOTES
Occupational Therapy  05/25/21    Orders received, chart reviewed and patient evaluated by occupational therapy. Pending progression with skilled acute occupational therapy, recommend:  No skilled occupational therapy/ follow up rehabilitation needs identified at this time. Recommend with nursing ADLs with supervision/setup, OOB to chair 3x/day and toileting via functional mobility to and from bathroom withSPV. Thank you for completing as able in order to maintain patient strength, endurance and independence. Full evaluation to follow.     Thank you,  Raenelle Hammans, OTD, OTR/L

## 2021-05-25 NOTE — PROGRESS NOTES
Transition of Care Plan   RUR- N/A- under Observation Status    DISPOSITION: home with home health; pending medical progression         - referral made to [facility] via all scripts, order uploaded/faxed referral at [phone] F/U with PCP/Specialist     Transport: Father- Ok [place] [phone]     Reason for Admission: Patient stated, \" I fell couple of times\"                       RUR Score:  N/A- under Observation Status                   Plan for utilizing home health:  Home health recommended         PCP: First and Last name:  Dr. Lauro Membreno MD    Name of Practice: Primary Care at [facility] [employer]    Are you a current patient: Yes/No: YES    Approximate date of last visit: Last Friday    Can you participate in a virtual visit with your PCP: YES                     Current Advanced Directive/Advance Care Plan: Full Code; Pt does not have an AMD. Pt would not like further information at this time. Healthcare Decision Maker:   Click here to complete [employer] including selection of the Healthcare Decision Maker Relationship (ie \"Primary\")           Ileana Schmidt [phone]                  Transition of Care Plan: home with home health; pending medical progression                     Reviewed chart for transitions of care, and discussed in rounds. CM met with patient at bedside to explain role and offer support. Patient is alert and oriented x4, and confirmed demographics. Baseline: ambulates freely; states \"I need help sometimes to walk\"   ADLs/IDALS: independent   Previous Home Health: none   Previous SNF/IPR: none   ER Contact: Ileana Schmidt [phone]    Patient lives in a one-level house with no steps to enter. Patient is independent with ADLs/IDALs. Patient ambulates freely, however, states he needs help sometimes.  Patient's preferred pharmacy is [facility] located at [facility]. Patient's father is expected to transport at discharge. Observation notice provided in writing to patient and/or caregiver as well as verbal explanation of the policy. Patients who are in outpatient status also receive the Observation notice. Patient has received notice and or patient representative has received via secure email, fax, or certified mail based on patient representative's preference. Care Management Interventions  PCP Verified by CM: Yes (last Friday )  Last Visit to PCP: 05/21/21  Palliative Care Criteria Met (RRAT>21 & CHF Dx)?: No  Mode of Transport at Discharge:  Other (see comment) (father )  Transition of Care Consult (CM Consult): Discharge Planning  MyChart Signup: No  Discharge Durable Medical Equipment: No  Health Maintenance Reviewed: Yes  Physical Therapy Consult: Yes  Occupational Therapy Consult: Yes  Speech Therapy Consult: Yes  Current Support Network: [place] (lives with father )  Confirm Follow Up Transport: Family  The Patient and/or Patient Representative was Provided with a Choice of Provider and Agrees with the Discharge Plan?: Yes  Freedom of Choice List was Provided with Basic Dialogue that Supports the Patient's Individualized Plan of Care/Goals, Treatment Preferences and Shares the Quality Data Associated with the Providers?: Yes  The [employer] Information Provided?: No  Discharge Location  Discharge Placement: Home with home health    KALIE Bates

## 2021-05-25 NOTE — PROGRESS NOTES
Problem: Self Care Deficits Care Plan (Adult)  Goal: *Acute Goals and Plan of Care (Insert Text)  Description:   FUNCTIONAL STATUS PRIOR TO ADMISSION: Patient was independent and active without use of DME, was independent for basic and instrumental ADLs. She lives with her father who she assists Alcira smith\" with ADLs and mobility. HOME SUPPORT: The patient lived with her father who she assists. Occupational Therapy Goals  Initiated 5/25/2021  1. Patient will perform grooming at the sink with Mod I within 7 day(s). 2.  Patient will perform bathing with supervision/set-up within 7 day(s). 3.  Patient will perform upper and lower body dressing with Mod I within 7 day(s). 4.  Patient will perform toilet transfers with Mod I within 7 day(s). 5.  Patient will perform all aspects of toileting with Mod I within 7 day(s). 6.  Patient will participate in upper extremity therapeutic exercise/activities with independence for 10 minutes within 7 day(s). 7.  Patient will gather 5/5 ADL items at varying heights with supervision/set-up within 7 day(s).              5/25/2021 1213 by Bernadette Mackey OT  Outcome: Not Met     OCCUPATIONAL THERAPY EVALUATION  Patient: Bev Graham (63 y.o. female)  Date: 5/25/2021  Primary Diagnosis: Dizziness [R42]  UTI (urinary tract infection) [N39.0]  Frequent falls [R29.6]  Head injury [S09.90XA]  LFT elevation [R79.89]        Precautions: Fall       ASSESSMENT  Based on the objective data described below, the patient presents with decreased balance, activity tolerance, safety awareness, cognition (processing, judgment, problem solving), BUE proximal ROM, and strength s/p admission for dizziness, frequent falls at home, + for UTI. At baseline, she is highly IND with no AD use, lives with her father who she provides physical assist for. She now presents just slightly below this baseline, SBA-CGA for ADLs and bathroom mobility with min cues for safety awareness.  Returned to the chair, educated on increasing OOB mobility for ADLs engagement with nursing, patient acknowledging understanding. Recommend d/c home with [facility]. Current Level of Function Impacting Discharge (ADLs/self-care): SBA-CGA for ADLs and mobility    Functional Outcome Measure: The patient scored Total: 55/100 on the Barthel Index outcome measure which is indicative of 45% impaired ability to care for basic self needs/dependency on others; inferred at least 30% dependency on others for instrumental ADLs. Other factors to consider for discharge: fall risk, PMH, high PLOF     Patient will benefit from skilled therapy intervention to address the above noted impairments. PLAN :  Recommendations and Planned Interventions: self care training, functional mobility training, therapeutic exercise, balance training, therapeutic activities, cognitive retraining, endurance activities, patient education, home safety training, and family training/education    Frequency/Duration: Patient will be followed by occupational therapy 3 times a week to address goals. Recommendation for discharge: (in order for the patient to meet his/her long term goals)  Occupational therapy at least 2 days/week in the home     This discharge recommendation:  Has been made in collaboration with the attending provider and/or case management    IF patient discharges home will need the following DME: To be determined (TBD), may benefit from shower chair pending progress       SUBJECTIVE:   Patient stated Well I help my dad alot.     OBJECTIVE DATA SUMMARY:   HISTORY:   Past Medical History:   Diagnosis Date    Asthma 1967    hospitalized for asthma attack x 2    Chronic kidney disease     kidney stones x 2-3 times    Ill-defined condition     nasal blockage from growth and misalignment - cannot breath out of nose - surgery in the future/cleared for colonoscopy 7/31/2014 - will bring in letter    Other ill-defined conditions(473.03)     blood in stool    Other ill-defined conditions(799.89)     hx low BP - 90's/60's-70's    PUD (peptic ulcer disease)     Thyroid disease     Unspecified adverse effect of anesthesia     nasal growth and misalignment and cannot breath through nose     Past Surgical History:   Procedure Laterality Date    HX HEENT      T & A    HX HEENT      turbinate surgery       Expanded or extensive additional review of patient history:     Home Situation  Home Environment: Private residence  # Steps to Enter: 10 (2x5)  Rails to Enter: Yes  [employer] : Right  One/Two Story Residence: One story  Living Alone: No  Support Systems: Family member(s)  Current DME Used/Available at Home: Grab bars, Walker, rolling  Tub or Shower Type: Tub/Shower combination    Hand dominance: Left    EXAMINATION OF PERFORMANCE DEFICITS:  Cognitive/Behavioral Status:  Neurologic State: Alert  Orientation Level: Oriented X4  Cognition: Decreased attention/concentration; Follows commands  Perception: Appears intact  Perseveration: No perseveration noted  Safety/Judgement: Awareness of environment;Decreased awareness of need for safety    Skin: Appears intact    Edema: None    Hearing:       Vision/Perceptual:    Tracking: Able to track stimulus in all quadrants w/o difficulty                 Diplopia: No    Acuity: Within Defined Limits    Corrective Lenses: Glasses    Range of Motion:  AROM: Generally decreased, functional                         Strength:  Strength: Generally decreased, functional                Coordination:  Coordination: Generally decreased, functional  Fine Motor Skills-Upper: Left Intact; Right Intact    Gross Motor Skills-Upper: Left Impaired;Right Impaired (decr at shoulder (R>L))    Tone & Sensation:    Tone: Normal  Sensation: Intact                      Balance:  Sitting: Intact  Standing: Impaired; Without support  Standing - Static: Good  Standing - Dynamic : Good;Fair    Functional Mobility and Transfers for ADLs:  Bed Mobility:  Supine to Sit: Supervision  Scooting: Supervision    Transfers:  Sit to Stand: Stand-by assistance  Stand to Sit: Stand-by assistance  Bathroom Mobility: Stand-by assistance  Toilet Transfer : Stand-by assistance (min cues for grab bar use)  Tub Transfer: Contact guard assistance (simulated, decr coordination & safety)    ADL Assessment:  Feeding: Setup    Oral Facial Hygiene/Grooming: Stand-by assistance    Bathing: Contact guard assistance    Upper Body Dressing: Setup    Lower Body Dressing: Contact guard assistance    Toileting: Contact guard assistance                ADL Intervention and task modifications:       Grooming  Grooming Assistance: Stand-by assistance  Position Performed: Standing (at sink)  Washing Hands: Stand-by assistance  Cues: Verbal cues provided                   Lower Body Dressing Assistance  Dressing Assistance: Contact guard assistance  Pants With Elastic Waist: Contact guard assistance  Socks: Stand-by assistance  Leg Crossed Method Used: Yes  Position Performed: Seated edge of bed;Standing  Cues: Tactile cues provided;Verbal cues provided;Visual cues provided    Toileting  Toileting Assistance: Contact guard assistance  Bladder Hygiene: Supervision  Clothing Management: Contact guard assistance (CGA for line mgmt)  Cues: Tactile cues provided;Verbal cues provided;Visual cues provided  Adaptive Equipment: Grab bars    Cognitive Retraining  Safety/Judgement: Awareness of environment;Decreased awareness of need for safety    Functional Measure:  Barthel Index:    Bathin  Bladder: 10  Bowels: 10  Groomin  Dressin  Feeding: 10  Mobility: 0  Stairs: 0  Toilet Use: 5  Transfer (Bed to Chair and Back): 10  Total: 55/100        The Barthel ADL Index: Guidelines  1. The index should be used as a record of what a patient does, not as a record of what a patient could do.   2. The main aim is to establish degree of independence from any help, physical or verbal, however minor and for whatever reason. 3. The need for supervision renders the patient not independent. 4. A patient's performance should be established using the best available evidence. Asking the patient, friends/relatives and nurses are the usual sources, but direct observation and common sense are also important. However direct testing is not needed. 5. Usually the patient's performance over the preceding 24-48 hours is important, but occasionally longer periods will be relevant. 6. Middle categories imply that the patient supplies over 50 per cent of the effort. 7. Use of aids to be independent is allowed. Classie Serum., Barthel, D.W. (2355). Functional evaluation: the Barthel Index. [address] [facility] (14)2. Steven Hamilton tim ZURI Appiah, Ankit Vargas., Raoul Fox., Saul, [address] (1999). Measuring the change indisability after inpatient rehabilitation; comparison of the responsiveness of the Barthel Index and Functional [place] Measure. Journal of Neurology, Neurosurgery, and Psychiatry, 66(4), 382-970. Carter Sandhoff, N.J.A, VELMA Ayala, & Roge Hannah MELIZABETH. (2004.) Assessment of post-stroke quality of life in cost-effectiveness studies: The usefulness of the Barthel Index and the EuroQoL-5D.  Quality of Life Research, 15, 402-48         Occupational Therapy Evaluation Charge Determination   History Examination Decision-Making   LOW Complexity : Brief history review  LOW Complexity : 1-3 performance deficits relating to physical, cognitive , or psychosocial skils that result in activity limitations and / or participation restrictions  LOW Complexity : No comorbidities that affect functional and no verbal or physical assistance needed to complete eval tasks       Based on the above components, the patient evaluation is determined to be of the following complexity level: LOW   Pain Rating:  None reported    Activity Tolerance:   Good    After treatment patient left in no apparent distress:    Sitting in chair, Call bell within reach, and Bed / chair alarm activated    COMMUNICATION/EDUCATION:   The patients plan of care was discussed with: Physical therapist and Registered nurse. Home safety education was provided and the patient/caregiver indicated understanding., Patient/family have participated as able in goal setting and plan of care. , and Patient/family agree to work toward stated goals and plan of care. This patients plan of care is appropriate for delegation to [facility].     Thank you for this referral.  XU Sal, OTR/L  Time Calculation: 28 mins

## 2021-05-25 NOTE — PROGRESS NOTES
Problem: Mobility Impaired (Adult and Pediatric)  Goal: *Acute Goals and Plan of Care (Insert Text)  Description: FUNCTIONAL STATUS PRIOR TO ADMISSION: Pt was independent with mobility and ADLs. She lives with her father and is his caregiver. Physical Therapy Goals  Initiated 5/25/2021  1. Patient will move from supine to sit and sit to supine , scoot up and down, and roll side to side in bed with modified independence within 7 day(s). 2.  Patient will transfer from bed to chair and chair to bed with modified independence using the least restrictive device within 7 day(s). 3.  Patient will perform sit to stand with modified independence within 7 day(s). 4.  Patient will ambulate with modified independence for 150 feet with the least restrictive device within 7 day(s). 5.  Patient will ascend/descend 10 stairs with single handrail(s) with modified independence within 7 day(s). Outcome: Not Met  PHYSICAL THERAPY EVALUATION  Patient: Nadia Curtis (4 y.o. female)  Date: 5/25/2021  Primary Diagnosis: Dizziness [R42]  UTI (urinary tract infection) [N39.0]  Frequent falls [R29.6]  Head injury [S09.90XA]  LFT elevation [R79.89]        Precautions: Fall       ASSESSMENT  Based on the objective data described below, the patient presents with impaired cognition (problem solving, memory, insight into deficits), blurry vision (reports over the last week), R impaired peripheral vision, impaired speech (intermittent slurred/garbled speech observed, pt reported difficulty word finding), impaired balance, unsteady gait, and overall decline in functional mobility s/p admission for dizziness - imaging negative at the time of this evaluation. 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 She reports 6 falls in the last week which she attributes to dizziness.           This date, Pt with difficulty describing symptoms. She was SBA for transfers, CGA for ambulation, and Lonnie for navigating stairs. During ambulation, pt with path deviation toward the R and with difficulty avoiding obstacles and managing her environment. During stair training, pt with poor sequencing (crossing legs over each other and attempting to hold vertical stair supports rather than the bannister - required max verbal and tactile cuing to sequence safely. Pt repeatedly reported she was fearful of the stairs but unable to clarify. During ambulation, pt unable to locate her room despite max verbal cuing and reported she has had \"memory issues for a while. \"  No reports of dizziness throughout mobility session. Did note SBP dropped from 105 to 96 when transferring sit>stand. Current Level of Function Impacting Discharge (mobility/balance): Lonnie for stairs     Functional Outcome Measure: The patient scored 70/100 on the Barthel outcome measure which is indicative of 30% impairment and dependence on others for basic mobility and self care tasks. Other factors to consider for discharge: impaired cognition, 10 stairs to enter home, caregiver for father, hx many falls     Patient will benefit from skilled therapy intervention to address the above noted impairments. PLAN :  Recommendations and Planned Interventions: bed mobility training, transfer training, gait training, therapeutic exercises, neuromuscular re-education, patient and family training/education, and therapeutic activities      Frequency/Duration: Patient will be followed by physical therapy:  3 times a week to address goals.     Recommendation for discharge: (in order for the patient to meet his/her long term goals)  Physical therapy at least 2 days/week in the home     This discharge recommendation:  Has not yet been discussed the attending provider and/or case management    IF patient discharges home will need the following DME: none         SUBJECTIVE:   Patient stated Can you give me something for my infected leg.  referring to small scrape on her knee that appears to be healing fine    OBJECTIVE DATA SUMMARY:   HISTORY:    Past Medical History:   Diagnosis Date    Asthma 1967    hospitalized for asthma attack x 2    Chronic kidney disease     kidney stones x 2-3 times    Ill-defined condition     nasal blockage from growth and misalignment - cannot breath out of nose - surgery in the future/cleared for colonoscopy 7/31/2014 - will bring in letter    Other ill-defined conditions(799.89)     blood in stool    Other ill-defined conditions(799.89)     hx low BP - 90's/60's-70's    PUD (peptic ulcer disease)     Thyroid disease     Unspecified adverse effect of anesthesia     nasal growth and misalignment and cannot breath through nose     Past Surgical History:   Procedure Laterality Date    HX HEENT      T & A    HX HEENT      turbinate surgery       Home Situation  Home Environment: Private residence  # Steps to Enter: 10 (2x5)  Rails to Enter: Yes  Hand Rails : Right  One/Two Story Residence: One story  Living Alone: No  Support Systems: Family member(s)  Current DME Used/Available at Home: Grab bars, Walker, rolling  Tub or Shower Type: Tub/Shower combination    EXAMINATION/PRESENTATION/DECISION MAKING:   Critical Behavior:  Neurologic State: Alert  Orientation Level: Oriented X4  Cognition: Decreased attention/concentration, Follows commands  Safety/Judgement: Awareness of environment, Decreased awareness of need for safety    Range Of Motion:  AROM: Generally decreased, functional                       Strength:    Strength: Generally decreased, functional                    Tone & Sensation:   Tone: Normal              Sensation: Intact               Coordination:  Coordination: Generally decreased, functional  Vision:   Tracking: Able to track stimulus in all quadrants w/o difficulty  Diplopia: No  Acuity: Within Defined Limits  Corrective Lenses: Glasses  Functional Mobility:  Bed Mobility:     Supine to Sit: Supervision     Scooting: Supervision  Transfers:  Sit to Stand: Stand-by assistance  Stand to Sit: Stand-by assistance                       Balance:   Sitting: Intact  Standing: Impaired; Without support  Standing - Static: Good  Standing - Dynamic : Fair  Ambulation/Gait Training:  Distance (ft): 150 Feet (ft) (x2 reps)  Assistive Device: Gait belt  Ambulation - Level of Assistance: Contact guard assistance        Gait Abnormalities: Trunk sway increased; Path deviations (deviation toward R)        Base of Support: Shift to right;Center of gravity altered                              Stairs:  Number of Stairs Trained: 8 (x2 reps)  Stairs - Level of Assistance: Minimum assistance   Rail Use: Right     Functional Measure:  Barthel Index:    Bathin  Bladder: 10  Bowels: 10  Groomin  Dressin  Feeding: 10  Mobility: 10  Stairs: 5  Toilet Use: 5  Transfer (Bed to Chair and Back): 10  Total: 70/100       The Barthel ADL Index: Guidelines  1. The index should be used as a record of what a patient does, not as a record of what a patient could do. 2. The main aim is to establish degree of independence from any help, physical or verbal, however minor and for whatever reason. 3. The need for supervision renders the patient not independent. 4. A patient's performance should be established using the best available evidence. Asking the patient, friends/relatives and nurses are the usual sources, but direct observation and common sense are also important. However direct testing is not needed. 5. Usually the patient's performance over the preceding 24-48 hours is important, but occasionally longer periods will be relevant. 6. Middle categories imply that the patient supplies over 50 per cent of the effort. 7. Use of aids to be independent is allowed. Manjinder Ann., Barthel, D.W. (6045). Functional evaluation: the Barthel Index. [address] [facility] (14)2.   Sreedhar dumont [facility], KORY JONES. Nelson Altamirano., Ed Benton., Disha Mcbride (1999). Measuring the change indisability after inpatient rehabilitation; comparison of the responsiveness of the Barthel Index and Functional [place] Measure. Journal of Neurology, Neurosurgery, and Psychiatry, 66(4), 481-872. BRYAN Walker, VELMA Ayala, & [place] M.A. (2004.) Assessment of post-stroke quality of life in cost-effectiveness studies: The usefulness of the Barthel Index and the EuroQoL-5D. Quality of Life Research, 15, 907-61           Physical Therapy Evaluation Charge Determination   History Examination Presentation Decision-Making   HIGH Complexity :3+ comorbidities / personal factors will impact the outcome/ POC  HIGH Complexity : 4+ Standardized tests and measures addressing body structure, function, activity limitation and / or participation in recreation  LOW Complexity : Stable, uncomplicated  Other outcome measures Barthel  MEDIUM      Based on the above components, the patient evaluation is determined to be of the following complexity level: LOW     Activity Tolerance:   Good and Fair    After treatment patient left in no apparent distress:   Sitting in chair, Call bell within reach, and Bed / chair alarm activated    COMMUNICATION/EDUCATION:   The patients plan of care was discussed with: Occupational therapist and Registered nurse. Fall prevention education was provided and the patient/caregiver indicated understanding., Patient/family have participated as able in goal setting and plan of care. , and Patient/family agree to work toward stated goals and plan of care.     Thank you for this referral.  Joanne Gunn, PT, DPT   Time Calculation: 29 mins

## 2021-05-25 NOTE — ROUTINE PROCESS
TRANSFER - OUT REPORT: 
 
Verbal report given to Jose Tanner RN(name) on Ayana Rojas  being transferred to 6s(unit) for routine progression of care Report consisted of patients Situation, Background, Assessment and  
Recommendations(SBAR). Information from the following report(s) SBAR, ED Summary, [facility] and Recent Results was reviewed with the receiving nurse. Lines:  
Peripheral IV 05/24/21 Right Antecubital (Active) Site Assessment Clean, dry, & intact 05/24/21 1553 Phlebitis Assessment 0 05/24/21 1553 Infiltration Assessment 0 05/24/21 1553 Dressing Status Clean, dry, & intact 05/24/21 1553 Dressing Type Transparent 05/24/21 1553 Hub Color/Line Status Patent; Flushed;Capped;Pink 05/24/21 1553 Action Taken Blood drawn 05/24/21 1553 Opportunity for questions and clarification was provided. Patient transported with: 
 [employer]

## 2021-05-25 NOTE — PROGRESS NOTES
Admission Medication Reconciliation:    Information obtained from:  Patient, LeslyeAlex 81:  YES    Comments/Recommendations: Spoke with patient via secure phone call to patient's room due to general Covid-19 precautions. Discussed use of PTA medications including prescription/OTC, vitamins/supplements, inhaled, topical, nasal, injectable, otic and ophthalmic medications. Patient was a fair historian. Updated PTA meds/reviewed patient's allergies. 1)  Of note, patient seemed a little out of it/somewhat unsure of her medications. Patient reports she didn't think she still takes venlafaxine or haldol, however both were recently filled in 4/8 with other psych medications (per RxQuery review) at [facility]. Both have been filled regularly in past several months. Addendum: Received current medication list faxed in from [employer] - Medication repackaging record, dated may 2021. Verified current medication list. Med list confirmed that patient is taking haldol 10 mg BID and venlafaxine  mg daily. 2)  Medication changes (since last review): Added  - Benztropine  - Mirtazapine  - Lovaza  - Venlafaxine ER    Adjusted  - Buspirone (from 15 mg to 10 mg BID)  - Haloperidol (from 5 mg QAM + 10 mg QHS to 10 mg BID)    Removed  - Vitamin B12   - Albuterol HFA inhaler  - Qvar inhaler  - Vitamin D3  - Sertraline  - Multivitamin     ¹[employer] pharmacy benefit data reflects medications filled and processed through the patient's insurance, however   this data does NOT capture whether the medication was picked up or is currently being taken by the patient. Allergies:   Other food, Astepro [azelastine], Bactrim [sulfamethoprim], Banana, Coconut, Cortisone, Nut - unspecified, Peanut, and Prednisone    Significant PMH/Disease States:   Past Medical History:   Diagnosis Date    Asthma 1967    hospitalized for asthma attack x 2    Chronic kidney disease     kidney stones x 2-3 times    Ill-defined condition     nasal blockage from growth and misalignment - cannot breath out of nose - surgery in the future/cleared for colonoscopy 7/31/2014 - will bring in letter    Other ill-defined conditions(799.89)     blood in stool    Other ill-defined conditions(799.89)     hx low BP - 90's/60's-70's    PUD (peptic ulcer disease)     Thyroid disease     Unspecified adverse effect of anesthesia     nasal growth and misalignment and cannot breath through nose     Chief Complaint for this Admission:    Chief Complaint   Patient presents with   Karen Camara     Prior to Admission Medications:   Prior to Admission Medications   Prescriptions Last Dose Informant Taking? Venlafaxine-ER 24 HR (EFFEXOR-ER) 225 mg tablet Unknown at Unknown time  No   Sig: Take 225 mg by mouth daily. aspirin (ASPIRIN) 325 mg tablet   Yes   Sig: Take 1 Tab by mouth daily. benztropine (COGENTIN) 1 mg tablet   Yes   Sig: Take 1 mg by mouth two (2) times a day. busPIRone (BUSPAR) 10 mg tablet   Yes   Sig: Take 10 mg by mouth two (2) times a day.   haloperidol (HALDOL) 5 mg tablet Unknown at Unknown time  No   Sig: Take one tab po q am and 2 tabs at bedtime   levothyroxine (SYNTHROID) 100 mcg tablet   Yes   Sig: Take 1 Tab by mouth Daily (before breakfast). mirtazapine (REMERON) 30 mg tablet   Yes   Sig: Take 30 mg by mouth nightly. omega-3 acid ethyl esters (Lovaza) 1 gram capsule   Yes   Sig: Take 2 g by mouth two (2) times daily (with meals). pravastatin (PRAVACHOL) 40 mg tablet   Yes   Sig: Take 1 Tab by mouth nightly. Facility-Administered Medications: None     Please contact the main inpatient pharmacy with any questions or concerns at [phone] and we will direct you to the clinical pharmacist covering this patient's care while in-house.    KITTY Iverson

## 2021-05-25 NOTE — PROGRESS NOTES
[address] [facility]  Hospitalist Group                                                                                          Hospitalist Progress Note  Salina Bradley MD  Answering service: [phone] OR [phone] from in house phone        Date of Service:  2021  NAME:  Spenser Murphy  :  1960  MRN:  [id number]      Admission Summary:   Pt admitted for dizziness associated with white spots in both eyes. She was diagnosed with a UTI on admission, currently on IV abx. CVA work up and neurology consultation pending. Interval history / Subjective:     Pat seen and examined. She is NAD. Complains of dizziness at rest.  No cp, sob, palpitations, n/v/d. Assessment & Plan:     1. Dizziness       -presenting with dizziness at rest/positional changes. Has had hx of frequent falls. -CT head neg        - will check orthostatic vitals           - check MRI, further cva work up pending MRI result       - add meclizine prn      - cont' ASA/statin       - PT/OT/neurology consultation            2. UTI (urinary tract infection)       - Ceftriaxone 1 gram IV q 24 hours       - f/u with ucx     3. Frequent falls       - plan as above       - consult PT/OT     4. LFT elevation       - repeat CMP in a.m.     5.  Elevated creatinine       - repeat renal panel     6. Head injury       - s/p fall with tenderness but no wound noted on exam       - plan as noted       - ambulate with assistance     7.   Left knee pain       - with wound/ skin abrasion of left knee       - skin / wound cares       Code status: Full   DVT prophylaxis:SCD    Care Plan discussed with: Patient/Family  Anticipated Disposition: Home w/Family  Anticipated Discharge: 24 hours to 48 hours     Hospital Problems  Never Reviewed        Codes Class Noted POA    Dizziness ICD-10-CM: R42  ICD-9-CM: 780.4  2021 Unknown        UTI (urinary tract infection) ICD-10-CM: N39.0  ICD-9-CM: 599.0  2021 Unknown        Head injury ICD-10-CM: S09. 90XA  ICD-9-CM: 959.01  5/24/2021 Unknown        LFT elevation ICD-10-CM: R79.89  ICD-9-CM: 790.6  5/24/2021 Unknown        Frequent falls ICD-10-CM: R29.6  ICD-9-CM: V15.88  5/24/2021 Unknown                Review of Systems:   Eyes: positive for seeing white spots in both eyes       Vital Signs:    Last 24hrs VS reviewed since prior progress note. Most recent are:  Visit Vitals  BP 99/78   Pulse 67   Temp 98.4 °F (36.9 °C)   Resp 16   Ht 5' 3\" (1.6 m)   Wt 77.3 kg (170 lb 6.7 oz)   SpO2 96%   BMI 30.19 kg/m²       No intake or output data in the 24 hours ending 05/25/21 1642     Physical Examination:     I had a face to face encounter with this patient and independently examined them on 5/25/2021 as outlined below:          Constitutional:  No acute distress, cooperative, pleasant    ENT:  Oral mucosa moist, oropharynx benign. Resp:  CTA bilaterally. No wheezing/rhonchi/rales. No accessory muscle use   CV:  Regular rhythm, normal rate, no murmurs, gallops, rubs    GI:  Soft, non distended, non tender. normoactive bowel sounds, no hepatosplenomegaly     Musculoskeletal:  No edema, warm, 2+ pulses throughout    Neurologic:  Moves all extremities. AAOx3, CN II-XII reviewed            Data Review:    Review and/or order of clinical lab test      Labs:     Recent Labs     05/25/21 0217 05/24/21  1513   WBC 5.7 5.2   HGB 12.9 13.5   HCT 40.9 42.4    277     Recent Labs     05/25/21 0217 05/24/21  1513    134*   K 3.7 4.9    100   CO2 25 27   BUN 12 15   CREA 1.20* 1.38*   * 100   CA 8.6 9.7   MG 2.3 2.4     Recent Labs     05/25/21  0217 05/24/21  1513   ALT 42 36   AP 76 78   TBILI 0.2 0.4   TP 7.6 8.6*   ALB 3.6 4.0   GLOB 4.0 4.6*     Recent Labs     05/25/21  0217   INR 1.0   PTP 10.5   APTT 21.5*      No results for input(s): FE, TIBC, PSAT, FERR in the last 72 hours.    No results found for: FOL, RBCF   No results for input(s): PH, PCO2, PO2 in the last 72 hours. Recent Labs     05/24/21  1513   TROIQ <0.05     Lab Results   Component Value Date/Time    Cholesterol, total 326 (H) 10/28/2018 06:07 AM    HDL Cholesterol 55 10/28/2018 06:07 AM    LDL, calculated 229 (H) 10/28/2018 06:07 AM    Triglyceride 210 (H) 10/28/2018 06:07 AM    CHOL/HDL Ratio 5.9 (H) 10/28/2018 06:07 AM     No results found for: [facility]  Lab Results   Component Value Date/Time    Color YELLOW/STRAW 05/24/2021 05:52 PM    Appearance CLEAR 05/24/2021 05:52 PM    Specific gravity 1.011 05/24/2021 05:52 PM    pH (UA) 6.0 05/24/2021 05:52 PM    Protein Negative 05/24/2021 05:52 PM    Glucose Negative 05/24/2021 05:52 PM    Ketone Negative 05/24/2021 05:52 PM    Bilirubin Negative 05/24/2021 05:52 PM    Urobilinogen 0.2 05/24/2021 05:52 PM    Nitrites Negative 05/24/2021 05:52 PM    Leukocyte Esterase MODERATE (A) 05/24/2021 05:52 PM    Epithelial cells FEW 05/24/2021 05:52 PM    Bacteria Negative 05/24/2021 05:52 PM    WBC 20-50 05/24/2021 05:52 PM    RBC 0-5 05/24/2021 05:52 PM         Medications Reviewed:     Current Facility-Administered Medications   Medication Dose Route Frequency    acetaminophen (TYLENOL) tablet 650 mg  650 mg Oral Q6H PRN    cefTRIAXone (ROCEPHIN) 1 g in 0.9% sodium chloride (MBP/ADV) 50 mL MBP  1 g IntraVENous Q24H    aspirin tablet 325 mg  325 mg Oral DAILY    busPIRone (BUSPAR) tablet 15 mg  15 mg Oral BID    cholecalciferol (VITAMIN D3) (1000 Units /25 mcg) tablet 1,000 Units  1,000 Units Oral DAILY    . PHARMACY TO SUBSTITUTE PER PROTOCOL (Reordered from: cyanocobalamin, vitamin B-12, (VITAMIN B-12) 5,000 mcg subl)    Per Protocol    haloperidoL (HALDOL) tablet 5 mg  5 mg Oral BID    levothyroxine (SYNTHROID) tablet 100 mcg  100 mcg Oral ACB    pravastatin (PRAVACHOL) tablet 40 mg  40 mg Oral QHS    sertraline (ZOLOFT) tablet 200 mg  200 mg Oral QPM    therapeutic multivitamin (THERAGRAN) tablet 1 Tablet  1 Tablet Oral DAILY    sodium chloride (NS) flush 5-40 mL  5-40 mL IntraVENous Q8H    sodium chloride (NS) flush 5-40 mL  5-40 mL IntraVENous PRN    polyethylene glycol (MIRALAX) packet 17 g  17 g Oral DAILY PRN    albuterol (PROVENTIL VENTOLIN) nebulizer solution 2.5 mg  2.5 mg Nebulization Q6H PRN     Current Outpatient Medications   Medication Sig    albuterol (PROVENTIL HFA, VENTOLIN HFA, PROAIR HFA) 90 mcg/actuation inhaler Take 2 Puffs by inhalation every six (6) hours as needed for Wheezing.  aspirin (ASPIRIN) 325 mg tablet Take 1 Tab by mouth daily.  beclomethasone (QVAR) 40 mcg/actuation aero Take 2 Puffs by inhalation two (2) times a day.  busPIRone (BUSPAR) 15 mg tablet Take 1 Tab by mouth two (2) times a day.  cholecalciferol (VITAMIN D3) 1,000 unit tablet Take 1 Tab by mouth daily.  levothyroxine (SYNTHROID) 100 mcg tablet Take 1 Tab by mouth Daily (before breakfast).  sertraline (ZOLOFT) 100 mg tablet Take 2 Tabs by mouth every evening.  therapeutic multivitamin (THERA) tablet Take 1 Tab by mouth daily.  haloperidol (HALDOL) 5 mg tablet Take one tab po q am and 2 tabs at bedtime    pravastatin (PRAVACHOL) 40 mg tablet Take 1 Tab by mouth nightly.  cyanocobalamin, vitamin B-12, (VITAMIN B-12) 5,000 mcg subl 5,000 mcg by SubLINGual route daily.      ______________________________________________________________________  EXPECTED LENGTH OF STAY:24-48 hrs  ACTUAL LENGTH OF STAY:          0                 Isabella Figueredo MD

## 2021-05-25 NOTE — CONSULTS
INPATIENT NEUROLOGY CONSULTATION  5/25/2021     Consulted by: Celestino Davis MD        Patient ID:  Peter Suarez  [id number]  56 y.o.  1960    Chief Complaint   Patient presents with    Fall       HPI    Ha Pleitez is a 57-year-old woman who was admitted for recurrent falls and dizziness. I spoke with her personally reviewed the medical record. She has significant medical history consisting of bipolar disorder, depression with psychosis, asthma,ckd. She tells me for the past few weeks she has had falls. She does not seem to be a very good historian. She cannot give me details as to the mechanism of her fall. She has hit her head a couple times. She denies losing consciousness. She has no headache right now. She takes multiple psychotropics of which Remeron at bedtime but is not included in her list.  She cannot tell me why she takes Haldol or if she takes it daily. Sometimes she has light sensitivity and sees sparkles in her vision but no headache and no double vision. Dizziness is worse when she moves. No nausea.       Review of Systems   Unable to perform ROS: Psychiatric disorder       Past Medical History:   Diagnosis Date    Asthma 1967    hospitalized for asthma attack x 2    Chronic kidney disease     kidney stones x 2-3 times    Ill-defined condition     nasal blockage from growth and misalignment - cannot breath out of nose - surgery in the future/cleared for colonoscopy 7/31/2014 - will bring in letter    Other ill-defined conditions(799.89)     blood in stool    Other ill-defined conditions(799.89)     hx low BP - 90's/60's-70's    PUD (peptic ulcer disease)     Thyroid disease     Unspecified adverse effect of anesthesia     nasal growth and misalignment and cannot breath through nose     Family History   Problem Relation Age of Onset    Stroke Mother     Other Mother         erosion of esophagus    Cancer Mother         melanoma/squamous    Heart Surgery Father         x2   [employer] Delayed Awakening Father     Pacemaker Father     Other Father         carotid endartarectomy/polio    Cataract Father      Social History     Socioeconomic History    Marital status: SINGLE     Spouse name: Not on file    Number of children: Not on file    Years of education: Not on file    Highest education level: Not on file   Occupational History    Not on file   Tobacco Use    Smoking status: Never Smoker    Smokeless tobacco: Never Used   Substance and Sexual Activity    Alcohol use: No    Drug use: No    Sexual activity: Not Currently   Other Topics Concern     Service Not Asked    Blood Transfusions Not Asked    Caffeine Concern Not Asked    Occupational Exposure Not Asked    Hobby Hazards Not Asked    Sleep Concern Not Asked    Stress Concern Not Asked    Weight Concern Not Asked    Special Diet Not Asked    Back Care Not Asked    Exercise Not Asked    Bike Helmet Not Asked   [address] Not Asked    Self-Exams Not Asked   Social History Narrative    Not on file     Social Determinants of Health     Financial Resource Strain:     Difficulty of Paying Living Expenses:    Food Insecurity:     Worried About Running Out of Food in the Last Year:     Ran Out of Food in the Last Year:    Transportation Needs:     Lack of Transportation (Medical):      Lack of Transportation (Non-Medical):    Physical Activity:     Days of Exercise per Week:     Minutes of Exercise per Session:    Stress:     Feeling of Stress :    Social Connections:     Frequency of Communication with Friends and Family:     Frequency of Social Gatherings with Friends and Family:     Attends Denominational Services:     Active Member of Clubs or Organizations:     Attends Club or Organization Meetings:     Marital Status:    Intimate Partner Violence:     Fear of Current or Ex-Partner:     Emotionally Abused:     Physically Abused:     Sexually Abused:      Current Facility-Administered Medications Medication Dose Route Frequency    acetaminophen (TYLENOL) tablet 650 mg  650 mg Oral Q6H PRN    cefTRIAXone (ROCEPHIN) 1 g in 0.9% sodium chloride (MBP/ADV) 50 mL MBP  1 g IntraVENous Q24H    meclizine (ANTIVERT) tablet 12.5 mg  12.5 mg Oral Q6H PRN    aspirin tablet 325 mg  325 mg Oral DAILY    busPIRone (BUSPAR) tablet 15 mg  15 mg Oral BID    cholecalciferol (VITAMIN D3) (1000 Units /25 mcg) tablet 1,000 Units  1,000 Units Oral DAILY    . PHARMACY TO SUBSTITUTE PER PROTOCOL (Reordered from: cyanocobalamin, vitamin B-12, (VITAMIN B-12) 5,000 mcg subl)    Per Protocol    haloperidoL (HALDOL) tablet 5 mg  5 mg Oral BID    levothyroxine (SYNTHROID) tablet 100 mcg  100 mcg Oral ACB    pravastatin (PRAVACHOL) tablet 40 mg  40 mg Oral QHS    sertraline (ZOLOFT) tablet 200 mg  200 mg Oral QPM    therapeutic multivitamin (THERAGRAN) tablet 1 Tablet  1 Tablet Oral DAILY    sodium chloride (NS) flush 5-40 mL  5-40 mL IntraVENous Q8H    sodium chloride (NS) flush 5-40 mL  5-40 mL IntraVENous PRN    polyethylene glycol (MIRALAX) packet 17 g  17 g Oral DAILY PRN    albuterol (PROVENTIL VENTOLIN) nebulizer solution 2.5 mg  2.5 mg Nebulization Q6H PRN     Allergies   Allergen Reactions    Other Food Other (comments)     \"Bad chest pain\" with walnuts. Coke - asthma/stuffy head. Fish sticks causes an asthma attack.  Astepro [Azelastine] Other (comments)     Violent headaches.  Bactrim [Sulfamethoprim] Other (comments)     Difficulty breathing, chills, fever.  Banana Other (comments)     Diffculty breathing, wheezing, asthma attack.  Coconut Other (comments)     Difficulty breathing, asthma attack, SOB.  Cortisone Hives     Difficulty breathing.  Nut - Unspecified Other (comments)     \"Bad chest pain\" with walnuts    Peanut Other (comments)     Wheezing, asthma attack, SOB.  Prednisone Hives     Difficulty breathing, kidney stones.        Visit Vitals  BP 91/66 (BP 1 Location: Left upper arm, BP Patient Position: At rest)   Pulse 62   Temp 98.2 °F (36.8 °C)   Resp 12   Ht 5' 3\" (1.6 m)   Wt 170 lb 6.7 oz (77.3 kg)   SpO2 96%   BMI 30.19 kg/m²     Physical Exam  Vitals and nursing note reviewed. Neurological:      Mental Status: She is alert. Neurologic Exam     Mental Status   Adult woman appropriate. Awake and alert. She follows commands. She knows the name of the hospital the month the year and the day of the week but she does take time to answer these questions. Her pupils are equal reactive without nystagmus, EOMI   her face is grossly symmetric on smile tongue is midline speech is clear but limited to short phrases  Strength is 5/5 supine symmetric  No abnormal movements  Gait deferred she feels acutely vertiginous after evaluating her eyes. Lab Results   Component Value Date/Time    WBC 5.7 05/25/2021 02:17 AM    HGB 12.9 05/25/2021 02:17 AM    HCT 40.9 05/25/2021 02:17 AM    PLATELET 485 64/29/1913 02:17 AM    MCV 92.5 05/25/2021 02:17 AM     Lab Results   Component Value Date/Time    Hemoglobin A1c 6.1 07/19/2018 06:03 AM    Glucose 122 (H) 05/25/2021 02:17 AM    Glucose 94 10/28/2018 06:07 AM    LDL, calculated 229 (H) 10/28/2018 06:07 AM    Creatinine 1.20 (H) 05/25/2021 02:17 AM      Lab Results   Component Value Date/Time    Cholesterol, total 326 (H) 10/28/2018 06:07 AM    HDL Cholesterol 55 10/28/2018 06:07 AM    LDL, calculated 229 (H) 10/28/2018 06:07 AM    Triglyceride 210 (H) 10/28/2018 06:07 AM    CHOL/HDL Ratio 5.9 (H) 10/28/2018 06:07 AM     Lab Results   Component Value Date/Time    ALT (SGPT) 42 05/25/2021 02:17 AM    Alk. phosphatase 76 05/25/2021 02:17 AM    Bilirubin, total 0.2 05/25/2021 02:17 AM    Albumin 3.6 05/25/2021 02:17 AM    Protein, total 7.6 05/25/2021 02:17 AM    INR 1.0 05/25/2021 02:17 AM    Prothrombin time 10.5 05/25/2021 02:17 AM    PLATELET 022 95/18/5635 02:17 AM        CT Results (maximum last 3):   Results from [facility] encounter on 05/24/21    CT SPINE CERV WO CONT    Narrative  EXAM:  CT CERVICAL SPINE WITHOUT CONTRAST    INDICATION: fall with neck pain. COMPARISON: None. CONTRAST:  None. TECHNIQUE: Multislice helical CT of the cervical spine was performed without  intravenous contrast administration. Sagittal and coronal reformats were  generated. CT dose reduction was achieved through use of a standardized  protocol tailored for this examination and automatic exposure control for dose  modulation. FINDINGS:    The alignment is within normal limits. There is mild loss of height in C5 and C6  that is likely degenerative. No acute fracture or subluxation. The odontoid  process is intact. The craniocervical junction is within normal limits. The incidentally imaged soft tissues are within normal limits. C2-C3: There is no spinal canal or neural foraminal stenosis. C3-C4: There is no spinal canal or neural foraminal stenosis. C4-C5: Mild disc space narrowing. There is no spinal canal or neural foraminal  stenosis. C5-C6: Mild disc space narrowing. There is severe right and moderate left neural  foraminal narrowing. C6-C7: Mild disc space narrowing. Mild right neural foraminal narrowing. .    C7-T1: There is no spinal canal or neural foraminal stenosis. Impression  No acute abnormality. CT HEAD WO CONT    Narrative  EXAM: CT HEAD WO CONT    INDICATION: fall with head injury    COMPARISON: 7/17/2018. CONTRAST: None. TECHNIQUE: Unenhanced CT of the head was performed using 5 mm images. Brain and  bone windows were generated. Coronal and sagittal reformats. CT dose reduction  was achieved through use of a standardized protocol tailored for this  examination and automatic exposure control for dose modulation. FINDINGS:  The ventricles and sulci are normal in size, shape and configuration. . There is  no significant white matter disease.  There is no intracranial hemorrhage,  extra-axial collection, or mass effect. The basilar cisterns are open. No CT  evidence of acute infarct. The bone windows demonstrate no abnormalities. The visualized portions of the  paranasal sinuses and mastoid air cells are clear. Impression  No acute abnormality. MRI Results (maximum last 3): No results found for this or any previous visit. VAS/US/Carotid Doppler Results (maximum last 3): No results found for this or any previous visit. PET Results (maximum last 3): No results found for this or any previous visit. Assessment and Plan        68-year-old woman with a long history of psychiatric disease who presents with recurrent falls with some vertigo that I suspect may be more peripheral/posttraumatic. I do think an MRI should be done given the recurrent falls and possible head injuries. Unclear why she is falling whether this is related to medications. She has good strength on exam.  PT OT should evaluate. Stay on her outpatient medications. She has evidence of UTI. Defer to primary team.      This clinical note was dictated with an electronic dictation software that can make unintentional errors. If there are any questions, please contact me directly for clarification.       2 [facility],   NEUROLOGIST  Diplomate CHARMAINE  5/25/2021

## 2021-05-26 VITALS
HEART RATE: 69 BPM | TEMPERATURE: 98.1 F | RESPIRATION RATE: 12 BRPM | BODY MASS INDEX: 29.46 KG/M2 | OXYGEN SATURATION: 94 % | DIASTOLIC BLOOD PRESSURE: 60 MMHG | HEIGHT: 63 IN | SYSTOLIC BLOOD PRESSURE: 91 MMHG | WEIGHT: 166.3 LBS

## 2021-05-26 PROCEDURE — 74011250637 HC RX REV CODE- 250/637: Performed by: FAMILY MEDICINE

## 2021-05-26 PROCEDURE — 99218 HC RM OBSERVATION: CPT

## 2021-05-26 RX ORDER — MECLIZINE HCL 12.5 MG 12.5 MG/1
12.5 TABLET ORAL
Qty: 30 TABLET | Refills: 0 | Status: SHIPPED | OUTPATIENT
Start: 2021-05-26 | End: 2021-06-05

## 2021-05-26 RX ADMIN — ASPIRIN 325 MG ORAL TABLET 325 MG: 325 PILL ORAL at 09:52

## 2021-05-26 RX ADMIN — BUSPIRONE HYDROCHLORIDE 10 MG: 10 TABLET ORAL at 09:52

## 2021-05-26 RX ADMIN — Medication 10 ML: at 07:27

## 2021-05-26 RX ADMIN — Medication 1000 UNITS: at 09:52

## 2021-05-26 RX ADMIN — HALOPERIDOL 5 MG: 5 TABLET ORAL at 09:52

## 2021-05-26 RX ADMIN — ACETAMINOPHEN 650 MG: 325 TABLET ORAL at 09:56

## 2021-05-26 RX ADMIN — LEVOTHYROXINE SODIUM 100 MCG: 0.1 TABLET ORAL at 07:26

## 2021-05-26 RX ADMIN — THERA TABS 1 TABLET: TAB at 09:53

## 2021-05-26 NOTE — PROGRESS NOTES
Problem: Falls - Risk of  Goal: *Absence of Falls  Description: Document Fernando Knox Fall Risk and appropriate interventions in the flowsheet.   Outcome: Progressing Towards Goal  Note: Fall Risk Interventions:  Mobility Interventions: Assess mobility with egress test, Bed/chair exit alarm, Communicate number of staff needed for ambulation/transfer, OT consult for ADLs, Patient to call before getting OOB, PT Consult for mobility concerns, PT Consult for assist device competence, Strengthening exercises (ROM-active/passive)         Medication Interventions: Assess postural VS orthostatic hypotension, Bed/chair exit alarm, Evaluate medications/consider consulting pharmacy, Patient to call before getting OOB, Teach patient to arise slowly         History of Falls Interventions: Bed/chair exit alarm, Consult care management for discharge planning, Door open when patient unattended, Evaluate medications/consider consulting pharmacy, Investigate reason for fall, Room close to nurse's station

## 2021-05-26 NOTE — PROGRESS NOTES
Problem: Falls - Risk of  Goal: *Absence of Falls  Description: Document Nelson Shove Fall Risk and appropriate interventions in the flowsheet.   Outcome: Progressing Towards Goal  Note: Fall Risk Interventions:  Mobility Interventions: Bed/chair exit alarm, Communicate number of staff needed for ambulation/transfer, Patient to call before getting OOB, PT Consult for mobility concerns         Medication Interventions: Bed/chair exit alarm, Patient to call before getting OOB, Teach patient to arise slowly         History of Falls Interventions: Bed/chair exit alarm

## 2021-05-26 NOTE — PROGRESS NOTES
Hospital follow-up PCP transitional care appointment has been scheduled with Dr. Maribel Morton for Tuesday, 6/1/21 at 3:00 p.m. Pending patient discharge.   Severa Fleck, Care Management Specialist.

## 2021-05-26 NOTE — PROGRESS NOTES
Neurology Progress Note    Patient ID:  Charlie Villarreal  [id number]  49 y.o.  1960    HPI     Charo Santos is a 53-year-old woman who was admitted for recurrent falls and dizziness. I spoke with her personally reviewed the medical record. She has significant medical history consisting of bipolar disorder, depression with psychosis, asthma,ckd. She tells me for the past few weeks she has had falls. She does not seem to be a very good historian. She cannot give me details as to the mechanism of her fall. She has hit her head a couple times. She denies losing consciousness. She has no headache right now. She takes multiple psychotropics of which Remeron at bedtime but is not included in her list.  She cannot tell me why she takes Haldol or if she takes it daily. Sometimes she has light sensitivity and sees sparkles in her vision but no headache and no double vision. Dizziness is worse when she moves. No nausea. Interval 5/26/2021      No events overnight. She informs me that she is depressed. She denies any suicidal thoughts. \"I'm just depressed,\" she says. Vertigo has improved      Objective:    All records in [facility] reviewed and noted    ROS:  Per HPI  All other 12 pt ROS negative    Meds:  Current Facility-Administered Medications   Medication Dose Route Frequency    acetaminophen (TYLENOL) tablet 650 mg  650 mg Oral Q6H PRN    meclizine (ANTIVERT) tablet 12.5 mg  12.5 mg Oral Q6H PRN    aspirin tablet 325 mg  325 mg Oral DAILY    busPIRone (BUSPAR) tablet 10 mg  10 mg Oral BID    cholecalciferol (VITAMIN D3) (1000 Units /25 mcg) tablet 1,000 Units  1,000 Units Oral DAILY    haloperidoL (HALDOL) tablet 5 mg  5 mg Oral BID    levothyroxine (SYNTHROID) tablet 100 mcg  100 mcg Oral ACB    pravastatin (PRAVACHOL) tablet 40 mg  40 mg Oral QHS    therapeutic multivitamin (THERAGRAN) tablet 1 Tablet  1 Tablet Oral DAILY    sodium chloride (NS) flush 5-40 mL  5-40 mL IntraVENous Q8H    sodium chloride (NS) flush 5-40 mL  5-40 mL IntraVENous PRN    polyethylene glycol (MIRALAX) packet 17 g  17 g Oral DAILY PRN    albuterol (PROVENTIL VENTOLIN) nebulizer solution 2.5 mg  2.5 mg Nebulization Q6H PRN       Imaging:  CT Results (most recent):  Results from Hospital Encounter encounter on 05/24/21    CT SPINE CERV WO CONT    Narrative  EXAM:  CT CERVICAL SPINE WITHOUT CONTRAST    INDICATION: fall with neck pain. COMPARISON: None. CONTRAST:  None. TECHNIQUE: Multislice helical CT of the cervical spine was performed without  intravenous contrast administration. Sagittal and coronal reformats were  generated. CT dose reduction was achieved through use of a standardized  protocol tailored for this examination and automatic exposure control for dose  modulation. FINDINGS:    The alignment is within normal limits. There is mild loss of height in C5 and C6  that is likely degenerative. No acute fracture or subluxation. The odontoid  process is intact. The craniocervical junction is within normal limits. The incidentally imaged soft tissues are within normal limits. C2-C3: There is no spinal canal or neural foraminal stenosis. C3-C4: There is no spinal canal or neural foraminal stenosis. C4-C5: Mild disc space narrowing. There is no spinal canal or neural foraminal  stenosis. C5-C6: Mild disc space narrowing. There is severe right and moderate left neural  foraminal narrowing. C6-C7: Mild disc space narrowing. Mild right neural foraminal narrowing. .    C7-T1: There is no spinal canal or neural foraminal stenosis. Impression  No acute abnormality. MRI Results (most recent):  Results from [place] encounter on 05/24/21    MRA BRAIN WO CONT    Narrative  EXAM:  MRI BRAIN WO CONT, MRA BRAIN WO CONT    INDICATION: Dizziness, fall, head injury.     COMPARISON: CT head on 5/24/2021    TECHNIQUE: Multisequence, multiplanar MRI of the brain without contrast.  Noncontrast time of flight MR angiography of the head. Multiplanar maximum  intensity projection reformats. (2 separate studies reported together)    FINDINGS: MRI brain: No hydrocephalus. No mass effect or midline shift. No  extra-axial fluid collection. Mild chronic microvascular ischemic disease is  greatest in the left parietal periventricular white matter. No restricted  diffusion to indicate acute infarct. The midline structures, including the  cervicomedullary junction, are within normal limits. MRA head: The vertebral arteries are codominant. The basilar artery and its  branches are normal. The internal carotid, anterior cerebral, and middle  cerebral arteries are patent. There is no flow-limiting intracranial stenosis. There is no aneurysm. There are no sizable posterior communicating arteries. Impression  1. Mild chronic microvascular ischemic disease. No acute infarct. 2. Normal MR angiography of the head. Lab Review   No results found for this or any previous visit (from the past 24 hour(s)). Exam:  Visit Vitals  BP 91/60   Pulse 66   Temp 98.1 °F (36.7 °C)   Resp 15   Ht 5' 3\" (1.6 m)   Wt 166 lb 4.8 oz (75.4 kg)   SpO2 95%   BMI 29.46 kg/m²     Gen: Well developed  CV: RRR  Neuro: A&O x 3, no dysarthria or aphasia  CN II-XII: PERRL, EOMI, face symmetric, tongue/palate midline  Motor: strength 5/5 all four ext  Sensory: intact to LT  Gait: deferred     Assessment:     Patient Active Problem List   Diagnosis Code    Depression F32.9    Severe major depression with psychotic features, mood-congruent (HCC) F32.3    Major depression with psychotic features ([facility] [address] F32.3    Dizziness R42    UTI (urinary tract infection) N39.0    Head injury S09.90XA    LFT elevation R79.89    Frequent falls R29.6       Plan:     76-year-old woman with a long history of psychiatric disease presents with recurrent falls and vertigo, which I suspect is more peripheral/posttraumatic in nature.  The results of a brain MRI  was normal. She confided in me that she is depressed. I recommend that she be evaluated by psychiatry before being discharged. Thank you very much for this consultation. No further neurologic recommendations at this time. Will sign off but please call with questions.     Signed:  Ghazal Flower NP  5/26/2021  8:33 AM

## 2021-05-26 NOTE — PROGRESS NOTES
Bedside and Verbal shift change report given to Cassandra BRIZUELA (oncoming nurse) by Julio C Reyes (offgoing nurse). Report included the following information SBAR, Kardex, Intake/Output, MAR, Recent Results, Cardiac Rhythm NSR and Dual Neuro Assessment.

## 2021-05-26 NOTE — ROUTINE PROCESS
Transition of Care Plan/Discharge Note  RUR- N/A under Observation Status  DISPOSITION: home with home health- [facility] accepted  F/U with PCP/Specialist   
 Transport: Ileana Maciel [phone] Reviewed chart for transitions of care, and discussed in rounds. Care Management Interventions PCP Verified by CM: Yes Last Visit to PCP: 05/21/21 Palliative Care Criteria Met (RRAT>21 & CHF Dx)?: No 
Mode of Transport at Discharge: Other (see comment) (Ileana Maciel [phone] ) Transition of Care Consult (CM Consult): Discharge Planning, Home Health [facility] - INPATIENT: No 
Reason Outside [place]: Patient already serviced by other home care/hospice agency MyChart Signup: No 
Discharge Durable Medical Equipment: No 
Health Maintenance Reviewed: Yes Physical Therapy Consult: Yes Occupational Therapy Consult: Yes Speech Therapy Consult: Yes Current Support Network: Relative's Home (lives with Ileana Maciel [phone] ) Confirm Follow Up Transport: Family (Ileana Maciel [phone] ) The Patient and/or Patient Representative was Provided with a Choice of Provider and Agrees with the Discharge Plan?: Yes Freedom of Choice List was Provided with Basic Dialogue that Supports the Patient's Individualized Plan of Care/Goals, Treatment Preferences and Shares the Quality Data Associated with the Providers?: Yes [employer] Information Provided?: No 
Discharge Location Discharge Placement: Home with home health ([facility] ) KALIE Lu

## 2021-05-26 NOTE — ROUTINE PROCESS
Bedside RN performed patient education and medication education. Discharge concerns initiated and discussed with patient and patient's father, including clarification on \"who\" assists the patient at their home and instructions for when the home going patient should call their provider after discharge. Opportunity for questions and clarification was provided. Patient receptive to education: YES Patient stated: \"I don't have any questions\" Barriers to Education: none Diagnosis Education given:  YES Length of stay: 0 Expected Day of Discharge: 0 Ask if they have \"Help at Home\" & add to white board? YES Education Day #: 0 Medication Education Given:  YES 
M in the box Medication name: antivert Pt aware of [facility]CA[facility]PS survey: YES

## 2021-05-26 NOTE — DISCHARGE SUMMARY
Discharge Summary      Name: Susana Castellanos  [id number]  YOB: 1960 (Age: 56 y.o.)   Date of Admission: 5/24/2021  Date of Discharge: 5/26/2021  Attending Physician: Soledad Allison MD    Discharge Diagnosis:   Frequent falls associated with dizziness  Psychiatric disorder  Head injury  Elevated cr and LFT    Consultations:  IP CONSULT TO NEUROLOGY    Brief Admission History/Reason for Admission Per Mirna Ferrer MD:   Susana Castellanos is a 56 y.o. female with past medical history of asthma, CKD, PUD, anxiety, depression, hypothyroidism presented to the ED from home with chief complaint of dizziness, falls, and head injury. Patient reportedly had dizziness for the past 2 weeks, with recurrent falls since yesterday, hitting her head during at least one of the falls. She complained on neck pain described as stiffness. On arrival in the ED, workup included CT head and C c spine which were unremarkable. Lumbar spine xray showed mild spondylosis at T11 - T 12.  UA showed UTI. Patient was given Ceftriaxone 1 gram IV x 1 dose. Patient is now seen for admission to the hospitalist service. There were no reports of syncope, loss of consciousness, visual disturbance, numbness, paresthesias, focal weakness, chest pain, palpitations, SOB, cough, abdominal pain, nausea, vomiting, diarrhea, calf pain, increased leg swelling/ edema, fever, chills, rash, or known COVID 19 exposure. Brief Hospital Course by Main Problems:   1. Frequent falls associated with dizziness at rest and with positional changes. This is likely due to component of vertigo suspect may be more peripheral/posttraumatic. Work up to include CT head that was neg for acute abnormality. MRI head showed mild chronic microvascular ischemic disease. No acute infarct. Normal MR angiography of the head. Other imaging to include CT Cspine, XR pelv and R humerus negative for acute fracture.   Orthostatic vitals negative. Pt was treated with meclizine prn. She was evaluated by neurology, no additional ercs. Her symptoms has improved significantly. She worked with PT/OT, recommended [facility] PT/OT at discharge. 2.  Abnormal UA, pt was empirically started on IV rocephin. Ucx was negative. No further indication for abx.        3.  LFT elevation, repeat labs outpt with PCP.     4.  Elevated creatinine, improved. Encourage po hydration. 5.  Head injury, s/p fall with tenderness but no wound noted on exam    6.  Left knee pain with skin abrasion of left knee    7. Psychiatric disorder, cont' home meds. outpt f/u with psych. Discharge Exam:  Patient seen and examined by me on discharge day. Pertinent Findings:  Visit Vitals  BP 91/60   Pulse 66   Temp 98.1 °F (36.7 °C)   Resp 15   Ht 5' 3\" (1.6 m)   Wt 75.4 kg (166 lb 4.8 oz)   SpO2 95%   BMI 29.46 kg/m²     Gen:    Not in distress  Chest: Clear lungs  CVS:   Regular rhythm. No edema  Abd:  Soft, not distended, not tender    Discharge/Recent Laboratory Results:  Recent Labs     05/25/21  0217      K 3.7      CO2 25   BUN 12   *   CA 8.6   MG 2.3     Recent Labs     05/25/21  0217   HGB 12.9   HCT 40.9   WBC 5.7          Discharge Medications:  Current Discharge Medication List      START taking these medications    Details   meclizine (ANTIVERT) 12.5 mg tablet Take 1 Tablet by mouth three (3) times daily as needed for Dizziness for up to 10 days. Qty: 30 Tablet, Refills: 0  Start date: 5/26/2021, End date: 6/5/2021         CONTINUE these medications which have NOT CHANGED    Details   busPIRone (BUSPAR) 10 mg tablet Take 10 mg by mouth two (2) times a day. benztropine (COGENTIN) 1 mg tablet Take 1 mg by mouth two (2) times a day. mirtazapine (REMERON) 30 mg tablet Take 30 mg by mouth nightly. omega-3 acid ethyl esters (Lovaza) 1 gram capsule Take 2 g by mouth two (2) times daily (with meals).       Venlafaxine-ER 24 HR (EFFEXOR-ER) 225 mg tablet Take 225 mg by mouth daily. haloperidoL (HALDOL) 10 mg tablet Take 10 mg by mouth two (2) times a day. aspirin (ASPIRIN) 325 mg tablet Take 1 Tab by mouth daily. Qty: 15 Tab, Refills: 0      levothyroxine (SYNTHROID) 100 mcg tablet Take 1 Tab by mouth Daily (before breakfast). Qty: 15 Tab, Refills: 0      pravastatin (PRAVACHOL) 40 mg tablet Take 1 Tab by mouth nightly. Qty: 15 Tab, Refills: 0             Patient Follow Up Instructions:    Activity: Activity as tolerated  Diet: Regular Diet  Wound Care: None needed  Code: Full    DISPOSITION:    Home with Family:    Home with HH/PT/OT/RN: x   SNF/LTC:    SUN:    OTHER:          Follow up with:   PCP : Ning Gutierres MD  Follow-up Information     Follow up With Specialties Details Why [address] Call As needed [phone]    Ning Gutierres MD Family Medicine In 3 days  [id number] [facility]  [phone]              Total time in minutes spent coordinating this discharge (includes going over instructions, follow-up, prescriptions, and preparing report for sign off to her PCP) : 35 minutes

## 2022-03-18 PROBLEM — R29.6 FREQUENT FALLS: Status: ACTIVE | Noted: 2021-05-24

## 2022-03-18 PROBLEM — F32.A DEPRESSION: Status: ACTIVE | Noted: 2018-07-17

## 2022-03-18 PROBLEM — R42 DIZZINESS: Status: ACTIVE | Noted: 2021-05-24

## 2022-03-18 PROBLEM — N39.0 UTI (URINARY TRACT INFECTION): Status: ACTIVE | Noted: 2021-05-24

## 2022-03-18 PROBLEM — R79.89 LFT ELEVATION: Status: ACTIVE | Noted: 2021-05-24

## 2022-03-19 PROBLEM — S09.90XA HEAD INJURY: Status: ACTIVE | Noted: 2021-05-24

## 2022-03-19 PROBLEM — F32.3 MAJOR DEPRESSION WITH PSYCHOTIC FEATURES (HCC): Status: ACTIVE | Noted: 2018-10-27

## 2022-03-20 PROBLEM — F32.3 SEVERE MAJOR DEPRESSION WITH PSYCHOTIC FEATURES, MOOD-CONGRUENT (HCC): Status: ACTIVE | Noted: 2018-07-30

## 2022-07-08 ENCOUNTER — HOSPITAL ENCOUNTER (EMERGENCY)
Age: 62
Discharge: HOME | End: 2022-07-09
Attending: EMERGENCY MEDICINE
Payer: MEDICAID

## 2022-07-08 ENCOUNTER — APPOINTMENT (OUTPATIENT)
Dept: VASCULAR SURGERY | Age: 62
End: 2022-07-08
Attending: STUDENT IN AN ORGANIZED HEALTH CARE EDUCATION/TRAINING PROGRAM
Payer: MEDICAID

## 2022-07-08 VITALS
DIASTOLIC BLOOD PRESSURE: 76 MMHG | HEART RATE: 91 BPM | TEMPERATURE: 98.3 F | SYSTOLIC BLOOD PRESSURE: 112 MMHG | RESPIRATION RATE: 16 BRPM | OXYGEN SATURATION: 98 %

## 2022-07-08 DIAGNOSIS — I82.492 ACUTE DEEP VEIN THROMBOSIS (DVT) OF OTHER SPECIFIED VEIN OF LEFT LOWER EXTREMITY (HCC): Primary | ICD-10-CM

## 2022-07-08 LAB
ALBUMIN SERPL-MCNC: 3.3 G/DL (ref 3.5–5)
ALBUMIN/GLOB SERPL: 0.8 {RATIO} (ref 1.1–2.2)
ALP SERPL-CCNC: 101 U/L (ref 45–117)
ALT SERPL-CCNC: 12 U/L (ref 12–78)
ANION GAP SERPL CALC-SCNC: 7 MMOL/L (ref 5–15)
AST SERPL-CCNC: 10 U/L (ref 15–37)
BASOPHILS # BLD: 0 K/UL (ref 0–0.1)
BASOPHILS NFR BLD: 0 % (ref 0–1)
BILIRUB SERPL-MCNC: 0.2 MG/DL (ref 0.2–1)
BUN SERPL-MCNC: 11 MG/DL (ref 6–20)
BUN/CREAT SERPL: 10 (ref 12–20)
CALCIUM SERPL-MCNC: 8.8 MG/DL (ref 8.5–10.1)
CHLORIDE SERPL-SCNC: 107 MMOL/L (ref 97–108)
CO2 SERPL-SCNC: 25 MMOL/L (ref 21–32)
CREAT SERPL-MCNC: 1.12 MG/DL (ref 0.55–1.02)
DIFFERENTIAL METHOD BLD: NORMAL
EOSINOPHIL # BLD: 0.1 K/UL (ref 0–0.4)
EOSINOPHIL NFR BLD: 1 % (ref 0–7)
ERYTHROCYTE [DISTWIDTH] IN BLOOD BY AUTOMATED COUNT: 12.1 % (ref 11.5–14.5)
GLOBULIN SER CALC-MCNC: 3.9 G/DL (ref 2–4)
GLUCOSE SERPL-MCNC: 148 MG/DL (ref 65–100)
HCT VFR BLD AUTO: 38.4 % (ref 35–47)
HGB BLD-MCNC: 12.5 G/DL (ref 11.5–16)
IMM GRANULOCYTES # BLD AUTO: 0 K/UL (ref 0–0.04)
IMM GRANULOCYTES NFR BLD AUTO: 0 % (ref 0–0.5)
LYMPHOCYTES # BLD: 1.3 K/UL (ref 0.8–3.5)
LYMPHOCYTES NFR BLD: 19 % (ref 12–49)
MCH RBC QN AUTO: 28.7 PG (ref 26–34)
MCHC RBC AUTO-ENTMCNC: 32.6 G/DL (ref 30–36.5)
MCV RBC AUTO: 88.1 FL (ref 80–99)
MONOCYTES # BLD: 0.3 K/UL (ref 0–1)
MONOCYTES NFR BLD: 5 % (ref 5–13)
NEUTS SEG # BLD: 5.1 K/UL (ref 1.8–8)
NEUTS SEG NFR BLD: 75 % (ref 32–75)
NRBC # BLD: 0 K/UL (ref 0–0.01)
NRBC BLD-RTO: 0 PER 100 WBC
PLATELET # BLD AUTO: 294 K/UL (ref 150–400)
PMV BLD AUTO: 10.1 FL (ref 8.9–12.9)
POTASSIUM SERPL-SCNC: 3.5 MMOL/L (ref 3.5–5.1)
PROT SERPL-MCNC: 7.2 G/DL (ref 6.4–8.2)
RBC # BLD AUTO: 4.36 M/UL (ref 3.8–5.2)
SODIUM SERPL-SCNC: 139 MMOL/L (ref 136–145)
WBC # BLD AUTO: 6.8 K/UL (ref 3.6–11)

## 2022-07-08 PROCEDURE — 99284 EMERGENCY DEPT VISIT MOD MDM: CPT

## 2022-07-08 PROCEDURE — 36415 COLL VENOUS BLD VENIPUNCTURE: CPT

## 2022-07-08 PROCEDURE — 74011250637 HC RX REV CODE- 250/637: Performed by: STUDENT IN AN ORGANIZED HEALTH CARE EDUCATION/TRAINING PROGRAM

## 2022-07-08 PROCEDURE — 93971 EXTREMITY STUDY: CPT

## 2022-07-08 PROCEDURE — 85025 COMPLETE CBC W/AUTO DIFF WBC: CPT

## 2022-07-08 PROCEDURE — 80053 COMPREHEN METABOLIC PANEL: CPT

## 2022-07-08 RX ORDER — APIXABAN 5 MG (74)
KIT ORAL
Qty: 1 DOSE PACK | Refills: 0 | Status: SHIPPED | OUTPATIENT
Start: 2022-07-08

## 2022-07-08 RX ADMIN — APIXABAN 10 MG: 5 TABLET, FILM COATED ORAL at 23:52

## 2022-07-09 NOTE — DISCHARGE INSTRUCTIONS
Take eliquis as prescribed. Do NOT take aspirin. Schedule an appointment to be seen by your primary care physician. If you develop new or worsening symptoms, chest pain, shortness of breath, difficulty breathing please return to ER.

## 2022-07-09 NOTE — ED PROVIDER NOTES
Patient is a 59year old female with pmh of hypothyroidism, asthma, kidney stones, PUD who presents to ED c/o left leg pain which started a few weeks prior. Patient reports pain is in calf and hamstring. She denies any injury or similar symptoms previously. She also denies any knee pain or known injury or trauma. Patient reports concern for DVT and states she has been taking aspirin daily to help prevent a blood clot. Patient denies any chest pain, shortness of breath, cough, difficulty breathing. Patient denies h/o prior VTE, long travel, prolonged immobilization, malignancy.            Past Medical History:   Diagnosis Date    Asthma 1967    hospitalized for asthma attack x 2    Chronic kidney disease     kidney stones x 2-3 times    Ill-defined condition     nasal blockage from growth and misalignment - cannot breath out of nose - surgery in the future/cleared for colonoscopy 7/31/2014 - will bring in letter    Other ill-defined conditions(799.89)     blood in stool    Other ill-defined conditions(799.89)     hx low BP - 90's/60's-70's    PUD (peptic ulcer disease)     Thyroid disease     Unspecified adverse effect of anesthesia     nasal growth and misalignment and cannot breath through nose       Past Surgical History:   Procedure Laterality Date    HX HEENT      T & A    HX HEENT      turbinate surgery         Family History:   Problem Relation Age of Onset    Stroke Mother     Other Mother         erosion of esophagus    Cancer Mother         melanoma/squamous    Heart Surgery Father         x2    Delayed Awakening Father     Pacemaker Father     Other Father         carotid endartarectomy/polio    Cataract Father        Social History     Socioeconomic History    Marital status: SINGLE     Spouse name: Not on file    Number of children: Not on file    Years of education: Not on file    Highest education level: Not on file   Occupational History    Not on file   Tobacco Use    Smoking status: Never Smoker    Smokeless tobacco: Never Used   Substance and Sexual Activity    Alcohol use: No    Drug use: No    Sexual activity: Not Currently   Other Topics Concern     Service Not Asked    Blood Transfusions Not Asked    Caffeine Concern Not Asked    Occupational Exposure Not Asked    Hobby Hazards Not Asked    Sleep Concern Not Asked    Stress Concern Not Asked    Weight Concern Not Asked    Special Diet Not Asked    Back Care Not Asked    Exercise Not Asked    Bike Helmet Not Asked   [address] Not Asked    Self-Exams Not Asked   Social History Narrative    Not on file     Social Determinants of Health     Financial Resource Strain:     Difficulty of Paying Living Expenses: Not on file   Food Insecurity:     Worried About Running Out of Food in the Last Year: Not on file    Ashley of Food in the Last Year: Not on file   Transportation Needs:     Lack of Transportation (Medical): Not on file    Lack of Transportation (Non-Medical):  Not on file   Physical Activity:     Days of Exercise per Week: Not on file    Minutes of Exercise per Session: Not on file   Stress:     Feeling of Stress : Not on file   Social Connections:     Frequency of Communication with Friends and Family: Not on file    Frequency of Social Gatherings with Friends and Family: Not on file    Attends Islam Services: Not on file    Active Member of [address] [place] [address] or Organizations: Not on file    Attends Club or Organization Meetings: Not on file    Marital Status: Not on file   Intimate Partner Violence:     Fear of Current or Ex-Partner: Not on file    Emotionally Abused: Not on file    Physically Abused: Not on file    Sexually Abused: Not on file   Housing Stability:     Unable to Pay for Housing in the Last Year: Not on file    Number of [place] in the Last Year: Not on file    Unstable Housing in the Last Year: Not on file         ALLERGIES: Other food, Astepro [azelastine], Bactrim [sulfamethoprim], Banana, Coconut, Cortisone, Nut - unspecified, Peanut, and Prednisone    Review of Systems   Constitutional: Negative for activity change, appetite change, chills and fever. HENT: Negative for congestion and sore throat. Eyes: Negative for pain and visual disturbance. Respiratory: Negative for cough and shortness of breath. Cardiovascular: Positive for leg swelling. Negative for chest pain and palpitations. Gastrointestinal: Negative for abdominal distention, abdominal pain, constipation, diarrhea, nausea and vomiting. Genitourinary: Negative for decreased urine volume, dysuria, flank pain, frequency and urgency. Musculoskeletal: Positive for myalgias. Negative for back pain and neck pain. Skin: Negative for rash and wound. Allergic/Immunologic: Negative for immunocompromised state. Neurological: Negative for dizziness, syncope, weakness, light-headedness, numbness and headaches. Psychiatric/Behavioral: Negative for confusion. All other systems reviewed and are negative. Vitals:    07/08/22 2045   BP: 112/76   Pulse: 91   Resp: 16   Temp: 98.3 °F (36.8 °C)   SpO2: 98%            Physical Exam  Vitals and nursing note reviewed. Constitutional:       General: She is not in acute distress. Appearance: Normal appearance. She is well-developed. She is not toxic-appearing. HENT:      Head: Normocephalic and atraumatic. Nose: Nose normal.      Mouth/Throat:      Mouth: Mucous membranes are moist.   Eyes:      General: Lids are normal.      Extraocular Movements: Extraocular movements intact. Conjunctiva/sclera: Conjunctivae normal.   Cardiovascular:      Rate and Rhythm: Normal rate and regular rhythm. Pulses: Normal pulses. Heart sounds: Normal heart sounds, S1 normal and S2 normal.   Pulmonary:      Effort: Pulmonary effort is normal. No accessory muscle usage or respiratory distress. Breath sounds: Normal breath sounds.    Abdominal: Palpations: Abdomen is soft. Musculoskeletal:         General: Normal range of motion. Cervical back: Normal range of motion and neck supple. Left lower leg: Edema present. Comments: Swelling, erythema and tenderness to left calf and upper thigh. + Tatianna sign on the left. Ambulates with limp. Dorsalis pedis pulses 2+ bilaterally. Skin:     General: Skin is warm and dry. Capillary Refill: Capillary refill takes less than 2 seconds. Neurological:      General: No focal deficit present. Mental Status: She is alert and oriented to person, place, and time. Mental status is at baseline. Psychiatric:         Attention and Perception: Attention normal.         Mood and Affect: Mood and affect normal.         Speech: Speech normal.         Behavior: Behavior is cooperative. Thought Content: Thought content normal.         Cognition and Memory: Cognition normal.         Judgment: Judgment normal.          MDM  Number of Diagnoses or Management Options  Acute deep vein thrombosis (DVT) of other specified vein of left lower extremity (HCC)  Diagnosis management comments: Duplex shows occlusive thrombus in great saphenous vein. Discussed patient case with Dr Austin Salguero who agreed that although saphenous is a superficial vein, given location of thrombus recommend treatment with anticoagulation. Discussed results and treatment options with patient who elects to proceed with treatment with Eliquis. Patient's results have been reviewed with them. Patient verbally conveyed their understanding and agreement of the signs, symptoms, diagnosis and treatment plan. patient agrees to follow up as recommended. Discharge instructions have also been provided to the patient with some educational information regarding their diagnosis as well a list of reasons why they would want to return to the ED should their condition change.          Amount and/or Complexity of Data Reviewed  Clinical lab tests: reviewed  Tests in the radiology section of CPT®: reviewed  Discuss the patient with other providers: yes           Procedures

## 2022-07-09 NOTE — ED TRIAGE NOTES
Triage: Pt arrives ambulatory from home with CC of left leg pain for several months. The pain has not changed in severity or character. She describes the pain as though something is squeezing her leg.

## 2022-10-07 ENCOUNTER — OFFICE VISIT (OUTPATIENT)
Dept: NEUROLOGY | Age: 62
End: 2022-10-07
Payer: MEDICAID

## 2022-10-07 DIAGNOSIS — G31.84 MILD COGNITIVE IMPAIRMENT: Primary | ICD-10-CM

## 2022-10-07 DIAGNOSIS — R41.89 COGNITIVE DECLINE: ICD-10-CM

## 2022-10-07 DIAGNOSIS — F33.3 DEPRESSION, MAJOR, RECURRENT, SEVERE WITH PSYCHOSIS (HCC): ICD-10-CM

## 2022-10-07 DIAGNOSIS — R41.3 SHORT-TERM MEMORY LOSS: ICD-10-CM

## 2022-10-07 PROCEDURE — 90791 PSYCH DIAGNOSTIC EVALUATION: CPT | Performed by: CLINICAL NEUROPSYCHOLOGIST

## 2022-10-07 NOTE — PROGRESS NOTES
[address] [facility],5Th Floor  [address]   [address] [place] [address] [place] [address] [address]   [phone] Office   [phone] Fax      Neuropsychology    Initial Diagnostic Interview Note      Referral:  Shannan Rosas MD    Radha Rubi is a 59 y.o. left handed  female who was accompanied  by Ms Es Jacobsen of [facility]  to the initial clinical interview on 10/7/22 . Please refer to her medical records for details pertaining to her history. At the start of the appointment, I reviewed the patient's [facility] Epic Chart (including Media scanned in from previous providers) for the active Problem List, all pertinent Past Medical Hx, medications, recent radiologic and laboratory findings. In addition, I reviewed pt's documented Immunization Record and Encounter History. She completed two years of college. She lives with her dad. She reports that her memory is doing generally okay. The patient completed Neuropsychological Evaluation at [facility] this July. I am COPYING the Neuropsychologist notes below for clarity:    \"Referral Purpose  Dr. Jing Simental referred Ms. Lacey for neuropsychological assessment over concerns about memory and executive functioning changes over the past several months. Dr. Jing Simental mentions that she is concerned that the patient may be manifesting a neurodegenerative process, possibly a dementia of the Alzheimer's type. Dr. Jing Simental also documents that she is concerned Patient's cognitive symptoms are judged to be worse compared to baseline. She is requesting an evaluation to rule out a major neurocognitive disorder (F02.81). A recent administration of the Max Cognitive Assessment (MoCA) yielded a score of 9/30, which is exceptionally low. Patient ID / Chief Complaint  Ms. Ailin Mahajan is a 58 y/o LHF with 13 years education.  The patient reported cognitive difficulties such as \"easy to forget; easily distracted\"; Memory issues \"for a while,\" estimated to be over last couple years at least.     Relevant Clinical History ([facility] Record Review)  Ms. Maye Oropeza is 59years of age. She has a long history of trauma, self-injurous behavior, suicidal ideation with at least two prior attempts, multiple psychiatric hospitalizations with most recent in November of 2021, auditory hallucinations, and carries a working diagnosis of severe major depressive disorder with psychosis as well as an unspecified personality disorder. According to Dr. Anton [facility] records, the patient has been under the care of the [address] [place] [address] and the ACT Team since 2018. Dr. Martyn Snellen stated that the patient resides and cares for her elderly father, and the two have reportedly refused to allow others in their home to assist with care. The patient is reportedly forgetting to shower or bathe, eat or drink, and take medications as prescribed. Attention and motivation have been long-term difficulties for the patient. The patient is also described as spending much of her known time in her bed, with consistent temporal disorientation. Although the patient joined [address] [place] [address] in 2018, she was hospitalized several times prior to the period, with several subsequent back-to-back hospitalizations after the death of her mother in 2018. SUMMARY OF RESULTS    This is a valid exam. The results are interpretable. There are subtle indications that the patient was initially challenged to put forth sufficient minimal cognitive resources during tasks [TOMM 1: 37; RDS: 6]. Thus, given this caveat the magnitude of cognitive deficit(s) on some tasks and in certain domains may be overestimated. The patients estimated level of premorbid intellectual ability was in the average range. On a brief screening measure of cognitive functioning, she performed in the average range [MMSE: 27/30].      Ms. Maye Oropeza is exhibiting widespread neurocognitive performance deficits. In the domain of language, she has significant difficulties comprehending auditory instructions and is easily confused. She manifests a dysnomia based on low confrontation naming performance in both auditory and visual streams. Verbal initiation was compromised, but more so under automatic lexical search conditions (exceptionally low) compared to the controlled condition in which she had to search her lexicon more effortfully. Basic attention span and visual selective attention and processing speed performance is exceptionally low, with the exception of visual discrimination and decision making under time pressure, which she performs in the low average range. Other tasks of mental control related to working memory, flexibility of thought, deductive reasoning, and visuospatial problem solving are significantly deficient. She does perform an auditory divided attention task just at the cutoff between average and low average (24%ile). Learning and memory performance was variable. She demonstrated a rate of learning for both auditory and visual information at the low average level. Her effortful retrieval was below average. Her retention of learned information (degree of forgetting) was variable with 57% for auditory information but 125% for visual information, meaning that in this later case she recalled more information than she originally learned. Recognition discrimination was exceptionally low. This pattern suggests that executive deficits are mediating memory inefficiency rather than a temporal-limbic pattern of memory dysfunction. Fine motor dexterity and coordination under time pressure was symmetric and at the below average range. Psychological adjustment is remarkable for moderate-to-high moderate levels of symptomatic distress, which is commensurate with her known chronic mental health condition.      SUMMARY AND DIAGNOSTIC IMPRESSION    The findings of this evaluation are non-specific and do not support focal or lateralized cerebral dysfunction. The profile appears mediated by executive control dysfunction given the learning and memory pattern. The degree of cognitive disorganization is unquestionably severe, which is probably the most salient executive finding. Patients with similar findings have substantial difficulties attending to, searching for, and retrieving information on demand and at requisite situations. Goal-directed activity is also often impaired. The results do not correlate with a typical Alzheimers or neurovascular pattern. Unfortunately, the diagnostic question remains unclear. The most likely diagnostic differential seems to be between a behavioral variant fronto-temporal disorder and an exacerbation of her recurrent and severe psychotic depressive illness. These two conditions share overlapping symptomatology and can look similar phenotypically. Certain distinguishing factors that tend to separate MDD from bvFTD include the presence of anhedonia as opposed to apathy accompanied by a sustained and/or worsening mood, feelings of worthlessness, guilty ruminations, and suicidal thoughts. A new onset deficit in motivation, apathy, exhibited as reduced self-initiated goal-directed behavior, cognitive activity, and emotional responsiveness without accompanying dysphoria is more likely to be attributable to bvFTD. Individuals with recurrent and severe major depressive illness punctuated by psychotic features typically manifest multi-domain cognitive deficits. Modification of pharmacotherapy along with psychological intervention with observable benefit argue against a neurodegenerative condition. Moreover, without previous neuropsychological data to make interval comparisons, it is extremely difficult to determine to what extent the current exam results suggest a decline from a previous baseline.  This becomes even more challenging in patient populations where severe functional disability is evident prior to the referral concern. Breadth and magnitude of neurocognitive performance deficits is compatible with a diagnosis of multidomain mild cognitive impairment (G31.84). Reasoning for the mild vs. major neurocognitive level of impairment is based on the estimated degree of reported psychosocial decline reported compared to her estimated premorbid baseline, e.g., increased social isolation, and not bathing as frequent. The findings are considered to be more compatible at this point with a chronic and highly disorganzied psychiatric profile. CONSIDERATIONS / RECOMMENDATIONS  1. Correlation with on-going psychiatric evaluation is recommended in order to refine her diagnosis if necessary and further inform care planning. 2. A third etiological possibility stems from deregulated medical comorbidities, particularly from metabolic or endocrine sources. Infections such as UTI can also impact cognition and functional decline, but this generally has a rapid onset and manifests as a delirium in vulnerable adults. Routine reversible dementia lab work is recommended in order to assess for these treatable factors. 3. Consider assessing for a breathing-related sleep disorder, such as apnea. Consider referral for sleep study if concerns are raised. 4. Consider ordering a brain MRI to assess for structural correlation and additional neurodiagnostic data to clarify diagnosis. Significant frontal and or temporal lobe changes will increase index of suspicion for a FTD onset. 5. Monitoring over time for the presence of apathy, as opposed to anhedonia, may also be helpful in clarifying her diagnosis. 6. Consider repeat neuropsychological evaluation in 1-2 years to assess for cognitive change or response to treatment. Additional assessment with a focus on the above distinguishing neuropsychiatric features will be prioritized.    7. Given these results, I agree with Dr. Maki Aldana that the patient is not well suited to continue be her fathers primary care taker. Alternative arrangements seem to be clearly warranted. \"      Aarti Allen has concerns about memory related concerns. She is not taking any medication for memory. There are times when she struggles with managing her medications. She has a chronic history of mental health concerns. Aarti Allen does daily medication delivery and she may forget her PM medication. She is a caregiver for her father, and there are concerns about this. Team has been with her for quite some time. She is primary caregiver for her ailing father- has been doing that about a year. She will make some foods for dinner. She goes to the store and drives a car. Sleep is so so- not that great. Appetite is alright. After her mother passed away in 2018, the patient developed depression with psychosis which has not occurred in the last three months. There is a decline noted. Not sure about the time. Enjoys watching TV, listening to the radio.       Neuropsychological Mental Status Exam (NMSE):      Historian: Good  Praxis: No UE apraxia  R/L Orientation: Intact to self and to other  Dress: within normal limits   Weight: within normal limits   Appearance/Hygiene: within normal limits   Gait: within normal limits   Assistive Devices: Glasses  Mood: within normal limits   Affect: within normal limits   Comprehension: within normal limits   Thought Process: within normal limits   Expressive Language: within normal limits   Receptive Language: within normal limits   Motor:  No cognitive or motor perseveration  ETOH:  Denied  Tobacco: Denied  Illicit: Denied  SI/HI: Denied  Psychosis: Denied current   Insight: Within normal limits  Judgment: Within normal limits  Other Psych:      Past Medical History:   Diagnosis Date    Asthma 1967    hospitalized for asthma attack x 2    Chronic kidney disease     kidney stones x 2-3 times    Ill-defined condition     nasal blockage from growth and misalignment - cannot breath out of nose - surgery in the future/cleared for colonoscopy 7/31/2014 - will bring in letter    Other ill-defined conditions(799.89)     blood in stool    Other ill-defined conditions(799.89)     hx low BP - 90's/60's-70's    PUD (peptic ulcer disease)     Thyroid disease     Unspecified adverse effect of anesthesia     nasal growth and misalignment and cannot breath through nose       Past Surgical History:   Procedure Laterality Date    HX HEENT      T & A    HX HEENT      turbinate surgery       Allergies   Allergen Reactions    Other Food Other (comments)     \"Bad chest pain\" with walnuts. Coke - asthma/stuffy head. Fish sticks causes an asthma attack. Astepro [Azelastine] Other (comments)     Violent headaches. Bactrim [Sulfamethoprim] Other (comments)     Difficulty breathing, chills, fever. Banana Other (comments)     Diffculty breathing, wheezing, asthma attack. Coconut Other (comments)     Difficulty breathing, asthma attack, SOB. Cortisone Hives     Difficulty breathing. Nut - Unspecified Other (comments)     \"Bad chest pain\" with walnuts    Peanut Other (comments)     Wheezing, asthma attack, SOB. Prednisone Hives     Difficulty breathing, kidney stones.        Family History   Problem Relation Age of Onset    Stroke Mother     Other Mother         erosion of esophagus    Cancer Mother         melanoma/squamous    Heart Surgery Father         x2    Delayed Awakening Father     Pacemaker Father     Other Father         carotid endartarectomy/polio    Cataract Father        Social History     Tobacco Use    Smoking status: Never    Smokeless tobacco: Never   Substance Use Topics    Alcohol use: No    Drug use: No       Current Outpatient Medications   Medication Sig Dispense Refill    apixaban (Eliquis DVT-PE Treat 30D Start) 5 mg (74 tabs) starter pack Take 10 mg (two 5 mg tablets) by mouth twice a day for 7 days Followed by 5 mg (one 5 mg tablet) by mouth twice a day 1 Dose Pack 0    busPIRone (BUSPAR) 10 mg tablet Take 10 mg by mouth two (2) times a day. benztropine (COGENTIN) 1 mg tablet Take 1 mg by mouth two (2) times a day. mirtazapine (REMERON) 30 mg tablet Take 30 mg by mouth nightly. omega-3 acid ethyl esters (Lovaza) 1 gram capsule Take 2 g by mouth two (2) times daily (with meals). Venlafaxine-ER 24 HR (EFFEXOR-ER) 225 mg tablet Take 225 mg by mouth daily. haloperidoL (HALDOL) 10 mg tablet Take 10 mg by mouth two (2) times a day. levothyroxine (SYNTHROID) 100 mcg tablet Take 1 Tab by mouth Daily (before breakfast). 15 Tab 0    pravastatin (PRAVACHOL) 40 mg tablet Take 1 Tab by mouth nightly. 15 Tab 0         Plan:  Obtain authorization for testing from [facility]. Report to follow once testing, scoring, and interpretation completed. ? Organic based neurocognitive issues versus mood disorder or combination of same. ? Problems organic, functional, or both? This note will not be viewable in [address]

## 2022-10-12 ENCOUNTER — OFFICE VISIT (OUTPATIENT)
Dept: NEUROLOGY | Age: 62
End: 2022-10-12
Payer: MEDICAID

## 2022-10-12 DIAGNOSIS — F33.3 DEPRESSION, MAJOR, RECURRENT, SEVERE WITH PSYCHOSIS (HCC): ICD-10-CM

## 2022-10-12 DIAGNOSIS — G31.84 MILD COGNITIVE IMPAIRMENT: Primary | ICD-10-CM

## 2022-10-12 DIAGNOSIS — F41.8 ANXIETY WITH SOMATIC FEATURES: ICD-10-CM

## 2022-10-12 PROCEDURE — 96139 PSYCL/NRPSYC TST TECH EA: CPT | Performed by: CLINICAL NEUROPSYCHOLOGIST

## 2022-10-12 PROCEDURE — 96137 PSYCL/NRPSYC TST PHY/QHP EA: CPT | Performed by: CLINICAL NEUROPSYCHOLOGIST

## 2022-10-12 PROCEDURE — 96138 PSYCL/NRPSYC TECH 1ST: CPT | Performed by: CLINICAL NEUROPSYCHOLOGIST

## 2022-10-12 PROCEDURE — 96136 PSYCL/NRPSYC TST PHY/QHP 1ST: CPT | Performed by: CLINICAL NEUROPSYCHOLOGIST

## 2022-10-12 PROCEDURE — 96132 NRPSYC TST EVAL PHYS/QHP 1ST: CPT | Performed by: CLINICAL NEUROPSYCHOLOGIST

## 2022-10-12 PROCEDURE — 96133 NRPSYC TST EVAL PHYS/QHP EA: CPT | Performed by: CLINICAL NEUROPSYCHOLOGIST

## 2022-10-12 NOTE — LETTER
10/27/2022    Patient: Miguel Jolly   YOB: 1960   Date of Visit: 10/12/2022     Andree Torres, [address] [place] [address]  Via Fax: [phone]    Dear Andree Torres MD,      Thank you for referring Ms. Miguel Jolly to [facility] for evaluation. My notes for this consultation are attached. If you have questions, please do not hesitate to call me. I look forward to following your patient along with you.       Sincerely,    Krystin Gallardo PsyD

## 2022-10-27 NOTE — PROGRESS NOTES
[address] [facility],5Th [address]  [address]   [address] [facility] [address] [facility]    [phone] Office   [phone] Fax      Neuropsychological Evaluation Report      Referral:  Efrain Ceja MD    Miguel Jolly is a 59 y.o. left handed  female who was accompanied  by Ms Saul Kenton of [address] [place] [address]  to the initial clinical interview on 10/7/22 . Please refer to her medical records for details pertaining to her history. At the start of the appointment, I reviewed the patient's [facility] Epic Chart (including Media scanned in from previous providers) for the active Problem List, all pertinent Past Medical Hx, medications, recent radiologic and laboratory findings. In addition, I reviewed pt's documented Immunization Record and Encounter History. She completed two years of college. She lives with her dad. She reports that her memory is doing generally okay. The patient completed Neuropsychological Evaluation at [facility] this July. I am COPYING the Neuropsychologist notes below for clarity:    \"Referral Purpose  Dr. Martyn Snellen referred Ms. Lacey for neuropsychological assessment over concerns about memory and executive functioning changes over the past several months. Dr. Martyn Snellen mentions that she is concerned that the patient may be manifesting a neurodegenerative process, possibly a dementia of the Alzheimer's type. Dr. Martyn Snellen also documents that she is concerned Patient's cognitive symptoms are judged to be worse compared to baseline. She is requesting an evaluation to rule out a major neurocognitive disorder (F02.81). A recent administration of the Max Cognitive Assessment (MoCA) yielded a score of 9/30, which is exceptionally low. Patient ID / Chief Complaint  Ms. Maye Oropeza is a 60 y/o LHF with 13 years education. The patient reported cognitive difficulties such as \"easy to forget; easily distracted\";  Memory issues \"for a while,\" estimated to be over last couple years at least.     Relevant Clinical [facility] ([facility])  Ms. Price Spivey is 59years of age. She has a long history of trauma, self-injurous behavior, suicidal ideation with at least two prior attempts, multiple psychiatric hospitalizations with most recent in November of 2021, auditory hallucinations, and carries a working diagnosis of severe major depressive disorder with psychosis as well as an unspecified personality disorder. According to Dr. Anisa Ashton records, the patient has been under the care of the [address] [place] [address] and the ACT Team since 2018. Dr. Haylie Romero stated that the patient resides and cares for her elderly father, and the two have reportedly refused to allow others in their home to assist with care. The patient is reportedly forgetting to shower or bathe, eat or drink, and take medications as prescribed. Attention and motivation have been long-term difficulties for the patient. The patient is also described as spending much of her known time in her bed, with consistent temporal disorientation. Although the patient joined [address] [place] [address] in 2018, she was hospitalized several times prior to the period, with several subsequent back-to-back hospitalizations after the death of her mother in 2018. SUMMARY OF RESULTS    This is a valid exam. The results are interpretable. There are subtle indications that the patient was initially challenged to put forth sufficient minimal cognitive resources during tasks [TOMM 1: 37; RDS: 6]. Thus, given this caveat the magnitude of cognitive deficit(s) on some tasks and in certain domains may be overestimated. The patients estimated level of premorbid intellectual ability was in the average range. On a brief screening measure of cognitive functioning, she performed in the average range [MMSE: 27/30]. Ms. Price Spivey is exhibiting widespread neurocognitive performance deficits.  In the domain of language, she has significant difficulties comprehending auditory instructions and is easily confused. She manifests a dysnomia based on low confrontation naming performance in both auditory and visual streams. Verbal initiation was compromised, but more so under automatic lexical search conditions (exceptionally low) compared to the controlled condition in which she had to search her lexicon more effortfully. Basic attention span and visual selective attention and processing speed performance is exceptionally low, with the exception of visual discrimination and decision making under time pressure, which she performs in the low average range. Other tasks of mental control related to working memory, flexibility of thought, deductive reasoning, and visuospatial problem solving are significantly deficient. She does perform an auditory divided attention task just at the cutoff between average and low average (24%ile). Learning and memory performance was variable. She demonstrated a rate of learning for both auditory and visual information at the low average level. Her effortful retrieval was below average. Her retention of learned information (degree of forgetting) was variable with 57% for auditory information but 125% for visual information, meaning that in this later case she recalled more information than she originally learned. Recognition discrimination was exceptionally low. This pattern suggests that executive deficits are mediating memory inefficiency rather than a temporal-limbic pattern of memory dysfunction. Fine motor dexterity and coordination under time pressure was symmetric and at the below average range. Psychological adjustment is remarkable for moderate-to-high moderate levels of symptomatic distress, which is commensurate with her known chronic mental health condition.      SUMMARY AND DIAGNOSTIC IMPRESSION    The findings of this evaluation are non-specific and do not support focal or lateralized cerebral dysfunction. The profile appears mediated by executive control dysfunction given the learning and memory pattern. The degree of cognitive disorganization is unquestionably severe, which is probably the most salient executive finding. Patients with similar findings have substantial difficulties attending to, searching for, and retrieving information on demand and at requisite situations. Goal-directed activity is also often impaired. The results do not correlate with a typical Alzheimers or neurovascular pattern. Unfortunately, the diagnostic question remains unclear. The most likely diagnostic differential seems to be between a behavioral variant fronto-temporal disorder and an exacerbation of her recurrent and severe psychotic depressive illness. These two conditions share overlapping symptomatology and can look similar phenotypically. Certain distinguishing factors that tend to separate MDD from bvFTD include the presence of anhedonia as opposed to apathy accompanied by a sustained and/or worsening mood, feelings of worthlessness, guilty ruminations, and suicidal thoughts. A new onset deficit in motivation, apathy, exhibited as reduced self-initiated goal-directed behavior, cognitive activity, and emotional responsiveness without accompanying dysphoria is more likely to be attributable to bvFTD. Individuals with recurrent and severe major depressive illness punctuated by psychotic features typically manifest multi-domain cognitive deficits. Modification of pharmacotherapy along with psychological intervention with observable benefit argue against a neurodegenerative condition. Moreover, without previous neuropsychological data to make interval comparisons, it is extremely difficult to determine to what extent the current exam results suggest a decline from a previous baseline.  This becomes even more challenging in patient populations where severe functional disability is evident prior to the referral concern. Breadth and magnitude of neurocognitive performance deficits is compatible with a diagnosis of multidomain mild cognitive impairment (G31.84). Reasoning for the mild vs. major neurocognitive level of impairment is based on the estimated degree of reported psychosocial decline reported compared to her estimated premorbid baseline, e.g., increased social isolation, and not bathing as frequent. The findings are considered to be more compatible at this point with a chronic and highly disorganzied psychiatric profile. CONSIDERATIONS / RECOMMENDATIONS  1. Correlation with on-going psychiatric evaluation is recommended in order to refine her diagnosis if necessary and further inform care planning. 2. A third etiological possibility stems from deregulated medical comorbidities, particularly from metabolic or endocrine sources. Infections such as UTI can also impact cognition and functional decline, but this generally has a rapid onset and manifests as a delirium in vulnerable adults. Routine reversible dementia lab work is recommended in order to assess for these treatable factors. 3. Consider assessing for a breathing-related sleep disorder, such as apnea. Consider referral for sleep study if concerns are raised. 4. Consider ordering a brain MRI to assess for structural correlation and additional neurodiagnostic data to clarify diagnosis. Significant frontal and or temporal lobe changes will increase index of suspicion for a FTD onset. 5. Monitoring over time for the presence of apathy, as opposed to anhedonia, may also be helpful in clarifying her diagnosis. 6. Consider repeat neuropsychological evaluation in 1-2 years to assess for cognitive change or response to treatment. Additional assessment with a focus on the above distinguishing neuropsychiatric features will be prioritized.    7. Given these results, I agree with Dr. Bhargav Durand that the patient is not well suited to continue be her fathers primary care taker. Alternative arrangements seem to be clearly warranted. \"      THE [facility] has concerns about memory related concerns. She is not taking any medication for memory. There are times when she struggles with managing her medications. She has a chronic history of mental health concerns. THE [facility] does daily medication delivery and she may forget her PM medication. She is a caregiver for her father, and there are concerns about this. Team has been with her for quite some time. She is primary caregiver for her ailing father- has been doing that about a year. She will make some foods for dinner. She goes to the store and drives a car. Sleep is so so- not that great. Appetite is alright. After her mother passed away in 2018, the patient developed depression with psychosis which has not occurred in the last three months. There is a decline noted. Not sure about the time. Enjoys watching TV, listening to the radio.       Neuropsychological Mental Status Exam (NMSE):      Historian: Good  Praxis: No UE apraxia  R/L Orientation: Intact to self and to other  Dress: within normal limits   Weight: within normal limits   Appearance/Hygiene: within normal limits   Gait: within normal limits   Assistive Devices: Glasses  Mood: within normal limits   Affect: within normal limits   Comprehension: within normal limits   Thought Process: within normal limits   Expressive Language: within normal limits   Receptive Language: within normal limits   Motor:  No cognitive or motor perseveration  ETOH:  Denied  Tobacco: Denied  Illicit: Denied  SI/HI: Denied  Psychosis: Denied current   Insight: Within normal limits  Judgment: Within normal limits  Other Psych:      Past Medical History:   Diagnosis Date    Asthma 1967    hospitalized for asthma attack x 2    Chronic kidney disease     kidney stones x 2-3 times    Ill-defined condition     nasal blockage from growth and misalignment - cannot breath out of nose - surgery in the future/cleared for colonoscopy 7/31/2014 - will bring in letter    Other ill-defined conditions(799.89)     blood in stool    Other ill-defined conditions(799.89)     hx low BP - 90's/60's-70's    PUD (peptic ulcer disease)     Thyroid disease     Unspecified adverse effect of anesthesia     nasal growth and misalignment and cannot breath through nose       Past Surgical History:   Procedure Laterality Date    HX HEENT      T & A    HX HEENT      turbinate surgery       Allergies   Allergen Reactions    Other Food Other (comments)     \"Bad chest pain\" with walnuts. Coke - asthma/stuffy head. Fish sticks causes an asthma attack. Astepro [Azelastine] Other (comments)     Violent headaches. Bactrim [Sulfamethoprim] Other (comments)     Difficulty breathing, chills, fever. Banana Other (comments)     Diffculty breathing, wheezing, asthma attack. Coconut Other (comments)     Difficulty breathing, asthma attack, SOB. Cortisone Hives     Difficulty breathing. Nut - Unspecified Other (comments)     \"Bad chest pain\" with walnuts    Peanut Other (comments)     Wheezing, asthma attack, SOB. Prednisone Hives     Difficulty breathing, kidney stones.        Family History   Problem Relation Age of Onset    Stroke Mother     Other Mother         erosion of esophagus    Cancer Mother         melanoma/squamous    Heart Surgery Father         x2    Delayed Awakening Father     Pacemaker Father     Other Father         carotid endartarectomy/polio    Cataract Father        Social History     Tobacco Use    Smoking status: Never    Smokeless tobacco: Never   Substance Use Topics    Alcohol use: No    Drug use: No       Current Outpatient Medications   Medication Sig Dispense Refill    apixaban (Eliquis DVT-PE Treat 30D Start) 5 mg (74 tabs) starter pack Take 10 mg (two 5 mg tablets) by mouth twice a day for 7 days Followed by 5 mg (one 5 mg tablet) by mouth twice a day 1 Dose Pack 0    busPIRone (BUSPAR) 10 mg tablet Take 10 mg by mouth two (2) times a day. benztropine (COGENTIN) 1 mg tablet Take 1 mg by mouth two (2) times a day. mirtazapine (REMERON) 30 mg tablet Take 30 mg by mouth nightly. omega-3 acid ethyl esters (Lovaza) 1 gram capsule Take 2 g by mouth two (2) times daily (with meals). Venlafaxine-ER 24 HR (EFFEXOR-ER) 225 mg tablet Take 225 mg by mouth daily. haloperidoL (HALDOL) 10 mg tablet Take 10 mg by mouth two (2) times a day. levothyroxine (SYNTHROID) 100 mcg tablet Take 1 Tab by mouth Daily (before breakfast). 15 Tab 0    pravastatin (PRAVACHOL) 40 mg tablet Take 1 Tab by mouth nightly. 15 Tab 0         Plan:  Obtain authorization for testing from [employer]. Report to follow once testing, scoring, and interpretation completed. ? Organic based neurocognitive issues versus mood disorder or combination of same. ? Problems organic, functional, or both? This note will not be viewable in [address] Neuropsychological Evaluation  Patient Testing 10/12/22 Report Completed 10/27/22  A Psychometrist Assisted w/ portions of this evaluation while under my direct supervision    Please refer to the patient's initial interview progress note (copied above) and her medical records for details pertaining to her history. Today's neuropsychological test scores follow: The following assessment procedures were performed:      Neuropsychologist Performed, Interpreted, & Reported: Neuropsychological Mental Status Exam, Revised Memory & Behavior Checklist, Mini Mental State Exam, Clock Drawing Test, Test Of Premorbid Functioning, Babak-Melzack Pain Questionnaire,  History Taking  & Clinical Interview With The Patient, Additional History Taking w/ The Patient's , GARRET, CPT-III, Review Of Available Records.     Psychometrist Administered & Neuropsychologist Interpreted & Neuropsychologist Reported: Finger Tapping Test, Grooved Pegboard Test, AUDREY, Wechsler Adult Intelligence Scale - IV, Verbal Fluency Tests, Amador & Amador - Revised, Trailmaking Test Parts A & B, [place] Verbal Learning Test - 3, Yash Complex Figure Test, Mckeon Depression Inventory - II, Mckeon Anxiety Inventory,. Test Findings:  Note:  The patients raw data have been compared with currently available norms which include demographic corrections for age, gender, and/or education. Sometimes, the patients scores are compared to demographically similar individuals as close to the patients age, education level, etc., as possible. \"Average\" is viewed as being +/- 1 standard deviation (SD) from the stated mean for a particular test score. \"Low average\" is viewed as being between 1 and 2 SD below the mean, and above average is viewed as being 1 and 2 SD above the mean. Scores falling in the borderline range (between 1-1/2 and 2 SD below the mean) are viewed with particular attention as to whether they are normal or abnormal neurocognitive test scores. Other methods of inference in analyzing the test data are also utilized, including the pattern and range of scores in the profile, bilateral motor functions, and the presence, if any, of pathognomonic signs. Behaviorally, the patient was friendly and cooperative and appeared motivated to perform well during this examination. Within this context, the results of this evaluation are viewed as a valid reflection of the patients actual neurocognitive and emotional status. Her structured word list fluency, as assessed by the FAS Test, was within themildly impaired range with a T score of 37. Category fluency was within the moderately to severely impaired range with a T score of 24. Confrontation naming ability, as assessed by the Amador & Amador - Revised, was within the mildly impaired range at 48/60 correct (T = 38).    This pattern of performance is indicative of a patient who is at increased risk for day-to-day problems with verbal fluency and confrontation naming ability. The patient was administered the [facility] Continuous Performance Test - III, a computer-administered test of sustained attention, and review of the subscales within this instrument did not reveal clinically significant concerns for inattentiveness or impulsivity. Auditory attention, as assessed by the AUDREY, was also normal.  This pattern of performance is not indicative of a patient who is at increased risk for day-to-day problems with auditory learning and/or memory. The patient was administered the Wechsler Adult Intelligence Scale - IV. See Appendix I for full breakdown of IQ test scores (scanned into media section of this EMR). As can be seen, there was no clinically significant difference between her low average range Working Memory Index score of 83 (13th %ile) and her low average range Processing Speed Index score of 84 (14th %ile). Her Verbal Comprehension Index score of 93 was average. Her Perceptual Reasoning Index score of 69 was extremely low. This latter score is lower than expected based on her performance on a task estimating premorbid functioning levels. The patient was administered the [place] Verbal Learning Test  - 3 and generated a normal range and positive learning curve over five repeated auditory word list learning trials. An interference trial was within normal limits. Free and cued, short and long delayed recall were within or above the normal range. Recognition and forced choice recall were within normal limits. This pattern of performance is not indicative of a patient who is at increased risk for day-to-day problems with auditory learning and/or memory. The patients performance on the copy portion of the Yash Complex Figure Test was within normal limits  (>16th %ile).   Recall for the complex design was within the borderline range after a short delay and normal after a long delay. This pattern of performance is not indicative of a patient who is at increased risk for day-to-day problems with visual delayed memory. Simple timed visual motor sequencing (Trailmaking Test Part A) was within the mildly to moderately impaired range with a T score of 32. Her performance on a similar, but more complex task of timed visual motor sequencing (Trailmaking Test Part B) was within the moderately impaired range with a T score of 25. She made only one sequencing errors on this latter completed test.  Taken together, this pattern of performance is not indicative of a patient who is at increased risk for day-to-day problems with executive functioning.  strength was moderately to severely impaired range for her dominant hand (T = 20) and mildly to moderately impaired for her nondominant hand (T = 32). Fine motor dexterity was mildly impaired bilaterally. This is a mixed pattern of performance and neurologic correlation is indicated. The patient rated her current level of pain as \"0/5 - No Pain\" on the Babak-Melzack Pain Questionnaire. Her Mckeon Depression Inventory -II score of 18 was within the mildly depressed range. Her Mckeon Anxiety Inventory score of 5 reflected minimal anxiety. The patient was also administered the Personality Assessment Inventory and generated a valid profile for interpretation, though some defensiveness in responding is noted. Despite this, there are physical signs of anxiety and depression, PTSD, somatic anxiety. Marked depression and anxiety issues are present. Her self-concept is harsh, fixed, negative. She is distant in those relationships that are maintained. She can be wilder and unassertive at times. Her self-reported level of treatment motivation is low. Jagdeep Foss completed the ABAS-3 and the CASE. She reported marked concerns for depression, cognitive functioning, and somatic concerns.  She reports the patient has marked problems with conceptual skills (SS = 58) and Social Skills (SS -= 63). Impressions & Recommendations: This patient's generated neurocognitive profile is consistent with a diagnosis of Mild Cognitive Impairment. She does not have dementia. Instead, she has pocket areas of weakness that remain unchanged from her previous testing which was completed elsewhere. This is compounded by chronic mental illness worsened by the death of her mother. In addition to continued medical care, my recommendations include psychiatric review of her medication management for anxiety and depression and ongoing monitoring and treatment of psychotic issues as they recur. Continue with case management/outpatient community-based mental health care services. The patient retains capacity at this time. She should be encouraged to remain as mentally, physically, and socially active as possible. That her memory scores are normal is reassuring. Follow-up prn. I will discuss these findings with the patient when she follows up with me in the near future. A follow up Neuropsychological Evaluation is indicated on a prn basis, especially if there are any cognitive and/or emotional changes. DIAGNOSES:   MCI Without Memory Loss      Depression with Psychosis      Moderate Anxiety With Somatic Features       The above information is based upon information currently available to me. If there is any additional information of which I am currently unaware, I would be more than happy to review it upon having it made available to me. Thank you for the opportunity to see this interesting individual.     Sincerely,       Silvino Mclean. Hortencia Joseph, Wilfredo    CC: Kelsey Bonilla MD     Time Documentation:      06123 x1 &  09537 x 1 Neuropsych testing/data gathering by Neuropsychologist (60 minutes)     [phone] x 1  96139 x 7 Test Administration/Data Gathering By Technician: (4 hours).  71157 x 1 (first 30 minutes), 63121 x 7 (each additional 30 minutes)    96132 x 1  96133 x 1 Testing Evaluation Services by Neuropsychologist (1 hour, 50 minutes) 96132 x 1 (first hour), 96133 x 1 (50 minutes)    Definitions:      02254/03546:  Neurobehavioral Status Exam, Clinical interview. Clinical assessment of thinking, reasoning and judgment, by neuropsychologist, both face to face time with patient and time interpreting those test results and reporting, first and subsequent hours)    [id number]: Neuropsychological Test Administration by Technician/Psychometrist, first 30 minutes and each additional 30 minutes. The above includes: Record review. Review of history provided by patient. Review of collaborative information. Testing by Clinician. Review of raw data. Scoring. Report writing of individual tests administered by Clinician. Integration of individual tests administered by psychometrist with NSE/testing by clinician, review of records/history/collaborative information, case Conceptualization, treatment planning, clinical decision making, report writing, coordination Of Care. Psychometry test codes as time spent by psychometrist administering and scoring neurocognitive/psychological tests under supervision of neuropsychologist.  Integral services including scoring of raw data, data interpretation, case conceptualization, report writing etcetera were initiated after the patient finished testing/raw data collected and was completed on the date the report was signed.

## 2022-11-29 ENCOUNTER — OFFICE VISIT (OUTPATIENT)
Dept: NEUROLOGY | Age: 62
End: 2022-11-29
Payer: MEDICAID

## 2022-11-29 DIAGNOSIS — F41.8 ANXIETY WITH SOMATIC FEATURES: ICD-10-CM

## 2022-11-29 DIAGNOSIS — F33.3 DEPRESSION, MAJOR, RECURRENT, SEVERE WITH PSYCHOSIS (HCC): ICD-10-CM

## 2022-11-29 DIAGNOSIS — G31.84 MILD COGNITIVE IMPAIRMENT: Primary | ICD-10-CM

## 2022-11-29 PROCEDURE — 90832 PSYTX W PT 30 MINUTES: CPT | Performed by: CLINICAL NEUROPSYCHOLOGIST

## 2022-11-29 NOTE — PROGRESS NOTES
Prior to seeing the patient I reviewed the records, including the previously completed report, the records in [place], and any updated visits from other providers since I saw the patient last.      Today, I engaged in a psychoeducational and supportive and cognitive/behavioral psychotherapy session with the patient and . I provided psychotherapy in the form of psychoeducation and support with respect to the results of the recent Neuropsychological Evaluation, including discussing individual tests as well as patient's areas of neurocognitive strength versus weakness. We discussed, in detail, the following: This patient's generated neurocognitive profile is consistent with a diagnosis of Mild Cognitive Impairment. She does not have dementia. Instead, she has pocket areas of weakness that remain unchanged from her previous testing which was completed elsewhere. This is compounded by chronic mental illness worsened by the death of her mother. In addition to continued medical care, my recommendations include psychiatric review of her medication management for anxiety and depression and ongoing monitoring and treatment of psychotic issues as they recur. Continue with case management/outpatient community-based mental health care services. The patient retains capacity at this time. She should be encouraged to remain as mentally, physically, and socially active as possible. That her memory scores are normal is reassuring. Follow-up prn. I will discuss these findings with the patient when she follows up with me in the near future. A follow up Neuropsychological Evaluation is indicated on a prn basis, especially if there are any cognitive and/or emotional changes.        DIAGNOSES:                         MCI Without Memory Loss                                                  Depression with Psychosis                                                  Moderate Anxiety With Somatic Features      Education was provided regarding my diagnostic impressions, and we discussed treatment plan/options. I also answered numerous questions related to the clinical findings, including discussing various methods to improve cognition and mood. Counseling provided regarding mood and cognition. CBT and supportive psychotherapy techniques were utilized. Supportive/Cognitive Behavioral/Solution Focused psychotherapy provided  Discussed rational versus irrational thinking patterns and their consequences. Discussed healthy/adaptive and unhealthy/maladaptive coping. The patient needs to continue receiving services by [facility] and follow with psychiatry and psychology as appropriate. Chronic issues here and she is permanently disabled. There is an increased risk for dementia, which thus means her memory needs to be monitored over time. apixaban (ELIQUIS) 5 mg, Oral, 2 times daily    azelastine (Astelin) 0.1 % nasal spray 1 spray, Each Nostril, 2 times daily, Use in each nostril as directed    busPIRone (Buspar) 10 MG tablet 1 tablet, Oral, 3 times daily    doxepin (SINEquan) 50 MG capsule No dose, route, or frequency recorded.    haloperidol (Haldol) 5 MG tablet 2.5 tablets, Oral, 2 times daily    haloperidol (HALDOL) 0.5 mg, Oral, 2 times daily    levothyroxine (SYNTHROID, LEVOXYL) 112 mcg, Oral, Daily    mirtazapine (Remeron) 30 MG tablet No dose, route, or frequency recorded.     pravastatin (PRAVACHOL) 40 mg, Oral, Daily    venlafaxine 225 MG 24 hr tablet 1 tablet, Oral, Daily     The patient had the following concerns which I deferred to their referring provider: meds for mood/cognition      Time spent today: 20

## 2023-01-26 ENCOUNTER — HOSPITAL ENCOUNTER (INPATIENT)
Age: 63
LOS: 27 days | Discharge: HOME | DRG: 751 | End: 2023-02-22
Attending: PSYCHIATRY & NEUROLOGY | Admitting: PSYCHIATRY & NEUROLOGY
Payer: MEDICAID

## 2023-01-26 DIAGNOSIS — F31.9 BIPOLAR 1 DISORDER (HCC): Primary | ICD-10-CM

## 2023-01-26 PROCEDURE — 65220000003 HC RM SEMIPRIVATE PSYCH

## 2023-01-26 PROCEDURE — 74011250637 HC RX REV CODE- 250/637: Performed by: PSYCHIATRY & NEUROLOGY

## 2023-01-26 RX ORDER — HYDROXYZINE 25 MG/1
50 TABLET, FILM COATED ORAL
Status: DISCONTINUED | OUTPATIENT
Start: 2023-01-26 | End: 2023-02-22 | Stop reason: HOSPADM

## 2023-01-26 RX ORDER — ACETAMINOPHEN 325 MG/1
650 TABLET ORAL
Status: DISCONTINUED | OUTPATIENT
Start: 2023-01-26 | End: 2023-02-22 | Stop reason: HOSPADM

## 2023-01-26 RX ORDER — LORAZEPAM 2 MG/ML
1 INJECTION INTRAMUSCULAR
Status: DISCONTINUED | OUTPATIENT
Start: 2023-01-26 | End: 2023-01-31

## 2023-01-26 RX ORDER — MIRTAZAPINE 45 MG/1
45 TABLET, FILM COATED ORAL
Status: ON HOLD | COMMUNITY
End: 2023-02-22 | Stop reason: SDUPTHER

## 2023-01-26 RX ORDER — VENLAFAXINE HYDROCHLORIDE 150 MG/1
300 TABLET, EXTENDED RELEASE ORAL DAILY
COMMUNITY
End: 2023-02-22

## 2023-01-26 RX ORDER — ADHESIVE BANDAGE
30 BANDAGE TOPICAL DAILY PRN
Status: DISCONTINUED | OUTPATIENT
Start: 2023-01-26 | End: 2023-02-22 | Stop reason: HOSPADM

## 2023-01-26 RX ORDER — LEVOTHYROXINE SODIUM 112 UG/1
112 TABLET ORAL
Status: ON HOLD | COMMUNITY
End: 2023-02-22 | Stop reason: SDUPTHER

## 2023-01-26 RX ORDER — CHOLECALCIFEROL (VITAMIN D3) 50 MCG
1 CAPSULE ORAL DAILY
Status: ON HOLD | COMMUNITY
End: 2023-02-22 | Stop reason: SDUPTHER

## 2023-01-26 RX ORDER — DOXEPIN HYDROCHLORIDE 50 MG/1
50 CAPSULE ORAL
Status: ON HOLD | COMMUNITY
End: 2023-01-26

## 2023-01-26 RX ORDER — BENZTROPINE MESYLATE 1 MG/1
1 TABLET ORAL
Status: DISCONTINUED | OUTPATIENT
Start: 2023-01-26 | End: 2023-02-22 | Stop reason: HOSPADM

## 2023-01-26 RX ORDER — MIDODRINE HYDROCHLORIDE 10 MG/1
10 TABLET ORAL
Status: ON HOLD | COMMUNITY
End: 2023-02-22 | Stop reason: SDUPTHER

## 2023-01-26 RX ORDER — TRAZODONE HYDROCHLORIDE 50 MG/1
50 TABLET ORAL
Status: DISCONTINUED | OUTPATIENT
Start: 2023-01-26 | End: 2023-02-22 | Stop reason: HOSPADM

## 2023-01-26 RX ORDER — HALOPERIDOL 5 MG/1
5 TABLET ORAL 3 TIMES DAILY
Status: ON HOLD | COMMUNITY
End: 2023-01-26

## 2023-01-26 RX ORDER — HALOPERIDOL 5 MG/ML
5 INJECTION INTRAMUSCULAR
Status: DISCONTINUED | OUTPATIENT
Start: 2023-01-26 | End: 2023-02-22 | Stop reason: HOSPADM

## 2023-01-26 RX ORDER — VENLAFAXINE HYDROCHLORIDE 150 MG/1
150 TABLET, EXTENDED RELEASE ORAL DAILY
Status: ON HOLD | COMMUNITY
End: 2023-01-26

## 2023-01-26 RX ORDER — OLANZAPINE 5 MG/1
5 TABLET ORAL
Status: DISCONTINUED | OUTPATIENT
Start: 2023-01-26 | End: 2023-02-22 | Stop reason: HOSPADM

## 2023-01-26 RX ORDER — OLANZAPINE 20 MG/1
20 TABLET ORAL
Status: ON HOLD | COMMUNITY
End: 2023-02-22 | Stop reason: SDUPTHER

## 2023-01-26 RX ORDER — DIPHENHYDRAMINE HYDROCHLORIDE 50 MG/ML
50 INJECTION, SOLUTION INTRAMUSCULAR; INTRAVENOUS
Status: DISCONTINUED | OUTPATIENT
Start: 2023-01-26 | End: 2023-02-22 | Stop reason: HOSPADM

## 2023-01-26 RX ADMIN — TRAZODONE HYDROCHLORIDE 50 MG: 50 TABLET ORAL at 21:25

## 2023-01-26 RX ADMIN — HYDROXYZINE HYDROCHLORIDE 50 MG: 25 TABLET, FILM COATED ORAL at 21:25

## 2023-01-26 NOTE — GROUP NOTE
TABITHA  GROUP DOCUMENTATION INDIVIDUAL                                                                          Group Therapy Note    Date: 1/26/2023    Group Start Time: 0900  Group End Time: 1000  Group Topic: Topic Group    [facility] [place] 3 ACUTE BEHAV TH    Jourdan Lombardo 9032 GROUP    Group Therapy Note    Attendees: 5       Attendance: Did not attend    Mauri Henry

## 2023-01-26 NOTE — PROGRESS NOTES
Problem: Suicide  Goal: *STG: Remains safe in hospital  Outcome: Progressing Towards Goal     Problem: Depressed Mood (Adult/Pediatric)  Goal: *STG: Remains safe in hospital  Outcome: Progressing Towards Goal      Pt is a 57year old white  female admitted VOL for ECT procedure. Pt states she has had chronic depression that has not subsided with other treatments. Pt arrived via medical transport on a stretcher. Patient admitted from [facility] ED. Pts allergies include Bananas, Nuts, Bactrim, Cortisone, Sulfamethoprim. Per pt, medical history includes Hypothyroidism, and asthma. Patient alert and oriented X4. Pt denies SI/HI/AVH. Pt endorses depression 10/10 and anxiety 10/10. Pt calm and cooperative during assessment. Skin assessment performed by Fabián Lombardi and [facility] RN. Small scab on Right Knee, all other Skin intact. Pt stated she has one suicide attempt over one month ago via Overdose on 12 pills. Patient denies current SI and stated she has family and supportive love ones to live for. Pt able to contract for safety. Pt denies drug, alcohol or tobacco use. Pt oriented to unit and all questions answered. Q15 minute checks initiated for safety.

## 2023-01-27 PROBLEM — F31.9 BIPOLAR 1 DISORDER (HCC): Status: ACTIVE | Noted: 2023-01-27

## 2023-01-27 LAB
ALBUMIN SERPL-MCNC: 3.1 G/DL (ref 3.5–5)
ALBUMIN/GLOB SERPL: 0.9 (ref 1.1–2.2)
ALP SERPL-CCNC: 66 U/L (ref 45–117)
ALT SERPL-CCNC: 29 U/L (ref 12–78)
ANION GAP SERPL CALC-SCNC: 6 MMOL/L (ref 5–15)
AST SERPL-CCNC: 25 U/L (ref 15–37)
BILIRUB SERPL-MCNC: 0.3 MG/DL (ref 0.2–1)
BUN SERPL-MCNC: 20 MG/DL (ref 6–20)
BUN/CREAT SERPL: 24 (ref 12–20)
CALCIUM SERPL-MCNC: 8.4 MG/DL (ref 8.5–10.1)
CHLORIDE SERPL-SCNC: 105 MMOL/L (ref 97–108)
CHOLEST SERPL-MCNC: 247 MG/DL
CO2 SERPL-SCNC: 28 MMOL/L (ref 21–32)
CREAT SERPL-MCNC: 0.84 MG/DL (ref 0.55–1.02)
ERYTHROCYTE [DISTWIDTH] IN BLOOD BY AUTOMATED COUNT: 13.5 % (ref 11.5–14.5)
GLOBULIN SER CALC-MCNC: 3.4 G/DL (ref 2–4)
GLUCOSE P FAST SERPL-MCNC: 100 MG/DL (ref 65–100)
GLUCOSE SERPL-MCNC: 100 MG/DL (ref 65–100)
HCT VFR BLD AUTO: 33.5 % (ref 35–47)
HDLC SERPL-MCNC: 64 MG/DL
HDLC SERPL: 3.9 (ref 0–5)
HGB BLD-MCNC: 11.1 G/DL (ref 11.5–16)
LDLC SERPL CALC-MCNC: 153.6 MG/DL (ref 0–100)
MCH RBC QN AUTO: 28.8 PG (ref 26–34)
MCHC RBC AUTO-ENTMCNC: 33.1 G/DL (ref 30–36.5)
MCV RBC AUTO: 86.8 FL (ref 80–99)
NRBC # BLD: 0 K/UL (ref 0–0.01)
NRBC BLD-RTO: 0 PER 100 WBC
PLATELET # BLD AUTO: 350 K/UL (ref 150–400)
PMV BLD AUTO: 10.1 FL (ref 8.9–12.9)
POTASSIUM SERPL-SCNC: 4.1 MMOL/L (ref 3.5–5.1)
PROT SERPL-MCNC: 6.5 G/DL (ref 6.4–8.2)
RBC # BLD AUTO: 3.86 M/UL (ref 3.8–5.2)
SODIUM SERPL-SCNC: 139 MMOL/L (ref 136–145)
TRIGL SERPL-MCNC: 147 MG/DL (ref ?–150)
TSH SERPL DL<=0.05 MIU/L-ACNC: 0.33 UIU/ML (ref 0.36–3.74)
VLDLC SERPL CALC-MCNC: 29.4 MG/DL
WBC # BLD AUTO: 4.5 K/UL (ref 3.6–11)

## 2023-01-27 PROCEDURE — 80053 COMPREHEN METABOLIC PANEL: CPT

## 2023-01-27 PROCEDURE — 74011250636 HC RX REV CODE- 250/636: Performed by: PSYCHIATRY & NEUROLOGY

## 2023-01-27 PROCEDURE — 36415 COLL VENOUS BLD VENIPUNCTURE: CPT

## 2023-01-27 PROCEDURE — 82947 ASSAY GLUCOSE BLOOD QUANT: CPT

## 2023-01-27 PROCEDURE — 85027 COMPLETE CBC AUTOMATED: CPT

## 2023-01-27 PROCEDURE — 84443 ASSAY THYROID STIM HORMONE: CPT

## 2023-01-27 PROCEDURE — 65220000003 HC RM SEMIPRIVATE PSYCH

## 2023-01-27 PROCEDURE — 80061 LIPID PANEL: CPT

## 2023-01-27 PROCEDURE — 74011250637 HC RX REV CODE- 250/637: Performed by: PSYCHIATRY & NEUROLOGY

## 2023-01-27 RX ORDER — OMEGA-3-ACID ETHYL ESTERS 1 G/1
1 CAPSULE, LIQUID FILLED ORAL DAILY
Status: DISCONTINUED | OUTPATIENT
Start: 2023-01-28 | End: 2023-02-22 | Stop reason: HOSPADM

## 2023-01-27 RX ORDER — OLANZAPINE 10 MG/1
20 TABLET ORAL
Status: DISCONTINUED | OUTPATIENT
Start: 2023-01-27 | End: 2023-02-22 | Stop reason: HOSPADM

## 2023-01-27 RX ORDER — BUSPIRONE HYDROCHLORIDE 10 MG/1
10 TABLET ORAL 3 TIMES DAILY
Status: DISCONTINUED | OUTPATIENT
Start: 2023-01-27 | End: 2023-02-22 | Stop reason: HOSPADM

## 2023-01-27 RX ORDER — VENLAFAXINE HYDROCHLORIDE 75 MG/1
300 CAPSULE, EXTENDED RELEASE ORAL DAILY
Status: DISCONTINUED | OUTPATIENT
Start: 2023-01-28 | End: 2023-02-22 | Stop reason: HOSPADM

## 2023-01-27 RX ORDER — MIRTAZAPINE 15 MG/1
45 TABLET, FILM COATED ORAL
Status: DISCONTINUED | OUTPATIENT
Start: 2023-01-27 | End: 2023-02-22 | Stop reason: HOSPADM

## 2023-01-27 RX ORDER — MIDODRINE HYDROCHLORIDE 5 MG/1
10 TABLET ORAL
Status: DISCONTINUED | OUTPATIENT
Start: 2023-01-27 | End: 2023-02-22 | Stop reason: HOSPADM

## 2023-01-27 RX ORDER — LEVOTHYROXINE SODIUM 112 UG/1
112 TABLET ORAL
Status: DISCONTINUED | OUTPATIENT
Start: 2023-01-28 | End: 2023-02-22 | Stop reason: HOSPADM

## 2023-01-27 RX ADMIN — LORAZEPAM 1 MG: 2 INJECTION INTRAMUSCULAR; INTRAVENOUS at 18:40

## 2023-01-27 RX ADMIN — BUSPIRONE HYDROCHLORIDE 10 MG: 10 TABLET ORAL at 17:00

## 2023-01-27 RX ADMIN — MIDODRINE HYDROCHLORIDE 10 MG: 5 TABLET ORAL at 16:36

## 2023-01-27 RX ADMIN — HYDROXYZINE HYDROCHLORIDE 50 MG: 25 TABLET, FILM COATED ORAL at 13:49

## 2023-01-27 RX ADMIN — MIRTAZAPINE 45 MG: 15 TABLET, FILM COATED ORAL at 21:23

## 2023-01-27 RX ADMIN — OLANZAPINE 20 MG: 10 TABLET, FILM COATED ORAL at 21:23

## 2023-01-27 RX ADMIN — OLANZAPINE 5 MG: 5 TABLET, FILM COATED ORAL at 13:49

## 2023-01-27 NOTE — BH NOTES
GROUP THERAPY PROGRESS NOTE    Jose Simon is not participating in Coping Skills Group. Group time: 45 minutes     2:00-2:45 pm     Goal orientation: personal  Group therapy participation: active     Therapeutic interventions reviewed and discussed: Patient participated in educational group that focused on cognitive distortions. Group facilitator discussed 12 different cognitive distortions and then asked participants to identify specific areas of life they could relate to this. Members were able to remain engaged and appropriate throughout group.      Impression of participation: patient did not attend group        Janet Novak, supervisee in social work

## 2023-01-27 NOTE — PROGRESS NOTES
Laboratory monitoring for mood stabilizer and antipsychotics:    Recommended baseline monitoring has been completed based on this patient's current medication regimen. Please note  and TSH 0.33     The patient is currently taking the following medication(s):   Current Facility-Administered Medications   Medication Dose Route Frequency    busPIRone (BUSPAR) tablet 10 mg  10 mg Oral TID    [START ON 1/28/2023] levothyroxine (SYNTHROID) tablet 112 mcg  112 mcg Oral ACB    midodrine (PROAMATINE) tablet 10 mg  10 mg Oral TID WITH MEALS    mirtazapine (REMERON) tablet 45 mg  45 mg Oral QHS    OLANZapine (ZyPREXA) tablet 20 mg  20 mg Oral QHS    [START ON 1/28/2023] venlafaxine-SR (EFFEXOR-XR) capsule 300 mg  300 mg Oral DAILY    [START ON 1/28/2023] omega-3 acid ethyl esters (LOVAZA) capsule 1,000 mg  1 g Oral DAILY       Height, Weight, BMI Estimation  Estimated body mass index is 27.07 kg/m² as calculated from the following:    Height as of this encounter: 157.5 cm (62\"). Weight as of this encounter: 67.1 kg (148 lb). Renal Function, Hepatic Function and Chemistry  Estimated Creatinine Clearance: 60.8 mL/min (based on SCr of 0.84 mg/dL). Lab Results   Component Value Date/Time    Sodium 139 01/27/2023 05:39 AM    Potassium 4.1 01/27/2023 05:39 AM    Chloride 105 01/27/2023 05:39 AM    CO2 28 01/27/2023 05:39 AM    Anion gap 6 01/27/2023 05:39 AM    Glucose 100 01/27/2023 05:39 AM    Glucose 100 01/27/2023 05:39 AM    BUN 20 01/27/2023 05:39 AM    Creatinine 0.84 01/27/2023 05:39 AM    BUN/Creatinine ratio 24 (H) 01/27/2023 05:39 AM    GFR est AA 60 (L) 07/08/2022 10:33 PM    GFR est non-AA 49 (L) 07/08/2022 10:33 PM    Calcium 8.4 (L) 01/27/2023 05:39 AM    ALT (SGPT) 29 01/27/2023 05:39 AM    Alk.  phosphatase 66 01/27/2023 05:39 AM    Protein, total 6.5 01/27/2023 05:39 AM    Albumin 3.1 (L) 01/27/2023 05:39 AM    Globulin 3.4 01/27/2023 05:39 AM    A-G Ratio 0.9 (L) 01/27/2023 05:39 AM Bilirubin, total 0.3 01/27/2023 05:39 AM       Lab Results   Component Value Date/Time    Glucose 100 01/27/2023 05:39 AM    Glucose 100 01/27/2023 05:39 AM       Lab Results   Component Value Date/Time    Hemoglobin A1c 6.1 07/19/2018 06:03 AM       Hematology  Lab Results   Component Value Date/Time    WBC 4.5 01/27/2023 05:39 AM    HGB 11.1 (L) 01/27/2023 05:39 AM    HCT 33.5 (L) 01/27/2023 05:39 AM    PLATELET 627 24/55/3922 05:39 AM    MCV 86.8 01/27/2023 05:39 AM       Lipids  Lab Results   Component Value Date/Time    Cholesterol, total 247 (H) 01/27/2023 05:39 AM    HDL Cholesterol 64 01/27/2023 05:39 AM    LDL, calculated 153.6 (H) 01/27/2023 05:39 AM    Triglyceride 147 01/27/2023 05:39 AM    CHOL/HDL Ratio 3.9 01/27/2023 05:39 AM       Thyroid Function  Lab Results   Component Value Date/Time    TSH 0.33 (L) 01/27/2023 05:38 AM    T4, Free 1.0 10/28/2018 06:07 AM     Vitals  Visit Vitals  /62 (BP 1 Location: Right arm, BP Patient Position: Sitting)   Pulse 86   Temp 97.8 °F (36.6 °C)   Resp 16   Ht 157.5 cm (62\")   Wt 67.1 kg (148 lb)   SpO2 97%   Breastfeeding No   BMI 27.07 kg/m²     Thank you,  Marsha Hills, PharmD, BCPS  [phone]

## 2023-01-27 NOTE — GROUP NOTE
TABITHA  GROUP DOCUMENTATION INDIVIDUAL                                                                          Group Therapy Note    Date: 1/27/2023    Group Start Time: 0900  Group End Time: 1000  Group Topic: Topic Group    [facility] [place] 3 ACUTE BEHAV TH    Jourdan Lombardo 2796 GROUP    Group Therapy Note    Attendees: 6       Attendance: Did not attend      Juan Jose Borjas

## 2023-01-27 NOTE — BH NOTES
PSYCHOSOCIAL ASSESSMENT  :Patient identifying info:   Kody Santos is a 57 y.o., female admitted 1/26/2023 12:24 PM     Presenting problem and precipitating factors: Pt was admitted to the ED with SI and a plan to overdose on her medications. Pt has been experiencing severe depression and vague AH for over 30 days. Pt appetite has decreased and her father recently passed away. Pt was refereed by Dr. Ant Lino for ECT therapy. Mental status assessment: Pt denies SI, HI,AHVH, and self harming behaviors. Pt was observed with a flat affect and depressed mood. Pt has fair hygiene and grooming. Strengths/Recreation/Coping Skills:Pt has health insurance and mental health supports. Collateral information: [facility] Medicaid,     Current psychiatric /substance abuse providers and contact info: Pt receives services from [facility] and the ACT team. Jason Strickland [phone]    Previous psychiatric/substance abuse providers and response to treatment: Pt has been hospitalized 6-7 times for SI, depression, anxiety, and attempt to overdose. Pt was hospitalized at [facility] in 2018 and was hospitalized at [facility] 11/2018, 7/2018. Family history of mental illness or substance abuse: Pt did not disclose a family history    Substance abuse history:    Social History     Tobacco Use    Smoking status: Never    Smokeless tobacco: Never   Substance Use Topics    Alcohol use: No   UDS:Negative  BAL< 10    History of biomedical complications associated with substance abuse: Pt does not have a history of biomedical complications    Patient's current acceptance of treatment or motivation for change: Pt is amenable to receiving ECT treatments. Is significant other involved? Pt is single    Describe support system: Pt reported that her greatest support comes from her nephew.     Describe living arrangements and home environment: Pt lives alone    GUARDIAN/POA: No    Guardian Name:     Guardian Contact:     Health issues: High cholesterol, Hyperthyroid, Asthma  Hospital Problems  Date Reviewed: 2022   None      Trauma history: Pt does not have a trauma history.     Legal issues: Pt does not have any legal issues pending    History of  service: Pt has not served in the [employer] status: Pt is unemployed    Mu-ism/cultural factors: Pt is a Bahai.    Education/work history: Pt completed some college and is unemployed    Have you been licensed as a health care professional (current or ): No    Describe coping skills:Odalys Riddle LCSW  2023

## 2023-01-27 NOTE — BH NOTES
Assumed care of the patient. Patient was cooperative with the assessments. Affect was worried, she confirmed \"Bad anxiety and depression. \" Patient denied S.I/H.I/AH and stated that she sees bicycles  on the wall like \"Photo developing process. \" PRN Atarax and Trazodone administered upon request at bed time. Patient appeared to be seeping for about 7 hours. Lab work completed this morning.

## 2023-01-27 NOTE — PROGRESS NOTES
[facility]: MED RECONCILIATION    Comments/Recommendations:   Med rec performed via interview with patient (poor historian) and call to [facility] for review of recent discharge paperwork (discharged on 1/26/23). Current med rec list reflects discharge list from [facility]. Per discharge, patient was noted to stop taking apixaban 5 mg BID, pravastatin 40 mg QHS, doxepin 50 mg QHS, and haloperidol. Per outside records per [facility] pharmacist, patient was noted to stop apixaban due to \"unknown reason why patient is taking. \" Per patient, she was taking for a history of DVT. No recent lipid panel per outside pharmacist so unsure why patient's pravastatin was discontinued at discharge. Midodrine 10 mg TID and olanzapine 20 mg QHS were added while at [facility]. Please evaluate whether patient is to continue apixaban 5 mg PO BID during [facility] admission. Per Epic records, appears patient was diagnosed with a DVT in the great saphenous vein (superficial vein) on 7/2022. Also noting records of frequent falls and dizziness. Per refill history & interview prior to [facility] admission, patient was on mirtazapine 30 mg QHS (increased to mirtazapine 45 mg QHS at [facility]), venlafaxine  mg daily (increased to 300 mg at [facility]), pravastatin 40 mg QHS, apixaban 5 mg BID, haloperidol, doxepin 50 mg QHS, fish oil, levothyroxine, and buspirone 10 mg TID. Allergies: Other food, Astepro [azelastine], Bactrim [sulfamethoprim], Banana, Coconut, Cortisone, Nut - unspecified, Peanut, and Prednisone    Prior to Admission Medications:   Prior to Admission Medications   Prescriptions Last Dose Informant Patient Reported? Taking? OLANZapine (ZyPREXA) 20 mg tablet   Yes Yes   Sig: Take 20 mg by mouth nightly. Venlafaxine-ER 24 HR (EFFEXOR-ER) 150 mg tr24 tablet   Yes Yes   Sig: Take 300 mg by mouth daily.    busPIRone (BUSPAR) 10 mg tablet   Yes Yes   Sig: Take 10 mg by mouth three (3) times daily. levothyroxine (SYNTHROID) 112 mcg tablet   Yes Yes   Sig: Take 112 mcg by mouth Daily (before breakfast). midodrine (PROAMATINE) 10 mg tablet   Yes Yes   Sig: Take 10 mg by mouth three (3) times daily (with meals). mirtazapine (REMERON) 45 mg tablet   Yes Yes   Sig: Take 45 mg by mouth nightly. omega 3-dha-epa-fish oil (Fish OiL) 100-160-1,000 mg cap   Yes Yes   Sig: Take 1 Capsule by mouth daily.       Facility-Administered Medications: None             KITTY Mckeon   Contact: [phone]

## 2023-01-27 NOTE — PROGRESS NOTES
Behavioral Services  Medicare Certification Upon Admission    I certify that this patient's inpatient psychiatric hospital admission is medically necessary for:    [x] (1) Treatment which could reasonably be expected to improve this patient's condition,       [x] (2) Or for diagnostic study;     AND     [x](2) The inpatient psychiatric services are provided while the individual is under the care of a physician and are included in the individualized plan of care.     Estimated length of stay/service 2 -3 weeks    Plan for post-hospital care per social work    Electronically signed by Vj Wu MD on 1/27/2023 at 2:14 PM

## 2023-01-27 NOTE — PROGRESS NOTES
Patient is noted to be isolative to her room this shift. Will encourage participation in unit activities.    Problem: Suicide  Goal: *STG: Attends activities and groups  Outcome: Not Progressing Towards Goal

## 2023-01-28 PROCEDURE — 65220000003 HC RM SEMIPRIVATE PSYCH

## 2023-01-28 PROCEDURE — 74011250637 HC RX REV CODE- 250/637: Performed by: PSYCHIATRY & NEUROLOGY

## 2023-01-28 RX ADMIN — HYDROXYZINE HYDROCHLORIDE 50 MG: 25 TABLET, FILM COATED ORAL at 04:47

## 2023-01-28 RX ADMIN — TRAZODONE HYDROCHLORIDE 50 MG: 50 TABLET ORAL at 21:34

## 2023-01-28 RX ADMIN — MIDODRINE HYDROCHLORIDE 10 MG: 5 TABLET ORAL at 11:29

## 2023-01-28 RX ADMIN — BUSPIRONE HYDROCHLORIDE 10 MG: 10 TABLET ORAL at 07:59

## 2023-01-28 RX ADMIN — BUSPIRONE HYDROCHLORIDE 10 MG: 10 TABLET ORAL at 11:28

## 2023-01-28 RX ADMIN — OLANZAPINE 5 MG: 5 TABLET, FILM COATED ORAL at 04:47

## 2023-01-28 RX ADMIN — VENLAFAXINE HYDROCHLORIDE 300 MG: 75 CAPSULE, EXTENDED RELEASE ORAL at 07:59

## 2023-01-28 RX ADMIN — LEVOTHYROXINE SODIUM 112 MCG: 112 TABLET ORAL at 06:00

## 2023-01-28 RX ADMIN — HYDROXYZINE HYDROCHLORIDE 50 MG: 25 TABLET, FILM COATED ORAL at 21:34

## 2023-01-28 RX ADMIN — BUSPIRONE HYDROCHLORIDE 10 MG: 10 TABLET ORAL at 16:20

## 2023-01-28 RX ADMIN — OLANZAPINE 5 MG: 5 TABLET, FILM COATED ORAL at 12:57

## 2023-01-28 RX ADMIN — MIDODRINE HYDROCHLORIDE 10 MG: 5 TABLET ORAL at 07:59

## 2023-01-28 RX ADMIN — OMEGA-3-ACID ETHYL ESTERS 1000 MG: 1 CAPSULE, LIQUID FILLED ORAL at 08:40

## 2023-01-28 RX ADMIN — HYDROXYZINE HYDROCHLORIDE 50 MG: 25 TABLET, FILM COATED ORAL at 12:57

## 2023-01-28 RX ADMIN — OLANZAPINE 20 MG: 10 TABLET, FILM COATED ORAL at 21:33

## 2023-01-28 RX ADMIN — MIDODRINE HYDROCHLORIDE 10 MG: 5 TABLET ORAL at 16:20

## 2023-01-28 RX ADMIN — MIRTAZAPINE 45 MG: 15 TABLET, FILM COATED ORAL at 21:33

## 2023-01-28 NOTE — PROGRESS NOTES
Patient observed resting in bed during initial assessment. Patient calm and cooperative. Patient oriented X4. Patient reported no immediate concerns and displayed no obvious signs of medical or psychiatric distress. Patient reported no S/I or H/I. Patient reported symptoms of depression and anxiety 10/10. Patient denied hallucinations of any type. Patient took all prescribed HS medications with no concern. Patient observed resting in bed throughout the night. Staff will continue to monitor Patient's progress. Patient slept for 6 hours.      Problem: Suicide  Goal: *STG: Remains safe in hospital  Outcome: Progressing Towards Goal  Goal: *STG: Seeks staff when feelings of self harm or harm towards others arise  Outcome: Progressing Towards Goal  Goal: *STG/LTG: Complies with medication therapy  Outcome: Progressing Towards Goal

## 2023-01-28 NOTE — H&P
[facility] HISTORY AND PHYSICAL    Name:  Vida Zambrano  MR#:  [id number]  :  1960  ACCOUNT #:  [de-identified]  ADMIT DATE:  2023    PSYCHIATRIC INTAKE NOTE    CHIEF COMPLAINT:  Severe depression. HISTORY OF PRESENT ILLNESS:  This is a 68-year-old  female with a long history of depression with prior treatments for it and maybe benzodiazepine dependence who comes to the hospital saying her depression has been going on for most of her life, but she has been severely depressed in the recent weeks and unable to manage her condition. She was admitted to [address] [place] [address], where the psychiatrist on duty evaluated and determined that ECT would be an appropriate treatment for her condition as she has had it in the past and has been the only thing that was useful; however, she had been taking medications that had not been effective and she states she has been off of them now. She started having suicidal ideations, but no specific plan, so she was transferred to our facility where ECT is available so that she may have the treatment. PAST PSYCHIATRIC HISTORY:  Chronic depression; major depressive disorder, recurrent, severe without psychotic features. On Effexor, Zyprexa, BuSpar, Remeron in the past, even omega-3 fish oil. PAST MEDICAL HISTORY:  Asthma, hyperthyroidism, hypercholesterolemia, blood clots. ALLERGIES:  TO CORTISONE CREAMS. SOCIAL HISTORY:  Lives alone. Denies alcohol, drugs, or cigarettes. MENTAL STATUS EXAM:  Adult female. Calm, cooperative. Clear, coherent speech of average rate, volume, and tone. Mood is depressed. Affect, withdrawn. Thoughts are goal directed. Denies suicidal or homicidal ideations currently. No auditory or visual hallucinations. Acknowledges severe depression and restlessness with poor energy and focus, had asked for Ativan to help her out.   Discussed that she is here for ECT, that would interfere with the process and she is accepting of this statement. Here for management of her condition. DIAGNOSIS:  Major depressive disorder, recurrent, severe, without psychotic features. PLAN:  Admit for safety and stabilization. Prepared for ECT. Group therapy and individual therapy. ESTIMATED LENGTH OF STAY:  2-3 weeks. DISPOSITION:  Planning with Social Work. STRENGTHS:  Willingness for treatment. DISABILITIES:  Potential underlying benzodiazepine dependency. JALIL Black MD      [id number]  D:  01/27/2023 15:07  T:  01/28/2023 1:54  JOB #:  [id number]

## 2023-01-28 NOTE — PROGRESS NOTES
Received care of patient resting in bed. Patient is alert and able to make her needs known. Patient reports that she did not sleep at all last night although offgoing nurse reports that she slept well through the night once she received her remeron. Patient reports 10/10 anxiety and depression. She denies SI/HI/AVH. Reports that she is looking forward to ECT next week and is hopeful it will be successful. Patient is noted to be medication and meal compliant this shift. Monitoring ongoing for support, situation, and safety.    Problem: Suicide  Goal: *STG/LTG: Complies with medication therapy  Outcome: Progressing Towards Goal

## 2023-01-29 PROCEDURE — 74011250637 HC RX REV CODE- 250/637: Performed by: PSYCHIATRY & NEUROLOGY

## 2023-01-29 PROCEDURE — 65220000003 HC RM SEMIPRIVATE PSYCH

## 2023-01-29 PROCEDURE — U0005 INFEC AGEN DETEC AMPLI PROBE: HCPCS

## 2023-01-29 RX ADMIN — MIDODRINE HYDROCHLORIDE 10 MG: 5 TABLET ORAL at 16:59

## 2023-01-29 RX ADMIN — BUSPIRONE HYDROCHLORIDE 10 MG: 10 TABLET ORAL at 12:10

## 2023-01-29 RX ADMIN — LEVOTHYROXINE SODIUM 112 MCG: 112 TABLET ORAL at 05:46

## 2023-01-29 RX ADMIN — OLANZAPINE 20 MG: 10 TABLET, FILM COATED ORAL at 21:45

## 2023-01-29 RX ADMIN — OLANZAPINE 5 MG: 5 TABLET, FILM COATED ORAL at 13:26

## 2023-01-29 RX ADMIN — HYDROXYZINE HYDROCHLORIDE 50 MG: 25 TABLET, FILM COATED ORAL at 16:12

## 2023-01-29 RX ADMIN — MIDODRINE HYDROCHLORIDE 10 MG: 5 TABLET ORAL at 08:27

## 2023-01-29 RX ADMIN — MIRTAZAPINE 45 MG: 15 TABLET, FILM COATED ORAL at 21:45

## 2023-01-29 RX ADMIN — TRAZODONE HYDROCHLORIDE 50 MG: 50 TABLET ORAL at 21:45

## 2023-01-29 RX ADMIN — BUSPIRONE HYDROCHLORIDE 10 MG: 10 TABLET ORAL at 16:59

## 2023-01-29 RX ADMIN — OMEGA-3-ACID ETHYL ESTERS 1000 MG: 1 CAPSULE, LIQUID FILLED ORAL at 08:27

## 2023-01-29 RX ADMIN — VENLAFAXINE HYDROCHLORIDE 300 MG: 75 CAPSULE, EXTENDED RELEASE ORAL at 08:26

## 2023-01-29 RX ADMIN — MIDODRINE HYDROCHLORIDE 10 MG: 5 TABLET ORAL at 12:10

## 2023-01-29 RX ADMIN — BUSPIRONE HYDROCHLORIDE 10 MG: 10 TABLET ORAL at 08:26

## 2023-01-29 NOTE — BH NOTES
Psychiatric Progress Note    Patient: Grace Reyes MRN: [id number]  SSN: [id number]    YOB: 1960  Age: 57 y.o. Sex: female      Admit Date: 1/26/2023    LOS: 2 days     Subjective:     Grace Reyes  reports feeling anxious (10/10) and moods are depressed (10/10). Denies SI/HI/AH/VH. No aggression or violence. Appropriately interactive and aware. Tolerating medications well. Eating well and sleeping well.     Objective:     Vitals:    01/27/23 0825 01/27/23 1921 01/28/23 0725 01/28/23 2000   BP: 106/62 (!) 158/93 97/71 94/60   Pulse: 86 94 80 79   Resp: 16 16 16 16   Temp: 97.8 °F (36.6 °C) 97.9 °F (36.6 °C) 98.1 °F (36.7 °C) 98.9 °F (37.2 °C)   SpO2: 97% 98% 99% 97%   Weight:       Height:            Mental Status Exam:   Sensorium  oriented to time, place and person   Relations cooperative   Eye Contact appropriate   Appearance:  age appropriate   Speech:  non-pressured   Thought Process: goal directed   Thought Content Auditory hallucinations she are unable to decipher   Suicidal ideations none   Mood:  depressed   Affect:  depressed   Memory   adequate   Concentration:  adequate   Insight:  limited   Judgment:  poor       MEDICATIONS:  Current Facility-Administered Medications   Medication Dose Route Frequency    busPIRone (BUSPAR) tablet 10 mg  10 mg Oral TID    levothyroxine (SYNTHROID) tablet 112 mcg  112 mcg Oral ACB    midodrine (PROAMATINE) tablet 10 mg  10 mg Oral TID WITH MEALS    mirtazapine (REMERON) tablet 45 mg  45 mg Oral QHS    OLANZapine (ZyPREXA) tablet 20 mg  20 mg Oral QHS    venlafaxine-SR (EFFEXOR-XR) capsule 300 mg  300 mg Oral DAILY    omega-3 acid ethyl esters (LOVAZA) capsule 1,000 mg  1 g Oral DAILY    OLANZapine (ZyPREXA) tablet 5 mg  5 mg Oral Q6H PRN    haloperidol lactate (HALDOL) injection 5 mg  5 mg IntraMUSCular Q6H PRN    benztropine (COGENTIN) tablet 1 mg  1 mg Oral BID PRN    diphenhydrAMINE (BENADRYL) injection 50 mg  50 mg IntraMUSCular BID PRN hydrOXYzine HCL (ATARAX) tablet 50 mg  50 mg Oral TID PRN    LORazepam (ATIVAN) injection 1 mg  1 mg IntraMUSCular Q4H PRN    traZODone (DESYREL) tablet 50 mg  50 mg Oral QHS PRN    acetaminophen (TYLENOL) tablet 650 mg  650 mg Oral Q4H PRN    magnesium hydroxide (MILK OF MAGNESIA) 400 mg/5 mL oral suspension 30 mL  30 mL Oral DAILY PRN      DISCUSSION:   the risks and benefits of the proposed medication  patient given opportunity to ask questions    Lab/Data Review: All lab results for the last 24 hours reviewed. No results found for this or any previous visit (from the past 24 hour(s)). Assessment:     Principal Problem:    Bipolar 1 disorder ([facility] [address] (1/27/2023)        Plan:     Continue current care  Collateral information  Medication modification as appropriate  Disposition planning with social work    I certify that this patient's inpatient psychiatric hospital services furnished since the previous certification were, and continue to be, required for treatment that could reasonably be expected to improve the patient's condition, or for diagnostic study, and that the patient continues to need, on a daily basis, active treatment furnished directly by or requiring the supervision of inpatient psychiatric facility personnel. In addition, the hospital records show that services furnished were intensive treatment services, admission or related services, or equivalent services.   Signed By: Sukumar Vargas MD     January 28, 2023

## 2023-01-29 NOTE — BH NOTES
Patient was endorsing anxiety. PRNs for anxiety given (see MAR). Tolerated well. EKG and COVID test done.

## 2023-01-29 NOTE — PROGRESS NOTES
Problem: Suicide  Goal: *STG: Remains safe in hospital  Outcome: Progressing Towards Goal  Goal: *STG/LTG: Complies with medication therapy  Outcome: Progressing Towards Goal       Patient alert, reported anxiety and depression, cooperative. Denies SI/HI. Denies AVH. Denies pain. No distress observed. No complaints at this time. Medication and meal complaint. Q15 minutes checks ongoing.

## 2023-01-29 NOTE — BH NOTES
Psychiatric Progress Note    Patient: Osvaldo Menchaca MRN: [id number]  SSN: [id number]    YOB: 1960  Age: 57 y.o. Sex: female      Admit Date: 1/26/2023    LOS: 3 days     Subjective:     Osvaldo Menchaca  reports feeling anxious (10/10) and moods are depressed (10/10). Denies SI/HI/AH/VH. No aggression or violence. Appropriately interactive and aware. Tolerating medications well. Eating well and sleeping well. 1/29 - Osvaldo Menchaca reports feeling very depressed awaiting ECT. Still having visual hallucinations that come and go. Denies SI/HI/AH/VH. No aggression or violence. Appropriately interactive and aware. Tolerating medications (atarax and trazodone prns) well. Eating and sleeping fairly.     Objective:     Vitals:    01/28/23 0725 01/28/23 2000 01/29/23 0901 01/29/23 1210   BP: 97/71 94/60 104/66 106/66   Pulse: 80 79 60 91   Resp: 16 16 15    Temp: 98.1 °F (36.7 °C) 98.9 °F (37.2 °C) 97.5 °F (36.4 °C)    SpO2: 99% 97% 96%    Weight:       Height:            Mental Status Exam:   Sensorium  oriented to time, place and person   Relations cooperative   Eye Contact appropriate   Appearance:  age appropriate   Speech:  non-pressured   Thought Process: goal directed   Thought Content Auditory hallucinations she are unable to decipher   Suicidal ideations none   Mood:  depressed   Affect:  depressed   Memory   adequate   Concentration:  adequate   Insight:  limited   Judgment:  poor       MEDICATIONS:  Current Facility-Administered Medications   Medication Dose Route Frequency    busPIRone (BUSPAR) tablet 10 mg  10 mg Oral TID    levothyroxine (SYNTHROID) tablet 112 mcg  112 mcg Oral ACB    midodrine (PROAMATINE) tablet 10 mg  10 mg Oral TID WITH MEALS    mirtazapine (REMERON) tablet 45 mg  45 mg Oral QHS    OLANZapine (ZyPREXA) tablet 20 mg  20 mg Oral QHS    venlafaxine-SR (EFFEXOR-XR) capsule 300 mg  300 mg Oral DAILY    omega-3 acid ethyl esters (LOVAZA) capsule 1,000 mg  1 g Oral DAILY    OLANZapine (ZyPREXA) tablet 5 mg  5 mg Oral Q6H PRN    haloperidol lactate (HALDOL) injection 5 mg  5 mg IntraMUSCular Q6H PRN    benztropine (COGENTIN) tablet 1 mg  1 mg Oral BID PRN    diphenhydrAMINE (BENADRYL) injection 50 mg  50 mg IntraMUSCular BID PRN    hydrOXYzine HCL (ATARAX) tablet 50 mg  50 mg Oral TID PRN    LORazepam (ATIVAN) injection 1 mg  1 mg IntraMUSCular Q4H PRN    traZODone (DESYREL) tablet 50 mg  50 mg Oral QHS PRN    acetaminophen (TYLENOL) tablet 650 mg  650 mg Oral Q4H PRN    magnesium hydroxide (MILK OF MAGNESIA) 400 mg/5 mL oral suspension 30 mL  30 mL Oral DAILY PRN      DISCUSSION:   the risks and benefits of the proposed medication  patient given opportunity to ask questions    Lab/Data Review: All lab results for the last 24 hours reviewed. No results found for this or any previous visit (from the past 24 hour(s)). Assessment:     Principal Problem:    Bipolar 1 disorder ([facility] [address] (1/27/2023)      Plan:     Continue current care  Collateral information  Medication modification as appropriate  Disposition planning with social work    I certify that this patient's inpatient psychiatric hospital services furnished since the previous certification were, and continue to be, required for treatment that could reasonably be expected to improve the patient's condition, or for diagnostic study, and that the patient continues to need, on a daily basis, active treatment furnished directly by or requiring the supervision of inpatient psychiatric facility personnel. In addition, the hospital records show that services furnished were intensive treatment services, admission or related services, or equivalent services.   Signed By: Kannan Hernandez MD     January 29, 2023

## 2023-01-29 NOTE — PROGRESS NOTES
Problem: Suicide  Goal: *STG: Remains safe in hospital  Outcome: Progressing Towards Goal  Goal: *STG/LTG: Complies with medication therapy  Outcome: Progressing Towards Goal   Problem: Depressed Mood (Adult/Pediatric)  Goal: *STG: Participates in treatment plan  Outcome: Progressing Towards Goal     Received pt isolative to room, resting in bed, withdrawn to self, blunt and apathetic, alert and oriented x 4. Presents  calm and cooperative with flat affect and anxious and depressed mood. Pt is cooperative with vital signs check and assessment. Pt endorses anxiety, and depression of 10/10, and AH which she says she will rather not talk about, denies SI/HI/VH. Speech is excessively soft. Interacts appropriately and aware. Pt appears agitated but redirectable. Med and meal compliant. Pain level of 0/10 . Scheduled med, PRN Atarax and Trazodone given @21:34. Will continue to monitor for safety. No behavioral issues observed. Pt slept for 8 hours.

## 2023-01-30 ENCOUNTER — ANESTHESIA EVENT (OUTPATIENT)
Dept: SURGERY | Age: 63
DRG: 751 | End: 2023-01-30
Payer: MEDICAID

## 2023-01-30 ENCOUNTER — ANESTHESIA (OUTPATIENT)
Dept: SURGERY | Age: 63
DRG: 751 | End: 2023-01-30
Payer: MEDICAID

## 2023-01-30 LAB
SARS-COV-2, XPLCVT: NOT DETECTED
SOURCE, COVRS: NORMAL

## 2023-01-30 PROCEDURE — 65220000003 HC RM SEMIPRIVATE PSYCH

## 2023-01-30 PROCEDURE — 2709999900 HC NON-CHARGEABLE SUPPLY: Performed by: PSYCHIATRY & NEUROLOGY

## 2023-01-30 PROCEDURE — GZB2ZZZ ELECTROCONVULSIVE THERAPY, BILATERAL-SINGLE SEIZURE: ICD-10-PCS | Performed by: PSYCHIATRY & NEUROLOGY

## 2023-01-30 PROCEDURE — 74011250637 HC RX REV CODE- 250/637: Performed by: PSYCHIATRY & NEUROLOGY

## 2023-01-30 PROCEDURE — 74011000250 HC RX REV CODE- 250: Performed by: ANESTHESIOLOGY

## 2023-01-30 PROCEDURE — 90870 ELECTROCONVULSIVE THERAPY: CPT | Performed by: PSYCHIATRY & NEUROLOGY

## 2023-01-30 PROCEDURE — 74011250636 HC RX REV CODE- 250/636: Performed by: ANESTHESIOLOGY

## 2023-01-30 PROCEDURE — 74780000002 HC ELECTROSHOCK THERAP RECOVERY: Performed by: PSYCHIATRY & NEUROLOGY

## 2023-01-30 RX ORDER — SODIUM CHLORIDE 9 MG/ML
50 INJECTION, SOLUTION INTRAVENOUS CONTINUOUS
Status: DISCONTINUED | OUTPATIENT
Start: 2023-01-30 | End: 2023-01-30 | Stop reason: HOSPADM

## 2023-01-30 RX ORDER — SODIUM CHLORIDE 0.9 % (FLUSH) 0.9 %
5-40 SYRINGE (ML) INJECTION EVERY 8 HOURS
Status: DISCONTINUED | OUTPATIENT
Start: 2023-01-30 | End: 2023-01-30 | Stop reason: HOSPADM

## 2023-01-30 RX ORDER — SODIUM CHLORIDE, SODIUM LACTATE, POTASSIUM CHLORIDE, CALCIUM CHLORIDE 600; 310; 30; 20 MG/100ML; MG/100ML; MG/100ML; MG/100ML
50 INJECTION, SOLUTION INTRAVENOUS CONTINUOUS
Status: DISCONTINUED | OUTPATIENT
Start: 2023-01-30 | End: 2023-01-30 | Stop reason: HOSPADM

## 2023-01-30 RX ORDER — SUCCINYLCHOLINE CHLORIDE 20 MG/ML
INJECTION INTRAMUSCULAR; INTRAVENOUS AS NEEDED
Status: DISCONTINUED | OUTPATIENT
Start: 2023-01-30 | End: 2023-01-30 | Stop reason: HOSPADM

## 2023-01-30 RX ORDER — SODIUM CHLORIDE 0.9 % (FLUSH) 0.9 %
5-40 SYRINGE (ML) INJECTION AS NEEDED
Status: DISCONTINUED | OUTPATIENT
Start: 2023-01-30 | End: 2023-01-30 | Stop reason: HOSPADM

## 2023-01-30 RX ORDER — SODIUM CHLORIDE 9 MG/ML
INJECTION, SOLUTION INTRAVENOUS
Status: DISCONTINUED | OUTPATIENT
Start: 2023-01-30 | End: 2023-01-30 | Stop reason: HOSPADM

## 2023-01-30 RX ORDER — METHOHEXITAL IN WATER/PF 100MG/10ML
SYRINGE (ML) INTRAVENOUS AS NEEDED
Status: DISCONTINUED | OUTPATIENT
Start: 2023-01-30 | End: 2023-01-30 | Stop reason: HOSPADM

## 2023-01-30 RX ADMIN — BUSPIRONE HYDROCHLORIDE 10 MG: 10 TABLET ORAL at 17:26

## 2023-01-30 RX ADMIN — SUCCINYLCHOLINE CHLORIDE 50 MG: 20 INJECTION, SOLUTION INTRAMUSCULAR; INTRAVENOUS at 09:40

## 2023-01-30 RX ADMIN — OMEGA-3-ACID ETHYL ESTERS 1000 MG: 1 CAPSULE, LIQUID FILLED ORAL at 16:02

## 2023-01-30 RX ADMIN — Medication 60 MG: at 09:40

## 2023-01-30 RX ADMIN — HYDROXYZINE HYDROCHLORIDE 50 MG: 25 TABLET, FILM COATED ORAL at 13:19

## 2023-01-30 RX ADMIN — MIRTAZAPINE 45 MG: 15 TABLET, FILM COATED ORAL at 21:32

## 2023-01-30 RX ADMIN — VENLAFAXINE HYDROCHLORIDE 300 MG: 75 CAPSULE, EXTENDED RELEASE ORAL at 10:54

## 2023-01-30 RX ADMIN — MIDODRINE HYDROCHLORIDE 10 MG: 5 TABLET ORAL at 11:00

## 2023-01-30 RX ADMIN — OLANZAPINE 20 MG: 10 TABLET, FILM COATED ORAL at 21:32

## 2023-01-30 RX ADMIN — SODIUM CHLORIDE: 9 INJECTION, SOLUTION INTRAVENOUS at 07:59

## 2023-01-30 RX ADMIN — BUSPIRONE HYDROCHLORIDE 10 MG: 10 TABLET ORAL at 11:00

## 2023-01-30 RX ADMIN — MIDODRINE HYDROCHLORIDE 10 MG: 5 TABLET ORAL at 17:25

## 2023-01-30 RX ADMIN — HYDROXYZINE HYDROCHLORIDE 50 MG: 25 TABLET, FILM COATED ORAL at 21:32

## 2023-01-30 NOTE — ANESTHESIA POSTPROCEDURE EVALUATION
Procedure(s):  ELECTRO CONVULSIVE THERAPY. general    Anesthesia Post Evaluation      Multimodal analgesia: multimodal analgesia not used between 6 hours prior to anesthesia start to PACU discharge  Patient location during evaluation: PACU  Patient participation: waiting for patient participation  Level of consciousness: sleepy but conscious  Pain management: adequate  Airway patency: patent  Anesthetic complications: no  Cardiovascular status: acceptable  Respiratory status: acceptable  Hydration status: acceptable  Post anesthesia nausea and vomiting:  none  Final Post Anesthesia Temperature Assessment:  Normothermia (36.0-37.5 degrees C)      INITIAL Post-op Vital signs:   Vitals Value Taken Time   /65 01/30/23 1020   Temp 36.5 °C (97.7 °F) 01/30/23 1020   Pulse 90 01/30/23 1023   Resp 23 01/30/23 1023   SpO2 94 % 01/30/23 1024   Vitals shown include unvalidated device data.

## 2023-01-30 NOTE — GROUP NOTE
TABITHA  GROUP DOCUMENTATION INDIVIDUAL                                                                          Group Therapy Note    Date: 1/30/2023    Group Start Time: 0900  Group End Time: 1000  Group Topic: Topic Group    [facility] ACUTE BEHAV TH    Jourdan Lombardo [id number] GROUP    Group Therapy Note    Attendees: 9       Attendance: Did not attend    Jan Scheuermann

## 2023-01-30 NOTE — PROGRESS NOTES
Problem: Suicide  Goal: *STG: Remains safe in hospital  Outcome: Progressing Towards Goal  Goal: *STG: Seeks staff when feelings of self harm or harm towards others arise  Outcome: Progressing Towards Goal  Goal: *STG/LTG: Complies with medication therapy  Outcome: Progressing Towards Goal     Pt alert and oriented x 2, forgetful at times. Pt completed first ETC appt today. Per ECT Procedural Nurse pt arrived only oriented to person. Per report pt tolerated procedure well. Pt denies HI/AVH. She endorses SI (stated she would OD if released)  Pt reports chronic anxiety and depression 10/10. Jim Messer called to change pts status from VOL to TDO. CIARA met with pt via zoom. Awaiting TDO paperwork. Q15 minute checks maintained for safety.

## 2023-01-30 NOTE — PERIOP NOTES
Handoff Report from Operating Room to PACU    Report received from Dr. George Carr and Domitila Knox RN regarding Meghana Gray. Surgeon(s):  Mariam Longoria MD  And Procedure(s) (LRB):  ELECTRO CONVULSIVE THERAPY (N/A)  confirmed   with allergies discussed. Anesthesia type, drugs, patient history, complications, estimated blood loss, vital signs, intake and output, and temperature were reviewed.

## 2023-01-30 NOTE — GROUP NOTE
TABITHA  GROUP DOCUMENTATION INDIVIDUAL                                                                          Group Therapy Note    Date: 1/30/2023    Group Start Time: 1500  Group End Time: 1600  Group Topic: Recreational/Music Therapy    [facility] [place] [address] ACUTE BEHAV [facility]    Jourdan Lombardo [address] GROUP    Group Therapy Note    Attendees: 6       Attendance: Did not attend    Victor Hugo Uriarte

## 2023-01-30 NOTE — BH NOTES
GROUP THERAPY PROGRESS NOTE     Fabián Blandon did not attend in Anxiety Psychoeducation Group      Group time: 45 minutes     2:00-2:45pm     Group therapy participation: Inactive     Therapeutic interventions reviewed and discussed: Patient participated in Psychoeducational group about anxiety. Patients were educated about anxiety, how it manifests in the body, thoughts that occur when experiencing anxiety, and coping strategies. Patients then discussed and shared their experiences and thoughts on each prompt.    Impression of participation: Patient did not attend group        KALIE Turner Student

## 2023-01-30 NOTE — PERIOP NOTES
TRANSFER - OUT REPORT:    Verbal report given to [facility] RN on Osvaldo Menchaca  being transferred to [facility] for ordered procedure       Report consisted of patients Situation, Background, Assessment and   Recommendations(SBAR). Information from the following report(s) SBAR was reviewed with the receiving nurse. Opportunity for questions and clarification was provided.       Patient transported with:   [employer]

## 2023-01-30 NOTE — ANESTHESIA PREPROCEDURE EVALUATION
Relevant Problems   No relevant active problems       Anesthetic History   No history of anesthetic complications            Review of Systems / Medical History  Patient summary reviewed and pertinent labs reviewed    Pulmonary            Asthma        Neuro/Psych         Psychiatric history     Cardiovascular  Within defined limits                Exercise tolerance: <4 METS     GI/Hepatic/Renal           PUD     Endo/Other      Hypothyroidism       Other Findings              Physical Exam    Airway  Mallampati: I  TM Distance: 4 - 6 cm  Neck ROM: normal range of motion   Mouth opening: Normal     Cardiovascular    Rhythm: regular  Rate: normal         Dental    Dentition: Lower dentition intact and Upper dentition intact     Pulmonary  Breath sounds clear to auscultation               Abdominal  GI exam deferred       Other Findings            Anesthetic Plan    ASA: 2  Anesthesia type: general          Induction: Intravenous  Anesthetic plan and risks discussed with: Patient

## 2023-01-30 NOTE — BH NOTES
This writer spoke with Archbold from [facility] team. SW informed her that MD will initiate [address] for ECT. DEV asked if someone from the ACT could come visit patient to help reassure her and support her while she is in treatment. Someone from ACT team will come out this 2/1/23. SW also informed ACT team that patient would benefit from a group home, [facility], or nursing home placement.         Mimi Dhillon, supervisee in social work

## 2023-01-30 NOTE — PROGRESS NOTES
Problem: Suicide  Goal: *STG: Remains safe in hospital  Outcome: Progressing Towards Goal  Goal: *STG/LTG: Complies with medication therapy  Outcome: Progressing Towards Goal   Problem: Depressed Mood (Adult/Pediatric)  Goal: *STG: Participates in treatment plan  Outcome: Progressing Towards Goal     Received pt isolative to room, resting in bed, withdrawn to self, blunt and apathetic, alert and oriented x 4. Presents  calm and cooperative with flat affect and anxious and depressed mood. Pt is cooperative with vital signs check and assessment. Pt endorses anxiety, and depression of 10/10, and AH which she says she will rather not talk about, denies SI/HI/VH. Speech is excessively soft. Interacts appropriately and aware. Pt appears agitated but redirectable. Med and meal compliant. Pain level of 0/10 Scheduled med, and PRN Trazodone given @21:45. Will continue to monitor for safety. No behavioral issues observed. NPO maintained. 06:30 Levothyroxine Tab held. Pt prepared for ECT, vital signs within normal limit. Pt slept for 7 hours.

## 2023-01-30 NOTE — PERIOP NOTES
Supervisor Mary Mckeon notified that pt is not oriented to place, time, or situation. She is oriented to self. She does understand that she has depression and anxiety and wants to be treated for these conditions. She appears to have a poor understanding of being in the hospital. She has a poor understanding of ECT procedure even after explanation, but is agreeable and expresses that she wants to have it done in order to treat her depression and anxiety symptoms. Risk management and behavioral health teams consulted, and advised to proceed with ECT procedure today.

## 2023-01-30 NOTE — BH NOTES
Psychiatric Progress Note    Patient: Madeline Moreira MRN: [id number]  SSN: [id number]    YOB: 1960  Age: 57 y.o. Sex: female      Admit Date: 1/26/2023    LOS: 4 days     Subjective:     Madeline Moreira  reports feeling anxious (10/10) and moods are depressed (10/10). Denies SI/HI/AH/VH. No aggression or violence. Appropriately interactive and aware. Tolerating medications well. Eating well and sleeping well. 1/29 - Madeline Moreira reports feeling very depressed awaiting ECT. Still having visual hallucinations that come and go. Denies SI/HI/AH/VH. No aggression or violence. Appropriately interactive and aware. Tolerating medications (atarax and trazodone prns) well. Eating and sleeping fairly. 1/30 - Madeline Moreira reports feeling terrible and did not recall getting ECT this morning. When told that she had it done, she stated it was not enough. Moods are severely depressed. Denies SI/HI/AH/VH. There was concern that she did not know what she was in the hospital for or anything about the procedure. TDO and court ordered ECT recommended. No aggression or violence. Appropriately interactive and aware. Tolerating medications well. Eating and sleeping fairly.     Objective:     Vitals:    01/30/23 1005 01/30/23 1010 01/30/23 1015 01/30/23 1020   BP: 130/83 125/80 125/79 113/65   Pulse: 96 96 95 99   Resp: 21 20 20 18   Temp:    97.7 °F (36.5 °C)   SpO2: 92% 92% 92% 93%   Weight:       Height:            Mental Status Exam:   Sensorium  oriented to time, place and person   Relations cooperative   Eye Contact appropriate   Appearance:  age appropriate   Speech:  non-pressured   Thought Process: goal directed   Thought Content Auditory hallucinations she are unable to decipher   Suicidal ideations none   Mood:  depressed   Affect:  depressed   Memory   adequate   Concentration:  adequate   Insight:  limited   Judgment:  poor       MEDICATIONS:  Current Facility-Administered Medications   Medication Dose Route Frequency    busPIRone (BUSPAR) tablet 10 mg  10 mg Oral TID    levothyroxine (SYNTHROID) tablet 112 mcg  112 mcg Oral ACB    midodrine (PROAMATINE) tablet 10 mg  10 mg Oral TID WITH MEALS    mirtazapine (REMERON) tablet 45 mg  45 mg Oral QHS    OLANZapine (ZyPREXA) tablet 20 mg  20 mg Oral QHS    venlafaxine-SR (EFFEXOR-XR) capsule 300 mg  300 mg Oral DAILY    omega-3 acid ethyl esters (LOVAZA) capsule 1,000 mg  1 g Oral DAILY    OLANZapine (ZyPREXA) tablet 5 mg  5 mg Oral Q6H PRN    haloperidol lactate (HALDOL) injection 5 mg  5 mg IntraMUSCular Q6H PRN    benztropine (COGENTIN) tablet 1 mg  1 mg Oral BID PRN    diphenhydrAMINE (BENADRYL) injection 50 mg  50 mg IntraMUSCular BID PRN    hydrOXYzine HCL (ATARAX) tablet 50 mg  50 mg Oral TID PRN    LORazepam (ATIVAN) injection 1 mg  1 mg IntraMUSCular Q4H PRN    traZODone (DESYREL) tablet 50 mg  50 mg Oral QHS PRN    acetaminophen (TYLENOL) tablet 650 mg  650 mg Oral Q4H PRN    magnesium hydroxide (MILK OF MAGNESIA) 400 mg/5 mL oral suspension 30 mL  30 mL Oral DAILY PRN      DISCUSSION:   the risks and benefits of the proposed medication  patient given opportunity to ask questions    Lab/Data Review: All lab results for the last 24 hours reviewed. No results found for this or any previous visit (from the past 24 hour(s)). Assessment:     Principal Problem:    Bipolar 1 disorder ([facility] [address] (1/27/2023)      Plan:     Continue current care  Collateral information  Medication modification as appropriate  Crisis evaluation for Commitment then  Court ordered ECT.   Disposition planning with social work    I certify that this patient's inpatient psychiatric hospital services furnished since the previous certification were, and continue to be, required for treatment that could reasonably be expected to improve the patient's condition, or for diagnostic study, and that the patient continues to need, on a daily basis, active treatment furnished directly by or requiring the supervision of inpatient psychiatric facility personnel. In addition, the hospital records show that services furnished were intensive treatment services, admission or related services, or equivalent services.   Signed By: Dexter Canavan, MD     January 30, 2023

## 2023-01-30 NOTE — PROCEDURES
ECT PROCEDURE NOTE      IDENTIFICATION:    Patient Name  Sergey Ramires   Date of Birth 1960   [facility] [id number]   Medical Record Number  [id number]      Age  57 y.o. PCP Sergio Miller MD   Admit date:  1/26/2023    Room Number  PACU/PL  @ [facility]   Date of Service  1/30/2023            Electro Convulsive Therapy:  Treatment number 1        Maintenance ECT NO   Sergey Ramires was admitted to the PACU for ECT. Patient was medically cleared and NPO for procedure. Patient is NOTcourt mandated and gave informed consent for ECT treatment. Patient received     Bilateral ECT YES   Administered using the [address] [place] [address] Drive. at 35 % Energy, under general anesthesia. Sergey Ramires with a good, well generalized seizure of 89 seconds on observation of the motor manifestations of the seizure. EEG duration was 125 secs. Post-Ictal Suppression Index: 37.6 %  Recovery was unremarkable and with no complications. Change in Energy %:             Anesthesia medications administered during procedure:  Brevital:  60 mg  Change for next treament:     Succinylcholine: 50 mg  Change for next treatment:      Propofol: mg  Glycopyrrolate:  mg  Labetalol:  mg  Esmolol:  mg  Lorazepam:  mg  Ketamine:  mg  Zofran:   mg    Toradol:  mg  Lidocaine:  mg  Caffeine Benzoate: mg       CSSRS 1/26/2023 7/8/2022   1) Within the past month, have you wished you were dead or wished you could go to sleep and not wake up? No No   2) Have you actually had any thoughts of killing yourself? No No   6) Have you ever done anything, started to do anything, or prepared to do anything to end your life? Yes No   Did this occur within the past 3 months? Yes -       SIGNED:    Eren Matamoros MD  1/30/2023            Dr. Soraya Chacon participated in this ECT procedure and was supervised by Dr Sania Richardson for credentialing.

## 2023-01-30 NOTE — BH NOTES
Behavioral Health Interdisciplinary Rounds     Patient Name: Patrick Webster  Age: 57 y.o. Room/Bed:  319/01  Primary Diagnosis: Bipolar 1 disorder ([place] [address]     Progress note: Treatment team met with the Pt. Pt reported that she had not received ECT therapy. Per nursing supervisor the Pt was not oriented to place, time, or situation and endorsed that she wanted ECT therapy for her symptoms of anxiety and depression. MD signed [facility] for the Pt to receive ECT therapy.  Pt , Meagan Dumont, from [facility] was contacted about the [facility].     LOS:  4  Expected LOS: TBD    Financial concerns/prescription coverage:  [employer]   Family contact: Pantera Carlson (Father) [phone]    Family requesting physician contact today:  No  Discharge plan: TBD  Access to weapons: No      Outpatient provider(s): Janice Giang [facility])  Patient's preferred phone number for follow up call: [phone]     Participating treatment team members: Eder Mayo Gibson Agent, MD

## 2023-01-31 PROCEDURE — 74011250637 HC RX REV CODE- 250/637: Performed by: PSYCHIATRY & NEUROLOGY

## 2023-01-31 PROCEDURE — 65220000003 HC RM SEMIPRIVATE PSYCH

## 2023-01-31 RX ADMIN — VENLAFAXINE HYDROCHLORIDE 300 MG: 75 CAPSULE, EXTENDED RELEASE ORAL at 09:23

## 2023-01-31 RX ADMIN — HYDROXYZINE HYDROCHLORIDE 50 MG: 25 TABLET, FILM COATED ORAL at 14:09

## 2023-01-31 RX ADMIN — OMEGA-3-ACID ETHYL ESTERS 1000 MG: 1 CAPSULE, LIQUID FILLED ORAL at 10:12

## 2023-01-31 RX ADMIN — LEVOTHYROXINE SODIUM 112 MCG: 112 TABLET ORAL at 06:22

## 2023-01-31 RX ADMIN — MIRTAZAPINE 45 MG: 15 TABLET, FILM COATED ORAL at 21:06

## 2023-01-31 RX ADMIN — MIDODRINE HYDROCHLORIDE 10 MG: 5 TABLET ORAL at 17:04

## 2023-01-31 RX ADMIN — BUSPIRONE HYDROCHLORIDE 10 MG: 10 TABLET ORAL at 17:04

## 2023-01-31 RX ADMIN — MIDODRINE HYDROCHLORIDE 10 MG: 5 TABLET ORAL at 13:52

## 2023-01-31 RX ADMIN — BUSPIRONE HYDROCHLORIDE 10 MG: 10 TABLET ORAL at 09:23

## 2023-01-31 RX ADMIN — OLANZAPINE 20 MG: 10 TABLET, FILM COATED ORAL at 21:06

## 2023-01-31 RX ADMIN — BUSPIRONE HYDROCHLORIDE 10 MG: 10 TABLET ORAL at 11:38

## 2023-01-31 RX ADMIN — TRAZODONE HYDROCHLORIDE 50 MG: 50 TABLET ORAL at 21:06

## 2023-01-31 RX ADMIN — MAGNESIUM HYDROXIDE 30 ML: 1200 LIQUID ORAL at 21:16

## 2023-01-31 NOTE — PROGRESS NOTES
Problem: Discharge Planning  Goal: *Discharge to safe environment  1/31/2023 1249 by Farida Guillen LCSW  Outcome: Progressing Towards Goal  Note: Patient identifies home as a safe environment. Patient will return home upon discharge. 1/31/2023 1247 by Farida Guillen LCSW  Outcome: Progressing Towards Goal  Note: Patient identifies home as a safe environment. Patient will return home upon discharge. Goal: *Knowledge of medication management  1/31/2023 1249 by Farida Guillen LCSW  Outcome: Progressing Towards Goal  Note: Patient verbalizes understanding of medication regimen. Patient is taking medications as prescribed. 1/31/2023 1247 by Farida Guillen LCSW  Outcome: Progressing Towards Goal  Note: Patient verbalizes understanding of medication regimen. Patient is taking medications as prescribed. Goal: *Knowledge of discharge instructions  1/31/2023 1249 by Farida Guillen LCSW  Outcome: Progressing Towards Goal  Note: Patient verbalizes understanding of goals for treatment and safe discharge. 1/31/2023 1247 by Farida Guillen LCSW  Outcome: Progressing Towards Goal  Note: Patient verbalizes understanding of goals for treatment and safe discharge.

## 2023-01-31 NOTE — BH NOTES
Psychiatric Progress Note    Patient: Yennifer Vale MRN: [id number]  SSN: [id number]    YOB: 1960  Age: 57 y.o. Sex: female      Admit Date: 1/26/2023    LOS: 5 days     Subjective:     Yennifer Vale  reports feeling anxious (10/10) and moods are depressed (10/10). Denies SI/HI/AH/VH. No aggression or violence. Appropriately interactive and aware. Tolerating medications well. Eating well and sleeping well. 1/29 - Yennifer Vale reports feeling very depressed awaiting ECT. Still having visual hallucinations that come and go. Denies SI/HI/AH/VH. No aggression or violence. Appropriately interactive and aware. Tolerating medications (atarax and trazodone prns) well. Eating and sleeping fairly. 1/30 - Yennifer Vale reports feeling terrible and did not recall getting ECT this morning. When told that she had it done, she stated it was not enough. Moods are severely depressed. Denies SI/HI/AH/VH. There was concern that she did not know what she was in the hospital for or anything about the procedure. TDO and court ordered ECT recommended. No aggression or violence. Appropriately interactive and aware. Tolerating medications well. Eating and sleeping fairly. 1/31 - Yennifer Vale reports not feeling well and moods are depressed. Per staff tends to ask for Injectable Ativan frequently then gets upset when she does not receive it. Passive SI per staff. Denies HI/AH/VH. No aggression or violence. Tends to isolate to room. Interactive and aware. Tolerating medications well. Eating and sleeping fairly (5 hrs).     Objective:     Vitals:    01/30/23 1020 01/30/23 2010 01/31/23 0820 01/31/23 1138   BP: 113/65 (!) 98/58 106/61 91/66   Pulse: 99 62 60 84   Resp: 18 18 18    Temp: 97.7 °F (36.5 °C) 98.2 °F (36.8 °C) 98.1 °F (36.7 °C)    SpO2: 93% 96% 95%    Weight:       Height:            Mental Status Exam:   Sensorium  oriented to time, place and person   Relations cooperative Eye Contact appropriate   Appearance:  age appropriate   Speech:  non-pressured   Thought Process: goal directed   Thought Content Auditory hallucinations she are unable to decipher   Suicidal ideations none   Mood:  depressed   Affect:  depressed   Memory   adequate   Concentration:  adequate   Insight:  limited   Judgment:  poor       MEDICATIONS:  Current Facility-Administered Medications   Medication Dose Route Frequency    busPIRone (BUSPAR) tablet 10 mg  10 mg Oral TID    levothyroxine (SYNTHROID) tablet 112 mcg  112 mcg Oral ACB    midodrine (PROAMATINE) tablet 10 mg  10 mg Oral TID WITH MEALS    mirtazapine (REMERON) tablet 45 mg  45 mg Oral QHS    OLANZapine (ZyPREXA) tablet 20 mg  20 mg Oral QHS    venlafaxine-SR (EFFEXOR-XR) capsule 300 mg  300 mg Oral DAILY    omega-3 acid ethyl esters (LOVAZA) capsule 1,000 mg  1 g Oral DAILY    OLANZapine (ZyPREXA) tablet 5 mg  5 mg Oral Q6H PRN    haloperidol lactate (HALDOL) injection 5 mg  5 mg IntraMUSCular Q6H PRN    benztropine (COGENTIN) tablet 1 mg  1 mg Oral BID PRN    diphenhydrAMINE (BENADRYL) injection 50 mg  50 mg IntraMUSCular BID PRN    hydrOXYzine HCL (ATARAX) tablet 50 mg  50 mg Oral TID PRN    LORazepam (ATIVAN) injection 1 mg  1 mg IntraMUSCular Q4H PRN    traZODone (DESYREL) tablet 50 mg  50 mg Oral QHS PRN    acetaminophen (TYLENOL) tablet 650 mg  650 mg Oral Q4H PRN    magnesium hydroxide (MILK OF MAGNESIA) 400 mg/5 mL oral suspension 30 mL  30 mL Oral DAILY PRN      DISCUSSION:   the risks and benefits of the proposed medication  patient given opportunity to ask questions    Lab/Data Review: All lab results for the last 24 hours reviewed. No results found for this or any previous visit (from the past 24 hour(s)).       Assessment:     Principal Problem:    Bipolar 1 disorder ([facility] [address] (1/27/2023)      Plan:     Continue current care  Collateral information  Medication modification as appropriate  Crisis evaluation for Commitment then  Court ordered ECT. Disposition planning with social work    I certify that this patient's inpatient psychiatric hospital services furnished since the previous certification were, and continue to be, required for treatment that could reasonably be expected to improve the patient's condition, or for diagnostic study, and that the patient continues to need, on a daily basis, active treatment furnished directly by or requiring the supervision of inpatient psychiatric facility personnel. In addition, the hospital records show that services furnished were intensive treatment services, admission or related services, or equivalent services.   Signed By: Severa Scholz, MD     January 31, 2023

## 2023-01-31 NOTE — BH NOTES
Behavioral Health Interdisciplinary Rounds     Patient Name: Pauline Gama  Age: 57 y.o. Room/Bed:  319/01  Primary Diagnosis: Bipolar 1 disorder ([address]     Progress note: Treatment team met with the Pt. Pt denies SI, HI, AHVH, and self harming behaviors. Pt rated her depression and anxiety at a level of 10/10. Per nursing the Pt slept for 5 hours and has complied with taking her scheduled medications. Pt second ECT therapy treatment is planned for 2/1/23. Pt is isolative to her room and presents with a flat affect and depressed mood.     LOS:  5  Expected LOS: TBD    Financial concerns/prescription coverage:  [employer]   Family contact: Thor Morrison (Father) [phone]    Family requesting physician contact today:  No  Discharge plan: TBD  Access to weapons: No                                                Outpatient provider(s): [facility])  Patient's preferred phone number for follow up call: [phone]      Participating treatment team members: Pauline Gama, Ingrid Montelongo LMSW, Irene Moreno MD

## 2023-01-31 NOTE — GROUP NOTE
TABITHA  GROUP DOCUMENTATION INDIVIDUAL                                                                          Group Therapy Note    Date: 1/31/2023    Group Start Time: 1500  Group End Time: 1600  Group Topic: Recreational/Music Therapy    [facility] - [place] [address] ACUTE BEHAV [facility]    [place], [address] [facility] GROUP DOCUMENTATION GROUP    Group Therapy Note    Attendees: 6       Attendance: Attended    Patient's Goal:  To concentrate on selected task    Interventions/techniques: Supported-crafts,games,music    Interactions: Interacted appropriately    Mental Status: Calm    Behavior/appearance: Attentive, Cooperative, and Needed prompting    Goals Achieved: Able to engage in interactions and Able to listen to others-active participant in game      Additional Notes:  Pleasant,smiling,eager to win-required minimal assistance during game    Mauri Henry

## 2023-01-31 NOTE — PERIOP NOTES
Spoke with Elisha Soria RN, [facility] via telephone to advise patient scheduled for ECT procedure 2/01/2023, NPO after midnight, arrive PACU 0630.

## 2023-01-31 NOTE — GROUP NOTE
TABITHA  GROUP DOCUMENTATION INDIVIDUAL                                                                          Group Therapy Note    Date: 1/31/2023    Group Start Time: 0900  Group End Time: 1000  Group Topic: Topic Group    [facility] ACUTE BEHAV TH    [address] GROUP    Group Therapy Note    Attendees: 7       Attendance: Did not attend    Gisel Rivas

## 2023-01-31 NOTE — PROGRESS NOTES
Problem: Suicide  Goal: *STG/LTG: No longer expresses self destructive or suicidal thoughts  Outcome: Not Progressing Towards Goal     Problem: Depressed Mood (Adult/Pediatric)  Goal: *STG: Remains safe in hospital  Outcome: Progressing Towards Goal     Problem: Suicide  Goal: *STG: Remains safe in hospital  Outcome: Progressing Towards Goal   Nurses Note;      1900 to 0700:      Report received from outgoing day shift RN. Assumed care of patient. On initial round Patient is in room resting in bed, alert,  up oriented. Patient  denies pain,reports S/I, H/I, A/V/H,  depression,10/10 anxiety 10/10. Patient is compliant  with HS medications. Patient received  no PRN. Patient observed resting in bed during shift rounds. Continuously hourly rounds and q 15 minute checks maintained during shift for care and safety. Patient slept for 5  hrs.

## 2023-01-31 NOTE — PROGRESS NOTES
Problem: Suicide  Goal: *STG: Remains safe in hospital  Outcome: Progressing Towards Goal    Shift change report given to Desirae OBREGON (oncoming nurse) by Melida Prakash (offgoing nurse). Report included the following information SBAR, Kardex, MAR, and Recent Results. Assumed care of patient. Met patient in room. Patient presented with a flat affect. Cooperative with assessment and VS. Patient endorsed anxiety and depression. Denied SI, HI and AVH. No complaints of pain. Patient isolative to room throughout shift. Patient informed writer that she was feeling dizzy. Writer attempted to assess and ask questions, patient stated that she is no longer dizzy and requesting Atarax. Patient then requesting Ativan. PRN Atarax given at 1409. Patient isolative to room throughout shift. To dayroom for meals. No issues noted throughout shift.

## 2023-02-01 ENCOUNTER — ANESTHESIA (OUTPATIENT)
Dept: SURGERY | Age: 63
DRG: 751 | End: 2023-02-01
Payer: MEDICAID

## 2023-02-01 PROCEDURE — 74011250637 HC RX REV CODE- 250/637: Performed by: PSYCHIATRY & NEUROLOGY

## 2023-02-01 PROCEDURE — 74011000250 HC RX REV CODE- 250: Performed by: ANESTHESIOLOGY

## 2023-02-01 PROCEDURE — 74011250636 HC RX REV CODE- 250/636: Performed by: ANESTHESIOLOGY

## 2023-02-01 PROCEDURE — GZB2ZZZ ELECTROCONVULSIVE THERAPY, BILATERAL-SINGLE SEIZURE: ICD-10-PCS | Performed by: PSYCHIATRY & NEUROLOGY

## 2023-02-01 PROCEDURE — 90870 ELECTROCONVULSIVE THERAPY: CPT | Performed by: PSYCHIATRY & NEUROLOGY

## 2023-02-01 PROCEDURE — 65220000003 HC RM SEMIPRIVATE PSYCH

## 2023-02-01 PROCEDURE — 74780000002 HC ELECTROSHOCK THERAP RECOVERY: Performed by: PSYCHIATRY & NEUROLOGY

## 2023-02-01 PROCEDURE — 2709999900 HC NON-CHARGEABLE SUPPLY: Performed by: PSYCHIATRY & NEUROLOGY

## 2023-02-01 RX ORDER — SODIUM CHLORIDE 9 MG/ML
50 INJECTION, SOLUTION INTRAVENOUS CONTINUOUS
Status: DISCONTINUED | OUTPATIENT
Start: 2023-02-01 | End: 2023-02-01 | Stop reason: HOSPADM

## 2023-02-01 RX ORDER — SODIUM CHLORIDE 0.9 % (FLUSH) 0.9 %
5-40 SYRINGE (ML) INJECTION AS NEEDED
Status: DISCONTINUED | OUTPATIENT
Start: 2023-02-01 | End: 2023-02-01 | Stop reason: HOSPADM

## 2023-02-01 RX ORDER — SUCCINYLCHOLINE CHLORIDE 20 MG/ML
INJECTION INTRAMUSCULAR; INTRAVENOUS AS NEEDED
Status: DISCONTINUED | OUTPATIENT
Start: 2023-02-01 | End: 2023-02-01 | Stop reason: HOSPADM

## 2023-02-01 RX ORDER — SODIUM CHLORIDE, SODIUM LACTATE, POTASSIUM CHLORIDE, CALCIUM CHLORIDE 600; 310; 30; 20 MG/100ML; MG/100ML; MG/100ML; MG/100ML
50 INJECTION, SOLUTION INTRAVENOUS CONTINUOUS
Status: DISCONTINUED | OUTPATIENT
Start: 2023-02-01 | End: 2023-02-01 | Stop reason: HOSPADM

## 2023-02-01 RX ORDER — SODIUM CHLORIDE 0.9 % (FLUSH) 0.9 %
5-40 SYRINGE (ML) INJECTION EVERY 8 HOURS
Status: DISCONTINUED | OUTPATIENT
Start: 2023-02-01 | End: 2023-02-01 | Stop reason: HOSPADM

## 2023-02-01 RX ORDER — SODIUM CHLORIDE 9 MG/ML
INJECTION, SOLUTION INTRAVENOUS
Status: DISCONTINUED | OUTPATIENT
Start: 2023-02-01 | End: 2023-02-01 | Stop reason: HOSPADM

## 2023-02-01 RX ORDER — METHOHEXITAL IN WATER/PF 100MG/10ML
SYRINGE (ML) INTRAVENOUS AS NEEDED
Status: DISCONTINUED | OUTPATIENT
Start: 2023-02-01 | End: 2023-02-01 | Stop reason: HOSPADM

## 2023-02-01 RX ADMIN — BUSPIRONE HYDROCHLORIDE 10 MG: 10 TABLET ORAL at 11:58

## 2023-02-01 RX ADMIN — OMEGA-3-ACID ETHYL ESTERS 1000 MG: 1 CAPSULE, LIQUID FILLED ORAL at 11:58

## 2023-02-01 RX ADMIN — BUSPIRONE HYDROCHLORIDE 10 MG: 10 TABLET ORAL at 17:43

## 2023-02-01 RX ADMIN — SUCCINYLCHOLINE CHLORIDE 50 MG: 20 INJECTION, SOLUTION INTRAMUSCULAR; INTRAVENOUS at 09:00

## 2023-02-01 RX ADMIN — SODIUM CHLORIDE: 9 INJECTION, SOLUTION INTRAVENOUS at 07:31

## 2023-02-01 RX ADMIN — MIRTAZAPINE 45 MG: 15 TABLET, FILM COATED ORAL at 22:07

## 2023-02-01 RX ADMIN — TRAZODONE HYDROCHLORIDE 50 MG: 50 TABLET ORAL at 22:07

## 2023-02-01 RX ADMIN — OLANZAPINE 20 MG: 10 TABLET, FILM COATED ORAL at 22:07

## 2023-02-01 RX ADMIN — HYDROXYZINE HYDROCHLORIDE 50 MG: 25 TABLET, FILM COATED ORAL at 22:07

## 2023-02-01 RX ADMIN — BUSPIRONE HYDROCHLORIDE 10 MG: 10 TABLET ORAL at 09:59

## 2023-02-01 RX ADMIN — Medication 60 MG: at 09:00

## 2023-02-01 RX ADMIN — VENLAFAXINE HYDROCHLORIDE 300 MG: 75 CAPSULE, EXTENDED RELEASE ORAL at 09:59

## 2023-02-01 RX ADMIN — LEVOTHYROXINE SODIUM 112 MCG: 112 TABLET ORAL at 09:58

## 2023-02-01 NOTE — GROUP NOTE
TABITHA  GROUP DOCUMENTATION INDIVIDUAL                                                                          Group Therapy Note    Date: 2/1/2023    Group Start Time: 1500  Group End Time: 1600  Group Topic: Recreational/Music Therapy    [facility] - [place] [address] ACUTE BEHAV [facility]    J[address] GROUP    Group Therapy Note    Attendees: 7       Attendance: Did not attend      Aixa Clement

## 2023-02-01 NOTE — PROGRESS NOTES
Problem: Suicide  Goal: *STG: Remains safe in hospital  Outcome: Progressing Towards Goal    Shift change report given to Desirae OBREGON (oncoming nurse) by Taiwo Mendieta (offgoing nurse). Report included the following information SBAR, Kardex, MAR, and Recent Results. Assumed care of patient. Patient off unit at ECT treatment. Patient arrived on unit. Met patient in room. Calm and cooperative with assessment. A&Ox3, disoriented to time. Patient re-oriented. Patient endorsed anxiety and depression. Denied SI, HI and AVH. No complaints of pain. Medication and meal compliant. Patient visible on unit, in dayroom for groups and to watch TV. Patient pleasant throughout shift. No issues noted.

## 2023-02-01 NOTE — PERIOP NOTES
TRANSFER - OUT REPORT:    Verbal report given to [facility] MAGALY RN on Patrick Sinks  being transferred to [facility] for ordered procedure       Report consisted of patients Situation, Background, Assessment and   Recommendations(SBAR). Information from the following report(s) SBAR was reviewed with the receiving nurse. Opportunity for questions and clarification was provided.       Patient transported with:   [employer]

## 2023-02-01 NOTE — BH NOTES
Psychiatric Progress Note    Patient: Yennifer Vale MRN: [id number]  SSN: [id number]    YOB: 1960  Age: 57 y.o. Sex: female      Admit Date: 1/26/2023    LOS: 6 days     Subjective:     Yennifer Vale  reports feeling anxious (10/10) and moods are depressed (10/10). Denies SI/HI/AH/VH. No aggression or violence. Appropriately interactive and aware. Tolerating medications well. Eating well and sleeping well. 1/29 - Yennifer Vale reports feeling very depressed awaiting ECT. Still having visual hallucinations that come and go. Denies SI/HI/AH/VH. No aggression or violence. Appropriately interactive and aware. Tolerating medications (atarax and trazodone prns) well. Eating and sleeping fairly. 1/30 - Yennifer Vale reports feeling terrible and did not recall getting ECT this morning. When told that she had it done, she stated it was not enough. Moods are severely depressed. Denies SI/HI/AH/VH. There was concern that she did not know what she was in the hospital for or anything about the procedure. TDO and court ordered ECT recommended. No aggression or violence. Appropriately interactive and aware. Tolerating medications well. Eating and sleeping fairly. 1/31 - Yennifer Vale reports not feeling well and moods are depressed. Per staff tends to ask for Injectable Ativan frequently then gets upset when she does not receive it. Passive SI per staff. Denies HI/AH/VH. No aggression or violence. Tends to isolate to room. Interactive and aware. Tolerating medications well. Eating and sleeping fairly (5 hrs). 2/1 - Yennifer Vale reports not feeling good again this morning but presents less intense, more calm and is more conversant today post ECT #2. Moods are depressed. Anxiety 10/10. Denies HI/AH/VH. Was afraid of her thoughts about being dead and did not discuss them yesterday. No aggression or violence. Appropriately interactive and aware.  Tolerating medications well.  Eating and sleeping fairly (6.45 hrs). Objective:     Vitals:    02/01/23 0935 02/01/23 0953 02/01/23 1152 02/01/23 1156   BP: 114/69 136/77  126/85   Pulse: 94 84 83 88   Resp: 19 18     Temp: 97.6 °F (36.4 °C) 97.3 °F (36.3 °C)     SpO2: 97% 100%     Weight:       Height:            Mental Status Exam:   Sensorium  oriented to time, place and person   Relations cooperative   Eye Contact appropriate   Appearance:  age appropriate   Speech:  non-pressured   Thought Process: goal directed   Thought Content Auditory hallucinations she are unable to decipher   Suicidal ideations none   Mood:  depressed   Affect:  depressed   Memory   adequate   Concentration:  adequate   Insight:  limited   Judgment:  poor       MEDICATIONS:  Current Facility-Administered Medications   Medication Dose Route Frequency    busPIRone (BUSPAR) tablet 10 mg  10 mg Oral TID    levothyroxine (SYNTHROID) tablet 112 mcg  112 mcg Oral ACB    midodrine (PROAMATINE) tablet 10 mg  10 mg Oral TID WITH MEALS    mirtazapine (REMERON) tablet 45 mg  45 mg Oral QHS    OLANZapine (ZyPREXA) tablet 20 mg  20 mg Oral QHS    venlafaxine-SR (EFFEXOR-XR) capsule 300 mg  300 mg Oral DAILY    omega-3 acid ethyl esters (LOVAZA) capsule 1,000 mg  1 g Oral DAILY    OLANZapine (ZyPREXA) tablet 5 mg  5 mg Oral Q6H PRN    haloperidol lactate (HALDOL) injection 5 mg  5 mg IntraMUSCular Q6H PRN    benztropine (COGENTIN) tablet 1 mg  1 mg Oral BID PRN    diphenhydrAMINE (BENADRYL) injection 50 mg  50 mg IntraMUSCular BID PRN    hydrOXYzine HCL (ATARAX) tablet 50 mg  50 mg Oral TID PRN    traZODone (DESYREL) tablet 50 mg  50 mg Oral QHS PRN    acetaminophen (TYLENOL) tablet 650 mg  650 mg Oral Q4H PRN    magnesium hydroxide (MILK OF MAGNESIA) 400 mg/5 mL oral suspension 30 mL  30 mL Oral DAILY PRN      DISCUSSION:   the risks and benefits of the proposed medication  patient given opportunity to ask questions    Lab/Data Review:   All lab results for the last 24 hours reviewed. No results found for this or any previous visit (from the past 24 hour(s)). Assessment:     Principal Problem:    Bipolar 1 disorder ([facility] [address] (1/27/2023)      Plan:     Continue current care  Collateral information  Medication modification as appropriate  Crisis evaluation for Commitment then  Court ordered ECT. Disposition planning with social work    I certify that this patient's inpatient psychiatric hospital services furnished since the previous certification were, and continue to be, required for treatment that could reasonably be expected to improve the patient's condition, or for diagnostic study, and that the patient continues to need, on a daily basis, active treatment furnished directly by or requiring the supervision of inpatient psychiatric facility personnel. In addition, the hospital records show that services furnished were intensive treatment services, admission or related services, or equivalent services.   Signed By: Alanna Baeza MD     February 1, 2023

## 2023-02-01 NOTE — PERIOP NOTES
Spoke with Cammy Mauricio RN, [facility], to advise patient scheduled for ECT procedure 02/07/2023, NPO after midnight, arrive PACU 0830.

## 2023-02-01 NOTE — BH NOTES
Behavioral Health Interdisciplinary Rounds     Patient Name: Flory Humphries  Age: 57 y.o. Room/Bed:  319/01  Primary Diagnosis: Bipolar 1 disorder ([facility] [address]   Admission Status: TDO  Progress note: Treatment team met with the Pt in her room. Pt reported that she was feeling scared of the ECT therapy and for having suicidal thoughts. Pt endorsed that she doesn't have a plan and can contract for safety. Pt presented with a bright affect and depressed mood. Pt received her 2nd ECT therapy this morning and did not report any side effects from the therapy. SW contacted the Pt  Judith Patterson and scheduled a time for the Pt  to come and visit the Pt at 3:00pm today.      LOS:  6  Expected LOS: TBD    Financial concerns/prescription coverage:  [employer] [facility]   Family contact: Lauro Cranker (Father) [phone]    Family requesting physician contact today:  No  Discharge plan: TBD  Access to weapons: No                                                Outpatient provider(s): [facility]), [phone]  Patient's preferred phone number for follow up call: [phone]      Participating treatment team members: Howard Pettit Edda Potts, MD

## 2023-02-01 NOTE — PROGRESS NOTES
Problem: Suicide  Goal: *STG: Remains safe in hospital  Outcome: Progressing Towards Goal  Goal: *STG/LTG: Complies with medication therapy  Outcome: Progressing Towards Goal   Problem: Depressed Mood (Adult/Pediatric)  Goal: *STG: Participates in treatment plan  Outcome: Progressing Towards Goal     Received pt resting in bed, alert and oriented x 3. Presents calm and cooperative with flat affect and anxious/depressed mood. Pt is cooperative with vital signs check and assessment. Pt endorses anxiety,and depression of 10/10, endorses SI but contracts to safety in the unit, denies HI/AH/VH. Speech unremarkable. Interacts appropriately and aware. Med and meal compliant. Pain level of 0/10. Pt complained of constipation LBM 1/29/23. Scheduled med and PRN Trazodone, Mag Hydroxide given. Will continue to monitor for safety. 2844, pt had a shower, vital signs within normal limit. Pt prepared for ECT, NPO maintained . Pre Proc documentation done. 06:30 Levothyroxine Tab held. Pt slept for 6.45 hours.

## 2023-02-01 NOTE — GROUP NOTE
TABITHA  GROUP DOCUMENTATION INDIVIDUAL                                                                          Group Therapy Note    Date: 2/1/2023    Group Start Time: 0900  Group End Time: 1000  Group Topic: Topic Group    [address] ACUTE BEHAV [facility]    Jourdan Lombardo [facility] GROUP    Group Therapy Note    Attendees: 5       Attendance: Did not attend    Gisel Rivas

## 2023-02-01 NOTE — BH NOTES
GROUP THERAPY PROGRESS NOTE    Simi Lucas is is participating in Psychiatric. Group time: 45 minutes     2:00-2:45pm     Goal orientation: medication     Group therapy participation: active     Therapeutic interventions reviewed and discussed: Group facilitator provided education on the five classes of psychotropic medications. Patient was able to serve as an active participant and was able to identify medications they take for their individual diagnosis. Patient continues to show progress towards their goals by being an active member of group.      Impression of participation: Patient was able to serve as a an active participant in group this afternoon           Anette Felder, supervisee in social work

## 2023-02-01 NOTE — ANESTHESIA POSTPROCEDURE EVALUATION
Procedure(s):  ELECTRO CONVULSIVE THERAPY. general    Anesthesia Post Evaluation      Multimodal analgesia: multimodal analgesia not used between 6 hours prior to anesthesia start to PACU discharge  Patient location during evaluation: PACU  Patient participation: complete - patient cannot participate  Level of consciousness: awake and alert  Pain management: adequate  Airway patency: patent  Anesthetic complications: no  Cardiovascular status: acceptable  Respiratory status: acceptable  Hydration status: acceptable  Post anesthesia nausea and vomiting:  none  Final Post Anesthesia Temperature Assessment:  Normothermia (36.0-37.5 degrees C)      INITIAL Post-op Vital signs:   Vitals Value Taken Time   /69 02/01/23 0935   Temp 36.4 °C (97.6 °F) 02/01/23 0935   Pulse 92 02/01/23 0935   Resp 18 02/01/23 0935   SpO2 97 % 02/01/23 0935   Vitals shown include unvalidated device data.

## 2023-02-01 NOTE — PERIOP NOTES
Handoff Report from Operating Room to PACU    Report received from Dr. Dank Reeves and Dm Ahn RN regarding Yennifer Vale. Surgeon(s):  Becca Dc MD  And Procedure(s) (LRB):  ELECTRO CONVULSIVE THERAPY (N/A)  confirmed   with allergies discussed. Anesthesia type, drugs, patient history, complications, estimated blood loss, vital signs, intake and output, and temperature were reviewed.

## 2023-02-01 NOTE — PROCEDURES
ECT PROCEDURE NOTE      IDENTIFICATION:    Patient Name  Meghana Gray   Date of Birth 1960   [facility] [id number]   Medical Record Number  [id number]      Age  57 y.o. PCP Suma Baumann MD   Admit date:  1/26/2023    Room Number  PACU/PL  @ [facility]   Date of Service  2/1/2023            Electro Convulsive Therapy:  Treatment number 2        Maintenance ECT NO   Meghana Gray was admitted to the PACU for ECT. Patient was medically cleared and NPO for procedure. Patient is NOTcourt mandated and gave informed consent for ECT treatment. Patient received     Bilateral ECT YES   Administered using the [address] [place] [address] Drive. at 35 % Energy, under general anesthesia. Meghana Gray with a good, well generalized seizure of 53 seconds on observation of the motor manifestations of the seizure. EEG duration was 76 secs. Post-Ictal Suppression Index: 94.2 %  Recovery was unremarkable and with no complications. Change in Energy %:             Anesthesia medications administered during procedure:  Brevital:  60 mg  Change for next treament:     Succinylcholine: 50 mg  Change for next treatment:      Propofol: mg  Glycopyrrolate:  mg  Labetalol:  mg  Esmolol:  mg  Lorazepam:  mg  Ketamine:  mg  Zofran:   mg    Toradol:  mg  Lidocaine:  mg  Caffeine Benzoate: mg       CSSRS 1/26/2023 7/8/2022   1) Within the past month, have you wished you were dead or wished you could go to sleep and not wake up? No No   2) Have you actually had any thoughts of killing yourself? No No   6) Have you ever done anything, started to do anything, or prepared to do anything to end your life? Yes No   Did this occur within the past 3 months? Yes -       SIGNED:    Peyton Cuadra MD  2/1/2023            Dr. Kath Miller participated in this ECT procedure and was supervised by Dr Flash Diaz for credentialing.

## 2023-02-02 PROCEDURE — 65220000003 HC RM SEMIPRIVATE PSYCH

## 2023-02-02 PROCEDURE — 74011250637 HC RX REV CODE- 250/637: Performed by: PSYCHIATRY & NEUROLOGY

## 2023-02-02 RX ADMIN — OLANZAPINE 20 MG: 10 TABLET, FILM COATED ORAL at 22:00

## 2023-02-02 RX ADMIN — MIRTAZAPINE 45 MG: 15 TABLET, FILM COATED ORAL at 22:00

## 2023-02-02 RX ADMIN — LEVOTHYROXINE SODIUM 112 MCG: 112 TABLET ORAL at 06:36

## 2023-02-02 RX ADMIN — BUSPIRONE HYDROCHLORIDE 10 MG: 10 TABLET ORAL at 13:49

## 2023-02-02 RX ADMIN — MIDODRINE HYDROCHLORIDE 10 MG: 5 TABLET ORAL at 09:02

## 2023-02-02 RX ADMIN — BUSPIRONE HYDROCHLORIDE 10 MG: 10 TABLET ORAL at 17:56

## 2023-02-02 RX ADMIN — MAGNESIUM HYDROXIDE 30 ML: 1200 LIQUID ORAL at 09:02

## 2023-02-02 RX ADMIN — VENLAFAXINE HYDROCHLORIDE 300 MG: 75 CAPSULE, EXTENDED RELEASE ORAL at 09:02

## 2023-02-02 RX ADMIN — OMEGA-3-ACID ETHYL ESTERS 1000 MG: 1 CAPSULE, LIQUID FILLED ORAL at 09:02

## 2023-02-02 RX ADMIN — MIDODRINE HYDROCHLORIDE 10 MG: 5 TABLET ORAL at 17:56

## 2023-02-02 RX ADMIN — MIDODRINE HYDROCHLORIDE 10 MG: 5 TABLET ORAL at 13:49

## 2023-02-02 RX ADMIN — BUSPIRONE HYDROCHLORIDE 10 MG: 10 TABLET ORAL at 09:02

## 2023-02-02 NOTE — BH NOTES
Behavioral Health Interdisciplinary Rounds     Patient Name: Peterson Apgar  Age: 57 y.o. Room/Bed:  319/01  Primary Diagnosis: Bipolar 1 disorder ([facility] [address]   Admission Status: TDO    Progress note: Patient met with treatment team. Patient endorsed passive SI without a plan. Patient is able to contract for safety. Patient endorsed depression and anxiety as 10/10. Patient denied HI/AH/VH. Patient received second ECT treatment yesterday and will receive third ECT treatment tomorrow 2/3/2023.      LOS:  7  Expected LOS: TBD    Financial concerns/prescription coverage:  [employer]   Family contact: Sunshine Schaefer (Father) [phone]    Family requesting physician contact today:  No  Discharge plan: TBD  Access to weapons: No                                                Outpatient provider(s): Hermina Frankel [facility]), [phone]  Patient's preferred phone number for follow up call: [phone]      Participating treatment team members: Peterson Apgar, Raechel Gu, Elfreda Larve, MD

## 2023-02-02 NOTE — BH NOTES
Psychiatric Progress Note    Patient: Corry Marte MRN: [id number]  SSN: [id number]    YOB: 1960  Age: 57 y.o. Sex: female      Admit Date: 1/26/2023    LOS: 7 days     Subjective:     Corry Marte  reports feeling anxious (10/10) and moods are depressed (10/10). Denies SI/HI/AH/VH. No aggression or violence. Appropriately interactive and aware. Tolerating medications well. Eating well and sleeping well. 1/29 - Corry Marte reports feeling very depressed awaiting ECT. Still having visual hallucinations that come and go. Denies SI/HI/AH/VH. No aggression or violence. Appropriately interactive and aware. Tolerating medications (atarax and trazodone prns) well. Eating and sleeping fairly. 1/30 - Corry Maret reports feeling terrible and did not recall getting ECT this morning. When told that she had it done, she stated it was not enough. Moods are severely depressed. Denies SI/HI/AH/VH. There was concern that she did not know what she was in the hospital for or anything about the procedure. TDO and court ordered ECT recommended. No aggression or violence. Appropriately interactive and aware. Tolerating medications well. Eating and sleeping fairly. 1/31 - Corry Marte reports not feeling well and moods are depressed. Per staff tends to ask for Injectable Ativan frequently then gets upset when she does not receive it. Passive SI per staff. Denies HI/AH/VH. No aggression or violence. Tends to isolate to room. Interactive and aware. Tolerating medications well. Eating and sleeping fairly (5 hrs). 2/1 - Corry Marte reports not feeling good again this morning but presents less intense, more calm and is more conversant today post ECT #2. Moods are depressed. Anxiety 10/10. Denies HI/AH/VH. Was afraid of her thoughts about being dead and did not discuss them yesterday. No aggression or violence. Appropriately interactive and aware.  Tolerating medications well.  Eating and sleeping fairly (6.45 hrs). 2/2 - Wilbur Art reports feeling tired today and moods are depressed with anxiety(10/10). Passive SI. Denies HI/AH/VH. No aggression or violence. Appropriately interactive and aware. Tolerating medications (trazodone and atarax prns). Eating and sleeping fairly.     Objective:     Vitals:    02/01/23 1156 02/01/23 1736 02/01/23 2027 02/02/23 0846   BP: 126/85 113/78 (!) 100/53 119/76   Pulse: 88 99 84 86   Resp:   18 14   Temp:   98.7 °F (37.1 °C) 98.1 °F (36.7 °C)   SpO2:   95% 100%   Weight:       Height:            Mental Status Exam:   Sensorium  oriented to time, place and person   Relations cooperative   Eye Contact appropriate   Appearance:  age appropriate   Speech:  non-pressured   Thought Process: goal directed   Thought Content Auditory hallucinations she are unable to decipher   Suicidal ideations none   Mood:  depressed   Affect:  depressed   Memory   adequate   Concentration:  adequate   Insight:  limited   Judgment:  poor       MEDICATIONS:  Current Facility-Administered Medications   Medication Dose Route Frequency    busPIRone (BUSPAR) tablet 10 mg  10 mg Oral TID    levothyroxine (SYNTHROID) tablet 112 mcg  112 mcg Oral ACB    midodrine (PROAMATINE) tablet 10 mg  10 mg Oral TID WITH MEALS    mirtazapine (REMERON) tablet 45 mg  45 mg Oral QHS    OLANZapine (ZyPREXA) tablet 20 mg  20 mg Oral QHS    venlafaxine-SR (EFFEXOR-XR) capsule 300 mg  300 mg Oral DAILY    omega-3 acid ethyl esters (LOVAZA) capsule 1,000 mg  1 g Oral DAILY    OLANZapine (ZyPREXA) tablet 5 mg  5 mg Oral Q6H PRN    haloperidol lactate (HALDOL) injection 5 mg  5 mg IntraMUSCular Q6H PRN    benztropine (COGENTIN) tablet 1 mg  1 mg Oral BID PRN    diphenhydrAMINE (BENADRYL) injection 50 mg  50 mg IntraMUSCular BID PRN    hydrOXYzine HCL (ATARAX) tablet 50 mg  50 mg Oral TID PRN    traZODone (DESYREL) tablet 50 mg  50 mg Oral QHS PRN    acetaminophen (TYLENOL) tablet 650 mg  650 mg Oral Q4H PRN    magnesium hydroxide (MILK OF MAGNESIA) 400 mg/5 mL oral suspension 30 mL  30 mL Oral DAILY PRN      DISCUSSION:   the risks and benefits of the proposed medication  patient given opportunity to ask questions    Lab/Data Review: All lab results for the last 24 hours reviewed. No results found for this or any previous visit (from the past 24 hour(s)). Assessment:     Principal Problem:    Bipolar 1 disorder ([facility] [address] (1/27/2023)      Plan:     Continue current care  Collateral information  Medication modification as appropriate  Crisis evaluation for Commitment then  Continue ECT. Disposition planning with social work    I certify that this patient's inpatient psychiatric hospital services furnished since the previous certification were, and continue to be, required for treatment that could reasonably be expected to improve the patient's condition, or for diagnostic study, and that the patient continues to need, on a daily basis, active treatment furnished directly by or requiring the supervision of inpatient psychiatric facility personnel. In addition, the hospital records show that services furnished were intensive treatment services, admission or related services, or equivalent services.   Signed By: Rinda Boxer, MD     February 2, 2023

## 2023-02-02 NOTE — PROGRESS NOTES
Pt screened per LOS policy. No acute nutrition or weight issues identified. Pt meal compliant, now NPO for ECT. Hx notable for hyperthyroidism, hypercholesterolemia, blood clots.   Ht: 5'2\"  Wt: 148 lb  BMI: 27.07 kg/(m^2) c/w overweight  Est energy needs: 1745 kcal, 64 g protein 1800 mL fluids  Pt will consume > 75% of meals at follow up 7-10 days  LOS

## 2023-02-02 NOTE — PROGRESS NOTES
Problem: Depressed Mood (Adult/Pediatric)  Goal: *STG: Participates in treatment plan  Outcome: Progressing Towards Goal     Elaine Perales was noted resting quietly in bed. She presented alert and oriented x3, affect constricted. She rated her depression and anxiety levels at 10/10. She endorsed SI but denied a plan while in hospital. She also endorsed AH that were getting fainter. She reported feelings of constipation. She was compliant with scheduled meds. She also received Atarax for anxiety, MOM for constipation, and Trazodone for sleep. She appeared to have slept approx 7 hours during the night.

## 2023-02-02 NOTE — GROUP NOTE
TABITHA  GROUP DOCUMENTATION INDIVIDUAL                                                                          Group Therapy Note    Date: 2/2/2023    Group Start Time: 0900  Group End Time: 1000  Group Topic: Topic Group    [facility] [place] 3 ACUTE BEHAV TH    [address] [facility] GROUP DOCUMENTATION GROUP    Group Therapy Note    Attendees: 6       Attendance: Did not attend      Mariely Campos

## 2023-02-02 NOTE — GROUP NOTE
TABITHA  GROUP DOCUMENTATION INDIVIDUAL                                                                          Group Therapy Note    Date: 2/2/2023    Group Start Time: 1500  Group End Time: 1600  Group Topic: Recreational/Music Therapy    [facility] [place] [address] ACUTE BEHAV [facility]    Jourdan Lombardo 8150 GROUP    Group Therapy Note    Attendees: 5       Attendance: Did not attend      [facility]

## 2023-02-02 NOTE — PROGRESS NOTES
Problem: Suicide  Goal: *STG: Remains safe in hospital  Outcome: Progressing Towards Goal  Goal: *STG: Seeks staff when feelings of self harm or harm towards others arise  Outcome: Progressing Towards Goal  Goal: *STG/LTG: Complies with medication therapy  Outcome: Progressing Towards Goal       Patient alert, calm, cooperative, isolative. Denies SI/HI. Denies AVH. Denies anxiety and depression. Denies pain. Patient endorsed constipation. MOM given. No distress observed. No other concerns at this time. Medication and meal compliant. Q15 minutes checks ongoing.

## 2023-02-02 NOTE — PROGRESS NOTES
Behavioral Services                                              Medicare Re-Certification    I certify that the inpatient psychiatric hospital services furnished since the previous certification/re-certification were, and continue to be, medically necessary for;    [x] (1) Treatment which could reasonably be expected to improve the patient's condition,    [x] (2) Or for diagnostic study. Estimated length of stay/service 2 - 3 weeks    Plan for post-hospital care per social work    This patient continues to need, on a daily basis, active treatment furnished directly by or requiring the supervision of inpatient psychiatric personnel.     Electronically signed by Amos Dobbins MD on 2/2/2023 at 12:23 PM

## 2023-02-03 ENCOUNTER — ANESTHESIA (OUTPATIENT)
Dept: SURGERY | Age: 63
DRG: 751 | End: 2023-02-03
Payer: MEDICAID

## 2023-02-03 PROCEDURE — 74011250637 HC RX REV CODE- 250/637: Performed by: PSYCHIATRY & NEUROLOGY

## 2023-02-03 PROCEDURE — 74780000002 HC ELECTROSHOCK THERAP RECOVERY: Performed by: PSYCHIATRY & NEUROLOGY

## 2023-02-03 PROCEDURE — 74011000250 HC RX REV CODE- 250: Performed by: ANESTHESIOLOGY

## 2023-02-03 PROCEDURE — 90870 ELECTROCONVULSIVE THERAPY: CPT | Performed by: PSYCHIATRY & NEUROLOGY

## 2023-02-03 PROCEDURE — 74011250636 HC RX REV CODE- 250/636: Performed by: ANESTHESIOLOGY

## 2023-02-03 PROCEDURE — GZB2ZZZ ELECTROCONVULSIVE THERAPY, BILATERAL-SINGLE SEIZURE: ICD-10-PCS | Performed by: PSYCHIATRY & NEUROLOGY

## 2023-02-03 PROCEDURE — 2709999900 HC NON-CHARGEABLE SUPPLY: Performed by: PSYCHIATRY & NEUROLOGY

## 2023-02-03 PROCEDURE — 65220000003 HC RM SEMIPRIVATE PSYCH

## 2023-02-03 RX ORDER — SODIUM CHLORIDE, SODIUM LACTATE, POTASSIUM CHLORIDE, CALCIUM CHLORIDE 600; 310; 30; 20 MG/100ML; MG/100ML; MG/100ML; MG/100ML
50 INJECTION, SOLUTION INTRAVENOUS CONTINUOUS
Status: DISCONTINUED | OUTPATIENT
Start: 2023-02-03 | End: 2023-02-03 | Stop reason: HOSPADM

## 2023-02-03 RX ORDER — SUCCINYLCHOLINE CHLORIDE 20 MG/ML
INJECTION INTRAMUSCULAR; INTRAVENOUS AS NEEDED
Status: DISCONTINUED | OUTPATIENT
Start: 2023-02-03 | End: 2023-02-03 | Stop reason: HOSPADM

## 2023-02-03 RX ORDER — METHOHEXITAL IN WATER/PF 100MG/10ML
SYRINGE (ML) INTRAVENOUS AS NEEDED
Status: DISCONTINUED | OUTPATIENT
Start: 2023-02-03 | End: 2023-02-03 | Stop reason: HOSPADM

## 2023-02-03 RX ORDER — SODIUM CHLORIDE 0.9 % (FLUSH) 0.9 %
5-40 SYRINGE (ML) INJECTION EVERY 8 HOURS
Status: DISCONTINUED | OUTPATIENT
Start: 2023-02-03 | End: 2023-02-03 | Stop reason: HOSPADM

## 2023-02-03 RX ORDER — SODIUM CHLORIDE 0.9 % (FLUSH) 0.9 %
5-40 SYRINGE (ML) INJECTION AS NEEDED
Status: DISCONTINUED | OUTPATIENT
Start: 2023-02-03 | End: 2023-02-03 | Stop reason: HOSPADM

## 2023-02-03 RX ORDER — SODIUM CHLORIDE 9 MG/ML
50 INJECTION, SOLUTION INTRAVENOUS CONTINUOUS
Status: DISCONTINUED | OUTPATIENT
Start: 2023-02-03 | End: 2023-02-03 | Stop reason: HOSPADM

## 2023-02-03 RX ADMIN — Medication 60 MG: at 10:41

## 2023-02-03 RX ADMIN — MIDODRINE HYDROCHLORIDE 10 MG: 5 TABLET ORAL at 17:27

## 2023-02-03 RX ADMIN — VENLAFAXINE HYDROCHLORIDE 300 MG: 75 CAPSULE, EXTENDED RELEASE ORAL at 12:46

## 2023-02-03 RX ADMIN — MIDODRINE HYDROCHLORIDE 10 MG: 5 TABLET ORAL at 12:46

## 2023-02-03 RX ADMIN — TRAZODONE HYDROCHLORIDE 50 MG: 50 TABLET ORAL at 21:44

## 2023-02-03 RX ADMIN — SUCCINYLCHOLINE CHLORIDE 50 MG: 20 INJECTION, SOLUTION INTRAMUSCULAR; INTRAVENOUS at 10:41

## 2023-02-03 RX ADMIN — MIRTAZAPINE 45 MG: 15 TABLET, FILM COATED ORAL at 21:44

## 2023-02-03 RX ADMIN — OLANZAPINE 20 MG: 10 TABLET, FILM COATED ORAL at 21:45

## 2023-02-03 RX ADMIN — BUSPIRONE HYDROCHLORIDE 10 MG: 10 TABLET ORAL at 17:27

## 2023-02-03 RX ADMIN — OMEGA-3-ACID ETHYL ESTERS 1000 MG: 1 CAPSULE, LIQUID FILLED ORAL at 12:45

## 2023-02-03 RX ADMIN — BUSPIRONE HYDROCHLORIDE 10 MG: 10 TABLET ORAL at 12:46

## 2023-02-03 RX ADMIN — LEVOTHYROXINE SODIUM 112 MCG: 112 TABLET ORAL at 06:09

## 2023-02-03 NOTE — ANESTHESIA POSTPROCEDURE EVALUATION
Procedure(s):  ELECTRO CONVULSIVE THERAPY. general    Anesthesia Post Evaluation      Multimodal analgesia: multimodal analgesia not used between 6 hours prior to anesthesia start to PACU discharge  Patient location during evaluation: PACU  Patient participation: complete - patient cannot participate  Level of consciousness: awake  Pain management: adequate  Airway patency: patent  Anesthetic complications: no  Cardiovascular status: acceptable  Respiratory status: acceptable  Hydration status: acceptable  Post anesthesia nausea and vomiting:  none  Final Post Anesthesia Temperature Assessment:  Normothermia (36.0-37.5 degrees C)      INITIAL Post-op Vital signs:   Vitals Value Taken Time   /68 02/03/23 1110   Temp 36.7 °C (98 °F) 02/03/23 1054   Pulse 86 02/03/23 1110   Resp 22 02/03/23 1110   SpO2 100 % 02/03/23 1110   Vitals shown include unvalidated device data.

## 2023-02-03 NOTE — BH NOTES
Behavioral Health Interdisciplinary Rounds     Patient Name: Jose Simon  Age: 57 y.o. Room/Bed:  319/01  Primary Diagnosis: Bipolar 1 disorder ([address]     Progress note: Treatment team met with the Pt in her room. Pt reported that she was feeling okay and presented with a bright affect and anxious mood. Pt endorsed having fleeting SI and reported that she is experiencing faint AH and stated \" I can't make out what they are saying but its not bothering me\". Pt reported that her blood pressure is low and that she experiences dizziness when trying to get out of bed for the first time in the mornings. Pt received her 3rd ECT therapy session today.       LOS:  8  Expected LOS: TBD    Financial concerns/prescription coverage:  [employer]   Family contact: Ray Sifuentes (Father) [phone]    Family requesting physician contact today:  No  Discharge plan: TBD  Access to weapons: No                                                Outpatient provider(s): Mitra [facility]), [phone]  Patient's preferred phone number for follow up call: [phone]      Participating treatment team members: Svitlana Aguila, Víctor Hayes MD

## 2023-02-03 NOTE — BH NOTES
GROUP THERAPY PROGRESS NOTE     Group Start Time: 2:15 pm  Group End Time: 3:15 pm  Group Topic: Protective Factors      [facility] - [place] [address] General BEHAV TH     KALIE Tse       [facility]     Group Therapy Note     Attendees: 12        Attendance: Pt did not attend

## 2023-02-03 NOTE — BH NOTES
GROUP THERAPY PROGRESS NOTE     Group Start Time: 10:45  Group End Time: 11:30  Group Topic: Five Finger Breathing and Positive Self-talk     [facility] [place] 3 General BEHAV HLTH     [place], [address] [facility] [address] [facility] GROUP DOCUMENTATION GROUP     Group Therapy Note     Attendees: 12        Attendance: Pt did not attend

## 2023-02-03 NOTE — PROGRESS NOTES
Problem: Suicide  Goal: *STG: Remains safe in hospital  Outcome: Progressing Towards Goal  Goal: *STG:  Verbalizes alternative ways of dealing with maladaptive feelings/behaviors  Outcome: Progressing Towards Goal  Goal: *STG/LTG: Complies with medication therapy  Outcome: Progressing Towards Goal  Goal: *STG/LTG: No longer expresses self destructive or suicidal thoughts  Outcome: Progressing Towards Goal

## 2023-02-03 NOTE — PROGRESS NOTES
Patient spent most of the evening in her room. She did go to the dayroom and was briefly social. She denied SI, HI, AVH and pain. She was med compliant. She appears to be sleeping well.

## 2023-02-03 NOTE — BH NOTES
Psychiatric Progress Note    Patient: Elysia Phillip MRN: [id number]  SSN: [id number]    YOB: 1960  Age: 57 y.o. Sex: female      Admit Date: 1/26/2023    LOS: 8 days     Subjective:     Elysia Phillip  reports feeling anxious (10/10) and moods are depressed (10/10). Denies SI/HI/AH/VH. No aggression or violence. Appropriately interactive and aware. Tolerating medications well. Eating well and sleeping well. 1/29 - Elysia Phillip reports feeling very depressed awaiting ECT. Still having visual hallucinations that come and go. Denies SI/HI/AH/VH. No aggression or violence. Appropriately interactive and aware. Tolerating medications (atarax and trazodone prns) well. Eating and sleeping fairly. 1/30 - Elysia Phillip reports feeling terrible and did not recall getting ECT this morning. When told that she had it done, she stated it was not enough. Moods are severely depressed. Denies SI/HI/AH/VH. There was concern that she did not know what she was in the hospital for or anything about the procedure. TDO and court ordered ECT recommended. No aggression or violence. Appropriately interactive and aware. Tolerating medications well. Eating and sleeping fairly. 1/31 - Elysia Phillip reports not feeling well and moods are depressed. Per staff tends to ask for Injectable Ativan frequently then gets upset when she does not receive it. Passive SI per staff. Denies HI/AH/VH. No aggression or violence. Tends to isolate to room. Interactive and aware. Tolerating medications well. Eating and sleeping fairly (5 hrs). 2/1 - Elysia Phillip reports not feeling good again this morning but presents less intense, more calm and is more conversant today post ECT #2. Moods are depressed. Anxiety 10/10. Denies HI/AH/VH. Was afraid of her thoughts about being dead and did not discuss them yesterday. No aggression or violence. Appropriately interactive and aware.  Tolerating medications well.  Eating and sleeping fairly (6.45 hrs). 2/2 - Simi Lucas reports feeling tired today and moods are depressed with anxiety(10/10). Passive SI. Denies HI/AH/VH. No aggression or violence. Appropriately interactive and aware. Tolerating medications (trazodone and atarax prns). Eating and sleeping fairly. 2/3 - Simi Lucas reports feeling ok this morning and even had a smile. Moods are improved. Hearing faint voices in background that do not bother her. Blood pressure was low with mild dizziness on standing. Denies SI/HI/VH. No aggression or violence. Appropriately interactive and aware. Tolerating medications well. Eating and sleeping fairly. Goes for ECT # 3 today.     Objective:     Vitals:    02/03/23 1105 02/03/23 1110 02/03/23 1115 02/03/23 1120   BP: 104/65 106/68 113/66 (!) 108/58   Pulse: 88 87  83   Resp: 18 15  9   Temp:    97.8 °F (36.6 °C)   SpO2: 94% 97%  97%   Weight:       Height:            Mental Status Exam:   Sensorium  oriented to time, place and person   Relations cooperative   Eye Contact appropriate   Appearance:  age appropriate   Speech:  non-pressured   Thought Process: goal directed   Thought Content Auditory hallucinations she are unable to decipher   Suicidal ideations none   Mood:  depressed   Affect:  depressed   Memory   adequate   Concentration:  adequate   Insight:  limited   Judgment:  poor       MEDICATIONS:  Current Facility-Administered Medications   Medication Dose Route Frequency    lactated Ringers infusion  50 mL/hr IntraVENous CONTINUOUS    0.9% sodium chloride infusion  50 mL/hr IntraVENous CONTINUOUS    sodium chloride (NS) flush 5-40 mL  5-40 mL IntraVENous Q8H    sodium chloride (NS) flush 5-40 mL  5-40 mL IntraVENous PRN    sodium chloride (NS) flush 5-40 mL  5-40 mL IntraVENous Q8H    sodium chloride (NS) flush 5-40 mL  5-40 mL IntraVENous PRN    sodium chloride (NS) flush 5-40 mL  5-40 mL IntraVENous Q8H    sodium chloride (NS) flush 5-40 mL 5-40 mL IntraVENous PRN    busPIRone (BUSPAR) tablet 10 mg  10 mg Oral TID    levothyroxine (SYNTHROID) tablet 112 mcg  112 mcg Oral ACB    midodrine (PROAMATINE) tablet 10 mg  10 mg Oral TID WITH MEALS    mirtazapine (REMERON) tablet 45 mg  45 mg Oral QHS    OLANZapine (ZyPREXA) tablet 20 mg  20 mg Oral QHS    venlafaxine-SR (EFFEXOR-XR) capsule 300 mg  300 mg Oral DAILY    omega-3 acid ethyl esters (LOVAZA) capsule 1,000 mg  1 g Oral DAILY    OLANZapine (ZyPREXA) tablet 5 mg  5 mg Oral Q6H PRN    haloperidol lactate (HALDOL) injection 5 mg  5 mg IntraMUSCular Q6H PRN    benztropine (COGENTIN) tablet 1 mg  1 mg Oral BID PRN    diphenhydrAMINE (BENADRYL) injection 50 mg  50 mg IntraMUSCular BID PRN    hydrOXYzine HCL (ATARAX) tablet 50 mg  50 mg Oral TID PRN    traZODone (DESYREL) tablet 50 mg  50 mg Oral QHS PRN    acetaminophen (TYLENOL) tablet 650 mg  650 mg Oral Q4H PRN    magnesium hydroxide (MILK OF MAGNESIA) 400 mg/5 mL oral suspension 30 mL  30 mL Oral DAILY PRN      DISCUSSION:   the risks and benefits of the proposed medication  patient given opportunity to ask questions    Lab/Data Review: All lab results for the last 24 hours reviewed. No results found for this or any previous visit (from the past 24 hour(s)). Assessment:     Principal Problem:    Bipolar 1 disorder ([facility] [address] (1/27/2023)      Plan:     Continue current care  Collateral information  Medication modification as appropriate  Crisis evaluation for Commitment then  Continue ECT # 3 today # 4 next Tuesday  NPO the morning of.   Disposition planning with social work    I certify that this patient's inpatient psychiatric hospital services furnished since the previous certification were, and continue to be, required for treatment that could reasonably be expected to improve the patient's condition, or for diagnostic study, and that the patient continues to need, on a daily basis, active treatment furnished directly by or requiring the supervision of inpatient psychiatric facility personnel. In addition, the hospital records show that services furnished were intensive treatment services, admission or related services, or equivalent services.   Signed By: John Monae MD     February 3, 2023

## 2023-02-03 NOTE — GROUP NOTE
TABITHA  GROUP DOCUMENTATION INDIVIDUAL                                                                          Group Therapy Note    Date: 2/3/2023    Group Start Time: 0900  Group End Time: 1000  Group Topic: Topic Group    [address] ACUTE BEHAV [facility]    Jourdan Lombardo [facility] GROUP    Group Therapy Note    Attendees: 5       Attendance: Did not attend    Jessie Ross

## 2023-02-03 NOTE — PERIOP NOTES
Handoff Report from Operating Room to PACU    Report received from Dr. Tia Singletary and Shirley Ding RN regarding Raymundo Jones. Surgeon(s):  Bela Mejia MD  And Procedure(s) (LRB):  ELECTRO CONVULSIVE THERAPY (N/A)  confirmed   with allergies discussed. Anesthesia type, drugs, patient history, complications, estimated blood loss, vital signs, intake and output, and temperature were reviewed.

## 2023-02-03 NOTE — PROCEDURES
ECT PROCEDURE NOTE      IDENTIFICATION:    Patient Name  Meghana Gray   Date of Birth 1960   [facility] [id number]   Medical Record Number  [id number]      Age  57 y.o. PCP Suma Baumann MD   Admit date:  1/26/2023    Room Number  OR/PL  @ [facility]   Date of Service  2/3/2023            Electro Convulsive Therapy:  Treatment number 3        Maintenance ECT NO   Meghana Gray was admitted to the PACU for ECT. Patient was medically cleared and NPO for procedure. Patient is NOTcourt mandated and gave informed consent for ECT treatment. Patient received     Bilateral ECT YES   Administered using the [address] [place] [address] Drive. at 35 % Energy, under general anesthesia. Meghana Gray with a good, well generalized seizure of 30 seconds on observation of the motor manifestations of the seizure. EEG duration was 61 secs. Post-Ictal Suppression Index: 89.9 %  Recovery was unremarkable and with no complications. Change in Energy %:             Anesthesia medications administered during procedure:  Brevital:  60 mg  Change for next treament:     Succinylcholine: 50 mg  Change for next treatment:      Propofol: mg  Glycopyrrolate:  mg  Labetalol:  mg  Esmolol:  mg  Lorazepam:  mg  Ketamine:  mg  Zofran:   mg    Toradol:  mg  Lidocaine:  mg  Caffeine Benzoate: mg       CSSRS 1/26/2023 7/8/2022   1) Within the past month, have you wished you were dead or wished you could go to sleep and not wake up? No No   2) Have you actually had any thoughts of killing yourself? No No   6) Have you ever done anything, started to do anything, or prepared to do anything to end your life? Yes No   Did this occur within the past 3 months? Yes -       SIGNED:    Peyton Cuadra MD  2/3/2023            Dr. Kath Miller participated in this ECT procedure and was supervised by Dr Flash Diaz for credentialing.

## 2023-02-03 NOTE — PROGRESS NOTES
Problem: Suicide  Goal: *STG: Remains safe in hospital  2/3/2023 0053 by Erich Pires  Outcome: Progressing Towards Goal  2/3/2023 0052 by Erich Pires  Outcome: Progressing Towards Goal  Goal: *STG:  Verbalizes alternative ways of dealing with maladaptive feelings/behaviors  Outcome: Progressing Towards Goal  Goal: *STG/LTG: Complies with medication therapy  2/3/2023 0053 by Erich Pires  Outcome: Progressing Towards Goal  2/3/2023 0052 by Erich Pires  Outcome: Progressing Towards Goal  Goal: *STG/LTG: No longer expresses self destructive or suicidal thoughts  2/3/2023 0053 by Erich Pires  Outcome: Progressing Towards Goal  2/3/2023 0052 by Erich Pires  Outcome: Progressing Towards Goal

## 2023-02-03 NOTE — PROGRESS NOTES
Problem: Suicide  Goal: *STG: Remains safe in hospital  Outcome: Progressing Towards Goal  Goal: *STG/LTG: Complies with medication therapy  Outcome: Progressing Towards Goal     Problem: Falls - Risk of  Goal: *Absence of Falls  Description: Document Samanta Fall Risk and appropriate interventions in the flowsheet. Outcome: Progressing Towards Goal  Note: Fall Risk Interventions:            Medication Interventions: Teach patient to arise slowly         Patient alert, calm, cooperative. Denies SI/HI. Denies pain. Denies AVH. Denies anxiety and depression. No distress observed. No concerns at this time. Patient is for ECT at 830 am. Q15  minutes checks ongoing.

## 2023-02-03 NOTE — PERIOP NOTES
TRANSFER - OUT REPORT:    Verbal report given to [facility] RN on Paul Matson  being transferred to [facility] for routine post - op       Report consisted of patients Situation, Background, Assessment and   Recommendations(SBAR). Information from the following report(s) SBAR was reviewed with the receiving nurse. Opportunity for questions and clarification was provided.       Patient transported with:   [employer]

## 2023-02-03 NOTE — GROUP NOTE
TABITHA  GROUP DOCUMENTATION INDIVIDUAL                                                                          Group Therapy Note    Date: 2/3/2023    Group Start Time: 1500  Group End Time: 1600  Group Topic: Recreational/Music Therapy    [address] [facility] 3 ACUTE BEHAV [facility]    Jourdan Lombardo [facility] GROUP    Group Therapy Note    Attendees: 7       Attendance: Did not attend      Vincent Francis

## 2023-02-04 PROCEDURE — 99231 SBSQ HOSP IP/OBS SF/LOW 25: CPT | Performed by: PSYCHIATRY & NEUROLOGY

## 2023-02-04 PROCEDURE — 65220000003 HC RM SEMIPRIVATE PSYCH

## 2023-02-04 PROCEDURE — 74011250637 HC RX REV CODE- 250/637: Performed by: PSYCHIATRY & NEUROLOGY

## 2023-02-04 RX ADMIN — TRAZODONE HYDROCHLORIDE 50 MG: 50 TABLET ORAL at 21:19

## 2023-02-04 RX ADMIN — BUSPIRONE HYDROCHLORIDE 10 MG: 10 TABLET ORAL at 08:50

## 2023-02-04 RX ADMIN — OLANZAPINE 20 MG: 10 TABLET, FILM COATED ORAL at 21:19

## 2023-02-04 RX ADMIN — BUSPIRONE HYDROCHLORIDE 10 MG: 10 TABLET ORAL at 17:08

## 2023-02-04 RX ADMIN — OMEGA-3-ACID ETHYL ESTERS 1000 MG: 1 CAPSULE, LIQUID FILLED ORAL at 08:53

## 2023-02-04 RX ADMIN — MIRTAZAPINE 45 MG: 15 TABLET, FILM COATED ORAL at 21:19

## 2023-02-04 RX ADMIN — BUSPIRONE HYDROCHLORIDE 10 MG: 10 TABLET ORAL at 12:07

## 2023-02-04 RX ADMIN — MIDODRINE HYDROCHLORIDE 10 MG: 5 TABLET ORAL at 17:08

## 2023-02-04 RX ADMIN — LEVOTHYROXINE SODIUM 112 MCG: 112 TABLET ORAL at 06:08

## 2023-02-04 RX ADMIN — MIDODRINE HYDROCHLORIDE 10 MG: 5 TABLET ORAL at 08:50

## 2023-02-04 RX ADMIN — VENLAFAXINE HYDROCHLORIDE 300 MG: 75 CAPSULE, EXTENDED RELEASE ORAL at 08:50

## 2023-02-04 NOTE — PROGRESS NOTES
Problem: Suicide  Goal: *STG: Remains safe in hospital  Outcome: Progressing Towards Goal    Shift change report given to Desirae OBREGON (oncoming nurse) by Anderson Kruse (offgoing nurse). Report included the following information SBAR, Kardex, MAR, and Recent Results. Assumed care of patient. Met patient in room. Calm and cooperative with assessment. Patient presented with a sad affect. Endorsed anxiety and depression. Denied SI, HI and AVH. No complaints of pain. Patient to dayroom for meals. No issues noted throughout shift.

## 2023-02-04 NOTE — PROGRESS NOTES
Writer met pt in her room lying quietly. Pt  reported feeling okay, present with  smiling affect and depressed mood. On assessment, pt endorse anxiety and depression 5/10, AH  and states \"I can't make out what they voices are saying\". Denied SI, HI, and VH. Remained calm and cooperative with care. PRN trazodone administered. Pt is compliant with med's and meals . No behaviors noted. Vital signs entered. Rounding in progress. Pt slept for total of 8 hours. No issues.

## 2023-02-05 PROCEDURE — 99231 SBSQ HOSP IP/OBS SF/LOW 25: CPT | Performed by: PSYCHIATRY & NEUROLOGY

## 2023-02-05 PROCEDURE — 65220000003 HC RM SEMIPRIVATE PSYCH

## 2023-02-05 PROCEDURE — 74011250637 HC RX REV CODE- 250/637: Performed by: PSYCHIATRY & NEUROLOGY

## 2023-02-05 RX ADMIN — BUSPIRONE HYDROCHLORIDE 10 MG: 10 TABLET ORAL at 17:15

## 2023-02-05 RX ADMIN — MIDODRINE HYDROCHLORIDE 10 MG: 5 TABLET ORAL at 12:19

## 2023-02-05 RX ADMIN — BUSPIRONE HYDROCHLORIDE 10 MG: 10 TABLET ORAL at 08:53

## 2023-02-05 RX ADMIN — OMEGA-3-ACID ETHYL ESTERS 1000 MG: 1 CAPSULE, LIQUID FILLED ORAL at 08:57

## 2023-02-05 RX ADMIN — MIDODRINE HYDROCHLORIDE 10 MG: 5 TABLET ORAL at 08:53

## 2023-02-05 RX ADMIN — VENLAFAXINE HYDROCHLORIDE 300 MG: 75 CAPSULE, EXTENDED RELEASE ORAL at 08:54

## 2023-02-05 RX ADMIN — LEVOTHYROXINE SODIUM 112 MCG: 112 TABLET ORAL at 06:02

## 2023-02-05 RX ADMIN — TRAZODONE HYDROCHLORIDE 50 MG: 50 TABLET ORAL at 21:37

## 2023-02-05 RX ADMIN — BUSPIRONE HYDROCHLORIDE 10 MG: 10 TABLET ORAL at 12:19

## 2023-02-05 RX ADMIN — MIRTAZAPINE 45 MG: 15 TABLET, FILM COATED ORAL at 21:37

## 2023-02-05 RX ADMIN — HYDROXYZINE HYDROCHLORIDE 50 MG: 25 TABLET, FILM COATED ORAL at 21:58

## 2023-02-05 RX ADMIN — OLANZAPINE 20 MG: 10 TABLET, FILM COATED ORAL at 21:37

## 2023-02-05 NOTE — PROGRESS NOTES
Writer met pt in her room lying quietly, present with  smiling affect and depressed mood. When ask  how are you feeling, pt replied \"am feeling depressed\" Pt endorse anxiety and depression 9/10. Pt also endorsed  AH and SI with no plan,  contract for safety. Pt states \"I can't make out what they voices are saying\". Denied  HI, and VH. Remained calm and cooperative with care. PRN trazodone administered. Pt is compliant with med's and meals . No behaviors noted. Vital signs entered. Rounding in progress. Pt slept for total of 7.5 hours. No issues.

## 2023-02-05 NOTE — BH NOTES
Psychiatric Progress Note    Patient: Herb Jiang MRN: [id number]  SSN: [id number]    YOB: 1960  Age: 57 y.o. Sex: female      Admit Date: 1/26/2023    LOS: 9 days     Subjective:     Herb Jiang  reports feeling anxious (10/10) and moods are depressed (10/10). Denies SI/HI/AH/VH. No aggression or violence. Appropriately interactive and aware. Tolerating medications well. Eating well and sleeping well. 1/29 - Herb Jiang reports feeling very depressed awaiting ECT. Still having visual hallucinations that come and go. Denies SI/HI/AH/VH. No aggression or violence. Appropriately interactive and aware. Tolerating medications (atarax and trazodone prns) well. Eating and sleeping fairly. 1/30 - Herb Jiang reports feeling terrible and did not recall getting ECT this morning. When told that she had it done, she stated it was not enough. Moods are severely depressed. Denies SI/HI/AH/VH. There was concern that she did not know what she was in the hospital for or anything about the procedure. TDO and court ordered ECT recommended. No aggression or violence. Appropriately interactive and aware. Tolerating medications well. Eating and sleeping fairly. 1/31 - Herb Jiang reports not feeling well and moods are depressed. Per staff tends to ask for Injectable Ativan frequently then gets upset when she does not receive it. Passive SI per staff. Denies HI/AH/VH. No aggression or violence. Tends to isolate to room. Interactive and aware. Tolerating medications well. Eating and sleeping fairly (5 hrs). 2/1 - Herb Jiang reports not feeling good again this morning but presents less intense, more calm and is more conversant today post ECT #2. Moods are depressed. Anxiety 10/10. Denies HI/AH/VH. Was afraid of her thoughts about being dead and did not discuss them yesterday. No aggression or violence. Appropriately interactive and aware.  Tolerating medications well.  Eating and sleeping fairly (6.45 hrs). 2/2 - Genevive Prom reports feeling tired today and moods are depressed with anxiety(10/10). Passive SI. Denies HI/AH/VH. No aggression or violence. Appropriately interactive and aware. Tolerating medications (trazodone and atarax prns). Eating and sleeping fairly. 2/3 - Genevive Prom reports feeling ok this morning and even had a smile. Moods are improved. Hearing faint voices in background that do not bother her. Blood pressure was low with mild dizziness on standing. Denies SI/HI/VH. No aggression or violence. Appropriately interactive and aware. Tolerating medications well. Eating and sleeping fairly. Goes for ECT # 3 today. 02/04 - no acute overnight events. The patient slept 8 hours overnight and got Trazodone with good effect. She reports ongoing anxiety and depressed mood but denies active thoughts of self harm. Patient is eeager for her next ECT session which is scheduled for early next week. She is able to make her needs known and medication compliant.      Objective:     Vitals:    02/04/23 0849 02/04/23 1205 02/04/23 1706 02/04/23 2000   BP: 103/68 118/64 107/66 (P) 98/66   Pulse: 70 94 94 (P) 82   Resp: 18   (P) 16   Temp: 97.7 °F (36.5 °C)   (P) 98.1 °F (36.7 °C)   SpO2: 99%   (P) 98%   Weight:       Height:            Mental Status Exam:   Sensorium  oriented to time, place and person   Relations cooperative   Eye Contact appropriate   Appearance:  age appropriate   Speech:  non-pressured   Thought Process: goal directed   Thought Content Auditory hallucinations she are unable to decipher   Suicidal ideations none   Mood:  depressed   Affect:  depressed   Memory   adequate   Concentration:  adequate   Insight:  limited   Judgment:  poor       MEDICATIONS:  Current Facility-Administered Medications   Medication Dose Route Frequency    busPIRone (BUSPAR) tablet 10 mg  10 mg Oral TID    levothyroxine (SYNTHROID) tablet 112 mcg  112 mcg Oral ACB    midodrine (PROAMATINE) tablet 10 mg  10 mg Oral TID WITH MEALS    mirtazapine (REMERON) tablet 45 mg  45 mg Oral QHS    OLANZapine (ZyPREXA) tablet 20 mg  20 mg Oral QHS    venlafaxine-SR (EFFEXOR-XR) capsule 300 mg  300 mg Oral DAILY    omega-3 acid ethyl esters (LOVAZA) capsule 1,000 mg  1 g Oral DAILY    OLANZapine (ZyPREXA) tablet 5 mg  5 mg Oral Q6H PRN    haloperidol lactate (HALDOL) injection 5 mg  5 mg IntraMUSCular Q6H PRN    benztropine (COGENTIN) tablet 1 mg  1 mg Oral BID PRN    diphenhydrAMINE (BENADRYL) injection 50 mg  50 mg IntraMUSCular BID PRN    hydrOXYzine HCL (ATARAX) tablet 50 mg  50 mg Oral TID PRN    traZODone (DESYREL) tablet 50 mg  50 mg Oral QHS PRN    acetaminophen (TYLENOL) tablet 650 mg  650 mg Oral Q4H PRN    magnesium hydroxide (MILK OF MAGNESIA) 400 mg/5 mL oral suspension 30 mL  30 mL Oral DAILY PRN      DISCUSSION:   the risks and benefits of the proposed medication  patient given opportunity to ask questions    Lab/Data Review: All lab results for the last 24 hours reviewed. No results found for this or any previous visit (from the past 24 hour(s)). Assessment:     Principal Problem:    Bipolar 1 disorder ([facility] [address] (1/27/2023)      Plan:     Continue current care  Collateral information  Medication modification as appropriate  Crisis evaluation for Commitment then  Continue ECT # 4 next Tuesday  NPO the morning of. Disposition planning with social work    I certify that this patient's inpatient psychiatric hospital services furnished since the previous certification were, and continue to be, required for treatment that could reasonably be expected to improve the patient's condition, or for diagnostic study, and that the patient continues to need, on a daily basis, active treatment furnished directly by or requiring the supervision of inpatient psychiatric facility personnel.  In addition, the hospital records show that services furnished were intensive treatment services, admission or related services, or equivalent services.   Signed By: Eric Tang MD     February 4, 2023

## 2023-02-05 NOTE — PROGRESS NOTES
Assumed care of pt after receiving report from outgoing nurse. No apparent distress. Pt calm and cooperative with assessment. Alert and oriented. Med and meal compliant. Pt currently denies SI/HI/AVH and pain. Pt continues to endorse depression and anxiety. Mood is anxious with smiling affect. Ambulates independently and is able to make needs known. No behavioral issues observed. Pt remains mostly isolative to room. Q15 minute safety rounds continued by staff. Problem: Depressed Mood (Adult/Pediatric)  Goal: *STG: Complies with medication therapy  Outcome: Progressing Towards Goal     Problem: Falls - Risk of  Goal: *Absence of Falls  Description: Document Samanta Fall Risk and appropriate interventions in the flowsheet.   Outcome: Progressing Towards Goal  Note: Fall Risk Interventions:            Medication Interventions: Teach patient to arise slowly

## 2023-02-06 ENCOUNTER — ANESTHESIA EVENT (OUTPATIENT)
Dept: SURGERY | Age: 63
DRG: 751 | End: 2023-02-06
Payer: MEDICAID

## 2023-02-06 PROCEDURE — 74011250637 HC RX REV CODE- 250/637: Performed by: PSYCHIATRY & NEUROLOGY

## 2023-02-06 PROCEDURE — 65220000003 HC RM SEMIPRIVATE PSYCH

## 2023-02-06 RX ADMIN — VENLAFAXINE HYDROCHLORIDE 300 MG: 75 CAPSULE, EXTENDED RELEASE ORAL at 08:26

## 2023-02-06 RX ADMIN — HYDROXYZINE HYDROCHLORIDE 50 MG: 25 TABLET, FILM COATED ORAL at 21:03

## 2023-02-06 RX ADMIN — MIDODRINE HYDROCHLORIDE 10 MG: 5 TABLET ORAL at 11:59

## 2023-02-06 RX ADMIN — MIDODRINE HYDROCHLORIDE 10 MG: 5 TABLET ORAL at 16:51

## 2023-02-06 RX ADMIN — BUSPIRONE HYDROCHLORIDE 10 MG: 10 TABLET ORAL at 08:26

## 2023-02-06 RX ADMIN — LEVOTHYROXINE SODIUM 112 MCG: 112 TABLET ORAL at 06:00

## 2023-02-06 RX ADMIN — BUSPIRONE HYDROCHLORIDE 10 MG: 10 TABLET ORAL at 11:59

## 2023-02-06 RX ADMIN — BUSPIRONE HYDROCHLORIDE 10 MG: 10 TABLET ORAL at 16:51

## 2023-02-06 RX ADMIN — MIRTAZAPINE 45 MG: 15 TABLET, FILM COATED ORAL at 21:03

## 2023-02-06 RX ADMIN — MIDODRINE HYDROCHLORIDE 10 MG: 5 TABLET ORAL at 08:31

## 2023-02-06 RX ADMIN — OMEGA-3-ACID ETHYL ESTERS 1000 MG: 1 CAPSULE, LIQUID FILLED ORAL at 08:30

## 2023-02-06 RX ADMIN — OLANZAPINE 20 MG: 10 TABLET, FILM COATED ORAL at 21:03

## 2023-02-06 NOTE — BH NOTES
Patients care assumed with the change of shift report received from the outgoing nurse - [address], RN. Patient received in the dayroom watching TV, alert and oriented to person, place and situation, calm and cooperative with assessment. Patient presented smiling, mood is anxious and depressed. Patient denied SI, HI, AVH, and pain, she endorsed anxiety and depression at the level of 8/10, speech is flat and soft. Patient compliant with scheduled medication, prn atarax and trazodone administered as requested by patient. No obvious sign of distress observed. Safety maintained by Q 15 safety rounds and nursing rounds. Patient observed asleep for approximately 7 hours.

## 2023-02-06 NOTE — PROGRESS NOTES
Problem: Falls - Risk of  Goal: *Absence of Falls  Description: Document Lino Mcclelland Fall Risk and appropriate interventions in the flowsheet.   Outcome: Progressing Towards Goal  Note: Fall Risk Interventions:     Medication Interventions: Teach patient to arise slowly      Problem: Anxiety-Behavioral Health (Adult/Pediatric)  Goal: *STG: Remains safe in hospital  Outcome: Progressing Towards Goal     Problem: Depressed Mood (Adult/Pediatric)  Goal: *STG: Remains safe in hospital  Outcome: Progressing Towards Goal     Problem: Depressed Mood (Adult/Pediatric)  Goal: *STG: Complies with medication therapy  Outcome: Progressing Towards Goal

## 2023-02-06 NOTE — GROUP NOTE
TABITHA  GROUP DOCUMENTATION INDIVIDUAL                                                                          Group Therapy Note    Date: 2/6/2023    Group Start Time: 0900  Group End Time: 1000  Group Topic: Topic Group    [facility] 3 ACUTE BEHAV TH    Jourdan Lombardo [id number] GROUP    Group Therapy Note    Attendees: 8       Attendance: Did not attend      Marino Bennett

## 2023-02-06 NOTE — BH NOTES
Psychiatric Progress Note    Patient: Abiodun Gary MRN: [id number]  SSN: [id number]    YOB: 1960  Age: 57 y.o. Sex: female      Admit Date: 1/26/2023    LOS: 11 days     Subjective:     Abiodun Gary  reports feeling anxious (10/10) and moods are depressed (10/10). Denies SI/HI/AH/VH. No aggression or violence. Appropriately interactive and aware. Tolerating medications well. Eating well and sleeping well. 1/29 - Abiodun Gary reports feeling very depressed awaiting ECT. Still having visual hallucinations that come and go. Denies SI/HI/AH/VH. No aggression or violence. Appropriately interactive and aware. Tolerating medications (atarax and trazodone prns) well. Eating and sleeping fairly. 1/30 - Abiodun Gary reports feeling terrible and did not recall getting ECT this morning. When told that she had it done, she stated it was not enough. Moods are severely depressed. Denies SI/HI/AH/VH. There was concern that she did not know what she was in the hospital for or anything about the procedure. TDO and court ordered ECT recommended. No aggression or violence. Appropriately interactive and aware. Tolerating medications well. Eating and sleeping fairly. 1/31 - Abiodun Gary reports not feeling well and moods are depressed. Per staff tends to ask for Injectable Ativan frequently then gets upset when she does not receive it. Passive SI per staff. Denies HI/AH/VH. No aggression or violence. Tends to isolate to room. Interactive and aware. Tolerating medications well. Eating and sleeping fairly (5 hrs). 2/1 - Abiodun Gary reports not feeling good again this morning but presents less intense, more calm and is more conversant today post ECT #2. Moods are depressed. Anxiety 10/10. Denies HI/AH/VH. Was afraid of her thoughts about being dead and did not discuss them yesterday. No aggression or violence. Appropriately interactive and aware.  Tolerating medications well. Eating and sleeping fairly (6.45 hrs). 2/2 - Connie Lozada reports feeling tired today and moods are depressed with anxiety(10/10). Passive SI. Denies HI/AH/VH. No aggression or violence. Appropriately interactive and aware. Tolerating medications (trazodone and atarax prns). Eating and sleeping fairly. 2/3 - Connie Lozada reports feeling ok this morning and even had a smile. Moods are improved. Hearing faint voices in background that do not bother her. Blood pressure was low with mild dizziness on standing. Denies SI/HI/VH. No aggression or violence. Appropriately interactive and aware. Tolerating medications well. Eating and sleeping fairly. Goes for ECT # 3 today. 02/04 - no acute overnight events. The patient slept 8 hours overnight and got Trazodone with good effect. She reports ongoing anxiety and depressed mood but denies active thoughts of self harm. Patient is eeager for her next ECT session which is scheduled for early next week. She is able to make her needs known and medication compliant. 02/05 - the patient reports ongoing AH+ and is isolative to her room. She denies active thoughts of self harm. Patient withdrawn and isolative and slept 7.5 hours overnight. She is eager for her next ECT session which is scheduled for Tuesday. Patient able to make her needs known though she remains dysphoric.    02/06 - Connie Lozada reports feeling \"alright: today with smile on her face. Moods are good. Weekend went well. Denies SI/HI/AH/VH. No aggression or violence. Appropriately interactive and aware. Tolerating medications well. Eating and sleeping fairly. Tolerating ECT well.     Objective:     Vitals:    02/05/23 1700 02/05/23 1720 02/05/23 2005 02/06/23 0840   BP: 118/78 118/78 (!) 102/56 (!) 109/57   Pulse: 86 86 88 85   Resp:  16 16 16   Temp:   98.1 °F (36.7 °C) 97.7 °F (36.5 °C)   SpO2:    98%   Weight:       Height:            Mental Status Exam: Sensorium  oriented to time, place and person   Relations cooperative   Eye Contact appropriate   Appearance:  age appropriate   Speech:  non-pressured   Thought Process: goal directed   Thought Content Denies SI/HI/AH/VH   Suicidal ideations none   Mood:  good   Affect:  constricted   Memory   adequate   Concentration:  adequate   Insight:  limited   Judgment:  fair       MEDICATIONS:  Current Facility-Administered Medications   Medication Dose Route Frequency    busPIRone (BUSPAR) tablet 10 mg  10 mg Oral TID    levothyroxine (SYNTHROID) tablet 112 mcg  112 mcg Oral ACB    midodrine (PROAMATINE) tablet 10 mg  10 mg Oral TID WITH MEALS    mirtazapine (REMERON) tablet 45 mg  45 mg Oral QHS    OLANZapine (ZyPREXA) tablet 20 mg  20 mg Oral QHS    venlafaxine-SR (EFFEXOR-XR) capsule 300 mg  300 mg Oral DAILY    omega-3 acid ethyl esters (LOVAZA) capsule 1,000 mg  1 g Oral DAILY    OLANZapine (ZyPREXA) tablet 5 mg  5 mg Oral Q6H PRN    haloperidol lactate (HALDOL) injection 5 mg  5 mg IntraMUSCular Q6H PRN    benztropine (COGENTIN) tablet 1 mg  1 mg Oral BID PRN    diphenhydrAMINE (BENADRYL) injection 50 mg  50 mg IntraMUSCular BID PRN    hydrOXYzine HCL (ATARAX) tablet 50 mg  50 mg Oral TID PRN    traZODone (DESYREL) tablet 50 mg  50 mg Oral QHS PRN    acetaminophen (TYLENOL) tablet 650 mg  650 mg Oral Q4H PRN    magnesium hydroxide (MILK OF MAGNESIA) 400 mg/5 mL oral suspension 30 mL  30 mL Oral DAILY PRN      DISCUSSION:   the risks and benefits of the proposed medication  patient given opportunity to ask questions    Lab/Data Review: All lab results for the last 24 hours reviewed. No results found for this or any previous visit (from the past 24 hour(s)).       Assessment:     Principal Problem:    Bipolar 1 disorder ([facility] [address] (1/27/2023)      Plan:     Continue current care  Collateral information  Medication modification as appropriate  Crisis evaluation for Commitment then  Continue ECT # 4 next Tuesday  NPO the morning of. Disposition planning with social work    I certify that this patient's inpatient psychiatric hospital services furnished since the previous certification were, and continue to be, required for treatment that could reasonably be expected to improve the patient's condition, or for diagnostic study, and that the patient continues to need, on a daily basis, active treatment furnished directly by or requiring the supervision of inpatient psychiatric facility personnel. In addition, the hospital records show that services furnished were intensive treatment services, admission or related services, or equivalent services.   Signed By: Briana Hubbard MD     February 6, 2023

## 2023-02-06 NOTE — GROUP NOTE
TABITHA  GROUP DOCUMENTATION INDIVIDUAL                                                                          Group Therapy Note    Date: 2/6/2023    Group Start Time: 1500  Group End Time: 1600  Group Topic: Recreational/Music Therapy    [facility] 3 ACUTE BEHAV [facility]    Jourdan Lombardo [address] GROUP    Group Therapy Note    Attendees: 4       Attendance: Did not attend    Jarod Roque

## 2023-02-06 NOTE — PROGRESS NOTES
Problem: Discharge Planning  Goal: *Discharge to safe environment  Outcome: Progressing Towards Goal  Note: Patient identifies home as a safe environment. Patient will return home upon discharge. Goal: *Knowledge of medication management  Outcome: Progressing Towards Goal  Note: Patient verbalizes understanding of medication regimen. Patient is taking medications as prescribed.   Goal: *Knowledge of discharge instructions  Outcome: Progressing Towards Goal  Note: Patient verbalizes understanding of goals for treatment and safe discharge

## 2023-02-06 NOTE — PERIOP NOTES
Spoke with Albania Kaur RN, [facility], to advise patient scheduled for ECT procedure 02/07/2023, NPO after midnight, arrive PACU 0630.

## 2023-02-06 NOTE — BH NOTES
Psychiatric Progress Note    Patient: Madeline Moreira MRN: [id number]  SSN: [id number]    YOB: 1960  Age: 57 y.o. Sex: female      Admit Date: 1/26/2023    LOS: 10 days     Subjective:     Madeline Moreira  reports feeling anxious (10/10) and moods are depressed (10/10). Denies SI/HI/AH/VH. No aggression or violence. Appropriately interactive and aware. Tolerating medications well. Eating well and sleeping well. 1/29 - Madeline Moreira reports feeling very depressed awaiting ECT. Still having visual hallucinations that come and go. Denies SI/HI/AH/VH. No aggression or violence. Appropriately interactive and aware. Tolerating medications (atarax and trazodone prns) well. Eating and sleeping fairly. 1/30 - Madeline Moreira reports feeling terrible and did not recall getting ECT this morning. When told that she had it done, she stated it was not enough. Moods are severely depressed. Denies SI/HI/AH/VH. There was concern that she did not know what she was in the hospital for or anything about the procedure. TDO and court ordered ECT recommended. No aggression or violence. Appropriately interactive and aware. Tolerating medications well. Eating and sleeping fairly. 1/31 - Madeline Moreira reports not feeling well and moods are depressed. Per staff tends to ask for Injectable Ativan frequently then gets upset when she does not receive it. Passive SI per staff. Denies HI/AH/VH. No aggression or violence. Tends to isolate to room. Interactive and aware. Tolerating medications well. Eating and sleeping fairly (5 hrs). 2/1 - Madeline Moreira reports not feeling good again this morning but presents less intense, more calm and is more conversant today post ECT #2. Moods are depressed. Anxiety 10/10. Denies HI/AH/VH. Was afraid of her thoughts about being dead and did not discuss them yesterday. No aggression or violence. Appropriately interactive and aware.  Tolerating medications well. Eating and sleeping fairly (6.45 hrs). 2/2 - Grace Reyes reports feeling tired today and moods are depressed with anxiety(10/10). Passive SI. Denies HI/AH/VH. No aggression or violence. Appropriately interactive and aware. Tolerating medications (trazodone and atarax prns). Eating and sleeping fairly. 2/3 - Grace Reyes reports feeling ok this morning and even had a smile. Moods are improved. Hearing faint voices in background that do not bother her. Blood pressure was low with mild dizziness on standing. Denies SI/HI/VH. No aggression or violence. Appropriately interactive and aware. Tolerating medications well. Eating and sleeping fairly. Goes for ECT # 3 today. 02/04 - no acute overnight events. The patient slept 8 hours overnight and got Trazodone with good effect. She reports ongoing anxiety and depressed mood but denies active thoughts of self harm. Patient is eeager for her next ECT session which is scheduled for early next week. She is able to make her needs known and medication compliant. 02/05 - the patient reports ongoing AH+ and is isolative to her room. She denies active thoughts of self harm. Patient withdrawn and isolative and slept 7.5 hours overnight. She is eager for her next ECT session which is scheduled for Tuesday. Patient able to make her needs known though she remains dysphoric.     Objective:     Vitals:    02/05/23 1218 02/05/23 1700 02/05/23 1720 02/05/23 2005   BP: 108/73 118/78 118/78 (!) 102/56   Pulse: 89 86 86 88   Resp: 18  16 16   Temp:    98.1 °F (36.7 °C)   SpO2:       Weight:       Height:            Mental Status Exam:   Sensorium  oriented to time, place and person   Relations cooperative   Eye Contact appropriate   Appearance:  age appropriate   Speech:  non-pressured   Thought Process: goal directed   Thought Content Auditory hallucinations she are unable to decipher   Suicidal ideations none   Mood:  depressed   Affect: depressed   Memory   adequate   Concentration:  adequate   Insight:  limited   Judgment:  poor       MEDICATIONS:  Current Facility-Administered Medications   Medication Dose Route Frequency    busPIRone (BUSPAR) tablet 10 mg  10 mg Oral TID    levothyroxine (SYNTHROID) tablet 112 mcg  112 mcg Oral ACB    midodrine (PROAMATINE) tablet 10 mg  10 mg Oral TID WITH MEALS    mirtazapine (REMERON) tablet 45 mg  45 mg Oral QHS    OLANZapine (ZyPREXA) tablet 20 mg  20 mg Oral QHS    venlafaxine-SR (EFFEXOR-XR) capsule 300 mg  300 mg Oral DAILY    omega-3 acid ethyl esters (LOVAZA) capsule 1,000 mg  1 g Oral DAILY    OLANZapine (ZyPREXA) tablet 5 mg  5 mg Oral Q6H PRN    haloperidol lactate (HALDOL) injection 5 mg  5 mg IntraMUSCular Q6H PRN    benztropine (COGENTIN) tablet 1 mg  1 mg Oral BID PRN    diphenhydrAMINE (BENADRYL) injection 50 mg  50 mg IntraMUSCular BID PRN    hydrOXYzine HCL (ATARAX) tablet 50 mg  50 mg Oral TID PRN    traZODone (DESYREL) tablet 50 mg  50 mg Oral QHS PRN    acetaminophen (TYLENOL) tablet 650 mg  650 mg Oral Q4H PRN    magnesium hydroxide (MILK OF MAGNESIA) 400 mg/5 mL oral suspension 30 mL  30 mL Oral DAILY PRN      DISCUSSION:   the risks and benefits of the proposed medication  patient given opportunity to ask questions    Lab/Data Review: All lab results for the last 24 hours reviewed. No results found for this or any previous visit (from the past 24 hour(s)). Assessment:     Principal Problem:    Bipolar 1 disorder ([facility] [address] (1/27/2023)      Plan:     Continue current care  Collateral information  Medication modification as appropriate  Crisis evaluation for Commitment then  Continue ECT # 4 next Tuesday  NPO the morning of.   Disposition planning with social work    I certify that this patient's inpatient psychiatric hospital services furnished since the previous certification were, and continue to be, required for treatment that could reasonably be expected to improve the patient's condition, or for diagnostic study, and that the patient continues to need, on a daily basis, active treatment furnished directly by or requiring the supervision of inpatient psychiatric facility personnel. In addition, the hospital records show that services furnished were intensive treatment services, admission or related services, or equivalent services.   Signed By: Pranay Myers MD     February 5, 2023

## 2023-02-07 ENCOUNTER — ANESTHESIA (OUTPATIENT)
Dept: SURGERY | Age: 63
DRG: 751 | End: 2023-02-07
Payer: MEDICAID

## 2023-02-07 PROCEDURE — 2709999900 HC NON-CHARGEABLE SUPPLY: Performed by: PSYCHIATRY & NEUROLOGY

## 2023-02-07 PROCEDURE — 74011000250 HC RX REV CODE- 250: Performed by: ANESTHESIOLOGY

## 2023-02-07 PROCEDURE — 74011250637 HC RX REV CODE- 250/637: Performed by: PSYCHIATRY & NEUROLOGY

## 2023-02-07 PROCEDURE — 74011250636 HC RX REV CODE- 250/636: Performed by: ANESTHESIOLOGY

## 2023-02-07 PROCEDURE — 74780000002 HC ELECTROSHOCK THERAP RECOVERY: Performed by: PSYCHIATRY & NEUROLOGY

## 2023-02-07 PROCEDURE — 65220000003 HC RM SEMIPRIVATE PSYCH

## 2023-02-07 PROCEDURE — 90870 ELECTROCONVULSIVE THERAPY: CPT | Performed by: PSYCHIATRY & NEUROLOGY

## 2023-02-07 PROCEDURE — GZB2ZZZ ELECTROCONVULSIVE THERAPY, BILATERAL-SINGLE SEIZURE: ICD-10-PCS | Performed by: PSYCHIATRY & NEUROLOGY

## 2023-02-07 RX ORDER — SODIUM CHLORIDE 0.9 % (FLUSH) 0.9 %
5-40 SYRINGE (ML) INJECTION EVERY 8 HOURS
Status: DISCONTINUED | OUTPATIENT
Start: 2023-02-07 | End: 2023-02-07 | Stop reason: HOSPADM

## 2023-02-07 RX ORDER — SODIUM CHLORIDE 9 MG/ML
50 INJECTION, SOLUTION INTRAVENOUS CONTINUOUS
Status: DISCONTINUED | OUTPATIENT
Start: 2023-02-07 | End: 2023-02-07 | Stop reason: HOSPADM

## 2023-02-07 RX ORDER — SODIUM CHLORIDE 0.9 % (FLUSH) 0.9 %
5-40 SYRINGE (ML) INJECTION AS NEEDED
Status: DISCONTINUED | OUTPATIENT
Start: 2023-02-07 | End: 2023-02-07 | Stop reason: HOSPADM

## 2023-02-07 RX ORDER — SODIUM CHLORIDE 9 MG/ML
INJECTION, SOLUTION INTRAVENOUS
Status: DISCONTINUED | OUTPATIENT
Start: 2023-02-07 | End: 2023-02-07 | Stop reason: HOSPADM

## 2023-02-07 RX ORDER — SODIUM CHLORIDE, SODIUM LACTATE, POTASSIUM CHLORIDE, CALCIUM CHLORIDE 600; 310; 30; 20 MG/100ML; MG/100ML; MG/100ML; MG/100ML
50 INJECTION, SOLUTION INTRAVENOUS CONTINUOUS
Status: DISCONTINUED | OUTPATIENT
Start: 2023-02-07 | End: 2023-02-07 | Stop reason: HOSPADM

## 2023-02-07 RX ORDER — SUCCINYLCHOLINE CHLORIDE 20 MG/ML
INJECTION INTRAMUSCULAR; INTRAVENOUS AS NEEDED
Status: DISCONTINUED | OUTPATIENT
Start: 2023-02-07 | End: 2023-02-07 | Stop reason: HOSPADM

## 2023-02-07 RX ORDER — METHOHEXITAL IN WATER/PF 100MG/10ML
SYRINGE (ML) INTRAVENOUS AS NEEDED
Status: DISCONTINUED | OUTPATIENT
Start: 2023-02-07 | End: 2023-02-07 | Stop reason: HOSPADM

## 2023-02-07 RX ADMIN — BUSPIRONE HYDROCHLORIDE 10 MG: 10 TABLET ORAL at 08:44

## 2023-02-07 RX ADMIN — OMEGA-3-ACID ETHYL ESTERS 1000 MG: 1 CAPSULE, LIQUID FILLED ORAL at 08:45

## 2023-02-07 RX ADMIN — BUSPIRONE HYDROCHLORIDE 10 MG: 10 TABLET ORAL at 17:58

## 2023-02-07 RX ADMIN — MIDODRINE HYDROCHLORIDE 10 MG: 5 TABLET ORAL at 12:43

## 2023-02-07 RX ADMIN — MIRTAZAPINE 45 MG: 15 TABLET, FILM COATED ORAL at 21:24

## 2023-02-07 RX ADMIN — OLANZAPINE 20 MG: 10 TABLET, FILM COATED ORAL at 21:24

## 2023-02-07 RX ADMIN — VENLAFAXINE HYDROCHLORIDE 300 MG: 75 CAPSULE, EXTENDED RELEASE ORAL at 08:44

## 2023-02-07 RX ADMIN — SUCCINYLCHOLINE CHLORIDE 50 MG: 20 INJECTION, SOLUTION INTRAMUSCULAR; INTRAVENOUS at 07:37

## 2023-02-07 RX ADMIN — Medication 60 MG: at 07:37

## 2023-02-07 RX ADMIN — LEVOTHYROXINE SODIUM 112 MCG: 112 TABLET ORAL at 08:44

## 2023-02-07 RX ADMIN — SODIUM CHLORIDE: 9 INJECTION, SOLUTION INTRAVENOUS at 06:56

## 2023-02-07 RX ADMIN — MIDODRINE HYDROCHLORIDE 10 MG: 5 TABLET ORAL at 17:58

## 2023-02-07 RX ADMIN — BUSPIRONE HYDROCHLORIDE 10 MG: 10 TABLET ORAL at 12:43

## 2023-02-07 NOTE — BH NOTES
Behavioral Health Interdisciplinary Rounds     Patient Name: Digna Garcia  Age: 57 y.o. Room/Bed:  319/01  Primary Diagnosis: Bipolar 1 disorder ([address]     Progress note: Treatment team met with the Pt. Pt denies SI, HI, AHVH, and self harming behaviors. Pt is alert and oriented X4 and presented with an bright affect and anxious mood. Pt rated her anxiety and depression at the level of 8/10. Per nursing the Pt received PRN atarax for anxiety and trazodone for sleep. Pt received her 4th ECT therapy and endorsed that she did not have any side effects from the treatment.     LOS:  12  Expected LOS: TBD    Financial concerns/prescription coverage:  [facility]   Family contact: Blanca Figueroa (Father) [phone]    Family requesting physician contact today:  No  Discharge plan: TBD  Access to weapons: No                                                Outpatient provider(s): [facility]), [phone]  Patient's preferred phone number for follow up call: [phone]      Participating treatment team members: Tanmay Roy Glorious Perl, MD

## 2023-02-07 NOTE — PROCEDURES
ECT PROCEDURE NOTE      IDENTIFICATION:    Patient Name  Patrick Webster   Date of Birth 1960   [facility] [id number]   Medical Record Number  [id number]      Age  57 y.o. PCP Yassine Daly MD   Admit date:  1/26/2023    Room Number  PACU/PL  @ [facility]   Date of Service  2/7/2023            Electro Convulsive Therapy:  Treatment number 4        Maintenance ECT NO   Patrick Webster was admitted to the PACU for ECT. Patient was medically cleared and NPO for procedure. Patient is NOTcourt mandated and gave informed consent for ECT treatment. Patient received     Bilateral ECT YES   Administered using the [address] [place] [address] Drive. at 35 % Energy, under general anesthesia. Patrick Webster with a good, well generalized seizure of 42 seconds on observation of the motor manifestations of the seizure. EEG duration was 56 secs. Post-Ictal Suppression Index: 52.2 %  Recovery was unremarkable and with no complications. Change in Energy %:             Anesthesia medications administered during procedure:  Brevital:  60 mg  Change for next treament:     Succinylcholine: 50 mg  Change for next treatment:      Propofol: mg  Glycopyrrolate:  mg  Labetalol:  mg  Esmolol:  mg  Lorazepam:  mg  Ketamine:  mg  Zofran:   mg    Toradol:  mg  Lidocaine:  mg  Caffeine Benzoate: mg       CSSRS 1/26/2023 7/8/2022   1) Within the past month, have you wished you were dead or wished you could go to sleep and not wake up? No No   2) Have you actually had any thoughts of killing yourself? No No   6) Have you ever done anything, started to do anything, or prepared to do anything to end your life? Yes No   Did this occur within the past 3 months?  Yes -       SIGNED:    Amber Alvarado MD  2/7/2023

## 2023-02-07 NOTE — PERIOP NOTES
Handoff Report from Operating Room to PACU    Report received from Dr. Torsten Whiting and Isaura Monroe RN regarding Valeria Serum. Surgeon(s):  Don Mcdaniels MD  And Procedure(s) (LRB):  ELECTRO CONVULSIVE THERAPY (N/A)  confirmed   with allergies discussed. Anesthesia type, drugs, patient history, complications, estimated blood loss, vital signs, intake and output, and temperature were reviewed.

## 2023-02-07 NOTE — GROUP NOTE
TABITHA  GROUP DOCUMENTATION INDIVIDUAL                                                                          Group Therapy Note    Date: 2/7/2023    Group Start Time: 0900  Group End Time: 1000  Group Topic: Topic Group    [address] ACUTE BEHAV [facility]    Jourdan Lombardo [facility] GROUP    Group Therapy Note    Attendees: 7       Attendance: Did not attend      Vincent Francis

## 2023-02-07 NOTE — PERIOP NOTES
TRANSFER - IN REPORT:    Verbal report received from [address] [place] [address]  being received from [facility] for ordered procedure      Report consisted of patients Situation, Background, Assessment and   Recommendations(SBAR). Information from the following report(s) SBAR was reviewed with the receiving nurse. Opportunity for questions and clarification was provided. Assessment completed upon patients arrival to unit and care assumed.

## 2023-02-07 NOTE — PERIOP NOTES
Spoke with Romana Mendoza, to advise patient scheduled for ECT 02/08/2023, NPO after midnight, arrive PACU 0630.

## 2023-02-07 NOTE — GROUP NOTE
TAB[facility]A  GROUP DOCUMENTATION INDIVIDUAL                                                                          Group Therapy Note    Date: 2/7/2023    Group Start Time: 1500  Group End Time: 1600  Group Topic: Recreational/Music Therapy    [facility] [place] [address] ACUTE BEHAV [facility]    [place] [address] GROUP DOCUMENTATION GROUP    Group Therapy Note    Attendees: 7       Attendance: Attended    Patient's Goal:  To concentrate on selected task    Interventions/techniques: Supported-crafts,games,music    Interactions: Interacted appropriately    Mental Status: Calm    Behavior/appearance: Cooperative    Goals Achieved: Able to engage in interactions and Able to listen to others-active participant      Sophie Roque

## 2023-02-07 NOTE — PROGRESS NOTES
Problem: Suicide  Goal: *STG: Remains safe in hospital  Outcome: Progressing Towards Goal    Shift change report given to Desirae OBREGON (oncoming nurse) by Heavenly Lopez (offgoing nurse). Report included the following information SBAR, Kardex, MAR, and Recent Results. Assumed care of patient. Met patient in dayroom after ECT. Calm and cooperative during assessment. A&Ox4. Patient smiling. Presented with euthymic mood. Denied anxiety, depression, SI, HI and AVH. No complaints of pain. Medication and meal compliant. Patient visible on unit. In dayroom to talk on phone. No issues noted throughout shift.

## 2023-02-07 NOTE — BH NOTES
Patients care assumed with the change of shift report received from the outgoing nurse Celestino Sullivan RN. Patient received in the dayroom watching TV, alert and oriented to person, place and situation, calm and cooperative with assessment. Patient presented smiling, mood is anxious and depressed. Patient denied SI, HI, AVH, and pain, she endorsed anxiety and depression at the level of 8/10, speech is flat and soft. Patient compliant with scheduled medication, prn atarax and trazodone administered as requested by patient. No obvious sign of distress observed. Safety maintained by Q 15 safety rounds and nursing rounds. 0000: NPO initiated for ECT procedure in the morning.

## 2023-02-07 NOTE — ANESTHESIA POSTPROCEDURE EVALUATION
Procedure(s):  ELECTRO CONVULSIVE THERAPY.     general    Anesthesia Post Evaluation      Multimodal analgesia: multimodal analgesia not used between 6 hours prior to anesthesia start to PACU discharge  Patient location during evaluation: PACU  Patient participation: complete - patient participated  Level of consciousness: awake and alert  Pain management: adequate  Airway patency: patent  Anesthetic complications: no  Cardiovascular status: acceptable  Respiratory status: acceptable  Hydration status: acceptable  Post anesthesia nausea and vomiting:  none  Final Post Anesthesia Temperature Assessment:  Normothermia (36.0-37.5 degrees C)      INITIAL Post-op Vital signs:   Vitals Value Taken Time   /76 02/07/23 0810   Temp 36.6 °C (97.8 °F) 02/07/23 0810   Pulse 87 02/07/23 0810   Resp 17 02/07/23 0810   SpO2 98 % 02/07/23 0810

## 2023-02-07 NOTE — PROGRESS NOTES
Problem: Anxiety-Behavioral Health (Adult/Pediatric)  Goal: *STG: Seeks staff when feelings of anxiety and fear arise  Outcome: Progressing Towards Goal     Problem: Falls - Risk of  Goal: *Absence of Falls  Description: Document Samanta Fall Risk and appropriate interventions in the flowsheet.   Outcome: Progressing Towards Goal  Note: Fall Risk Interventions:       Problem: Depressed Mood (Adult/Pediatric)  Goal: *STG: Remains safe in hospital  Outcome: Progressing Towards Goal     Problem: Depressed Mood (Adult/Pediatric)  Goal: *STG: Complies with medication therapy  Outcome: Progressing Towards Goal     Medication Interventions: Teach patient to arise slowly

## 2023-02-07 NOTE — PERIOP NOTES
TRANSFER - OUT REPORT:    Verbal report given to [facility] MAGALY RN on Camden Ba  being transferred to [facility] for ordered procedure       Report consisted of patients Situation, Background, Assessment and   Recommendations(SBAR). Information from the following report(s) SBAR was reviewed with the receiving nurse. Opportunity for questions and clarification was provided.       Patient transported with:   [employer]

## 2023-02-07 NOTE — BH NOTES
Patients care assumed with the change of shift report received from the outgoing nurse Mindi Larsen RN. Patient received in the dayroom watching TV, alert and oriented to person, place and situation, calm and cooperative with assessment. Patient presented smiling, mood is anxious and depressed. Patient denied SI, HI, AVH, and pain, she endorsed anxiety and depression at the level of 8/10, speech is flat and soft. Patient compliant with scheduled medication, prn atarax and trazodone administered as requested by patient. No obvious sign of distress observed. Safety maintained by Q 15 safety rounds and nursing rounds. 0000: NPO initiated for ECT procedure in the morning.   Patient observed throughout the night appeared asleep for approximately 8 hours

## 2023-02-07 NOTE — BH NOTES
Psychiatric Progress Note    Patient: Camden Sosa MRN: [id number]  SSN: [id number]    YOB: 1960  Age: 57 y.o. Sex: female      Admit Date: 1/26/2023    LOS: 12 days     Subjective:     Camden Sosa  reports feeling anxious (10/10) and moods are depressed (10/10). Denies SI/HI/AH/VH. No aggression or violence. Appropriately interactive and aware. Tolerating medications well. Eating well and sleeping well. 1/29 - Camden Sosa reports feeling very depressed awaiting ECT. Still having visual hallucinations that come and go. Denies SI/HI/AH/VH. No aggression or violence. Appropriately interactive and aware. Tolerating medications (atarax and trazodone prns) well. Eating and sleeping fairly. 1/30 - Camden Sosa reports feeling terrible and did not recall getting ECT this morning. When told that she had it done, she stated it was not enough. Moods are severely depressed. Denies SI/HI/AH/VH. There was concern that she did not know what she was in the hospital for or anything about the procedure. TDO and court ordered ECT recommended. No aggression or violence. Appropriately interactive and aware. Tolerating medications well. Eating and sleeping fairly. 1/31 - Camden Sosa reports not feeling well and moods are depressed. Per staff tends to ask for Injectable Ativan frequently then gets upset when she does not receive it. Passive SI per staff. Denies HI/AH/VH. No aggression or violence. Tends to isolate to room. Interactive and aware. Tolerating medications well. Eating and sleeping fairly (5 hrs). 2/1 - Camden Sosa reports not feeling good again this morning but presents less intense, more calm and is more conversant today post ECT #2. Moods are depressed. Anxiety 10/10. Denies HI/AH/VH. Was afraid of her thoughts about being dead and did not discuss them yesterday. No aggression or violence. Appropriately interactive and aware.  Tolerating medications well. Eating and sleeping fairly (6.45 hrs). 2/2 - [facility] reports feeling tired today and moods are depressed with anxiety(10/10). Passive SI. Denies HI/AH/VH. No aggression or violence. Appropriately interactive and aware. Tolerating medications (trazodone and atarax prns). Eating and sleeping fairly. 2/3 - [facility] reports feeling ok this morning and even had a smile. Moods are improved. Hearing faint voices in background that do not bother her. Blood pressure was low with mild dizziness on standing. Denies SI/HI/VH. No aggression or violence. Appropriately interactive and aware. Tolerating medications well. Eating and sleeping fairly. Goes for ECT # 3 today. 02/04 - no acute overnight events. The patient slept 8 hours overnight and got Trazodone with good effect. She reports ongoing anxiety and depressed mood but denies active thoughts of self harm. Patient is eeager for her next ECT session which is scheduled for early next week. She is able to make her needs known and medication compliant. 02/05 - the patient reports ongoing AH+ and is isolative to her room. She denies active thoughts of self harm. Patient withdrawn and isolative and slept 7.5 hours overnight. She is eager for her next ECT session which is scheduled for Tuesday. Patient able to make her needs known though she remains dysphoric.    02/06 - [facility] reports feeling \"alright: today with smile on her face. Moods are good. Weekend went well. Denies SI/HI/AH/VH. No aggression or violence. Appropriately interactive and aware. Tolerating medications well. Eating and sleeping fairly. Tolerating ECT well. 02/07  - [facility] reports feeling pretty good and moods are improving with ECT #4. Pleasant and smiling. Denies SI/HI/AH/VH. No aggression or violence. Appropriately interactive and aware. Tolerating medications well. Atarax prn given last night.   Eating and sleeping fairly. Objective:     Vitals:    02/07/23 0805 02/07/23 0810 02/07/23 0842 02/07/23 1240   BP: 117/78 124/76 125/70 103/63   Pulse: 94 87 77 80   Resp: 19 17 18    Temp:  97.8 °F (36.6 °C) 97.7 °F (36.5 °C)    SpO2: 97% 98% 99%    Weight:       Height:            Mental Status Exam:   Sensorium  oriented to time, place and person   Relations cooperative   Eye Contact appropriate   Appearance:  age appropriate   Speech:  non-pressured   Thought Process: goal directed   Thought Content Denies SI/HI/AH/VH   Suicidal ideations none   Mood:  good   Affect:  constricted   Memory   adequate   Concentration:  adequate   Insight:  limited   Judgment:  fair       MEDICATIONS:  Current Facility-Administered Medications   Medication Dose Route Frequency    busPIRone (BUSPAR) tablet 10 mg  10 mg Oral TID    levothyroxine (SYNTHROID) tablet 112 mcg  112 mcg Oral ACB    midodrine (PROAMATINE) tablet 10 mg  10 mg Oral TID WITH MEALS    mirtazapine (REMERON) tablet 45 mg  45 mg Oral QHS    OLANZapine (ZyPREXA) tablet 20 mg  20 mg Oral QHS    venlafaxine-SR (EFFEXOR-XR) capsule 300 mg  300 mg Oral DAILY    omega-3 acid ethyl esters (LOVAZA) capsule 1,000 mg  1 g Oral DAILY    OLANZapine (ZyPREXA) tablet 5 mg  5 mg Oral Q6H PRN    haloperidol lactate (HALDOL) injection 5 mg  5 mg IntraMUSCular Q6H PRN    benztropine (COGENTIN) tablet 1 mg  1 mg Oral BID PRN    diphenhydrAMINE (BENADRYL) injection 50 mg  50 mg IntraMUSCular BID PRN    hydrOXYzine HCL (ATARAX) tablet 50 mg  50 mg Oral TID PRN    traZODone (DESYREL) tablet 50 mg  50 mg Oral QHS PRN    acetaminophen (TYLENOL) tablet 650 mg  650 mg Oral Q4H PRN    magnesium hydroxide (MILK OF MAGNESIA) 400 mg/5 mL oral suspension 30 mL  30 mL Oral DAILY PRN      DISCUSSION:   the risks and benefits of the proposed medication  patient given opportunity to ask questions    Lab/Data Review: All lab results for the last 24 hours reviewed.      No results found for this or any previous visit (from the past 24 hour(s)). Assessment:     Principal Problem:    Bipolar 1 disorder ([facility] [address] (1/27/2023)      Plan:     Continue current care  Collateral information  Medication modification as appropriate  Crisis evaluation for Commitment then  Continue ECT # 5 Tomorrow  NPO the morning of. Disposition planning with social work    I certify that this patient's inpatient psychiatric hospital services furnished since the previous certification were, and continue to be, required for treatment that could reasonably be expected to improve the patient's condition, or for diagnostic study, and that the patient continues to need, on a daily basis, active treatment furnished directly by or requiring the supervision of inpatient psychiatric facility personnel. In addition, the hospital records show that services furnished were intensive treatment services, admission or related services, or equivalent services.   Signed By: Petty Bustillos MD     February 7, 2023

## 2023-02-08 ENCOUNTER — ANESTHESIA (OUTPATIENT)
Dept: SURGERY | Age: 63
DRG: 751 | End: 2023-02-08
Payer: MEDICAID

## 2023-02-08 PROCEDURE — 65220000003 HC RM SEMIPRIVATE PSYCH

## 2023-02-08 PROCEDURE — 74780000002 HC ELECTROSHOCK THERAP RECOVERY: Performed by: PSYCHIATRY & NEUROLOGY

## 2023-02-08 PROCEDURE — 74011250637 HC RX REV CODE- 250/637: Performed by: PSYCHIATRY & NEUROLOGY

## 2023-02-08 PROCEDURE — 74011000250 HC RX REV CODE- 250: Performed by: ANESTHESIOLOGY

## 2023-02-08 PROCEDURE — 74011250636 HC RX REV CODE- 250/636: Performed by: ANESTHESIOLOGY

## 2023-02-08 PROCEDURE — 90870 ELECTROCONVULSIVE THERAPY: CPT | Performed by: PSYCHIATRY & NEUROLOGY

## 2023-02-08 PROCEDURE — 2709999900 HC NON-CHARGEABLE SUPPLY: Performed by: PSYCHIATRY & NEUROLOGY

## 2023-02-08 PROCEDURE — GZB2ZZZ ELECTROCONVULSIVE THERAPY, BILATERAL-SINGLE SEIZURE: ICD-10-PCS | Performed by: PSYCHIATRY & NEUROLOGY

## 2023-02-08 RX ORDER — SODIUM CHLORIDE 9 MG/ML
INJECTION, SOLUTION INTRAVENOUS
Status: DISCONTINUED | OUTPATIENT
Start: 2023-02-08 | End: 2023-02-08 | Stop reason: HOSPADM

## 2023-02-08 RX ORDER — SUCCINYLCHOLINE CHLORIDE 20 MG/ML
INJECTION INTRAMUSCULAR; INTRAVENOUS AS NEEDED
Status: DISCONTINUED | OUTPATIENT
Start: 2023-02-08 | End: 2023-02-08 | Stop reason: HOSPADM

## 2023-02-08 RX ORDER — SODIUM CHLORIDE 0.9 % (FLUSH) 0.9 %
5-40 SYRINGE (ML) INJECTION EVERY 8 HOURS
Status: DISCONTINUED | OUTPATIENT
Start: 2023-02-08 | End: 2023-02-08 | Stop reason: HOSPADM

## 2023-02-08 RX ORDER — SODIUM CHLORIDE 0.9 % (FLUSH) 0.9 %
5-40 SYRINGE (ML) INJECTION AS NEEDED
Status: DISCONTINUED | OUTPATIENT
Start: 2023-02-08 | End: 2023-02-08 | Stop reason: HOSPADM

## 2023-02-08 RX ORDER — METHOHEXITAL IN WATER/PF 100MG/10ML
SYRINGE (ML) INTRAVENOUS AS NEEDED
Status: DISCONTINUED | OUTPATIENT
Start: 2023-02-08 | End: 2023-02-08 | Stop reason: HOSPADM

## 2023-02-08 RX ORDER — LANOLIN ALCOHOL/MO/W.PET/CERES
3 CREAM (GRAM) TOPICAL
Status: DISCONTINUED | OUTPATIENT
Start: 2023-02-08 | End: 2023-02-22 | Stop reason: HOSPADM

## 2023-02-08 RX ORDER — SODIUM CHLORIDE, SODIUM LACTATE, POTASSIUM CHLORIDE, CALCIUM CHLORIDE 600; 310; 30; 20 MG/100ML; MG/100ML; MG/100ML; MG/100ML
50 INJECTION, SOLUTION INTRAVENOUS CONTINUOUS
Status: DISCONTINUED | OUTPATIENT
Start: 2023-02-08 | End: 2023-02-08 | Stop reason: HOSPADM

## 2023-02-08 RX ORDER — SODIUM CHLORIDE 9 MG/ML
50 INJECTION, SOLUTION INTRAVENOUS CONTINUOUS
Status: DISCONTINUED | OUTPATIENT
Start: 2023-02-08 | End: 2023-02-08 | Stop reason: HOSPADM

## 2023-02-08 RX ADMIN — BUSPIRONE HYDROCHLORIDE 10 MG: 10 TABLET ORAL at 09:37

## 2023-02-08 RX ADMIN — SODIUM CHLORIDE: 9 INJECTION, SOLUTION INTRAVENOUS at 07:01

## 2023-02-08 RX ADMIN — LEVOTHYROXINE SODIUM 112 MCG: 112 TABLET ORAL at 09:37

## 2023-02-08 RX ADMIN — SUCCINYLCHOLINE CHLORIDE 50 MG: 20 INJECTION, SOLUTION INTRAMUSCULAR; INTRAVENOUS at 08:49

## 2023-02-08 RX ADMIN — Medication 60 MG: at 08:49

## 2023-02-08 RX ADMIN — BUSPIRONE HYDROCHLORIDE 10 MG: 10 TABLET ORAL at 11:53

## 2023-02-08 RX ADMIN — OLANZAPINE 20 MG: 10 TABLET, FILM COATED ORAL at 22:41

## 2023-02-08 RX ADMIN — Medication 3 MG: at 20:50

## 2023-02-08 RX ADMIN — VENLAFAXINE HYDROCHLORIDE 300 MG: 75 CAPSULE, EXTENDED RELEASE ORAL at 09:37

## 2023-02-08 RX ADMIN — MIRTAZAPINE 45 MG: 15 TABLET, FILM COATED ORAL at 22:40

## 2023-02-08 RX ADMIN — MIDODRINE HYDROCHLORIDE 10 MG: 5 TABLET ORAL at 11:54

## 2023-02-08 RX ADMIN — BUSPIRONE HYDROCHLORIDE 10 MG: 10 TABLET ORAL at 16:54

## 2023-02-08 RX ADMIN — OMEGA-3-ACID ETHYL ESTERS 1000 MG: 1 CAPSULE, LIQUID FILLED ORAL at 09:41

## 2023-02-08 NOTE — GROUP NOTE
[facility]  GROUP DOCUMENTATION INDIVIDUAL                                                                          Group Therapy Note    Date: 2/8/2023    Group Start Time: 0900  Group End Time: 1000  Group Topic: Recreational/Music Therapy    [facility] [place] [facility] ACUTE BEHAV [facility]    [address]    Group Therapy Note    Attendees: 11       Attendance: Did not attend    Jayy Yan

## 2023-02-08 NOTE — ANESTHESIA POSTPROCEDURE EVALUATION
Procedure(s):  ELECTRO CONVULSIVE THERAPY.     general    Anesthesia Post Evaluation      Multimodal analgesia: multimodal analgesia not used between 6 hours prior to anesthesia start to PACU discharge  Patient location during evaluation: PACU  Patient participation: complete - patient participated  Level of consciousness: awake and alert  Pain management: adequate  Airway patency: patent  Anesthetic complications: no  Cardiovascular status: acceptable  Respiratory status: acceptable  Hydration status: acceptable  Post anesthesia nausea and vomiting:  none  Final Post Anesthesia Temperature Assessment:  Normothermia (36.0-37.5 degrees C)      INITIAL Post-op Vital signs:   Vitals Value Taken Time   /72 02/08/23 0920   Temp 36.7 °C (98 °F) 02/08/23 0859   Pulse 90 02/08/23 0920   Resp 19 02/08/23 0920   SpO2 98 % 02/08/23 0920

## 2023-02-08 NOTE — PROGRESS NOTES
GROUP THERAPY PROGRESS NOTE      Grace Reyes was present for medication group. GROUP TIME: 45 minutes, Wednesdays 2pm    PERSONAL GOAL FOR PARTICIPATION: To be present for group, participate in discussion, and answer patient-directed questions. GOAL ORIENTATION: Personal    THERAPEUTIC INTERVENTIONS REVIEWED AND DISCUSSED: The following topics were presented: storage of medications, how to remember to refill medications and keep up with doctor appointments, relapse prevention, keeping a record of all medication including prescription and non-prescription drugs, and who to contact with medication questions. Patients were given time to ask questions regarding their current therapy. IMPRESSION OF PARTICIPATION: Steve Rodriges remained quiet throughout most of group. She did participate in medication BINGO and received a pillbox at the conclusion of group.        Ronal Bishop, JULIETD, BCPS, [facility]  Clinical Pharmacy Specialist, [address] [place] [address]  Desk: [phone] ([id number]  Pharmacy: [phone] ([phone]

## 2023-02-08 NOTE — PERIOP NOTES
TRANSFER - OUT REPORT:    Verbal report given to Sariah Velasco RN(name) on Sebastien Apgar  being transferred to U(unit) for routine post - op       Report consisted of patients Situation, Background, Assessment and   Recommendations(SBAR). Information from the following report(s) SBAR was reviewed with the receiving nurse. Lines:       Opportunity for questions and clarification was provided.       Patient transported with:   [employer]

## 2023-02-08 NOTE — BH NOTES
Psychiatric Progress Note    Patient: Jose Simon MRN: [id number]  SSN: [id number]    YOB: 1960  Age: 57 y.o. Sex: female      Admit Date: 1/26/2023    LOS: 13 days     Subjective:     Jose Simon  reports feeling anxious (10/10) and moods are depressed (10/10). Denies SI/HI/AH/VH. No aggression or violence. Appropriately interactive and aware. Tolerating medications well. Eating well and sleeping well. 1/29 - Jose Simon reports feeling very depressed awaiting ECT. Still having visual hallucinations that come and go. Denies SI/HI/AH/VH. No aggression or violence. Appropriately interactive and aware. Tolerating medications (atarax and trazodone prns) well. Eating and sleeping fairly. 1/30 - Jose Simon reports feeling terrible and did not recall getting ECT this morning. When told that she had it done, she stated it was not enough. Moods are severely depressed. Denies SI/HI/AH/VH. There was concern that she did not know what she was in the hospital for or anything about the procedure. TDO and court ordered ECT recommended. No aggression or violence. Appropriately interactive and aware. Tolerating medications well. Eating and sleeping fairly. 1/31 - Jose Simon reports not feeling well and moods are depressed. Per staff tends to ask for Injectable Ativan frequently then gets upset when she does not receive it. Passive SI per staff. Denies HI/AH/VH. No aggression or violence. Tends to isolate to room. Interactive and aware. Tolerating medications well. Eating and sleeping fairly (5 hrs). 2/1 - Jose Simon reports not feeling good again this morning but presents less intense, more calm and is more conversant today post ECT #2. Moods are depressed. Anxiety 10/10. Denies HI/AH/VH. Was afraid of her thoughts about being dead and did not discuss them yesterday. No aggression or violence. Appropriately interactive and aware.  Tolerating medications well. Eating and sleeping fairly (6.45 hrs). 2/2 - Sergey Ramires reports feeling tired today and moods are depressed with anxiety(10/10). Passive SI. Denies HI/AH/VH. No aggression or violence. Appropriately interactive and aware. Tolerating medications (trazodone and atarax prns). Eating and sleeping fairly. 2/3 - Sergey Ramires reports feeling ok this morning and even had a smile. Moods are improved. Hearing faint voices in background that do not bother her. Blood pressure was low with mild dizziness on standing. Denies SI/HI/VH. No aggression or violence. Appropriately interactive and aware. Tolerating medications well. Eating and sleeping fairly. Goes for ECT # 3 today. 02/04 - no acute overnight events. The patient slept 8 hours overnight and got Trazodone with good effect. She reports ongoing anxiety and depressed mood but denies active thoughts of self harm. Patient is eeager for her next ECT session which is scheduled for early next week. She is able to make her needs known and medication compliant. 02/05 - the patient reports ongoing AH+ and is isolative to her room. She denies active thoughts of self harm. Patient withdrawn and isolative and slept 7.5 hours overnight. She is eager for her next ECT session which is scheduled for Tuesday. Patient able to make her needs known though she remains dysphoric.    02/06 - Sergey Ramires reports feeling \"alright: today with smile on her face. Moods are good. Weekend went well. Denies SI/HI/AH/VH. No aggression or violence. Appropriately interactive and aware. Tolerating medications well. Eating and sleeping fairly. Tolerating ECT well. 02/07  - Sergey Ramires reports feeling pretty good and moods are improving with ECT #4. Pleasant and smiling. Denies SI/HI/AH/VH. No aggression or violence. Appropriately interactive and aware. Tolerating medications well. Atarax prn given last night.   Eating and sleeping fairly. 02/08 - Camille Pozo reports feeling better than on admission with a smile and Moods that are 8/10. Completed ECT #5 today. Denies SI/HI/AH/VH. No aggression or violence. Appropriately interactive and aware. Tolerating medications well. Eating and sleeping fairly.     Objective:     Vitals:    02/08/23 0915 02/08/23 0920 02/08/23 0934 02/08/23 1154   BP: 118/72 119/72 121/63 106/60   Pulse: 94 90 83 100   Resp: 14 19 18    Temp:   97.6 °F (36.4 °C)    SpO2: 97% 98% 99%    Weight:       Height:            Mental Status Exam:   Sensorium  oriented to time, place and person   Relations cooperative   Eye Contact appropriate   Appearance:  age appropriate   Speech:  non-pressured   Thought Process: goal directed   Thought Content Denies SI/HI/AH/VH   Suicidal ideations none   Mood:  good   Affect:  constricted   Memory   adequate   Concentration:  adequate   Insight:  limited   Judgment:  fair       MEDICATIONS:  Current Facility-Administered Medications   Medication Dose Route Frequency    busPIRone (BUSPAR) tablet 10 mg  10 mg Oral TID    levothyroxine (SYNTHROID) tablet 112 mcg  112 mcg Oral ACB    midodrine (PROAMATINE) tablet 10 mg  10 mg Oral TID WITH MEALS    mirtazapine (REMERON) tablet 45 mg  45 mg Oral QHS    OLANZapine (ZyPREXA) tablet 20 mg  20 mg Oral QHS    venlafaxine-SR (EFFEXOR-XR) capsule 300 mg  300 mg Oral DAILY    omega-3 acid ethyl esters (LOVAZA) capsule 1,000 mg  1 g Oral DAILY    OLANZapine (ZyPREXA) tablet 5 mg  5 mg Oral Q6H PRN    haloperidol lactate (HALDOL) injection 5 mg  5 mg IntraMUSCular Q6H PRN    benztropine (COGENTIN) tablet 1 mg  1 mg Oral BID PRN    diphenhydrAMINE (BENADRYL) injection 50 mg  50 mg IntraMUSCular BID PRN    hydrOXYzine HCL (ATARAX) tablet 50 mg  50 mg Oral TID PRN    traZODone (DESYREL) tablet 50 mg  50 mg Oral QHS PRN    acetaminophen (TYLENOL) tablet 650 mg  650 mg Oral Q4H PRN    magnesium hydroxide (MILK OF MAGNESIA) 400 mg/5 mL oral suspension 30 mL  30 mL Oral DAILY PRN      DISCUSSION:   the risks and benefits of the proposed medication  patient given opportunity to ask questions    Lab/Data Review: All lab results for the last 24 hours reviewed. No results found for this or any previous visit (from the past 24 hour(s)). Assessment:     Principal Problem:    Bipolar 1 disorder ([facility] [address] (1/27/2023)      Plan:     Continue current care  Collateral information  Medication modification as appropriate  Crisis evaluation for Commitment then  Continue ECT # 6 on Friday  NPO the morning of. Disposition planning with social work    I certify that this patient's inpatient psychiatric hospital services furnished since the previous certification were, and continue to be, required for treatment that could reasonably be expected to improve the patient's condition, or for diagnostic study, and that the patient continues to need, on a daily basis, active treatment furnished directly by or requiring the supervision of inpatient psychiatric facility personnel. In addition, the hospital records show that services furnished were intensive treatment services, admission or related services, or equivalent services.   Signed By: Maribel Ham MD     February 8, 2023

## 2023-02-08 NOTE — BH NOTES
Behavioral Health Interdisciplinary Rounds     Patient Name: Abiodun Gary  Age: 57 y.o. Room/Bed:  319/01  Primary Diagnosis: Bipolar 1 disorder ([facility] [address]     Progress note: Treatment team met with the Pt. Pt denies SI, HI, and VH. Pt reported experiencing AH of faint voices. Pt endorsed experiencing severe depression at a level of 8/10 and anxiety at a level of 9/10. Pt received her 5th ECT therapy treatment today and presented with a flat affect and anxious mood. Pt attended group today and received a pill box. SW will explore home health support prior to the Pt discharging.     LOS:  13  Expected LOS: TBD    Financial concerns/prescription coverage:  [employer]   Family contact: Lily Webb (Father) [phone]    Family requesting physician contact today:  No  Discharge plan: TBD  Access to weapons: No                                                [facility] [phone]  Patient's preferred phone number for follow up call: [phone]      Participating treatment team members: Gokul Gustafson Markel Fordyce, MD

## 2023-02-08 NOTE — PROGRESS NOTES
Problem: Suicide  Goal: *STG: Remains safe in hospital  Outcome: Progressing Towards Goal  Goal: *STG/LTG: No longer expresses self destructive or suicidal thoughts  Outcome: Progressing Towards Goal     Problem: Depressed Mood (Adult/Pediatric)  Goal: *STG: Participates in treatment plan  Outcome: Progressing Towards Goal  Goal: *STG: Complies with medication therapy  Outcome: Progressing Towards Goal     Problem: Anxiety-Behavioral Health (Adult/Pediatric)  Goal: *STG: Seeks staff when feelings of anxiety and fear arise  Outcome: Progressing Towards Goal     Problem: Falls - Risk of  Goal: *Absence of Falls  Description: Document Samanta Fall Risk and appropriate interventions in the flowsheet. Outcome: Progressing Towards Goal  Note: Fall Risk Interventions:   Medication Interventions: Teach patient to arise slowly      Patient received up in her room. Denies SI/HI, endorses faintly hearing voices, endorses a depression level of 8 and an anxiety level of 9. Taking medications as prescribed. Will continue to monitor for safety. Patient appeared to sleep hours.

## 2023-02-08 NOTE — PROCEDURES
ECT PROCEDURE NOTE      IDENTIFICATION:    Patient Name  Keith Greer   Date of Birth 1960   [facility] [id number]   Medical Record Number  [id number]      Age  57 y.o. PCP Marilou Pope MD   Admit date:  1/26/2023    Room Number  PACU/PL  @ [facility]   Date of Service  2/8/2023            Electro Convulsive Therapy:  Treatment number 5       Maintenance ECT NO   Keith Greer was admitted to the PACU for ECT. Patient was medically cleared and NPO for procedure. Patient is NOTcourt mandated and gave informed consent for ECT treatment. Patient received     Bilateral ECT YES   Administered using the [address] [place] [address] Drive. at 35 % Energy, under general anesthesia. Keith Greer with a good, well generalized seizure of 52 seconds on observation of the motor manifestations of the seizure. EEG duration was 63 secs. Post-Ictal Suppression Index: 57.5 %  Recovery was unremarkable and with no complications. Change in Energy %:        Anesthesia medications administered during procedure:  Brevital:  60 mg  Change for next treament:     Succinylcholine: 50 mg  Change for next treatment:      Propofol: mg  Glycopyrrolate:  mg  Labetalol:  mg  Esmolol:  mg  Lorazepam:  mg  Ketamine:  mg  Zofran:   mg    Toradol:  mg  Lidocaine:  mg  Caffeine Benzoate: mg       CSSRS 1/26/2023 7/8/2022   1) Within the past month, have you wished you were dead or wished you could go to sleep and not wake up? No No   2) Have you actually had any thoughts of killing yourself? No No   6) Have you ever done anything, started to do anything, or prepared to do anything to end your life? Yes No   Did this occur within the past 3 months?  Yes -       SIGNED:    Jose Angel Gomez MD  2/8/2023

## 2023-02-08 NOTE — PERIOP NOTES
Genevive Prom  1960  [id number]    Situation:  Verbal report given from: Dr. Alida Moreira and Fabiaon Stroud RN  Procedure: Procedure(s):  ELECTRO CONVULSIVE THERAPY    Background:    Preoperative diagnosis: DEPRESSION    Postoperative diagnosis: DEPRESSION    :  Dr. Kandace Rivas    Assistant(s): Circ-1: Dale Gerber RN-1: Cindy Colbert RN    Specimens: * No specimens in log *    Assessment:  Intra-procedure medications         Anesthesia gave intra-procedure sedation and medications, see anesthesia flow sheet     Intravenous fluids: LR@ KVO     Vital signs stable

## 2023-02-08 NOTE — PROGRESS NOTES
Pt appeared to have slept approx 8 hours during the night. No distress observed. Monitoring for safety continues.

## 2023-02-08 NOTE — GROUP NOTE
TABITHA  GROUP DOCUMENTATION INDIVIDUAL                                                                          Group Therapy Note    Date: 2/8/2023    Group Start Time: 1500  Group End Time: 1600  Group Topic: Recreational/Music Therapy    [facility] [place] [address] ACUTE BEHAV [facility]    [place], [address] [facility] GROUP DOCUMENTATION GROUP    Group Therapy Note    Attendees: 11       Attendance: Attended    Patient's Goal:  To concentrate on selected task    Interventions/techniques: Supported-crafts,games,music    Interactions: Interacted appropriately    Mental Status: Calm    Behavior/appearance: Attentive and Cooperative    Goals Achieved: Able to engage in interactions and Able to listen to others-active participant      Esthela Gunn

## 2023-02-08 NOTE — PERIOP NOTES
TRANSFER - IN REPORT:    Verbal report received from Rigo RN(name) on Wilbur Art  being received from [facility](unit) for ordered procedure      Report consisted of patients Situation, Background, Assessment and   Recommendations(SBAR). Information from the following report(s) SBAR was reviewed with the receiving nurse. Opportunity for questions and clarification was provided. Assessment completed upon patients arrival to unit and care assumed.

## 2023-02-08 NOTE — PROGRESS NOTES
Problem: Suicide  Goal: *STG: Remains safe in hospital  Outcome: Progressing Towards Goal    Shift change report given to Desirae OBREGON (oncoming nurse) by Timbo Crane (offgoing nurse). Report included the following information SBAR, Kardex, MAR, and Recent Results. Assumed care of patient. Met patient in dayroom after ECT. Calm and cooperative during assessment. A&Ox4. Patient smiling. Presented with euthymic mood. Endorsed anxiety and depression. Denied SI, HI and AVH. No complaints of pain. Medication and meal compliant. Patient visible on unit, in dayroom for meals and groups. Interacting appropriately with staff and peers. No issues noted throughout shift.

## 2023-02-08 NOTE — DISCHARGE INSTRUCTIONS
ECT DISCHARGE INSTRUCTIONS      NAME: Irasema Rosas   :  1960   MRN:  [id number]     Date/Time:  2023 8:54 AM    ADMIT DATE: 2023     DISCHARGE DATE: 2023     DISCHARGE DIAGNOSIS:  [unfilled]     Recommended diet:  {diet:20954}    Recommended activity: {discharge activity:07506}    Have a responsible person stay with the patient for 24 hours after the treatment, some temporary confusion may be noticed. Do not allow a patient to operate a motor vehicle for at least 24 hours and all the confusion has cleared. Do not drink alcoholic beverages for 48 hours past treatment. Do not sign any legal documents. Do not make any critical decisions for 24 hours. Advance diet as tolerated. Start with liquids and advance it nausea is not present. Understand that memory for recent events, phone numbers, addresses, ect. May be decreased for a short period of time. Report any persistant nausea or pain to the treating physician. Patient receiving ECT while in the hospital should also not attempt to ambulate without assistance, please use tap bell which will be provide. If you have questions regarding the hospital related prescriptions or hospital related issues please call the [address] [place] [address] [phone]. If you experience any of the following symptoms then please call your primary care physician/outpatient psychiatrist or return to the emergency room if you cannot get hold of your doctor: Fever, chills, nausea, vomiting, diarrhea, change in mentation, falling, bleeding, shortness of breath. Follow Up:  Continue outpatient psychiatric follow-up visits with personal psychiatrist.  Return for next ECT treatment in ***        Information obtained by :  I understand that if any problems occur once I am at home I am to contact my physician. I understand and acknowledge receipt of the instructions indicated above. Physician's or R.N.'s Signature                                                             Date/Time                                                                                                                                              Patient or Representative Signature                                                     Date/Time                DISCHARGE SUMMARY    Steven Olivia  : 1960  MRN: [id number]    The patient Pranay Willis exhibits the ability to control behavior in a less restrictive environment. Patient's level of functioning is improving. No assaultive/destructive behavior has been observed for the past 24 hours. No suicidal/homicidal threat or behavior has been observed for the past 24 hours. There is no evidence of serious medication side effects. Patient has not been in physical or protective restraints for at least the past 24 hours. If weapons involved, how are they secured? Pt does not have access to any weapons. Is patient aware of and in agreement with discharge plan? Yes    Arrangements for medication:  Medications given to the PT    Copy of discharge instructions to provider?:  Yes to  [address] [facility] PACT team    Arrangements for transportation home:  Pt will be picked up by family    Keep all follow up appointments as scheduled, continue to take prescribed medications per physician instructions.   Mental health crisis number:  592 or your local mental health crisis line number at [phone]      [place] [address] Emergency WARM LINE      [phone] ([phone])      M-F: 9am to 9pm      Sat & Sun: 5pm - 9pm  National suicide prevention lines:                             1-158-CTORCQM (4-424-321-295.104.8821)       4-919-913-TALK ([phone])    Crisis Text Line:  Text HOME to 445030

## 2023-02-09 PROCEDURE — 74011250637 HC RX REV CODE- 250/637: Performed by: PSYCHIATRY & NEUROLOGY

## 2023-02-09 PROCEDURE — 94760 N-INVAS EAR/PLS OXIMETRY 1: CPT

## 2023-02-09 PROCEDURE — 65220000003 HC RM SEMIPRIVATE PSYCH

## 2023-02-09 RX ADMIN — Medication 3 MG: at 21:15

## 2023-02-09 RX ADMIN — OMEGA-3-ACID ETHYL ESTERS 1000 MG: 1 CAPSULE, LIQUID FILLED ORAL at 08:35

## 2023-02-09 RX ADMIN — MIDODRINE HYDROCHLORIDE 10 MG: 5 TABLET ORAL at 12:08

## 2023-02-09 RX ADMIN — OLANZAPINE 20 MG: 10 TABLET, FILM COATED ORAL at 21:15

## 2023-02-09 RX ADMIN — MIRTAZAPINE 45 MG: 15 TABLET, FILM COATED ORAL at 21:15

## 2023-02-09 RX ADMIN — BUSPIRONE HYDROCHLORIDE 10 MG: 10 TABLET ORAL at 08:33

## 2023-02-09 RX ADMIN — MIDODRINE HYDROCHLORIDE 10 MG: 5 TABLET ORAL at 08:33

## 2023-02-09 RX ADMIN — VENLAFAXINE HYDROCHLORIDE 300 MG: 75 CAPSULE, EXTENDED RELEASE ORAL at 08:33

## 2023-02-09 RX ADMIN — TRAZODONE HYDROCHLORIDE 50 MG: 50 TABLET ORAL at 21:15

## 2023-02-09 RX ADMIN — BUSPIRONE HYDROCHLORIDE 10 MG: 10 TABLET ORAL at 12:08

## 2023-02-09 RX ADMIN — MIDODRINE HYDROCHLORIDE 10 MG: 5 TABLET ORAL at 17:01

## 2023-02-09 RX ADMIN — LEVOTHYROXINE SODIUM 112 MCG: 112 TABLET ORAL at 06:47

## 2023-02-09 RX ADMIN — BUSPIRONE HYDROCHLORIDE 10 MG: 10 TABLET ORAL at 17:01

## 2023-02-09 NOTE — BH NOTES
Psychiatric Progress Note    Patient: Madeline Moreira MRN: [id number]  SSN: [id number]    YOB: 1960  Age: 57 y.o. Sex: female      Admit Date: 1/26/2023    LOS: 14 days     Subjective:     Madeline Moreira  reports feeling anxious (10/10) and moods are depressed (10/10). Denies SI/HI/AH/VH. No aggression or violence. Appropriately interactive and aware. Tolerating medications well. Eating well and sleeping well. 1/29 - Madeline Moreira reports feeling very depressed awaiting ECT. Still having visual hallucinations that come and go. Denies SI/HI/AH/VH. No aggression or violence. Appropriately interactive and aware. Tolerating medications (atarax and trazodone prns) well. Eating and sleeping fairly. 1/30 - Madeline Moreira reports feeling terrible and did not recall getting ECT this morning. When told that she had it done, she stated it was not enough. Moods are severely depressed. Denies SI/HI/AH/VH. There was concern that she did not know what she was in the hospital for or anything about the procedure. TDO and court ordered ECT recommended. No aggression or violence. Appropriately interactive and aware. Tolerating medications well. Eating and sleeping fairly. 1/31 - Madeline Moreira reports not feeling well and moods are depressed. Per staff tends to ask for Injectable Ativan frequently then gets upset when she does not receive it. Passive SI per staff. Denies HI/AH/VH. No aggression or violence. Tends to isolate to room. Interactive and aware. Tolerating medications well. Eating and sleeping fairly (5 hrs). 2/1 - Madeline Moreira reports not feeling good again this morning but presents less intense, more calm and is more conversant today post ECT #2. Moods are depressed. Anxiety 10/10. Denies HI/AH/VH. Was afraid of her thoughts about being dead and did not discuss them yesterday. No aggression or violence. Appropriately interactive and aware.  Tolerating medications well. Eating and sleeping fairly (6.45 hrs). 2/2 - Jose Simon reports feeling tired today and moods are depressed with anxiety(10/10). Passive SI. Denies HI/AH/VH. No aggression or violence. Appropriately interactive and aware. Tolerating medications (trazodone and atarax prns). Eating and sleeping fairly. 2/3 - Jose Simon reports feeling ok this morning and even had a smile. Moods are improved. Hearing faint voices in background that do not bother her. Blood pressure was low with mild dizziness on standing. Denies SI/HI/VH. No aggression or violence. Appropriately interactive and aware. Tolerating medications well. Eating and sleeping fairly. Goes for ECT # 3 today. 02/04 - no acute overnight events. The patient slept 8 hours overnight and got Trazodone with good effect. She reports ongoing anxiety and depressed mood but denies active thoughts of self harm. Patient is eeager for her next ECT session which is scheduled for early next week. She is able to make her needs known and medication compliant. 02/05 - the patient reports ongoing AH+ and is isolative to her room. She denies active thoughts of self harm. Patient withdrawn and isolative and slept 7.5 hours overnight. She is eager for her next ECT session which is scheduled for Tuesday. Patient able to make her needs known though she remains dysphoric.    02/06 - Jose Simon reports feeling \"alright: today with smile on her face. Moods are good. Weekend went well. Denies SI/HI/AH/VH. No aggression or violence. Appropriately interactive and aware. Tolerating medications well. Eating and sleeping fairly. Tolerating ECT well. 02/07  - Jose Simon reports feeling pretty good and moods are improving with ECT #4. Pleasant and smiling. Denies SI/HI/AH/VH. No aggression or violence. Appropriately interactive and aware. Tolerating medications well. Atarax prn given last night.   Eating and sleeping fairly. 02/08 - Meghana Gray reports feeling better than on admission with a smile and Moods that are 8/10. Completed ECT #5 today. Denies SI/HI/AH/VH. No aggression or violence. Appropriately interactive and aware. Tolerating medications well. Eating and sleeping fairly. 02/09 - Meghana Gray reports feeling alright and moods are about a 5-7/10. Anxiety is about 6-8/10. Denies SI/HI/AH/VH. No aggression or violence. Appropriately interactive and aware. Tolerating medications well. Eating and sleeping fairly.     Objective:     Vitals:    02/08/23 1653 02/08/23 2048 02/09/23 0808 02/09/23 1133   BP: 123/70 106/71 (!) 102/90 105/63   Pulse: 85 87 72 76   Resp:  16 16    Temp:  98.4 °F (36.9 °C) 97.6 °F (36.4 °C)    SpO2:  97% 97%    Weight:       Height:            Mental Status Exam:   Sensorium  oriented to time, place and person   Relations cooperative   Eye Contact appropriate   Appearance:  age appropriate   Speech:  non-pressured   Thought Process: goal directed   Thought Content Denies SI/HI/AH/VH   Suicidal ideations none   Mood:  good   Affect:  constricted   Memory   adequate   Concentration:  adequate   Insight:  limited   Judgment:  fair       MEDICATIONS:  Current Facility-Administered Medications   Medication Dose Route Frequency    melatonin tablet 3 mg  3 mg Oral QHS    busPIRone (BUSPAR) tablet 10 mg  10 mg Oral TID    levothyroxine (SYNTHROID) tablet 112 mcg  112 mcg Oral ACB    midodrine (PROAMATINE) tablet 10 mg  10 mg Oral TID WITH MEALS    mirtazapine (REMERON) tablet 45 mg  45 mg Oral QHS    OLANZapine (ZyPREXA) tablet 20 mg  20 mg Oral QHS    venlafaxine-SR (EFFEXOR-XR) capsule 300 mg  300 mg Oral DAILY    omega-3 acid ethyl esters (LOVAZA) capsule 1,000 mg  1 g Oral DAILY    OLANZapine (ZyPREXA) tablet 5 mg  5 mg Oral Q6H PRN    haloperidol lactate (HALDOL) injection 5 mg  5 mg IntraMUSCular Q6H PRN    benztropine (COGENTIN) tablet 1 mg  1 mg Oral BID PRN    diphenhydrAMINE (BENADRYL) injection 50 mg  50 mg IntraMUSCular BID PRN    hydrOXYzine HCL (ATARAX) tablet 50 mg  50 mg Oral TID PRN    traZODone (DESYREL) tablet 50 mg  50 mg Oral QHS PRN    acetaminophen (TYLENOL) tablet 650 mg  650 mg Oral Q4H PRN    magnesium hydroxide (MILK OF MAGNESIA) 400 mg/5 mL oral suspension 30 mL  30 mL Oral DAILY PRN      DISCUSSION:   the risks and benefits of the proposed medication  patient given opportunity to ask questions    Lab/Data Review: All lab results for the last 24 hours reviewed. No results found for this or any previous visit (from the past 24 hour(s)). Assessment:     Principal Problem:    Bipolar 1 disorder ([facility] [address] (1/27/2023)      Plan:     Continue current care  Collateral information  Medication modification as appropriate  Crisis evaluation for Commitment then  Continue ECT # 6 on Friday  NPO the morning of. Disposition planning with social work    I certify that this patient's inpatient psychiatric hospital services furnished since the previous certification were, and continue to be, required for treatment that could reasonably be expected to improve the patient's condition, or for diagnostic study, and that the patient continues to need, on a daily basis, active treatment furnished directly by or requiring the supervision of inpatient psychiatric facility personnel. In addition, the hospital records show that services furnished were intensive treatment services, admission or related services, or equivalent services.   Signed By: Rinda Boxer, MD     February 9, 2023

## 2023-02-09 NOTE — BH NOTES
GROUP THERAPY PROGRESS NOTE    Michaela Taylor is participating in Goals Group. Group time: 45 minutes    Personal goal for participation: To complete the provided handout. Goal orientation: personal    Group therapy participation: Did not attend    Therapeutic interventions reviewed and discussed: Patients identified their short and long term goals and discussed why they are looking forward to these goals and how they plan to achieve them.     Impression of participation: Did not attend

## 2023-02-09 NOTE — PROGRESS NOTES
Problem: Suicide  Goal: *STG: Remains safe in hospital  Outcome: Progressing Towards Goal     Shift change report given to Desirae OBREGON (oncoming nurse) by Bryan Grier (offgoing nurse). Report included the following information SBAR, Kardex, MAR, and Recent Results. Assumed care of patient. Met patient in room. Calm and cooperative with assessment, smiling. Patient alert and oriented. Patient endorsed anxiety 6/10 and depression 5/10. Denied SI, HI and AVH. No complaints of pain. Medication and meal compliant. No issues noted throughout shift.

## 2023-02-09 NOTE — BH NOTES
GROUP THERAPY PROGRESS NOTE     Group time: 45 minutes     Personal goal for participation: To assess personal values and discuss handout. Goal orientation: personal     Group therapy participation: Attended      Therapeutic interventions reviewed and discussed: Patient was encouraged to identify personal values and described why they are important at current stage of life. Patient was encouraged to compare and contraste values from different stages in life and was able to identify how life experiences shape identified values.        Impression of participation: Patient did not participate      KALIE Galicia

## 2023-02-09 NOTE — GROUP NOTE
[facility] GROUP DOCUMENTATION INDIVIDUAL                                                                          Group Therapy Note    Date: 2/9/2023    Group Start Time: 1500  Group End Time: 1600  Group Topic: Recreational/Music Therapy    [facility] [place] [address] ACUTE BEHAV [facility]    Jourdan Lombardo [address] GROUP    Group Therapy Note    Attendees: 7       Attendance: Did not attend      Dayana Call

## 2023-02-09 NOTE — GROUP NOTE
[facility]  GROUP DOCUMENTATION INDIVIDUAL                                                                          Group Therapy Note    Date: 2/9/2023    Group Start Time: 0900  Group End Time: 1000  Group Topic: Topic Group    [facility] [place] [address] ACUTE BEHAV TH    Jour[address] Lomb[address] GROUP    Group Therapy Note    Attendees: 8       Attendance: Did not attend      Saulo Nicole

## 2023-02-09 NOTE — BH NOTES
Behavioral Health Interdisciplinary Rounds     Patient Name: Michaela Taylor  Age: 57 y.o. Room/Bed:  319/01  Primary Diagnosis: Bipolar 1 disorder ([address]     Progress note: Treatment team met with the Pt. Pt denies SI, HI, AHVH, and self harming behaviors. Pt presented with a bright affect and depressed mood. Pt rated her anxiety and depression at a level of 7/10. Pt met with her [facility] , Krista Calderonalejandra today. SW informed Krista Da Silva that [facility] will need to complete the Pt UAI due to [facility] not knowing the Pt financial situation. SW informed Krista Da Silva that she will find home health care for the Pt prior to discharge.      LOS:  14  Expected LOS: TBD     Financial concerns/prescription coverage:  [employer]   Family contact: Lucía Jane (Father) [phone]    Family requesting physician contact today:  No  Discharge plan: TBD  Access to weapons: No                                                Outpatient provider(s): [facility]), [phone]  Patient's preferred phone number for follow up call: [phone]      Participating treatment team members: Minerva Perera Elda Gallo, MD

## 2023-02-09 NOTE — PROGRESS NOTES
Problem: Depressed Mood (Adult/Pediatric)  Goal: *STG: Demonstrates reduction in symptoms and increase in insight into coping skills/future focused  Outcome: Progressing Towards Goal     Problem: Suicide  Goal: *STG/LTG: Complies with medication therapy  Outcome: Progressing Towards Goal     Problem: Suicide  Goal: *STG: Remains safe in hospital  Outcome: Progressing Towards Goal   Patient slept for 8.0hrs

## 2023-02-10 ENCOUNTER — ANESTHESIA EVENT (OUTPATIENT)
Dept: SURGERY | Age: 63
DRG: 751 | End: 2023-02-10
Payer: MEDICAID

## 2023-02-10 ENCOUNTER — ANESTHESIA (OUTPATIENT)
Dept: SURGERY | Age: 63
DRG: 751 | End: 2023-02-10
Payer: MEDICAID

## 2023-02-10 PROCEDURE — 74011000250 HC RX REV CODE- 250: Performed by: ANESTHESIOLOGY

## 2023-02-10 PROCEDURE — 2709999900 HC NON-CHARGEABLE SUPPLY: Performed by: PSYCHIATRY & NEUROLOGY

## 2023-02-10 PROCEDURE — 90870 ELECTROCONVULSIVE THERAPY: CPT | Performed by: PSYCHIATRY & NEUROLOGY

## 2023-02-10 PROCEDURE — 74011250636 HC RX REV CODE- 250/636: Performed by: ANESTHESIOLOGY

## 2023-02-10 PROCEDURE — 65220000003 HC RM SEMIPRIVATE PSYCH

## 2023-02-10 PROCEDURE — 74780000002 HC ELECTROSHOCK THERAP RECOVERY: Performed by: PSYCHIATRY & NEUROLOGY

## 2023-02-10 PROCEDURE — GZB2ZZZ ELECTROCONVULSIVE THERAPY, BILATERAL-SINGLE SEIZURE: ICD-10-PCS | Performed by: PSYCHIATRY & NEUROLOGY

## 2023-02-10 PROCEDURE — 74011250637 HC RX REV CODE- 250/637: Performed by: PSYCHIATRY & NEUROLOGY

## 2023-02-10 RX ORDER — SODIUM CHLORIDE, SODIUM LACTATE, POTASSIUM CHLORIDE, CALCIUM CHLORIDE 600; 310; 30; 20 MG/100ML; MG/100ML; MG/100ML; MG/100ML
50 INJECTION, SOLUTION INTRAVENOUS CONTINUOUS
Status: DISCONTINUED | OUTPATIENT
Start: 2023-02-10 | End: 2023-02-10 | Stop reason: HOSPADM

## 2023-02-10 RX ORDER — SODIUM CHLORIDE 0.9 % (FLUSH) 0.9 %
5-40 SYRINGE (ML) INJECTION AS NEEDED
Status: DISCONTINUED | OUTPATIENT
Start: 2023-02-10 | End: 2023-02-10 | Stop reason: HOSPADM

## 2023-02-10 RX ORDER — METHOHEXITAL IN WATER/PF 100MG/10ML
SYRINGE (ML) INTRAVENOUS AS NEEDED
Status: DISCONTINUED | OUTPATIENT
Start: 2023-02-10 | End: 2023-02-10 | Stop reason: HOSPADM

## 2023-02-10 RX ORDER — SODIUM CHLORIDE 0.9 % (FLUSH) 0.9 %
5-40 SYRINGE (ML) INJECTION EVERY 8 HOURS
Status: DISCONTINUED | OUTPATIENT
Start: 2023-02-10 | End: 2023-02-10 | Stop reason: HOSPADM

## 2023-02-10 RX ORDER — SUCCINYLCHOLINE CHLORIDE 20 MG/ML
INJECTION INTRAMUSCULAR; INTRAVENOUS AS NEEDED
Status: DISCONTINUED | OUTPATIENT
Start: 2023-02-10 | End: 2023-02-10 | Stop reason: HOSPADM

## 2023-02-10 RX ORDER — SODIUM CHLORIDE 9 MG/ML
INJECTION, SOLUTION INTRAVENOUS
Status: DISCONTINUED | OUTPATIENT
Start: 2023-02-10 | End: 2023-02-10 | Stop reason: HOSPADM

## 2023-02-10 RX ORDER — SODIUM CHLORIDE 9 MG/ML
50 INJECTION, SOLUTION INTRAVENOUS CONTINUOUS
Status: DISCONTINUED | OUTPATIENT
Start: 2023-02-10 | End: 2023-02-10 | Stop reason: HOSPADM

## 2023-02-10 RX ADMIN — MIRTAZAPINE 45 MG: 15 TABLET, FILM COATED ORAL at 21:45

## 2023-02-10 RX ADMIN — HYDROXYZINE HYDROCHLORIDE 50 MG: 25 TABLET, FILM COATED ORAL at 21:46

## 2023-02-10 RX ADMIN — VENLAFAXINE HYDROCHLORIDE 300 MG: 75 CAPSULE, EXTENDED RELEASE ORAL at 12:47

## 2023-02-10 RX ADMIN — OMEGA-3-ACID ETHYL ESTERS 1000 MG: 1 CAPSULE, LIQUID FILLED ORAL at 12:47

## 2023-02-10 RX ADMIN — OLANZAPINE 20 MG: 10 TABLET, FILM COATED ORAL at 21:45

## 2023-02-10 RX ADMIN — SUCCINYLCHOLINE CHLORIDE 50 MG: 20 INJECTION, SOLUTION INTRAMUSCULAR; INTRAVENOUS at 10:19

## 2023-02-10 RX ADMIN — Medication 60 MG: at 10:19

## 2023-02-10 RX ADMIN — MIDODRINE HYDROCHLORIDE 10 MG: 5 TABLET ORAL at 12:47

## 2023-02-10 RX ADMIN — BUSPIRONE HYDROCHLORIDE 10 MG: 10 TABLET ORAL at 16:29

## 2023-02-10 RX ADMIN — SODIUM CHLORIDE: 9 INJECTION, SOLUTION INTRAVENOUS at 07:50

## 2023-02-10 RX ADMIN — BUSPIRONE HYDROCHLORIDE 10 MG: 10 TABLET ORAL at 12:47

## 2023-02-10 RX ADMIN — Medication 3 MG: at 21:45

## 2023-02-10 RX ADMIN — MIDODRINE HYDROCHLORIDE 10 MG: 5 TABLET ORAL at 16:28

## 2023-02-10 NOTE — PROGRESS NOTES
Problem: Discharge Planning  Goal: *Discharge to safe environment  2/10/2023 0836 by Alvin Garza RN  Outcome: Progressing Towards Goal  2/10/2023 0324 by Alvin Garza RN  Outcome: Progressing Towards Goal

## 2023-02-10 NOTE — BH NOTES
Received patient from ECT. She is alert, oriented to person, place and situation - unsure of date or month. Hygiene is fair, will need some encouragment after rest. Gait is steady. Denies SI/HI and demonstrates no evidence of intent to harm self or others. States she hears nondescript voices \"faintly\". Pt encouraged to continue to participate in care. Will continue to monitor pt with q 15 min checks and hourly nursing rounds.

## 2023-02-10 NOTE — PERIOP NOTES
TRANSFER - OUT REPORT:    Verbal report given to [facility] RN on Raymundo Jones  being transferred to [facility] for routine post - op       Report consisted of patients Situation, Background, Assessment and   Recommendations(SBAR). Information from the following report(s) SBAR was reviewed with the receiving nurse. Opportunity for questions and clarification was provided.       Patient transported with:   [facility]

## 2023-02-10 NOTE — GROUP NOTE
[facility]  GROUP DOCUMENTATION INDIVIDUAL                                                                          Group Therapy Note    Date: 2/10/2023    Group Start Time: 1500  Group End Time: 1600  Group Topic: Recreational/Music Therapy    [facility] [place] [address] ACUTE BEHAV [facility]    [place] [facility] GROUP DOCUMENTATION GROUP    Group Therapy Note    Attendees: 8       Attendance: Attended    Patient's Goal:  To concentrate on selected task    Interventions/techniques: Supported-crafts,games,music    Follows Directions:  Followed directions    Interactions: Interacted appropriately    Mental Status: Calm    Behavior/appearance: Attentive, Cooperative, and Needed prompting    Goals Achieved: Able to engage in interactions and Able to listen to others-active participant    Severa Fusi

## 2023-02-10 NOTE — PROCEDURES
ECT PROCEDURE NOTE      IDENTIFICATION:    Patient Name  Patrick Webster   Date of Birth 1960   [facility] [id number]   Medical Record Number  [id number]      Age  57 y.o. PCP Yassine Daly MD   Admit date:  1/26/2023    Room Number  PACU/PL  @ [facility]   Date of Service  2/10/2023            Electro Convulsive Therapy:  Treatment number 6       Maintenance ECT NO   Patrick Webster was admitted to the PACU for ECT. Patient was medically cleared and NPO for procedure. Patient is NOTcourt mandated and gave informed consent for ECT treatment. Patient received     Bilateral ECT YES   Administered using the [address] [place] [address] Drive. at 35 % Energy, under general anesthesia. Patrick Webster with a good, well generalized seizure of 62 seconds on observation of the motor manifestations of the seizure. EEG duration was NA secs. Post-Ictal Suppression Index: NA %  Recovery was unremarkable and with no complications. Change in Energy %:        Anesthesia medications administered during procedure:  Brevital:  60 mg  Change for next treament:     Succinylcholine: 50 mg  Change for next treatment:      Propofol: mg  Glycopyrrolate:  mg  Labetalol:  mg  Esmolol:  mg  Lorazepam:  mg  Ketamine:  mg  Zofran:   mg    Toradol:  mg  Lidocaine:  mg  Caffeine Benzoate: mg       CSSRS 1/26/2023 7/8/2022   1) Within the past month, have you wished you were dead or wished you could go to sleep and not wake up? No No   2) Have you actually had any thoughts of killing yourself? No No   6) Have you ever done anything, started to do anything, or prepared to do anything to end your life? Yes No   Did this occur within the past 3 months?  Yes -       SIGNED:    Amber Alvarado MD  2/10/2023

## 2023-02-10 NOTE — PROGRESS NOTES
Problem: Discharge Planning  Goal: *Discharge to safe environment  Outcome: [address] presented alert and oriented x4, affect and mood noted brighter. She rated her depression level at 7/10. She denied SI/HI/AVH. She was compliant with her scheduled meds. She was reminded off NPO status after MN for ECT scheduled in the AM. She verbalized understanding. She received Trazodone for sleep. She appeared to have slept approx 8 hours during the night. No distress observed. She was maintained NPO. Monitoring continues.

## 2023-02-10 NOTE — BH NOTES
Behavioral Health Interdisciplinary Rounds     Patient Name: Thien Schmitt  Age: 57 y.o. Room/Bed:  319/01  Primary Diagnosis: Bipolar 1 disorder ([address]     Progress note: Treatment team met with the Pt. Pt denies SI, HI,VH, and self harming behaviors. Pt received her 6th ECT therapy today and had difficulty remembering the date or month after the procedure. Pt endorsed experiencing AH of faint nondescript voices. Pt was observed with fair hygiene and grooming. Per nursing the Pt slept for 8 hours and received trazodone for sleep. Pt rated her depression level at a 7/10.     LOS:  15  Expected LOS: TBD  Financial concerns/prescription coverage:  [employer]   Family contact: Reema Gilliland (Father) [phone]    Family requesting physician contact today:  No  Discharge plan: TBD  Access to weapons: No                                                Outpatient provider(s): [facility]), [phone]  Patient's preferred phone number for follow up call: [phone]      Participating treatment team members: Thien Schmitt, Lb Elliott MD

## 2023-02-10 NOTE — PROGRESS NOTES
Behavioral Services                                              Medicare Re-Certification    I certify that the inpatient psychiatric hospital services furnished since the previous certification/re-certification were, and continue to be, medically necessary for;    [x] (1) Treatment which could reasonably be expected to improve the patient's condition,    [x] (2) Or for diagnostic study. Estimated length of stay/service  5 - 7 days    Plan for post-hospital care per social work    This patient continues to need, on a daily basis, active treatment furnished directly by or requiring the supervision of inpatient psychiatric personnel.     Electronically signed by Lesia Desai MD on 2/10/2023 at 12:55 PM

## 2023-02-10 NOTE — BH NOTES
Psychiatric Progress Note    Patient: Gloria Kumar MRN: [id number]  SSN: [id number]    YOB: 1960  Age: 57 y.o. Sex: female      Admit Date: 1/26/2023    LOS: 15 days     Subjective:     Gloria Kumar  reports feeling anxious (10/10) and moods are depressed (10/10). Denies SI/HI/AH/VH. No aggression or violence. Appropriately interactive and aware. Tolerating medications well. Eating well and sleeping well. 1/29 - Gloria Kumar reports feeling very depressed awaiting ECT. Still having visual hallucinations that come and go. Denies SI/HI/AH/VH. No aggression or violence. Appropriately interactive and aware. Tolerating medications (atarax and trazodone prns) well. Eating and sleeping fairly. 1/30 - Gloria Kumar reports feeling terrible and did not recall getting ECT this morning. When told that she had it done, she stated it was not enough. Moods are severely depressed. Denies SI/HI/AH/VH. There was concern that she did not know what she was in the hospital for or anything about the procedure. TDO and court ordered ECT recommended. No aggression or violence. Appropriately interactive and aware. Tolerating medications well. Eating and sleeping fairly. 1/31 - Gloria Kumar reports not feeling well and moods are depressed. Per staff tends to ask for Injectable Ativan frequently then gets upset when she does not receive it. Passive SI per staff. Denies HI/AH/VH. No aggression or violence. Tends to isolate to room. Interactive and aware. Tolerating medications well. Eating and sleeping fairly (5 hrs). 2/1 - Gloria Kumar reports not feeling good again this morning but presents less intense, more calm and is more conversant today post ECT #2. Moods are depressed. Anxiety 10/10. Denies HI/AH/VH. Was afraid of her thoughts about being dead and did not discuss them yesterday. No aggression or violence. Appropriately interactive and aware.  Tolerating medications well. Eating and sleeping fairly (6.45 hrs). 2/2 - Pauline Gama reports feeling tired today and moods are depressed with anxiety(10/10). Passive SI. Denies HI/AH/VH. No aggression or violence. Appropriately interactive and aware. Tolerating medications (trazodone and atarax prns). Eating and sleeping fairly. 2/3 - Pauline Gama reports feeling ok this morning and even had a smile. Moods are improved. Hearing faint voices in background that do not bother her. Blood pressure was low with mild dizziness on standing. Denies SI/HI/VH. No aggression or violence. Appropriately interactive and aware. Tolerating medications well. Eating and sleeping fairly. Goes for ECT # 3 today. 02/04 - no acute overnight events. The patient slept 8 hours overnight and got Trazodone with good effect. She reports ongoing anxiety and depressed mood but denies active thoughts of self harm. Patient is eeager for her next ECT session which is scheduled for early next week. She is able to make her needs known and medication compliant. 02/05 - the patient reports ongoing AH+ and is isolative to her room. She denies active thoughts of self harm. Patient withdrawn and isolative and slept 7.5 hours overnight. She is eager for her next ECT session which is scheduled for Tuesday. Patient able to make her needs known though she remains dysphoric.    02/06 - Pauline Gama reports feeling \"alright: today with smile on her face. Moods are good. Weekend went well. Denies SI/HI/AH/VH. No aggression or violence. Appropriately interactive and aware. Tolerating medications well. Eating and sleeping fairly. Tolerating ECT well. 02/07  - Pauline Gama reports feeling pretty good and moods are improving with ECT #4. Pleasant and smiling. Denies SI/HI/AH/VH. No aggression or violence. Appropriately interactive and aware. Tolerating medications well. Atarax prn given last night.   Eating and sleeping fairly.  - Crowe Apgar reports feeling better than on admission with a smile and Moods that are 8/10. Completed ECT #5 today. Denies SI/HI/AH/VH. No aggression or violence. Appropriately interactive and aware. Tolerating medications well. Eating and sleeping fairly.  - Crowe Apgar reports feeling alright and moods are about a 5-7/10. Anxiety is about 6-8/10. Denies SI/HI/AH/VH. No aggression or violence. Appropriately interactive and aware. Tolerating medications well. Eating and sleeping fairly. 02/10 - Crowe Apgar reports feeling well with a slight headache today post ECT # 6. Bright smile and moods are pretty good. Denies SI/HI/AH/VH. No aggression or violence. Appropriately interactive and aware. Tolerating medications well. Eating and sleeping fairly.     Objective:     Vitals:    02/10/23 1040 02/10/23 1045 02/10/23 1050 02/10/23 1111   BP: 111/75 131/71 123/66 108/69   Pulse: 95 99 94 80   Resp: 16 17 16 18   Temp:   97.7 °F (36.5 °C) 97.9 °F (36.6 °C)   SpO2: 93% 94% 95% 99%   Weight:       Height:            Mental Status Exam:   Sensorium  oriented to time, place and person   Relations cooperative   Eye Contact appropriate   Appearance:  age appropriate   Speech:  non-pressured   Thought Process: goal directed   Thought Content Denies SI/HI/AH/VH   Suicidal ideations none   Mood:  good   Affect:  constricted   Memory   adequate   Concentration:  adequate   Insight:  limited   Judgment:  fair       MEDICATIONS:  Current Facility-Administered Medications   Medication Dose Route Frequency    melatonin tablet 3 mg  3 mg Oral QHS    busPIRone (BUSPAR) tablet 10 mg  10 mg Oral TID    levothyroxine (SYNTHROID) tablet 112 mcg  112 mcg Oral ACB    midodrine (PROAMATINE) tablet 10 mg  10 mg Oral TID WITH MEALS    mirtazapine (REMERON) tablet 45 mg  45 mg Oral QHS    OLANZapine (ZyPREXA) tablet 20 mg  20 mg Oral QHS    venlafaxine-SR (EFFEXOR-XR) capsule 300 mg  300 mg Oral DAILY    omega-3 acid ethyl esters (LOVAZA) capsule 1,000 mg  1 g Oral DAILY    OLANZapine (ZyPREXA) tablet 5 mg  5 mg Oral Q6H PRN    haloperidol lactate (HALDOL) injection 5 mg  5 mg IntraMUSCular Q6H PRN    benztropine (COGENTIN) tablet 1 mg  1 mg Oral BID PRN    diphenhydrAMINE (BENADRYL) injection 50 mg  50 mg IntraMUSCular BID PRN    hydrOXYzine HCL (ATARAX) tablet 50 mg  50 mg Oral TID PRN    traZODone (DESYREL) tablet 50 mg  50 mg Oral QHS PRN    acetaminophen (TYLENOL) tablet 650 mg  650 mg Oral Q4H PRN    magnesium hydroxide (MILK OF MAGNESIA) 400 mg/5 mL oral suspension 30 mL  30 mL Oral DAILY PRN      DISCUSSION:   the risks and benefits of the proposed medication  patient given opportunity to ask questions    Lab/Data Review: All lab results for the last 24 hours reviewed. No results found for this or any previous visit (from the past 24 hour(s)). Assessment:     Principal Problem:    Bipolar 1 disorder ([facility] [address] (1/27/2023)      Plan:     Continue current care  Collateral information  Medication modification as appropriate  Continue ECT # 7 on Monday  NPO the morning of. Disposition planning with social work    I certify that this patient's inpatient psychiatric hospital services furnished since the previous certification were, and continue to be, required for treatment that could reasonably be expected to improve the patient's condition, or for diagnostic study, and that the patient continues to need, on a daily basis, active treatment furnished directly by or requiring the supervision of inpatient psychiatric facility personnel. In addition, the hospital records show that services furnished were intensive treatment services, admission or related services, or equivalent services.   Signed By: Jessica Steiner MD     February 10, 2023

## 2023-02-10 NOTE — GROUP NOTE
TAB[facility]  GROUP DOCUMENTATION INDIVIDUAL                                                                          Group Therapy Note    Date: 2/10/2023    Group Start Time: 0900  Group End Time: 1000  Group Topic: Topic Group    [address] 3 ACUTE BEHAV [facility]    Jourdan Lombardo [facility] GROUP    Group Therapy Note    Attendees: 5       Attendance: Did not attend  [place]

## 2023-02-10 NOTE — ANESTHESIA POSTPROCEDURE EVALUATION
Procedure(s):  ELECTRO CONVULSIVE THERAPY. general    Anesthesia Post Evaluation      Multimodal analgesia: multimodal analgesia not used between 6 hours prior to anesthesia start to PACU discharge  Patient location during evaluation: PACU  Patient participation: waiting for patient participation  Level of consciousness: sleepy but conscious  Pain management: adequate  Airway patency: patent  Anesthetic complications: no  Cardiovascular status: acceptable  Respiratory status: acceptable  Hydration status: acceptable  Post anesthesia nausea and vomiting:  none  Final Post Anesthesia Temperature Assessment:  Normothermia (36.0-37.5 degrees C)      INITIAL Post-op Vital signs:   Vitals Value Taken Time   /75 02/10/23 1040   Temp 36.8 °C (98.2 °F) 02/10/23 1030   Pulse 99 02/10/23 1045   Resp 19 02/10/23 1045   SpO2 94 % 02/10/23 1045   Vitals shown include unvalidated device data.

## 2023-02-10 NOTE — BH NOTES
GROUP THERAPY PROGRESS NOTE    Flory Humphries is participating in Coping Skills Group. Group time: 45 minutes 2:00pm-2:45pm     Group therapy participation: active     Therapeutic interventions reviewed and discussed: Group facilitator conducted a educational group on cognitive distortions. Facilitator reviewed 10 different types of thinking errors. Group members were then challenged to identify thinking errors that they can relate to in their daily lives. Impression of participation: Patient served as active participant. She continues to show progress towards his goals by being an active member of group therapy. Patient sat quietly during group without engaging in disruptive behaviors.          Minoo Zuniga, supervisee in social work

## 2023-02-11 PROCEDURE — 65220000003 HC RM SEMIPRIVATE PSYCH

## 2023-02-11 PROCEDURE — 74011250637 HC RX REV CODE- 250/637: Performed by: PSYCHIATRY & NEUROLOGY

## 2023-02-11 RX ADMIN — MIDODRINE HYDROCHLORIDE 10 MG: 5 TABLET ORAL at 16:33

## 2023-02-11 RX ADMIN — MIRTAZAPINE 45 MG: 15 TABLET, FILM COATED ORAL at 21:44

## 2023-02-11 RX ADMIN — BUSPIRONE HYDROCHLORIDE 10 MG: 10 TABLET ORAL at 16:33

## 2023-02-11 RX ADMIN — MIDODRINE HYDROCHLORIDE 10 MG: 5 TABLET ORAL at 14:04

## 2023-02-11 RX ADMIN — Medication 3 MG: at 21:44

## 2023-02-11 RX ADMIN — BUSPIRONE HYDROCHLORIDE 10 MG: 10 TABLET ORAL at 14:04

## 2023-02-11 RX ADMIN — OLANZAPINE 20 MG: 10 TABLET, FILM COATED ORAL at 21:44

## 2023-02-11 RX ADMIN — VENLAFAXINE HYDROCHLORIDE 300 MG: 75 CAPSULE, EXTENDED RELEASE ORAL at 07:27

## 2023-02-11 RX ADMIN — LEVOTHYROXINE SODIUM 112 MCG: 112 TABLET ORAL at 05:56

## 2023-02-11 RX ADMIN — MIDODRINE HYDROCHLORIDE 10 MG: 5 TABLET ORAL at 07:27

## 2023-02-11 RX ADMIN — BUSPIRONE HYDROCHLORIDE 10 MG: 10 TABLET ORAL at 07:27

## 2023-02-11 RX ADMIN — OMEGA-3-ACID ETHYL ESTERS 1000 MG: 1 CAPSULE, LIQUID FILLED ORAL at 08:33

## 2023-02-11 NOTE — PROGRESS NOTES
Problem: Suicide  Goal: *STG: Remains safe in hospital  Outcome: Progressing Towards Goal  Goal: *STG: Seeks staff when feelings of self harm or harm towards others arise  Outcome: Progressing Towards Goal  Goal: *STG/LTG: Complies with medication therapy  Outcome: Progressing Towards Goal     Problem: Depressed Mood (Adult/Pediatric)  Goal: *STG: Participates in treatment plan  Outcome: Progressing Towards Goal  Goal: *STG: Remains safe in hospital  Outcome: Progressing Towards Goal  Goal: *STG: Complies with medication therapy  Outcome: Progressing Towards Goal     Problem: Anxiety-Behavioral Health (Adult/Pediatric)  Goal: *STG: Participates in treatment plan  Outcome: Progressing Towards Goal  Goal: *STG: Remains safe in hospital  Outcome: Progressing Towards Goal  Goal: *STG: Seeks staff when feelings of anxiety and fear arise  Outcome: Progressing Towards Goal  Goal: *STG/LTG: Complies with medication therapy  Outcome: Progressing Towards Goal     Problem: Falls - Risk of  Goal: *Absence of Falls  Description: Document Samanta Fall Risk and appropriate interventions in the flowsheet.   Outcome: Progressing Towards Goal  Note: Fall Risk Interventions:            Medication Interventions: Teach patient to arise slowly

## 2023-02-11 NOTE — BH NOTES
Alert and oriented x 4 met in the room resting in bed with eyes open at room air with no distress. Spoke with clear coherent speech,and ambulant with steady gait. Endorsed depression and anxiety at 8,denied SI/HI,A/V hallucinations and pain. Safety checks in place and monitored. Meal and medication compliant. Prn atarax given. 0630 am,awake resting in bed, appeared to have slept for 6 hrs.

## 2023-02-11 NOTE — BH NOTES
Psychiatric Progress Note    Patient: Simi Lucas MRN: [id number]  SSN: [id number]    YOB: 1960  Age: 57 y.o. Sex: female      Admit Date: 1/26/2023    LOS: 16 days     Subjective:     Simi Lucas  reports feeling anxious (10/10) and moods are depressed (10/10). Denies SI/HI/AH/VH. No aggression or violence. Appropriately interactive and aware. Tolerating medications well. Eating well and sleeping well. 1/29 - Simi Lucas reports feeling very depressed awaiting ECT. Still having visual hallucinations that come and go. Denies SI/HI/AH/VH. No aggression or violence. Appropriately interactive and aware. Tolerating medications (atarax and trazodone prns) well. Eating and sleeping fairly. 1/30 - Simi Lucas reports feeling terrible and did not recall getting ECT this morning. When told that she had it done, she stated it was not enough. Moods are severely depressed. Denies SI/HI/AH/VH. There was concern that she did not know what she was in the hospital for or anything about the procedure. TDO and court ordered ECT recommended. No aggression or violence. Appropriately interactive and aware. Tolerating medications well. Eating and sleeping fairly. 1/31 - Simi Lucas reports not feeling well and moods are depressed. Per staff tends to ask for Injectable Ativan frequently then gets upset when she does not receive it. Passive SI per staff. Denies HI/AH/VH. No aggression or violence. Tends to isolate to room. Interactive and aware. Tolerating medications well. Eating and sleeping fairly (5 hrs). 2/1 - Simi Lucas reports not feeling good again this morning but presents less intense, more calm and is more conversant today post ECT #2. Moods are depressed. Anxiety 10/10. Denies HI/AH/VH. Was afraid of her thoughts about being dead and did not discuss them yesterday. No aggression or violence. Appropriately interactive and aware.  Tolerating medications well. Eating and sleeping fairly (6.45 hrs). 2/2 - Osvaldo Menchaca reports feeling tired today and moods are depressed with anxiety(10/10). Passive SI. Denies HI/AH/VH. No aggression or violence. Appropriately interactive and aware. Tolerating medications (trazodone and atarax prns). Eating and sleeping fairly. 2/3 - Osvaldo Menchaca reports feeling ok this morning and even had a smile. Moods are improved. Hearing faint voices in background that do not bother her. Blood pressure was low with mild dizziness on standing. Denies SI/HI/VH. No aggression or violence. Appropriately interactive and aware. Tolerating medications well. Eating and sleeping fairly. Goes for ECT # 3 today. 02/04 - no acute overnight events. The patient slept 8 hours overnight and got Trazodone with good effect. She reports ongoing anxiety and depressed mood but denies active thoughts of self harm. Patient is eeager for her next ECT session which is scheduled for early next week. She is able to make her needs known and medication compliant. 02/05 - the patient reports ongoing AH+ and is isolative to her room. She denies active thoughts of self harm. Patient withdrawn and isolative and slept 7.5 hours overnight. She is eager for her next ECT session which is scheduled for Tuesday. Patient able to make her needs known though she remains dysphoric.    02/06 - Osvaldo Menchaca reports feeling \"alright: today with smile on her face. Moods are good. Weekend went well. Denies SI/HI/AH/VH. No aggression or violence. Appropriately interactive and aware. Tolerating medications well. Eating and sleeping fairly. Tolerating ECT well. 02/07  - Osvaldo Menchaca reports feeling pretty good and moods are improving with ECT #4. Pleasant and smiling. Denies SI/HI/AH/VH. No aggression or violence. Appropriately interactive and aware. Tolerating medications well. Atarax prn given last night.   Eating and sleeping fairly. 02/08 - Sergey Ramires reports feeling better than on admission with a smile and Moods that are 8/10. Completed ECT #5 today. Denies SI/HI/AH/VH. No aggression or violence. Appropriately interactive and aware. Tolerating medications well. Eating and sleeping fairly. 02/09 - Sergey Ramires reports feeling alright and moods are about a 5-7/10. Anxiety is about 6-8/10. Denies SI/HI/AH/VH. No aggression or violence. Appropriately interactive and aware. Tolerating medications well. Eating and sleeping fairly. 02/10 - Sergey Ramires reports feeling well with a slight headache today post ECT # 6. Bright smile and moods are pretty good. Denies SI/HI/AH/VH. No aggression or violence. Appropriately interactive and aware. Tolerating medications well. Eating and sleeping fairly. 02/11 - Sergey Ramires reports feeling well and moods are good. Denies SI/HI/AH/VH. No aggression or violence. Appropriately interactive and aware. Tolerating medications well. Eating and sleeping fairly.   Two more ECT's before discharge    Objective:     Vitals:    02/10/23 1050 02/10/23 1111 02/10/23 2008 02/11/23 0754   BP: 123/66 108/69 110/78 97/64   Pulse: 94 80 96 77   Resp: 16 18 16 18   Temp: 97.7 °F (36.5 °C) 97.9 °F (36.6 °C) 98.1 °F (36.7 °C) 97.9 °F (36.6 °C)   SpO2: 95% 99% 98% 99%   Weight:       Height:            Mental Status Exam:   Sensorium  oriented to time, place and person   Relations cooperative   Eye Contact appropriate   Appearance:  age appropriate   Speech:  non-pressured   Thought Process: goal directed   Thought Content Denies SI/HI/AH/VH   Suicidal ideations none   Mood:  good   Affect:  constricted   Memory   adequate   Concentration:  adequate   Insight:  limited   Judgment:  fair       MEDICATIONS:  Current Facility-Administered Medications   Medication Dose Route Frequency    melatonin tablet 3 mg  3 mg Oral QHS    busPIRone (BUSPAR) tablet 10 mg  10 mg Oral TID    levothyroxine (SYNTHROID) tablet 112 mcg  112 mcg Oral ACB    midodrine (PROAMATINE) tablet 10 mg  10 mg Oral TID WITH MEALS    mirtazapine (REMERON) tablet 45 mg  45 mg Oral QHS    OLANZapine (ZyPREXA) tablet 20 mg  20 mg Oral QHS    venlafaxine-SR (EFFEXOR-XR) capsule 300 mg  300 mg Oral DAILY    omega-3 acid ethyl esters (LOVAZA) capsule 1,000 mg  1 g Oral DAILY    OLANZapine (ZyPREXA) tablet 5 mg  5 mg Oral Q6H PRN    haloperidol lactate (HALDOL) injection 5 mg  5 mg IntraMUSCular Q6H PRN    benztropine (COGENTIN) tablet 1 mg  1 mg Oral BID PRN    diphenhydrAMINE (BENADRYL) injection 50 mg  50 mg IntraMUSCular BID PRN    hydrOXYzine HCL (ATARAX) tablet 50 mg  50 mg Oral TID PRN    traZODone (DESYREL) tablet 50 mg  50 mg Oral QHS PRN    acetaminophen (TYLENOL) tablet 650 mg  650 mg Oral Q4H PRN    magnesium hydroxide (MILK OF MAGNESIA) 400 mg/5 mL oral suspension 30 mL  30 mL Oral DAILY PRN      DISCUSSION:   the risks and benefits of the proposed medication  patient given opportunity to ask questions    Lab/Data Review: All lab results for the last 24 hours reviewed. No results found for this or any previous visit (from the past 24 hour(s)). Assessment:     Principal Problem:    Bipolar 1 disorder ([facility] [address] (1/27/2023)      Plan:     Continue current care  Collateral information  Medication modification as appropriate  Continue ECT # 7 on Monday  NPO the morning of. Disposition planning with social work    I certify that this patient's inpatient psychiatric hospital services furnished since the previous certification were, and continue to be, required for treatment that could reasonably be expected to improve the patient's condition, or for diagnostic study, and that the patient continues to need, on a daily basis, active treatment furnished directly by or requiring the supervision of inpatient psychiatric facility personnel.  In addition, the hospital records show that services furnished were intensive treatment services, admission or related services, or equivalent services.   Signed By: Severa Scholz, MD     February 11, 2023

## 2023-02-12 PROCEDURE — 65220000003 HC RM SEMIPRIVATE PSYCH

## 2023-02-12 PROCEDURE — 74011250637 HC RX REV CODE- 250/637: Performed by: PSYCHIATRY & NEUROLOGY

## 2023-02-12 RX ADMIN — MIRTAZAPINE 45 MG: 15 TABLET, FILM COATED ORAL at 21:41

## 2023-02-12 RX ADMIN — OLANZAPINE 20 MG: 10 TABLET, FILM COATED ORAL at 21:42

## 2023-02-12 RX ADMIN — MIDODRINE HYDROCHLORIDE 10 MG: 5 TABLET ORAL at 07:59

## 2023-02-12 RX ADMIN — BUSPIRONE HYDROCHLORIDE 10 MG: 10 TABLET ORAL at 07:59

## 2023-02-12 RX ADMIN — Medication 3 MG: at 21:42

## 2023-02-12 RX ADMIN — HYDROXYZINE HYDROCHLORIDE 50 MG: 25 TABLET, FILM COATED ORAL at 21:42

## 2023-02-12 RX ADMIN — MIDODRINE HYDROCHLORIDE 10 MG: 5 TABLET ORAL at 12:35

## 2023-02-12 RX ADMIN — OMEGA-3-ACID ETHYL ESTERS 1000 MG: 1 CAPSULE, LIQUID FILLED ORAL at 09:19

## 2023-02-12 RX ADMIN — LEVOTHYROXINE SODIUM 112 MCG: 112 TABLET ORAL at 06:09

## 2023-02-12 RX ADMIN — VENLAFAXINE HYDROCHLORIDE 300 MG: 75 CAPSULE, EXTENDED RELEASE ORAL at 07:59

## 2023-02-12 RX ADMIN — BUSPIRONE HYDROCHLORIDE 10 MG: 10 TABLET ORAL at 16:22

## 2023-02-12 RX ADMIN — BUSPIRONE HYDROCHLORIDE 10 MG: 10 TABLET ORAL at 12:35

## 2023-02-12 RX ADMIN — ACETAMINOPHEN 650 MG: 325 TABLET ORAL at 21:42

## 2023-02-12 RX ADMIN — MIDODRINE HYDROCHLORIDE 10 MG: 5 TABLET ORAL at 16:22

## 2023-02-12 NOTE — PROGRESS NOTES
Problem: Suicide  Goal: *STG: Remains safe in hospital  Outcome: Progressing Towards Goal  Goal: *STG: Seeks staff when feelings of self harm or harm towards others arise  Outcome: Progressing Towards Goal  Goal: *STG:  Verbalizes alternative ways of dealing with maladaptive feelings/behaviors  Outcome: Progressing Towards Goal  Goal: *STG/LTG: Complies with medication therapy  Outcome: Progressing Towards Goal

## 2023-02-12 NOTE — BH NOTES
Psychiatric Progress Note    Patient: Jacqueline Mancilla MRN: [id number]  SSN: [id number]    YOB: 1960  Age: 57 y.o. Sex: female      Admit Date: 1/26/2023    LOS: 17 days     Subjective:     Jacqueline Mancilla  reports feeling anxious (10/10) and moods are depressed (10/10). Denies SI/HI/AH/VH. No aggression or violence. Appropriately interactive and aware. Tolerating medications well. Eating well and sleeping well. 1/29 - Jacqueline Mancilla reports feeling very depressed awaiting ECT. Still having visual hallucinations that come and go. Denies SI/HI/AH/VH. No aggression or violence. Appropriately interactive and aware. Tolerating medications (atarax and trazodone prns) well. Eating and sleeping fairly. 1/30 - Jacqueline Mancilla reports feeling terrible and did not recall getting ECT this morning. When told that she had it done, she stated it was not enough. Moods are severely depressed. Denies SI/HI/AH/VH. There was concern that she did not know what she was in the hospital for or anything about the procedure. TDO and court ordered ECT recommended. No aggression or violence. Appropriately interactive and aware. Tolerating medications well. Eating and sleeping fairly. 1/31 - Jacqueline Mancilla reports not feeling well and moods are depressed. Per staff tends to ask for Injectable Ativan frequently then gets upset when she does not receive it. Passive SI per staff. Denies HI/AH/VH. No aggression or violence. Tends to isolate to room. Interactive and aware. Tolerating medications well. Eating and sleeping fairly (5 hrs). 2/1 - Jacqueline Mancilla reports not feeling good again this morning but presents less intense, more calm and is more conversant today post ECT #2. Moods are depressed. Anxiety 10/10. Denies HI/AH/VH. Was afraid of her thoughts about being dead and did not discuss them yesterday. No aggression or violence. Appropriately interactive and aware.  Tolerating medications well. Eating and sleeping fairly (6.45 hrs). 2/2 - Flory Humphries reports feeling tired today and moods are depressed with anxiety(10/10). Passive SI. Denies HI/AH/VH. No aggression or violence. Appropriately interactive and aware. Tolerating medications (trazodone and atarax prns). Eating and sleeping fairly. 2/3 - Flory Humphries reports feeling ok this morning and even had a smile. Moods are improved. Hearing faint voices in background that do not bother her. Blood pressure was low with mild dizziness on standing. Denies SI/HI/VH. No aggression or violence. Appropriately interactive and aware. Tolerating medications well. Eating and sleeping fairly. Goes for ECT # 3 today. 02/04 - no acute overnight events. The patient slept 8 hours overnight and got Trazodone with good effect. She reports ongoing anxiety and depressed mood but denies active thoughts of self harm. Patient is eeager for her next ECT session which is scheduled for early next week. She is able to make her needs known and medication compliant. 02/05 - the patient reports ongoing AH+ and is isolative to her room. She denies active thoughts of self harm. Patient withdrawn and isolative and slept 7.5 hours overnight. She is eager for her next ECT session which is scheduled for Tuesday. Patient able to make her needs known though she remains dysphoric.    02/06 - Flory Humphries reports feeling \"alright: today with smile on her face. Moods are good. Weekend went well. Denies SI/HI/AH/VH. No aggression or violence. Appropriately interactive and aware. Tolerating medications well. Eating and sleeping fairly. Tolerating ECT well. 02/07  - Flory Humphries reports feeling pretty good and moods are improving with ECT #4. Pleasant and smiling. Denies SI/HI/AH/VH. No aggression or violence. Appropriately interactive and aware. Tolerating medications well. Atarax prn given last night.   Eating and sleeping fairly. 02/08 - Connie Kierra reports feeling better than on admission with a smile and Moods that are 8/10. Completed ECT #5 today. Denies SI/HI/AH/VH. No aggression or violence. Appropriately interactive and aware. Tolerating medications well. Eating and sleeping fairly. 02/09 - Connie Kierra reports feeling alright and moods are about a 5-7/10. Anxiety is about 6-8/10. Denies SI/HI/AH/VH. No aggression or violence. Appropriately interactive and aware. Tolerating medications well. Eating and sleeping fairly. 02/10 - Connie Kierra reports feeling well with a slight headache today post ECT # 6. Bright smile and moods are pretty good. Denies SI/HI/AH/VH. No aggression or violence. Appropriately interactive and aware. Tolerating medications well. Eating and sleeping fairly. 02/11 - Connie Kierra reports feeling well and moods are good. Denies SI/HI/AH/VH. No aggression or violence. Appropriately interactive and aware. Tolerating medications well. Eating and sleeping fairly. Two more ECT's before discharge    02/12 - Connie Kierra reports feeling better today and continues to angel a bright smile. Moods are good. Denies SI/HI/AH/VH. No aggression or violence. Appropriately interactive and aware. Tolerating medications well. Eating and sleeping fairly 8 hrs.     Objective:     Vitals:    02/11/23 0754 02/11/23 1358 02/11/23 2223 02/12/23 0759   BP: 97/64 (!) 97/56 114/74 106/80   Pulse: 77  79 77   Resp: 18  18 18   Temp: 97.9 °F (36.6 °C)  98 °F (36.7 °C) 97.7 °F (36.5 °C)   SpO2: 99%  99% 99%   Weight:       Height:            Mental Status Exam:   Sensorium  oriented to time, place and person   Relations cooperative   Eye Contact appropriate   Appearance:  age appropriate   Speech:  non-pressured   Thought Process: goal directed   Thought Content Denies SI/HI/AH/VH   Suicidal ideations none   Mood:  good   Affect:  constricted   Memory   adequate   Concentration:  adequate Insight:  limited   Judgment:  fair       MEDICATIONS:  Current Facility-Administered Medications   Medication Dose Route Frequency    melatonin tablet 3 mg  3 mg Oral QHS    busPIRone (BUSPAR) tablet 10 mg  10 mg Oral TID    levothyroxine (SYNTHROID) tablet 112 mcg  112 mcg Oral ACB    midodrine (PROAMATINE) tablet 10 mg  10 mg Oral TID WITH MEALS    mirtazapine (REMERON) tablet 45 mg  45 mg Oral QHS    OLANZapine (ZyPREXA) tablet 20 mg  20 mg Oral QHS    venlafaxine-SR (EFFEXOR-XR) capsule 300 mg  300 mg Oral DAILY    omega-3 acid ethyl esters (LOVAZA) capsule 1,000 mg  1 g Oral DAILY    OLANZapine (ZyPREXA) tablet 5 mg  5 mg Oral Q6H PRN    haloperidol lactate (HALDOL) injection 5 mg  5 mg IntraMUSCular Q6H PRN    benztropine (COGENTIN) tablet 1 mg  1 mg Oral BID PRN    diphenhydrAMINE (BENADRYL) injection 50 mg  50 mg IntraMUSCular BID PRN    hydrOXYzine HCL (ATARAX) tablet 50 mg  50 mg Oral TID PRN    traZODone (DESYREL) tablet 50 mg  50 mg Oral QHS PRN    acetaminophen (TYLENOL) tablet 650 mg  650 mg Oral Q4H PRN    magnesium hydroxide (MILK OF MAGNESIA) 400 mg/5 mL oral suspension 30 mL  30 mL Oral DAILY PRN      DISCUSSION:   the risks and benefits of the proposed medication  patient given opportunity to ask questions    Lab/Data Review: All lab results for the last 24 hours reviewed. No results found for this or any previous visit (from the past 24 hour(s)). Assessment:     Principal Problem:    Bipolar 1 disorder ([facility] [address] (1/27/2023)      Plan:     Continue current care  Collateral information  Medication modification as appropriate  Continue ECT # 7 on Monday  NPO the morning of.   Disposition planning with social work    I certify that this patient's inpatient psychiatric hospital services furnished since the previous certification were, and continue to be, required for treatment that could reasonably be expected to improve the patient's condition, or for diagnostic study, and that the patient continues to need, on a daily basis, active treatment furnished directly by or requiring the supervision of inpatient psychiatric facility personnel. In addition, the hospital records show that services furnished were intensive treatment services, admission or related services, or equivalent services.   Signed By: Meagan Villar MD     February 12, 2023

## 2023-02-12 NOTE — BH NOTES
Pt is alert and oriented x 4. Smiles when engaged. She remains calm and cooperative. Pt denies pain and discomfort at this time. Pt denies SI/HI, AVH, anxiety and depression. She remains meal and mediation compliant. Monitoring will continue for changes.

## 2023-02-12 NOTE — BH NOTES
Pt is alert and oriented, calm and cooperative. Denies SI/HI, AVH. Pt also denies anxiety and depression. She is medication and meal compliant. She remains isolative to her room. There are no complaints of pain or discomfort at this time. Monitoring will continue for changes.

## 2023-02-12 NOTE — PROGRESS NOTES
Received patient sitting in the dayroom with peers watching TV, alert and oriented x 4. Pt present with smiling affect, interactive with peers. On assessment, pt endorse  anxiety at 9/10, depression at 8/10 and AH faintly. Pt denied SI, HI, and VH. Remained calm and cooperative with care. No PRN administered. Pt tolerated scheduled meds. No behaviors noted. Vital signs entered. Rounding in progress. Pt slept for total of  8 hours.

## 2023-02-13 ENCOUNTER — ANESTHESIA (OUTPATIENT)
Dept: SURGERY | Age: 63
DRG: 751 | End: 2023-02-13
Payer: MEDICAID

## 2023-02-13 PROCEDURE — 74011000250 HC RX REV CODE- 250: Performed by: ANESTHESIOLOGY

## 2023-02-13 PROCEDURE — 74011250636 HC RX REV CODE- 250/636: Performed by: ANESTHESIOLOGY

## 2023-02-13 PROCEDURE — 2709999900 HC NON-CHARGEABLE SUPPLY: Performed by: PSYCHIATRY & NEUROLOGY

## 2023-02-13 PROCEDURE — 74011250637 HC RX REV CODE- 250/637: Performed by: PSYCHIATRY & NEUROLOGY

## 2023-02-13 PROCEDURE — GZB2ZZZ ELECTROCONVULSIVE THERAPY, BILATERAL-SINGLE SEIZURE: ICD-10-PCS | Performed by: PSYCHIATRY & NEUROLOGY

## 2023-02-13 PROCEDURE — 74780000002 HC ELECTROSHOCK THERAP RECOVERY: Performed by: PSYCHIATRY & NEUROLOGY

## 2023-02-13 PROCEDURE — 65220000003 HC RM SEMIPRIVATE PSYCH

## 2023-02-13 PROCEDURE — 90870 ELECTROCONVULSIVE THERAPY: CPT | Performed by: PSYCHIATRY & NEUROLOGY

## 2023-02-13 RX ORDER — SODIUM CHLORIDE, SODIUM LACTATE, POTASSIUM CHLORIDE, CALCIUM CHLORIDE 600; 310; 30; 20 MG/100ML; MG/100ML; MG/100ML; MG/100ML
50 INJECTION, SOLUTION INTRAVENOUS CONTINUOUS
Status: DISCONTINUED | OUTPATIENT
Start: 2023-02-13 | End: 2023-02-13 | Stop reason: HOSPADM

## 2023-02-13 RX ORDER — SODIUM CHLORIDE 9 MG/ML
50 INJECTION, SOLUTION INTRAVENOUS CONTINUOUS
Status: DISCONTINUED | OUTPATIENT
Start: 2023-02-13 | End: 2023-02-13 | Stop reason: HOSPADM

## 2023-02-13 RX ORDER — SODIUM CHLORIDE 9 MG/ML
INJECTION, SOLUTION INTRAVENOUS
Status: DISCONTINUED | OUTPATIENT
Start: 2023-02-13 | End: 2023-02-13 | Stop reason: HOSPADM

## 2023-02-13 RX ORDER — METHOHEXITAL IN WATER/PF 100MG/10ML
SYRINGE (ML) INTRAVENOUS AS NEEDED
Status: DISCONTINUED | OUTPATIENT
Start: 2023-02-13 | End: 2023-02-13 | Stop reason: HOSPADM

## 2023-02-13 RX ORDER — SODIUM CHLORIDE 0.9 % (FLUSH) 0.9 %
5-40 SYRINGE (ML) INJECTION EVERY 8 HOURS
Status: DISCONTINUED | OUTPATIENT
Start: 2023-02-13 | End: 2023-02-13 | Stop reason: HOSPADM

## 2023-02-13 RX ORDER — SODIUM CHLORIDE 0.9 % (FLUSH) 0.9 %
5-40 SYRINGE (ML) INJECTION AS NEEDED
Status: DISCONTINUED | OUTPATIENT
Start: 2023-02-13 | End: 2023-02-13 | Stop reason: HOSPADM

## 2023-02-13 RX ORDER — SUCCINYLCHOLINE CHLORIDE 20 MG/ML
INJECTION INTRAMUSCULAR; INTRAVENOUS AS NEEDED
Status: DISCONTINUED | OUTPATIENT
Start: 2023-02-13 | End: 2023-02-13 | Stop reason: HOSPADM

## 2023-02-13 RX ADMIN — OMEGA-3-ACID ETHYL ESTERS 1000 MG: 1 CAPSULE, LIQUID FILLED ORAL at 11:35

## 2023-02-13 RX ADMIN — Medication 60 MG: at 10:30

## 2023-02-13 RX ADMIN — VENLAFAXINE HYDROCHLORIDE 300 MG: 75 CAPSULE, EXTENDED RELEASE ORAL at 11:35

## 2023-02-13 RX ADMIN — SUCCINYLCHOLINE CHLORIDE 50 MG: 20 INJECTION, SOLUTION INTRAMUSCULAR; INTRAVENOUS at 10:30

## 2023-02-13 RX ADMIN — BUSPIRONE HYDROCHLORIDE 10 MG: 10 TABLET ORAL at 17:25

## 2023-02-13 RX ADMIN — SODIUM CHLORIDE: 9 INJECTION, SOLUTION INTRAVENOUS at 08:41

## 2023-02-13 RX ADMIN — OLANZAPINE 20 MG: 10 TABLET, FILM COATED ORAL at 21:34

## 2023-02-13 RX ADMIN — LEVOTHYROXINE SODIUM 112 MCG: 112 TABLET ORAL at 06:38

## 2023-02-13 RX ADMIN — Medication 3 MG: at 21:34

## 2023-02-13 RX ADMIN — MIDODRINE HYDROCHLORIDE 10 MG: 5 TABLET ORAL at 17:25

## 2023-02-13 RX ADMIN — BUSPIRONE HYDROCHLORIDE 10 MG: 10 TABLET ORAL at 11:35

## 2023-02-13 RX ADMIN — MIRTAZAPINE 45 MG: 15 TABLET, FILM COATED ORAL at 22:27

## 2023-02-13 NOTE — PROGRESS NOTES
Problem: Discharge Planning  Goal: *Discharge to safe environment  Outcome: Progressing Towards Goal  Note: Patient identifies home as a safe environment. Patient will return home upon discharge  Goal: *Knowledge of medication management  Outcome: Progressing Towards Goal  Note: Patient verbalizes understanding of medication regimen. Patient is taking medications as prescribed. Goal: *Knowledge of discharge instructions  Outcome: Progressing Towards Goal  Note: Patient verbalizes understanding of goals for treatment and safe discharge.

## 2023-02-13 NOTE — PROGRESS NOTES
Pt was met in her room. Pt was observed laying down in bed awake. Pt was greeted by this writer. Pt was smiling and stated that she had a good day. Pt stated that she was in pain in her lower left arm rating it 6/10. Pt denies SI,HI,VH, but endorses AH and states the voices are faint. Pt rates her depression 7/10, and anxiety 8/10. Pt stated her mood is complacent, affect is euthymic. Pt reported her LBM was today, her appetite is good and she is sleeping well. Pt received Atarax 50 mg per Pt request. Pt will continue to be monitored for her safety. Pt slept for a total of 7.5 hrs.       Problem: Suicide  Goal: *STG: Remains safe in hospital  2/13/2023 0452 by Teodoro Singh RN  Outcome: Progressing Towards Goal  2/13/2023 0451 by Teodoro Singh RN  Outcome: Progressing Towards Goal  Goal: *STG: Attends activities and groups  Outcome: Progressing Towards Goal  Goal: *STG/LTG: Complies with medication therapy  2/13/2023 0452 by Teodoro Singh RN  Outcome: Progressing Towards Goal  2/13/2023 0451 by Teodoro Singh RN  Outcome: Progressing Towards Goal     Problem: Depressed Mood (Adult/Pediatric)  Goal: *STG: Participates in treatment plan  2/13/2023 0452 by Teodoro Singh RN  Outcome: Progressing Towards Goal  2/13/2023 0451 by Teodoro Singh RN  Outcome: Progressing Towards Goal  Goal: *STG: Remains safe in hospital  2/13/2023 0452 by Teodoro Singh RN  Outcome: Progressing Towards Goal  2/13/2023 0451 by Teodoro Singh RN  Outcome: Progressing Towards Goal  Goal: *STG: Complies with medication therapy  2/13/2023 0452 by Teodoro Singh RN  Outcome: Progressing Towards Goal  2/13/2023 0451 by Teodoro Singh RN  Outcome: Progressing Towards Goal     Problem: Anxiety-Behavioral Health (Adult/Pediatric)  Goal: *STG: Participates in treatment plan  Outcome: Progressing Towards Goal  Goal: *STG: Remains safe in hospital  2/13/2023 0452 by Teodoro Singh RN  Outcome: Progressing Towards Goal  2/13/2023 0451 by Ayana Luna RN  Outcome: Progressing Towards Goal  Goal: *STG/LTG: Complies with medication therapy  Outcome: Progressing Towards Goal     Problem: Falls - Risk of  Goal: *Absence of Falls  Description: Document Emmanuel Christians Fall Risk and appropriate interventions in the flowsheet.   2/13/2023 0452 by Ayana Luna RN  Outcome: Progressing Towards Goal  Note: Fall Risk Interventions:            Medication Interventions: Teach patient to arise slowly                2/13/2023 0451 by Ayana Luna RN  Outcome: Progressing Towards Goal  Note: Fall Risk Interventions:            Medication Interventions: Teach patient to arise slowly

## 2023-02-13 NOTE — PERIOP NOTES
TRANSFER - IN REPORT:    Verbal report received from DOROTHY Segovia(name) on Elysia Phillip  being received from [facility](unit) for ordered procedure      Report consisted of patients Situation, Background, Assessment and   Recommendations(SBAR). Information from the following report(s) SBAR was reviewed with the receiving nurse. Opportunity for questions and clarification was provided. Assessment completed upon patients arrival to unit and care assumed.

## 2023-02-13 NOTE — ROUTINE PROCESS
[facility]  1960  [id number]    Situation:  Verbal report given from: Dr. Alberto Howard and Celso Soni, RN  Procedure: Procedure(s):  ELECTRO CONVULSIVE THERAPY    Background:    Preoperative diagnosis: DEPRESSION    Postoperative diagnosis: DEPRESSION    :  Dr. Leonid Isabel    Assistant(s): Circ-1: Hao Ruffin Tech-1: Malou Puri    Specimens: * No specimens in log *    Assessment:  Intra-procedure medications         Anesthesia gave intra-procedure sedation and medications, see anesthesia flow sheet     Intravenous fluids: LR@ KVO     Vital signs stable

## 2023-02-13 NOTE — GROUP NOTE
TAB[facility]  GROUP DOCUMENTATION INDIVIDUAL                                                                          Group Therapy Note    Date: 2/13/2023    Group Start Time: 1500  Group End Time: 1600  Group Topic: Recreational/Music Therapy    [facility] [place] [address] ACUTE BEHAV [facility]    [place], [address] [facility] GROUP DOCUMENTATION GROUP    Group Therapy Note    Attendees: 9       Attendance: Attended    Patient's Goal:  To concentrate on selected task    Interventions/techniques: Supported-crafts,games,music    Interactions: Interacted appropriately    Mental Status: Calm    Behavior/appearance: Cooperative and Needed prompting    Goals Achieved: Able to engage in interactions and Able to listen to others-active participant in game      Severa Fusi

## 2023-02-13 NOTE — PERIOP NOTES
TRANSFER - OUT REPORT:    Verbal report given to [address], RN(name) on Wilbur Art  being transferred to [facility](unit) for routine post - op       Report consisted of patients Situation, Background, Assessment and   Recommendations(SBAR). Information from the following report(s) SBAR was reviewed with the receiving nurse. Lines:   Peripheral IV 02/13/23 Right Hand (Active)   Site Assessment Clean, dry, & intact 02/13/23 1042   Phlebitis Assessment 0 02/13/23 1042   Infiltration Assessment 0 02/13/23 1042   Dressing Status Clean, dry, & intact 02/13/23 1042   Dressing Type Tape;Transparent 02/13/23 1042   Hub Color/Line Status Yellow 02/13/23 1042        Opportunity for questions and clarification was provided.       Patient transported with:   [employer]

## 2023-02-13 NOTE — BH NOTES
Behavioral Health Interdisciplinary Rounds     Patient Name: Corry Marte  Age: 57 y.o. Room/Bed:  320/01  Primary Diagnosis: Bipolar 1 disorder ([address]     Progress note: Treatment team met with the Pt. Pt denies SI, HI, AHVH, and self harming behaviors. Pt received her 7th ECT therapy today and endorsed that it went well and that she is not experiencing any side effects. Pt presented with a bright affect and euthymic mood. Pt endorsed that she is experiencing pain in her lower left arm and rated her pain level a 6/10. Pt rated her depression and anxiety at a level of 8/10. SW contacted [facility] to inquire about home health services for the Pt upon discharge. Harman informed the SW that the Pt needs a LTSS screening and UAI completed and reviewed by [facility] prior to the Pt receiving home care services. SW was informed that [facility] would not be able to provide the Pt with services until the beginning of April due to medicaid needing to review the Pt UAI. DEV informed Harman that the Pt Erica  is completing the Pt UAI and inquired if [facility] could speak with the Pt about the PACE program. DEV provided [facility] Ward with the Pt code to speak with the Pt. DEV spoke with the Pt , Mary Albright and provided her with an update on the Pt progress and informed the  that [facility] would not be able to provide services until April. DEV informed Mary Albright that the Pt could discharge this week.     LOS:  18  Expected LOS: TBD    Financial concerns/prescription coverage:  [employer]   Family contact: Aniyah Bergeron (Father) [phone]    Family requesting physician contact today:  No  Discharge plan: TBD  Access to weapons: No                                                Outpatient provider(s): [facility]), [phone], [facility] (Personal Care)  Patient's preferred phone number for follow up call: [phone]      Participating treatment team members: Rubin Rosario Jagruti Calvin, Victorino Santacruz MD

## 2023-02-13 NOTE — PROGRESS NOTES
Problem: Suicide  Goal: *STG: Remains safe in hospital  Outcome: Progressing Towards Goal    Shift change report given to Desirae OBREGON (oncoming nurse) by Lashonda Canas (offgoing nurse). Report included the following information SBAR, Kardex, MAR, and Recent Results. Assumed care of patient. Patient at ECT. Met patient in room. Calm and cooperative with assessment. A&Ox2, disoriented to place and situation. Patient oriented. Patient endorsed a \"little\" anxiety and depression. Denied SI, HI and AVH. No complaints of pain. Medication and meal compliant. Patient visible on unit, in dayroom for groups and for meals. No issues noted throughout shift.

## 2023-02-13 NOTE — ANESTHESIA POSTPROCEDURE EVALUATION
Procedure(s):  ELECTRO CONVULSIVE THERAPY. general    Anesthesia Post Evaluation      Multimodal analgesia: multimodal analgesia not used between 6 hours prior to anesthesia start to PACU discharge  Patient location during evaluation: PACU  Patient participation: waiting for patient participation  Level of consciousness: sleepy but conscious  Pain management: adequate  Airway patency: patent  Anesthetic complications: no  Cardiovascular status: acceptable  Respiratory status: acceptable  Hydration status: acceptable  Post anesthesia nausea and vomiting:  none  Final Post Anesthesia Temperature Assessment:  Normothermia (36.0-37.5 degrees C)      INITIAL Post-op Vital signs:   Vitals Value Taken Time   /70 02/13/23 1050   Temp 36.6 °C (97.8 °F) 02/13/23 1041   Pulse 92 02/13/23 1054   Resp 13 02/13/23 1054   SpO2 95 % 02/13/23 1054   Vitals shown include unvalidated device data.

## 2023-02-13 NOTE — BH NOTES
Psychiatric Progress Note    Patient: Escobar Stauffer MRN: [id number]  SSN: [id number]    YOB: 1960  Age: 57 y.o. Sex: female      Admit Date: 1/26/2023    LOS: 18 days     Subjective:     Escobar Stauffer  reports feeling anxious (10/10) and moods are depressed (10/10). Denies SI/HI/AH/VH. No aggression or violence. Appropriately interactive and aware. Tolerating medications well. Eating well and sleeping well. 1/29 - Escobar Stauffer reports feeling very depressed awaiting ECT. Still having visual hallucinations that come and go. Denies SI/HI/AH/VH. No aggression or violence. Appropriately interactive and aware. Tolerating medications (atarax and trazodone prns) well. Eating and sleeping fairly. 1/30 - Escobar Stauffer reports feeling terrible and did not recall getting ECT this morning. When told that she had it done, she stated it was not enough. Moods are severely depressed. Denies SI/HI/AH/VH. There was concern that she did not know what she was in the hospital for or anything about the procedure. TDO and court ordered ECT recommended. No aggression or violence. Appropriately interactive and aware. Tolerating medications well. Eating and sleeping fairly. 1/31 - Escobar Stauffer reports not feeling well and moods are depressed. Per staff tends to ask for Injectable Ativan frequently then gets upset when she does not receive it. Passive SI per staff. Denies HI/AH/VH. No aggression or violence. Tends to isolate to room. Interactive and aware. Tolerating medications well. Eating and sleeping fairly (5 hrs). 2/1 - Escobar Stauffer reports not feeling good again this morning but presents less intense, more calm and is more conversant today post ECT #2. Moods are depressed. Anxiety 10/10. Denies HI/AH/VH. Was afraid of her thoughts about being dead and did not discuss them yesterday. No aggression or violence. Appropriately interactive and aware.  Tolerating medications well. Eating and sleeping fairly (6.45 hrs). 2/2 - Osvaldo Menchaca reports feeling tired today and moods are depressed with anxiety(10/10). Passive SI. Denies HI/AH/VH. No aggression or violence. Appropriately interactive and aware. Tolerating medications (trazodone and atarax prns). Eating and sleeping fairly. 2/3 - Osvaldo Menchaca reports feeling ok this morning and even had a smile. Moods are improved. Hearing faint voices in background that do not bother her. Blood pressure was low with mild dizziness on standing. Denies SI/HI/VH. No aggression or violence. Appropriately interactive and aware. Tolerating medications well. Eating and sleeping fairly. Goes for ECT # 3 today. 02/04 - no acute overnight events. The patient slept 8 hours overnight and got Trazodone with good effect. She reports ongoing anxiety and depressed mood but denies active thoughts of self harm. Patient is eeager for her next ECT session which is scheduled for early next week. She is able to make her needs known and medication compliant. 02/05 - the patient reports ongoing AH+ and is isolative to her room. She denies active thoughts of self harm. Patient withdrawn and isolative and slept 7.5 hours overnight. She is eager for her next ECT session which is scheduled for Tuesday. Patient able to make her needs known though she remains dysphoric.    02/06 - Osvaldo Menchaca reports feeling \"alright: today with smile on her face. Moods are good. Weekend went well. Denies SI/HI/AH/VH. No aggression or violence. Appropriately interactive and aware. Tolerating medications well. Eating and sleeping fairly. Tolerating ECT well. 02/07  - Osvaldo Menchaca reports feeling pretty good and moods are improving with ECT #4. Pleasant and smiling. Denies SI/HI/AH/VH. No aggression or violence. Appropriately interactive and aware. Tolerating medications well. Atarax prn given last night.   Eating and sleeping fairly. 02/08 - Gloria Kumar reports feeling better than on admission with a smile and Moods that are 8/10. Completed ECT #5 today. Denies SI/HI/AH/VH. No aggression or violence. Appropriately interactive and aware. Tolerating medications well. Eating and sleeping fairly. 02/09 - Gloria Kumar reports feeling alright and moods are about a 5-7/10. Anxiety is about 6-8/10. Denies SI/HI/AH/VH. No aggression or violence. Appropriately interactive and aware. Tolerating medications well. Eating and sleeping fairly. 02/10 - Gloria Kumar reports feeling well with a slight headache today post ECT # 6. Bright smile and moods are pretty good. Denies SI/HI/AH/VH. No aggression or violence. Appropriately interactive and aware. Tolerating medications well. Eating and sleeping fairly. 02/11 - Gloria Kumar reports feeling well and moods are good. Denies SI/HI/AH/VH. No aggression or violence. Appropriately interactive and aware. Tolerating medications well. Eating and sleeping fairly. Two more ECT's before discharge    02/12 - Gloria Kumar reports feeling better today and continues to angel a bright smile. Moods are good. Denies SI/HI/AH/VH. No aggression or violence. Appropriately interactive and aware. Tolerating medications well. Eating and sleeping fairly 8 hrs.    02/13 - Gloria Kumar reports feeling alright post ECT # 7. Memory is intact and no headache. Smiling and interacting on the unit. Moods are still depressed but less and anxiety is just a little. Denies SI/HI/AH/VH. No aggression or violence. Appropriately interactive and aware. Tolerating medications well. Eating and sleeping fairly.     Objective:     Vitals:    02/13/23 1055 02/13/23 1100 02/13/23 1105 02/13/23 1129   BP: 113/66 113/71 119/69 123/63   Pulse: 92 90 88 85   Resp: 13 15 17 18   Temp: 97.6 °F (36.4 °C)   97.3 °F (36.3 °C)   SpO2: 95% 96% 97% 97%   Weight:       Height:            Mental Status Exam: Sensorium  oriented to time, place and person   Relations cooperative   Eye Contact appropriate   Appearance:  age appropriate   Speech:  non-pressured   Thought Process: goal directed   Thought Content Denies SI/HI/AH/VH   Suicidal ideations none   Mood:  good   Affect:  constricted   Memory   adequate   Concentration:  adequate   Insight:  limited   Judgment:  fair       MEDICATIONS:  Current Facility-Administered Medications   Medication Dose Route Frequency    melatonin tablet 3 mg  3 mg Oral QHS    busPIRone (BUSPAR) tablet 10 mg  10 mg Oral TID    levothyroxine (SYNTHROID) tablet 112 mcg  112 mcg Oral ACB    midodrine (PROAMATINE) tablet 10 mg  10 mg Oral TID WITH MEALS    mirtazapine (REMERON) tablet 45 mg  45 mg Oral QHS    OLANZapine (ZyPREXA) tablet 20 mg  20 mg Oral QHS    venlafaxine-SR (EFFEXOR-XR) capsule 300 mg  300 mg Oral DAILY    omega-3 acid ethyl esters (LOVAZA) capsule 1,000 mg  1 g Oral DAILY    OLANZapine (ZyPREXA) tablet 5 mg  5 mg Oral Q6H PRN    haloperidol lactate (HALDOL) injection 5 mg  5 mg IntraMUSCular Q6H PRN    benztropine (COGENTIN) tablet 1 mg  1 mg Oral BID PRN    diphenhydrAMINE (BENADRYL) injection 50 mg  50 mg IntraMUSCular BID PRN    hydrOXYzine HCL (ATARAX) tablet 50 mg  50 mg Oral TID PRN    traZODone (DESYREL) tablet 50 mg  50 mg Oral QHS PRN    acetaminophen (TYLENOL) tablet 650 mg  650 mg Oral Q4H PRN    magnesium hydroxide (MILK OF MAGNESIA) 400 mg/5 mL oral suspension 30 mL  30 mL Oral DAILY PRN      DISCUSSION:   the risks and benefits of the proposed medication  patient given opportunity to ask questions    Lab/Data Review: All lab results for the last 24 hours reviewed. No results found for this or any previous visit (from the past 24 hour(s)).       Assessment:     Principal Problem:    Bipolar 1 disorder ([facility] [address] (1/27/2023)      Plan:     Continue current care  Collateral information  Medication modification as appropriate  Continue ECT # 8 on Wednesday  NPO the morning of. Disposition planning with social work    I certify that this patient's inpatient psychiatric hospital services furnished since the previous certification were, and continue to be, required for treatment that could reasonably be expected to improve the patient's condition, or for diagnostic study, and that the patient continues to need, on a daily basis, active treatment furnished directly by or requiring the supervision of inpatient psychiatric facility personnel. In addition, the hospital records show that services furnished were intensive treatment services, admission or related services, or equivalent services.   Signed By: Aazlea Pope MD     February 13, 2023

## 2023-02-13 NOTE — PROCEDURES
ECT PROCEDURE NOTE      IDENTIFICATION:    Patient Name  Simi Lucas   Date of Birth 1960   [facility] [id number]   Medical Record Number  [id number]      Age  57 y.o. PCP Chelsey Chacon MD   Admit date:  1/26/2023    Room Number  PACU/PL  @ [facility]   Date of Service  2/13/2023            Electro Convulsive Therapy:  Treatment number 7       Maintenance ECT NO   Simi Lucas was admitted to the PACU for ECT. Patient was medically cleared and NPO for procedure. Patient is NOTcourt mandated and gave informed consent for ECT treatment. Patient received     Bilateral ECT YES   Administered using the [address] [place] [address] Drive. at 40 % Energy, under general anesthesia. Simi Lucas with a good, well generalized seizure of 37 seconds on observation of the motor manifestations of the seizure. EEG duration was 26 secs. Post-Ictal Suppression Index: 42.8 %  Recovery was unremarkable and with no complications. Change in Energy %:        Anesthesia medications administered during procedure:  Brevital:  60 mg  Change for next treament:     Succinylcholine: 50 mg  Change for next treatment:      Propofol: mg  Glycopyrrolate:  mg  Labetalol:  mg  Esmolol:  mg  Lorazepam:  mg  Ketamine:  mg  Zofran:   mg    Toradol:  mg  Lidocaine:  mg  Caffeine Benzoate: mg       CSSRS 1/26/2023 7/8/2022   1) Within the past month, have you wished you were dead or wished you could go to sleep and not wake up? No No   2) Have you actually had any thoughts of killing yourself? No No   6) Have you ever done anything, started to do anything, or prepared to do anything to end your life? Yes No   Did this occur within the past 3 months?  Yes -       SIGNED:    Stacey Farmer MD  2/13/2023

## 2023-02-14 PROCEDURE — 65220000003 HC RM SEMIPRIVATE PSYCH

## 2023-02-14 PROCEDURE — 94760 N-INVAS EAR/PLS OXIMETRY 1: CPT

## 2023-02-14 PROCEDURE — 74011250637 HC RX REV CODE- 250/637: Performed by: PSYCHIATRY & NEUROLOGY

## 2023-02-14 RX ADMIN — OLANZAPINE 20 MG: 10 TABLET, FILM COATED ORAL at 21:49

## 2023-02-14 RX ADMIN — BUSPIRONE HYDROCHLORIDE 10 MG: 10 TABLET ORAL at 08:50

## 2023-02-14 RX ADMIN — VENLAFAXINE HYDROCHLORIDE 300 MG: 75 CAPSULE, EXTENDED RELEASE ORAL at 08:50

## 2023-02-14 RX ADMIN — BUSPIRONE HYDROCHLORIDE 10 MG: 10 TABLET ORAL at 17:30

## 2023-02-14 RX ADMIN — MIDODRINE HYDROCHLORIDE 10 MG: 5 TABLET ORAL at 17:30

## 2023-02-14 RX ADMIN — MIRTAZAPINE 45 MG: 15 TABLET, FILM COATED ORAL at 21:48

## 2023-02-14 RX ADMIN — Medication 3 MG: at 21:48

## 2023-02-14 RX ADMIN — MIDODRINE HYDROCHLORIDE 10 MG: 5 TABLET ORAL at 08:50

## 2023-02-14 RX ADMIN — OMEGA-3-ACID ETHYL ESTERS 1000 MG: 1 CAPSULE, LIQUID FILLED ORAL at 08:57

## 2023-02-14 RX ADMIN — MIDODRINE HYDROCHLORIDE 10 MG: 5 TABLET ORAL at 12:26

## 2023-02-14 RX ADMIN — BUSPIRONE HYDROCHLORIDE 10 MG: 10 TABLET ORAL at 12:26

## 2023-02-14 RX ADMIN — LEVOTHYROXINE SODIUM 112 MCG: 112 TABLET ORAL at 06:18

## 2023-02-14 NOTE — GROUP NOTE
[facility] GROUP DOCUMENTATION INDIVIDUAL                                                                          Group Therapy Note    Date: 2/14/2023    Group Start Time: 1500  Group End Time: 1600  Group Topic: Recreational/Music Therapy    [facility] [address] ACUTE BEHAV [facility]    Jourdan Lombardo [address]120 GROUP    Group Therapy Note    Attendees: 5       Attendance: Did not attend      Ivana Tripp

## 2023-02-14 NOTE — BH NOTES
Psychiatric Progress Note    Patient: Brady Manzo MRN: [id number]  SSN: [id number]    YOB: 1960  Age: 57 y.o. Sex: female      Admit Date: 1/26/2023    LOS: 19 days     Subjective:     Brady Manzo  reports feeling anxious (10/10) and moods are depressed (10/10). Denies SI/HI/AH/VH. No aggression or violence. Appropriately interactive and aware. Tolerating medications well. Eating well and sleeping well. 1/29 - Brady Manzo reports feeling very depressed awaiting ECT. Still having visual hallucinations that come and go. Denies SI/HI/AH/VH. No aggression or violence. Appropriately interactive and aware. Tolerating medications (atarax and trazodone prns) well. Eating and sleeping fairly. 1/30 - Brady Manzo reports feeling terrible and did not recall getting ECT this morning. When told that she had it done, she stated it was not enough. Moods are severely depressed. Denies SI/HI/AH/VH. There was concern that she did not know what she was in the hospital for or anything about the procedure. TDO and court ordered ECT recommended. No aggression or violence. Appropriately interactive and aware. Tolerating medications well. Eating and sleeping fairly. 1/31 - Brady Manzo reports not feeling well and moods are depressed. Per staff tends to ask for Injectable Ativan frequently then gets upset when she does not receive it. Passive SI per staff. Denies HI/AH/VH. No aggression or violence. Tends to isolate to room. Interactive and aware. Tolerating medications well. Eating and sleeping fairly (5 hrs). 2/1 - Brady Manzo reports not feeling good again this morning but presents less intense, more calm and is more conversant today post ECT #2. Moods are depressed. Anxiety 10/10. Denies HI/AH/VH. Was afraid of her thoughts about being dead and did not discuss them yesterday. No aggression or violence. Appropriately interactive and aware.  Tolerating medications well. Eating and sleeping fairly (6.45 hrs). 2/2 [facility] today and moods are depressed with anxiety(10/10). Passive SI. Denies HI/AH/VH. No aggression or violence. Appropriately interactive and aware. Tolerating medications (trazodone and atarax prns). Eating and sleeping fairly. 2/3 - [facility] feeling ok this morning and even had a smile. Moods are improved. Hearing faint voices in background that do not bother her. Blood pressure was low with mild dizziness on standing. Denies SI/HI/VH. No aggression or violence. Appropriately interactive and aware. Tolerating medications well. Eating and sleeping fairly. Goes for ECT # 3 today. 02/04 - no acute overnight events. The patient slept 8 hours overnight and got Trazodone with good effect. She reports ongoing anxiety and depressed mood but denies active thoughts of self harm. Patient is eeager for her next ECT session which is scheduled for early next week. She is able to make her needs known and medication compliant. 02/05 - the patient reports ongoing AH+ and is isolative to her room. She denies active thoughts of self harm. Patient withdrawn and isolative and slept 7.5 hours overnight. She is eager for her next ECT session which is scheduled for Tuesday. Patient able to make her needs known though she remains dysphoric.    02/06 - [facility] feeling \"alright: today with smile on her face. Moods are good. Weekend went well. Denies SI/HI/AH/VH. No aggression or violence. Appropriately interactive and aware. Tolerating medications well. Eating and sleeping fairly. Tolerating ECT well. 02/07  - [facility] feeling pretty good and moods are improving with ECT #4. Pleasant and smiling. Denies SI/HI/AH/VH. No aggression or violence. Appropriately interactive and aware. Tolerating medications well. Atarax prn given last night.   Eating and sleeping fairly. 02/08 - Yennifer Vale reports feeling better than on admission with a smile and Moods that are 8/10. Completed ECT #5 today. Denies SI/HI/AH/VH. No aggression or violence. Appropriately interactive and aware. Tolerating medications well. Eating and sleeping fairly. 02/09 - Yennifer Vale reports feeling alright and moods are about a 5-7/10. Anxiety is about 6-8/10. Denies SI/HI/AH/VH. No aggression or violence. Appropriately interactive and aware. Tolerating medications well. Eating and sleeping fairly. 02/10 - Yennifer Vale reports feeling well with a slight headache today post ECT # 6. Bright smile and moods are pretty good. Denies SI/HI/AH/VH. No aggression or violence. Appropriately interactive and aware. Tolerating medications well. Eating and sleeping fairly. 02/11 - Yennifer Alegrias reports feeling well and moods are good. Denies SI/HI/AH/VH. No aggression or violence. Appropriately interactive and aware. Tolerating medications well. Eating and sleeping fairly. Two more ECT's before discharge    02/12 - Yennifer Vale reports feeling better today and continues to angel a bright smile. Moods are good. Denies SI/HI/AH/VH. No aggression or violence. Appropriately interactive and aware. Tolerating medications well. Eating and sleeping fairly 8 hrs.    02/13 - Yennifer Vale reports feeling alright post ECT # 7. Memory is intact and no headache. Smiling and interacting on the unit. Moods are still depressed but less and anxiety is just a little. Denies SI/HI/AH/VH. No aggression or violence. Appropriately interactive and aware. Tolerating medications well. Eating and sleeping fairly. 02/14 - Yennifer Alegrias reports feeling well and moods are good. Denies SI/HI/AH/VH. No aggression or violence. Appropriately interactive and aware. Tolerating medications well. Eating and sleeping fairly.     Objective:     Vitals:    02/13/23 1129 02/13/23 1725 02/13/23 2038 02/14/23 0800   BP: 123/63 94/65 101/66 105/72   Pulse: 85 (!) 103 96 73   Resp: 18  16 17   Temp: 97.3 °F (36.3 °C)  97.6 °F (36.4 °C) 97.4 °F (36.3 °C)   SpO2: 97%  96% 98%   Weight:       Height:            Mental Status Exam:   Sensorium  oriented to time, place and person   Relations cooperative   Eye Contact appropriate   Appearance:  age appropriate   Speech:  non-pressured   Thought Process: goal directed   Thought Content Denies SI/HI/AH/VH   Suicidal ideations none   Mood:  good   Affect:  constricted   Memory   adequate   Concentration:  adequate   Insight:  limited   Judgment:  fair       MEDICATIONS:  Current Facility-Administered Medications   Medication Dose Route Frequency    melatonin tablet 3 mg  3 mg Oral QHS    busPIRone (BUSPAR) tablet 10 mg  10 mg Oral TID    levothyroxine (SYNTHROID) tablet 112 mcg  112 mcg Oral ACB    midodrine (PROAMATINE) tablet 10 mg  10 mg Oral TID WITH MEALS    mirtazapine (REMERON) tablet 45 mg  45 mg Oral QHS    OLANZapine (ZyPREXA) tablet 20 mg  20 mg Oral QHS    venlafaxine-SR (EFFEXOR-XR) capsule 300 mg  300 mg Oral DAILY    omega-3 acid ethyl esters (LOVAZA) capsule 1,000 mg  1 g Oral DAILY    OLANZapine (ZyPREXA) tablet 5 mg  5 mg Oral Q6H PRN    haloperidol lactate (HALDOL) injection 5 mg  5 mg IntraMUSCular Q6H PRN    benztropine (COGENTIN) tablet 1 mg  1 mg Oral BID PRN    diphenhydrAMINE (BENADRYL) injection 50 mg  50 mg IntraMUSCular BID PRN    hydrOXYzine HCL (ATARAX) tablet 50 mg  50 mg Oral TID PRN    traZODone (DESYREL) tablet 50 mg  50 mg Oral QHS PRN    acetaminophen (TYLENOL) tablet 650 mg  650 mg Oral Q4H PRN    magnesium hydroxide (MILK OF MAGNESIA) 400 mg/5 mL oral suspension 30 mL  30 mL Oral DAILY PRN      DISCUSSION:   the risks and benefits of the proposed medication  patient given opportunity to ask questions    Lab/Data Review: All lab results for the last 24 hours reviewed.      No results found for this or any previous visit (from the past 24 hour(s)). Assessment:     Principal Problem:    Bipolar 1 disorder ([facility] [address] (1/27/2023)      Plan:     Continue current care  Collateral information  Medication modification as appropriate  Continue ECT # 8 on Wednesday  NPO the morning of. Disposition planning with social work    I certify that this patient's inpatient psychiatric hospital services furnished since the previous certification were, and continue to be, required for treatment that could reasonably be expected to improve the patient's condition, or for diagnostic study, and that the patient continues to need, on a daily basis, active treatment furnished directly by or requiring the supervision of inpatient psychiatric facility personnel. In addition, the hospital records show that services furnished were intensive treatment services, admission or related services, or equivalent services.   Signed By: Petty Bustillos MD     February 14, 2023

## 2023-02-14 NOTE — PROGRESS NOTES
Problem: Depressed Mood (Adult/Pediatric)  Goal: *LTG: Returns to previous level of functioning and participates with after care plan  Outcome: Progressing Towards Goal     Problem: Suicide  Goal: *STG/LTG: Complies with medication therapy  Outcome: Progressing Towards Goal   Nurses Note;      1900 to 0700:      Report received from outgoing day shift RN. Assumed care of patient. On initial round Patient is in room up., alert, oriented, sitting in chair. Patient  denies S/I, H/I, A/V/H, pain, depression, anxiety. Patient is compliant   with HS medications. Patient received PRN for. Patient observed resting in bed during shift rounds. Continuously hourly rounds and q 15 minute checks maintained during shift for care and safety. Patient slept for  7 hrs.

## 2023-02-14 NOTE — PROGRESS NOTES
Assumed care of pt after receiving shift report from outgoing nurse. No apparent distress. Calm and cooperative. Pt currently denies SI/HI/AVH. Elaine Perales endorses depression and anxiety. Mood is euthymic with smiling affect. Med and meal compliant. Alert and oriented. Pt ambulates independently. No behavioral issues observed. Q15 minute safety rounds continued by staff. Problem: Depressed Mood (Adult/Pediatric)  Goal: *STG: Complies with medication therapy  Outcome: Progressing Towards Goal     Problem: Falls - Risk of  Goal: *Absence of Falls  Description: Document Samanta Fall Risk and appropriate interventions in the flowsheet.   Outcome: Progressing Towards Goal  Note: Fall Risk Interventions:            Medication Interventions: Teach patient to arise slowly

## 2023-02-14 NOTE — PERIOP NOTES
Spoke with Bushra Hampton to advise patient scheduled for ECT procedure 2/16/2023, NPO after midnight, arrive PACU 0630.

## 2023-02-14 NOTE — GROUP NOTE
[facility]  GROUP DOCUMENTATION INDIVIDUAL                                                                          Group Therapy Note    Date: 2/14/2023    Group Start Time: 0900  Group End Time: 1000  Group Topic: Topic Group    [address] ACUTE BEHAV [facility] GROUP    Group Therapy Note    Attendees: 11       Attendance: Did not attend    Dave Hoffmann

## 2023-02-14 NOTE — BH NOTES
Behavioral Health Interdisciplinary Rounds     Patient Name: Pilar Garcia  Age: 57 y.o. Room/Bed:  320/01  Primary Diagnosis: Bipolar 1 disorder ([facility] [address]     Progress note: Treatment team met with the Pt. Pt denies SI, HI, AHVH, and self harming behaviors. Pt endorsed that she is still experiencing symptoms of depression and anxiety. Pt presents with a euthymic mood and bright affect. DEV faxed the Pt [facility] to [place] at [address] [place] [address] to review for personal care assistant services. DEV faxed the Pt [facility] to [facility], and [facility] to review for personal care assistant services for the Pt to utilize upon discharge.      LOS:  19  Expected LOS: TBD    Financial concerns/prescription coverage:  [facility]   Family contact: Wendi Rankin (Father) [phone]    Family requesting physician contact today:  No  Discharge plan: TBD  Access to weapons: No                                                Outpatient provider(s): Casi Gaspar [facility]), [phone], VANDANA (Personal Care)  Patient's preferred phone number for follow up call: [phone]      Participating treatment team members: James Odonnell Yolande Gold, MD

## 2023-02-15 ENCOUNTER — ANESTHESIA (OUTPATIENT)
Dept: SURGERY | Age: 63
DRG: 751 | End: 2023-02-15
Payer: MEDICAID

## 2023-02-15 PROCEDURE — 90870 ELECTROCONVULSIVE THERAPY: CPT | Performed by: PSYCHIATRY & NEUROLOGY

## 2023-02-15 PROCEDURE — 74011250637 HC RX REV CODE- 250/637: Performed by: PSYCHIATRY & NEUROLOGY

## 2023-02-15 PROCEDURE — GZB2ZZZ ELECTROCONVULSIVE THERAPY, BILATERAL-SINGLE SEIZURE: ICD-10-PCS | Performed by: PSYCHIATRY & NEUROLOGY

## 2023-02-15 PROCEDURE — 2709999900 HC NON-CHARGEABLE SUPPLY: Performed by: PSYCHIATRY & NEUROLOGY

## 2023-02-15 PROCEDURE — 74011000250 HC RX REV CODE- 250: Performed by: ANESTHESIOLOGY

## 2023-02-15 PROCEDURE — 74011250636 HC RX REV CODE- 250/636: Performed by: ANESTHESIOLOGY

## 2023-02-15 PROCEDURE — 74780000002 HC ELECTROSHOCK THERAP RECOVERY: Performed by: PSYCHIATRY & NEUROLOGY

## 2023-02-15 PROCEDURE — 65220000003 HC RM SEMIPRIVATE PSYCH

## 2023-02-15 RX ORDER — SODIUM CHLORIDE 0.9 % (FLUSH) 0.9 %
5-40 SYRINGE (ML) INJECTION EVERY 8 HOURS
Status: DISCONTINUED | OUTPATIENT
Start: 2023-02-15 | End: 2023-02-15 | Stop reason: HOSPADM

## 2023-02-15 RX ORDER — SODIUM CHLORIDE 9 MG/ML
50 INJECTION, SOLUTION INTRAVENOUS CONTINUOUS
Status: DISCONTINUED | OUTPATIENT
Start: 2023-02-15 | End: 2023-02-15 | Stop reason: HOSPADM

## 2023-02-15 RX ORDER — SODIUM CHLORIDE 0.9 % (FLUSH) 0.9 %
5-40 SYRINGE (ML) INJECTION AS NEEDED
Status: DISCONTINUED | OUTPATIENT
Start: 2023-02-15 | End: 2023-02-15 | Stop reason: HOSPADM

## 2023-02-15 RX ORDER — METHOHEXITAL IN WATER/PF 100MG/10ML
SYRINGE (ML) INTRAVENOUS AS NEEDED
Status: DISCONTINUED | OUTPATIENT
Start: 2023-02-15 | End: 2023-02-15 | Stop reason: HOSPADM

## 2023-02-15 RX ORDER — SODIUM CHLORIDE 9 MG/ML
INJECTION, SOLUTION INTRAVENOUS
Status: DISCONTINUED | OUTPATIENT
Start: 2023-02-15 | End: 2023-02-15 | Stop reason: HOSPADM

## 2023-02-15 RX ORDER — SODIUM CHLORIDE, SODIUM LACTATE, POTASSIUM CHLORIDE, CALCIUM CHLORIDE 600; 310; 30; 20 MG/100ML; MG/100ML; MG/100ML; MG/100ML
50 INJECTION, SOLUTION INTRAVENOUS CONTINUOUS
Status: DISCONTINUED | OUTPATIENT
Start: 2023-02-15 | End: 2023-02-15 | Stop reason: HOSPADM

## 2023-02-15 RX ORDER — SUCCINYLCHOLINE CHLORIDE 20 MG/ML
INJECTION INTRAMUSCULAR; INTRAVENOUS AS NEEDED
Status: DISCONTINUED | OUTPATIENT
Start: 2023-02-15 | End: 2023-02-15 | Stop reason: HOSPADM

## 2023-02-15 RX ADMIN — Medication 60 MG: at 08:59

## 2023-02-15 RX ADMIN — SODIUM CHLORIDE: 9 INJECTION, SOLUTION INTRAVENOUS at 07:07

## 2023-02-15 RX ADMIN — BUSPIRONE HYDROCHLORIDE 10 MG: 10 TABLET ORAL at 16:53

## 2023-02-15 RX ADMIN — VENLAFAXINE HYDROCHLORIDE 300 MG: 75 CAPSULE, EXTENDED RELEASE ORAL at 09:44

## 2023-02-15 RX ADMIN — MIRTAZAPINE 45 MG: 15 TABLET, FILM COATED ORAL at 21:03

## 2023-02-15 RX ADMIN — SUCCINYLCHOLINE CHLORIDE 50 MG: 20 INJECTION, SOLUTION INTRAMUSCULAR; INTRAVENOUS at 08:59

## 2023-02-15 RX ADMIN — TRAZODONE HYDROCHLORIDE 50 MG: 50 TABLET ORAL at 21:03

## 2023-02-15 RX ADMIN — BUSPIRONE HYDROCHLORIDE 10 MG: 10 TABLET ORAL at 09:44

## 2023-02-15 RX ADMIN — MIDODRINE HYDROCHLORIDE 10 MG: 5 TABLET ORAL at 16:53

## 2023-02-15 RX ADMIN — OLANZAPINE 20 MG: 10 TABLET, FILM COATED ORAL at 21:02

## 2023-02-15 RX ADMIN — MIDODRINE HYDROCHLORIDE 10 MG: 5 TABLET ORAL at 09:44

## 2023-02-15 RX ADMIN — Medication 3 MG: at 21:02

## 2023-02-15 RX ADMIN — BUSPIRONE HYDROCHLORIDE 10 MG: 10 TABLET ORAL at 11:45

## 2023-02-15 RX ADMIN — HYDROXYZINE HYDROCHLORIDE 50 MG: 25 TABLET, FILM COATED ORAL at 21:02

## 2023-02-15 RX ADMIN — LEVOTHYROXINE SODIUM 112 MCG: 112 TABLET ORAL at 09:44

## 2023-02-15 NOTE — PROGRESS NOTES
GROUP THERAPY PROGRESS NOTE      [facility] medication [facility]. GROUP TIME: 45 minutes, Wednesdays 2pm    PERSONAL GOAL FOR PARTICIPATION: To be present for group, participate in discussion, and answer patient-directed questions. GOAL ORIENTATION: Personal    THERAPEUTIC INTERVENTIONS REVIEWED AND DISCUSSED: The following topics were presented: storage of medications, how to remember to refill medications and keep up with doctor appointments, relapse prevention, keeping a record of all medication including prescription and non-prescription drugs, and who to contact with medication questions. Patients were given time to ask questions regarding their current therapy. IMPRESSION OF PARTICIPATION: Ulus Lesch arrived late to group, remained quiet throughout most of group but was observed documenting answers to the true/false quiz.        Malachi Vazquez, PHARMD, BCPS, [facility]  Clinical Pharmacy Specialist, [facility]  Desk: [phone]  Pharmacy: [phone])

## 2023-02-15 NOTE — PROCEDURES
ECT PROCEDURE NOTE      IDENTIFICATION:    Patient Name  Digna Garcia   Date of Birth 1960   [facility] [id number]   Medical Record Number  [id number]      Age  57 y.o. PCP Wing Matt MD   Admit date:  1/26/2023    Room Number  PACU/PL  @ [facility]   Date of Service  2/15/2023            Electro Convulsive Therapy:  Treatment number 8       Maintenance ECT NO   Digna Garcia was admitted to the PACU for ECT. Patient was medically cleared and NPO for procedure. Patient is NOTcourt mandated and gave informed consent for ECT treatment. Patient received     Bilateral ECT YES   Administered using the [address] [place] [address] Drive. at 40 % Energy, under general anesthesia. Digna Garcia with a good, well generalized seizure of 48 seconds on observation of the motor manifestations of the seizure. EEG duration was 50 secs. Post-Ictal Suppression Index: 66.9 %  Recovery was unremarkable and with no complications. Change in Energy %:        Anesthesia medications administered during procedure:  Brevital:  60 mg  Change for next treament:     Succinylcholine: 50 mg  Change for next treatment:      Propofol: mg  Glycopyrrolate:  mg  Labetalol:  mg  Esmolol:  mg  Lorazepam:  mg  Ketamine:  mg  Zofran:   mg    Toradol:  mg  Lidocaine:  mg  Caffeine Benzoate: mg       CSSRS 1/26/2023 7/8/2022   1) Within the past month, have you wished you were dead or wished you could go to sleep and not wake up? No No   2) Have you actually had any thoughts of killing yourself? No No   6) Have you ever done anything, started to do anything, or prepared to do anything to end your life? Yes No   Did this occur within the past 3 months?  Yes -       SIGNED:    Vivek Kelley MD  2/15/2023

## 2023-02-15 NOTE — BH NOTES
Psychiatric Progress Note    Patient: Michaela Taylor MRN: [id number]  SSN: [id number]    YOB: 1960  Age: 57 y.o. Sex: female      Admit Date: 1/26/2023    LOS: 20 days     Subjective:     Michaela Taylor  reports feeling anxious (10/10) and moods are depressed (10/10). Denies SI/HI/AH/VH. No aggression or violence. Appropriately interactive and aware. Tolerating medications well. Eating well and sleeping well. 1/29 - Michaela Taylor reports feeling very depressed awaiting ECT. Still having visual hallucinations that come and go. Denies SI/HI/AH/VH. No aggression or violence. Appropriately interactive and aware. Tolerating medications (atarax and trazodone prns) well. Eating and sleeping fairly. 1/30 - Michaela Taylor reports feeling terrible and did not recall getting ECT this morning. When told that she had it done, she stated it was not enough. Moods are severely depressed. Denies SI/HI/AH/VH. There was concern that she did not know what she was in the hospital for or anything about the procedure. TDO and court ordered ECT recommended. No aggression or violence. Appropriately interactive and aware. Tolerating medications well. Eating and sleeping fairly. 1/31 - Michaela Taylor reports not feeling well and moods are depressed. Per staff tends to ask for Injectable Ativan frequently then gets upset when she does not receive it. Passive SI per staff. Denies HI/AH/VH. No aggression or violence. Tends to isolate to room. Interactive and aware. Tolerating medications well. Eating and sleeping fairly (5 hrs). 2/1 - Michaela Taylor reports not feeling good again this morning but presents less intense, more calm and is more conversant today post ECT #2. Moods are depressed. Anxiety 10/10. Denies HI/AH/VH. Was afraid of her thoughts about being dead and did not discuss them yesterday. No aggression or violence. Appropriately interactive and aware.  Tolerating medications well. Eating and sleeping fairly (6.45 hrs). 2/2 - Pilar Harness reports feeling tired today and moods are depressed with anxiety(10/10). Passive SI. Denies HI/AH/VH. No aggression or violence. Appropriately interactive and aware. Tolerating medications (trazodone and atarax prns). Eating and sleeping fairly. 2/3 - Pilar Harness reports feeling ok this morning and even had a smile. Moods are improved. Hearing faint voices in background that do not bother her. Blood pressure was low with mild dizziness on standing. Denies SI/HI/VH. No aggression or violence. Appropriately interactive and aware. Tolerating medications well. Eating and sleeping fairly. Goes for ECT # 3 today. 02/04 - no acute overnight events. The patient slept 8 hours overnight and got Trazodone with good effect. She reports ongoing anxiety and depressed mood but denies active thoughts of self harm. Patient is eeager for her next ECT session which is scheduled for early next week. She is able to make her needs known and medication compliant. 02/05 - the patient reports ongoing AH+ and is isolative to her room. She denies active thoughts of self harm. Patient withdrawn and isolative and slept 7.5 hours overnight. She is eager for her next ECT session which is scheduled for Tuesday. Patient able to make her needs known though she remains dysphoric.    02/06 - Pilar Harness reports feeling \"alright: today with smile on her face. Moods are good. Weekend went well. Denies SI/HI/AH/VH. No aggression or violence. Appropriately interactive and aware. Tolerating medications well. Eating and sleeping fairly. Tolerating ECT well. 02/07  - Pilar Harness reports feeling pretty good and moods are improving with ECT #4. Pleasant and smiling. Denies SI/HI/AH/VH. No aggression or violence. Appropriately interactive and aware. Tolerating medications well. Atarax prn given last night.   Eating and sleeping fairly. 02/08 - [facility] reports feeling better than on admission with a smile and Moods that are 8/10. Completed ECT #5 today. Denies SI/HI/AH/VH. No aggression or violence. Appropriately interactive and aware. Tolerating medications well. Eating and sleeping fairly. 02/09 - [facility] reports feeling alright and moods are about a 5-7/10. Anxiety is about 6-8/10. Denies SI/HI/AH/VH. No aggression or violence. Appropriately interactive and aware. Tolerating medications well. Eating and sleeping fairly. 02/10 - [facility] reports feeling well with a slight headache today post ECT # 6. Bright smile and moods are pretty good. Denies SI/HI/AH/VH. No aggression or violence. Appropriately interactive and aware. Tolerating medications well. Eating and sleeping fairly. 02/11 - [facility] reports feeling well and moods are good. Denies SI/HI/AH/VH. No aggression or violence. Appropriately interactive and aware. Tolerating medications well. Eating and sleeping fairly. Two more ECT's before discharge    [facility] better today and continues to angel a bright smile. Moods are good. Denies SI/HI/AH/VH. No aggression or violence. Appropriately interactive and aware. Tolerating medications well. Eating and sleeping fairly 8 hrs.    02/13 - [facility] reports feeling alright post ECT # 7. Memory is intact and no headache. Smiling and interacting on the unit. Moods are still depressed but less and anxiety is just a little. Denies SI/HI/AH/VH. No aggression or violence. Appropriately interactive and aware. Tolerating medications well. Eating and sleeping fairly. 02/14 - [facility] reports feeling well and moods are good. Denies SI/HI/AH/VH. No aggression or violence. Appropriately interactive and aware. Tolerating medications well. Eating and sleeping fairly. 02/15 - [facility] reports doing well post ECT #8.   Moods are depressed 7/10 but smiles brightly and appears to be less depressed. Denies SI/HI/AH/VH. No aggression or violence. Appropriately interactive and aware. Tolerating medications well. Eating and sleeping fairly. She feels that she can take care of herself at home.     Objective:     Vitals:    02/15/23 0925 02/15/23 0930 02/15/23 0942 02/15/23 1150   BP: 110/84 109/88 128/67 118/75   Pulse: 92 87 84 88   Resp: 18 13 16    Temp:  97.9 °F (36.6 °C) 98.2 °F (36.8 °C)    SpO2: 96% 97% 98%    Weight:       Height:            Mental Status Exam:   Sensorium  oriented to time, place and person   Relations cooperative   Eye Contact appropriate   Appearance:  age appropriate   Speech:  non-pressured   Thought Process: goal directed   Thought Content Denies SI/HI/AH/VH   Suicidal ideations none   Mood:  good   Affect:  Fair range   Memory   adequate   Concentration:  adequate   Insight:  limited   Judgment:  fair       MEDICATIONS:  Current Facility-Administered Medications   Medication Dose Route Frequency    melatonin tablet 3 mg  3 mg Oral QHS    busPIRone (BUSPAR) tablet 10 mg  10 mg Oral TID    levothyroxine (SYNTHROID) tablet 112 mcg  112 mcg Oral ACB    midodrine (PROAMATINE) tablet 10 mg  10 mg Oral TID WITH MEALS    mirtazapine (REMERON) tablet 45 mg  45 mg Oral QHS    OLANZapine (ZyPREXA) tablet 20 mg  20 mg Oral QHS    venlafaxine-SR (EFFEXOR-XR) capsule 300 mg  300 mg Oral DAILY    omega-3 acid ethyl esters (LOVAZA) capsule 1,000 mg  1 g Oral DAILY    OLANZapine (ZyPREXA) tablet 5 mg  5 mg Oral Q6H PRN    haloperidol lactate (HALDOL) injection 5 mg  5 mg IntraMUSCular Q6H PRN    benztropine (COGENTIN) tablet 1 mg  1 mg Oral BID PRN    diphenhydrAMINE (BENADRYL) injection 50 mg  50 mg IntraMUSCular BID PRN    hydrOXYzine HCL (ATARAX) tablet 50 mg  50 mg Oral TID PRN    traZODone (DESYREL) tablet 50 mg  50 mg Oral QHS PRN    acetaminophen (TYLENOL) tablet 650 mg  650 mg Oral Q4H PRN    magnesium hydroxide (MILK OF MAGNESIA) 400 mg/5 mL oral suspension 30 mL  30 mL Oral DAILY PRN      DISCUSSION:   the risks and benefits of the proposed medication  patient given opportunity to ask questions    Lab/Data Review: All lab results for the last 24 hours reviewed. No results found for this or any previous visit (from the past 24 hour(s)). Assessment:     Principal Problem:    Bipolar 1 disorder ([facility] [address] (1/27/2023)      Plan:     Continue current care  Collateral information  Medication modification as appropriate  Continue ECT # 8 completed today  NPO the morning of. Disposition planning with social work    I certify that this patient's inpatient psychiatric hospital services furnished since the previous certification were, and continue to be, required for treatment that could reasonably be expected to improve the patient's condition, or for diagnostic study, and that the patient continues to need, on a daily basis, active treatment furnished directly by or requiring the supervision of inpatient psychiatric facility personnel. In addition, the hospital records show that services furnished were intensive treatment services, admission or related services, or equivalent services.   Signed By: Paulina Maharaj MD     February 15, 2023

## 2023-02-15 NOTE — PERIOP NOTES
TRANSFER - OUT REPORT:    Verbal report given to Letitia(name) on Digna Garcia  being transferred to [facility](unit) for routine post - op       Report consisted of patients Situation, Background, Assessment and   Recommendations(SBAR). Information from the following report(s) SBAR was reviewed with the receiving nurse. Lines:       Opportunity for questions and clarification was provided.       Patient transported with:   [employer]

## 2023-02-15 NOTE — PERIOP NOTES
Handoff Report from Operating Room to PACU    Report received from Dr Agustina Hernandez and Esteban Mcintyre regarding Kody Santos. Surgeon(s):  Kedar Vallecillo MD  And Procedure(s) (LRB):  ELECTRO CONVULSIVE THERAPY (N/A)  confirmed   with allergies discussed. Anesthesia type, drugs, patient history, complications, estimated blood loss, vital signs, intake and output, and lines and temperature were reviewed.

## 2023-02-15 NOTE — PROGRESS NOTES
Writer met patient in her room upon return from ECT. Per report from Clif Tidwell RN in PACU, patient tollerated ECT well, VS WNL and no PRNs given.    Problem: Suicide  Goal: *STG: Remains safe in hospital  Outcome: Progressing Towards Goal  Goal: *STG: Seeks staff when feelings of self harm or harm towards others arise  Outcome: Progressing Towards Goal  Goal: *STG/LTG: Complies with medication therapy  Outcome: Progressing Towards Goal  Goal: *STG/LTG: No longer expresses self destructive or suicidal thoughts  Outcome: Progressing Towards Goal     Problem: Depressed Mood (Adult/Pediatric)  Goal: *STG: Participates in treatment plan  Outcome: Progressing Towards Goal     Problem: Suicide  Goal: *STG: Attends activities and groups  Outcome: Not Progressing Towards Goal

## 2023-02-15 NOTE — H&P
History and Physical    Date of Service:  2/15/2023  Primary Care Provider: Arvind Du MD  Source of information: The patient    Chief Complaint: No chief complaint on file. History of Presenting Illness:   Osvaldo Menchaca is a 57 y.o. female  with known past medical history of hypothyroidism, asthma, PUD, Hypotension on midodrine, Bipolar Ds, depression and anxiety who was admitted to acute psychiatric unit at [facility] for treatment with  ECT. Medicine was consulted to evaluated the patient prior to next ECT treatment. The patient had ECT treatment today 02/15/2023 and tolerated it well. The patient was evaluated in her room, she is comfortable, alert and oriented, appropriate, smiles during conversation, denies having any SI and believes that ECT treatment helps her. The patient denies any headache, blurry vision, sore throat, trouble swallowing, trouble with speech, chest pain, SOB, cough, fever, chills, N/V/D, abd pain, urinary symptoms, constipation, recent travels, sick contacts, focal or generalized neurological symptoms, falls, injuries, rashes, contact with COVID-19 diagnosed patients, hematemesis, melena, hemoptysis, hematuria, rashes, denies starting any new medications and denies any other concerns or problems besides as mentioned above. REVIEW OF SYSTEMS:  A comprehensive review of systems was negative except for that written in the History of Present Illness.      Past Medical History:   Diagnosis Date    Asthma 01/01/1967    hospitalized for asthma attack x 2    Bipolar disorder ([facility] [address]     Chronic kidney disease     kidney stones x 2-3 times    Ill-defined condition     nasal blockage from growth and misalignment - cannot breath out of nose - surgery in the future/cleared for colonoscopy 7/31/2014 - will bring in letter    Major depressive disorder     Other ill-defined conditions(799.89)     blood in stool    Other ill-defined conditions(799.89)     hx low BP - 90's/60's-70's    Psychiatric disorder     PTSD (post-traumatic stress disorder)     PUD (peptic ulcer disease)     Schizoaffective disorder (HCC)     Thyroid disease     Unspecified adverse effect of anesthesia     nasal growth and misalignment and cannot breath through nose      Past Surgical History:   Procedure Laterality Date    HX HEENT      T & A    HX HEENT      turbinate surgery     Prior to Admission medications    Medication Sig Start Date End Date Taking? Authorizing Provider   omega 3-dha-epa-fish oil (Fish OiL) 100-160-1,000 mg cap Take 1 Capsule by mouth daily. Yes Provider, Historical   levothyroxine (SYNTHROID) 112 mcg tablet Take 112 mcg by mouth Daily (before breakfast). Yes Provider, Historical   mirtazapine (REMERON) 45 mg tablet Take 45 mg by mouth nightly. Yes Provider, Historical   Venlafaxine-ER 24 HR (EFFEXOR-ER) 150 mg tr24 tablet Take 300 mg by mouth daily. Yes Provider, Historical   midodrine (PROAMATINE) 10 mg tablet Take 10 mg by mouth three (3) times daily (with meals). Yes Provider, Historical   OLANZapine (ZyPREXA) 20 mg tablet Take 20 mg by mouth nightly. Yes Provider, Historical   busPIRone (BUSPAR) 10 mg tablet Take 10 mg by mouth three (3) times daily. Yes Provider, Historical     Allergies   Allergen Reactions    Other Food Other (comments)     \"Bad chest pain\" with walnuts. Coke - asthma/stuffy head. Fish sticks causes an asthma attack. Astepro [Azelastine] Other (comments)     Violent headaches. Bactrim [Sulfamethoprim] Other (comments)     Difficulty breathing, chills, fever. Banana Other (comments)     Diffculty breathing, wheezing, asthma attack. Coconut Other (comments)     Difficulty breathing, asthma attack, SOB. Cortisone Hives     Difficulty breathing. Nut - Unspecified Other (comments)     \"Bad chest pain\" with walnuts    Peanut Other (comments)     Wheezing, asthma attack, SOB.     Prednisone Hives     Difficulty breathing, kidney stones. Family History   Problem Relation Age of Onset    Stroke Mother     Other Mother         erosion of esophagus    Cancer Mother         melanoma/squamous    Heart Surgery Father         x2    Delayed Awakening Father     Pacemaker Father     Other Father         carotid endartarectomy/polio    Cataract Father       Social History:  reports that she has never smoked. She has never used smokeless tobacco. She reports that she does not drink alcohol and does not use drugs. Social Determinants of Health     Tobacco Use: Low Risk     Smoking Tobacco Use: Never    Smokeless Tobacco Use: Never    Passive Exposure: Not on file   Alcohol Use: Not on file   Financial Resource Strain: Not on file   Food Insecurity: Not on file   Transportation Needs: Not on file   Physical Activity: Not on file   Stress: Not on file   Social Connections: Not on file   Intimate Partner Violence: Not on file   Depression: Not on file   Housing Stability: Not on file        Medications were reconciled to the best of my ability given all available resources at the time of admission. Route is PO if not otherwise noted. Family and social history were personally reviewed, all pertinent and relevant details are outlined as above.     Objective:   Visit Vitals  /75 (BP 1 Location: Left upper arm, BP Patient Position: Sitting)   Pulse 88   Temp 98.2 °F (36.8 °C)   Resp 16   Ht 5' 2\" (1.575 m)   Wt 67.1 kg (148 lb)   SpO2 98%   Breastfeeding No   BMI 27.07 kg/m²      O2 Device: None (Room air)    PHYSICAL EXAM:   General: Alert x oriented x 3, awake, no acute distress,   HEENT: PEERL, EOMI, moist mucus membranes  Neck: Supple, no JVD, no meningeal signs  Chest: Clear to auscultation bilaterally   CVS: RRR, S1 S2 heard, no murmurs/rubs/gallops  Abd: Soft, non-tender, non-distended, +bowel sounds   Ext: No clubbing, no cyanosis, no edema  Neuro/Psych: Pleasant mood and affect, CN 2-12 grossly intact, sensory grossly within normal limit, Strength 5/5 in all extremities, DTR 1+ x 4  Cap refill: Brisk, less than 3 seconds  Pulses: 2+, symmetric in all extremities  Skin: Warm, dry, without rashes or lesions    Data Review:   I have independently reviewed and interpreted patient's lab and all other diagnostic data    Abnormal Labs Reviewed   METABOLIC PANEL, COMPREHENSIVE - Abnormal; Notable for the following components:       Result Value    BUN/Creatinine ratio 24 (*)     Calcium 8.4 (*)     Albumin 3.1 (*)     A-G Ratio 0.9 (*)     All other components within normal limits   CBC W/O DIFF - Abnormal; Notable for the following components:    HGB 11.1 (*)     HCT 33.5 (*)     All other components within normal limits   TSH 3RD GENERATION - Abnormal; Notable for the following components:    TSH 0.33 (*)     All other components within normal limits   LIPID PANEL - Abnormal; Notable for the following components:    Cholesterol, total 247 (*)     LDL, calculated 153.6 (*)     All other components within normal limits       All Micro Results       None            IMAGING:   No orders to display        ECG/ECHO:    Results for orders placed or performed during the hospital encounter of 05/24/21   EKG, 12 LEAD, INITIAL   Result Value Ref Range    Ventricular Rate 79 BPM    Atrial Rate 79 BPM    P-R Interval 144 ms    QRS Duration 84 ms    Q-T Interval 370 ms    QTC Calculation (Bezet) 424 ms    Calculated P Axis 53 degrees    Calculated R Axis -103 degrees    Calculated T Axis 59 degrees    Diagnosis       Normal sinus rhythm  When compared with ECG of 17-JUL-2018 06:19,  Nonspecific T wave abnormality, worse in Inferior leads  Nonspecific T wave abnormality now evident in Anterolateral leads  Confirmed by Jayy Lynch M.D., Сергей Morris ([id number]) on 5/24/2021 3:58:28 PM            Notes reviewed from all clinical/nonclinical/nursing services involved in patient's clinical care.  Care coordination discussions were held with appropriate clinical/nonclinical/ nursing providers based on care coordination needs. Assessment:   Given the patient's current clinical presentation, there is a high level of concern for decompensation if discharged from the emergency department. Complex decision making was performed, which includes reviewing the patient's available past medical records, laboratory results, and imaging studies. Principal Problem:    Bipolar 1 disorder ([place] [address] (1/27/2023)    Depression requiring ECT treatment per psychiatrist    Hypothyroidism    Hypotension controlled with Midodrine    Asthma controlled  Plan:     The patient is admitted to acute psychiatric unit due to Bipolar ds and depression requiring ECT treatment. Medicine was consulted to evaluated the patient prior to the next scheduled ECT treatment on 02/21/2023. The patient is hemodynamically stable, BP is well controlled with midodrine, the patient dose not experience any distress and she is comfortable, neurologically intact. The patient is medical stable to proceed with scheduled ECT treatment on 02/21/2023. The patient will continue current medication including levothyroxine and midodrine. CBC and CMP will be requested prior to procedure. DIET: DIET ONE TIME MESSAGE  DIET ONE TIME MESSAGE   ISOLATION PRECAUTIONS: There are currently no Active Isolations  CODE STATUS: Full Code   DVT PROPHYLAXIS: No VTE Prophylaxis Needed  FUNCTIONAL STATUS PRIOR TO HOSPITALIZATION: Fully active and ambulatory; able to carry on all self-care without restriction. Ambulatory status/function: By self   EARLY MOBILITY ASSESSMENT: Recommend routine ambulation while hospitalized with the assistance of nursing staff  ANTICIPATED DISCHARGE: 24-48 hours.  Per Psychiatrist  ANTICIPATED DISPOSITION: Home  EMERGENCY CONTACT/SURROGATE DECISION MAKER: none per patient    CRITICAL CARE WAS PERFORMED FOR THIS ENCOUNTER: NO.      Signed By: Saadia Zaldivar MD     February 15, 2023 Please note that this dictation may have been completed with Dragon, the computer voice recognition software. Quite often unanticipated grammatical, syntax, homophones, and other interpretive errors are inadvertently transcribed by the computer software. Please disregard these errors. Please excuse any errors that have escaped final proofreading.

## 2023-02-15 NOTE — PROGRESS NOTES
Problem: Discharge Planning  Goal: *Discharge to safe environment  Outcome: [address] presented alert and oriented x4, bright affect/mood. She rated her depression and anxiety levels at 8/10. She denied SI/HI/AVH. She was compliant with her scheduled meds. She was reminded off NPO status after MN for ECT scheduled in the AM. She verbalized understanding. She appeared to have slept approx 8 hours during the night. No distress observed. She was maintained NPO. VS obtained, clean gown, gripper socks, underwear, and mask provided. Report given to PACU nurse. Monitoring continues.

## 2023-02-15 NOTE — ANESTHESIA POSTPROCEDURE EVALUATION
Procedure(s):  ELECTRO CONVULSIVE THERAPY.     general    Anesthesia Post Evaluation      Multimodal analgesia: multimodal analgesia not used between 6 hours prior to anesthesia start to PACU discharge  Patient location during evaluation: PACU  Patient participation: waiting for patient participation  Level of consciousness: awake  Pain management: adequate  Airway patency: patent  Anesthetic complications: no  Cardiovascular status: acceptable  Respiratory status: acceptable  Hydration status: acceptable  Post anesthesia nausea and vomiting:  none  Final Post Anesthesia Temperature Assessment:  Normothermia (36.0-37.5 degrees C)      INITIAL Post-op Vital signs:   Vitals Value Taken Time   /78 02/15/23 0910   Temp 36.8 °C (98.3 °F) 02/15/23 0909   Pulse 86 02/15/23 0910   Resp 18 02/15/23 0910   SpO2 90 % 02/15/23 0910

## 2023-02-15 NOTE — GROUP NOTE
[facility] GROUP DOCUMENTATION INDIVIDUAL                                                                          Group Therapy Note    Date: 2/15/2023    Group Start Time: 1500  Group End Time: 1600  Group Topic: Recreational/Music Therapy    [facility] - [place] [address] ACUTE BEHAV [facility]    Jourdan Lombardo 6390 GROUP    Group Therapy Note    Attendees: 6       Attendance: Did not attend      Gisel Rivas

## 2023-02-15 NOTE — GROUP NOTE
TABITHA  GROUP DOCUMENTATION INDIVIDUAL                                                                          Group Therapy Note    Date: 2/15/2023    Group Start Time: 0900  Group End Time: 1000  Group Topic: Topic Group    [facility] [place] 3 ACUTE BEHAV [facility]    Jourdan Lombardo 9670 GROUP    Group Therapy Note    Attendees: 9       Attendance: Did not attend      Maik Del Rio

## 2023-02-15 NOTE — PERIOP NOTES
TRANSFER - IN REPORT:    Verbal report received from [place](name) on [facility]  being received from [facility](unit) for ordered procedure      Report consisted of patients Situation, Background, Assessment and   Recommendations(SBAR). Information from the following report(s) SBAR was reviewed with the receiving nurse. Opportunity for questions and clarification was provided. Assessment completed upon patients arrival to unit and care assumed.

## 2023-02-16 PROCEDURE — 74011250637 HC RX REV CODE- 250/637: Performed by: PSYCHIATRY & NEUROLOGY

## 2023-02-16 PROCEDURE — 65220000003 HC RM SEMIPRIVATE PSYCH

## 2023-02-16 RX ADMIN — MIDODRINE HYDROCHLORIDE 10 MG: 5 TABLET ORAL at 16:44

## 2023-02-16 RX ADMIN — OMEGA-3-ACID ETHYL ESTERS 1000 MG: 1 CAPSULE, LIQUID FILLED ORAL at 08:52

## 2023-02-16 RX ADMIN — BUSPIRONE HYDROCHLORIDE 10 MG: 10 TABLET ORAL at 16:44

## 2023-02-16 RX ADMIN — VENLAFAXINE HYDROCHLORIDE 300 MG: 75 CAPSULE, EXTENDED RELEASE ORAL at 08:16

## 2023-02-16 RX ADMIN — BUSPIRONE HYDROCHLORIDE 10 MG: 10 TABLET ORAL at 11:57

## 2023-02-16 RX ADMIN — TRAZODONE HYDROCHLORIDE 50 MG: 50 TABLET ORAL at 21:35

## 2023-02-16 RX ADMIN — MIRTAZAPINE 45 MG: 15 TABLET, FILM COATED ORAL at 21:35

## 2023-02-16 RX ADMIN — MIDODRINE HYDROCHLORIDE 10 MG: 5 TABLET ORAL at 08:16

## 2023-02-16 RX ADMIN — OLANZAPINE 20 MG: 10 TABLET, FILM COATED ORAL at 21:35

## 2023-02-16 RX ADMIN — LEVOTHYROXINE SODIUM 112 MCG: 112 TABLET ORAL at 06:08

## 2023-02-16 RX ADMIN — Medication 3 MG: at 21:36

## 2023-02-16 RX ADMIN — HYDROXYZINE HYDROCHLORIDE 50 MG: 25 TABLET, FILM COATED ORAL at 21:35

## 2023-02-16 RX ADMIN — MIDODRINE HYDROCHLORIDE 10 MG: 5 TABLET ORAL at 11:57

## 2023-02-16 RX ADMIN — BUSPIRONE HYDROCHLORIDE 10 MG: 10 TABLET ORAL at 08:17

## 2023-02-16 NOTE — PROGRESS NOTES
Problem: Depressed Mood (Adult/Pediatric)  Goal: *LTG: Returns to previous level of functioning and participates with after care plan  Outcome: Progressing Towards Goal

## 2023-02-16 NOTE — GROUP NOTE
TABITHA  GROUP DOCUMENTATION INDIVIDUAL                                                                          Group Therapy Note    Date: 2/16/2023    Group Start Time: 1500  Group End Time: 1600  Group Topic: Recreational/Music Therapy    [facility] - [place] [address] ACUTE BEHAV [facility]    Jourdan Lombardo 9190 GROUP    Group Therapy Note    Attendees: 8       Attendance: Did not attend      [facility]

## 2023-02-16 NOTE — BH NOTES
Behavioral Health Interdisciplinary Rounds     Patient Name: Thien Schmitt  Age: 57 y.o. Room/Bed:  320/01  Primary Diagnosis: Bipolar 1 disorder ([address]     Progress note: Treatment team met with the Pt. Pt denies SI, HI, AHVH, and self harming behaviors. Pt declined to eat breakfast and was observed with fair hygiene and grooming. Pt endorses experiencing severe anxiety and depression and rated her level of anxiety and depression a level of 10/10. Pt presents with a smiling affect and anxious mood. Pt is withdrawn to self and is isolative to her room. DEV spoke with the Pt [place]  Kev Agrawal and informed her that [facility] will need a LTSS screening completed on the Pt in order for the Pt to receive personal care services at home. Kev Read informed the SW that [facility] is unable to complete the LTSS screening and that  will need to complete the screening. SW informed Kev Read that she contacted [facility] and they declined the Pt due to not providing personal care services. SW informed the Pt  that the Pt is ready to discharge and that the SW will continue to explore personal care services for the Pt. DEV informed the Pt  that she was hoping to have personal care services set up prior to the Pt discharging, but is unsure of how she will be able to set up the services if the company is requesting a LTSS screening.     2:39pm Pt , Kev Read informed the SW that she is unable to call and that the hospital will need to contact [id number] [place] [address] ([phone]) and request a LTSS screening. DEV left a voicemail with [employer] requesting a return phone call to inquire about the Pt receiving a LTSS screening.     LOS:  21  Expected LOS: TBD    Financial concerns/prescription coverage:  [place] [employer] [facility]   Family contact: Reema Gilliland (Father) [phone]    Family requesting physician contact today:  No  Discharge plan: Return home  Access to weapons: No                                                Outpatient provider(s): Ramesh Bucio [facility]), [phone], VANDANA ([address] [place] [address])  Patient's preferred phone number for follow up call: [phone]      Participating treatment team members: Sammy Woodard, YAIMA, Lul Padilla MD

## 2023-02-16 NOTE — PROGRESS NOTES
Pt met with this writer in her bedroom. Pt was laying in bed quietly with eyes closed. Pt declined breakfast, did not elaborate on why she did not wish to eat. Pt appears disheveled and is somewhat malodorous. Pt is pleasant and calm on approach, denies SI/HI/AVH. Endorses 10/10 anxiety and depression. Pt presents with smiling affect which is incongruent with stated mood. A&Ox4, VSS. Compliant with medications. Quiet and withdrawn to self. Currently laying in bed resting. Will continue to monitor.      Problem: Suicide  Goal: *STG: Remains safe in hospital  Outcome: Progressing Towards Goal  Goal: *STG: Attends activities and groups  Outcome: Progressing Towards Goal  Goal: *STG/LTG: Complies with medication therapy  Outcome: Progressing Towards Goal

## 2023-02-16 NOTE — GROUP NOTE
TABITHA  GROUP DOCUMENTATION INDIVIDUAL                                                                          Group Therapy Note    Date: 2/16/2023    Group Start Time: 0900  Group End Time: 1000  Group Topic: Topic Group    [facility] 3 ACUTE BEHAV [facility]    [place], [facility] GROUP DOCUMENTATION GROUP    Group Therapy Note    Attendees: 5       Attendance: Attended    Patient's Goal:  To participate in chair exercise routine    Interventions/techniques: Supported-benefits of exercise and other wellness tips    Follows Directions:  Followed directions    Interactions: Interacted appropriately    Mental Status: Calm    Behavior/appearance: Attentive, Cooperative, and Needed prompting    Goals Achieved: Able to engage in interactions, Able to listen to others, Able to self-disclose, and Discussed coping    [facility]

## 2023-02-16 NOTE — BH NOTES
GROUP THERAPY PROGRESS NOTE    Pilar Garcia is participating in Substance abuse. Group time: 35 minutes    Personal goal for participation:  To participate in the substance use discussion    Goal orientation: personal    Group therapy participation: Did not attend    Impression of participation: did not attend

## 2023-02-16 NOTE — PROGRESS NOTES
Problem: Discharge Planning  Goal: *Discharge to safe environment  Outcome: [address] presented alert and oriented x4, bright affect/mood. She rated her depression level at 7/10. She denied SI/HI/AVH. She was compliant with her scheduled meds. She also received Atarax for anxiety rated at 7/10, and Trazodone for sleep. She appeared to have slept approx 8 hours during the night. No distress observed. She was cooperative with blood draw for morning labs, however after two unsuccessful attempts a sample was not obtained. Monitoring continues for safety.

## 2023-02-16 NOTE — BH NOTES
GROUP THERAPY PROGRESS NOTE     Janes Larsen is participating in Psychoeducation Group. Group time: 60 minutes 2:00pm-3:00pm     Group therapy participation: not active     Therapeutic interventions reviewed and discussed: Group facilitator dicussed fight or flight responses to perceived threats. Group facilitator dicussed definitions of fight, flight, freeze, loli responses to stress. Patients were asked to identify responses relevant to them and provide examples.            Impression of participation: Not present      KALIE Montiel

## 2023-02-16 NOTE — BH NOTES
Psychiatric Progress Note    Patient: Osvaldo Menchaca MRN: [id number]  SSN: [id number]    YOB: 1960  Age: 57 y.o. Sex: female      Admit Date: 1/26/2023    LOS: 21 days     Subjective:     Osvaldo Menchaca  reports feeling anxious (10/10) and moods are depressed (10/10). Denies SI/HI/AH/VH. No aggression or violence. Appropriately interactive and aware. Tolerating medications well. Eating well and sleeping well. 1/29 - Osvaldo Menchaca reports feeling very depressed awaiting ECT. Still having visual hallucinations that come and go. Denies SI/HI/AH/VH. No aggression or violence. Appropriately interactive and aware. Tolerating medications (atarax and trazodone prns) well. Eating and sleeping fairly. 1/30 - Osvaldo Menchaca reports feeling terrible and did not recall getting ECT this morning. When told that she had it done, she stated it was not enough. Moods are severely depressed. Denies SI/HI/AH/VH. There was concern that she did not know what she was in the hospital for or anything about the procedure. TDO and court ordered ECT recommended. No aggression or violence. Appropriately interactive and aware. Tolerating medications well. Eating and sleeping fairly. 1/31 - Osvaldo Menchaca reports not feeling well and moods are depressed. Per staff tends to ask for Injectable Ativan frequently then gets upset when she does not receive it. Passive SI per staff. Denies HI/AH/VH. No aggression or violence. Tends to isolate to room. Interactive and aware. Tolerating medications well. Eating and sleeping fairly (5 hrs). 2/1 - Osvaldo Menchaca reports not feeling good again this morning but presents less intense, more calm and is more conversant today post ECT #2. Moods are depressed. Anxiety 10/10. Denies HI/AH/VH. Was afraid of her thoughts about being dead and did not discuss them yesterday. No aggression or violence. Appropriately interactive and aware.  Tolerating medications well. Eating and sleeping fairly (6.45 hrs). 2/2 - Pranay Willis reports feeling tired today and moods are depressed with anxiety(10/10). Passive SI. Denies HI/AH/VH. No aggression or violence. Appropriately interactive and aware. Tolerating medications (trazodone and atarax prns). Eating and sleeping fairly. 2/3 - Pranay Willis reports feeling ok this morning and even had a smile. Moods are improved. Hearing faint voices in background that do not bother her. Blood pressure was low with mild dizziness on standing. Denies SI/HI/VH. No aggression or violence. Appropriately interactive and aware. Tolerating medications well. Eating and sleeping fairly. Goes for ECT # 3 today. 02/04 - no acute overnight events. The patient slept 8 hours overnight and got Trazodone with good effect. She reports ongoing anxiety and depressed mood but denies active thoughts of self harm. Patient is eeager for her next ECT session which is scheduled for early next week. She is able to make her needs known and medication compliant. 02/05 - the patient reports ongoing AH+ and is isolative to her room. She denies active thoughts of self harm. Patient withdrawn and isolative and slept 7.5 hours overnight. She is eager for her next ECT session which is scheduled for Tuesday. Patient able to make her needs known though she remains dysphoric.    02/06 - Pranay Willis reports feeling \"alright: today with smile on her face. Moods are good. Weekend went well. Denies SI/HI/AH/VH. No aggression or violence. Appropriately interactive and aware. Tolerating medications well. Eating and sleeping fairly. Tolerating ECT well. 02/07  - Pranay Willis reports feeling pretty good and moods are improving with ECT #4. Pleasant and smiling. Denies SI/HI/AH/VH. No aggression or violence. Appropriately interactive and aware. Tolerating medications well. Atarax prn given last night.   Eating and sleeping fairly. 02/08 - Emanuel Wang reports feeling better than on admission with a smile and Moods that are 8/10. Completed ECT #5 today. Denies SI/HI/AH/VH. No aggression or violence. Appropriately interactive and aware. Tolerating medications well. Eating and sleeping fairly. 02/09 - Emanuel Wang reports feeling alright and moods are about a 5-7/10. Anxiety is about 6-8/10. Denies SI/HI/AH/VH. No aggression or violence. Appropriately interactive and aware. Tolerating medications well. Eating and sleeping fairly. 02/10 - Emanuel Wang reports feeling well with a slight headache today post ECT # 6. Bright smile and moods are pretty good. Denies SI/HI/AH/VH. No aggression or violence. Appropriately interactive and aware. Tolerating medications well. Eating and sleeping fairly. 02/11 - Emanuel Wang reports feeling well and moods are good. Denies SI/HI/AH/VH. No aggression or violence. Appropriately interactive and aware. Tolerating medications well. Eating and sleeping fairly. Two more ECT's before discharge    02/12 - Emanuel Wang reports feeling better today and continues to angel a bright smile. Moods are good. Denies SI/HI/AH/VH. No aggression or violence. Appropriately interactive and aware. Tolerating medications well. Eating and sleeping fairly 8 hrs.    02/13 - Emanuel Wang reports feeling alright post ECT # 7. Memory is intact and no headache. Smiling and interacting on the unit. Moods are still depressed but less and anxiety is just a little. Denies SI/HI/AH/VH. No aggression or violence. Appropriately interactive and aware. Tolerating medications well. Eating and sleeping fairly. 02/14 - Emanuel Wang reports feeling well and moods are good. Denies SI/HI/AH/VH. No aggression or violence. Appropriately interactive and aware. Tolerating medications well. Eating and sleeping fairly. 02/15 - Emanuel Wang reports doing well post ECT #8.   Moods are depressed 7/10 but smiles brightly and appears to be less depressed. Denies SI/HI/AH/VH. No aggression or violence. Appropriately interactive and aware. Tolerating medications well. Eating and sleeping fairly. She feels that she can take care of herself at home. 02/16 - Sari Guzman reports feeling well and moods are depressed 7/10. Smiles. Poor ADL's per staff. Denies SI/HI/AH/VH. No aggression or violence. Appropriately interactive and aware. Tolerating medications well. Eating and sleeping fairly. Candidate for Maintenance ECT but does not have the support for transport.     Objective:     Vitals:    02/15/23 1649 02/15/23 1955 02/15/23 2107 02/16/23 0828   BP: 93/63 (!) 93/59 119/67 107/78   Pulse: 96 95 73 75   Resp:  16 16 16   Temp:  98 °F (36.7 °C) 98.4 °F (36.9 °C) 97.5 °F (36.4 °C)   SpO2:  96% 98% 100%   Weight:       Height:            Mental Status Exam:   Sensorium  oriented to time, place and person   Relations cooperative   Eye Contact appropriate   Appearance:  age appropriate   Speech:  non-pressured   Thought Process: goal directed   Thought Content Denies SI/HI/AH/VH   Suicidal ideations none   Mood:  good   Affect:  Fair range   Memory   adequate   Concentration:  adequate   Insight:  limited   Judgment:  fair       MEDICATIONS:  Current Facility-Administered Medications   Medication Dose Route Frequency    melatonin tablet 3 mg  3 mg Oral QHS    busPIRone (BUSPAR) tablet 10 mg  10 mg Oral TID    levothyroxine (SYNTHROID) tablet 112 mcg  112 mcg Oral ACB    midodrine (PROAMATINE) tablet 10 mg  10 mg Oral TID WITH MEALS    mirtazapine (REMERON) tablet 45 mg  45 mg Oral QHS    OLANZapine (ZyPREXA) tablet 20 mg  20 mg Oral QHS    venlafaxine-SR (EFFEXOR-XR) capsule 300 mg  300 mg Oral DAILY    omega-3 acid ethyl esters (LOVAZA) capsule 1,000 mg  1 g Oral DAILY    OLANZapine (ZyPREXA) tablet 5 mg  5 mg Oral Q6H PRN    haloperidol lactate (HALDOL) injection 5 mg  5 mg IntraMUSCular Q6H PRN benztropine (COGENTIN) tablet 1 mg  1 mg Oral BID PRN    diphenhydrAMINE (BENADRYL) injection 50 mg  50 mg IntraMUSCular BID PRN    hydrOXYzine HCL (ATARAX) tablet 50 mg  50 mg Oral TID PRN    traZODone (DESYREL) tablet 50 mg  50 mg Oral QHS PRN    acetaminophen (TYLENOL) tablet 650 mg  650 mg Oral Q4H PRN    magnesium hydroxide (MILK OF MAGNESIA) 400 mg/5 mL oral suspension 30 mL  30 mL Oral DAILY PRN      DISCUSSION:   the risks and benefits of the proposed medication  patient given opportunity to ask questions    Lab/Data Review: All lab results for the last 24 hours reviewed. No results found for this or any previous visit (from the past 24 hour(s)). Assessment:     Principal Problem:    Bipolar 1 disorder ([facility] [address] (1/27/2023)      Plan:     Continue current care  Collateral information  Medication modification as appropriate  Continue ECT # 9  Wednesday next week  NPO the morning of. Disposition planning with social work    I certify that this patient's inpatient psychiatric hospital services furnished since the previous certification were, and continue to be, required for treatment that could reasonably be expected to improve the patient's condition, or for diagnostic study, and that the patient continues to need, on a daily basis, active treatment furnished directly by or requiring the supervision of inpatient psychiatric facility personnel. In addition, the hospital records show that services furnished were intensive treatment services, admission or related services, or equivalent services.   Signed By: Meghann Leary MD     February 16, 2023

## 2023-02-17 ENCOUNTER — ANESTHESIA EVENT (OUTPATIENT)
Dept: SURGERY | Age: 63
DRG: 751 | End: 2023-02-17
Payer: MEDICAID

## 2023-02-17 LAB
ALBUMIN SERPL-MCNC: 2.9 G/DL (ref 3.5–5)
ALBUMIN/GLOB SERPL: 0.8 (ref 1.1–2.2)
ALP SERPL-CCNC: 71 U/L (ref 45–117)
ALT SERPL-CCNC: 24 U/L (ref 12–78)
ANION GAP SERPL CALC-SCNC: 7 MMOL/L (ref 5–15)
AST SERPL-CCNC: 16 U/L (ref 15–37)
BASOPHILS # BLD: 0 K/UL (ref 0–0.1)
BASOPHILS NFR BLD: 1 % (ref 0–1)
BILIRUB SERPL-MCNC: 0.2 MG/DL (ref 0.2–1)
BUN SERPL-MCNC: 19 MG/DL (ref 6–20)
BUN/CREAT SERPL: 22 (ref 12–20)
CALCIUM SERPL-MCNC: 8.6 MG/DL (ref 8.5–10.1)
CHLORIDE SERPL-SCNC: 106 MMOL/L (ref 97–108)
CO2 SERPL-SCNC: 26 MMOL/L (ref 21–32)
CREAT SERPL-MCNC: 0.88 MG/DL (ref 0.55–1.02)
DIFFERENTIAL METHOD BLD: ABNORMAL
EOSINOPHIL # BLD: 0.1 K/UL (ref 0–0.4)
EOSINOPHIL NFR BLD: 2 % (ref 0–7)
ERYTHROCYTE [DISTWIDTH] IN BLOOD BY AUTOMATED COUNT: 13.4 % (ref 11.5–14.5)
GLOBULIN SER CALC-MCNC: 3.6 G/DL (ref 2–4)
GLUCOSE SERPL-MCNC: 103 MG/DL (ref 65–100)
HCT VFR BLD AUTO: 35.2 % (ref 35–47)
HGB BLD-MCNC: 11.2 G/DL (ref 11.5–16)
IMM GRANULOCYTES # BLD AUTO: 0 K/UL (ref 0–0.04)
IMM GRANULOCYTES NFR BLD AUTO: 0 % (ref 0–0.5)
LYMPHOCYTES # BLD: 2.2 K/UL (ref 0.8–3.5)
LYMPHOCYTES NFR BLD: 40 % (ref 12–49)
MCH RBC QN AUTO: 28.1 PG (ref 26–34)
MCHC RBC AUTO-ENTMCNC: 31.8 G/DL (ref 30–36.5)
MCV RBC AUTO: 88.2 FL (ref 80–99)
MONOCYTES # BLD: 0.3 K/UL (ref 0–1)
MONOCYTES NFR BLD: 5 % (ref 5–13)
NEUTS SEG # BLD: 2.9 K/UL (ref 1.8–8)
NEUTS SEG NFR BLD: 52 % (ref 32–75)
NRBC # BLD: 0 K/UL (ref 0–0.01)
NRBC BLD-RTO: 0 PER 100 WBC
PLATELET # BLD AUTO: 349 K/UL (ref 150–400)
PMV BLD AUTO: 9.5 FL (ref 8.9–12.9)
POTASSIUM SERPL-SCNC: 3.9 MMOL/L (ref 3.5–5.1)
PROT SERPL-MCNC: 6.5 G/DL (ref 6.4–8.2)
RBC # BLD AUTO: 3.99 M/UL (ref 3.8–5.2)
SODIUM SERPL-SCNC: 139 MMOL/L (ref 136–145)
WBC # BLD AUTO: 5.5 K/UL (ref 3.6–11)

## 2023-02-17 PROCEDURE — 65220000003 HC RM SEMIPRIVATE PSYCH

## 2023-02-17 PROCEDURE — 74011250637 HC RX REV CODE- 250/637: Performed by: PSYCHIATRY & NEUROLOGY

## 2023-02-17 PROCEDURE — 85025 COMPLETE CBC W/AUTO DIFF WBC: CPT

## 2023-02-17 PROCEDURE — 36415 COLL VENOUS BLD VENIPUNCTURE: CPT

## 2023-02-17 PROCEDURE — 80053 COMPREHEN METABOLIC PANEL: CPT

## 2023-02-17 RX ADMIN — VENLAFAXINE HYDROCHLORIDE 300 MG: 75 CAPSULE, EXTENDED RELEASE ORAL at 09:14

## 2023-02-17 RX ADMIN — LEVOTHYROXINE SODIUM 112 MCG: 112 TABLET ORAL at 05:45

## 2023-02-17 RX ADMIN — MIDODRINE HYDROCHLORIDE 10 MG: 5 TABLET ORAL at 12:29

## 2023-02-17 RX ADMIN — MIDODRINE HYDROCHLORIDE 10 MG: 5 TABLET ORAL at 16:51

## 2023-02-17 RX ADMIN — Medication 3 MG: at 21:31

## 2023-02-17 RX ADMIN — HYDROXYZINE HYDROCHLORIDE 50 MG: 25 TABLET, FILM COATED ORAL at 21:31

## 2023-02-17 RX ADMIN — BUSPIRONE HYDROCHLORIDE 10 MG: 10 TABLET ORAL at 09:14

## 2023-02-17 RX ADMIN — MIDODRINE HYDROCHLORIDE 10 MG: 5 TABLET ORAL at 09:14

## 2023-02-17 RX ADMIN — BUSPIRONE HYDROCHLORIDE 10 MG: 10 TABLET ORAL at 12:29

## 2023-02-17 RX ADMIN — OLANZAPINE 20 MG: 10 TABLET, FILM COATED ORAL at 21:31

## 2023-02-17 RX ADMIN — BUSPIRONE HYDROCHLORIDE 10 MG: 10 TABLET ORAL at 16:51

## 2023-02-17 RX ADMIN — OMEGA-3-ACID ETHYL ESTERS 1000 MG: 1 CAPSULE, LIQUID FILLED ORAL at 09:14

## 2023-02-17 RX ADMIN — MIRTAZAPINE 45 MG: 15 TABLET, FILM COATED ORAL at 21:31

## 2023-02-17 NOTE — GROUP NOTE
[facility] GROUP DOCUMENTATION INDIVIDUAL                                                                          Group Therapy Note    Date: 2/17/2023    Group Start Time: 0900  Group End Time: 1000  Group Topic: Topic Group    [facility] [place] 3 ACUTE BEHAV TH    Jourdan Lombardo 9577 GROUP    Group Therapy Note    Attendees: 7       Attendance: Did not attend      Gisel Rivas

## 2023-02-17 NOTE — GROUP NOTE
TABITHA  GROUP DOCUMENTATION INDIVIDUAL                                                                          Group Therapy Note    Date: 2/17/2023    Group Start Time: 1500  Group End Time: 1600  Group Topic: Recreational/Music Therapy    [facility] - [place] [address] ACUTE BEHAV [facility]    Jourdan Lombardo 0277 GROUP    Group Therapy Note    Attendees: 7       Attendance: Did not attend      Veronica Zamora

## 2023-02-17 NOTE — PROGRESS NOTES
Problem: Discharge Planning  Goal: *Discharge to safe environment  Outcome: [address] presented alert and oriented x4, bright affect/mood. She rated her depression level at 7/10. She denied SI/HI/AVH. She was compliant with her scheduled meds. She also received Atarax for anxiety rated at 7/10, and Trazodone for sleep. She appeared to have slept approx 8 hours during the night. No distress observed. She was cooperative with blood draw for morning labs. Monitoring continues for safety.

## 2023-02-17 NOTE — BH NOTES
Behavioral Health Interdisciplinary Rounds     Patient Name: Jacqueline Mancilla  Age: 57 y.o. Room/Bed:  320/01  Primary Diagnosis: Bipolar 1 disorder ([address]     Progress note: Treatment team met with the Pt. Pt denies HI, [facility],THE, and self harming behavior. Pt reported having passive SI and rated her level of anxiety and depression at a level of 8/10. Pt presents with a flat affect and depressed mood. Per nursing the Pt received atarax for anxiety and trazodone for sleep. DEV spoke with the Pt nephew, Lisa Guevara [phone] and provided an update on how the Pt is doing and scheduled a family session for the nephew to see the Pt on 2/20/23 at 1:00pm. DEV informed the Pt nephew that [address] [place] [address] will need to complete a LTSS screening on the Pt in order for the Pt to have medicaid pay for personal care assistance. DEV informed the Pt nephew of the differences between Adult assisted living and nursing homes.  DEV contacted the Pt case [phone], and provided her with an update on how the Pt is doing and informed the Pt  that the Pt will receive additional ECT therapy treatments next week due to the Pt's passive SI.     LOS:  22  Expected LOS: TBD    Financial concerns/prescription coverage:  [employer] [facility]   Family contact: Casandra Niece (Father) [phone]    Family requesting physician contact today:  No  Discharge plan: TBD  Access to weapons: No                                                Outpatient provider(s): [facility]), [phone], VANDANA (Personal Care)  Patient's preferred phone number for follow up call: [phone]      Participating treatment team members: Jacqueline Mancilla, Hali Soto LMSW, Fedreico Wiggins MD

## 2023-02-17 NOTE — BH NOTES
Psychiatric Progress Note    Patient: Pauline Gama MRN: [id number]  SSN: [id number]    YOB: 1960  Age: 57 y.o. Sex: female      Admit Date: 1/26/2023    LOS: 22 days     Subjective:     Pauline Gama  reports feeling anxious (10/10) and moods are depressed (10/10). Denies SI/HI/AH/VH. No aggression or violence. Appropriately interactive and aware. Tolerating medications well. Eating well and sleeping well. 1/29 - Pauline Gama reports feeling very depressed awaiting ECT. Still having visual hallucinations that come and go. Denies SI/HI/AH/VH. No aggression or violence. Appropriately interactive and aware. Tolerating medications (atarax and trazodone prns) well. Eating and sleeping fairly. 1/30 - Pauline Gama reports feeling terrible and did not recall getting ECT this morning. When told that she had it done, she stated it was not enough. Moods are severely depressed. Denies SI/HI/AH/VH. There was concern that she did not know what she was in the hospital for or anything about the procedure. TDO and court ordered ECT recommended. No aggression or violence. Appropriately interactive and aware. Tolerating medications well. Eating and sleeping fairly. 1/31 - Pauline Gama reports not feeling well and moods are depressed. Per staff tends to ask for Injectable Ativan frequently then gets upset when she does not receive it. Passive SI per staff. Denies HI/AH/VH. No aggression or violence. Tends to isolate to room. Interactive and aware. Tolerating medications well. Eating and sleeping fairly (5 hrs). 2/1 - Pauline Gama reports not feeling good again this morning but presents less intense, more calm and is more conversant today post ECT #2. Moods are depressed. Anxiety 10/10. Denies HI/AH/VH. Was afraid of her thoughts about being dead and did not discuss them yesterday. No aggression or violence. Appropriately interactive and aware.  Tolerating medications well. Eating and sleeping fairly (6.45 hrs). 2/2 - Simi Lance reports feeling tired today and moods are depressed with anxiety(10/10). Passive SI. Denies HI/AH/VH. No aggression or violence. Appropriately interactive and aware. Tolerating medications (trazodone and atarax prns). Eating and sleeping fairly. 2/3 - Simi Lucas reports feeling ok this morning and even had a smile. Moods are improved. Hearing faint voices in background that do not bother her. Blood pressure was low with mild dizziness on standing. Denies SI/HI/VH. No aggression or violence. Appropriately interactive and aware. Tolerating medications well. Eating and sleeping fairly. Goes for ECT # 3 today. 02/04 - no acute overnight events. The patient slept 8 hours overnight and got Trazodone with good effect. She reports ongoing anxiety and depressed mood but denies active thoughts of self harm. Patient is eeager for her next ECT session which is scheduled for early next week. She is able to make her needs known and medication compliant. 02/05 - the patient reports ongoing AH+ and is isolative to her room. She denies active thoughts of self harm. Patient withdrawn and isolative and slept 7.5 hours overnight. She is eager for her next ECT session which is scheduled for Tuesday. Patient able to make her needs known though she remains dysphoric.    02/06 - Simi Lance reports feeling \"alright: today with smile on her face. Moods are good. Weekend went well. Denies SI/HI/AH/VH. No aggression or violence. Appropriately interactive and aware. Tolerating medications well. Eating and sleeping fairly. Tolerating ECT well. 02/07  - Simi Lance reports feeling pretty good and moods are improving with ECT #4. Pleasant and smiling. Denies SI/HI/AH/VH. No aggression or violence. Appropriately interactive and aware. Tolerating medications well. Atarax prn given last night.   Eating and sleeping fairly. 02/08 - Flory Prows reports feeling better than on admission with a smile and Moods that are 8/10. Completed ECT #5 today. Denies SI/HI/AH/VH. No aggression or violence. Appropriately interactive and aware. Tolerating medications well. Eating and sleeping fairly. 02/09 - Flory Prows reports feeling alright and moods are about a 5-7/10. Anxiety is about 6-8/10. Denies SI/HI/AH/VH. No aggression or violence. Appropriately interactive and aware. Tolerating medications well. Eating and sleeping fairly. 02/10 - Flory Prows reports feeling well with a slight headache today post ECT # 6. Bright smile and moods are pretty good. Denies SI/HI/AH/VH. No aggression or violence. Appropriately interactive and aware. Tolerating medications well. Eating and sleeping fairly. 02/11 - Flory Prows reports feeling well and moods are good. Denies SI/HI/AH/VH. No aggression or violence. Appropriately interactive and aware. Tolerating medications well. Eating and sleeping fairly. Two more ECT's before discharge    02/12 - Flory Prows reports feeling better today and continues to angel a bright smile. Moods are good. Denies SI/HI/AH/VH. No aggression or violence. Appropriately interactive and aware. Tolerating medications well. Eating and sleeping fairly 8 hrs.    02/13 - Flory Prows reports feeling alright post ECT # 7. Memory is intact and no headache. Smiling and interacting on the unit. Moods are still depressed but less and anxiety is just a little. Denies SI/HI/AH/VH. No aggression or violence. Appropriately interactive and aware. Tolerating medications well. Eating and sleeping fairly. 02/14 - Flory Prows reports feeling well and moods are good. Denies SI/HI/AH/VH. No aggression or violence. Appropriately interactive and aware. Tolerating medications well. Eating and sleeping fairly. 02/15 - Flory Prows reports doing well post ECT #8.   Moods are depressed 7/10 but smiles brightly and appears to be less depressed. Denies SI/HI/AH/VH. No aggression or violence. Appropriately interactive and aware. Tolerating medications well. Eating and sleeping fairly. She feels that she can take care of herself at home. 02/16 - Elysia Phillip reports feeling well and moods are depressed 7/10. Smiles. Poor ADL's per staff. Denies SI/HI/AH/VH. No aggression or violence. Appropriately interactive and aware. Tolerating medications well. Eating and sleeping fairly. Candidate for Maintenance ECT but does not have the support for transport. 02/17 - Elysia Phillip reports poor sleep last evening and feeling down. Notes having thoughts of suicide this morning but she does not know why. Moods are depressed. Denies SI/HI/AH/VH. No aggression or violence. Appropriately interactive and aware. Tolerating medications well. Eating and sleeping a broken 8hrs.     Objective:     Vitals:    02/15/23 2107 02/16/23 0828 02/16/23 2018 02/17/23 1152   BP: 119/67 107/78 90/68 92/66   Pulse: 73 75 88 75   Resp: 16 16 16 16   Temp: 98.4 °F (36.9 °C) 97.5 °F (36.4 °C) 98.2 °F (36.8 °C) 97.3 °F (36.3 °C)   SpO2: 98% 100% 98% 97%   Weight:       Height:            Mental Status Exam:   Sensorium  oriented to time, place and person   Relations cooperative   Eye Contact appropriate   Appearance:  age appropriate   Speech:  non-pressured   Thought Process: goal directed   Thought Content Denies SI/HI/AH/VH   Suicidal ideations none   Mood:  good   Affect:  Fair range   Memory   adequate   Concentration:  adequate   Insight:  limited   Judgment:  fair       MEDICATIONS:  Current Facility-Administered Medications   Medication Dose Route Frequency    melatonin tablet 3 mg  3 mg Oral QHS    busPIRone (BUSPAR) tablet 10 mg  10 mg Oral TID    levothyroxine (SYNTHROID) tablet 112 mcg  112 mcg Oral ACB    midodrine (PROAMATINE) tablet 10 mg  10 mg Oral TID WITH MEALS    mirtazapine (REMERON) tablet 45 mg  45 mg Oral QHS    OLANZapine (ZyPREXA) tablet 20 mg  20 mg Oral QHS    venlafaxine-SR (EFFEXOR-XR) capsule 300 mg  300 mg Oral DAILY    omega-3 acid ethyl esters (LOVAZA) capsule 1,000 mg  1 g Oral DAILY    OLANZapine (ZyPREXA) tablet 5 mg  5 mg Oral Q6H PRN    haloperidol lactate (HALDOL) injection 5 mg  5 mg IntraMUSCular Q6H PRN    benztropine (COGENTIN) tablet 1 mg  1 mg Oral BID PRN    diphenhydrAMINE (BENADRYL) injection 50 mg  50 mg IntraMUSCular BID PRN    hydrOXYzine HCL (ATARAX) tablet 50 mg  50 mg Oral TID PRN    traZODone (DESYREL) tablet 50 mg  50 mg Oral QHS PRN    acetaminophen (TYLENOL) tablet 650 mg  650 mg Oral Q4H PRN    magnesium hydroxide (MILK OF MAGNESIA) 400 mg/5 mL oral suspension 30 mL  30 mL Oral DAILY PRN      DISCUSSION:   the risks and benefits of the proposed medication  patient given opportunity to ask questions    Lab/Data Review: All lab results for the last 24 hours reviewed. Recent Results (from the past 24 hour(s))   CBC WITH AUTOMATED DIFF    Collection Time: 02/17/23  5:34 AM   Result Value Ref Range    WBC 5.5 3.6 - 11.0 K/uL    RBC 3.99 3.80 - 5.20 M/uL    HGB 11.2 (L) 11.5 - 16.0 g/dL    HCT 35.2 35.0 - 47.0 %    MCV 88.2 80.0 - 99.0 FL    MCH 28.1 26.0 - 34.0 PG    MCHC 31.8 30.0 - 36.5 g/dL    RDW 13.4 11.5 - 14.5 %    PLATELET 560 938 - 299 K/uL    MPV 9.5 8.9 - 12.9 FL    NRBC 0.0 0  WBC    ABSOLUTE NRBC 0.00 0.00 - 0.01 K/uL    NEUTROPHILS 52 32 - 75 %    LYMPHOCYTES 40 12 - 49 %    MONOCYTES 5 5 - 13 %    EOSINOPHILS 2 0 - 7 %    BASOPHILS 1 0 - 1 %    IMMATURE GRANULOCYTES 0 0.0 - 0.5 %    ABS. NEUTROPHILS 2.9 1.8 - 8.0 K/UL    ABS. LYMPHOCYTES 2.2 0.8 - 3.5 K/UL    ABS. MONOCYTES 0.3 0.0 - 1.0 K/UL    ABS. EOSINOPHILS 0.1 0.0 - 0.4 K/UL    ABS. BASOPHILS 0.0 0.0 - 0.1 K/UL    ABS. IMM.  GRANS. 0.0 0.00 - 0.04 K/UL    DF AUTOMATED     METABOLIC PANEL, COMPREHENSIVE    Collection Time: 02/17/23  5:34 AM   Result Value Ref Range    Sodium 139 136 - 145 mmol/L    Potassium 3.9 3.5 - 5.1 mmol/L    Chloride 106 97 - 108 mmol/L    CO2 26 21 - 32 mmol/L    Anion gap 7 5 - 15 mmol/L    Glucose 103 (H) 65 - 100 mg/dL    BUN 19 6 - 20 MG/DL    Creatinine 0.88 0.55 - 1.02 MG/DL    BUN/Creatinine ratio 22 (H) 12 - 20      eGFR >60 >60 ml/min/1.73m2    Calcium 8.6 8.5 - 10.1 MG/DL    Bilirubin, total 0.2 0.2 - 1.0 MG/DL    ALT (SGPT) 24 12 - 78 U/L    AST (SGOT) 16 15 - 37 U/L    Alk. phosphatase 71 45 - 117 U/L    Protein, total 6.5 6.4 - 8.2 g/dL    Albumin 2.9 (L) 3.5 - 5.0 g/dL    Globulin 3.6 2.0 - 4.0 g/dL    A-G Ratio 0.8 (L) 1.1 - 2.2           Assessment:     Principal Problem:    Bipolar 1 disorder ([facility] [address] (1/27/2023)      Plan:     Continue current care  Collateral information  Medication modification as appropriate  Continue ECT # 9  Tusdeay or Wednesday next week  NPO the morning of. Disposition planning with social work    I certify that this patient's inpatient psychiatric hospital services furnished since the previous certification were, and continue to be, required for treatment that could reasonably be expected to improve the patient's condition, or for diagnostic study, and that the patient continues to need, on a daily basis, active treatment furnished directly by or requiring the supervision of inpatient psychiatric facility personnel. In addition, the hospital records show that services furnished were intensive treatment services, admission or related services, or equivalent services.   Signed By: Paulina Maharaj MD     February 17, 2023

## 2023-02-17 NOTE — PROGRESS NOTES
Pt met with this writer in her bedroom. Pt was laying in bed asleep. Pt is calm and cooperative upon waking. Pt denies SI/HI/AVH, endorses 8/10 anxiety and depression. Pt later reported to physician that she was having thoughts of passive SI. Pt withdrawn to self and isolative in bed. Compliant with scheduled medications. Will continue to monitor.      Problem: Suicide  Goal: *STG: Remains safe in hospital  Outcome: Progressing Towards Goal  Goal: *STG: Seeks staff when feelings of self harm or harm towards others arise  Outcome: Progressing Towards Goal  Goal: *STG/LTG: Complies with medication therapy  Outcome: Progressing Towards Goal

## 2023-02-18 PROCEDURE — 94760 N-INVAS EAR/PLS OXIMETRY 1: CPT

## 2023-02-18 PROCEDURE — 74011250637 HC RX REV CODE- 250/637: Performed by: PSYCHIATRY & NEUROLOGY

## 2023-02-18 PROCEDURE — 65220000003 HC RM SEMIPRIVATE PSYCH

## 2023-02-18 RX ADMIN — MIDODRINE HYDROCHLORIDE 10 MG: 5 TABLET ORAL at 16:48

## 2023-02-18 RX ADMIN — OMEGA-3-ACID ETHYL ESTERS 1000 MG: 1 CAPSULE, LIQUID FILLED ORAL at 09:16

## 2023-02-18 RX ADMIN — MIDODRINE HYDROCHLORIDE 10 MG: 5 TABLET ORAL at 12:07

## 2023-02-18 RX ADMIN — BUSPIRONE HYDROCHLORIDE 10 MG: 10 TABLET ORAL at 08:27

## 2023-02-18 RX ADMIN — VENLAFAXINE HYDROCHLORIDE 300 MG: 75 CAPSULE, EXTENDED RELEASE ORAL at 08:27

## 2023-02-18 RX ADMIN — MIDODRINE HYDROCHLORIDE 10 MG: 5 TABLET ORAL at 08:27

## 2023-02-18 RX ADMIN — MIRTAZAPINE 45 MG: 15 TABLET, FILM COATED ORAL at 21:25

## 2023-02-18 RX ADMIN — OLANZAPINE 20 MG: 10 TABLET, FILM COATED ORAL at 21:25

## 2023-02-18 RX ADMIN — BUSPIRONE HYDROCHLORIDE 10 MG: 10 TABLET ORAL at 12:07

## 2023-02-18 RX ADMIN — BUSPIRONE HYDROCHLORIDE 10 MG: 10 TABLET ORAL at 16:48

## 2023-02-18 RX ADMIN — Medication 3 MG: at 21:25

## 2023-02-18 RX ADMIN — HYDROXYZINE HYDROCHLORIDE 50 MG: 25 TABLET, FILM COATED ORAL at 21:25

## 2023-02-18 RX ADMIN — LEVOTHYROXINE SODIUM 112 MCG: 112 TABLET ORAL at 06:37

## 2023-02-18 NOTE — BH NOTES
Psychiatric Progress Note    Patient: Irasema Rosas MRN: [id number]  SSN: [id number]    YOB: 1960  Age: 57 y.o. Sex: female      Admit Date: 1/26/2023    LOS: 23 days     Subjective:     Irasema Rosas  reports feeling anxious (10/10) and moods are depressed (10/10). Denies SI/HI/AH/VH. No aggression or violence. Appropriately interactive and aware. Tolerating medications well. Eating well and sleeping well. 1/29 - Irasema Rosas reports feeling very depressed awaiting ECT. Still having visual hallucinations that come and go. Denies SI/HI/AH/VH. No aggression or violence. Appropriately interactive and aware. Tolerating medications (atarax and trazodone prns) well. Eating and sleeping fairly. 1/30 - Irasema Rosas reports feeling terrible and did not recall getting ECT this morning. When told that she had it done, she stated it was not enough. Moods are severely depressed. Denies SI/HI/AH/VH. There was concern that she did not know what she was in the hospital for or anything about the procedure. TDO and court ordered ECT recommended. No aggression or violence. Appropriately interactive and aware. Tolerating medications well. Eating and sleeping fairly. 1/31 - Irasema Rosas reports not feeling well and moods are depressed. Per staff tends to ask for Injectable Ativan frequently then gets upset when she does not receive it. Passive SI per staff. Denies HI/AH/VH. No aggression or violence. Tends to isolate to room. Interactive and aware. Tolerating medications well. Eating and sleeping fairly (5 hrs). 2/1 - Irasema Rosas reports not feeling good again this morning but presents less intense, more calm and is more conversant today post ECT #2. Moods are depressed. Anxiety 10/10. Denies HI/AH/VH. Was afraid of her thoughts about being dead and did not discuss them yesterday. No aggression or violence. Appropriately interactive and aware.  Tolerating medications well. Eating and sleeping fairly (6.45 hrs). 2/2 - Abiodun Abdirahman reports feeling tired today and moods are depressed with anxiety(10/10). Passive SI. Denies HI/AH/VH. No aggression or violence. Appropriately interactive and aware. Tolerating medications (trazodone and atarax prns). Eating and sleeping fairly. 2/3 - Abiodun Abdirahman reports feeling ok this morning and even had a smile. Moods are improved. Hearing faint voices in background that do not bother her. Blood pressure was low with mild dizziness on standing. Denies SI/HI/VH. No aggression or violence. Appropriately interactive and aware. Tolerating medications well. Eating and sleeping fairly. Goes for ECT # 3 today. 02/04 - no acute overnight events. The patient slept 8 hours overnight and got Trazodone with good effect. She reports ongoing anxiety and depressed mood but denies active thoughts of self harm. Patient is eeager for her next ECT session which is scheduled for early next week. She is able to make her needs known and medication compliant. 02/05 - the patient reports ongoing AH+ and is isolative to her room. She denies active thoughts of self harm. Patient withdrawn and isolative and slept 7.5 hours overnight. She is eager for her next ECT session which is scheduled for Tuesday. Patient able to make her needs known though she remains dysphoric.    02/06 - Abiodun Abdirahman reports feeling \"alright: today with smile on her face. Moods are good. Weekend went well. Denies SI/HI/AH/VH. No aggression or violence. Appropriately interactive and aware. Tolerating medications well. Eating and sleeping fairly. Tolerating ECT well. 02/07  - Abiodun Abdirahman reports feeling pretty good and moods are improving with ECT #4. Pleasant and smiling. Denies SI/HI/AH/VH. No aggression or violence. Appropriately interactive and aware. Tolerating medications well. Atarax prn given last night.   Eating and sleeping fairly. 02/08 - Gloria Kumar reports feeling better than on admission with a smile and Moods that are 8/10. Completed ECT #5 today. Denies SI/HI/AH/VH. No aggression or violence. Appropriately interactive and aware. Tolerating medications well. Eating and sleeping fairly. 02/09 - Gloria Kumar reports feeling alright and moods are about a 5-7/10. Anxiety is about 6-8/10. Denies SI/HI/AH/VH. No aggression or violence. Appropriately interactive and aware. Tolerating medications well. Eating and sleeping fairly. 02/10 - Gloria Kumar reports feeling well with a slight headache today post ECT # 6. Bright smile and moods are pretty good. Denies SI/HI/AH/VH. No aggression or violence. Appropriately interactive and aware. Tolerating medications well. Eating and sleeping fairly. 02/11 - Gloria Kumar reports feeling well and moods are good. Denies SI/HI/AH/VH. No aggression or violence. Appropriately interactive and aware. Tolerating medications well. Eating and sleeping fairly. Two more ECT's before discharge    02/12 - Gloria Kumar reports feeling better today and continues to angel a bright smile. Moods are good. Denies SI/HI/AH/VH. No aggression or violence. Appropriately interactive and aware. Tolerating medications well. Eating and sleeping fairly 8 hrs.    02/13 - Gloria Kumar reports feeling alright post ECT # 7. Memory is intact and no headache. Smiling and interacting on the unit. Moods are still depressed but less and anxiety is just a little. Denies SI/HI/AH/VH. No aggression or violence. Appropriately interactive and aware. Tolerating medications well. Eating and sleeping fairly. 02/14 - Gloria Kumar reports feeling well and moods are good. Denies SI/HI/AH/VH. No aggression or violence. Appropriately interactive and aware. Tolerating medications well. Eating and sleeping fairly. 02/15 - Gloria Kumar reports doing well post ECT #8.   Moods are depressed 7/10 but smiles brightly and appears to be less depressed. Denies SI/HI/AH/VH. No aggression or violence. Appropriately interactive and aware. Tolerating medications well. Eating and sleeping fairly. She feels that she can take care of herself at home. 02/16 - Jacqueline Mancilla reports feeling well and moods are depressed 7/10. Smiles. Poor ADL's per staff. Denies SI/HI/AH/VH. No aggression or violence. Appropriately interactive and aware. Tolerating medications well. Eating and sleeping fairly. Candidate for Maintenance ECT but does not have the support for transport. 02/17 - Jacqueline Mancilla reports poor sleep last evening and feeling down. Notes having thoughts of suicide this morning but she does not know why. Moods are depressed. Denies SI/HI/AH/VH. No aggression or violence. Appropriately interactive and aware. Tolerating medications well. Eating and sleeping a broken 8hrs. 2/18- Sawyer Ballard reports that she continues with depression but denies suicidal ideations today. > She rate depression at 10/10 though affect is congruent as she is smiling and verbalizing that she feels ECT is beneficial.  She has been in the day room, eating her lunch and engaging in the milieu with others. Denies side effects from medications.          Objective:     Vitals:    02/17/23 1152 02/17/23 2044 02/17/23 2215 02/18/23 0946   BP: 92/66 (!) 99/56 99/67 117/65   Pulse: 75 (!) 106 100 81   Resp: 16 16  16   Temp: 97.3 °F (36.3 °C) 97.9 °F (36.6 °C)  97.1 °F (36.2 °C)   SpO2: 97% 93%  97%   Weight:       Height:            Mental Status Exam:   Sensorium  oriented to time, place and person   Relations cooperative   Eye Contact appropriate   Appearance:  age appropriate   Speech:  non-pressured   Thought Process: goal directed   Thought Content Denies SI/HI/AH/VH   Suicidal ideations none   Mood:  good   Affect:  Fair range   Memory   adequate   Concentration:  adequate   Insight:  limited   Judgment:  fair MEDICATIONS:  Current Facility-Administered Medications   Medication Dose Route Frequency    melatonin tablet 3 mg  3 mg Oral QHS    busPIRone (BUSPAR) tablet 10 mg  10 mg Oral TID    levothyroxine (SYNTHROID) tablet 112 mcg  112 mcg Oral ACB    midodrine (PROAMATINE) tablet 10 mg  10 mg Oral TID WITH MEALS    mirtazapine (REMERON) tablet 45 mg  45 mg Oral QHS    OLANZapine (ZyPREXA) tablet 20 mg  20 mg Oral QHS    venlafaxine-SR (EFFEXOR-XR) capsule 300 mg  300 mg Oral DAILY    omega-3 acid ethyl esters (LOVAZA) capsule 1,000 mg  1 g Oral DAILY    OLANZapine (ZyPREXA) tablet 5 mg  5 mg Oral Q6H PRN    haloperidol lactate (HALDOL) injection 5 mg  5 mg IntraMUSCular Q6H PRN    benztropine (COGENTIN) tablet 1 mg  1 mg Oral BID PRN    diphenhydrAMINE (BENADRYL) injection 50 mg  50 mg IntraMUSCular BID PRN    hydrOXYzine HCL (ATARAX) tablet 50 mg  50 mg Oral TID PRN    traZODone (DESYREL) tablet 50 mg  50 mg Oral QHS PRN    acetaminophen (TYLENOL) tablet 650 mg  650 mg Oral Q4H PRN    magnesium hydroxide (MILK OF MAGNESIA) 400 mg/5 mL oral suspension 30 mL  30 mL Oral DAILY PRN      DISCUSSION:   the risks and benefits of the proposed medication  patient given opportunity to ask questions    Lab/Data Review: All lab results for the last 24 hours reviewed. No results found for this or any previous visit (from the past 24 hour(s)). Assessment:     Principal Problem:    Bipolar 1 disorder ([facility] (1/27/2023)      Plan:     Continue current care  Collateral information  Medication modification as appropriate  Continue ECT # 9  Tusdeay or Wednesday next week  NPO the morning of.   Disposition planning with social work    I certify that this patient's inpatient psychiatric hospital services furnished since the previous certification were, and continue to be, required for treatment that could reasonably be expected to improve the patient's condition, or for diagnostic study, and that the patient continues to need, on a daily basis, active treatment furnished directly by or requiring the supervision of inpatient psychiatric facility personnel. In addition, the hospital records show that services furnished were intensive treatment services, admission or related services, or equivalent services.   Signed By: Yessy Krause NP     February 18, 2023

## 2023-02-18 NOTE — PROGRESS NOTES
Patient observed resting in bed during initial assessment. Patient calm and fully engaging. Patient oriented X4. Patient reported no immediate concerns. Patient displayed no obvious signs of medical or psychiatric distress. Patient reported symptoms of depression 7/10 and anxiety 8/10. Patient denied hallucinations, S/I or H/I. Patient was given her prescribed HS medications. She was also given Atarax. Patient observed resting in bed throughout the night with no concerns. Staff will continue to monitor. Patient slept for 7 hours. Patient received 0630 medications.      Problem: Suicide  Goal: *STG: Remains safe in hospital  Outcome: Progressing Towards Goal  Goal: *STG: Seeks staff when feelings of self harm or harm towards others arise  Outcome: Progressing Towards Goal  Goal: *STG/LTG: Complies with medication therapy  Outcome: Progressing Towards Goal

## 2023-02-18 NOTE — PROGRESS NOTES
Pt met with this writer in her bedroom. Pt laying in bed with covers over her head. Pt pleasant and cooperative on approach. Pt presents with smiling affect, incongruent with reported mood. Pt denies SI/HI/AVH, endorses 9/10 depression and 10/10 anxiety. Declined PRN medication, stated \"I don't know what would help\". Pt refused breakfast and stayed in bed throughout the morning. Compliant with medications. Will continue to monitor.      Problem: Suicide  Goal: *STG: Remains safe in hospital  Outcome: Progressing Towards Goal  Goal: *STG/LTG: Complies with medication therapy  Outcome: Progressing Towards Goal  Goal: *STG/LTG: No longer expresses self destructive or suicidal thoughts  Outcome: Progressing Towards Goal

## 2023-02-19 PROCEDURE — 65220000003 HC RM SEMIPRIVATE PSYCH

## 2023-02-19 PROCEDURE — 74011250637 HC RX REV CODE- 250/637: Performed by: PSYCHIATRY & NEUROLOGY

## 2023-02-19 RX ADMIN — BUSPIRONE HYDROCHLORIDE 10 MG: 10 TABLET ORAL at 17:20

## 2023-02-19 RX ADMIN — OLANZAPINE 5 MG: 5 TABLET, FILM COATED ORAL at 07:59

## 2023-02-19 RX ADMIN — MIRTAZAPINE 45 MG: 15 TABLET, FILM COATED ORAL at 21:18

## 2023-02-19 RX ADMIN — OLANZAPINE 20 MG: 10 TABLET, FILM COATED ORAL at 21:18

## 2023-02-19 RX ADMIN — LEVOTHYROXINE SODIUM 112 MCG: 112 TABLET ORAL at 06:36

## 2023-02-19 RX ADMIN — BUSPIRONE HYDROCHLORIDE 10 MG: 10 TABLET ORAL at 07:54

## 2023-02-19 RX ADMIN — HYDROXYZINE HYDROCHLORIDE 50 MG: 25 TABLET, FILM COATED ORAL at 21:18

## 2023-02-19 RX ADMIN — MIDODRINE HYDROCHLORIDE 10 MG: 5 TABLET ORAL at 17:20

## 2023-02-19 RX ADMIN — OMEGA-3-ACID ETHYL ESTERS 1000 MG: 1 CAPSULE, LIQUID FILLED ORAL at 07:54

## 2023-02-19 RX ADMIN — MIDODRINE HYDROCHLORIDE 10 MG: 5 TABLET ORAL at 11:52

## 2023-02-19 RX ADMIN — Medication 3 MG: at 21:18

## 2023-02-19 RX ADMIN — BUSPIRONE HYDROCHLORIDE 10 MG: 10 TABLET ORAL at 11:52

## 2023-02-19 RX ADMIN — MIDODRINE HYDROCHLORIDE 10 MG: 5 TABLET ORAL at 07:54

## 2023-02-19 RX ADMIN — VENLAFAXINE HYDROCHLORIDE 300 MG: 75 CAPSULE, EXTENDED RELEASE ORAL at 07:54

## 2023-02-19 NOTE — BH NOTES
Psychiatric Progress Note    Patient: Corry Marte MRN: [id number]  SSN: [id number]    YOB: 1960  Age: 57 y.o. Sex: female      Admit Date: 1/26/2023    LOS: 24 days     Subjective:     Corry Marte  reports feeling anxious (10/10) and moods are depressed (10/10). Denies SI/HI/AH/VH. No aggression or violence. Appropriately interactive and aware. Tolerating medications well. Eating well and sleeping well. 1/29 - Corry Marte reports feeling very depressed awaiting ECT. Still having visual hallucinations that come and go. Denies SI/HI/AH/VH. No aggression or violence. Appropriately interactive and aware. Tolerating medications (atarax and trazodone prns) well. Eating and sleeping fairly. 1/30 - Corry Marte reports feeling terrible and did not recall getting ECT this morning. When told that she had it done, she stated it was not enough. Moods are severely depressed. Denies SI/HI/AH/VH. There was concern that she did not know what she was in the hospital for or anything about the procedure. TDO and court ordered ECT recommended. No aggression or violence. Appropriately interactive and aware. Tolerating medications well. Eating and sleeping fairly. 1/31 - Corry Marte reports not feeling well and moods are depressed. Per staff tends to ask for Injectable Ativan frequently then gets upset when she does not receive it. Passive SI per staff. Denies HI/AH/VH. No aggression or violence. Tends to isolate to room. Interactive and aware. Tolerating medications well. Eating and sleeping fairly (5 hrs). 2/1 - Corry Marte reports not feeling good again this morning but presents less intense, more calm and is more conversant today post ECT #2. Moods are depressed. Anxiety 10/10. Denies HI/AH/VH. Was afraid of her thoughts about being dead and did not discuss them yesterday. No aggression or violence. Appropriately interactive and aware.  Tolerating medications well. Eating and sleeping fairly (6.45 hrs). 2/2 - Gloria Kumar reports feeling tired today and moods are depressed with anxiety(10/10). Passive SI. Denies HI/AH/VH. No aggression or violence. Appropriately interactive and aware. Tolerating medications (trazodone and atarax prns). Eating and sleeping fairly. 2/3 - Gloria Kumar reports feeling ok this morning and even had a smile. Moods are improved. Hearing faint voices in background that do not bother her. Blood pressure was low with mild dizziness on standing. Denies SI/HI/VH. No aggression or violence. Appropriately interactive and aware. Tolerating medications well. Eating and sleeping fairly. Goes for ECT # 3 today. 02/04 - no acute overnight events. The patient slept 8 hours overnight and got Trazodone with good effect. She reports ongoing anxiety and depressed mood but denies active thoughts of self harm. Patient is eeager for her next ECT session which is scheduled for early next week. She is able to make her needs known and medication compliant. 02/05 - the patient reports ongoing AH+ and is isolative to her room. She denies active thoughts of self harm. Patient withdrawn and isolative and slept 7.5 hours overnight. She is eager for her next ECT session which is scheduled for Tuesday. Patient able to make her needs known though she remains dysphoric.    02/06 - Gloria Kumar reports feeling \"alright: today with smile on her face. Moods are good. Weekend went well. Denies SI/HI/AH/VH. No aggression or violence. Appropriately interactive and aware. Tolerating medications well. Eating and sleeping fairly. Tolerating ECT well. 02/07  - Gloria Kumar reports feeling pretty good and moods are improving with ECT #4. Pleasant and smiling. Denies SI/HI/AH/VH. No aggression or violence. Appropriately interactive and aware. Tolerating medications well. Atarax prn given last night.   Eating and sleeping fairly. 02/08 - Meredith Thomas reports feeling better than on admission with a smile and Moods that are 8/10. Completed ECT #5 today. Denies SI/HI/AH/VH. No aggression or violence. Appropriately interactive and aware. Tolerating medications well. Eating and sleeping fairly. 02/09 - Meredith Thomas reports feeling alright and moods are about a 5-7/10. Anxiety is about 6-8/10. Denies SI/HI/AH/VH. No aggression or violence. Appropriately interactive and aware. Tolerating medications well. Eating and sleeping fairly. 02/10 - Meredith Thomas reports feeling well with a slight headache today post ECT # 6. Bright smile and moods are pretty good. Denies SI/HI/AH/VH. No aggression or violence. Appropriately interactive and aware. Tolerating medications well. Eating and sleeping fairly. 02/11 - Meredith Thomas reports feeling well and moods are good. Denies SI/HI/AH/VH. No aggression or violence. Appropriately interactive and aware. Tolerating medications well. Eating and sleeping fairly. Two more ECT's before discharge    02/12 - Meredith Thomas reports feeling better today and continues to angel a bright smile. Moods are good. Denies SI/HI/AH/VH. No aggression or violence. Appropriately interactive and aware. Tolerating medications well. Eating and sleeping fairly 8 hrs.    02/13 - Meredith Thomas reports feeling alright post ECT # 7. Memory is intact and no headache. Smiling and interacting on the unit. Moods are still depressed but less and anxiety is just a little. Denies SI/HI/AH/VH. No aggression or violence. Appropriately interactive and aware. Tolerating medications well. Eating and sleeping fairly. 02/14 - Meredith Thomas reports feeling well and moods are good. Denies SI/HI/AH/VH. No aggression or violence. Appropriately interactive and aware. Tolerating medications well. Eating and sleeping fairly. 02/15 - Meredith Thomas reports doing well post ECT #8.   Moods are depressed 7/10 but smiles brightly and appears to be less depressed. Denies SI/HI/AH/VH. No aggression or violence. Appropriately interactive and aware. Tolerating medications well. Eating and sleeping fairly. She feels that she can take care of herself at home. 02/16 - Corry Marte reports feeling well and moods are depressed 7/10. Smiles. Poor ADL's per staff. Denies SI/HI/AH/VH. No aggression or violence. Appropriately interactive and aware. Tolerating medications well. Eating and sleeping fairly. Candidate for Maintenance ECT but does not have the support for transport. 02/17 - Corry Marte reports poor sleep last evening and feeling down. Notes having thoughts of suicide this morning but she does not know why. Moods are depressed. Denies SI/HI/AH/VH. No aggression or violence. Appropriately interactive and aware. Tolerating medications well. Eating and sleeping a broken 8hrs. 2/18- Reji Burgos reports that she continues with depression but denies suicidal ideations today. > She rate depression at 10/10 though affect is congruent as she is smiling and verbalizing that she feels ECT is beneficial.  She has been in the day room, eating her lunch and engaging in the milieu with others. Denies side effects from medications. 2/19- Reji Burgos reports that he is doing okay today. She continue to endorse depression and anxiety and described having some AH this am.  When asked to elaborate she state that she cannot make out the sound. Nursing staff report that she verbalized the same to them  Denies current AVH, denies SI, HI . She has been compliant with medications, eating and sleeping well.      Objective:     Vitals:    02/18/23 0946 02/18/23 2040 02/18/23 2254 02/19/23 0808   BP: 117/65 109/73  107/76   Pulse: 81 (!) 106 100 84   Resp: 16 16 18   Temp: 97.1 °F (36.2 °C) 98.7 °F (37.1 °C)  98.2 °F (36.8 °C)   SpO2: 97% 98%  100%   Weight:       Height:            Mental Status Exam:   Sensorium oriented to time, place and person   Relations cooperative   Eye Contact appropriate   Appearance:  age appropriate   Speech:  non-pressured   Thought Process: goal directed   Thought Content Denies SI/HI/AH/VH   Suicidal ideations none   Mood:  good   Affect:  Fair range   Memory   adequate   Concentration:  adequate   Insight:  limited   Judgment:  fair       MEDICATIONS:  Current Facility-Administered Medications   Medication Dose Route Frequency    melatonin tablet 3 mg  3 mg Oral QHS    busPIRone (BUSPAR) tablet 10 mg  10 mg Oral TID    levothyroxine (SYNTHROID) tablet 112 mcg  112 mcg Oral ACB    midodrine (PROAMATINE) tablet 10 mg  10 mg Oral TID WITH MEALS    mirtazapine (REMERON) tablet 45 mg  45 mg Oral QHS    OLANZapine (ZyPREXA) tablet 20 mg  20 mg Oral QHS    venlafaxine-SR (EFFEXOR-XR) capsule 300 mg  300 mg Oral DAILY    omega-3 acid ethyl esters (LOVAZA) capsule 1,000 mg  1 g Oral DAILY    OLANZapine (ZyPREXA) tablet 5 mg  5 mg Oral Q6H PRN    haloperidol lactate (HALDOL) injection 5 mg  5 mg IntraMUSCular Q6H PRN    benztropine (COGENTIN) tablet 1 mg  1 mg Oral BID PRN    diphenhydrAMINE (BENADRYL) injection 50 mg  50 mg IntraMUSCular BID PRN    hydrOXYzine HCL (ATARAX) tablet 50 mg  50 mg Oral TID PRN    traZODone (DESYREL) tablet 50 mg  50 mg Oral QHS PRN    acetaminophen (TYLENOL) tablet 650 mg  650 mg Oral Q4H PRN    magnesium hydroxide (MILK OF MAGNESIA) 400 mg/5 mL oral suspension 30 mL  30 mL Oral DAILY PRN      DISCUSSION:   the risks and benefits of the proposed medication  patient given opportunity to ask questions    Lab/Data Review: All lab results for the last 24 hours reviewed. No results found for this or any previous visit (from the past 24 hour(s)).         Assessment:     Principal Problem:    Bipolar 1 disorder ([facility] [address] (1/27/2023)      Plan:     Continue current care  Collateral information  Medication modification as appropriate  Continue ECT # 9  Tusdeay or Wednesday next week  NPO the morning of. Disposition planning with social work    I certify that this patient's inpatient psychiatric hospital services furnished since the previous certification were, and continue to be, required for treatment that could reasonably be expected to improve the patient's condition, or for diagnostic study, and that the patient continues to need, on a daily basis, active treatment furnished directly by or requiring the supervision of inpatient psychiatric facility personnel. In addition, the hospital records show that services furnished were intensive treatment services, admission or related services, or equivalent services.   Signed By: Kyle Connelly NP     February 19, 2023

## 2023-02-19 NOTE — PROGRESS NOTES
Patient observed resting in bed during transition of care. Patient oriented X4, calm and cooperative. No obvious signs of medical or psychiatric distress observed. Patient reported no new-onset concerns. Patient denied any symptoms of anxiety, however rated symptoms of depression 8/10. Patient denied hallucinations and did not endorse S/I or HI. Patient was given HS medications along with PRN Atarax. Patient observed resting in bed throughout the night. Patient slept for 7 hours.      Problem: Suicide  Goal: *STG: Remains safe in hospital  Outcome: Progressing Towards Goal  Goal: *STG: Seeks staff when feelings of self harm or harm towards others arise  Outcome: Progressing Towards Goal  Goal: *STG/LTG: Complies with medication therapy  Outcome: Progressing Towards Goal

## 2023-02-19 NOTE — PROGRESS NOTES
Pt met with this writer in her bedroom. Pt was laying in bed with eyes closed and covers over her face. Pt pleasant and cooperative on engagement. Pt denies SI/HI, endorses AH but cannot describe them, states \"just noise\". Reports 8/10 anxiety and depression. Pt given PRN Zyprexa with morning medications for psychotic symptoms (AH). Pt compliant with medications. Pt presents with somewhat dull affect but smiles appropriately. Pt A&Ox4, VSS. Will continue to monitor.      Problem: Suicide  Goal: *STG: Remains safe in hospital  Outcome: Progressing Towards Goal  Goal: *STG/LTG: Complies with medication therapy  Outcome: Progressing Towards Goal

## 2023-02-20 PROCEDURE — 74011250637 HC RX REV CODE- 250/637: Performed by: PSYCHIATRY & NEUROLOGY

## 2023-02-20 PROCEDURE — 65220000003 HC RM SEMIPRIVATE PSYCH

## 2023-02-20 RX ADMIN — LEVOTHYROXINE SODIUM 112 MCG: 112 TABLET ORAL at 06:47

## 2023-02-20 RX ADMIN — BUSPIRONE HYDROCHLORIDE 10 MG: 10 TABLET ORAL at 11:45

## 2023-02-20 RX ADMIN — VENLAFAXINE HYDROCHLORIDE 300 MG: 75 CAPSULE, EXTENDED RELEASE ORAL at 08:42

## 2023-02-20 RX ADMIN — MIRTAZAPINE 45 MG: 15 TABLET, FILM COATED ORAL at 22:04

## 2023-02-20 RX ADMIN — Medication 3 MG: at 22:04

## 2023-02-20 RX ADMIN — OMEGA-3-ACID ETHYL ESTERS 1000 MG: 1 CAPSULE, LIQUID FILLED ORAL at 08:42

## 2023-02-20 RX ADMIN — BUSPIRONE HYDROCHLORIDE 10 MG: 10 TABLET ORAL at 08:42

## 2023-02-20 RX ADMIN — MIDODRINE HYDROCHLORIDE 10 MG: 5 TABLET ORAL at 17:42

## 2023-02-20 RX ADMIN — BUSPIRONE HYDROCHLORIDE 10 MG: 10 TABLET ORAL at 17:42

## 2023-02-20 RX ADMIN — OLANZAPINE 20 MG: 10 TABLET, FILM COATED ORAL at 22:04

## 2023-02-20 NOTE — PROGRESS NOTES
Pt currently denies SI/HI/AVH and pain. No apparent distress. Pt endorses depression and anxiety. Med compliant. Alert and oriented. Mood is euthymic with dull affect. Pt ambulates independently. Pt isolative to room, refused breakfast and lunch. No behavioral issues observed. Q15 minute safety rounds continued by staff. Problem: Depressed Mood (Adult/Pediatric)  Goal: *STG: Complies with medication therapy  Outcome: Progressing Towards Goal     Problem: Falls - Risk of  Goal: *Absence of Falls  Description: Document Samanta Fall Risk and appropriate interventions in the flowsheet.   Outcome: Progressing Towards Goal  Note: Fall Risk Interventions:       Medication Interventions: Teach patient to arise slowly

## 2023-02-20 NOTE — BH NOTES
Psychiatric Progress Note    Patient: Digna Garcia MRN: [id number]  SSN: [id number]    YOB: 1960  Age: 57 y.o. Sex: female      Admit Date: 1/26/2023    LOS: 25 days     Subjective:     Digna Garcia  reports feeling anxious (10/10) and moods are depressed (10/10). Denies SI/HI/AH/VH. No aggression or violence. Appropriately interactive and aware. Tolerating medications well. Eating well and sleeping well. 1/29 - Digna Garcia reports feeling very depressed awaiting ECT. Still having visual hallucinations that come and go. Denies SI/HI/AH/VH. No aggression or violence. Appropriately interactive and aware. Tolerating medications (atarax and trazodone prns) well. Eating and sleeping fairly. 1/30 - Digna Garcia reports feeling terrible and did not recall getting ECT this morning. When told that she had it done, she stated it was not enough. Moods are severely depressed. Denies SI/HI/AH/VH. There was concern that she did not know what she was in the hospital for or anything about the procedure. TDO and court ordered ECT recommended. No aggression or violence. Appropriately interactive and aware. Tolerating medications well. Eating and sleeping fairly. 1/31 - Digna Garcia reports not feeling well and moods are depressed. Per staff tends to ask for Injectable Ativan frequently then gets upset when she does not receive it. Passive SI per staff. Denies HI/AH/VH. No aggression or violence. Tends to isolate to room. Interactive and aware. Tolerating medications well. Eating and sleeping fairly (5 hrs). 2/1 - Digna Garcia reports not feeling good again this morning but presents less intense, more calm and is more conversant today post ECT #2. Moods are depressed. Anxiety 10/10. Denies HI/AH/VH. Was afraid of her thoughts about being dead and did not discuss them yesterday. No aggression or violence. Appropriately interactive and aware.  Tolerating medications well. Eating and sleeping fairly (6.45 hrs). 2/2 - Gloria Kumar reports feeling tired today and moods are depressed with anxiety(10/10). Passive SI. Denies HI/AH/VH. No aggression or violence. Appropriately interactive and aware. Tolerating medications (trazodone and atarax prns). Eating and sleeping fairly. 2/3 - Gloria Kumar reports feeling ok this morning and even had a smile. Moods are improved. Hearing faint voices in background that do not bother her. Blood pressure was low with mild dizziness on standing. Denies SI/HI/VH. No aggression or violence. Appropriately interactive and aware. Tolerating medications well. Eating and sleeping fairly. Goes for ECT # 3 today. 02/04 - no acute overnight events. The patient slept 8 hours overnight and got Trazodone with good effect. She reports ongoing anxiety and depressed mood but denies active thoughts of self harm. Patient is eeager for her next ECT session which is scheduled for early next week. She is able to make her needs known and medication compliant. 02/05 - the patient reports ongoing AH+ and is isolative to her room. She denies active thoughts of self harm. Patient withdrawn and isolative and slept 7.5 hours overnight. She is eager for her next ECT session which is scheduled for Tuesday. Patient able to make her needs known though she remains dysphoric.    02/06 - Gloria Kumar reports feeling \"alright: today with smile on her face. Moods are good. Weekend went well. Denies SI/HI/AH/VH. No aggression or violence. Appropriately interactive and aware. Tolerating medications well. Eating and sleeping fairly. Tolerating ECT well. 02/07  - Gloria Kumar reports feeling pretty good and moods are improving with ECT #4. Pleasant and smiling. Denies SI/HI/AH/VH. No aggression or violence. Appropriately interactive and aware. Tolerating medications well. Atarax prn given last night.   Eating and sleeping fairly. 02/08 - Murlean Aurora reports feeling better than on admission with a smile and Moods that are 8/10. Completed ECT #5 today. Denies SI/HI/AH/VH. No aggression or violence. Appropriately interactive and aware. Tolerating medications well. Eating and sleeping fairly. 02/09 - Murlean Aurora reports feeling alright and moods are about a 5-7/10. Anxiety is about 6-8/10. Denies SI/HI/AH/VH. No aggression or violence. Appropriately interactive and aware. Tolerating medications well. Eating and sleeping fairly. 02/10 - Murlean Aurora reports feeling well with a slight headache today post ECT # 6. Bright smile and moods are pretty good. Denies SI/HI/AH/VH. No aggression or violence. Appropriately interactive and aware. Tolerating medications well. Eating and sleeping fairly. [facility] feeling well and moods are good. Denies SI/HI/AH/VH. No aggression or violence. Appropriately interactive and aware. Tolerating medications well. Eating and sleeping fairly. Two more ECT's before discharge    02/12 - [facility] reports feeling better today and continues to angel a bright smile. Moods are good. Denies SI/HI/AH/VH. No aggression or violence. Appropriately interactive and aware. Tolerating medications well. Eating and sleeping fairly 8 hrs.    02/13 - [facility] reports feeling alright post ECT # 7. Memory is intact and no headache. Smiling and interacting on the unit. Moods are still depressed but less and anxiety is just a little. Denies SI/HI/AH/VH. No aggression or violence. Appropriately interactive and aware. Tolerating medications well. Eating and sleeping fairly. 02/14 - [facility] reports feeling well and moods are good. Denies SI/HI/AH/VH. No aggression or violence. Appropriately interactive and aware. Tolerating medications well. Eating and sleeping fairly. 02/15 - [facility] reports doing well post ECT #8.   Moods are depressed 7/10 but smiles brightly and appears to be less depressed. Denies SI/HI/AH/VH. No aggression or violence. Appropriately interactive and aware. Tolerating medications well. Eating and sleeping fairly. She feels that she can take care of herself at home. 02/16 - Raymundo Jones reports feeling well and moods are depressed 7/10. Smiles. Poor ADL's per staff. Denies SI/HI/AH/VH. No aggression or violence. Appropriately interactive and aware. Tolerating medications well. Eating and sleeping fairly. Candidate for Maintenance ECT but does not have the support for transport. 02/17 - Raymundo Jones reports poor sleep last evening and feeling down. Notes having thoughts of suicide this morning but she does not know why. Moods are depressed. Denies SI/HI/AH/VH. No aggression or violence. Appropriately interactive and aware. Tolerating medications well. Eating and sleeping a broken 8hrs. 2/18- Ricco Cancer reports that she continues with depression but denies suicidal ideations today. > She rate depression at 10/10 though affect is congruent as she is smiling and verbalizing that she feels ECT is beneficial.  She has been in the day room, eating her lunch and engaging in the milieu with others. Denies side effects from medications. 2/19- Ricco Cancer reports that he is doing okay today. She continue to endorse depression and anxiety and described having some AH this am.  When asked to elaborate she state that she cannot make out the sound. Nursing staff report that she verbalized the same to them  Denies current AVH, denies SI, HI . She has been compliant with medications, eating and sleeping well. 2/20 - Raymundo Jones reports feeling \"fine\" and moods are alright. Isolative. Denies SI/HI/AH/VH. No aggression or violence. Appropriately interactive and aware. Tolerating medications well. Eating and sleeping fairly.     Objective:     Vitals:    02/19/23 0808 02/19/23 2000 02/20/23 0730 02/20/23 1155   BP: 107/76 103/89 121/79 111/70   Pulse: 84 86 79 65   Resp: 18 16 17    Temp: 98.2 °F (36.8 °C) 98.4 °F (36.9 °C) 97.9 °F (36.6 °C)    SpO2: 100% 96% 97%    Weight:       Height:            Mental Status Exam:   Sensorium  oriented to time, place and person   Relations cooperative   Eye Contact appropriate   Appearance:  age appropriate   Speech:  non-pressured   Thought Process: goal directed   Thought Content Denies SI/HI/AH/VH   Suicidal ideations none   Mood:  good   Affect:  Fair range   Memory   adequate   Concentration:  adequate   Insight:  limited   Judgment:  fair       MEDICATIONS:  Current Facility-Administered Medications   Medication Dose Route Frequency    melatonin tablet 3 mg  3 mg Oral QHS    busPIRone (BUSPAR) tablet 10 mg  10 mg Oral TID    levothyroxine (SYNTHROID) tablet 112 mcg  112 mcg Oral ACB    midodrine (PROAMATINE) tablet 10 mg  10 mg Oral TID WITH MEALS    mirtazapine (REMERON) tablet 45 mg  45 mg Oral QHS    OLANZapine (ZyPREXA) tablet 20 mg  20 mg Oral QHS    venlafaxine-SR (EFFEXOR-XR) capsule 300 mg  300 mg Oral DAILY    omega-3 acid ethyl esters (LOVAZA) capsule 1,000 mg  1 g Oral DAILY    OLANZapine (ZyPREXA) tablet 5 mg  5 mg Oral Q6H PRN    haloperidol lactate (HALDOL) injection 5 mg  5 mg IntraMUSCular Q6H PRN    benztropine (COGENTIN) tablet 1 mg  1 mg Oral BID PRN    diphenhydrAMINE (BENADRYL) injection 50 mg  50 mg IntraMUSCular BID PRN    hydrOXYzine HCL (ATARAX) tablet 50 mg  50 mg Oral TID PRN    traZODone (DESYREL) tablet 50 mg  50 mg Oral QHS PRN    acetaminophen (TYLENOL) tablet 650 mg  650 mg Oral Q4H PRN    magnesium hydroxide (MILK OF MAGNESIA) 400 mg/5 mL oral suspension 30 mL  30 mL Oral DAILY PRN      DISCUSSION:   the risks and benefits of the proposed medication  patient given opportunity to ask questions    Lab/Data Review: All lab results for the last 24 hours reviewed.      No results found for this or any previous visit (from the past 24 hour(s)). Assessment:     Principal Problem:    Bipolar 1 disorder ([facility] [address] (1/27/2023)      Plan:     Continue current care  Collateral information  Medication modification as appropriate  Continue ECT # 9  Tomorrow and or Wednesday  NPO the morning of. Disposition planning with social work    I certify that this patient's inpatient psychiatric hospital services furnished since the previous certification were, and continue to be, required for treatment that could reasonably be expected to improve the patient's condition, or for diagnostic study, and that the patient continues to need, on a daily basis, active treatment furnished directly by or requiring the supervision of inpatient psychiatric facility personnel. In addition, the hospital records show that services furnished were intensive treatment services, admission or related services, or equivalent services.   Signed By: Brandyn Sarabia MD     February 20, 2023

## 2023-02-20 NOTE — BH NOTES
Behavioral Health Interdisciplinary Rounds     Patient Name: Sampson Underwood  Age: 57 y.o. Room/Bed:  320/01  Primary Diagnosis: Bipolar 1 disorder ([facility] [address]     Progress note: Treatment team met with the Pt. Pt denies SI, HI, AHVH, and self harming behaviors. Pt is alert and oriented and presents with dull affect and euthymic mood. Pt is isolative to her room and per nursing the Pt did refused to eat breakfast or lunch. Pt met with her nephew, Alvin Moura today and discussed the Pt disposition plan. Pt will receive her final inpatient ECT therapy on 2/21/23 and will start receiving outpatient ECT therapy on 2/27/23. Pt will discharge home on 2/22/23 at 12:00pm and the Pt nephew will pick the Pt up. SW explained to the Pt nephew why the Pt needs a LTSS screening in order for medicaid to pay for the Pt personal care assistance when she is at home. DEV contacted the Pt [place] , Jessica Angeles, and updated her on the Pt progress and informed her of the Pt disposition plan. DEV requested a return phone call to schedule a follow up psychiatrist appointment.  SW contacted [facility] and left a voicemail advising her on when the Pt is discharging from the hospital.     LOS:  25  Expected LOS: 27    Financial concerns/prescription coverage:  [facility] [address] [place] [address]   Family contact: Vinetta Bloch [phone]   Family requesting physician contact today:  No  Discharge plan: Return home  Access to weapons: No                                                Outpatient provider(s): Rene Crenshaw [facility]), [phone], VANDANA (Personal Care)  Patient's preferred phone number for follow up call: [phone]      Participating treatment team members: Janene Jack, Demetrius Neri MD

## 2023-02-20 NOTE — PROGRESS NOTES
Patient observed resting in bed during initial assessment. Patient calm, cooperative, A/O to self, place and situation. Patient reported no immediate concerns and displayed no obvious signs of medical or psychiatric distress. Patient denied hallucinations, S/I or H/I. Patient reported symptoms of anxiety and depression 8/10. Patient took all prescribed HS medications along with PRN Atarax. Patient took HS shower and changed into clean clothes. Patient observed resting in bed throughout the night with no concerns. Patient slept for 8 hours.      Problem: Suicide  Goal: *STG: Remains safe in hospital  Outcome: Progressing Towards Goal  Goal: *STG: Seeks staff when feelings of self harm or harm towards others arise  Outcome: Progressing Towards Goal  Goal: *STG/LTG: Complies with medication therapy  Outcome: Progressing Towards Goal

## 2023-02-20 NOTE — PERIOP NOTES
Spoke with Levi Ivey, to advise patient scheduled for ECT procedure 2/21/2023, NPO after midnight, arrive PACU 0630.

## 2023-02-20 NOTE — GROUP NOTE
[facility] GROUP DOCUMENTATION INDIVIDUAL                                                                          Group Therapy Note    Date: 2/20/2023    Group Start Time: 0900  Group End Time: 1000  Group Topic: Topic Group    [facility] ACUTE BEHAV TH    Jourdan Lombardo 1157 GROUP    Group Therapy Note    Attendees: 8       Attendance: Did not attend      Saulo Nicole

## 2023-02-21 ENCOUNTER — APPOINTMENT (OUTPATIENT)
Dept: VASCULAR SURGERY | Age: 63
DRG: 751 | End: 2023-02-21
Attending: STUDENT IN AN ORGANIZED HEALTH CARE EDUCATION/TRAINING PROGRAM
Payer: MEDICAID

## 2023-02-21 ENCOUNTER — ANESTHESIA (OUTPATIENT)
Dept: SURGERY | Age: 63
DRG: 751 | End: 2023-02-21
Payer: MEDICAID

## 2023-02-21 PROCEDURE — 65220000003 HC RM SEMIPRIVATE PSYCH

## 2023-02-21 PROCEDURE — 74780000002 HC ELECTROSHOCK THERAP RECOVERY: Performed by: PSYCHIATRY & NEUROLOGY

## 2023-02-21 PROCEDURE — 74011250637 HC RX REV CODE- 250/637: Performed by: PSYCHIATRY & NEUROLOGY

## 2023-02-21 PROCEDURE — 74011250637 HC RX REV CODE- 250/637: Performed by: STUDENT IN AN ORGANIZED HEALTH CARE EDUCATION/TRAINING PROGRAM

## 2023-02-21 PROCEDURE — 90870 ELECTROCONVULSIVE THERAPY: CPT | Performed by: PSYCHIATRY & NEUROLOGY

## 2023-02-21 PROCEDURE — 74011250636 HC RX REV CODE- 250/636: Performed by: ANESTHESIOLOGY

## 2023-02-21 PROCEDURE — 2709999900 HC NON-CHARGEABLE SUPPLY: Performed by: PSYCHIATRY & NEUROLOGY

## 2023-02-21 PROCEDURE — 93970 EXTREMITY STUDY: CPT

## 2023-02-21 PROCEDURE — 74011000250 HC RX REV CODE- 250: Performed by: ANESTHESIOLOGY

## 2023-02-21 PROCEDURE — GZB2ZZZ ELECTROCONVULSIVE THERAPY, BILATERAL-SINGLE SEIZURE: ICD-10-PCS | Performed by: PSYCHIATRY & NEUROLOGY

## 2023-02-21 RX ORDER — SODIUM CHLORIDE, SODIUM LACTATE, POTASSIUM CHLORIDE, CALCIUM CHLORIDE 600; 310; 30; 20 MG/100ML; MG/100ML; MG/100ML; MG/100ML
50 INJECTION, SOLUTION INTRAVENOUS CONTINUOUS
Status: DISCONTINUED | OUTPATIENT
Start: 2023-02-21 | End: 2023-02-21 | Stop reason: HOSPADM

## 2023-02-21 RX ORDER — SUCCINYLCHOLINE CHLORIDE 20 MG/ML
INJECTION INTRAMUSCULAR; INTRAVENOUS AS NEEDED
Status: DISCONTINUED | OUTPATIENT
Start: 2023-02-21 | End: 2023-02-21 | Stop reason: HOSPADM

## 2023-02-21 RX ORDER — METHOHEXITAL IN WATER/PF 100MG/10ML
SYRINGE (ML) INTRAVENOUS AS NEEDED
Status: DISCONTINUED | OUTPATIENT
Start: 2023-02-21 | End: 2023-02-21 | Stop reason: HOSPADM

## 2023-02-21 RX ORDER — SODIUM CHLORIDE 0.9 % (FLUSH) 0.9 %
5-40 SYRINGE (ML) INJECTION AS NEEDED
Status: DISCONTINUED | OUTPATIENT
Start: 2023-02-21 | End: 2023-02-21 | Stop reason: HOSPADM

## 2023-02-21 RX ORDER — SODIUM CHLORIDE 9 MG/ML
50 INJECTION, SOLUTION INTRAVENOUS CONTINUOUS
Status: DISCONTINUED | OUTPATIENT
Start: 2023-02-21 | End: 2023-02-21 | Stop reason: HOSPADM

## 2023-02-21 RX ORDER — SODIUM CHLORIDE 0.9 % (FLUSH) 0.9 %
5-40 SYRINGE (ML) INJECTION EVERY 8 HOURS
Status: DISCONTINUED | OUTPATIENT
Start: 2023-02-21 | End: 2023-02-21 | Stop reason: HOSPADM

## 2023-02-21 RX ADMIN — LEVOTHYROXINE SODIUM 112 MCG: 112 TABLET ORAL at 09:48

## 2023-02-21 RX ADMIN — MIRTAZAPINE 45 MG: 15 TABLET, FILM COATED ORAL at 21:43

## 2023-02-21 RX ADMIN — OMEGA-3-ACID ETHYL ESTERS 1000 MG: 1 CAPSULE, LIQUID FILLED ORAL at 09:47

## 2023-02-21 RX ADMIN — BUSPIRONE HYDROCHLORIDE 10 MG: 10 TABLET ORAL at 09:48

## 2023-02-21 RX ADMIN — OLANZAPINE 20 MG: 10 TABLET, FILM COATED ORAL at 21:43

## 2023-02-21 RX ADMIN — VENLAFAXINE HYDROCHLORIDE 300 MG: 75 CAPSULE, EXTENDED RELEASE ORAL at 09:48

## 2023-02-21 RX ADMIN — BUSPIRONE HYDROCHLORIDE 10 MG: 10 TABLET ORAL at 17:24

## 2023-02-21 RX ADMIN — Medication 60 MG: at 08:43

## 2023-02-21 RX ADMIN — MIDODRINE HYDROCHLORIDE 10 MG: 5 TABLET ORAL at 12:25

## 2023-02-21 RX ADMIN — APIXABAN 5 MG: 5 TABLET, FILM COATED ORAL at 17:24

## 2023-02-21 RX ADMIN — Medication 3 MG: at 21:43

## 2023-02-21 RX ADMIN — SUCCINYLCHOLINE CHLORIDE 50 MG: 20 INJECTION, SOLUTION INTRAMUSCULAR; INTRAVENOUS at 08:43

## 2023-02-21 RX ADMIN — MIDODRINE HYDROCHLORIDE 10 MG: 5 TABLET ORAL at 17:24

## 2023-02-21 NOTE — ANESTHESIA POSTPROCEDURE EVALUATION
Procedure(s):  ELECTRO CONVULSIVE THERAPY. general    Anesthesia Post Evaluation      Multimodal analgesia: multimodal analgesia not used between 6 hours prior to anesthesia start to PACU discharge  Patient location during evaluation: PACU  Patient participation: waiting for patient participation  Level of consciousness: awake  Pain management: adequate  Airway patency: patent  Anesthetic complications: no  Cardiovascular status: acceptable  Respiratory status: acceptable  Hydration status: acceptable  Post anesthesia nausea and vomiting:  none  Final Post Anesthesia Temperature Assessment:  Normothermia (36.0-37.5 degrees C)      INITIAL Post-op Vital signs:   Vitals Value Taken Time   /69 02/21/23 0905   Temp 36.8 °C (98.3 °F) 02/21/23 0855   Pulse 90 02/21/23 0910   Resp 17 02/21/23 0910   SpO2 100 % 02/21/23 0910   Vitals shown include unvalidated device data.

## 2023-02-21 NOTE — PERIOP NOTES
Report given to nurse Sarai Gonzales  on patient Mila Page. Updated on pt neuro status, recent vitals, intake ,and swollen tender area assessed on Left medial AC area. RN confirmed will follow upo.

## 2023-02-21 NOTE — PROCEDURES
ECT PROCEDURE NOTE      IDENTIFICATION:    Patient Name  Jacqueline Mancilla   Date of Birth 1960   [facility] [id number]   Medical Record Number  [id number]      Age  57 y.o. PCP Mayuri Montalvo MD   Admit date:  1/26/2023    Room Number  PACU/PL  @ [facility]   Date of Service  2/21/2023            Electro Convulsive Therapy:  Treatment number 9       Maintenance ECT NO   Jacqueline Mancilla was admitted to the PACU for ECT. Patient was medically cleared and NPO for procedure. Patient is NOTcourt mandated and gave informed consent for ECT treatment. Patient received     Bilateral ECT YES   Administered using the [address] [place] [address] Drive. at 40 % Energy, under general anesthesia. Jacqueline Mancilla with a good, well generalized seizure of 49 seconds on observation of the motor manifestations of the seizure. EEG duration was NA secs. Post-Ictal Suppression Index: NA %  Recovery was unremarkable and with no complications. Change in Energy %:        Anesthesia medications administered during procedure:  Brevital:  60 mg  Change for next treament:     Succinylcholine: 50 mg  Change for next treatment:      Propofol: mg  Glycopyrrolate:  mg  Labetalol:  mg  Esmolol:  mg  Lorazepam:  mg  Ketamine:  mg  Zofran:   mg    Toradol:  mg  Lidocaine:  mg  Caffeine Benzoate: mg       CSSRS 1/26/2023 7/8/2022   1) Within the past month, have you wished you were dead or wished you could go to sleep and not wake up? No No   2) Have you actually had any thoughts of killing yourself? No No   6) Have you ever done anything, started to do anything, or prepared to do anything to end your life? Yes No   Did this occur within the past 3 months?  Yes -       SIGNED:    Gary Mac MD  2/21/2023

## 2023-02-21 NOTE — PROGRESS NOTES
Problem: Suicide  Goal: *STG: Remains safe in hospital  Outcome: Progressing Towards Goal    Shift change report given to Desirae OBREGON (oncoming nurse) by Randa Ospina (offgoing nurse). Report included the following information SBAR, Kardex, MAR, and Recent Results. Assumed care of patient. Patient arrived back from ECT. Patient calm and cooperative with assessment. Patient presented with a flat affect. Patient c/o pain in left arm. Patient states that she has a blood clot in her arm since Friday. Patient reports having a history of blood clots in the arm, states that she takes Eliquis. Patient given cool packs. MD notified. Patient endorsed anxiety 9/10 and depression 8/10. Denied SI, HI, and AVH. Medication and meal compliant. Patient to get duplex of upper extremities.

## 2023-02-21 NOTE — CONSULTS
Hospitalist Consultation Note    NAME:  Jacqueline Mancilla   :   1960   MRN:   [id number]   Room Number: 619/80  @ [facility]     ATTENDING: Paulina Maharaj MD  PCP:  Mayuri Montalvo MD    Date/Time:  2023 1:50 PM    Please note that this dictation was completed with IQcard, the computer voice recognition software. Quite often unanticipated grammatical, syntax, homophones, and other interpretive errors are inadvertently transcribed by the computer software. Please disregard these errors. Please excuse any errors that have escaped final proofreading. Recommendations/Plan:       Principal Problem:    Bipolar 1 disorder ([facility] [address] (2023)           #History of DVT  -Duplex 2022 with acute nonocclusive thrombus in left great saphenous  -Patient was previously on Eliquis 5 mg twice daily    -Resume Eliquis 5 mg twice daily for DVT treatment  -Duplex upper extremity to rule out DVT  -Follow-up with primary for the management of DVT      #Bipolar disorder  -Per primary team          Subjective:   REQUESTING PHYSICIAN:  REASON FOR CONSULT:      Lanie Bean is a 57 y.o.   female who I was asked to see for history of DVT. Per chart review patient has a duplex on 2020 with acute nonocclusive thrombus in left great saphenous vein. Patient was treated with 10 mg of Eliquis for 7 days and then 5 mg twice daily. Patient will be resumed on her Eliquis for DVT treatment dose and follow-up with PCP further management.   Patient states she is feeling some knots in her left upper extremity and wanted to make sure there is no clot in there            Past Medical History:   Diagnosis Date    Asthma 1967    hospitalized for asthma attack x 2    Bipolar disorder ([facility] [address]     Chronic kidney disease     kidney stones x 2-3 times    Ill-defined condition     nasal blockage from growth and misalignment - cannot breath out of nose - surgery in the future/cleared for colonoscopy 2014 - will bring in letter    Major depressive disorder     Other ill-defined conditions(799.89)     blood in stool    Other ill-defined conditions(799.89)     hx low BP - 90's/60's-70's    Psychiatric disorder     PTSD (post-traumatic stress disorder)     PUD (peptic ulcer disease)     Schizoaffective disorder (HCC)     Thyroid disease     Unspecified adverse effect of anesthesia     nasal growth and misalignment and cannot breath through nose      Past Surgical History:   Procedure Laterality Date    HX HEENT      T & A    HX HEENT      turbinate surgery     Social History     Tobacco Use    Smoking status: Never    Smokeless tobacco: Never   Substance Use Topics    Alcohol use: No      Family History   Problem Relation Age of Onset    Stroke Mother     Other Mother         erosion of esophagus    Cancer Mother         melanoma/squamous    Heart Surgery Father         x2    Delayed Awakening Father     Pacemaker Father     Other Father         carotid endartarectomy/polio    Cataract Father        Allergies   Allergen Reactions    Other Food Other (comments)     \"Bad chest pain\" with walnuts. Coke - asthma/stuffy head. Fish sticks causes an asthma attack. Astepro [Azelastine] Other (comments)     Violent headaches. Bactrim [Sulfamethoprim] Other (comments)     Difficulty breathing, chills, fever. Banana Other (comments)     Diffculty breathing, wheezing, asthma attack. Coconut Other (comments)     Difficulty breathing, asthma attack, SOB. Cortisone Hives     Difficulty breathing. Nut - Unspecified Other (comments)     \"Bad chest pain\" with walnuts    Peanut Other (comments)     Wheezing, asthma attack, SOB. Prednisone Hives     Difficulty breathing, kidney stones. Prior to Admission medications    Medication Sig Start Date End Date Taking? Authorizing Provider   omega 3-dha-epa-fish oil (Fish OiL) 100-160-1,000 mg cap Take 1 Capsule by mouth daily.    Yes Provider, Historical   levothyroxine (SYNTHROID) 112 mcg tablet Take 112 mcg by mouth Daily (before breakfast). Yes Provider, Historical   mirtazapine (REMERON) 45 mg tablet Take 45 mg by mouth nightly. Yes Provider, Historical   Venlafaxine-ER 24 HR (EFFEXOR-ER) 150 mg tr24 tablet Take 300 mg by mouth daily. Yes Provider, Historical   midodrine (PROAMATINE) 10 mg tablet Take 10 mg by mouth three (3) times daily (with meals). Yes Provider, Historical   OLANZapine (ZyPREXA) 20 mg tablet Take 20 mg by mouth nightly. Yes Provider, Historical   busPIRone (BUSPAR) 10 mg tablet Take 10 mg by mouth three (3) times daily. Yes Provider, Historical       REVIEW OF SYSTEMS:     Total of 12 systems reviewed as follows:   I am not able to complete the review of systems because:    The patient is intubated and sedated    The patient has altered mental status due to his acute medical problems    The patient has baseline aphasia from prior stroke(s)    The patient has baseline dementia and is not reliable historian                 POSITIVE= underlined text  Negative = text not underlined  General:  fever, chills, sweats, generalized weakness, weight loss/gain,      loss of appetite   Eyes:    blurred vision, eye pain, loss of vision, double vision  ENT:    rhinorrhea, pharyngitis   Respiratory:   cough, sputum production, SOB, wheezing, FRANK, pleuritic pain   Cardiology:   chest pain, palpitations, orthopnea, PND, edema, syncope   Gastrointestinal:  abdominal pain , N/V, dysphagia, diarrhea, constipation, bleeding   Genitourinary:  frequency, urgency, dysuria, hematuria, incontinence   Muskuloskeletal :  arthralgia, myalgia   Hematology:  easy bruising, nose or gum bleeding, lymphadenopathy   Dermatological: rash, ulceration, pruritis   Endocrine:   hot flashes or polydipsia   Neurological:  headache, dizziness, confusion, focal weakness, paresthesia,     Speech difficulties, memory loss, gait disturbance  Psychological: Feelings of anxiety, depression, agitation    Objective:   VITALS:    Visit Vitals  /63   Pulse 89   Temp 97.9 °F (36.6 °C)   Resp 18   Ht 5' 2\" (1.575 m)   Wt 67.1 kg (148 lb)   SpO2 94%   Breastfeeding No   BMI 27.07 kg/m²     Temp (24hrs), Av.2 °F (36.8 °C), Min:97.9 °F (36.6 °C), Max:98.3 °F (36.8 °C)      PHYSICAL EXAM:   General:    Alert, cooperative, no distress, appears stated age. HEENT: Atraumatic, anicteric sclerae, pink conjunctivae     No oral ulcers, mucosa moist, throat clear  Neck:  Supple, symmetrical,  thyroid: non tender  Lungs:   Clear to auscultation bilaterally. No Wheezing or Rhonchi. No rales. Chest wall:  No tenderness  No Accessory muscle use. Heart:   Regular  rhythm,  No  murmur   No edema  Abdomen:   Soft, non-tender. Not distended. Bowel sounds normal  Extremities: No cyanosis. No clubbing  Skin:     Not pale. Not Jaundiced  No rashes   Psych:  . Not anxious or agitated. Neurologic: EOMs intact. No facial asymmetry. No aphasia or slurred speech. Symmetrical strength, Alert and oriented X 4.     _______________________________________________________________________  Care Plan discussed with:    Comments   Patient x    Family      RN x    Care Manager                    Consultant:      ____________________________________________________________________  TOTAL TIME:     25 mins    Comments    x Reviewed previous records   >50% of visit spent in counseling and coordination of care x Discussion with patient and/or family and questions answered       Critical Care Provided     Minutes non procedure based  ________________________________________________________________________  Signed: Romel Ruiz MD      Procedures: see electronic medical records for all procedures/Xrays and details which were not copied into this note but were reviewed prior to creation of Plan.     LAB DATA REVIEWED:    No results found for this or any previous visit (from the past 24 parisa(s)).     _____________________________  Hospitalist: Isabel Mera MD

## 2023-02-21 NOTE — PERIOP NOTES
Report received from [facility] Behavioral health unit on Rosemarie Prater. NPO status confirmed. No acute issues and  vitals WNL.

## 2023-02-21 NOTE — GROUP NOTE
[facility]  GROUP DOCUMENTATION INDIVIDUAL                                                                          Group Therapy Note    Date: 2/21/2023    Group Start Time: 1500  Group End Time: 1600  Group Topic: Recreational/Music Therapy    [facility] [place] [address] ACUTE BEHAV [facility]    [place] [address] [facility] GROUP DOCUMENTATION GROUP    Group Therapy Note    Attendees: 9       Attendance: Attended    Patient's Goal:  To concentrate on selected task    Interventions/techniques: Supported-crafts,games,music    Interactions: Interacted appropriately    Mental Status: Calm-pleasant    Behavior/appearance: Cooperative and Needed prompting    Goals Achieved: Able to engage in interactions and Able to listen to others-active participant in game      Esthela Golas

## 2023-02-21 NOTE — BH NOTES
Psychiatric Progress Note    Patient: Gloria Kumar MRN: [id number]  SSN: [id number]    YOB: 1960  Age: 57 y.o. Sex: female      Admit Date: 1/26/2023    LOS: 26 days     Subjective:     Gloria Kumar  reports feeling anxious (10/10) and moods are depressed (10/10). Denies SI/HI/AH/VH. No aggression or violence. Appropriately interactive and aware. Tolerating medications well. Eating well and sleeping well. 1/29 - Gloria Kumar reports feeling very depressed awaiting ECT. Still having visual hallucinations that come and go. Denies SI/HI/AH/VH. No aggression or violence. Appropriately interactive and aware. Tolerating medications (atarax and trazodone prns) well. Eating and sleeping fairly. 1/30 - Gloria Kumar reports feeling terrible and did not recall getting ECT this morning. When told that she had it done, she stated it was not enough. Moods are severely depressed. Denies SI/HI/AH/VH. There was concern that she did not know what she was in the hospital for or anything about the procedure. TDO and court ordered ECT recommended. No aggression or violence. Appropriately interactive and aware. Tolerating medications well. Eating and sleeping fairly. 1/31 - Gloria Kumar reports not feeling well and moods are depressed. Per staff tends to ask for Injectable Ativan frequently then gets upset when she does not receive it. Passive SI per staff. Denies HI/AH/VH. No aggression or violence. Tends to isolate to room. Interactive and aware. Tolerating medications well. Eating and sleeping fairly (5 hrs). 2/1 - Gloria Kumar reports not feeling good again this morning but presents less intense, more calm and is more conversant today post ECT #2. Moods are depressed. Anxiety 10/10. Denies HI/AH/VH. Was afraid of her thoughts about being dead and did not discuss them yesterday. No aggression or violence. Appropriately interactive and aware.  Tolerating medications well. Eating and sleeping fairly (6.45 hrs). 2/2 - Valeria Serum reports feeling tired today and moods are depressed with anxiety(10/10). Passive SI. Denies HI/AH/VH. No aggression or violence. Appropriately interactive and aware. Tolerating medications (trazodone and atarax prns). Eating and sleeping fairly. 2/3 - Valeria Serum reports feeling ok this morning and even had a smile. Moods are improved. Hearing faint voices in background that do not bother her. Blood pressure was low with mild dizziness on standing. Denies SI/HI/VH. No aggression or violence. Appropriately interactive and aware. Tolerating medications well. Eating and sleeping fairly. Goes for ECT # 3 today. 02/04 - no acute overnight events. The patient slept 8 hours overnight and got Trazodone with good effect. She reports ongoing anxiety and depressed mood but denies active thoughts of self harm. Patient is eeager for her next ECT session which is scheduled for early next week. She is able to make her needs known and medication compliant. 02/05 - the patient reports ongoing AH+ and is isolative to her room. She denies active thoughts of self harm. Patient withdrawn and isolative and slept 7.5 hours overnight. She is eager for her next ECT session which is scheduled for Tuesday. Patient able to make her needs known though she remains dysphoric.    02/06 - Valeria Serum reports feeling \"alright: today with smile on her face. Moods are good. Weekend went well. Denies SI/HI/AH/VH. No aggression or violence. Appropriately interactive and aware. Tolerating medications well. Eating and sleeping fairly. Tolerating ECT well. 02/07  - Valeria Serum reports feeling pretty good and moods are improving with ECT #4. Pleasant and smiling. Denies SI/HI/AH/VH. No aggression or violence. Appropriately interactive and aware. Tolerating medications well. Atarax prn given last night.   Eating and sleeping fairly. 02/08 - Pilar Harness reports feeling better than on admission with a smile and Moods that are 8/10. Completed ECT #5 today. Denies SI/HI/AH/VH. No aggression or violence. Appropriately interactive and aware. Tolerating medications well. Eating and sleeping fairly. 02/09 - Pilar Harness reports feeling alright and moods are about a 5-7/10. Anxiety is about 6-8/10. Denies SI/HI/AH/VH. No aggression or violence. Appropriately interactive and aware. Tolerating medications well. Eating and sleeping fairly. 02/10 - Pilar Harness reports feeling well with a slight headache today post ECT # 6. Bright smile and moods are pretty good. Denies SI/HI/AH/VH. No aggression or violence. Appropriately interactive and aware. Tolerating medications well. Eating and sleeping fairly. 02/11 - Pilar Harness reports feeling well and moods are good. Denies SI/HI/AH/VH. No aggression or violence. Appropriately interactive and aware. Tolerating medications well. Eating and sleeping fairly. Two more ECT's before discharge    02/12 - Pilar Harness reports feeling better today and continues to angel a bright smile. Moods are good. Denies SI/HI/AH/VH. No aggression or violence. Appropriately interactive and aware. Tolerating medications well. Eating and sleeping fairly 8 hrs.    02/13 - Pilar Harness reports feeling alright post ECT # 7. Memory is intact and no headache. Smiling and interacting on the unit. Moods are still depressed but less and anxiety is just a little. Denies SI/HI/AH/VH. No aggression or violence. Appropriately interactive and aware. Tolerating medications well. Eating and sleeping fairly. 02/14 - Pilar Harness reports feeling well and moods are good. Denies SI/HI/AH/VH. No aggression or violence. Appropriately interactive and aware. Tolerating medications well. Eating and sleeping fairly. 02/15 - Pilar Harness reports doing well post ECT #8.   Moods are depressed 7/10 but smiles brightly and appears to be less depressed. Denies SI/HI/AH/VH. No aggression or violence. Appropriately interactive and aware. Tolerating medications well. Eating and sleeping fairly. She feels that she can take care of herself at home. 02/16 - Valeria Serum reports feeling well and moods are depressed 7/10. Smiles. Poor ADL's per staff. Denies SI/HI/AH/VH. No aggression or violence. Appropriately interactive and aware. Tolerating medications well. Eating and sleeping fairly. Candidate for Maintenance ECT but does not have the support for transport. 02/17 - Valeria Serum reports poor sleep last evening and feeling down. Notes having thoughts of suicide this morning but she does not know why. Moods are depressed. Denies SI/HI/AH/VH. No aggression or violence. Appropriately interactive and aware. Tolerating medications well. Eating and sleeping a broken 8hrs. 2/18- Eula Tay reports that she continues with depression but denies suicidal ideations today. > She rate depression at 10/10 though affect is congruent as she is smiling and verbalizing that she feels ECT is beneficial.  She has been in the day room, eating her lunch and engaging in the milieu with others. Denies side effects from medications. 2/19- Eula Tay reports that he is doing okay today. She continue to endorse depression and anxiety and described having some AH this am.  When asked to elaborate she state that she cannot make out the sound. Nursing staff report that she verbalized the same to them  Denies current AVH, denies SI, HI . She has been compliant with medications, eating and sleeping well. 2/20 - Valeria Serum reports feeling \"fine\" and moods are alright. Isolative. Denies SI/HI/AH/VH. No aggression or violence. Appropriately interactive and aware. Tolerating medications well. Eating and sleeping fairly.     2/21 - Valeria Nam reports feeling fine today except that she is having blood clot formation in her arms again. She normally takes elloquist for this. Moods are good. No headache post ECT. Recalls this physician. Denies SI/HI/AH/VH. No aggression or violence. Appropriately interactive and aware. Tolerating medications well. Eating and sleeping fairly.     Objective:     Vitals:    02/21/23 0925 02/21/23 0943 02/21/23 1223 02/21/23 1236   BP:  (!) 125/96 100/63    Pulse: 88 93 (!) 115 89   Resp: 18 18     Temp:  97.9 °F (36.6 °C)     SpO2: 95% 94%     Weight:       Height:            Mental Status Exam:   Sensorium  oriented to time, place and person   Relations cooperative   Eye Contact appropriate   Appearance:  age appropriate   Speech:  non-pressured   Thought Process: goal directed   Thought Content Denies SI/HI/AH/VH   Suicidal ideations none   Mood:  good   Affect:  Fair range   Memory   adequate   Concentration:  adequate   Insight:  limited   Judgment:  fair       MEDICATIONS:  Current Facility-Administered Medications   Medication Dose Route Frequency    melatonin tablet 3 mg  3 mg Oral QHS    busPIRone (BUSPAR) tablet 10 mg  10 mg Oral TID    levothyroxine (SYNTHROID) tablet 112 mcg  112 mcg Oral ACB    midodrine (PROAMATINE) tablet 10 mg  10 mg Oral TID WITH MEALS    mirtazapine (REMERON) tablet 45 mg  45 mg Oral QHS    OLANZapine (ZyPREXA) tablet 20 mg  20 mg Oral QHS    venlafaxine-SR (EFFEXOR-XR) capsule 300 mg  300 mg Oral DAILY    omega-3 acid ethyl esters (LOVAZA) capsule 1,000 mg  1 g Oral DAILY    OLANZapine (ZyPREXA) tablet 5 mg  5 mg Oral Q6H PRN    haloperidol lactate (HALDOL) injection 5 mg  5 mg IntraMUSCular Q6H PRN    benztropine (COGENTIN) tablet 1 mg  1 mg Oral BID PRN    diphenhydrAMINE (BENADRYL) injection 50 mg  50 mg IntraMUSCular BID PRN    hydrOXYzine HCL (ATARAX) tablet 50 mg  50 mg Oral TID PRN    traZODone (DESYREL) tablet 50 mg  50 mg Oral QHS PRN    acetaminophen (TYLENOL) tablet 650 mg  650 mg Oral Q4H PRN    magnesium hydroxide (MILK OF MAGNESIA) 400 mg/5 mL oral suspension 30 mL  30 mL Oral DAILY PRN      DISCUSSION:   the risks and benefits of the proposed medication  patient given opportunity to ask questions    Lab/Data Review: All lab results for the last 24 hours reviewed. No results found for this or any previous visit (from the past 24 hour(s)). Assessment:     Principal Problem:    Bipolar 1 disorder ([facility] [address] (1/27/2023)      Plan:     Continue current care  Collateral information  Medication modification as appropriate  Maintenance ECT  as directed or Wednesday  NPO the morning of. Medicine consult for blood clots. Disposition planning with social work for [facility]    I certify that this patient's inpatient psychiatric hospital services furnished since the previous certification were, and continue to be, required for treatment that could reasonably be expected to improve the patient's condition, or for diagnostic study, and that the patient continues to need, on a daily basis, active treatment furnished directly by or requiring the supervision of inpatient psychiatric facility personnel. In addition, the hospital records show that services furnished were intensive treatment services, admission or related services, or equivalent services.   Signed By: John Monae MD     February 21, 2023

## 2023-02-21 NOTE — PROGRESS NOTES
Behavioral Services                                              Medicare Re-Certification    I certify that the inpatient psychiatric hospital services furnished since the previous certification/re-certification were, and continue to be, medically necessary for;    [x] (1) Treatment which could reasonably be expected to improve the patient's condition,    [x] (2) Or for diagnostic study. Estimated length of stay/service 1 - 3 days     Plan for post-hospital care per social work    This patient continues to need, on a daily basis, active treatment furnished directly by or requiring the supervision of inpatient psychiatric personnel.     Electronically signed by Alanna Baeza MD on 2/21/2023 at 12:43 PM

## 2023-02-21 NOTE — BH NOTES
Behavioral Health Interdisciplinary Rounds     Patient Name: Escobar Stauffer  Age: 57 y.o. Room/Bed:  320/01  Primary Diagnosis: Bipolar 1 disorder ([address]     Progress note: Treatment team met with the Pt. Pt denies SI, HI, AHVH, and self harming behaviors. Pt had her last inpatient ECT therapy today and endorsed that she did not have any side effects from the ECT therapy. Pt is alert and oriented X4 and presents with a bright affect and good mood. Pt rated her depression level at 8/10 and her anxiety at 7/10. Per nursing the Pt slept for 8 hours and received PRN trazodone for sleep and atarax for anxiety. SW contacted the Pt [place] , Maggie Colon, and scheduled a follow up appointment for the PT to meet with Dr. Julieta Caceres at 1:00pm after the Pt discharges from the hospital. DEV informed Maggie Colon that the Pt has an outpatient ECT therapy appointment scheduled for 2/27/23 and that the Pt nephew will take the Pt to her appointment.      LOS:  26  Expected LOS: 27    Financial concerns/prescription coverage:  [email or web address] [facility]   Family contact: Juan Avina [phone]   Family requesting physician contact today:  No  Discharge plan: Return home  Access to weapons: No                                                Outpatient provider(s): [facility]), [phone], VANDANA (Personal Care)  Patient's preferred phone number for follow up call: [phone]      Participating treatment team members: Little Charles Unknown Ida, MD

## 2023-02-21 NOTE — PROGRESS NOTES
Problem: Discharge Planning  Goal: *Discharge to safe environment  Outcome: [address] presented alert and oriented x4, bright affect/mood. She rated her depression and anxiety levels at 8/10. She denied SI/HI/AVH. She was reminded of NPO status for ECT scheduled in the morning. She was compliant with her scheduled meds. She appeared to have slept approx 8 hours during the night. No distress observed. She was maintained NPO after MN. VS measured, clean gown, underwear, gripper socks, and mask provided. Report given to PACU. Monitoring continues for safety.

## 2023-02-21 NOTE — GROUP NOTE
TABITHA  GROUP DOCUMENTATION INDIVIDUAL                                                                          Group Therapy Note    Date: 2/21/2023    Group Start Time: 0900  Group End Time: 1000  Group Topic: Topic Group    [facility] [place] 3 ACUTE BEHAV [facility]    [place], [address] [facility] GROUP DOCUMENTATION GROUP    Group Therapy Note    Attendees: 10       Attendance: Did not attend    [place]

## 2023-02-22 VITALS
TEMPERATURE: 97.4 F | OXYGEN SATURATION: 100 % | SYSTOLIC BLOOD PRESSURE: 100 MMHG | HEART RATE: 76 BPM | DIASTOLIC BLOOD PRESSURE: 64 MMHG | WEIGHT: 148 LBS | RESPIRATION RATE: 16 BRPM | HEIGHT: 62 IN | BODY MASS INDEX: 27.23 KG/M2

## 2023-02-22 PROCEDURE — 74011250637 HC RX REV CODE- 250/637: Performed by: PSYCHIATRY & NEUROLOGY

## 2023-02-22 PROCEDURE — 93970 EXTREMITY STUDY: CPT | Performed by: INTERNAL MEDICINE

## 2023-02-22 PROCEDURE — 74011250637 HC RX REV CODE- 250/637: Performed by: STUDENT IN AN ORGANIZED HEALTH CARE EDUCATION/TRAINING PROGRAM

## 2023-02-22 RX ORDER — BUSPIRONE HYDROCHLORIDE 10 MG/1
10 TABLET ORAL 3 TIMES DAILY
Qty: 90 TABLET | Refills: 0 | Status: SHIPPED | OUTPATIENT
Start: 2023-02-22

## 2023-02-22 RX ORDER — LEVOTHYROXINE SODIUM 112 UG/1
112 TABLET ORAL
Qty: 30 TABLET | Refills: 0 | Status: SHIPPED | OUTPATIENT
Start: 2023-02-22

## 2023-02-22 RX ORDER — LANOLIN ALCOHOL/MO/W.PET/CERES
3 CREAM (GRAM) TOPICAL
Qty: 30 TABLET | Refills: 0 | Status: SHIPPED | OUTPATIENT
Start: 2023-02-22

## 2023-02-22 RX ORDER — MIRTAZAPINE 45 MG/1
45 TABLET, FILM COATED ORAL
Qty: 30 TABLET | Refills: 0 | Status: SHIPPED | OUTPATIENT
Start: 2023-02-22

## 2023-02-22 RX ORDER — MIDODRINE HYDROCHLORIDE 10 MG/1
10 TABLET ORAL
Qty: 90 TABLET | Refills: 0 | Status: SHIPPED | OUTPATIENT
Start: 2023-02-22

## 2023-02-22 RX ORDER — OLANZAPINE 20 MG/1
20 TABLET ORAL
Qty: 30 TABLET | Refills: 0 | Status: SHIPPED | OUTPATIENT
Start: 2023-02-22

## 2023-02-22 RX ORDER — VENLAFAXINE HYDROCHLORIDE 150 MG/1
300 CAPSULE, EXTENDED RELEASE ORAL DAILY
Qty: 60 CAPSULE | Refills: 0 | Status: SHIPPED | OUTPATIENT
Start: 2023-02-23

## 2023-02-22 RX ORDER — CHOLECALCIFEROL (VITAMIN D3) 50 MCG
1 CAPSULE ORAL DAILY
Qty: 30 CAPSULE | Refills: 0 | Status: SHIPPED | OUTPATIENT
Start: 2023-02-22

## 2023-02-22 RX ADMIN — BUSPIRONE HYDROCHLORIDE 10 MG: 10 TABLET ORAL at 08:00

## 2023-02-22 RX ADMIN — APIXABAN 5 MG: 5 TABLET, FILM COATED ORAL at 08:00

## 2023-02-22 RX ADMIN — BUSPIRONE HYDROCHLORIDE 10 MG: 10 TABLET ORAL at 12:02

## 2023-02-22 RX ADMIN — MIDODRINE HYDROCHLORIDE 10 MG: 5 TABLET ORAL at 12:02

## 2023-02-22 RX ADMIN — OMEGA-3-ACID ETHYL ESTERS 1000 MG: 1 CAPSULE, LIQUID FILLED ORAL at 08:03

## 2023-02-22 RX ADMIN — MIDODRINE HYDROCHLORIDE 10 MG: 5 TABLET ORAL at 08:00

## 2023-02-22 RX ADMIN — LEVOTHYROXINE SODIUM 112 MCG: 112 TABLET ORAL at 08:00

## 2023-02-22 RX ADMIN — VENLAFAXINE HYDROCHLORIDE 300 MG: 75 CAPSULE, EXTENDED RELEASE ORAL at 07:59

## 2023-02-22 NOTE — PROGRESS NOTES
Problem: Suicide  Goal: *STG/LTG: No longer expresses self destructive or suicidal thoughts  Outcome: Progressing Towards Goal     Problem: Depressed Mood (Adult/Pediatric)  Goal: *LTG: Returns to previous level of functioning and participates with after care plan  Outcome: Progressing Towards Goal     Problem: Anxiety-Behavioral Health (Adult/Pediatric)  Goal: *STG/LTG: Complies with medication therapy  Outcome: Progressing Towards Goal     Problem: Discharge Planning  Goal: *Discharge to safe environment  Outcome: Progressing Towards Goal     Problem: Falls - Risk of  Goal: *Absence of Falls  Description: Document Samanta Fall Risk and appropriate interventions in the flowsheet.   Outcome: Progressing Towards Goal  Note: Fall Risk Interventions:            Medication Interventions: Teach patient to arise slowly

## 2023-02-22 NOTE — PROGRESS NOTES
Problem: Discharge Planning  Goal: *Discharge to safe environment  Outcome: [address] presented alert and oriented x4, bright affect/mood. She was noted in the dayroom interacting with her peers. She rated her depression and anxiety levels at 8/10. She denied SI/HI/AVH. She was compliant with her scheduled meds. She appeared to have slept approx 7 hours during the night. No distress observed. Monitoring continues for safety.

## 2023-02-22 NOTE — GROUP NOTE
TABITHA  GROUP DOCUMENTATION INDIVIDUAL                                                                          Group Therapy Note    Date: 2/22/2023    Group Start Time: 0900  Group End Time: 1000  Group Topic: Topic Group    [facility] [place] 3 ACUTE BEHAV [facility] [address], [id number] [address] GROUP    Group Therapy Note    Attendees: 6       Attendance: Did not attend      [facility]

## 2023-02-22 NOTE — PROGRESS NOTES
Duplex with thrombus in basilic vein of left forearm    Eliquis has been resumed   Outpatient follow up PCP     Praful Batista MD

## 2023-02-22 NOTE — PROGRESS NOTES
Pt discharged to home. Pt denies SI/HI/AVH. Discharge instructions reviewed with pt. All belongings returned to pt. Pt transported home by Nephew. All questions answered to the pts satisfaction.

## 2023-02-22 NOTE — DISCHARGE SUMMARY
PSYCHIATRIC DISCHARGE SUMMARY         IDENTIFICATION:    Patient Name  Meredith Thomas   Date of Birth 1960   [facility] [id number]   Medical Record Number  [id number]      Age  57 y.o. PCP Jana Callejas MD   Admit date:  1/26/2023    Discharge date: 2/22/2023   Room Number  320/01  @ [facility]   Date of Service  2/22/2023            TYPE OF DISCHARGE: REGULAR               CONDITION AT DISCHARGE: improved and fair       PROVISIONAL & DISCHARGE DIAGNOSES:    Problem List  Date Reviewed: 11/29/2022            Codes Class    * (Principal) Bipolar 1 disorder (HCC) ICD-10-CM: F31.9  ICD-9-CM: 296.7         Dizziness ICD-10-CM: R42  ICD-9-CM: 780.4         UTI (urinary tract infection) ICD-10-CM: N39.0  ICD-9-CM: 599.0         Head injury ICD-10-CM: S09. 90XA  ICD-9-CM: 959.01         LFT elevation ICD-10-CM: R79.89  ICD-9-CM: 790.6         Frequent falls ICD-10-CM: R29.6  ICD-9-CM: V15.88         Major depression with psychotic features (HCC) ICD-10-CM: F32.3  ICD-9-CM: 296.24         Severe major depression with psychotic features, mood-congruent ([facility] ICD-10-CM: F32.3  ICD-9-CM: 296.24         Depression ICD-10-CM: F32. A  ICD-9-CM: [facility] Problems    *Bipolar 1 disorder ([facility]        DISCHARGE DIAGNOSIS:   Axis I:  SEE ABOVE  Axis II: SEE ABOVE  Axis III: SEE ABOVE  Axis IV:  lack of structure  Axis V:  <50 on admission, 55+ on discharge     CC & HISTORY OF PRESENT ILLNESS:  25-year-old  female with a long history of depression with prior treatments for it and maybe benzodiazepine dependence who comes to the hospital saying her depression has been going on for most of her life, but she has been severely depressed in the recent weeks and unable to manage her condition.   She was admitted to [address] [place] [address], where the psychiatrist on duty evaluated and determined that ECT would be an appropriate treatment for her condition as she has had it in the past and has been the only thing that was useful; however, she had been taking medications that had not been effective and she states she has been off of them now. She started having suicidal ideations, but no specific plan, so she was transferred to our facility where ECT is available so that she may have the treatment.      SOCIAL HISTORY:    Social History     Socioeconomic History    Marital status: SINGLE     Spouse name: Not on file    Number of children: Not on file    Years of education: Not on file    Highest education level: Not on file   Occupational History    Not on file   Tobacco Use    Smoking status: Never    Smokeless tobacco: Never   Substance and Sexual Activity    Alcohol use: No    Drug use: No    Sexual activity: Not Currently   Other Topics Concern     Service Not Asked    Blood Transfusions Not Asked    Caffeine Concern Not Asked    Occupational Exposure Not Asked    Hobby Hazards Not Asked    Sleep Concern Not Asked    Stress Concern Not Asked    Weight Concern Not Asked    Special Diet Not Asked    Back Care Not Asked    Exercise Not Asked    Bike Helmet Not Asked    Seat Belt Not Asked    Self-Exams Not Asked   Social History Narrative    Not on file     Social Determinants of Health     Financial Resource Strain: Not on file   Food Insecurity: Not on file   Transportation Needs: Not on file   Physical Activity: Not on file   Stress: Not on file   Social Connections: Not on file   Intimate Partner Violence: Not on file   Housing Stability: Not on file      FAMILY HISTORY:   Family History   Problem Relation Age of Onset    Stroke Mother     Other Mother         erosion of esophagus    Cancer Mother         melanoma/squamous    Heart Surgery Father         x2    Delayed Awakening Father     Pacemaker Father     Other Father         carotid endartarectomy/polio    Cataract Father              HOSPITALIZATION COURSE:    Raymundo Jones was admitted to the inpatient psychiatric unit [facility] for acute psychiatric stabilization in regards to symptomatology as described in the HPI above. The differential diagnosis at time of admission included: schizophrenia vs substance induced psychotic disorder schizoaffective vs bipolar MDD vs adjustment disorder. While on the unit Kody Santos was involved in individual, group, occupational and milieu therapy. Psychiatric medications were adjusted during this hospitalization. Kody Santos demonstrated a progressive improvement in overall condition. Much of patient's initial presentation appeared to be related to situational stressors, effects of medication non-compliance and psychological factors. Please see individual progress notes for more specific details regarding patient's hospitalization course. Kody Santos reports feeling anxious and moods are depressed. However her affect is incongruent with statements. Denies SI/HI/AH/VH. No aggression or violence. Appropriately interactive and aware. Tolerating medications well. Eating and sleeping fairly. Patient with request for discharge today. There are no grounds to seek a TDO. At time of discharge, Kody Santos is without significant problems of depression, psychosis, or guevara. Patient free of suicidal and homicidal ideations (appears to be at very low risk of suicide or homicide) and reports many positive predictive factors in terms of not attempting suicide or homicide. 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 Patient has maximized benefit to be derived from acute inpatient psychiatric treatment. All members of the treatment team concur with each other in regards to plans for discharge today. Patient and family are aware and in agreement with discharge and discharge plan.          LABS AND IMAGAING:    Labs Reviewed   METABOLIC PANEL, COMPREHENSIVE - Abnormal; Notable for the following components:       Result Value    BUN/Creatinine ratio 24 (*)     Calcium 8.4 (*)     Albumin 3.1 (*)     A-G Ratio 0.9 (*)     All other components within normal limits   CBC W/O DIFF - Abnormal; Notable for the following components:    HGB 11.1 (*)     HCT 33.5 (*)     All other components within normal limits   TSH 3RD GENERATION - Abnormal; Notable for the following components:    TSH 0.33 (*)     All other components within normal limits   LIPID PANEL - Abnormal; Notable for the following components:    Cholesterol, total 247 (*)     LDL, calculated 153.6 (*)     All other components within normal limits   CBC WITH AUTOMATED DIFF - Abnormal; Notable for the following components:    HGB 11.2 (*)     All other components within normal limits   METABOLIC PANEL, COMPREHENSIVE - Abnormal; Notable for the following components:    Glucose 103 (*)     BUN/Creatinine ratio 22 (*)     Albumin 2.9 (*)     A-G Ratio 0.8 (*)     All other components within normal limits   GLUCOSE, FASTING   SARS-COV-2     No results found for: DS35, PHEN, PHENO, PHENT, DILF, DS39, PHENY, PTN, VALF2, VALAC, VALP, VALPR, DS6, CRBAM, CRBAMP, CARB2, XCRBAM  Admission on 01/26/2023   Component Date Value Ref Range Status    Sodium 01/27/2023 139  136 - 145 mmol/L Final    Potassium 01/27/2023 4.1  3.5 - 5.1 mmol/L Final    Chloride 01/27/2023 105  97 - 108 mmol/L Final    CO2 01/27/2023 28  21 - 32 mmol/L Final    Anion gap 01/27/2023 6  5 - 15 mmol/L Final    Glucose 01/27/2023 100  65 - 100 mg/dL Final    BUN 01/27/2023 20  6 - 20 MG/DL Final    Creatinine 01/27/2023 0.84  0.55 - 1.02 MG/DL Final    BUN/Creatinine ratio 01/27/2023 24 (A)  12 - 20   Final    eGFR 01/27/2023 >60  >60 ml/min/1.73m2 Final    Calcium 01/27/2023 8.4 (A)  8.5 - 10.1 MG/DL Final    Bilirubin, total 01/27/2023 0.3  0.2 - 1.0 MG/DL Final    ALT (SGPT) 01/27/2023 29  12 - 78 U/L Final    AST (SGOT) 01/27/2023 25  15 - 37 U/L Final Alk. phosphatase 01/27/2023 66  45 - 117 U/L Final    Protein, total 01/27/2023 6.5  6.4 - 8.2 g/dL Final    Albumin 01/27/2023 3.1 (A)  3.5 - 5.0 g/dL Final    Globulin 01/27/2023 3.4  2.0 - 4.0 g/dL Final    A-G Ratio 01/27/2023 0.9 (A)  1.1 - 2.2   Final    Glucose 01/27/2023 100  65 - 100 MG/DL Final    WBC 01/27/2023 4.5  3.6 - 11.0 K/uL Final    RBC 01/27/2023 3.86  3.80 - 5.20 M/uL Final    HGB 01/27/2023 11.1 (A)  11.5 - 16.0 g/dL Final    HCT 01/27/2023 33.5 (A)  35.0 - 47.0 % Final    MCV 01/27/2023 86.8  80.0 - 99.0 FL Final    MCH 01/27/2023 28.8  26.0 - 34.0 PG Final    MCHC 01/27/2023 33.1  30.0 - 36.5 g/dL Final    RDW 01/27/2023 13.5  11.5 - 14.5 % Final    PLATELET 88/67/4022 935  150 - 400 K/uL Final    MPV 01/27/2023 10.1  8.9 - 12.9 FL Final    NRBC 01/27/2023 0.0  0  WBC Final    ABSOLUTE NRBC 01/27/2023 0.00  0.00 - 0.01 K/uL Final    TSH 01/27/2023 0.33 (A)  0.36 - 3.74 uIU/mL Final    Cholesterol, total 01/27/2023 247 (A)  <200 MG/DL Final    Triglyceride 01/27/2023 147  <150 MG/DL Final    HDL Cholesterol 01/27/2023 64  MG/DL Final    LDL, calculated 01/27/2023 153.6 (A)  0 - 100 MG/DL Final    VLDL, calculated 01/27/2023 29.4  MG/DL Final    CHOL/HDL Ratio 01/27/2023 3.9  0.0 - 5.0   Final    Specimen source 01/29/2023 Nasopharyngeal    Final    SARS-CoV-2 01/29/2023 Not detected  NOTD   Final    WBC 02/17/2023 5.5  3.6 - 11.0 K/uL Final    RBC 02/17/2023 3.99  3.80 - 5.20 M/uL Final    HGB 02/17/2023 11.2 (A)  11.5 - 16.0 g/dL Final    HCT 02/17/2023 35.2  35.0 - 47.0 % Final    MCV 02/17/2023 88.2  80.0 - 99.0 FL Final    MCH 02/17/2023 28.1  26.0 - 34.0 PG Final    MCHC 02/17/2023 31.8  30.0 - 36.5 g/dL Final    RDW 02/17/2023 13.4  11.5 - 14.5 % Final    PLATELET 44/46/4284 472  150 - 400 K/uL Final    MPV 02/17/2023 9.5  8.9 - 12.9 FL Final    NRBC 02/17/2023 0.0  0  WBC Final    ABSOLUTE NRBC 02/17/2023 0.00  0.00 - 0.01 K/uL Final    NEUTROPHILS 02/17/2023 52  32 - 75 % Final    LYMPHOCYTES 02/17/2023 40  12 - 49 % Final    MONOCYTES 02/17/2023 5  5 - 13 % Final    EOSINOPHILS 02/17/2023 2  0 - 7 % Final    BASOPHILS 02/17/2023 1  0 - 1 % Final    IMMATURE GRANULOCYTES 02/17/2023 0  0.0 - 0.5 % Final    ABS. NEUTROPHILS 02/17/2023 2.9  1.8 - 8.0 K/UL Final    ABS. LYMPHOCYTES 02/17/2023 2.2  0.8 - 3.5 K/UL Final    ABS. MONOCYTES 02/17/2023 0.3  0.0 - 1.0 K/UL Final    ABS. EOSINOPHILS 02/17/2023 0.1  0.0 - 0.4 K/UL Final    ABS. BASOPHILS 02/17/2023 0.0  0.0 - 0.1 K/UL Final    ABS. IMM. GRANS. 02/17/2023 0.0  0.00 - 0.04 K/UL Final    DF 02/17/2023 AUTOMATED    Final    Sodium 02/17/2023 139  136 - 145 mmol/L Final    Potassium 02/17/2023 3.9  3.5 - 5.1 mmol/L Final    Chloride 02/17/2023 106  97 - 108 mmol/L Final    CO2 02/17/2023 26  21 - 32 mmol/L Final    Anion gap 02/17/2023 7  5 - 15 mmol/L Final    Glucose 02/17/2023 103 (A)  65 - 100 mg/dL Final    BUN 02/17/2023 19  6 - 20 MG/DL Final    Creatinine 02/17/2023 0.88  0.55 - 1.02 MG/DL Final    BUN/Creatinine ratio 02/17/2023 22 (A)  12 - 20   Final    eGFR 02/17/2023 >60  >60 ml/min/1.73m2 Final    Calcium 02/17/2023 8.6  8.5 - 10.1 MG/DL Final    Bilirubin, total 02/17/2023 0.2  0.2 - 1.0 MG/DL Final    ALT (SGPT) 02/17/2023 24  12 - 78 U/L Final    AST (SGOT) 02/17/2023 16  15 - 37 U/L Final    Alk. phosphatase 02/17/2023 71  45 - 117 U/L Final    Protein, total 02/17/2023 6.5  6.4 - 8.2 g/dL Final    Albumin 02/17/2023 2.9 (A)  3.5 - 5.0 g/dL Final    Globulin 02/17/2023 3.6  2.0 - 4.0 g/dL Final    A-G Ratio 02/17/2023 0.8 (A)  1.1 - 2.2   Final     No results found. DISPOSITION:    Home. Patient to f/u with psychiatric, and psychotherapy appointments. Patient is to f/u with internist as directed. Maintenance ECT scheduled weekly             FOLLOW-UP CARE:    Activity as tolerated  Regular diet  Wound Care: none needed.   Follow-up Information       Follow up With Specialties Details Why Contact Info Suma Baumann MD Family Medicine   [address] [address] [facility] [id number]      [address] [place] [address] Dr Team  Follow up on 2/22/2023 Please attend your appointment with Dr. Dedra Concepcion at 1:00pm. Dr. Dedra Concepcion will come to your house for your appointment. Tomeka Staley, [address]  [phone]  F: [phone]    [address] on 2/27/2023 Please arrive at the hospital at 6:30am for your ECT therapy treatment. Pt will recieve ECT on 3/7/23 and 3/13/23. [address] [place] [address]  J:[phone]                   PROGNOSIS:   Rosemary Hammers ---- based on nature of patient's pathology/ies and treatment compliance issues. Prognosis is greatly dependent upon patient's ability to remain sober and to follow up with scheduled appointments as well as to comply with psychiatric medications as prescribed. DISCHARGE MEDICATIONS:     Informed consent given for the use of following psychotropic medications:  Current Discharge Medication List        START taking these medications    Details   venlafaxine-SR (EFFEXOR-XR) 150 mg capsule Take 2 Capsules by mouth daily. Indications: depression  Qty: 60 Capsule, Refills: 0  Start date: 2/23/2023      apixaban (ELIQUIS) 5 mg tablet Take 1 Tablet by mouth two (2) times a day. Indications: DVT 7/2022  Qty: 60 Tablet, Refills: 0  Start date: 2/22/2023      melatonin 3 mg tablet Take 1 Tablet by mouth nightly. Indications: INSOMNIA  Qty: 30 Tablet, Refills: 0  Start date: 2/22/2023           CONTINUE these medications which have CHANGED    Details   busPIRone (BUSPAR) 10 mg tablet Take 1 Tablet by mouth three (3) times daily. Indications: repeated episodes of anxiety  Qty: 90 Tablet, Refills: 0  Start date: 2/22/2023      levothyroxine (SYNTHROID) 112 mcg tablet Take 1 Tablet by mouth Daily (before breakfast).  Indications: a condition with low thyroid hormone levels  Qty: 30 Tablet, Refills: 0  Start date: 2/22/2023      midodrine (PROAMATINE) 10 mg tablet Take 1 Tablet by mouth three (3) times daily (with meals). Indications: a feeling of dizziness upon standing due to a drop in blood pressure  Qty: 90 Tablet, Refills: 0  Start date: 2/22/2023      mirtazapine (REMERON) 45 mg tablet Take 1 Tablet by mouth nightly. Indications: major depressive disorder  Qty: 30 Tablet, Refills: 0  Start date: 2/22/2023      OLANZapine (ZyPREXA) 20 mg tablet Take 1 Tablet by mouth nightly. Indications: manic-depression  Qty: 30 Tablet, Refills: 0  Start date: 2/22/2023      omega 3-dha-epa-fish oil (Fish OiL) 100-160-1,000 mg cap Take 1 Capsule by mouth daily. Indications: high amount of triglyceride in the blood  Qty: 30 Capsule, Refills: 0  Start date: 2/22/2023           STOP taking these medications       Venlafaxine-ER 24 HR (EFFEXOR-ER) 150 mg tr24 tablet Comments:   Reason for Stopping:                      A coordinated, multidisplinary treatment team round was conducted with Aniyah Ventura is done daily here at [facility]. This team consists of the nurse, psychiatric unit pharmacist,  and Eitan Ramos. I have spent greater than 35 minutes on discharge work.     Signed:  Alanna Baeza MD  2/22/2023

## 2023-02-22 NOTE — PROGRESS NOTES
Problem: Suicide  Goal: *STG: Remains safe in hospital  2/22/2023 1020 by Bjorn Spears RN  Outcome: Resolved/Met  2/22/2023 0839 by Bjorn Spears RN  Outcome: Progressing Towards Goal  Goal: *STG: Seeks staff when feelings of self harm or harm towards others arise  Outcome: Resolved/Met  Goal: *STG: Attends activities and groups  Outcome: Resolved/Met  Goal: *STG:  Verbalizes alternative ways of dealing with maladaptive feelings/behaviors  Outcome: Resolved/Met  Goal: *STG/LTG: Complies with medication therapy  2/22/2023 1020 by Bjorn Spears RN  Outcome: Resolved/Met  2/22/2023 0839 by Bjorn Spears RN  Outcome: Progressing Towards Goal  Goal: *STG/LTG: No longer expresses self destructive or suicidal thoughts  Outcome: Resolved/Met  Goal: *LTG:  Identifies available community resources  Outcome: Resolved/Met  Goal: *LTG:  Develops proactive suicide prevention plan  Outcome: Resolved/Met  Goal: Interventions  Outcome: Resolved/Met     Problem: Patient Education: Go to Patient Education Activity  Goal: Patient/Family Education  Outcome: Resolved/Met     Problem: Depressed Mood (Adult/Pediatric)  Goal: *STG: Participates in treatment plan  Outcome: Resolved/Met  Goal: *STG: Participates in 1:1 therapy sessions  Outcome: Resolved/Met  Goal: *STG: Verbalizes anger, guilt, and other feelings in a constructive manor  Outcome: Resolved/Met  Goal: *STG: Attends activities and groups  Outcome: Resolved/Met  Goal: *STG: Demonstrates reduction in symptoms and increase in insight into coping skills/future focused  Outcome: Resolved/Met  Goal: *STG: Remains safe in hospital  Outcome: Resolved/Met  Goal: *STG: Complies with medication therapy  Outcome: Resolved/Met  Goal: *LTG: Returns to previous level of functioning and participates with after care plan  Outcome: Resolved/Met  Goal: *LTG: Understands illness and can identify signs of relapse  Outcome: Resolved/Met  Goal: Interventions  Outcome: Resolved/Met     Problem: Patient Education: Go to Patient Education Activity  Goal: Patient/Family Education  Outcome: Resolved/Met     Problem: Anxiety-Behavioral Health (Adult/Pediatric)  Goal: *STG: Participates in treatment plan  Outcome: Resolved/Met  Goal: *STG: Remains safe in hospital  Outcome: Resolved/Met  Goal: *STG: Seeks staff when feelings of anxiety and fear arise  Outcome: Resolved/Met  Goal: *STG: Attends activities and groups  Outcome: Resolved/Met  Goal: *STG: Demonstrates decrease in ritualistic behavior  Outcome: Resolved/Met  Goal: *STG: Demonstrates effective anxiety reduction strategies  Outcome: Resolved/Met  Goal: *STG/LTG: Trigger identification  Outcome: Resolved/Met  Goal: *STG/LTG: Complies with medication therapy  Outcome: Resolved/Met  Goal: *LTG:  Verbalizes understanding of personal adaptive level of functioning  Outcome: Resolved/Met  Goal: Interventions  Outcome: Resolved/Met     Problem: Patient Education: Go to Patient Education Activity  Goal: Patient/Family Education  Outcome: Resolved/Met     Problem: Discharge Planning  Goal: *Discharge to safe environment  Outcome: Resolved/Met  Goal: *Knowledge of medication management  Outcome: Resolved/Met  Goal: *Knowledge of discharge instructions  Outcome: Resolved/Met     Problem: Falls - Risk of  Goal: *Absence of Falls  Description: Document Samanta Fall Risk and appropriate interventions in the flowsheet.   Outcome: Resolved/Met  Note: Fall Risk Interventions:       Medication Interventions: Teach patient to arise slowly    Problem: Patient Education: Go to Patient Education Activity  Goal: Patient/Family Education  Outcome: Resolved/Met

## 2023-04-14 ENCOUNTER — ANESTHESIA EVENT (OUTPATIENT)
Dept: SURGERY | Age: 63
End: 2023-04-14
Payer: MEDICAID

## 2023-04-14 ENCOUNTER — HOSPITAL ENCOUNTER (OUTPATIENT)
Age: 63
Setting detail: OUTPATIENT SURGERY
Discharge: HOME | End: 2023-04-14
Attending: PSYCHIATRY & NEUROLOGY | Admitting: PSYCHIATRY & NEUROLOGY
Payer: MEDICAID

## 2023-04-14 VITALS
HEART RATE: 90 BPM | OXYGEN SATURATION: 96 % | WEIGHT: 155 LBS | DIASTOLIC BLOOD PRESSURE: 73 MMHG | HEIGHT: 62 IN | TEMPERATURE: 98 F | SYSTOLIC BLOOD PRESSURE: 128 MMHG | BODY MASS INDEX: 28.52 KG/M2 | RESPIRATION RATE: 20 BRPM

## 2023-04-14 PROCEDURE — 74780000002 HC ELECTROSHOCK THERAP RECOVERY: Performed by: PSYCHIATRY & NEUROLOGY

## 2023-04-14 PROCEDURE — 90870 ELECTROCONVULSIVE THERAPY: CPT | Performed by: PSYCHIATRY & NEUROLOGY

## 2023-04-14 PROCEDURE — 2709999900 HC NON-CHARGEABLE SUPPLY: Performed by: PSYCHIATRY & NEUROLOGY

## 2023-04-14 RX ORDER — SODIUM CHLORIDE 0.9 % (FLUSH) 0.9 %
5-40 SYRINGE (ML) INJECTION EVERY 8 HOURS
Status: CANCELLED | OUTPATIENT
Start: 2023-04-14

## 2023-04-14 RX ORDER — SODIUM CHLORIDE, SODIUM LACTATE, POTASSIUM CHLORIDE, CALCIUM CHLORIDE 600; 310; 30; 20 MG/100ML; MG/100ML; MG/100ML; MG/100ML
50 INJECTION, SOLUTION INTRAVENOUS CONTINUOUS
Status: DISCONTINUED | OUTPATIENT
Start: 2023-04-14 | End: 2023-04-14 | Stop reason: HOSPADM

## 2023-04-14 RX ORDER — SODIUM CHLORIDE 0.9 % (FLUSH) 0.9 %
5-40 SYRINGE (ML) INJECTION EVERY 8 HOURS
Status: DISCONTINUED | OUTPATIENT
Start: 2023-04-14 | End: 2023-04-14 | Stop reason: HOSPADM

## 2023-04-14 RX ORDER — SODIUM CHLORIDE 0.9 % (FLUSH) 0.9 %
5-40 SYRINGE (ML) INJECTION AS NEEDED
Status: CANCELLED | OUTPATIENT
Start: 2023-04-14

## 2023-04-14 RX ORDER — SODIUM CHLORIDE 0.9 % (FLUSH) 0.9 %
5-40 SYRINGE (ML) INJECTION AS NEEDED
Status: DISCONTINUED | OUTPATIENT
Start: 2023-04-14 | End: 2023-04-14 | Stop reason: HOSPADM

## 2023-04-14 RX ORDER — SODIUM CHLORIDE 9 MG/ML
50 INJECTION, SOLUTION INTRAVENOUS CONTINUOUS
Status: DISCONTINUED | OUTPATIENT
Start: 2023-04-14 | End: 2023-04-14 | Stop reason: HOSPADM

## 2023-04-14 NOTE — DISCHARGE INSTR - SUPPLEMENTARY INSTRUCTIONS
You were administered an similar medication like ibuprofen, if you need to take another dose please take after 3 pm.

## 2023-04-14 NOTE — PROCEDURES
ECT PROCEDURE NOTE      IDENTIFICATION:    Patient Name  Kulwant Santiago   Date of Birth 1960   [facility] [id number]   Medical Record Number  [id number]      Age  57 y.o. PCP Scarlett Wasserman MD   Admit date:  4/14/2023    Room Number  PACU/PL  @ [facility]   Date of Service  4/14/2023            Electro Convulsive Therapy:  Treatment number 10       Maintenance ECT NO   Kulwant Santiago was admitted to the PACU for ECT. Patient was medically cleared and NPO for procedure. Patient is NOTcourt mandated and gave informed consent for ECT treatment. Patient received     Bilateral ECT YES   Administered using the [address] [place] [address] Drive. at 40 % Energy, under general anesthesia. Kulwant Santiago with a good, well generalized seizure of 64 seconds on observation of the motor manifestations of the seizure. EEG duration was NA secs. Post-Ictal Suppression Index: NA %  Recovery was unremarkable and with no complications. Change in Energy %:        Anesthesia medications administered during procedure:  Brevital:  60 mg  Change for next treament:     Succinylcholine: 50 mg  Change for next treatment:      Propofol: mg  Glycopyrrolate:  mg  Labetalol:  mg  Esmolol:  mg  Lorazepam:  mg  Ketamine:  mg  Zofran:   mg    Toradol:  mg  Lidocaine:  mg  Caffeine Benzoate: mg       CSS[facility] 1/26/2023 7/8/2022   1) Within the past month, have you wished you were dead or wished you could go to sleep and not wake up? No No   2) Have you actually had any thoughts of killing yourself? No No   6) Have you ever done anything, started to do anything, or prepared to do anything to end your life? Yes No   Did this occur within the past 3 months?  Yes -       SIGNED:    Ifeanyi Lorenzo MD  4/14/2023

## 2023-04-14 NOTE — DISCHARGE INSTRUCTIONS
ECT DISCHARGE INSTRUCTIONS      NAME: Jm Musa   :  1960   MRN:  [id number]     Date/Time:  2023 9:15 AM    ADMIT DATE: 2023     DISCHARGE DATE: 2023     DISCHARGE DIAGNOSIS:  [unfilled]     Recommended diet:  As tolerated. Recommended activity:      Have a responsible person stay with the patient for 24 hours after the treatment, some temporary confusion may be noticed. Do not allow a patient to operate a motor vehicle for at least 24 hours and all the confusion has cleared. Do not drink alcoholic beverages for 48 hours past treatment. Do not sign any legal documents. Do not make any critical decisions for 24 hours. Advance diet as tolerated. Start with liquids and advance it nausea is not present. Understand that memory for recent events, phone numbers, addresses, ect. May be decreased for a short period of time. Report any persistant nausea or pain to the treating physician. Patient receiving ECT while in the hospital should also not attempt to ambulate without assistance, please use tap bell which will be provide. If you experience any of the following symptoms then please call your primary care physician/outpatient psychiatrist or return to the emergency room if you cannot get hold of your doctor: Fever, chills, nausea, vomiting, diarrhea, change in mentation, falling, bleeding, shortness of breath. Please call the emergency room at [facility] [phone] in this case. Follow Up:  Continue outpatient psychiatric follow-up visits with your personal psychiatrist.       Information obtained by :  I understand that if any problems occur once I am at home I am to contact my physician. I understand and acknowledge receipt of the instructions indicated above.                                                                                                                                            Physician's or MUNIR.'s Signature                                                             Date/Time                                                                                                                                              Patient or Representative Signature                                                     Date/Time      DISCHARGE SUMMARY from Nurse    PATIENT INSTRUCTIONS:    After general anesthesia or intravenous sedation, for 24 hours or while taking prescription Narcotics:  Limit your activities  Do not drive and operate hazardous machinery  Do not make important personal or business decisions  Do  not drink alcoholic beverages  If you have not urinated within 8 hours after discharge, please contact your surgeon on call. Report the following to your surgeon:  Excessive pain, swelling, redness or odor of or around the surgical area  Temperature over 100.5  Nausea and vomiting lasting longer than 4 hours or if unable to take medications  Any signs of decreased circulation or nerve impairment to extremity: change in color, persistent  numbness, tingling, coldness or increase pain  Any questions    These are general instructions for a healthy lifestyle:    No smoking/ No tobacco products/ Avoid exposure to second hand smoke  Surgeon General's Warning:  Quitting smoking now greatly reduces serious risk to your health. Obesity, smoking, and sedentary lifestyle greatly increases your risk for illness    A healthy diet, regular physical exercise & weight monitoring are important for maintaining a healthy lifestyle    You may be retaining fluid if you have a history of heart failure or if you experience any of the following symptoms:  Weight gain of 3 pounds or more overnight or 5 pounds in a week, increased swelling in our hands or feet or shortness of breath while lying flat in bed. Please call your doctor as soon as you notice any of these symptoms; do not wait until your next office visit.         The discharge information has been reviewed with the patient and [facility]. The patient and Gilford Carrow   verbalized understanding. Discharge medications reviewed with the patient and Case Manger Gilford Carrow and appropriate educational materials and side effects teaching were provided.   ___________________________________________________________________________________________________________________________________

## 2023-04-21 ENCOUNTER — HOSPITAL ENCOUNTER (OUTPATIENT)
Age: 63
Setting detail: OUTPATIENT SURGERY
Discharge: HOME | End: 2023-04-21
Attending: PSYCHIATRY & NEUROLOGY | Admitting: PSYCHIATRY & NEUROLOGY
Payer: MEDICAID

## 2023-04-21 ENCOUNTER — ANESTHESIA (OUTPATIENT)
Dept: SURGERY | Age: 63
End: 2023-04-21
Payer: MEDICAID

## 2023-04-21 VITALS
HEIGHT: 62 IN | HEART RATE: 90 BPM | DIASTOLIC BLOOD PRESSURE: 74 MMHG | RESPIRATION RATE: 17 BRPM | OXYGEN SATURATION: 96 % | BODY MASS INDEX: 28.52 KG/M2 | WEIGHT: 155 LBS | SYSTOLIC BLOOD PRESSURE: 126 MMHG | TEMPERATURE: 98 F

## 2023-04-21 PROCEDURE — 74780000002 HC ELECTROSHOCK THERAP RECOVERY: Performed by: PSYCHIATRY & NEUROLOGY

## 2023-04-21 PROCEDURE — 74011000250 HC RX REV CODE- 250: Performed by: ANESTHESIOLOGY

## 2023-04-21 PROCEDURE — 90870 ELECTROCONVULSIVE THERAPY: CPT | Performed by: PSYCHIATRY & NEUROLOGY

## 2023-04-21 PROCEDURE — 74011250636 HC RX REV CODE- 250/636: Performed by: ANESTHESIOLOGY

## 2023-04-21 PROCEDURE — 2709999900 HC NON-CHARGEABLE SUPPLY: Performed by: PSYCHIATRY & NEUROLOGY

## 2023-04-21 RX ORDER — SODIUM CHLORIDE 0.9 % (FLUSH) 0.9 %
5-40 SYRINGE (ML) INJECTION AS NEEDED
Status: DISCONTINUED | OUTPATIENT
Start: 2023-04-21 | End: 2023-04-21 | Stop reason: HOSPADM

## 2023-04-21 RX ORDER — SODIUM CHLORIDE, SODIUM LACTATE, POTASSIUM CHLORIDE, CALCIUM CHLORIDE 600; 310; 30; 20 MG/100ML; MG/100ML; MG/100ML; MG/100ML
50 INJECTION, SOLUTION INTRAVENOUS CONTINUOUS
Status: DISCONTINUED | OUTPATIENT
Start: 2023-04-21 | End: 2023-04-21 | Stop reason: HOSPADM

## 2023-04-21 RX ORDER — SUCCINYLCHOLINE CHLORIDE 20 MG/ML
INJECTION INTRAMUSCULAR; INTRAVENOUS AS NEEDED
Status: DISCONTINUED | OUTPATIENT
Start: 2023-04-21 | End: 2023-04-21 | Stop reason: HOSPADM

## 2023-04-21 RX ORDER — SODIUM CHLORIDE 9 MG/ML
50 INJECTION, SOLUTION INTRAVENOUS CONTINUOUS
Status: DISCONTINUED | OUTPATIENT
Start: 2023-04-21 | End: 2023-04-21 | Stop reason: HOSPADM

## 2023-04-21 RX ORDER — SODIUM CHLORIDE 0.9 % (FLUSH) 0.9 %
5-40 SYRINGE (ML) INJECTION EVERY 8 HOURS
Status: DISCONTINUED | OUTPATIENT
Start: 2023-04-21 | End: 2023-04-21 | Stop reason: HOSPADM

## 2023-04-21 RX ORDER — METHOHEXITAL IN WATER/PF 100MG/10ML
SYRINGE (ML) INTRAVENOUS AS NEEDED
Status: DISCONTINUED | OUTPATIENT
Start: 2023-04-21 | End: 2023-04-21 | Stop reason: HOSPADM

## 2023-04-21 RX ADMIN — SUCCINYLCHOLINE CHLORIDE 50 MG: 20 INJECTION, SOLUTION INTRAMUSCULAR; INTRAVENOUS at 09:08

## 2023-04-21 RX ADMIN — Medication 60 MG: at 09:08

## 2023-04-21 NOTE — ANESTHESIA POSTPROCEDURE EVALUATION
Procedure(s):  ELECTRO CONVULSIVE THERAPY. general    Anesthesia Post Evaluation      Multimodal analgesia: multimodal analgesia not used between 6 hours prior to anesthesia start to PACU discharge  Patient location during evaluation: PACU  Patient participation: complete - patient participated  Level of consciousness: awake and alert  Pain score: 0  Pain management: satisfactory to patient  Airway patency: patent  Anesthetic complications: no  Cardiovascular status: acceptable  Respiratory status: acceptable  Hydration status: acceptable  Post anesthesia nausea and vomiting:  none  Final Post Anesthesia Temperature Assessment:  Normothermia (36.0-37.5 degrees C)      INITIAL Post-op Vital signs:   Vitals Value Taken Time   /68 04/21/23 0940   Temp 36.7 °C (98 °F) 04/21/23 0935   Pulse 84 04/21/23 0942   Resp 20 04/21/23 0942   SpO2 98 % 04/21/23 0942   Vitals shown include unvalidated device data.

## 2023-04-21 NOTE — PROCEDURES
ECT PROCEDURE NOTE      IDENTIFICATION:    Patient Name  Arianna Abdullahi   Date of Birth 1960   [facility] [id number]   Medical Record Number  [id number]      Age  57 y.o. PCP Wilbert Duran MD   Admit date:  4/21/2023    Room Number  PACU/PL  @ [facility]   Date of Service  4/21/2023            Electro Convulsive Therapy:  Treatment number 11       Maintenance ECT NO   Arianna Abdullahi was admitted to the PACU for ECT. Patient was medically cleared and NPO for procedure. Patient is NOTcourt mandated and gave informed consent for ECT treatment. Patient received     Bilateral ECT YES   Administered using the [address] [place] [address] Drive. at 40 % Energy, under general anesthesia. Arianna Abdullahi with a good, well generalized seizure of 56 seconds on observation of the motor manifestations of the seizure. EEG duration was NA secs. Post-Ictal Suppression Index: 46.3 %  Recovery was unremarkable and with no complications. Change in Energy %:        Anesthesia medications administered during procedure:  Brevital:  60 mg  Change for next treament:     Succinylcholine: 50 mg  Change for next treatment:      Propofol: mg  Glycopyrrolate:  mg  Labetalol:  mg  Esmolol:  mg  Lorazepam:  mg  Ketamine:  mg  Zofran:   mg    Toradol:  mg  Lidocaine:  mg  Caffeine Benzoate: mg       CSSRS 1/26/2023 7/8/2022   1) Within the past month, have you wished you were dead or wished you could go to sleep and not wake up? No No   2) Have you actually had any thoughts of killing yourself? No No   6) Have you ever done anything, started to do anything, or prepared to do anything to end your life? Yes No   Did this occur within the past 3 months?  Yes -       SIGNED:    Joann Fortune MD  4/21/2023

## 2023-04-21 NOTE — PERIOP NOTES
Patient meets discharge criteria. Patient and   provided with verbal and written discharge instructions. Patient discharged by wheelchair with Clair Waldron to transport home.

## 2023-04-21 NOTE — DISCHARGE INSTRUCTIONS
ECT DISCHARGE INSTRUCTIONS      NAME: Claudetta Freund   :  1960   MRN:  [id number]     Date/Time:  2023 9:11 AM    ADMIT DATE: 2023     DISCHARGE DATE: 2023     DISCHARGE DIAGNOSIS:  [unfilled]     Recommended diet:  {diet:62530}    Recommended activity: {discharge activity:68413}    Have a responsible person stay with the patient for 24 hours after the treatment, some temporary confusion may be noticed. Do not allow a patient to operate a motor vehicle for at least 24 hours and all the confusion has cleared. Do not drink alcoholic beverages for 48 hours past treatment. Do not sign any legal documents. Do not make any critical decisions for 24 hours. Advance diet as tolerated. Start with liquids and advance it nausea is not present. Understand that memory for recent events, phone numbers, addresses, ect. May be decreased for a short period of time. Report any persistant nausea or pain to the treating physician. Patient receiving ECT while in the hospital should also not attempt to ambulate without assistance, please use tap bell which will be provide. If you have questions regarding the hospital related prescriptions or hospital related issues please call the [address] [place] [address] [phone]. If you experience any of the following symptoms then please call your primary care physician/outpatient psychiatrist or return to the emergency room if you cannot get hold of your doctor: Fever, chills, nausea, vomiting, diarrhea, change in mentation, falling, bleeding, shortness of breath. Follow Up:  Continue outpatient psychiatric follow-up visits with personal psychiatrist.  Return for next ECT treatment in         Information obtained by :  I understand that if any problems occur once I am at home I am to contact my physician.     I understand and acknowledge receipt of the instructions indicated above.                                                                                                                                           Physician's or R.N.'s Signature                                                             Date/Time                                                                                                                                              Patient or Representative Signature                                                     Date/Time        DISCHARGE SUMMARY from Nurse    PATIENT INSTRUCTIONS:    After general anesthesia or intravenous sedation, for 24 hours or while taking prescription Narcotics:  Limit your activities  Do not drive and operate hazardous machinery  Do not make important personal or business decisions  Do  not drink alcoholic beverages  If you have not urinated within 8 hours after discharge, please contact your surgeon on call. Report the following to your surgeon:  Excessive pain, swelling, redness or odor of or around the surgical area  Temperature over 100.5  Nausea and vomiting lasting longer than 4 hours or if unable to take medications  Any signs of decreased circulation or nerve impairment to extremity: change in color, persistent  numbness, tingling, coldness or increase pain  Any questions      These are general instructions for a healthy lifestyle:    No smoking/ No tobacco products/ Avoid exposure to second hand smoke  Surgeon General's Warning:  Quitting smoking now greatly reduces serious risk to your health.     Obesity, smoking, and sedentary lifestyle greatly increases your risk for illness    A healthy diet, regular physical exercise & weight monitoring are important for maintaining a healthy lifestyle    You may be retaining fluid if you have a history of heart failure or if you experience any of the following symptoms:  Weight gain of 3 pounds or more overnight or 5 pounds in a week, increased swelling in our hands or feet or shortness of breath while lying flat in bed. Please call your doctor as soon as you notice any of these symptoms; do not wait until your next office visit. The discharge information has been reviewed with the {PATIENT PARENT GUARDIAN:68291}. The {PATIENT PARENT GUARDIAN:79791} verbalized understanding. Discharge medications reviewed with the {Dishcarge meds reviewed FLGE:98911} and appropriate educational materials and side effects teaching were provided.   ___________________________________________________________________________________________________________________________________

## 2023-04-24 ENCOUNTER — ANESTHESIA EVENT (OUTPATIENT)
Dept: SURGERY | Age: 63
End: 2023-04-24
Payer: MEDICAID

## 2023-04-28 ENCOUNTER — ANESTHESIA (OUTPATIENT)
Dept: SURGERY | Age: 63
End: 2023-04-28
Payer: MEDICAID

## 2023-04-28 ENCOUNTER — HOSPITAL ENCOUNTER (OUTPATIENT)
Age: 63
Setting detail: OUTPATIENT SURGERY
Discharge: HOME | End: 2023-04-28
Attending: PSYCHIATRY & NEUROLOGY | Admitting: PSYCHIATRY & NEUROLOGY
Payer: MEDICAID

## 2023-04-28 ENCOUNTER — ANESTHESIA EVENT (OUTPATIENT)
Dept: SURGERY | Age: 63
End: 2023-04-28
Payer: MEDICAID

## 2023-04-28 VITALS
HEIGHT: 62 IN | OXYGEN SATURATION: 96 % | HEART RATE: 98 BPM | RESPIRATION RATE: 22 BRPM | SYSTOLIC BLOOD PRESSURE: 123 MMHG | WEIGHT: 155 LBS | BODY MASS INDEX: 28.52 KG/M2 | TEMPERATURE: 97.9 F | DIASTOLIC BLOOD PRESSURE: 78 MMHG

## 2023-04-28 PROCEDURE — 74780000002 HC ELECTROSHOCK THERAP RECOVERY: Performed by: PSYCHIATRY & NEUROLOGY

## 2023-04-28 PROCEDURE — 90870 ELECTROCONVULSIVE THERAPY: CPT | Performed by: PSYCHIATRY & NEUROLOGY

## 2023-04-28 PROCEDURE — 74011250636 HC RX REV CODE- 250/636: Performed by: ANESTHESIOLOGY

## 2023-04-28 PROCEDURE — 74011000250 HC RX REV CODE- 250: Performed by: ANESTHESIOLOGY

## 2023-04-28 PROCEDURE — 2709999900 HC NON-CHARGEABLE SUPPLY: Performed by: PSYCHIATRY & NEUROLOGY

## 2023-04-28 RX ORDER — SODIUM CHLORIDE 0.9 % (FLUSH) 0.9 %
5-40 SYRINGE (ML) INJECTION AS NEEDED
Status: DISCONTINUED | OUTPATIENT
Start: 2023-04-28 | End: 2023-05-05 | Stop reason: HOSPADM

## 2023-04-28 RX ORDER — SODIUM CHLORIDE 0.9 % (FLUSH) 0.9 %
5-40 SYRINGE (ML) INJECTION EVERY 8 HOURS
Status: DISCONTINUED | OUTPATIENT
Start: 2023-04-28 | End: 2023-05-05 | Stop reason: HOSPADM

## 2023-04-28 RX ORDER — SUCCINYLCHOLINE CHLORIDE 20 MG/ML
INJECTION INTRAMUSCULAR; INTRAVENOUS AS NEEDED
Status: DISCONTINUED | OUTPATIENT
Start: 2023-04-28 | End: 2023-04-28 | Stop reason: HOSPADM

## 2023-04-28 RX ORDER — SODIUM CHLORIDE 9 MG/ML
50 INJECTION, SOLUTION INTRAVENOUS CONTINUOUS
Status: DISCONTINUED | OUTPATIENT
Start: 2023-04-28 | End: 2023-04-29 | Stop reason: HOSPADM

## 2023-04-28 RX ORDER — SODIUM CHLORIDE, SODIUM LACTATE, POTASSIUM CHLORIDE, CALCIUM CHLORIDE 600; 310; 30; 20 MG/100ML; MG/100ML; MG/100ML; MG/100ML
50 INJECTION, SOLUTION INTRAVENOUS CONTINUOUS
Status: DISCONTINUED | OUTPATIENT
Start: 2023-04-28 | End: 2023-04-29 | Stop reason: HOSPADM

## 2023-04-28 RX ORDER — METHOHEXITAL IN WATER/PF 100MG/10ML
SYRINGE (ML) INTRAVENOUS AS NEEDED
Status: DISCONTINUED | OUTPATIENT
Start: 2023-04-28 | End: 2023-04-28 | Stop reason: HOSPADM

## 2023-04-28 RX ORDER — SODIUM CHLORIDE 9 MG/ML
INJECTION, SOLUTION INTRAVENOUS
Status: DISCONTINUED | OUTPATIENT
Start: 2023-04-28 | End: 2023-04-28 | Stop reason: HOSPADM

## 2023-04-28 RX ADMIN — Medication 60 MG: at 11:00

## 2023-04-28 RX ADMIN — SODIUM CHLORIDE: 9 INJECTION, SOLUTION INTRAVENOUS at 08:04

## 2023-04-28 RX ADMIN — SUCCINYLCHOLINE CHLORIDE 50 MG: 20 INJECTION, SOLUTION INTRAMUSCULAR; INTRAVENOUS at 11:00

## 2023-04-28 NOTE — ANESTHESIA POSTPROCEDURE EVALUATION
Procedure(s):  ELECTRO CONVULSIVE THERAPY. general    Anesthesia Post Evaluation      Multimodal analgesia: multimodal analgesia not used between 6 hours prior to anesthesia start to PACU discharge  Patient location during evaluation: PACU  Patient participation: waiting for patient participation  Level of consciousness: awake  Pain management: adequate  Airway patency: patent  Anesthetic complications: no  Cardiovascular status: acceptable  Respiratory status: acceptable  Hydration status: acceptable  Post anesthesia nausea and vomiting:  none  Final Post Anesthesia Temperature Assessment:  Normothermia (36.0-37.5 degrees C)      INITIAL Post-op Vital signs:   Vitals Value Taken Time   /79 04/28/23 1135   Temp 36.6 °C (97.8 °F) 04/28/23 1111   Pulse 101 04/28/23 1136   Resp 13 04/28/23 1136   SpO2 94 % 04/28/23 1136   Vitals shown include unvalidated device data.

## 2023-05-05 ENCOUNTER — HOSPITAL ENCOUNTER (OUTPATIENT)
Age: 63
Setting detail: OUTPATIENT SURGERY
Discharge: HOME | End: 2023-05-05
Attending: PSYCHIATRY & NEUROLOGY | Admitting: PSYCHIATRY & NEUROLOGY
Payer: MEDICAID

## 2023-05-05 ENCOUNTER — ANESTHESIA (OUTPATIENT)
Dept: SURGERY | Age: 63
End: 2023-05-05
Payer: MEDICAID

## 2023-05-05 VITALS
HEART RATE: 92 BPM | BODY MASS INDEX: 28.52 KG/M2 | RESPIRATION RATE: 21 BRPM | DIASTOLIC BLOOD PRESSURE: 64 MMHG | HEIGHT: 62 IN | OXYGEN SATURATION: 99 % | TEMPERATURE: 98.3 F | WEIGHT: 155 LBS | SYSTOLIC BLOOD PRESSURE: 110 MMHG

## 2023-05-05 PROCEDURE — 74011000250 HC RX REV CODE- 250: Performed by: ANESTHESIOLOGY

## 2023-05-05 PROCEDURE — 74780000002 HC ELECTROSHOCK THERAP RECOVERY: Performed by: PSYCHIATRY & NEUROLOGY

## 2023-05-05 PROCEDURE — 90870 ELECTROCONVULSIVE THERAPY: CPT | Performed by: PSYCHIATRY & NEUROLOGY

## 2023-05-05 PROCEDURE — 74011250636 HC RX REV CODE- 250/636: Performed by: ANESTHESIOLOGY

## 2023-05-05 PROCEDURE — 2709999900 HC NON-CHARGEABLE SUPPLY: Performed by: PSYCHIATRY & NEUROLOGY

## 2023-05-05 RX ORDER — SODIUM CHLORIDE 0.9 % (FLUSH) 0.9 %
5-40 SYRINGE (ML) INJECTION EVERY 8 HOURS
Status: DISCONTINUED | OUTPATIENT
Start: 2023-05-05 | End: 2023-05-05 | Stop reason: HOSPADM

## 2023-05-05 RX ORDER — SODIUM CHLORIDE 0.9 % (FLUSH) 0.9 %
5-40 SYRINGE (ML) INJECTION AS NEEDED
Status: DISCONTINUED | OUTPATIENT
Start: 2023-05-05 | End: 2023-05-05 | Stop reason: HOSPADM

## 2023-05-05 RX ORDER — SODIUM CHLORIDE, SODIUM LACTATE, POTASSIUM CHLORIDE, CALCIUM CHLORIDE 600; 310; 30; 20 MG/100ML; MG/100ML; MG/100ML; MG/100ML
50 INJECTION, SOLUTION INTRAVENOUS CONTINUOUS
Status: DISCONTINUED | OUTPATIENT
Start: 2023-05-05 | End: 2023-05-05 | Stop reason: HOSPADM

## 2023-05-05 RX ORDER — SODIUM CHLORIDE 9 MG/ML
INJECTION, SOLUTION INTRAVENOUS
Status: DISCONTINUED | OUTPATIENT
Start: 2023-05-05 | End: 2023-05-05 | Stop reason: HOSPADM

## 2023-05-05 RX ORDER — SUCCINYLCHOLINE CHLORIDE 20 MG/ML
INJECTION INTRAMUSCULAR; INTRAVENOUS AS NEEDED
Status: DISCONTINUED | OUTPATIENT
Start: 2023-05-05 | End: 2023-05-05 | Stop reason: HOSPADM

## 2023-05-05 RX ORDER — METHOHEXITAL IN WATER/PF 100MG/10ML
SYRINGE (ML) INTRAVENOUS AS NEEDED
Status: DISCONTINUED | OUTPATIENT
Start: 2023-05-05 | End: 2023-05-05 | Stop reason: HOSPADM

## 2023-05-05 RX ORDER — SODIUM CHLORIDE 9 MG/ML
50 INJECTION, SOLUTION INTRAVENOUS CONTINUOUS
Status: DISCONTINUED | OUTPATIENT
Start: 2023-05-05 | End: 2023-05-05 | Stop reason: HOSPADM

## 2023-05-05 RX ADMIN — Medication 60 MG: at 09:17

## 2023-05-05 RX ADMIN — SUCCINYLCHOLINE CHLORIDE 5 MG: 20 INJECTION, SOLUTION INTRAMUSCULAR; INTRAVENOUS at 09:17

## 2023-05-05 RX ADMIN — SODIUM CHLORIDE: 9 INJECTION, SOLUTION INTRAVENOUS at 08:59

## 2023-05-07 NOTE — BH NOTES
[facility]  Infectious Disease  Progress Note     Date of Admission:  5/2/2023    Assessment & Plan    Gram negative bacteremia-  Capnocytophaga  Acute febrile illness with arthralgias notably right shoulder and ankle, body aches, rash, transient diarrhea, that followed a superficial dog bite to the finger>>all can be explained by capnocytophaga bacteremia.  Susceptibility remain in process.    While there is no evidence of finger related cellulitis or tenosynovitis it is possible this active as a portal of entry for a disseminated bacterial infection  Leukocytosis is consistent with bacterial infection  History of splenectomy for spherocytosis- puts hism at risk for capno infectin    History shoulder replacement; MRI OK; passive motion OK; improving  Ankle discomfort improving as well    Diarrhea better- cdiff negative    Leukocytosis , resolved  Fever down    S/p shoulder tap; not much ; MRI unremarkable; not likley septic arthritis; perhaps synovitis         Rashes, various morphologies; pustular neck.  History of chickenpox.  Lesions stopped on the midline.  HSV/VZV PCR in process.  On valacyclovir.      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  Cervical shingle with positive VZV PCR.     Plan  Discontinue IV ceftriaxone and Unasyn.  Discharge on p.o. Augmentin 875/125 mg twice daily for 14 days.  Complete a 7-day course of valacyclovir.  For cervical shingles  Outpatient antibiotic order sent.    Discussed with the patient, nursing staff.    ID will sign off.        Joaquin Tierney MD      _______________________________________________________    Interval History   Doing okay today.  Eager to go home.  Susceptibility not back yet.  VZV PCR positive.    Can stand better after a while (initial stiffness)  Had shoulder tapped, moves better    Physical Exam   Vital Signs: Temp: 97.7  F (36.5  C) Temp src: Oral BP: (!) 151/90 Pulse: 58   Resp: 18 SpO2: 94 % O2  Problem: Depressed Mood (Adult/Pediatric) Goal: *STG: Remains safe in hospital 
Outcome: Progressing Towards Goal 
Pt is visible in the milieu. Pt presents as quiet and isolative but cooperative. Pt is meals compliant, but refused to take her medications. Pt has participated in her treatment team meeting today. Will continue to monitor q 15 for safety, mood and behavior changes. Device: None (Room air)    Weight: 249 lbs 9.6 oz  Gen. appearance nontoxic  Eyes no conjunctivitis or icterus  Neck no stiffness or neck vein distention, no LN  Papular rash  Abdomen soft not tender  Extremities passive motion of shoulder OK; active motion difficult but better  Can stand (after stiffness resolved  Skin  no rash or emboli  Neurologic alert oriented no focal deficits      Data   Results for orders placed or performed during the hospital encounter of 05/02/23 (from the past 24 hour(s))   Potassium   Result Value Ref Range    Potassium 4.0 3.5 - 5.0 mmol/L   Magnesium   Result Value Ref Range    Magnesium 2.0 1.8 - 2.6 mg/dL

## 2023-05-08 NOTE — ED NOTES
Diana is a 11 year old who is being evaluated via a billable video visit.      How would you like to obtain your AVS? MyChart  If the video visit is dropped, the invitation should be resent by: Text to cell phone: [phone]  Will anyone else be joining your video visit? No          Assessment & Plan   (F90.2) Attention deficit hyperactivity disorder (ADHD), combined type  Plan: buPROPion (WELLBUTRIN XL) 150 MG 24 hr tablet        continue current care    (F32.1) Current moderate episode of major depressive disorder without prior episode (H)  (F41.9) Anxiety  Plan: buPROPion (WELLBUTRIN XL) 150 MG 24 hr tablet        Continue therapy.    Follow up in 6 months for medication recheck, sooner if needed.      Terri Williamson MD        Subjective   Diana is a 11 year old, presenting for the following health issues:  Recheck Medication        5/8/2023     8:25 AM   Additional Questions   Roomed by joey   Accompanied by mom     History of Present Illness       Reason for visit:  Check up on new medication      Diana was started on Wellbutrin 3 months ago for her anxiety, depression, and ADHD.  We met 2 months ago for her well check and noted that she was experiencing appetite suppression, and in increase in anxiety.  We had elected to switch Wellbutrin to evening rather than morning doses.    Today, Diana reports that she went back to morning dosing because she noted that the evening dosing was making it difficult to sleep.  She was spending a lot of time on the school play, in which she was the lead.  Now that the play has ended, she is no longer noting any appetite problems.  Her anxiety and depression symptoms are also quite minimal.    She finally has time for therapy, and will be starting biweekly therapy at the end of this week, and that is scheduled to continue through the summer.     She feels that her focus is good and that the medication is working well for her.       Review of Systems   Constitutional, eye,  Patient resting comfortably. Remains in handcuffs. Officer at bedside, pt close to nurses's station with curtains open. Call bell within reach. ENT, skin, respiratory, cardiac, and GI are normal except as otherwise noted.      Objective           Vitals:  No vitals were obtained today due to virtual visit.    Physical Exam   GENERAL: Healthy, alert and no distress  EYES: Eyes grossly normal to inspection.  No discharge or erythema, or obvious scleral/conjunctival abnormalities.  RESP: No audible wheeze, cough, or visible cyanosis.  No visible retractions or increased work of breathing.    SKIN: Visible skin clear. No significant rash, abnormal pigmentation or lesions.  NEURO: Cranial nerves grossly intact.  Mentation and speech appropriate for age.  PSYCH: Mentation appears normal, affect normal/bright, judgement and insight intact, normal speech and appearance well-groomed.      Diagnostics: None          Video-Visit Details    Type of service:  Video Visit     Originating Location (pt. Location): Home    Distant Location (provider location):  On-site  Platform used for Video Visit: Artimi   Video start time: 8:27 AM  Video end time: 8:34 AM

## 2023-05-12 ENCOUNTER — ANESTHESIA EVENT (OUTPATIENT)
Facility: HOSPITAL | Age: 63
End: 2023-05-12
Payer: MEDICAID

## 2023-05-19 ENCOUNTER — ANESTHESIA (OUTPATIENT)
Facility: HOSPITAL | Age: 63
End: 2023-05-19
Payer: MEDICAID

## 2023-05-19 ENCOUNTER — HOSPITAL ENCOUNTER (OUTPATIENT)
Facility: HOSPITAL | Age: 63
Setting detail: OUTPATIENT SURGERY
Discharge: HOME | End: 2023-05-19
Attending: PSYCHIATRY & NEUROLOGY | Admitting: PSYCHIATRY & NEUROLOGY
Payer: MEDICAID

## 2023-05-19 VITALS
WEIGHT: 155 LBS | SYSTOLIC BLOOD PRESSURE: 125 MMHG | TEMPERATURE: 98.2 F | DIASTOLIC BLOOD PRESSURE: 70 MMHG | OXYGEN SATURATION: 99 % | RESPIRATION RATE: 15 BRPM | HEART RATE: 84 BPM | HEIGHT: 62 IN | BODY MASS INDEX: 28.52 KG/M2

## 2023-05-19 PROCEDURE — 3600000002 HC SURGERY LEVEL 2 BASE: Performed by: PSYCHIATRY & NEUROLOGY

## 2023-05-19 PROCEDURE — 3700000000 HC ANESTHESIA ATTENDED CARE: Performed by: PSYCHIATRY & NEUROLOGY

## 2023-05-19 PROCEDURE — 7100000001 HC PACU RECOVERY - ADDTL 15 MIN: Performed by: PSYCHIATRY & NEUROLOGY

## 2023-05-19 PROCEDURE — 6360000002 HC RX W HCPCS: Performed by: ANESTHESIOLOGY

## 2023-05-19 PROCEDURE — 7100000000 HC PACU RECOVERY - FIRST 15 MIN: Performed by: PSYCHIATRY & NEUROLOGY

## 2023-05-19 PROCEDURE — 7100000010 HC PHASE II RECOVERY - FIRST 15 MIN: Performed by: PSYCHIATRY & NEUROLOGY

## 2023-05-19 PROCEDURE — 2709999900 HC NON-CHARGEABLE SUPPLY: Performed by: PSYCHIATRY & NEUROLOGY

## 2023-05-19 PROCEDURE — 7100000011 HC PHASE II RECOVERY - ADDTL 15 MIN: Performed by: PSYCHIATRY & NEUROLOGY

## 2023-05-19 PROCEDURE — 2500000003 HC RX 250 WO HCPCS: Performed by: ANESTHESIOLOGY

## 2023-05-19 RX ORDER — SODIUM CHLORIDE 0.9 % (FLUSH) 0.9 %
5-40 SYRINGE (ML) INJECTION EVERY 12 HOURS SCHEDULED
Status: DISCONTINUED | OUTPATIENT
Start: 2023-05-19 | End: 2023-05-19 | Stop reason: HOSPADM

## 2023-05-19 RX ORDER — SODIUM CHLORIDE 9 MG/ML
INJECTION, SOLUTION INTRAVENOUS CONTINUOUS
Status: DISCONTINUED | OUTPATIENT
Start: 2023-05-19 | End: 2023-05-19 | Stop reason: HOSPADM

## 2023-05-19 RX ORDER — SODIUM CHLORIDE 9 MG/ML
INJECTION, SOLUTION INTRAVENOUS PRN
Status: DISCONTINUED | OUTPATIENT
Start: 2023-05-19 | End: 2023-05-19 | Stop reason: HOSPADM

## 2023-05-19 RX ORDER — SODIUM CHLORIDE 0.9 % (FLUSH) 0.9 %
5-40 SYRINGE (ML) INJECTION PRN
Status: DISCONTINUED | OUTPATIENT
Start: 2023-05-19 | End: 2023-05-19 | Stop reason: HOSPADM

## 2023-05-19 RX ORDER — SUCCINYLCHOLINE/SOD CL,ISO/PF 100 MG/5ML
SYRINGE (ML) INTRAVENOUS PRN
Status: DISCONTINUED | OUTPATIENT
Start: 2023-05-19 | End: 2023-05-19 | Stop reason: SDUPTHER

## 2023-05-19 RX ADMIN — Medication 50 MG: at 09:38

## 2023-05-19 RX ADMIN — Medication 60 MG: at 09:38

## 2023-05-19 NOTE — PROCEDURES
ECT PROCEDURE NOTE         IDENTIFICATION:           Patient Name   Moraima Elder         Date of Birth  1960         [facility]  [id number]         Medical Record Number   [id number]             Age   57 y.o. PCP  Fabrizio Brock MD         Admit date:   5/5/2023          Room Number   PACU/PL  @ [facility]         Date of Service   5/19/2023                         Electro Convulsive Therapy:   Treatment number 14         Maintenance ECT NO        Moraima Elder was admitted to the PACU for ECT. Patient was medically cleared and NPO for procedure. Patient is NOTcourt mandated and gave informed consent for ECT treatment. Patient received       Bilateral ECT YES    Administered using the [address] [place] [address] Drive. at 40 % Energy, under general anesthesia. Moraima Elder with a good, well generalized seizure of 74 seconds on observation of the motor manifestations of the seizure. EEG duration was NA secs. Post-Ictal Suppression Index: NA %   Recovery was unremarkable and with no complications. Change in Energy %:                 Anesthesia medications administered during procedure:   Brevital:  60 mg   Change for next treament:       Succinylcholine: 50 mg   Change for next treatment:        Propofol: mg   Glycopyrrolate:  mg   Labetalol:  mg   Esmolol:  mg   Lorazepam:  mg   Ketamine:  mg   Zofran:   mg     Toradol:  mg   Lidocaine:  mg   Caffeine Benzoate: mg               CSSRS  1/26/2023 7/8/2022         1) Within the past month, have you wished you were dead or wished you could go to sleep and not wake up? No  No     2) Have you actually had any thoughts of killing yourself? No  No     6) Have you ever done anything, started to do anything, or prepared to do anything to end your life? Yes  No         Did this occur within the past 3 months?   Yes  -

## 2023-05-19 NOTE — ANESTHESIA PRE PROCEDURE
Department of Anesthesiology  Preprocedure Note       Name:  Calista Couch   Age:  57 y.o.  :  1960                                          MRN:  [id number]         Date:  2023      Surgeon: Brianna Hatch):  Salome Akins MD    Procedure: Procedure(s):  ELECTROCONVULSIVE THERAPY    Medications prior to admission:   Prior to Admission medications    Medication Sig Start Date End Date Taking?  Authorizing Provider   apixaban (ELIQUIS) 5 MG TABS tablet Take by mouth 2 times daily 23   Ar Automatic Reconciliation   busPIRone (BUSPAR) 10 MG tablet Take by mouth 3 times daily 23   Ar Automatic Reconciliation   levothyroxine (SYNTHROID) 112 MCG tablet Take by mouth every morning (before breakfast) 23   Ar Automatic Reconciliation   melatonin 3 MG TABS tablet Take by mouth 23   Ar Automatic Reconciliation   midodrine (PROAMATINE) 10 MG tablet Take by mouth 3 times daily (with meals) 23   Ar Automatic Reconciliation   mirtazapine (REMERON) 45 MG tablet Take by mouth 23   Ar Automatic Reconciliation   OLANZapine (ZYPREXA) 20 MG tablet Take by mouth 23   Ar Automatic Reconciliation   venlafaxine (EFFEXOR XR) 150 MG extended release capsule Take by mouth daily 23   Ar Automatic Reconciliation       Current medications:    Current Facility-Administered Medications   Medication Dose Route Frequency Provider Last Rate Last Admin    0.9 % sodium chloride infusion   IntraVENous Continuous Josue Rivera MD        sodium chloride flush 0.9 % injection 5-40 mL  5-40 mL IntraVENous 2 times per day Josue Rivera MD        sodium chloride flush 0.9 % injection 5-40 mL  5-40 mL IntraVENous PRN Josue Rivera MD        0.9 % sodium chloride infusion   IntraVENous PRN Josue Rivera MD        0.9 % sodium chloride infusion   IntraVENous Continuous Josue Rivera MD        sodium chloride flush 0.9 % injection 5-40 mL  5-40 mL IntraVENous 2 times per day MD Mateo Whalen

## 2023-05-19 NOTE — PERIOP NOTE
Preston Givens  1960  [id number]    Situation:  Verbal report given from: rafael  Procedure: Procedure(s):  ELECTROCONVULSIVE THERAPY    Background:    Preoperative diagnosis: Major depressive disorder, recurrent episode, moderate ([facility] [F33.1]    Postoperative diagnosis: * No post-op diagnosis entered *    :  Dr. Robb Cardona    Assistant(s): Circulator: Yakelin Mei RN  Scrub Person First: Sharif Fraser RN    Specimens: * No specimens in log *    Assessment:  Intra-procedure medications         Anesthesia gave intra-procedure sedation and medications, see anesthesia flow sheet     Intravenous fluids: LR@ KVO     Vital signs stable       Recommendation:    Permission to share finding with  :

## 2023-05-19 NOTE — DISCHARGE INSTRUCTIONS
ECT DISCHARGE INSTRUCTIONS      NAME: Kim Rojas   :  1960   MRN:  [id number]     Date/Time:  2023 9:43 AM    ADMIT DATE: 2023     DISCHARGE DATE: 2023     DISCHARGE DIAGNOSIS:  [unfilled]     Recommended diet:  {diet:37668}    Recommended activity: {discharge activity:31711}    Have a responsible person stay with the patient for 24 hours after the treatment, some temporary confusion may be noticed. Do not allow a patient to operate a motor vehicle for at least 24 hours and all the confusion has cleared. Do not drink alcoholic beverages for 48 hours past treatment. Do not sign any legal documents. Do not make any critical decisions for 24 hours. Advance diet as tolerated. Start with liquids and advance it nausea is not present. Understand that memory for recent events, phone numbers, addresses, ect. May be decreased for a short period of time. Report any persistant nausea or pain to the treating physician. Patient receiving ECT while in the hospital should also not attempt to ambulate without assistance, please use tap bell which will be provide. If you have questions regarding the hospital related prescriptions or hospital related issues please call the [address] [place] [address] [phone]. If you experience any of the following symptoms then please call your primary care physician/outpatient psychiatrist or return to the emergency room if you cannot get hold of your doctor: Fever, chills, nausea, vomiting, diarrhea, change in mentation, falling, bleeding, shortness of breath. Follow Up:  Continue outpatient psychiatric follow-up visits with personal psychiatrist.  Return for next ECT treatment in ***        Information obtained by :  I understand that if any problems occur once I am at home I am to contact my physician. I understand and acknowledge receipt of the instructions indicated above.

## 2023-05-19 NOTE — ANESTHESIA POSTPROCEDURE EVALUATION
Department of Anesthesiology  Postprocedure Note    Patient: Calista Couch  MRN: [id number]  YOB: 1960  Date of evaluation: 5/19/2023      Procedure Summary     Date: 05/19/23 Room / Location: [facility] OR R2 / [facility] OR    Anesthesia Start: 1325 Anesthesia Stop: 3648    Procedure: ELECTROCONVULSIVE THERAPY (Head) Diagnosis:       Major depressive disorder, recurrent episode, moderate (HCC)      (Major depressive [facility], recurrent episode, [facility] ([facility] [F33.1])    Surgeons: Salome Akins MD Responsible Provider: Josue Rivera MD    Anesthesia Type: general ASA Status: 2          Anesthesia Type: No value filed.     Ama Phase I: Ama Score: 8    Ama Phase II:        Anesthesia Post Evaluation    Patient location during evaluation: PACU  Patient participation: waiting for patient participation  Level of consciousness: awake  Airway patency: patent  Nausea & Vomiting: no nausea and no vomiting  Complications: no  Cardiovascular status: hemodynamically stable  Respiratory status: acceptable  Hydration status: stable

## 2023-05-26 ENCOUNTER — ANESTHESIA EVENT (OUTPATIENT)
Facility: HOSPITAL | Age: 63
End: 2023-05-26
Payer: MEDICAID

## 2023-06-02 ENCOUNTER — ANESTHESIA (OUTPATIENT)
Facility: HOSPITAL | Age: 63
End: 2023-06-02
Payer: MEDICAID

## 2023-06-02 ENCOUNTER — HOSPITAL ENCOUNTER (OUTPATIENT)
Facility: HOSPITAL | Age: 63
Setting detail: OUTPATIENT SURGERY
Discharge: HOME | End: 2023-06-02
Attending: PSYCHIATRY & NEUROLOGY | Admitting: PSYCHIATRY & NEUROLOGY
Payer: MEDICAID

## 2023-06-02 VITALS
HEIGHT: 62 IN | HEART RATE: 87 BPM | BODY MASS INDEX: 28.52 KG/M2 | WEIGHT: 155 LBS | OXYGEN SATURATION: 94 % | RESPIRATION RATE: 17 BRPM | DIASTOLIC BLOOD PRESSURE: 69 MMHG | TEMPERATURE: 97.9 F | SYSTOLIC BLOOD PRESSURE: 113 MMHG

## 2023-06-02 PROCEDURE — 6360000002 HC RX W HCPCS: Performed by: ANESTHESIOLOGY

## 2023-06-02 PROCEDURE — 3600000002 HC SURGERY LEVEL 2 BASE: Performed by: PSYCHIATRY & NEUROLOGY

## 2023-06-02 PROCEDURE — 2500000003 HC RX 250 WO HCPCS: Performed by: ANESTHESIOLOGY

## 2023-06-02 PROCEDURE — 7100000000 HC PACU RECOVERY - FIRST 15 MIN: Performed by: PSYCHIATRY & NEUROLOGY

## 2023-06-02 PROCEDURE — 7100000010 HC PHASE II RECOVERY - FIRST 15 MIN: Performed by: PSYCHIATRY & NEUROLOGY

## 2023-06-02 PROCEDURE — 2709999900 HC NON-CHARGEABLE SUPPLY: Performed by: PSYCHIATRY & NEUROLOGY

## 2023-06-02 PROCEDURE — 7100000001 HC PACU RECOVERY - ADDTL 15 MIN: Performed by: PSYCHIATRY & NEUROLOGY

## 2023-06-02 PROCEDURE — 3700000000 HC ANESTHESIA ATTENDED CARE: Performed by: PSYCHIATRY & NEUROLOGY

## 2023-06-02 RX ORDER — SODIUM CHLORIDE 0.9 % (FLUSH) 0.9 %
5-40 SYRINGE (ML) INJECTION EVERY 12 HOURS SCHEDULED
Status: DISCONTINUED | OUTPATIENT
Start: 2023-06-02 | End: 2023-06-02 | Stop reason: HOSPADM

## 2023-06-02 RX ORDER — SODIUM CHLORIDE 0.9 % (FLUSH) 0.9 %
5-40 SYRINGE (ML) INJECTION PRN
Status: DISCONTINUED | OUTPATIENT
Start: 2023-06-02 | End: 2023-06-02 | Stop reason: HOSPADM

## 2023-06-02 RX ORDER — SODIUM CHLORIDE 9 MG/ML
INJECTION, SOLUTION INTRAVENOUS CONTINUOUS
Status: DISCONTINUED | OUTPATIENT
Start: 2023-06-02 | End: 2023-06-02 | Stop reason: HOSPADM

## 2023-06-02 RX ORDER — SUCCINYLCHOLINE/SOD CL,ISO/PF 100 MG/5ML
SYRINGE (ML) INTRAVENOUS PRN
Status: DISCONTINUED | OUTPATIENT
Start: 2023-06-02 | End: 2023-06-02 | Stop reason: SDUPTHER

## 2023-06-02 RX ORDER — SODIUM CHLORIDE 9 MG/ML
INJECTION, SOLUTION INTRAVENOUS PRN
Status: CANCELLED | OUTPATIENT
Start: 2023-06-02

## 2023-06-02 RX ORDER — SODIUM CHLORIDE 0.9 % (FLUSH) 0.9 %
5-40 SYRINGE (ML) INJECTION EVERY 12 HOURS SCHEDULED
Status: CANCELLED | OUTPATIENT
Start: 2023-06-02

## 2023-06-02 RX ORDER — SODIUM CHLORIDE 0.9 % (FLUSH) 0.9 %
5-40 SYRINGE (ML) INJECTION PRN
Status: CANCELLED | OUTPATIENT
Start: 2023-06-02

## 2023-06-02 RX ORDER — PRAVASTATIN SODIUM 40 MG
40 TABLET ORAL DAILY
COMMUNITY

## 2023-06-02 RX ORDER — SODIUM CHLORIDE 9 MG/ML
INJECTION, SOLUTION INTRAVENOUS PRN
Status: DISCONTINUED | OUTPATIENT
Start: 2023-06-02 | End: 2023-06-02 | Stop reason: HOSPADM

## 2023-06-02 RX ADMIN — Medication 50 MG: at 08:38

## 2023-06-02 RX ADMIN — Medication 60 MG: at 08:38

## 2023-06-02 ASSESSMENT — PAIN - FUNCTIONAL ASSESSMENT
PAIN_FUNCTIONAL_ASSESSMENT: ADULT NONVERBAL PAIN SCALE (NPVS)
PAIN_FUNCTIONAL_ASSESSMENT: NONE - DENIES PAIN

## 2023-06-02 NOTE — PROCEDURES
ECT PROCEDURE NOTE         IDENTIFICATION:           Patient Name   Bhakti Laboy         Date of Birth  1960         [facility]  [id number]         Medical Record Number   [id number]             Age   57 y.o. PCP  Anthony Cooper MD         Admit date:  06/02/23           Room Number   PACU/PL  @ [facility]         Date of Service   6/02/2023 06/02/23                          Electro Convulsive Therapy:   Treatment number 15         Maintenance ECT NO        Bhakti Laboy was admitted to the PACU for ECT. Patient was medically cleared and NPO for procedure. Patient is NOTcourt mandated and gave informed consent for ECT treatment. Patient received       Bilateral ECT YES    Administered using the [address] [place] [address] Drive. at 40 % Energy, under general anesthesia. Bhakti Laboy with a good, well generalized seizure of 62 seconds on observation of the motor manifestations of the seizure. EEG duration was 86 secs. Post-Ictal Suppression Index: 64.3 %   Recovery was unremarkable and with no complications. Change in Energy %:                 Anesthesia medications administered during procedure:   Brevital:  60 mg   Change for next treament:       Succinylcholine: 50 mg   Change for next treatment:        Propofol: mg   Glycopyrrolate:  mg   Labetalol:  mg   Esmolol:  mg   Lorazepam:  mg   Ketamine:  mg   Zofran:   mg     Toradol:  mg   Lidocaine:  mg   Caffeine Benzoate: mg               CSSRS  1/26/2023 7/8/2022         1) Within the past month, have you wished you were dead or wished you could go to sleep and not wake up? No  No     2) Have you actually had any thoughts of killing yourself? No  No     6) Have you ever done anything, started to do anything, or prepared to do anything to end your life? Yes  No         Did this occur within the past 3 months?   Yes  -

## 2023-06-02 NOTE — ANESTHESIA POSTPROCEDURE EVALUATION
Department of Anesthesiology  Postprocedure Note    Patient: Nate Jewell  MRN: [id number]  YOB: 1960  Date of evaluation: 6/2/2023      Procedure Summary     Date: 06/02/23 Room / Location: [address] [place] [address] MAIN OR R2 / [address] [place] [address] OR    Anesthesia Start: 4391 Anesthesia Stop: 0848    Procedure: ELECTROCONVULSIVE THERAPY (Head) Diagnosis:       Major depressive disorder, recurrent episode, moderate (HCC)      (Major depressive disorder, recurrent episode, moderate ([facility] [F33.[facility]])    Surgeons: Spencer Peralta MD Responsible Provider: Rakan Olivera MD    Anesthesia Type: general ASA Status: 2          Anesthesia Type: No value filed.     Ama Phase I: Ama Score: 10    Ama Phase II:        Anesthesia Post Evaluation    Patient location during evaluation: PACU  Patient participation: complete - patient participated  Level of consciousness: awake and alert  Airway patency: patent  Nausea & Vomiting: no vomiting and no nausea  Complications: no  Cardiovascular status: hemodynamically stable  Respiratory status: acceptable  Hydration status: stable

## 2023-06-02 NOTE — DISCHARGE INSTRUCTIONS
shortness of breath while lying flat in bed. Please call your doctor as soon as you notice any of these symptoms; do not wait until your next office visit. The discharge information has been reviewed with the patient and caregiver. The patient and caregiver verbalized understanding. Discharge medications reviewed with the patient and caregiver and appropriate educational materials and side effects teaching were provided.   ___________________________________________________________________________________________________________________________________

## 2023-06-02 NOTE — ANESTHESIA PRE PROCEDURE
Department of Anesthesiology  Preprocedure Note       Name:  Vidal Porter   Age:  57 y.o.  :  1960                                          MRN:  [id number]         Date:  2023      Surgeon: Rakesh Gaspar):  Olimpia Moon MD    Procedure: Procedure(s):  ELECTROCONVULSIVE THERAPY    Medications prior to admission:   Prior to Admission medications    Medication Sig Start Date End Date Taking? Authorizing Provider   apixaban (ELIQUIS) 5 MG TABS tablet Take by mouth 2 times daily 23   Ar Automatic Reconciliation   busPIRone (BUSPAR) 10 MG tablet Take by mouth 3 times daily 23   Ar Automatic Reconciliation   levothyroxine (SYNTHROID) 112 MCG tablet Take by mouth every morning (before breakfast) 23   Ar Automatic Reconciliation   melatonin 3 MG TABS tablet Take by mouth 23   Ar Automatic Reconciliation   midodrine (PROAMATINE) 10 MG tablet Take by mouth 3 times daily (with meals) 23   Ar Automatic Reconciliation   mirtazapine (REMERON) 45 MG tablet Take by mouth 23   Ar Automatic Reconciliation   OLANZapine (ZYPREXA) 20 MG tablet Take by mouth 23   Ar Automatic Reconciliation   venlafaxine (EFFEXOR XR) 150 MG extended release capsule Take by mouth daily 23   Ar Automatic Reconciliation       Current medications:    No current outpatient medications on file. No current facility-administered medications for this visit. Allergies: Allergies   Allergen Reactions    Azelastine Other (See Comments)     Violent headaches.  Banana Other (See Comments)     Diffculty breathing, wheezing, asthma attack.  Cortisone Hives     Difficulty breathing.  Other Other (See Comments)     \"Bad chest pain\" with walnuts. Coke - asthma/stuffy head. Fish sticks causes an asthma attack.  Prednisone Hives     Difficulty breathing, kidney stones.  Sulfamethoxazole-Trimethoprim Other (See Comments)     Difficulty breathing, chills, fever.        Problem List:    Patient Active

## 2023-06-02 NOTE — PERIOP NOTE
Patient meets discharge criteria. Patient and , Douglas Castleman, provided with verbal and written discharge instructions. Patient discharged by wheelchair with , Douglas Castleman, to transport home.

## 2023-06-02 NOTE — PERIOP NOTE
Nusrat Suero  1960  [id number]    Situation:  Verbal report given from: Dr. Gerhardt Boyers and Alexis Caldwell RN  Procedure: Procedure(s):  ELECTROCONVULSIVE THERAPY    Background:    Preoperative diagnosis: Major depressive disorder, recurrent episode, moderate ([facility] [F33.1]    Postoperative diagnosis: * No post-op diagnosis entered *    :  Dr. Barney Alegre    Assistant(s): Circulator: Shannon Garcia RN  Scrub Person First: Kate Mcbride    Specimens: * No specimens in log *    Assessment:  Intra-procedure medications         Anesthesia gave intra-procedure sedation and medications, see anesthesia flow sheet     Intravenous fluids: LR@ KVO     Vital signs stable

## 2023-06-23 ENCOUNTER — ANESTHESIA EVENT (OUTPATIENT)
Facility: HOSPITAL | Age: 63
End: 2023-06-23
Payer: MEDICAID

## 2023-06-30 ENCOUNTER — HOSPITAL ENCOUNTER (OUTPATIENT)
Facility: HOSPITAL | Age: 63
Setting detail: OUTPATIENT SURGERY
Discharge: HOME | DRG: 751 | End: 2023-06-30
Attending: PSYCHIATRY & NEUROLOGY | Admitting: PSYCHIATRY & NEUROLOGY
Payer: MEDICAID

## 2023-06-30 ENCOUNTER — ANESTHESIA (OUTPATIENT)
Facility: HOSPITAL | Age: 63
End: 2023-06-30
Payer: MEDICAID

## 2023-06-30 VITALS
BODY MASS INDEX: 28.52 KG/M2 | RESPIRATION RATE: 20 BRPM | SYSTOLIC BLOOD PRESSURE: 122 MMHG | TEMPERATURE: 98.5 F | HEART RATE: 87 BPM | OXYGEN SATURATION: 94 % | HEIGHT: 62 IN | DIASTOLIC BLOOD PRESSURE: 70 MMHG | WEIGHT: 155 LBS

## 2023-06-30 PROCEDURE — 3700000000 HC ANESTHESIA ATTENDED CARE: Performed by: PSYCHIATRY & NEUROLOGY

## 2023-06-30 PROCEDURE — 90870 ELECTROCONVULSIVE THERAPY: CPT | Performed by: PSYCHIATRY & NEUROLOGY

## 2023-06-30 PROCEDURE — 2500000003 HC RX 250 WO HCPCS: Performed by: ANESTHESIOLOGY

## 2023-06-30 PROCEDURE — 7100000000 HC PACU RECOVERY - FIRST 15 MIN: Performed by: PSYCHIATRY & NEUROLOGY

## 2023-06-30 PROCEDURE — 6360000002 HC RX W HCPCS: Performed by: ANESTHESIOLOGY

## 2023-06-30 PROCEDURE — 7100000010 HC PHASE II RECOVERY - FIRST 15 MIN: Performed by: PSYCHIATRY & NEUROLOGY

## 2023-06-30 PROCEDURE — 2709999900 HC NON-CHARGEABLE SUPPLY: Performed by: PSYCHIATRY & NEUROLOGY

## 2023-06-30 PROCEDURE — 7100000011 HC PHASE II RECOVERY - ADDTL 15 MIN: Performed by: PSYCHIATRY & NEUROLOGY

## 2023-06-30 RX ORDER — SODIUM CHLORIDE 9 MG/ML
INJECTION, SOLUTION INTRAVENOUS PRN
Status: DISCONTINUED | OUTPATIENT
Start: 2023-06-30 | End: 2023-06-30 | Stop reason: HOSPADM

## 2023-06-30 RX ORDER — SODIUM CHLORIDE 0.9 % (FLUSH) 0.9 %
5-40 SYRINGE (ML) INJECTION PRN
Status: DISCONTINUED | OUTPATIENT
Start: 2023-06-30 | End: 2023-06-30 | Stop reason: HOSPADM

## 2023-06-30 RX ORDER — SODIUM CHLORIDE 0.9 % (FLUSH) 0.9 %
5-40 SYRINGE (ML) INJECTION EVERY 12 HOURS SCHEDULED
Status: DISCONTINUED | OUTPATIENT
Start: 2023-06-30 | End: 2023-06-30 | Stop reason: HOSPADM

## 2023-06-30 RX ORDER — SODIUM CHLORIDE 9 MG/ML
INJECTION, SOLUTION INTRAVENOUS CONTINUOUS
Status: DISCONTINUED | OUTPATIENT
Start: 2023-06-30 | End: 2023-06-30 | Stop reason: HOSPADM

## 2023-06-30 RX ORDER — SUCCINYLCHOLINE/SOD CL,ISO/PF 100 MG/5ML
SYRINGE (ML) INTRAVENOUS PRN
Status: DISCONTINUED | OUTPATIENT
Start: 2023-06-30 | End: 2023-06-30 | Stop reason: SDUPTHER

## 2023-06-30 RX ADMIN — Medication 50 MG: at 09:17

## 2023-06-30 RX ADMIN — Medication 60 MG: at 09:17

## 2023-06-30 ASSESSMENT — PAIN - FUNCTIONAL ASSESSMENT
PAIN_FUNCTIONAL_ASSESSMENT: NONE - DENIES PAIN
PAIN_FUNCTIONAL_ASSESSMENT: NONE - DENIES PAIN
PAIN_FUNCTIONAL_ASSESSMENT: ADULT NONVERBAL PAIN SCALE (NPVS)
PAIN_FUNCTIONAL_ASSESSMENT: NONE - DENIES PAIN
PAIN_FUNCTIONAL_ASSESSMENT: NONE - DENIES PAIN

## 2023-07-01 ENCOUNTER — HOSPITAL ENCOUNTER (INPATIENT)
Facility: HOSPITAL | Age: 63
LOS: 10 days | Discharge: HOME | DRG: 751 | End: 2023-07-11
Attending: PSYCHIATRY & NEUROLOGY | Admitting: PSYCHIATRY & NEUROLOGY
Payer: MEDICAID

## 2023-07-01 PROBLEM — F32.3 MAJOR DEPRESSIVE DISORDER WITH PSYCHOTIC FEATURES (HCC): Status: ACTIVE | Noted: 2023-07-01

## 2023-07-01 PROCEDURE — 99223 1ST HOSP IP/OBS HIGH 75: CPT | Performed by: PSYCHIATRY & NEUROLOGY

## 2023-07-01 PROCEDURE — 1240000000 HC EMOTIONAL WELLNESS R&B

## 2023-07-01 PROCEDURE — 6370000000 HC RX 637 (ALT 250 FOR IP): Performed by: PSYCHIATRY & NEUROLOGY

## 2023-07-01 RX ORDER — MIRTAZAPINE 15 MG/1
45 TABLET, FILM COATED ORAL NIGHTLY
Status: DISCONTINUED | OUTPATIENT
Start: 2023-07-01 | End: 2023-07-11 | Stop reason: HOSPADM

## 2023-07-01 RX ORDER — LEVOTHYROXINE SODIUM 0.05 MG/1
100 TABLET ORAL DAILY
Status: DISCONTINUED | OUTPATIENT
Start: 2023-07-01 | End: 2023-07-11 | Stop reason: HOSPADM

## 2023-07-01 RX ORDER — BENZTROPINE MESYLATE 0.5 MG/1
0.5 TABLET ORAL DAILY
Status: ON HOLD | COMMUNITY
End: 2023-07-01 | Stop reason: ALTCHOICE

## 2023-07-01 RX ORDER — TRAZODONE HYDROCHLORIDE 50 MG/1
50 TABLET ORAL NIGHTLY PRN
Status: DISCONTINUED | OUTPATIENT
Start: 2023-07-01 | End: 2023-07-11 | Stop reason: HOSPADM

## 2023-07-01 RX ORDER — HYDROXYZINE HYDROCHLORIDE 25 MG/1
25 TABLET, FILM COATED ORAL 3 TIMES DAILY PRN
COMMUNITY

## 2023-07-01 RX ORDER — HALOPERIDOL 5 MG/ML
5 INJECTION INTRAMUSCULAR EVERY 4 HOURS PRN
Status: DISCONTINUED | OUTPATIENT
Start: 2023-07-01 | End: 2023-07-11 | Stop reason: HOSPADM

## 2023-07-01 RX ORDER — MAGNESIUM HYDROXIDE/ALUMINUM HYDROXICE/SIMETHICONE 120; 1200; 1200 MG/30ML; MG/30ML; MG/30ML
30 SUSPENSION ORAL EVERY 6 HOURS PRN
Status: DISCONTINUED | OUTPATIENT
Start: 2023-07-01 | End: 2023-07-11 | Stop reason: HOSPADM

## 2023-07-01 RX ORDER — VENLAFAXINE HYDROCHLORIDE 75 MG/1
300 CAPSULE, EXTENDED RELEASE ORAL DAILY
Status: DISCONTINUED | OUTPATIENT
Start: 2023-07-01 | End: 2023-07-11 | Stop reason: HOSPADM

## 2023-07-01 RX ORDER — HYDROXYZINE HYDROCHLORIDE 25 MG/1
50 TABLET, FILM COATED ORAL 3 TIMES DAILY PRN
Status: DISCONTINUED | OUTPATIENT
Start: 2023-07-01 | End: 2023-07-09

## 2023-07-01 RX ORDER — PRAVASTATIN SODIUM 40 MG
40 TABLET ORAL DAILY
Status: DISCONTINUED | OUTPATIENT
Start: 2023-07-01 | End: 2023-07-11 | Stop reason: HOSPADM

## 2023-07-01 RX ORDER — BENZTROPINE MESYLATE 1 MG/1
0.5 TABLET ORAL DAILY
Status: DISCONTINUED | OUTPATIENT
Start: 2023-07-01 | End: 2023-07-01

## 2023-07-01 RX ORDER — OLANZAPINE 10 MG/1
10 TABLET ORAL 2 TIMES DAILY
Status: DISCONTINUED | OUTPATIENT
Start: 2023-07-01 | End: 2023-07-02

## 2023-07-01 RX ORDER — MIDODRINE HYDROCHLORIDE 5 MG/1
10 TABLET ORAL
Status: DISCONTINUED | OUTPATIENT
Start: 2023-07-01 | End: 2023-07-11 | Stop reason: HOSPADM

## 2023-07-01 RX ORDER — ACETAMINOPHEN 325 MG/1
650 TABLET ORAL EVERY 4 HOURS PRN
Status: DISCONTINUED | OUTPATIENT
Start: 2023-07-01 | End: 2023-07-11 | Stop reason: HOSPADM

## 2023-07-01 RX ORDER — OLANZAPINE 10 MG/1
10 TABLET ORAL 2 TIMES DAILY
Status: ON HOLD | COMMUNITY
End: 2023-07-10 | Stop reason: HOSPADM

## 2023-07-01 RX ORDER — HALOPERIDOL 5 MG/1
5 TABLET ORAL EVERY 4 HOURS PRN
Status: DISCONTINUED | OUTPATIENT
Start: 2023-07-01 | End: 2023-07-11 | Stop reason: HOSPADM

## 2023-07-01 RX ORDER — DIPHENHYDRAMINE HYDROCHLORIDE 50 MG/ML
50 INJECTION INTRAMUSCULAR; INTRAVENOUS EVERY 4 HOURS PRN
Status: DISCONTINUED | OUTPATIENT
Start: 2023-07-01 | End: 2023-07-11 | Stop reason: HOSPADM

## 2023-07-01 RX ORDER — LEVOTHYROXINE SODIUM 0.1 MG/1
100 TABLET ORAL DAILY
COMMUNITY

## 2023-07-01 RX ORDER — POLYETHYLENE GLYCOL 3350 17 G/17G
17 POWDER, FOR SOLUTION ORAL DAILY PRN
Status: DISCONTINUED | OUTPATIENT
Start: 2023-07-01 | End: 2023-07-11 | Stop reason: HOSPADM

## 2023-07-01 RX ADMIN — VENLAFAXINE HYDROCHLORIDE 300 MG: 75 CAPSULE, EXTENDED RELEASE ORAL at 17:35

## 2023-07-01 RX ADMIN — MIRTAZAPINE 45 MG: 15 TABLET, FILM COATED ORAL at 21:33

## 2023-07-01 RX ADMIN — OLANZAPINE 10 MG: 10 TABLET, FILM COATED ORAL at 21:33

## 2023-07-01 RX ADMIN — APIXABAN 5 MG: 5 TABLET, FILM COATED ORAL at 21:33

## 2023-07-01 RX ADMIN — PRAVASTATIN SODIUM 40 MG: 40 TABLET ORAL at 17:36

## 2023-07-01 RX ADMIN — LEVOTHYROXINE SODIUM 100 MCG: 50 TABLET ORAL at 17:35

## 2023-07-01 RX ADMIN — MIDODRINE HYDROCHLORIDE 10 MG: 5 TABLET ORAL at 17:36

## 2023-07-01 ASSESSMENT — LIFESTYLE VARIABLES
HOW OFTEN DO YOU HAVE A DRINK CONTAINING ALCOHOL: NEVER
HOW MANY STANDARD DRINKS CONTAINING ALCOHOL DO YOU HAVE ON A TYPICAL DAY: PATIENT DOES NOT DRINK

## 2023-07-01 ASSESSMENT — SLEEP AND FATIGUE QUESTIONNAIRES
DO YOU HAVE DIFFICULTY SLEEPING: YES
AVERAGE NUMBER OF SLEEP HOURS: 8
DO YOU USE A SLEEP AID: NO

## 2023-07-02 PROCEDURE — 99232 SBSQ HOSP IP/OBS MODERATE 35: CPT | Performed by: PSYCHIATRY & NEUROLOGY

## 2023-07-02 PROCEDURE — 6370000000 HC RX 637 (ALT 250 FOR IP): Performed by: PSYCHIATRY & NEUROLOGY

## 2023-07-02 PROCEDURE — 1240000000 HC EMOTIONAL WELLNESS R&B

## 2023-07-02 RX ADMIN — APIXABAN 5 MG: 5 TABLET, FILM COATED ORAL at 21:24

## 2023-07-02 RX ADMIN — MIDODRINE HYDROCHLORIDE 10 MG: 5 TABLET ORAL at 12:07

## 2023-07-02 RX ADMIN — LEVOTHYROXINE SODIUM 100 MCG: 50 TABLET ORAL at 08:48

## 2023-07-02 RX ADMIN — ACETAMINOPHEN 650 MG: 325 TABLET ORAL at 12:07

## 2023-07-02 RX ADMIN — OLANZAPINE 10 MG: 10 TABLET, FILM COATED ORAL at 08:48

## 2023-07-02 RX ADMIN — MIDODRINE HYDROCHLORIDE 10 MG: 5 TABLET ORAL at 17:28

## 2023-07-02 RX ADMIN — APIXABAN 5 MG: 5 TABLET, FILM COATED ORAL at 08:48

## 2023-07-02 RX ADMIN — MIRTAZAPINE 45 MG: 15 TABLET, FILM COATED ORAL at 21:24

## 2023-07-02 RX ADMIN — MIDODRINE HYDROCHLORIDE 10 MG: 5 TABLET ORAL at 08:47

## 2023-07-02 RX ADMIN — OLANZAPINE 15 MG: 5 TABLET, FILM COATED ORAL at 21:29

## 2023-07-02 RX ADMIN — PRAVASTATIN SODIUM 40 MG: 40 TABLET ORAL at 08:48

## 2023-07-02 RX ADMIN — VENLAFAXINE HYDROCHLORIDE 300 MG: 75 CAPSULE, EXTENDED RELEASE ORAL at 08:47

## 2023-07-02 ASSESSMENT — PAIN SCALES - GENERAL: PAINLEVEL_OUTOF10: 4

## 2023-07-03 PROCEDURE — 6370000000 HC RX 637 (ALT 250 FOR IP): Performed by: PSYCHIATRY & NEUROLOGY

## 2023-07-03 PROCEDURE — 1240000000 HC EMOTIONAL WELLNESS R&B

## 2023-07-03 PROCEDURE — 99232 SBSQ HOSP IP/OBS MODERATE 35: CPT | Performed by: PSYCHIATRY & NEUROLOGY

## 2023-07-03 RX ORDER — ACETAMINOPHEN 325 MG/1
650 TABLET ORAL EVERY 4 HOURS PRN
Status: CANCELLED | OUTPATIENT
Start: 2023-07-03

## 2023-07-03 RX ORDER — DIPHENHYDRAMINE HYDROCHLORIDE 50 MG/ML
50 INJECTION INTRAMUSCULAR; INTRAVENOUS EVERY 4 HOURS PRN
Status: CANCELLED | OUTPATIENT
Start: 2023-07-03

## 2023-07-03 RX ORDER — MAGNESIUM HYDROXIDE/ALUMINUM HYDROXICE/SIMETHICONE 120; 1200; 1200 MG/30ML; MG/30ML; MG/30ML
30 SUSPENSION ORAL EVERY 6 HOURS PRN
Status: CANCELLED | OUTPATIENT
Start: 2023-07-03

## 2023-07-03 RX ORDER — HALOPERIDOL 5 MG/ML
5 INJECTION INTRAMUSCULAR EVERY 4 HOURS PRN
Status: CANCELLED | OUTPATIENT
Start: 2023-07-03

## 2023-07-03 RX ORDER — POLYETHYLENE GLYCOL 3350 17 G/17G
17 POWDER, FOR SOLUTION ORAL DAILY PRN
Status: CANCELLED | OUTPATIENT
Start: 2023-07-03

## 2023-07-03 RX ORDER — TRAZODONE HYDROCHLORIDE 50 MG/1
50 TABLET ORAL NIGHTLY PRN
Status: CANCELLED | OUTPATIENT
Start: 2023-07-03

## 2023-07-03 RX ORDER — HALOPERIDOL 5 MG/1
5 TABLET ORAL EVERY 4 HOURS PRN
Status: CANCELLED | OUTPATIENT
Start: 2023-07-03

## 2023-07-03 RX ORDER — HYDROXYZINE HYDROCHLORIDE 25 MG/1
50 TABLET, FILM COATED ORAL 3 TIMES DAILY PRN
Status: CANCELLED | OUTPATIENT
Start: 2023-07-03

## 2023-07-03 RX ADMIN — APIXABAN 5 MG: 5 TABLET, FILM COATED ORAL at 21:35

## 2023-07-03 RX ADMIN — PRAVASTATIN SODIUM 40 MG: 40 TABLET ORAL at 08:24

## 2023-07-03 RX ADMIN — MIDODRINE HYDROCHLORIDE 10 MG: 5 TABLET ORAL at 12:25

## 2023-07-03 RX ADMIN — MIRTAZAPINE 45 MG: 15 TABLET, FILM COATED ORAL at 21:34

## 2023-07-03 RX ADMIN — OLANZAPINE 15 MG: 5 TABLET, FILM COATED ORAL at 08:24

## 2023-07-03 RX ADMIN — MIDODRINE HYDROCHLORIDE 10 MG: 5 TABLET ORAL at 17:25

## 2023-07-03 RX ADMIN — LEVOTHYROXINE SODIUM 100 MCG: 50 TABLET ORAL at 07:42

## 2023-07-03 RX ADMIN — VENLAFAXINE HYDROCHLORIDE 300 MG: 75 CAPSULE, EXTENDED RELEASE ORAL at 08:24

## 2023-07-03 RX ADMIN — MIDODRINE HYDROCHLORIDE 10 MG: 5 TABLET ORAL at 08:24

## 2023-07-03 RX ADMIN — APIXABAN 5 MG: 5 TABLET, FILM COATED ORAL at 08:25

## 2023-07-03 RX ADMIN — OLANZAPINE 15 MG: 5 TABLET, FILM COATED ORAL at 21:35

## 2023-07-04 LAB
GLUCOSE BLD STRIP.AUTO-MCNC: 111 MG/DL (ref 65–117)
GLUCOSE BLD STRIP.AUTO-MCNC: 95 MG/DL (ref 65–117)
SERVICE CMNT-IMP: NORMAL
SERVICE CMNT-IMP: NORMAL

## 2023-07-04 PROCEDURE — 6370000000 HC RX 637 (ALT 250 FOR IP): Performed by: PSYCHIATRY & NEUROLOGY

## 2023-07-04 PROCEDURE — 82962 GLUCOSE BLOOD TEST: CPT

## 2023-07-04 PROCEDURE — 99232 SBSQ HOSP IP/OBS MODERATE 35: CPT | Performed by: PSYCHIATRY & NEUROLOGY

## 2023-07-04 PROCEDURE — 1240000000 HC EMOTIONAL WELLNESS R&B

## 2023-07-04 RX ADMIN — APIXABAN 5 MG: 5 TABLET, FILM COATED ORAL at 09:51

## 2023-07-04 RX ADMIN — LEVOTHYROXINE SODIUM 100 MCG: 50 TABLET ORAL at 07:57

## 2023-07-04 RX ADMIN — MIDODRINE HYDROCHLORIDE 10 MG: 5 TABLET ORAL at 13:28

## 2023-07-04 RX ADMIN — OLANZAPINE 15 MG: 5 TABLET, FILM COATED ORAL at 21:22

## 2023-07-04 RX ADMIN — MIDODRINE HYDROCHLORIDE 10 MG: 5 TABLET ORAL at 09:52

## 2023-07-04 RX ADMIN — PRAVASTATIN SODIUM 40 MG: 40 TABLET ORAL at 10:16

## 2023-07-04 RX ADMIN — VENLAFAXINE HYDROCHLORIDE 300 MG: 75 CAPSULE, EXTENDED RELEASE ORAL at 09:51

## 2023-07-04 RX ADMIN — MIRTAZAPINE 45 MG: 15 TABLET, FILM COATED ORAL at 21:22

## 2023-07-04 RX ADMIN — OLANZAPINE 15 MG: 5 TABLET, FILM COATED ORAL at 09:52

## 2023-07-04 RX ADMIN — APIXABAN 5 MG: 5 TABLET, FILM COATED ORAL at 21:22

## 2023-07-04 RX ADMIN — MIDODRINE HYDROCHLORIDE 10 MG: 5 TABLET ORAL at 16:55

## 2023-07-05 PROCEDURE — 1240000000 HC EMOTIONAL WELLNESS R&B

## 2023-07-05 PROCEDURE — 99232 SBSQ HOSP IP/OBS MODERATE 35: CPT | Performed by: PSYCHIATRY & NEUROLOGY

## 2023-07-05 PROCEDURE — 6370000000 HC RX 637 (ALT 250 FOR IP): Performed by: PSYCHIATRY & NEUROLOGY

## 2023-07-05 RX ADMIN — APIXABAN 5 MG: 5 TABLET, FILM COATED ORAL at 21:49

## 2023-07-05 RX ADMIN — HYDROXYZINE HYDROCHLORIDE 50 MG: 25 TABLET, FILM COATED ORAL at 18:50

## 2023-07-05 RX ADMIN — MIDODRINE HYDROCHLORIDE 10 MG: 5 TABLET ORAL at 16:24

## 2023-07-05 RX ADMIN — VENLAFAXINE HYDROCHLORIDE 300 MG: 75 CAPSULE, EXTENDED RELEASE ORAL at 08:17

## 2023-07-05 RX ADMIN — MIDODRINE HYDROCHLORIDE 10 MG: 5 TABLET ORAL at 08:17

## 2023-07-05 RX ADMIN — OLANZAPINE 15 MG: 5 TABLET, FILM COATED ORAL at 21:48

## 2023-07-05 RX ADMIN — LEVOTHYROXINE SODIUM 100 MCG: 50 TABLET ORAL at 06:15

## 2023-07-05 RX ADMIN — OLANZAPINE 15 MG: 5 TABLET, FILM COATED ORAL at 08:18

## 2023-07-05 RX ADMIN — MIRTAZAPINE 45 MG: 15 TABLET, FILM COATED ORAL at 21:48

## 2023-07-05 RX ADMIN — APIXABAN 5 MG: 5 TABLET, FILM COATED ORAL at 08:18

## 2023-07-05 RX ADMIN — MIDODRINE HYDROCHLORIDE 10 MG: 5 TABLET ORAL at 11:47

## 2023-07-05 RX ADMIN — PRAVASTATIN SODIUM 40 MG: 40 TABLET ORAL at 08:17

## 2023-07-06 PROCEDURE — 6370000000 HC RX 637 (ALT 250 FOR IP): Performed by: PSYCHIATRY & NEUROLOGY

## 2023-07-06 PROCEDURE — 99232 SBSQ HOSP IP/OBS MODERATE 35: CPT | Performed by: PSYCHIATRY & NEUROLOGY

## 2023-07-06 PROCEDURE — 1240000000 HC EMOTIONAL WELLNESS R&B

## 2023-07-06 RX ADMIN — MIRTAZAPINE 45 MG: 15 TABLET, FILM COATED ORAL at 21:17

## 2023-07-06 RX ADMIN — VENLAFAXINE HYDROCHLORIDE 300 MG: 75 CAPSULE, EXTENDED RELEASE ORAL at 08:40

## 2023-07-06 RX ADMIN — MIDODRINE HYDROCHLORIDE 10 MG: 5 TABLET ORAL at 12:34

## 2023-07-06 RX ADMIN — MIDODRINE HYDROCHLORIDE 10 MG: 5 TABLET ORAL at 17:30

## 2023-07-06 RX ADMIN — PRAVASTATIN SODIUM 40 MG: 40 TABLET ORAL at 08:40

## 2023-07-06 RX ADMIN — OLANZAPINE 15 MG: 5 TABLET, FILM COATED ORAL at 21:17

## 2023-07-06 RX ADMIN — MIDODRINE HYDROCHLORIDE 10 MG: 5 TABLET ORAL at 08:40

## 2023-07-06 RX ADMIN — LEVOTHYROXINE SODIUM 100 MCG: 50 TABLET ORAL at 08:44

## 2023-07-06 RX ADMIN — APIXABAN 5 MG: 5 TABLET, FILM COATED ORAL at 08:40

## 2023-07-06 RX ADMIN — APIXABAN 5 MG: 5 TABLET, FILM COATED ORAL at 21:17

## 2023-07-06 RX ADMIN — HYDROXYZINE HYDROCHLORIDE 50 MG: 25 TABLET, FILM COATED ORAL at 17:37

## 2023-07-06 RX ADMIN — OLANZAPINE 15 MG: 5 TABLET, FILM COATED ORAL at 08:40

## 2023-07-07 PROCEDURE — 1240000000 HC EMOTIONAL WELLNESS R&B

## 2023-07-07 PROCEDURE — 6370000000 HC RX 637 (ALT 250 FOR IP): Performed by: PSYCHIATRY & NEUROLOGY

## 2023-07-07 PROCEDURE — 99232 SBSQ HOSP IP/OBS MODERATE 35: CPT | Performed by: PSYCHIATRY & NEUROLOGY

## 2023-07-07 RX ORDER — OLANZAPINE 10 MG/1
20 TABLET ORAL 2 TIMES DAILY
Status: DISCONTINUED | OUTPATIENT
Start: 2023-07-07 | End: 2023-07-11 | Stop reason: HOSPADM

## 2023-07-07 RX ADMIN — APIXABAN 5 MG: 5 TABLET, FILM COATED ORAL at 21:25

## 2023-07-07 RX ADMIN — VENLAFAXINE HYDROCHLORIDE 300 MG: 75 CAPSULE, EXTENDED RELEASE ORAL at 08:59

## 2023-07-07 RX ADMIN — HYDROXYZINE HYDROCHLORIDE 50 MG: 25 TABLET, FILM COATED ORAL at 14:37

## 2023-07-07 RX ADMIN — APIXABAN 5 MG: 5 TABLET, FILM COATED ORAL at 08:59

## 2023-07-07 RX ADMIN — OLANZAPINE 20 MG: 10 TABLET, FILM COATED ORAL at 21:25

## 2023-07-07 RX ADMIN — MIDODRINE HYDROCHLORIDE 10 MG: 5 TABLET ORAL at 17:59

## 2023-07-07 RX ADMIN — OLANZAPINE 15 MG: 5 TABLET, FILM COATED ORAL at 08:59

## 2023-07-07 RX ADMIN — PRAVASTATIN SODIUM 40 MG: 40 TABLET ORAL at 08:59

## 2023-07-07 RX ADMIN — LEVOTHYROXINE SODIUM 100 MCG: 50 TABLET ORAL at 06:11

## 2023-07-07 RX ADMIN — MIRTAZAPINE 45 MG: 15 TABLET, FILM COATED ORAL at 21:25

## 2023-07-07 RX ADMIN — MIDODRINE HYDROCHLORIDE 10 MG: 5 TABLET ORAL at 08:59

## 2023-07-07 RX ADMIN — MIDODRINE HYDROCHLORIDE 10 MG: 5 TABLET ORAL at 13:00

## 2023-07-07 RX ADMIN — TRAZODONE HYDROCHLORIDE 50 MG: 50 TABLET ORAL at 21:25

## 2023-07-08 PROCEDURE — 6370000000 HC RX 637 (ALT 250 FOR IP): Performed by: PSYCHIATRY & NEUROLOGY

## 2023-07-08 PROCEDURE — 1240000000 HC EMOTIONAL WELLNESS R&B

## 2023-07-08 RX ADMIN — MIDODRINE HYDROCHLORIDE 10 MG: 5 TABLET ORAL at 08:33

## 2023-07-08 RX ADMIN — PRAVASTATIN SODIUM 40 MG: 40 TABLET ORAL at 08:33

## 2023-07-08 RX ADMIN — MIDODRINE HYDROCHLORIDE 10 MG: 5 TABLET ORAL at 12:12

## 2023-07-08 RX ADMIN — MIRTAZAPINE 45 MG: 15 TABLET, FILM COATED ORAL at 20:29

## 2023-07-08 RX ADMIN — ACETAMINOPHEN 650 MG: 325 TABLET ORAL at 01:29

## 2023-07-08 RX ADMIN — APIXABAN 5 MG: 5 TABLET, FILM COATED ORAL at 08:35

## 2023-07-08 RX ADMIN — OLANZAPINE 20 MG: 10 TABLET, FILM COATED ORAL at 20:29

## 2023-07-08 RX ADMIN — LEVOTHYROXINE SODIUM 100 MCG: 50 TABLET ORAL at 06:35

## 2023-07-08 RX ADMIN — TRAZODONE HYDROCHLORIDE 50 MG: 50 TABLET ORAL at 20:29

## 2023-07-08 RX ADMIN — MIDODRINE HYDROCHLORIDE 10 MG: 5 TABLET ORAL at 17:50

## 2023-07-08 RX ADMIN — VENLAFAXINE HYDROCHLORIDE 300 MG: 75 CAPSULE, EXTENDED RELEASE ORAL at 08:33

## 2023-07-08 RX ADMIN — OLANZAPINE 20 MG: 10 TABLET, FILM COATED ORAL at 08:33

## 2023-07-08 RX ADMIN — APIXABAN 5 MG: 5 TABLET, FILM COATED ORAL at 20:29

## 2023-07-08 ASSESSMENT — PAIN DESCRIPTION - DESCRIPTORS: DESCRIPTORS: DISCOMFORT;ACHING

## 2023-07-08 ASSESSMENT — PAIN SCALES - GENERAL
PAINLEVEL_OUTOF10: 4
PAINLEVEL_OUTOF10: 0
PAINLEVEL_OUTOF10: 4
PAINLEVEL_OUTOF10: 0
PAINLEVEL_OUTOF10: 0

## 2023-07-08 ASSESSMENT — PAIN DESCRIPTION - LOCATION
LOCATION: CHEST
LOCATION: CHEST

## 2023-07-08 ASSESSMENT — PAIN DESCRIPTION - ORIENTATION
ORIENTATION: MID
ORIENTATION: MID

## 2023-07-09 PROCEDURE — 6370000000 HC RX 637 (ALT 250 FOR IP): Performed by: PSYCHIATRY & NEUROLOGY

## 2023-07-09 PROCEDURE — 6370000000 HC RX 637 (ALT 250 FOR IP): Performed by: NURSE PRACTITIONER

## 2023-07-09 PROCEDURE — 1240000000 HC EMOTIONAL WELLNESS R&B

## 2023-07-09 RX ORDER — HYDROXYZINE HYDROCHLORIDE 25 MG/1
25 TABLET, FILM COATED ORAL EVERY 6 HOURS PRN
Status: DISCONTINUED | OUTPATIENT
Start: 2023-07-09 | End: 2023-07-11 | Stop reason: HOSPADM

## 2023-07-09 RX ADMIN — LEVOTHYROXINE SODIUM 100 MCG: 50 TABLET ORAL at 06:41

## 2023-07-09 RX ADMIN — OLANZAPINE 20 MG: 10 TABLET, FILM COATED ORAL at 08:41

## 2023-07-09 RX ADMIN — MIDODRINE HYDROCHLORIDE 10 MG: 5 TABLET ORAL at 12:12

## 2023-07-09 RX ADMIN — VENLAFAXINE HYDROCHLORIDE 300 MG: 75 CAPSULE, EXTENDED RELEASE ORAL at 08:41

## 2023-07-09 RX ADMIN — TRAZODONE HYDROCHLORIDE 50 MG: 50 TABLET ORAL at 20:43

## 2023-07-09 RX ADMIN — OLANZAPINE 20 MG: 10 TABLET, FILM COATED ORAL at 20:43

## 2023-07-09 RX ADMIN — MIRTAZAPINE 45 MG: 15 TABLET, FILM COATED ORAL at 20:43

## 2023-07-09 RX ADMIN — MIDODRINE HYDROCHLORIDE 10 MG: 5 TABLET ORAL at 16:42

## 2023-07-09 RX ADMIN — PRAVASTATIN SODIUM 40 MG: 40 TABLET ORAL at 08:41

## 2023-07-09 RX ADMIN — MIDODRINE HYDROCHLORIDE 10 MG: 5 TABLET ORAL at 08:41

## 2023-07-09 RX ADMIN — HYDROXYZINE HYDROCHLORIDE 50 MG: 25 TABLET, FILM COATED ORAL at 12:15

## 2023-07-09 RX ADMIN — APIXABAN 5 MG: 5 TABLET, FILM COATED ORAL at 08:42

## 2023-07-09 RX ADMIN — APIXABAN 5 MG: 5 TABLET, FILM COATED ORAL at 20:44

## 2023-07-09 RX ADMIN — HYDROXYZINE HYDROCHLORIDE 25 MG: 25 TABLET, FILM COATED ORAL at 20:43

## 2023-07-09 ASSESSMENT — PAIN SCALES - GENERAL
PAINLEVEL_OUTOF10: 0

## 2023-07-10 PROCEDURE — 99232 SBSQ HOSP IP/OBS MODERATE 35: CPT | Performed by: PSYCHIATRY & NEUROLOGY

## 2023-07-10 PROCEDURE — 6370000000 HC RX 637 (ALT 250 FOR IP): Performed by: NURSE PRACTITIONER

## 2023-07-10 PROCEDURE — 6370000000 HC RX 637 (ALT 250 FOR IP): Performed by: PSYCHIATRY & NEUROLOGY

## 2023-07-10 PROCEDURE — 1240000000 HC EMOTIONAL WELLNESS R&B

## 2023-07-10 RX ORDER — TRAZODONE HYDROCHLORIDE 50 MG/1
50 TABLET ORAL NIGHTLY PRN
Qty: 30 TABLET | Refills: 1 | Status: SHIPPED | OUTPATIENT
Start: 2023-07-10

## 2023-07-10 RX ORDER — OLANZAPINE 20 MG/1
20 TABLET ORAL 2 TIMES DAILY
Qty: 60 TABLET | Refills: 1 | Status: SHIPPED | OUTPATIENT
Start: 2023-07-10

## 2023-07-10 RX ADMIN — OLANZAPINE 20 MG: 10 TABLET, FILM COATED ORAL at 21:18

## 2023-07-10 RX ADMIN — APIXABAN 5 MG: 5 TABLET, FILM COATED ORAL at 21:17

## 2023-07-10 RX ADMIN — PRAVASTATIN SODIUM 40 MG: 40 TABLET ORAL at 09:16

## 2023-07-10 RX ADMIN — APIXABAN 5 MG: 5 TABLET, FILM COATED ORAL at 09:16

## 2023-07-10 RX ADMIN — MIDODRINE HYDROCHLORIDE 10 MG: 5 TABLET ORAL at 16:56

## 2023-07-10 RX ADMIN — MIDODRINE HYDROCHLORIDE 10 MG: 5 TABLET ORAL at 12:23

## 2023-07-10 RX ADMIN — VENLAFAXINE HYDROCHLORIDE 300 MG: 75 CAPSULE, EXTENDED RELEASE ORAL at 09:13

## 2023-07-10 RX ADMIN — MIDODRINE HYDROCHLORIDE 10 MG: 5 TABLET ORAL at 09:13

## 2023-07-10 RX ADMIN — ACETAMINOPHEN 650 MG: 325 TABLET ORAL at 10:14

## 2023-07-10 RX ADMIN — MIRTAZAPINE 45 MG: 15 TABLET, FILM COATED ORAL at 21:17

## 2023-07-10 RX ADMIN — LEVOTHYROXINE SODIUM 100 MCG: 50 TABLET ORAL at 06:28

## 2023-07-10 RX ADMIN — HYDROXYZINE HYDROCHLORIDE 25 MG: 25 TABLET, FILM COATED ORAL at 16:57

## 2023-07-10 RX ADMIN — OLANZAPINE 20 MG: 10 TABLET, FILM COATED ORAL at 09:15

## 2023-07-10 ASSESSMENT — PAIN SCALES - GENERAL
PAINLEVEL_OUTOF10: 0
PAINLEVEL_OUTOF10: 3
PAINLEVEL_OUTOF10: 0

## 2023-07-10 ASSESSMENT — PAIN DESCRIPTION - LOCATION: LOCATION: GENERALIZED

## 2023-07-10 ASSESSMENT — PAIN DESCRIPTION - DESCRIPTORS: DESCRIPTORS: ACHING

## 2023-07-10 NOTE — PROGRESS NOTES
Pharmacist Discharge Medication Reconciliation    Discharging Provider: Dr. Laverne Mendieta [facility]:   Past Medical History:   Diagnosis Date    Asthma 01/01/1967    hospitalized for asthma attack x 2    Bipolar disorder ([address] )     Chronic kidney disease     kidney stones x 2-3 times    Ill-defined condition     nasal blockage from growth and misalignment - cannot breath out of nose - surgery in the future/cleared for colonoscopy 7/31/2014 - will bring in letter    Major depressive disorder     Other ill-defined conditions(799.89)     hx low BP - 90's/60's-70's    Other ill-defined conditions(799.89)     blood in stool    Psychiatric disorder     PTSD (post-traumatic stress disorder)     PUD (peptic ulcer disease)     Schizoaffective disorder ([address] )     Thyroid disease     Unspecified adverse effect of anesthesia     nasal growth and misalignment and cannot breath through nose     Chief Complaint for this Admission: No chief complaint on file.     Allergies: Coconut (cocos nucifera), Azelastine, Banana, Cortisone, Other, Prednisone, and Sulfamethoxazole-trimethoprim    Discharge Medications:   Current Discharge Medication List        START taking these medications    Details   traZODone (DESYREL) 50 MG tablet Take 1 tablet by mouth nightly as needed for Sleep  Qty: 30 tablet, Refills: 1           CONTINUE these medications which have CHANGED    Details   OLANZapine (ZYPREXA) 20 MG tablet Take 1 tablet by mouth in the morning and at bedtime  Qty: 60 tablet, Refills: 1           CONTINUE these medications which have NOT CHANGED    Details   levothyroxine (SYNTHROID) 100 MCG tablet Take 1 tablet by mouth Daily      hydrOXYzine HCl (ATARAX) 25 MG tablet Take 1 tablet by mouth 3 times daily as needed for Anxiety      pravastatin (PRAVACHOL) 40 MG tablet Take 1 tablet by mouth daily      apixaban (ELIQUIS) 5 MG TABS tablet Take 1 tablet by mouth 2 times daily H/o DVT      midodrine (PROAMATINE) 10 MG tablet Take 1 tablet

## 2023-07-10 NOTE — GROUP NOTE
Group Therapy Note    Date: 7/10/2023    Group Start Time: 0900  Group End Time: 1000  Group Topic: Topic Group    [place] [address] ACUTE BEHAV [facility]        Group Therapy Note    Attendees:        Patient's Goal:  To participate in relaxation activity    Notes:  Pt did not attend session      Discipline Responsible: Recreational Therapist      Signature:  Astrid Leblanc

## 2023-07-11 VITALS
SYSTOLIC BLOOD PRESSURE: 107 MMHG | HEART RATE: 70 BPM | TEMPERATURE: 97.8 F | DIASTOLIC BLOOD PRESSURE: 67 MMHG | RESPIRATION RATE: 16 BRPM | OXYGEN SATURATION: 99 %

## 2023-07-11 PROCEDURE — 6370000000 HC RX 637 (ALT 250 FOR IP): Performed by: PSYCHIATRY & NEUROLOGY

## 2023-07-11 PROCEDURE — 99239 HOSP IP/OBS DSCHRG MGMT >30: CPT | Performed by: PSYCHIATRY & NEUROLOGY

## 2023-07-11 RX ADMIN — PRAVASTATIN SODIUM 40 MG: 40 TABLET ORAL at 08:42

## 2023-07-11 RX ADMIN — MIDODRINE HYDROCHLORIDE 10 MG: 5 TABLET ORAL at 08:41

## 2023-07-11 RX ADMIN — APIXABAN 5 MG: 5 TABLET, FILM COATED ORAL at 08:42

## 2023-07-11 RX ADMIN — OLANZAPINE 20 MG: 10 TABLET, FILM COATED ORAL at 08:41

## 2023-07-11 RX ADMIN — LEVOTHYROXINE SODIUM 100 MCG: 50 TABLET ORAL at 08:41

## 2023-07-11 RX ADMIN — VENLAFAXINE HYDROCHLORIDE 300 MG: 75 CAPSULE, EXTENDED RELEASE ORAL at 08:42

## 2023-07-11 RX ADMIN — MIDODRINE HYDROCHLORIDE 10 MG: 5 TABLET ORAL at 12:19

## 2023-07-11 NOTE — DISCHARGE INSTRUCTIONS
DISCHARGE SUMMARY    NAME:Anuradha Carvajal  : 1960  MRN: [id number]    The patient Jaden Rodriguez exhibits the ability to control behavior in a less restrictive environment. Patient's level of functioning is improving. No assaultive/destructive behavior has been observed for the past 24 hours. No suicidal/homicidal threat or behavior has been observed for the past 24 hours. There is no evidence of serious medication side effects. Patient has not been in physical or protective restraints for at least the past 24 hours. If weapons involved, how are they secured? NO access     Is patient aware of and in agreement with discharge plan? Yes    Arrangements for medication:  Prescriptions sent to pharmacy     Copy of discharge instructions to provider?:  yes    Arrangements for transportation home:  [address] [place] [address] all follow up appointments as scheduled, continue to take prescribed medications per physician instructions.   Mental health crisis number:  157 or your local mental health crisis line number at [address] [place] [address] Emergency WARM LINE      [phone] ([phone])      M-F: 9am to 9pm      Sat & Sun: 5pm - 9pm  National suicide prevention lines:                             0-469-SFGTWLH (8-700-245-3828)       7-244-023-TALK ([phone])    Crisis Text Line:  Text HOME to [phone]
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
## 2023-07-11 NOTE — PLAN OF CARE
Problem: Anxiety  Goal: Will report anxiety at manageable levels  Description: INTERVENTIONS:  1. Administer medication as ordered  2. Teach and rehearse alternative coping skills  3.  Provide emotional support with 1:1 interaction with staff  Outcome: Progressing  Flowsheets (Taken 7/9/2023 0800 by Cherylene Legato, RN)  Will report anxiety at manageable levels: Administer medication as ordered
Problem: Anxiety  Goal: Will report anxiety at manageable levels  Description: INTERVENTIONS:  1. Administer medication as ordered  2. Teach and rehearse alternative coping skills  3. Provide emotional support with 1:1 interaction with staff  7/10/2023 0819 by Yasmine Velez RN  Outcome: Progressing  7/9/2023 2101 by Shania Valle RN  Outcome: Progressing  Flowsheets (Taken 7/9/2023 0800 by Liset Sanchez RN)  Will report anxiety at manageable levels: Administer medication as ordered     Problem: Confusion  Goal: Confusion, delirium, dementia, or psychosis is improved or at baseline  Description: INTERVENTIONS:  1. Assess for possible contributors to thought disturbance, including medications, impaired vision or hearing, underlying metabolic abnormalities, dehydration, psychiatric diagnoses, and notify attending LIP  2. [place] high risk fall precautions, as indicated  3. Provide frequent short contacts to provide reality reorientation, refocusing and direction  4. Decrease environmental stimuli, including noise as appropriate  5. Monitor and intervene to maintain adequate nutrition, hydration, elimination, sleep and activity  6. If unable to ensure safety without constant attention obtain sitter and review sitter guidelines with assigned personnel  7. Initiate Psychosocial CNS and Spiritual Care consult, as indicated  Outcome: Progressing     Problem: Behavior  Goal: Pt/Family maintain appropriate behavior and adhere to behavioral management agreement, if implemented  Description: INTERVENTIONS:  1. Assess patient/family's coping skills and  non-compliant behavior (including use of illegal substances)  2. Notify security of behavior or suspected illegal substances which indicate the need for search of the family and/or belongings  3. Encourage verbalization of thoughts and concerns in a socially appropriate manner  4.  Utilize positive, consistent limit setting strategies supporting safety of patient, staff
Problem: Anxiety  Goal: Will report anxiety at manageable levels  Description: INTERVENTIONS:  1. Administer medication as ordered  2. Teach and rehearse alternative coping skills  3. Provide emotional support with 1:1 interaction with staff  Outcome: Progressing     Problem: Behavior  Goal: Pt/Family maintain appropriate behavior and adhere to behavioral management agreement, if implemented  Description: INTERVENTIONS:  1. Assess patient/family's coping skills and  non-compliant behavior (including use of illegal substances)  2. Notify security of behavior or suspected illegal substances which indicate the need for search of the family and/or belongings  3. Encourage verbalization of thoughts and concerns in a socially appropriate manner  4. Utilize positive, consistent limit setting strategies supporting safety of patient, staff and others  5. Encourage participation in the decision making process about the behavioral management agreement  6. If a visitor's behavior poses a threat to safety call refer to organization policy. 7. Initiate consult with , Psychosocial CNS, Spiritual Care as appropriate  Outcome: Progressing     Problem: Depression  Goal: Will be euthymic at discharge  Description: INTERVENTIONS:  1. Administer medication as ordered  2. Provide emotional support via 1:1 interaction with staff  3. Encourage involvement in milieu/groups/activities  4.  Monitor for social isolation  Outcome: Progressing
with assigned personnel  7. Initiate Psychosocial CNS and Spiritual Care consult, as indicated  Outcome: Completed     Problem: Behavior  Goal: Pt/Family maintain appropriate behavior and adhere to behavioral management agreement, if implemented  Description: INTERVENTIONS:  1. Assess patient/family's coping skills and  non-compliant behavior (including use of illegal substances)  2. Notify security of behavior or suspected illegal substances which indicate the need for search of the family and/or belongings  3. Encourage verbalization of thoughts and concerns in a socially appropriate manner  4. Utilize positive, consistent limit setting strategies supporting safety of patient, staff and others  5. Encourage participation in the decision making process about the behavioral management agreement  6. If a visitor's behavior poses a threat to safety call refer to organization policy. 7. Initiate consult with , Psychosocial CNS, Spiritual Care as appropriate  7/11/2023 1221 by Kenyon Mota RN  Outcome: Completed  7/11/2023 0837 by Kenyon Mota RN  Outcome: Progressing     Problem: Spiritual Care  Goal: Pt/Family able to move forward in process of forgiving self, others, and/or higher power  Description: INTERVENTIONS:  1. Assist patient/family to overcome blocks to healing by use of spiritual practices (prayer, meditation, guided imagery, reiki, breath work, etc). 2. De-myth guilt and help patient/family identify possible irrational spiritual/cultural beliefs and values. 3. Explore possibilities of making amends & reconciliation with self, others, and/or a greater power. 4. Guide patient/family in identifying painful feelings of guilt. 5. Help patient/famiy explore and identify spiritual beliefs, cultural understandings or values that may help or hinder letting go of issue. 6. Help patient/family explore feelings of anger, bitterness, resentment.   7. Help patient/family identify and examine

## 2023-07-11 NOTE — PROGRESS NOTES
Patient remained and in her room and did not socialize. She denied SI, HI, AVH and pain. She was med compliant. She had a snack. She appears to be sleeping well.

## 2023-07-11 NOTE — PROGRESS NOTES
MUSIC THERAPY GROUP PROGRESS NOTE        7/11/2023    The patient Gil vidales 57 y.o. female participated in Music Therapy group on the [address] [place] [address] unit. Group time: 11:15-12:00    Personal goal for participation:  Patient (pt) will actively participate in at least one-third of the group session time. Goal orientation:   personal/community    Group therapy participation: Passive    Therapeutic interventions: Mindfulness, live music, jonathan analysis, art experiences    Observation of participation: The pt was present, though passively participated during the music therapy group session. The pt arrived nursing home through the session and sat off to the side within the space. As this MT introduced self and explained what to expect during the session, pt expressed a willingness to participate in the session and fill out a pre-session survey provided by this MT. Once completing this, pt actively listened to the three songs sung and played with guitar by this MT during the main activity. Though pt did not engage during group discussion, MT observed the pt maintain eye contact and smile at times in response to the music. As the session concluded, pt completed the post-session survey and exited the space.      Domonique Quesada MT-BC (Music Therapist, Board Certified)  [facility] [facility]

## 2023-07-11 NOTE — CARE COORDINATION
07/11/23 0939   ITP   Date of Plan 07/11/23   Primary Diagnosis Code Major Depressive Disorder   Short Term Goal 1   STG Goal 1 Status: Patient Appears to be    (Goal met, discharging)   Short Term Goal 2   STG Goal 2 Status: Patient Appears to be    (Goal met, discharging)   Crisis/Safety/Discharge Plan   Discharge [address] [facility]

## 2023-07-11 NOTE — GROUP NOTE
Group Therapy Note    Date: 7/11/2023    Group Start Time: 0900  Group End Time: 1000  Group Topic: Topic Group    [place] [address] AC[facility]        Group Therapy Note    Attendees: 7       Patient's Goal:  To participate in chair exercise routine    Notes:  Pt did not attend session      Discipline Responsible: Recreational Therapist      Signature:  Ana Hamilton

## 2023-07-21 ENCOUNTER — ANESTHESIA EVENT (OUTPATIENT)
Facility: HOSPITAL | Age: 63
End: 2023-07-21
Payer: MEDICAID

## 2023-07-28 ENCOUNTER — ANESTHESIA (OUTPATIENT)
Facility: HOSPITAL | Age: 63
End: 2023-07-28
Payer: MEDICAID

## 2023-07-28 ENCOUNTER — HOSPITAL ENCOUNTER (OUTPATIENT)
Facility: HOSPITAL | Age: 63
Setting detail: OUTPATIENT SURGERY
Discharge: HOME | End: 2023-07-28
Attending: PSYCHIATRY & NEUROLOGY | Admitting: PSYCHIATRY & NEUROLOGY
Payer: MEDICAID

## 2023-07-28 VITALS
HEIGHT: 63 IN | WEIGHT: 155 LBS | TEMPERATURE: 97 F | RESPIRATION RATE: 17 BRPM | BODY MASS INDEX: 27.46 KG/M2 | DIASTOLIC BLOOD PRESSURE: 70 MMHG | HEART RATE: 85 BPM | OXYGEN SATURATION: 94 % | SYSTOLIC BLOOD PRESSURE: 130 MMHG

## 2023-07-28 LAB
GLUCOSE BLD STRIP.AUTO-MCNC: 112 MG/DL (ref 65–117)
SERVICE CMNT-IMP: NORMAL

## 2023-07-28 PROCEDURE — 7100000001 HC PACU RECOVERY - ADDTL 15 MIN: Performed by: PSYCHIATRY & NEUROLOGY

## 2023-07-28 PROCEDURE — 90870 ELECTROCONVULSIVE THERAPY: CPT | Performed by: PSYCHIATRY & NEUROLOGY

## 2023-07-28 PROCEDURE — 3700000000 HC ANESTHESIA ATTENDED CARE: Performed by: PSYCHIATRY & NEUROLOGY

## 2023-07-28 PROCEDURE — 6360000002 HC RX W HCPCS: Performed by: ANESTHESIOLOGY

## 2023-07-28 PROCEDURE — 2500000003 HC RX 250 WO HCPCS: Performed by: ANESTHESIOLOGY

## 2023-07-28 PROCEDURE — 7100000000 HC PACU RECOVERY - FIRST 15 MIN: Performed by: PSYCHIATRY & NEUROLOGY

## 2023-07-28 PROCEDURE — 7100000010 HC PHASE II RECOVERY - FIRST 15 MIN: Performed by: PSYCHIATRY & NEUROLOGY

## 2023-07-28 PROCEDURE — 2709999900 HC NON-CHARGEABLE SUPPLY: Performed by: PSYCHIATRY & NEUROLOGY

## 2023-07-28 PROCEDURE — 82962 GLUCOSE BLOOD TEST: CPT

## 2023-07-28 RX ORDER — SODIUM CHLORIDE 0.9 % (FLUSH) 0.9 %
5-40 SYRINGE (ML) INJECTION EVERY 12 HOURS SCHEDULED
Status: DISCONTINUED | OUTPATIENT
Start: 2023-07-28 | End: 2023-07-28 | Stop reason: HOSPADM

## 2023-07-28 RX ORDER — SODIUM CHLORIDE 0.9 % (FLUSH) 0.9 %
5-40 SYRINGE (ML) INJECTION PRN
Status: DISCONTINUED | OUTPATIENT
Start: 2023-07-28 | End: 2023-07-28 | Stop reason: HOSPADM

## 2023-07-28 RX ORDER — SODIUM CHLORIDE 9 MG/ML
INJECTION, SOLUTION INTRAVENOUS CONTINUOUS
Status: DISCONTINUED | OUTPATIENT
Start: 2023-07-28 | End: 2023-07-28 | Stop reason: HOSPADM

## 2023-07-28 RX ORDER — SODIUM CHLORIDE 9 MG/ML
INJECTION, SOLUTION INTRAVENOUS PRN
Status: DISCONTINUED | OUTPATIENT
Start: 2023-07-28 | End: 2023-07-28 | Stop reason: HOSPADM

## 2023-07-28 RX ORDER — SUCCINYLCHOLINE/SOD CL,ISO/PF 100 MG/5ML
SYRINGE (ML) INTRAVENOUS PRN
Status: DISCONTINUED | OUTPATIENT
Start: 2023-07-28 | End: 2023-07-28 | Stop reason: SDUPTHER

## 2023-07-28 RX ADMIN — Medication 60 MG: at 08:51

## 2023-07-28 RX ADMIN — Medication 50 MG: at 08:51

## 2023-07-28 ASSESSMENT — PAIN - FUNCTIONAL ASSESSMENT: PAIN_FUNCTIONAL_ASSESSMENT: 0-10

## 2023-07-28 NOTE — ANESTHESIA POSTPROCEDURE EVALUATION
Department of Anesthesiology  Postprocedure Note    Patient: Maddy Delaney  MRN: [id number]  YOB: 1960  Date of evaluation: 7/28/2023      Procedure Summary     Date: 07/28/23 Room / Location: [facility] OR R2 / [facility] OR    Anesthesia Start: 0848 Anesthesia Stop: 2816    Procedure: ELECTROCONVULSIVE THERAPY (Head) Diagnosis:       Major depressive disorder, recurrent episode, moderate (HCC)      (Major depressive disorder, recurrent episode, moderate ([address]) [F33.1])    Surgeons: Rajiv Jensen MD Responsible Provider: Radha White MD    Anesthesia Type: general ASA Status: 2          Anesthesia Type: No value filed.     Ama Phase I: Ama Score: 6    Ama Phase II:        Anesthesia Post Evaluation    Patient location during evaluation: PACU  Patient participation: waiting for patient participation  Level of consciousness: sleepy but conscious  Airway patency: patent  Nausea & Vomiting: no vomiting and no nausea  Complications: no  Cardiovascular status: hemodynamically stable  Respiratory status: acceptable  Hydration status: stable  Pain management: adequate

## 2023-07-28 NOTE — PERIOP NOTE
ACT Team member for [facility] came with her. She states the patient was let out of [facility] , she had been under a TDO she was released so she can have ECT per Cielo Martin her Guardian for today.

## 2023-07-28 NOTE — PERIOP NOTE
Patient meets discharge criteria. Patient and Sara Evangelical provided with verbal and written discharge instructions. Patient discharged by wheelchair with Sara Evangelical to transport home.

## 2023-07-28 NOTE — DISCHARGE INSTRUCTIONS
ECT DISCHARGE INSTRUCTIONS      NAME: Noelle Teague   :  1960   MRN:  [id number]     Date/Time:  2023 8:56 AM    ADMIT DATE: 2023     DISCHARGE DATE: 2023     DISCHARGE DIAGNOSIS:  [unfilled]     Recommended diet:  {diet:74908}    Recommended activity: {discharge activity:87993}    Have a responsible person stay with the patient for 24 hours after the treatment, some temporary confusion may be noticed. Do not allow a patient to operate a motor vehicle for at least 24 hours and all the confusion has cleared. Do not drink alcoholic beverages for 48 hours past treatment. Do not sign any legal documents. Do not make any critical decisions for 24 hours. Advance diet as tolerated. Start with liquids and advance it nausea is not present. Understand that memory for recent events, phone numbers, addresses, ect. May be decreased for a short period of time. Report any persistant nausea or pain to the treating physician. Patient receiving ECT while in the hospital should also not attempt to ambulate without assistance, please use tap bell which will be provide. If you have questions regarding the hospital related prescriptions or hospital related issues please call the [address] [place] [address] at [facility] [phone]. If you experience any of the following symptoms then please call your primary care physician/outpatient psychiatrist or return to the emergency room if you cannot get hold of your doctor: Fever, chills, nausea, vomiting, diarrhea, change in mentation, falling, bleeding, shortness of breath. Follow Up:  Continue outpatient psychiatric follow-up visits with personal psychiatrist.  Return for next ECT treatment in ***        Information obtained by :  I understand that if any problems occur once I am at home I am to contact my physician. I understand and acknowledge receipt of the instructions indicated above.

## 2023-07-28 NOTE — PROCEDURES
ECT PROCEDURE NOTE         IDENTIFICATION:           Patient Name   Shefali Mccullough         Date of Birth  1960         [facility]  [id number]         Medical Record Number   [id number]             Age   57 y.o. PCP  Misael Martinez MD         Admit date:  07/28/23           Room Number   PACU/PL  @ [facility]         Date of Service   07/28/23                          Electro Convulsive Therapy:   Treatment number 17          Maintenance ECT NO        Shefali Mccullough was admitted to the PACU for ECT. Patient was medically cleared and NPO for procedure. Patient is NOTcourt mandated and gave informed consent for ECT treatment. Patient received       Bilateral ECT YES    Administered using the [address] [place] [address]. at 40 % Energy, under general anesthesia. Shefali Mccullough with a good, well generalized seizure of 61 seconds on observation of the motor manifestations of the seizure. EEG duration was 67 secs. Post-Ictal Suppression Index: 77.8 %   Recovery was unremarkable and with no complications. Change in Energy %:                 Anesthesia medications administered during procedure:   Brevital:  60 mg   Change for next treament:       Succinylcholine: 50 mg   Change for next treatment:        Propofol: mg   Glycopyrrolate:  mg   Labetalol:  mg   Esmolol:  mg   Lorazepam:  mg   Ketamine:  mg   Zofran:   mg     Toradol:  mg   Lidocaine:  mg   Caffeine Benzoate: mg               CSSRS  1/26/2023 7/8/2022         1) Within the past month, have you wished you were dead or wished you could go to sleep and not wake up? No  No     2) Have you actually had any thoughts of killing yourself? No  No     6) Have you ever done anything, started to do anything, or prepared to do anything to end your life? Yes  No         Did this occur within the past 3 months?   Yes  -

## 2023-08-02 ENCOUNTER — ANESTHESIA (OUTPATIENT)
Facility: HOSPITAL | Age: 63
End: 2023-08-02
Payer: MEDICAID

## 2023-08-02 ENCOUNTER — HOSPITAL ENCOUNTER (OUTPATIENT)
Facility: HOSPITAL | Age: 63
Setting detail: OUTPATIENT SURGERY
Discharge: HOME | End: 2023-08-02
Attending: PSYCHIATRY & NEUROLOGY | Admitting: PSYCHIATRY & NEUROLOGY
Payer: MEDICAID

## 2023-08-02 ENCOUNTER — ANESTHESIA EVENT (OUTPATIENT)
Facility: HOSPITAL | Age: 63
End: 2023-08-02
Payer: MEDICAID

## 2023-08-02 VITALS
OXYGEN SATURATION: 91 % | WEIGHT: 155 LBS | DIASTOLIC BLOOD PRESSURE: 73 MMHG | RESPIRATION RATE: 19 BRPM | BODY MASS INDEX: 27.46 KG/M2 | HEART RATE: 83 BPM | SYSTOLIC BLOOD PRESSURE: 132 MMHG | TEMPERATURE: 98.1 F | HEIGHT: 63 IN

## 2023-08-02 PROCEDURE — 2709999900 HC NON-CHARGEABLE SUPPLY: Performed by: PSYCHIATRY & NEUROLOGY

## 2023-08-02 PROCEDURE — 7100000010 HC PHASE II RECOVERY - FIRST 15 MIN: Performed by: PSYCHIATRY & NEUROLOGY

## 2023-08-02 PROCEDURE — 2500000003 HC RX 250 WO HCPCS: Performed by: ANESTHESIOLOGY

## 2023-08-02 PROCEDURE — 90870 ELECTROCONVULSIVE THERAPY: CPT | Performed by: PSYCHIATRY & NEUROLOGY

## 2023-08-02 PROCEDURE — 6360000002 HC RX W HCPCS: Performed by: ANESTHESIOLOGY

## 2023-08-02 PROCEDURE — 7100000000 HC PACU RECOVERY - FIRST 15 MIN: Performed by: PSYCHIATRY & NEUROLOGY

## 2023-08-02 PROCEDURE — 3700000000 HC ANESTHESIA ATTENDED CARE: Performed by: PSYCHIATRY & NEUROLOGY

## 2023-08-02 RX ORDER — SODIUM CHLORIDE 9 MG/ML
INJECTION, SOLUTION INTRAVENOUS PRN
Status: DISCONTINUED | OUTPATIENT
Start: 2023-08-02 | End: 2023-08-02 | Stop reason: HOSPADM

## 2023-08-02 RX ORDER — SODIUM CHLORIDE 0.9 % (FLUSH) 0.9 %
5-40 SYRINGE (ML) INJECTION PRN
Status: DISCONTINUED | OUTPATIENT
Start: 2023-08-02 | End: 2023-08-02 | Stop reason: HOSPADM

## 2023-08-02 RX ORDER — SODIUM CHLORIDE 0.9 % (FLUSH) 0.9 %
5-40 SYRINGE (ML) INJECTION EVERY 12 HOURS SCHEDULED
Status: DISCONTINUED | OUTPATIENT
Start: 2023-08-02 | End: 2023-08-02 | Stop reason: HOSPADM

## 2023-08-02 RX ORDER — SUCCINYLCHOLINE/SOD CL,ISO/PF 100 MG/5ML
SYRINGE (ML) INTRAVENOUS PRN
Status: DISCONTINUED | OUTPATIENT
Start: 2023-08-02 | End: 2023-08-02 | Stop reason: SDUPTHER

## 2023-08-02 RX ORDER — SODIUM CHLORIDE 9 MG/ML
INJECTION, SOLUTION INTRAVENOUS CONTINUOUS
Status: DISCONTINUED | OUTPATIENT
Start: 2023-08-02 | End: 2023-08-02 | Stop reason: HOSPADM

## 2023-08-02 RX ADMIN — Medication 50 MG: at 08:38

## 2023-08-02 RX ADMIN — Medication 60 MG: at 08:38

## 2023-08-02 NOTE — PERIOP NOTE
Patient meets discharge criteria. Patient and Jenny Simmering provided with verbal and written discharge instructions. Patient discharged by wheelchair with Jenny Simmering to transport home.

## 2023-08-02 NOTE — ANESTHESIA POSTPROCEDURE EVALUATION
Department of Anesthesiology  Postprocedure Note    Patient: Michael Khanna  MRN: [id number]  YOB: 1960  Date of evaluation: 8/2/2023      Procedure Summary     Date: 08/02/23 Room / Location: [facility] R2 / [facility] OR    Anesthesia Start: 7850 Anesthesia Stop: 4390    Procedure: ELECTROCONVULSIVE THERAPY (Head) Diagnosis:       Major depressive disorder, recurrent episode, moderate (HCC)      (Major depressive disorder, recurrent episode, moderate ([address]) [F33.1])    Surgeons: Carlton Arnold MD Responsible Provider: Morgan Cartwright MD    Anesthesia Type: general ASA Status: 2          Anesthesia Type: No value filed.     Ama Phase I: Maa Score: 9    Ama Phase II:        Anesthesia Post Evaluation    Patient location during evaluation: PACU  Patient participation: complete - patient participated  Level of consciousness: awake and alert  Airway patency: patent  Nausea & Vomiting: no vomiting and no nausea  Complications: no  Cardiovascular status: hemodynamically stable  Respiratory status: acceptable  Hydration status: stable  Pain management: adequate

## 2023-08-02 NOTE — PROCEDURES
ECT PROCEDURE NOTE         IDENTIFICATION:           Patient Name   Zaid Branch         Date of Birth  1960         [facility]  [id number]         Medical Record Number   [id number]             Age   57 y.o. PCP  Vik Rodriguez MD         Admit date:  08/02/23           Room Number   PACU/PL  @ [facility]         Date of Service   08/02/23                          Electro Convulsive Therapy:   Treatment number 18          Maintenance ECT NO        Zaid Branch was admitted to the PACU for ECT. Patient was medically cleared and NPO for procedure. Patient is NOTcourt mandated and gave informed consent for ECT treatment. Patient received       Bilateral ECT YES    Administered using the [address] [place] [address]. at 40 % Energy, under general anesthesia. Zaid Branch with a good, well generalized seizure of 62 seconds on observation of the motor manifestations of the seizure. EEG duration was 74 secs. Post-Ictal Suppression Index: 81.1 %   Recovery was unremarkable and with no complications. Change in Energy %:                 Anesthesia medications administered during procedure:   Brevital:  60 mg   Change for next treament:       Succinylcholine: 50 mg   Change for next treatment:        Propofol: mg   Glycopyrrolate:  mg   Labetalol:  mg   Esmolol:  mg   Lorazepam:  mg   Ketamine:  mg   Zofran:   mg     Toradol:  mg   Lidocaine:  mg   Caffeine Benzoate: mg               CSSRS  1/26/2023 7/8/2022         1) Within the past month, have you wished you were dead or wished you could go to sleep and not wake up? No  No     2) Have you actually had any thoughts of killing yourself? No  No     6) Have you ever done anything, started to do anything, or prepared to do anything to end your life? Yes  No         Did this occur within the past 3 months?   Yes  -

## 2023-08-02 NOTE — ANESTHESIA PRE PROCEDURE
10.5 05/25/2021 02:17 AM    INR 1.0 05/25/2021 02:17 AM    APTT 21.5 05/25/2021 02:17 AM       HCG (If Applicable): No results found for: PREGTESTUR, PREGSERUM, HCG, HCGQUANT     ABGs: No results found for: PHART, PO2ART, GXX4IUE, ZLI0VQA, BEART, H5DXHRHW     Type & Screen (If Applicable):  No results found for: LABABO, LABRH    Drug/Infectious Status (If Applicable):  No results found for: HIV, HEPCAB    COVID-19 Screening (If Applicable):   Lab Results   Component Value Date/Time    COVID19 Not detected 01/29/2023 01:12 PM           Anesthesia Evaluation    Airway: Mallampati: I  TM distance: >3 FB   Neck ROM: full  Mouth opening: > = 3 FB   Dental:          Pulmonary:normal exam    (+) asthma:                            Cardiovascular:Negative CV ROS  Exercise tolerance: good (>4 METS),           Rhythm: regular  Rate: normal           Beta Blocker:  Not on Beta Blocker         Neuro/Psych:   (+) psychiatric history:            GI/Hepatic/Renal:   (+) PUD,           Endo/Other: Negative Endo/Other ROS                    Abdominal:             Vascular: negative vascular ROS. Other Findings:             Anesthesia Plan      general     ASA 2       Induction: intravenous. Anesthetic plan and risks discussed with patient.                         Zenobia Pelayo MD   8/2/2023

## 2023-08-02 NOTE — DISCHARGE INSTRUCTIONS
healthy lifestyle    You may be retaining fluid if you have a history of heart failure or if you experience any of the following symptoms:  Weight gain of 3 pounds or more overnight or 5 pounds in a week, increased swelling in our hands or feet or shortness of breath while lying flat in bed. Please call your doctor as soon as you notice any of these symptoms; do not wait until your next office visit. The discharge information has been reviewed with the patient and caregiver. The patient and caregiver verbalized understanding. Discharge medications reviewed with the patient and caregiver and appropriate educational materials and side effects teaching were provided.   ___________________________________________________________________________________________________________________________________

## 2023-08-02 NOTE — PERIOP NOTE
Parth Conyngham  1960  [id number]    Situation:  Verbal report given from: Dr Ashutosh Kaufman and Sara Larsen RN   Procedure: Procedure(s):  ELECTROCONVULSIVE THERAPY    Background:    Preoperative diagnosis: Major depressive disorder, recurrent episode, moderate ([address]) [F33.1]    Postoperative diagnosis: * No post-op diagnosis entered *    :  Dr. Colin Wright     Assistant(s): Circulator: Marla Hunter RN  Scrub Person First: Jose Solid    Specimens: * No specimens in log *    Assessment:  Intra-procedure medications         Anesthesia gave intra-procedure sedation and medications, see anesthesia flow sheet     Intravenous fluids: LR@ KVO     Vital signs stable

## 2023-08-04 ENCOUNTER — ANESTHESIA EVENT (OUTPATIENT)
Facility: HOSPITAL | Age: 63
End: 2023-08-04
Payer: MEDICAID

## 2023-08-11 ENCOUNTER — ANESTHESIA (OUTPATIENT)
Facility: HOSPITAL | Age: 63
End: 2023-08-11
Payer: MEDICAID

## 2023-08-11 ENCOUNTER — HOSPITAL ENCOUNTER (OUTPATIENT)
Facility: HOSPITAL | Age: 63
Setting detail: OUTPATIENT SURGERY
Discharge: HOME | End: 2023-08-11
Attending: PSYCHIATRY & NEUROLOGY | Admitting: PSYCHIATRY & NEUROLOGY
Payer: MEDICAID

## 2023-08-11 VITALS
BODY MASS INDEX: 27.46 KG/M2 | HEIGHT: 63 IN | HEART RATE: 89 BPM | TEMPERATURE: 98.1 F | DIASTOLIC BLOOD PRESSURE: 75 MMHG | SYSTOLIC BLOOD PRESSURE: 134 MMHG | OXYGEN SATURATION: 91 % | RESPIRATION RATE: 12 BRPM | WEIGHT: 155 LBS

## 2023-08-11 PROCEDURE — 6360000002 HC RX W HCPCS: Performed by: ANESTHESIOLOGY

## 2023-08-11 PROCEDURE — 3700000000 HC ANESTHESIA ATTENDED CARE: Performed by: PSYCHIATRY & NEUROLOGY

## 2023-08-11 PROCEDURE — 7100000001 HC PACU RECOVERY - ADDTL 15 MIN: Performed by: PSYCHIATRY & NEUROLOGY

## 2023-08-11 PROCEDURE — 2500000003 HC RX 250 WO HCPCS: Performed by: ANESTHESIOLOGY

## 2023-08-11 PROCEDURE — 90870 ELECTROCONVULSIVE THERAPY: CPT | Performed by: PSYCHIATRY & NEUROLOGY

## 2023-08-11 PROCEDURE — 2580000003 HC RX 258: Performed by: ANESTHESIOLOGY

## 2023-08-11 PROCEDURE — 7100000010 HC PHASE II RECOVERY - FIRST 15 MIN: Performed by: PSYCHIATRY & NEUROLOGY

## 2023-08-11 PROCEDURE — 7100000000 HC PACU RECOVERY - FIRST 15 MIN: Performed by: PSYCHIATRY & NEUROLOGY

## 2023-08-11 PROCEDURE — 2709999900 HC NON-CHARGEABLE SUPPLY: Performed by: PSYCHIATRY & NEUROLOGY

## 2023-08-11 RX ORDER — SODIUM CHLORIDE 0.9 % (FLUSH) 0.9 %
5-40 SYRINGE (ML) INJECTION PRN
Status: DISCONTINUED | OUTPATIENT
Start: 2023-08-11 | End: 2023-08-11 | Stop reason: HOSPADM

## 2023-08-11 RX ORDER — DIAPER,BRIEF,INFANT-TODD,DISP
EACH MISCELLANEOUS 2 TIMES DAILY
COMMUNITY

## 2023-08-11 RX ORDER — SUCCINYLCHOLINE CHLORIDE 20 MG/ML
INJECTION INTRAMUSCULAR; INTRAVENOUS PRN
Status: DISCONTINUED | OUTPATIENT
Start: 2023-08-11 | End: 2023-08-11 | Stop reason: SDUPTHER

## 2023-08-11 RX ORDER — SODIUM CHLORIDE 0.9 % (FLUSH) 0.9 %
5-40 SYRINGE (ML) INJECTION EVERY 12 HOURS SCHEDULED
Status: DISCONTINUED | OUTPATIENT
Start: 2023-08-11 | End: 2023-08-11 | Stop reason: HOSPADM

## 2023-08-11 RX ORDER — SODIUM CHLORIDE, SODIUM LACTATE, POTASSIUM CHLORIDE, CALCIUM CHLORIDE 600; 310; 30; 20 MG/100ML; MG/100ML; MG/100ML; MG/100ML
INJECTION, SOLUTION INTRAVENOUS CONTINUOUS PRN
Status: DISCONTINUED | OUTPATIENT
Start: 2023-08-11 | End: 2023-08-11 | Stop reason: SDUPTHER

## 2023-08-11 RX ORDER — SODIUM CHLORIDE 9 MG/ML
INJECTION, SOLUTION INTRAVENOUS PRN
Status: DISCONTINUED | OUTPATIENT
Start: 2023-08-11 | End: 2023-08-11 | Stop reason: HOSPADM

## 2023-08-11 RX ORDER — SODIUM CHLORIDE 9 MG/ML
INJECTION, SOLUTION INTRAVENOUS CONTINUOUS
Status: DISCONTINUED | OUTPATIENT
Start: 2023-08-11 | End: 2023-08-11 | Stop reason: HOSPADM

## 2023-08-11 RX ADMIN — SODIUM CHLORIDE, POTASSIUM CHLORIDE, SODIUM LACTATE AND CALCIUM CHLORIDE: 600; 310; 30; 20 INJECTION, SOLUTION INTRAVENOUS at 09:09

## 2023-08-11 RX ADMIN — METHOHEXITAL SODIUM 60 MG: 500 INJECTION, POWDER, LYOPHILIZED, FOR SOLUTION INTRAMUSCULAR; INTRAVENOUS; RECTAL at 09:15

## 2023-08-11 RX ADMIN — SUCCINYLCHOLINE CHLORIDE 50 MG: 20 INJECTION, SOLUTION INTRAMUSCULAR; INTRAVENOUS at 09:15

## 2023-08-11 ASSESSMENT — PAIN - FUNCTIONAL ASSESSMENT
PAIN_FUNCTIONAL_ASSESSMENT: ADULT NONVERBAL PAIN SCALE (NPVS)
PAIN_FUNCTIONAL_ASSESSMENT: NONE - DENIES PAIN
PAIN_FUNCTIONAL_ASSESSMENT: NONE - DENIES PAIN
PAIN_FUNCTIONAL_ASSESSMENT: ADULT NONVERBAL PAIN SCALE (NPVS)
PAIN_FUNCTIONAL_ASSESSMENT: NONE - DENIES PAIN
PAIN_FUNCTIONAL_ASSESSMENT: ADULT NONVERBAL PAIN SCALE (NPVS)
PAIN_FUNCTIONAL_ASSESSMENT: NONE - DENIES PAIN
PAIN_FUNCTIONAL_ASSESSMENT: NONE - DENIES PAIN

## 2023-08-11 NOTE — DISCHARGE INSTRUCTIONS
ECT DISCHARGE INSTRUCTIONS      NAME: Lesli Quiroz   :  1960   MRN:  [id number]     Date/Time:  2023 9:18 AM    ADMIT DATE: 2023     DISCHARGE DATE: 2023     DISCHARGE DIAGNOSIS:  [unfilled]     Recommended diet:  regular diet    Recommended activity: activity as tolerated and no driving for today    Have a responsible person stay with the patient for 24 hours after the treatment, some temporary confusion may be noticed. Do not allow a patient to operate a motor vehicle for at least 24 hours and all the confusion has cleared. Do not drink alcoholic beverages for 48 hours past treatment. Do not sign any legal documents. Do not make any critical decisions for 24 hours. Advance diet as tolerated. Start with liquids and advance it nausea is not present. Understand that memory for recent events, phone numbers, addresses, ect. May be decreased for a short period of time. Report any persistant nausea or pain to the treating physician. Patient receiving ECT while in the hospital should also not attempt to ambulate without assistance, please use tap bell which will be provide. If you have questions regarding the hospital related prescriptions or hospital related issues please call the [address] [place] [address] at [facility] [phone]. If you experience any of the following symptoms then please call your primary care physician/outpatient psychiatrist or return to the emergency room if you cannot get hold of your doctor: Fever, chills, nausea, vomiting, diarrhea, change in mentation, falling, bleeding, shortness of breath. Follow Up:  Continue outpatient psychiatric follow-up visits with personal psychiatrist.  Return for next ECT treatment in 2023 and 2023        Information obtained by :  I understand that if any problems occur once I am at home I am to contact my physician.     I understand and acknowledge

## 2023-08-11 NOTE — PROCEDURES
ECT PROCEDURE NOTE         IDENTIFICATION:           Patient Name   Lesli Quiroz         Date of Birth  1960         [facility]  [id number]         Medical Record Number   [id number]             Age   57 y.o. PCP  Eze John MD         Admit date:  08/11/23           Room Number   PACU/PL  @ [facility]         Date of Service   08/11/23                          Electro Convulsive Therapy:   Treatment number 19          Maintenance ECT NO        Lesli Quiroz was admitted to the PACU for ECT. Patient was medically cleared and NPO for procedure. Patient is NOTcourt mandated and gave informed consent for ECT treatment. Patient received       Bilateral ECT YES    Administered using the [address] [place] [address]. at 40 % Energy, under general anesthesia. Lesli Quiroz with a good, well generalized seizure of 61 seconds on observation of the motor manifestations of the seizure. EEG duration was 74 secs. Post-Ictal Suppression Index: 67.7 %   Recovery was unremarkable and with no complications. Change in Energy %:                 Anesthesia medications administered during procedure:   Brevital:  60 mg   Change for next treament:       Succinylcholine: 50 mg   Change for next treatment:        Propofol: mg   Glycopyrrolate:  mg   Labetalol:  mg   Esmolol:  mg   Lorazepam:  mg   Ketamine:  mg   Zofran:   mg     Toradol:  mg   Lidocaine:  mg   Caffeine Benzoate: mg               CSSRS  1/26/2023 7/8/2022         1) Within the past month, have you wished you were dead or wished you could go to sleep and not wake up? No  No     2) Have you actually had any thoughts of killing yourself? No  No     6) Have you ever done anything, started to do anything, or prepared to do anything to end your life? Yes  No         Did this occur within the past 3 months?   Yes  -

## 2023-08-11 NOTE — ANESTHESIA POSTPROCEDURE EVALUATION
Department of Anesthesiology  Postprocedure Note    Patient: Miri Magaña  MRN: [id number]  YOB: 1960  Date of evaluation: 8/11/2023      Procedure Summary     Date: 08/11/23 Room / Location: [facility] R2 / [facility] OR    Anesthesia Start: 9248 Anesthesia Stop: 1458    Procedure: ELECTROCONVULSIVE THERAPY (Head) Diagnosis:       Major depressive disorder, recurrent episode, moderate (HCC)      (Major depressive disorder, recurrent episode, moderate ([address]) [F33.1])    Surgeons: Amy Daly MD Responsible Provider: Grisel Rios MD    Anesthesia Type: general ASA Status: 3          Anesthesia Type: No value filed.     Ama Phase I: Ama Score: 10    Ama Phase II:        Anesthesia Post Evaluation    Patient location during evaluation: PACU  Patient participation: complete - patient participated  Level of consciousness: awake and alert  Pain score: 0  Airway patency: patent  Nausea & Vomiting: no nausea and no vomiting  Complications: no  Cardiovascular status: blood pressure returned to baseline  Respiratory status: acceptable  Hydration status: euvolemic  Pain management: adequate

## 2023-08-11 NOTE — PERIOP NOTE
Patient meets discharge criteria. Patient and care taker provided with verbal and written discharge instructions. Patient discharged by wheelchair with care taker to transport home.

## 2023-08-11 NOTE — PERIOP NOTE
[facility]  1960  [id number]    Situation:  Verbal report given from: Dr. Carolynn Acevedo and Fabiana Hernandez RN  Procedure: Procedure(s):  ELECTROCONVULSIVE THERAPY    Background:    Preoperative diagnosis: Major depressive disorder, recurrent episode, moderate ([address]) [F33.1]    Postoperative diagnosis: * No post-op diagnosis entered *    :  Dr. Yasmin West    Assistant(s): Circulator: Adama Ortega RN  Scrub Person First: Aline Avilez    Specimens: * No specimens in log *    Assessment:  Intra-procedure medications         Anesthesia gave intra-procedure sedation and medications, see anesthesia flow sheet     Intravenous fluids: LR@ KVO     Vital signs stable

## 2023-08-14 ENCOUNTER — ANESTHESIA EVENT (OUTPATIENT)
Facility: HOSPITAL | Age: 63
End: 2023-08-14
Payer: MEDICAID

## 2023-08-17 ENCOUNTER — ANESTHESIA EVENT (OUTPATIENT)
Facility: HOSPITAL | Age: 63
End: 2023-08-17
Payer: MEDICAID

## 2023-08-18 ENCOUNTER — ANESTHESIA (OUTPATIENT)
Facility: HOSPITAL | Age: 63
End: 2023-08-18
Payer: MEDICAID

## 2023-08-18 ENCOUNTER — HOSPITAL ENCOUNTER (OUTPATIENT)
Facility: HOSPITAL | Age: 63
Setting detail: OUTPATIENT SURGERY
Discharge: HOME | End: 2023-08-18
Attending: PSYCHIATRY & NEUROLOGY | Admitting: PSYCHIATRY & NEUROLOGY
Payer: MEDICAID

## 2023-08-18 VITALS
HEART RATE: 86 BPM | DIASTOLIC BLOOD PRESSURE: 68 MMHG | SYSTOLIC BLOOD PRESSURE: 131 MMHG | HEIGHT: 63 IN | RESPIRATION RATE: 14 BRPM | BODY MASS INDEX: 27.46 KG/M2 | TEMPERATURE: 98 F | OXYGEN SATURATION: 93 % | WEIGHT: 155 LBS

## 2023-08-18 PROCEDURE — 2709999900 HC NON-CHARGEABLE SUPPLY: Performed by: PSYCHIATRY & NEUROLOGY

## 2023-08-18 PROCEDURE — 7100000001 HC PACU RECOVERY - ADDTL 15 MIN: Performed by: PSYCHIATRY & NEUROLOGY

## 2023-08-18 PROCEDURE — 2500000003 HC RX 250 WO HCPCS: Performed by: ANESTHESIOLOGY

## 2023-08-18 PROCEDURE — 90870 ELECTROCONVULSIVE THERAPY: CPT | Performed by: PSYCHIATRY & NEUROLOGY

## 2023-08-18 PROCEDURE — 7100000011 HC PHASE II RECOVERY - ADDTL 15 MIN: Performed by: PSYCHIATRY & NEUROLOGY

## 2023-08-18 PROCEDURE — 6360000002 HC RX W HCPCS: Performed by: ANESTHESIOLOGY

## 2023-08-18 PROCEDURE — 7100000010 HC PHASE II RECOVERY - FIRST 15 MIN: Performed by: PSYCHIATRY & NEUROLOGY

## 2023-08-18 PROCEDURE — 3700000000 HC ANESTHESIA ATTENDED CARE: Performed by: PSYCHIATRY & NEUROLOGY

## 2023-08-18 PROCEDURE — 7100000000 HC PACU RECOVERY - FIRST 15 MIN: Performed by: PSYCHIATRY & NEUROLOGY

## 2023-08-18 RX ORDER — SODIUM CHLORIDE 0.9 % (FLUSH) 0.9 %
5-40 SYRINGE (ML) INJECTION PRN
Status: DISCONTINUED | OUTPATIENT
Start: 2023-08-18 | End: 2023-08-18 | Stop reason: HOSPADM

## 2023-08-18 RX ORDER — SODIUM CHLORIDE 9 MG/ML
INJECTION, SOLUTION INTRAVENOUS PRN
Status: DISCONTINUED | OUTPATIENT
Start: 2023-08-18 | End: 2023-08-18 | Stop reason: HOSPADM

## 2023-08-18 RX ORDER — SODIUM CHLORIDE 0.9 % (FLUSH) 0.9 %
5-40 SYRINGE (ML) INJECTION EVERY 12 HOURS SCHEDULED
Status: DISCONTINUED | OUTPATIENT
Start: 2023-08-18 | End: 2023-08-18 | Stop reason: HOSPADM

## 2023-08-18 RX ORDER — SODIUM CHLORIDE 9 MG/ML
INJECTION, SOLUTION INTRAVENOUS CONTINUOUS
Status: DISCONTINUED | OUTPATIENT
Start: 2023-08-18 | End: 2023-08-18 | Stop reason: HOSPADM

## 2023-08-18 RX ORDER — SUCCINYLCHOLINE/SOD CL,ISO/PF 100 MG/5ML
SYRINGE (ML) INTRAVENOUS PRN
Status: DISCONTINUED | OUTPATIENT
Start: 2023-08-18 | End: 2023-08-18 | Stop reason: SDUPTHER

## 2023-08-18 RX ADMIN — Medication 60 MG: at 09:22

## 2023-08-18 RX ADMIN — Medication 50 MG: at 09:22

## 2023-08-18 ASSESSMENT — PAIN - FUNCTIONAL ASSESSMENT: PAIN_FUNCTIONAL_ASSESSMENT: NONE - DENIES PAIN

## 2023-08-18 NOTE — PERIOP NOTE
Patient meets discharge criteria. Patient and Mishel Maloney provided with verbal and written discharge instructions. Patient discharged by wheelchair with Mishel Maloney to transport home.

## 2023-08-18 NOTE — PERIOP NOTE
Skip Shelia  1960  [id number]    Situation:  Verbal report given from: Jenise Burnette  Procedure: Procedure(s):  ELECTROCONVULSIVE THERAPY    Background:    Preoperative diagnosis: Major depressive disorder, recurrent episode, moderate ([address]) [F33.1]    Postoperative diagnosis: * No post-op diagnosis entered *    :  Dr. Santino Chua    Assistant(s): Circulator: Paulette Diggs RN  Circulator Assist: Sheldon Mcmillan    Specimens: * No specimens in log *    Assessment:  Intra-procedure medications         Anesthesia gave intra-procedure sedation and medications, see anesthesia flow sheet     Intravenous fluids: LR@ KVO     Vital signs stable yes      Recommendation:    Permission to share finding with

## 2023-08-18 NOTE — ANESTHESIA PRE PROCEDURE
K 3.9 02/17/2023 05:34 AM     02/17/2023 05:34 AM    CO2 26 02/17/2023 05:34 AM    BUN 19 02/17/2023 05:34 AM    CREATININE 0.88 02/17/2023 05:34 AM    GFRAA 60 07/08/2022 10:33 PM    AGRATIO 0.8 02/17/2023 05:34 AM    GLUCOSE 103 02/17/2023 05:34 AM     01/27/2023 05:39 AM    PROT 6.5 02/17/2023 05:34 AM    CALCIUM 8.6 02/17/2023 05:34 AM    BILITOT 0.2 02/17/2023 05:34 AM    ALKPHOS 71 02/17/2023 05:34 AM    AST 16 02/17/2023 05:34 AM    ALT 24 02/17/2023 05:34 AM       POC Tests: No results for input(s): POCGLU, POCNA, POCK, POCCL, POCBUN, POCHEMO, POCHCT in the last 72 hours. Coags:   Lab Results   Component Value Date/Time    PROTIME 10.5 05/25/2021 02:17 AM    INR 1.0 05/25/2021 02:17 AM    APTT 21.5 05/25/2021 02:17 AM       HCG (If Applicable): No results found for: PREGTESTUR, PREGSERUM, HCG, HCGQUANT     ABGs: No results found for: PHART, PO2ART, SKG9YEN, NAG6EXJ, BEART, X9AJTEYD     Type & Screen (If Applicable):  No results found for: LABABO, LABRH    Drug/Infectious Status (If Applicable):  No results found for: HIV, HEPCAB    COVID-19 Screening (If Applicable):   Lab Results   Component Value Date/Time    COVID19 Not detected 01/29/2023 01:12 PM           Anesthesia Evaluation  Patient summary reviewed and Nursing notes reviewed  Airway: Mallampati: II  TM distance: >3 FB   Neck ROM: full  Mouth opening: > = 3 FB   Dental: normal exam         Pulmonary:   (+) asthma:                            Cardiovascular:  Exercise tolerance: good (>4 METS),           Rhythm: regular  Rate: normal                    Neuro/Psych:   (+) psychiatric history:            GI/Hepatic/Renal:   (+) PUD,           Endo/Other:    (+) Diabetes, . Abdominal:             Vascular: Other Findings:             Anesthesia Plan      general     ASA 3       Induction: intravenous. Anesthetic plan and risks discussed with patient.                         Frank Lopez MD

## 2023-08-18 NOTE — DISCHARGE INSTRUCTIONS
ECT DISCHARGE INSTRUCTIONS      NAME: Ayaan Perera   :  1960   MRN:  [id number]     Date/Time:  2023 9:27 AM    ADMIT DATE: 2023     DISCHARGE DATE: 2023     DISCHARGE DIAGNOSIS:  [unfilled]     Recommended diet:  {diet:25955}    Recommended activity: {discharge activity:87411}    Have a responsible person stay with the patient for 24 hours after the treatment, some temporary confusion may be noticed. Do not allow a patient to operate a motor vehicle for at least 24 hours and all the confusion has cleared. Do not drink alcoholic beverages for 48 hours past treatment. Do not sign any legal documents. Do not make any critical decisions for 24 hours. Advance diet as tolerated. Start with liquids and advance it nausea is not present. Understand that memory for recent events, phone numbers, addresses, ect. May be decreased for a short period of time. Report any persistant nausea or pain to the treating physician. Patient receiving ECT while in the hospital should also not attempt to ambulate without assistance, please use tap bell which will be provide. If you have questions regarding the hospital related prescriptions or hospital related issues please call the [address] [place] [address] at [facility] [phone]. If you experience any of the following symptoms then please call your primary care physician/outpatient psychiatrist or return to the emergency room if you cannot get hold of your doctor: Fever, chills, nausea, vomiting, diarrhea, change in mentation, falling, bleeding, shortness of breath. Follow Up:  Continue outpatient psychiatric follow-up visits with personal psychiatrist.  Return for next ECT treatment in ***        Information obtained by :  I understand that if any problems occur once I am at home I am to contact my physician. I understand and acknowledge receipt of the instructions indicated above.

## 2023-08-18 NOTE — ANESTHESIA POSTPROCEDURE EVALUATION
Department of Anesthesiology  Postprocedure Note    Patient: Ayaan Perera  MRN: [id number]  YOB: 1960  Date of evaluation: 8/18/2023      Procedure Summary     Date: 08/18/23 Room / Location: [address] MAIN OR R2 / [address] MAIN OR    Anesthesia Start: 7512 Anesthesia Stop: 0930    Procedure: ELECTROCONVULSIVE THERAPY (Head) Diagnosis:       Major depressive disorder, recurrent episode, moderate (HCC)      (Major depressive disorder, recurrent episode, moderate ([address]) [F33.1])    Surgeons: Reese Palencia MD Responsible Provider: Olga Lidia Moser MD    Anesthesia Type: general ASA Status: 3          Anesthesia Type: No value filed.     Ama Phase I: Ama Score: 6    Ama Phase II:        Anesthesia Post Evaluation    Patient location during evaluation: PACU  Patient participation: waiting for patient participation  Level of consciousness: sleepy but conscious  Airway patency: patent  Nausea & Vomiting: no vomiting and no nausea  Complications: no  Cardiovascular status: hemodynamically stable  Respiratory status: acceptable  Hydration status: stable  Pain management: adequate

## 2023-08-18 NOTE — PROCEDURES
ECT PROCEDURE NOTE         IDENTIFICATION:           Patient Name   Roman Pisano         Date of Birth  1960         [facility]  [id number]         Medical Record Number   [id number]             Age   57 y.o. PCP  Eugenia Blank MD         Admit date:  08/18/23           Room Number   PACU/PL  @ [facility]         Date of Service   08/18/23                          Electro Convulsive Therapy:   Treatment number 20          Maintenance ECT NO        Roman Pisano was admitted to the PACU for ECT. Patient was medically cleared and NPO for procedure. Patient is NOT court mandated and gave informed consent for ECT treatment. Patient received       Bilateral ECT YES    Administered using the [address] [place] [address]. at 40 % Energy, under general anesthesia. Roman Pisano with a good, well generalized seizure of 44 seconds on observation of the motor manifestations of the seizure. EEG duration was 65 secs. Post-Ictal Suppression Index: 21 %   Recovery was unremarkable and with no complications. Change in Energy %:                 Anesthesia medications administered during procedure:   Brevital:  60 mg   Change for next treament:       Succinylcholine: 50 mg   Change for next treatment:        Propofol: mg   Glycopyrrolate:  mg   Labetalol:  mg   Esmolol:  mg   Lorazepam:  mg   Ketamine:  mg   Zofran:   mg     Toradol:  mg   Lidocaine:  mg   Caffeine Benzoate: mg               CSSRS  1/26/2023 7/8/2022         1) Within the past month, have you wished you were dead or wished you could go to sleep and not wake up? No  No     2) Have you actually had any thoughts of killing yourself? No  No     6) Have you ever done anything, started to do anything, or prepared to do anything to end your life? Yes  No         Did this occur within the past 3 months?   Yes  -

## 2023-08-25 ENCOUNTER — ANESTHESIA (OUTPATIENT)
Facility: HOSPITAL | Age: 63
End: 2023-08-25
Payer: MEDICAID

## 2023-08-25 ENCOUNTER — HOSPITAL ENCOUNTER (OUTPATIENT)
Facility: HOSPITAL | Age: 63
Setting detail: OUTPATIENT SURGERY
Discharge: HOME | End: 2023-08-25
Attending: PSYCHIATRY & NEUROLOGY | Admitting: PSYCHIATRY & NEUROLOGY
Payer: MEDICAID

## 2023-08-25 VITALS
OXYGEN SATURATION: 93 % | HEIGHT: 63 IN | BODY MASS INDEX: 27.46 KG/M2 | DIASTOLIC BLOOD PRESSURE: 71 MMHG | HEART RATE: 88 BPM | SYSTOLIC BLOOD PRESSURE: 126 MMHG | RESPIRATION RATE: 18 BRPM | WEIGHT: 155 LBS | TEMPERATURE: 98.4 F

## 2023-08-25 LAB
GLUCOSE BLD STRIP.AUTO-MCNC: 119 MG/DL (ref 65–117)
SERVICE CMNT-IMP: ABNORMAL

## 2023-08-25 PROCEDURE — 7100000001 HC PACU RECOVERY - ADDTL 15 MIN: Performed by: PSYCHIATRY & NEUROLOGY

## 2023-08-25 PROCEDURE — 2709999900 HC NON-CHARGEABLE SUPPLY: Performed by: PSYCHIATRY & NEUROLOGY

## 2023-08-25 PROCEDURE — 7100000010 HC PHASE II RECOVERY - FIRST 15 MIN: Performed by: PSYCHIATRY & NEUROLOGY

## 2023-08-25 PROCEDURE — 2500000003 HC RX 250 WO HCPCS: Performed by: ANESTHESIOLOGY

## 2023-08-25 PROCEDURE — 6360000002 HC RX W HCPCS: Performed by: ANESTHESIOLOGY

## 2023-08-25 PROCEDURE — 90870 ELECTROCONVULSIVE THERAPY: CPT | Performed by: PSYCHIATRY & NEUROLOGY

## 2023-08-25 PROCEDURE — 82962 GLUCOSE BLOOD TEST: CPT

## 2023-08-25 PROCEDURE — 7100000000 HC PACU RECOVERY - FIRST 15 MIN: Performed by: PSYCHIATRY & NEUROLOGY

## 2023-08-25 PROCEDURE — 3700000000 HC ANESTHESIA ATTENDED CARE: Performed by: PSYCHIATRY & NEUROLOGY

## 2023-08-25 RX ORDER — SUCCINYLCHOLINE/SOD CL,ISO/PF 100 MG/5ML
SYRINGE (ML) INTRAVENOUS PRN
Status: DISCONTINUED | OUTPATIENT
Start: 2023-08-25 | End: 2023-08-25 | Stop reason: SDUPTHER

## 2023-08-25 RX ORDER — SODIUM CHLORIDE 0.9 % (FLUSH) 0.9 %
5-40 SYRINGE (ML) INJECTION PRN
Status: DISCONTINUED | OUTPATIENT
Start: 2023-08-25 | End: 2023-08-25 | Stop reason: HOSPADM

## 2023-08-25 RX ORDER — SODIUM CHLORIDE 9 MG/ML
INJECTION, SOLUTION INTRAVENOUS PRN
Status: DISCONTINUED | OUTPATIENT
Start: 2023-08-25 | End: 2023-08-25 | Stop reason: HOSPADM

## 2023-08-25 RX ORDER — SODIUM CHLORIDE 0.9 % (FLUSH) 0.9 %
5-40 SYRINGE (ML) INJECTION EVERY 12 HOURS SCHEDULED
Status: DISCONTINUED | OUTPATIENT
Start: 2023-08-25 | End: 2023-08-25 | Stop reason: HOSPADM

## 2023-08-25 RX ORDER — SODIUM CHLORIDE 9 MG/ML
INJECTION, SOLUTION INTRAVENOUS CONTINUOUS
Status: DISCONTINUED | OUTPATIENT
Start: 2023-08-25 | End: 2023-08-25 | Stop reason: HOSPADM

## 2023-08-25 RX ADMIN — Medication 50 MG: at 08:52

## 2023-08-25 RX ADMIN — Medication 60 MG: at 08:52

## 2023-08-25 ASSESSMENT — PAIN - FUNCTIONAL ASSESSMENT
PAIN_FUNCTIONAL_ASSESSMENT: ADULT NONVERBAL PAIN SCALE (NPVS)
PAIN_FUNCTIONAL_ASSESSMENT: NONE - DENIES PAIN
PAIN_FUNCTIONAL_ASSESSMENT: ADULT NONVERBAL PAIN SCALE (NPVS)
PAIN_FUNCTIONAL_ASSESSMENT: ADULT NONVERBAL PAIN SCALE (NPVS)
PAIN_FUNCTIONAL_ASSESSMENT: NONE - DENIES PAIN
PAIN_FUNCTIONAL_ASSESSMENT: NONE - DENIES PAIN
PAIN_FUNCTIONAL_ASSESSMENT: ADULT NONVERBAL PAIN SCALE (NPVS)
PAIN_FUNCTIONAL_ASSESSMENT: 0-10
PAIN_FUNCTIONAL_ASSESSMENT: ADULT NONVERBAL PAIN SCALE (NPVS)
PAIN_FUNCTIONAL_ASSESSMENT: ADULT NONVERBAL PAIN SCALE (NPVS)

## 2023-08-25 NOTE — PERIOP NOTE
Patient meets discharge criteria. Patient and Man Admire Team provided with verbal and written discharge instructions. Patient discharged by wheelchair with URIEL Neil Team to transport home.

## 2023-08-25 NOTE — PROCEDURES
ECT PROCEDURE NOTE         IDENTIFICATION:           Patient Name   Jamila Ballard         Date of Birth  1960         [facility]  [id number]         Medical Record Number   [id number]             Age   57 y.o. PCP  Joon Ballard MD         Admit date:  08/25/23           Room Number   PACU/PL  @ [facility]         Date of Service   08/25/23                          Electro Convulsive Therapy:   Treatment number 21          Maintenance ECT - NO        Jamila Ballard was admitted to the PACU for ECT. Patient was medically cleared and NPO for procedure. Patient is NOT court mandated and gave informed consent for ECT treatment. Patient received       Bilateral ECT YES    Administered using the [address] [place] [address]. at 40 % Energy, under general anesthesia. Jamila Ballard with a good, well generalized seizure of 56 seconds on observation of the motor manifestations of the seizure. EEG duration was 72 secs. Post-Ictal Suppression Index: 76.6 %   Recovery was unremarkable and with no complications. Change in Energy %:                 Anesthesia medications administered during procedure:   Brevital:  60 mg   Change for next treament:       Succinylcholine: 50 mg   Change for next treatment:        Propofol: mg   Glycopyrrolate:  mg   Labetalol:  mg   Esmolol:  mg   Lorazepam:  mg   Ketamine:  mg   Zofran:   mg     Toradol:  mg   Lidocaine:  mg   Caffeine Benzoate: mg               CSSRS  1/26/2023 7/8/2022         1) Within the past month, have you wished you were dead or wished you could go to sleep and not wake up? No  No     2) Have you actually had any thoughts of killing yourself? No  No     6) Have you ever done anything, started to do anything, or prepared to do anything to end your life? Yes  No         Did this occur within the past 3 months?   Yes  -

## 2023-08-25 NOTE — ANESTHESIA POSTPROCEDURE EVALUATION
Department of Anesthesiology  Postprocedure Note    Patient: Parth Gutierrez  MRN: [id number]  YOB: 1960  Date of evaluation: 8/25/2023      Procedure Summary     Date: 08/25/23 Room / Location: [facility] OR R2 / [facility] OR    Anesthesia Start: 0848 Anesthesia Stop: 0900    Procedure: ELECTROCONVULSIVE THERAPY (Head) Diagnosis:       Major depressive disorder, recurrent episode, moderate (HCC)      (Major depressive disorder, recurrent episode, moderate ([address]) [F33.1])    Surgeons: Ham Sullivan MD Responsible Provider: Cristopher Simmons MD    Anesthesia Type: general ASA Status: 3          Anesthesia Type: No value filed.     Ama Phase I: Ama Score: 10    Maa Phase II:        Anesthesia Post Evaluation    Patient location during evaluation: PACU  Patient participation: complete - patient participated  Level of consciousness: awake and alert  Airway patency: patent  Nausea & Vomiting: no vomiting and no nausea  Complications: no  Cardiovascular status: hemodynamically stable  Respiratory status: acceptable  Hydration status: stable  Pain management: adequate

## 2023-08-25 NOTE — PERIOP NOTE
Jackson Rule  1960  [id number]    Situation:  Verbal report given from: Dr. Félix Montenegro and Darlin Jay RN  Procedure: Procedure(s):  ELECTROCONVULSIVE THERAPY    Background:    Preoperative diagnosis: Major depressive disorder, recurrent episode, moderate ([address]) [F33.1]    Postoperative diagnosis: * No post-op diagnosis entered *    :  Dr. Karlos Wolf    Assistant(s): Circulator: Erika Martin RN  Scrub Person First: Morgan Selby    Specimens: * No specimens in log *    Assessment:  Intra-procedure medications         Anesthesia gave intra-procedure sedation and medications, see anesthesia flow sheet     Intravenous fluids: LR@ KVO     Vital signs stable

## 2023-08-25 NOTE — DISCHARGE INSTRUCTIONS
ECT DISCHARGE INSTRUCTIONS      NAME: Cindy Aguirre   :  1960   MRN:  [id number]     Date/Time:  2023 8:58 AM    ADMIT DATE: 2023     DISCHARGE DATE: 2023     DISCHARGE DIAGNOSIS:  [unfilled]     Recommended diet:  regular diet    Recommended activity: activity as tolerated and no driving for today    Have a responsible person stay with the patient for 24 hours after the treatment, some temporary confusion may be noticed. Do not allow a patient to operate a motor vehicle for at least 24 hours and all the confusion has cleared. Do not drink alcoholic beverages for 48 hours past treatment. Do not sign any legal documents. Do not make any critical decisions for 24 hours. Advance diet as tolerated. Start with liquids and advance it nausea is not present. Understand that memory for recent events, phone numbers, addresses, ect. May be decreased for a short period of time. Report any persistant nausea or pain to the treating physician. Patient receiving ECT while in the hospital should also not attempt to ambulate without assistance, please use tap bell which will be provide. If you have questions regarding the hospital related prescriptions or hospital related issues please call the [address] [place] [address] at [facility] [phone]. If you experience any of the following symptoms then please call your primary care physician/outpatient psychiatrist or return to the emergency room if you cannot get hold of your doctor: Fever, chills, nausea, vomiting, diarrhea, change in mentation, falling, bleeding, shortness of breath. Follow Up:  Continue outpatient psychiatric follow-up visits with personal psychiatrist.      Information obtained by :  I understand that if any problems occur once I am at home I am to contact my physician. I understand and acknowledge receipt of the instructions indicated above.

## 2023-09-01 ENCOUNTER — ANESTHESIA EVENT (OUTPATIENT)
Facility: HOSPITAL | Age: 63
End: 2023-09-01
Payer: MEDICAID

## 2023-09-01 ENCOUNTER — HOSPITAL ENCOUNTER (OUTPATIENT)
Facility: HOSPITAL | Age: 63
Setting detail: OUTPATIENT SURGERY
Discharge: HOME | End: 2023-09-01
Attending: PSYCHIATRY & NEUROLOGY | Admitting: PSYCHIATRY & NEUROLOGY
Payer: MEDICAID

## 2023-09-01 ENCOUNTER — ANESTHESIA (OUTPATIENT)
Facility: HOSPITAL | Age: 63
End: 2023-09-01
Payer: MEDICAID

## 2023-09-01 VITALS
DIASTOLIC BLOOD PRESSURE: 67 MMHG | SYSTOLIC BLOOD PRESSURE: 119 MMHG | HEIGHT: 63 IN | WEIGHT: 155 LBS | OXYGEN SATURATION: 100 % | TEMPERATURE: 98.4 F | HEART RATE: 82 BPM | BODY MASS INDEX: 27.46 KG/M2 | RESPIRATION RATE: 18 BRPM

## 2023-09-01 PROCEDURE — 2500000003 HC RX 250 WO HCPCS: Performed by: ANESTHESIOLOGY

## 2023-09-01 PROCEDURE — 7100000010 HC PHASE II RECOVERY - FIRST 15 MIN: Performed by: PSYCHIATRY & NEUROLOGY

## 2023-09-01 PROCEDURE — 3700000000 HC ANESTHESIA ATTENDED CARE: Performed by: PSYCHIATRY & NEUROLOGY

## 2023-09-01 PROCEDURE — 6360000002 HC RX W HCPCS: Performed by: ANESTHESIOLOGY

## 2023-09-01 PROCEDURE — 3700000001 HC ADD 15 MINUTES (ANESTHESIA): Performed by: PSYCHIATRY & NEUROLOGY

## 2023-09-01 PROCEDURE — 90870 ELECTROCONVULSIVE THERAPY: CPT | Performed by: PSYCHIATRY & NEUROLOGY

## 2023-09-01 PROCEDURE — 2709999900 HC NON-CHARGEABLE SUPPLY: Performed by: PSYCHIATRY & NEUROLOGY

## 2023-09-01 PROCEDURE — 7100000000 HC PACU RECOVERY - FIRST 15 MIN: Performed by: PSYCHIATRY & NEUROLOGY

## 2023-09-01 PROCEDURE — 7100000001 HC PACU RECOVERY - ADDTL 15 MIN: Performed by: PSYCHIATRY & NEUROLOGY

## 2023-09-01 RX ORDER — SODIUM CHLORIDE 0.9 % (FLUSH) 0.9 %
5-40 SYRINGE (ML) INJECTION EVERY 12 HOURS SCHEDULED
Status: DISCONTINUED | OUTPATIENT
Start: 2023-09-01 | End: 2023-09-01 | Stop reason: HOSPADM

## 2023-09-01 RX ORDER — SODIUM CHLORIDE 9 MG/ML
INJECTION, SOLUTION INTRAVENOUS PRN
Status: DISCONTINUED | OUTPATIENT
Start: 2023-09-01 | End: 2023-09-01 | Stop reason: HOSPADM

## 2023-09-01 RX ORDER — SUCCINYLCHOLINE/SOD CL,ISO/PF 100 MG/5ML
SYRINGE (ML) INTRAVENOUS PRN
Status: DISCONTINUED | OUTPATIENT
Start: 2023-09-01 | End: 2023-09-01 | Stop reason: SDUPTHER

## 2023-09-01 RX ORDER — SODIUM CHLORIDE 0.9 % (FLUSH) 0.9 %
5-40 SYRINGE (ML) INJECTION PRN
Status: DISCONTINUED | OUTPATIENT
Start: 2023-09-01 | End: 2023-09-01 | Stop reason: HOSPADM

## 2023-09-01 RX ADMIN — Medication 60 MG: at 09:24

## 2023-09-01 RX ADMIN — Medication 50 MG: at 09:24

## 2023-09-01 ASSESSMENT — PAIN - FUNCTIONAL ASSESSMENT
PAIN_FUNCTIONAL_ASSESSMENT: ADULT NONVERBAL PAIN SCALE (NPVS)
PAIN_FUNCTIONAL_ASSESSMENT: NONE - DENIES PAIN
PAIN_FUNCTIONAL_ASSESSMENT: ADULT NONVERBAL PAIN SCALE (NPVS)
PAIN_FUNCTIONAL_ASSESSMENT: ADULT NONVERBAL PAIN SCALE (NPVS)
PAIN_FUNCTIONAL_ASSESSMENT: NONE - DENIES PAIN
PAIN_FUNCTIONAL_ASSESSMENT: ADULT NONVERBAL PAIN SCALE (NPVS)
PAIN_FUNCTIONAL_ASSESSMENT: NONE - DENIES PAIN
PAIN_FUNCTIONAL_ASSESSMENT: NONE - DENIES PAIN
PAIN_FUNCTIONAL_ASSESSMENT: ADULT NONVERBAL PAIN SCALE (NPVS)
PAIN_FUNCTIONAL_ASSESSMENT: ADULT NONVERBAL PAIN SCALE (NPVS)

## 2023-09-01 NOTE — PROCEDURES
ECT PROCEDURE NOTE         IDENTIFICATION:           Patient Name   Laure Cooper         Date of Birth  1960         [facility]  [id number]         Medical Record Number   [id number]             Age   57 y.o. PCP  Rhianna Messina MD         Admit date:  09/01/23           Room Number   PACU/PL  @ [facility]         Date of Service   09/01/23                          Electro Convulsive Therapy:   Treatment number 22          Maintenance ECT - NO        Laure Cooper was admitted to the PACU for ECT. Patient was medically cleared and NPO for procedure. Patient is NOT court mandated and gave informed consent for ECT treatment. Patient received       Bilateral ECT YES    Administered using the [address] [place] [address]. at 40 % Energy, under general anesthesia. Laure Cooper with a good, well generalized seizure of 63 seconds on observation of the motor manifestations of the seizure. EEG duration was 77 secs. Post-Ictal Suppression Index: 50.7 %   Recovery was unremarkable and with no complications. Change in Energy %:                 Anesthesia medications administered during procedure:   Brevital:  60 mg   Change for next treament:       Succinylcholine: 50 mg   Change for next treatment:        Propofol: mg   Glycopyrrolate:  mg   Labetalol:  mg   Esmolol:  mg   Lorazepam:  mg   Ketamine:  mg   Zofran:   mg     Toradol:  mg   Lidocaine:  mg   Caffeine Benzoate: mg               CSSRS  1/26/2023 7/8/2022         1) Within the past month, have you wished you were dead or wished you could go to sleep and not wake up? No  No     2) Have you actually had any thoughts of killing yourself? No  No     6) Have you ever done anything, started to do anything, or prepared to do anything to end your life? Yes  No         Did this occur within the past 3 months?   Yes  -

## 2023-09-01 NOTE — PERIOP NOTE
Patient meets discharge criteria. Patient and Roseann Shanon provided with verbal and written discharge instructions. Patient discharged by wheelchair with Roseann Claudio to transport home.

## 2023-09-01 NOTE — ANESTHESIA PRE PROCEDURE
Value Date/Time    WBC 5.5 02/17/2023 05:34 AM    RBC 3.99 02/17/2023 05:34 AM    HGB 11.2 02/17/2023 05:34 AM    HCT 35.2 02/17/2023 05:34 AM    MCV 88.2 02/17/2023 05:34 AM    RDW 13.4 02/17/2023 05:34 AM     02/17/2023 05:34 AM       CMP:   Lab Results   Component Value Date/Time     02/17/2023 05:34 AM    K 3.9 02/17/2023 05:34 AM     02/17/2023 05:34 AM    CO2 26 02/17/2023 05:34 AM    BUN 19 02/17/2023 05:34 AM    CREATININE 0.88 02/17/2023 05:34 AM    GFRAA 60 07/08/2022 10:33 PM    AGRATIO 0.8 02/17/2023 05:34 AM    GLUCOSE 103 02/17/2023 05:34 AM     01/27/2023 05:39 AM    PROT 6.5 02/17/2023 05:34 AM    CALCIUM 8.6 02/17/2023 05:34 AM    BILITOT 0.2 02/17/2023 05:34 AM    ALKPHOS 71 02/17/2023 05:34 AM    AST 16 02/17/2023 05:34 AM    ALT 24 02/17/2023 05:34 AM       POC Tests: No results for input(s): POCGLU, POCNA, POCK, POCCL, POCBUN, POCHEMO, POCHCT in the last 72 hours.     Coags:   Lab Results   Component Value Date/Time    PROTIME 10.5 05/25/2021 02:17 AM    INR 1.0 05/25/2021 02:17 AM    APTT 21.5 05/25/2021 02:17 AM       HCG (If Applicable): No results found for: PREGTESTUR, PREGSERUM, HCG, HCGQUANT     ABGs: No results found for: PHART, PO2ART, XQA1TCK, JTH5PJG, BEART, U2LQYLSB     Type & Screen (If Applicable):  No results found for: LABABO, LABRH    Drug/Infectious Status (If Applicable):  No results found for: HIV, HEPCAB    COVID-19 Screening (If Applicable):   Lab Results   Component Value Date/Time    COVID19 Not detected 01/29/2023 01:12 PM           Anesthesia Evaluation  Patient summary reviewed and Nursing notes reviewed  Airway: Mallampati: II  TM distance: >3 FB   Neck ROM: full  Mouth opening: > = 3 FB   Dental: normal exam         Pulmonary:   (+) asthma:                            Cardiovascular:Negative CV ROS  Exercise tolerance: good (>4 METS),           Rhythm: regular  Rate: normal           Beta Blocker:  Not on Beta Blocker         Neuro/Psych:

## 2023-09-01 NOTE — DISCHARGE INSTRUCTIONS
ECT DISCHARGE INSTRUCTIONS      NAME: Aliya Omer   :  1960   MRN:  [id number]     Date/Time:  2023 9:29 AM    ADMIT DATE: 2023     DISCHARGE DATE: 2023     DISCHARGE DIAGNOSIS:  [unfilled]     Recommended diet:  regular diet    Recommended activity: activity as tolerated and no driving for today    Have a responsible person stay with the patient for 24 hours after the treatment, some temporary confusion may be noticed. Do not allow a patient to operate a motor vehicle for at least 24 hours and all the confusion has cleared. Do not drink alcoholic beverages for 48 hours past treatment. Do not sign any legal documents. Do not make any critical decisions for 24 hours. Advance diet as tolerated. Start with liquids and advance it nausea is not present. Understand that memory for recent events, phone numbers, addresses, ect. May be decreased for a short period of time. Report any persistant nausea or pain to the treating physician. Patient receiving ECT while in the hospital should also not attempt to ambulate without assistance, please use tap bell which will be provide. If you have questions regarding the hospital related prescriptions or hospital related issues please call the [address] [place] [address] at [facility] [phone]. If you experience any of the following symptoms then please call your primary care physician/outpatient psychiatrist or return to the emergency room if you cannot get hold of your doctor: Fever, chills, nausea, vomiting, diarrhea, change in mentation, falling, bleeding, shortness of breath. Follow Up:  Continue outpatient psychiatric follow-up visits with personal psychiatrist.  Return for next ECT treatment in 2023        Information obtained by :  I understand that if any problems occur once I am at home I am to contact my physician.     I understand and acknowledge receipt of the

## 2023-09-01 NOTE — ANESTHESIA POSTPROCEDURE EVALUATION
Department of Anesthesiology  Postprocedure Note    Patient: Carolyn Magallon  MRN: [id number]  YOB: 1960  Date of evaluation: 9/1/2023      Procedure Summary     Date: 09/01/23 Room / Location: [facility] OR R2 / [facility] OR    Anesthesia Start: 9358 Anesthesia Stop: 0983    Procedure: ELECTROCONVULSIVE THERAPY (Head) Diagnosis:       Major depressive disorder, recurrent episode, moderate (HCC)      (Major depressive disorder, recurrent episode, moderate ([address]) [F33.1])    Surgeons: Mikhail Diggs MD Responsible Provider: Nate Cortez MD    Anesthesia Type: general ASA Status: 3          Anesthesia Type: No value filed.     Ama Phase I: Ama Score: 10    Ama Phase II:        Anesthesia Post Evaluation    Patient location during evaluation: PACU  Patient participation: waiting for patient participation  Level of consciousness: sleepy but conscious  Airway patency: patent  Nausea & Vomiting: no vomiting and no nausea  Complications: no  Cardiovascular status: hemodynamically stable  Respiratory status: acceptable  Hydration status: stable  Pain management: adequate

## 2023-09-01 NOTE — PERIOP NOTE
[facility]  1960  [id number]    Situation:  Verbal report given from: aMrry Alonzo RN and Dr. Elin Spatz  Procedure: Procedure(s):  ELECTROCONVULSIVE THERAPY    Background:    Preoperative diagnosis: Major depressive disorder, recurrent episode, moderate ([address]) [F33.1]    Postoperative diagnosis: * No post-op diagnosis entered *    :  Dr. Ty Graves    Assistant(s): Circulator: Carmel Hall RN  Scrub Person First: Walt Gamez    Specimens: * No specimens in log *    Assessment:  Intra-procedure medications         Anesthesia gave intra-procedure sedation and medications, see anesthesia flow sheet     Intravenous fluids: LR@ KVO     Vital signs stable

## 2023-09-08 ENCOUNTER — ANESTHESIA (OUTPATIENT)
Facility: HOSPITAL | Age: 63
End: 2023-09-08
Payer: MEDICAID

## 2023-09-08 ENCOUNTER — HOSPITAL ENCOUNTER (OUTPATIENT)
Facility: HOSPITAL | Age: 63
Setting detail: OUTPATIENT SURGERY
Discharge: HOME | End: 2023-09-08
Attending: PSYCHIATRY & NEUROLOGY | Admitting: PSYCHIATRY & NEUROLOGY
Payer: MEDICAID

## 2023-09-08 VITALS
WEIGHT: 155 LBS | TEMPERATURE: 97.8 F | SYSTOLIC BLOOD PRESSURE: 136 MMHG | RESPIRATION RATE: 19 BRPM | DIASTOLIC BLOOD PRESSURE: 72 MMHG | HEART RATE: 90 BPM | OXYGEN SATURATION: 94 % | HEIGHT: 63 IN | BODY MASS INDEX: 27.46 KG/M2

## 2023-09-08 PROCEDURE — 2500000003 HC RX 250 WO HCPCS: Performed by: ANESTHESIOLOGY

## 2023-09-08 PROCEDURE — 3700000000 HC ANESTHESIA ATTENDED CARE: Performed by: PSYCHIATRY & NEUROLOGY

## 2023-09-08 PROCEDURE — 90870 ELECTROCONVULSIVE THERAPY: CPT | Performed by: PSYCHIATRY & NEUROLOGY

## 2023-09-08 PROCEDURE — 7100000000 HC PACU RECOVERY - FIRST 15 MIN: Performed by: PSYCHIATRY & NEUROLOGY

## 2023-09-08 PROCEDURE — 2709999900 HC NON-CHARGEABLE SUPPLY: Performed by: PSYCHIATRY & NEUROLOGY

## 2023-09-08 PROCEDURE — 6360000002 HC RX W HCPCS: Performed by: ANESTHESIOLOGY

## 2023-09-08 PROCEDURE — 7100000001 HC PACU RECOVERY - ADDTL 15 MIN: Performed by: PSYCHIATRY & NEUROLOGY

## 2023-09-08 PROCEDURE — 7100000010 HC PHASE II RECOVERY - FIRST 15 MIN: Performed by: PSYCHIATRY & NEUROLOGY

## 2023-09-08 RX ORDER — SODIUM CHLORIDE 9 MG/ML
INJECTION, SOLUTION INTRAVENOUS PRN
Status: DISCONTINUED | OUTPATIENT
Start: 2023-09-08 | End: 2023-09-08 | Stop reason: HOSPADM

## 2023-09-08 RX ORDER — SODIUM CHLORIDE 9 MG/ML
INJECTION, SOLUTION INTRAVENOUS CONTINUOUS
Status: DISCONTINUED | OUTPATIENT
Start: 2023-09-08 | End: 2023-09-08 | Stop reason: HOSPADM

## 2023-09-08 RX ORDER — SODIUM CHLORIDE 0.9 % (FLUSH) 0.9 %
5-40 SYRINGE (ML) INJECTION PRN
Status: DISCONTINUED | OUTPATIENT
Start: 2023-09-08 | End: 2023-09-08 | Stop reason: HOSPADM

## 2023-09-08 RX ORDER — SODIUM CHLORIDE 0.9 % (FLUSH) 0.9 %
5-40 SYRINGE (ML) INJECTION EVERY 12 HOURS SCHEDULED
Status: DISCONTINUED | OUTPATIENT
Start: 2023-09-08 | End: 2023-09-08 | Stop reason: HOSPADM

## 2023-09-08 RX ORDER — SUCCINYLCHOLINE/SOD CL,ISO/PF 100 MG/5ML
SYRINGE (ML) INTRAVENOUS PRN
Status: DISCONTINUED | OUTPATIENT
Start: 2023-09-08 | End: 2023-09-08 | Stop reason: SDUPTHER

## 2023-09-08 RX ADMIN — Medication 50 MG: at 09:12

## 2023-09-08 RX ADMIN — Medication 60 MG: at 09:12

## 2023-09-08 ASSESSMENT — PAIN - FUNCTIONAL ASSESSMENT
PAIN_FUNCTIONAL_ASSESSMENT: ADULT NONVERBAL PAIN SCALE (NPVS)
PAIN_FUNCTIONAL_ASSESSMENT: NONE - DENIES PAIN
PAIN_FUNCTIONAL_ASSESSMENT: 0-10
PAIN_FUNCTIONAL_ASSESSMENT: NONE - DENIES PAIN
PAIN_FUNCTIONAL_ASSESSMENT: NONE - DENIES PAIN
PAIN_FUNCTIONAL_ASSESSMENT: ADULT NONVERBAL PAIN SCALE (NPVS)
PAIN_FUNCTIONAL_ASSESSMENT: ADULT NONVERBAL PAIN SCALE (NPVS)

## 2023-09-08 NOTE — DISCHARGE INSTRUCTIONS
important for maintaining a healthy lifestyle    You may be retaining fluid if you have a history of heart failure or if you experience any of the following symptoms:  Weight gain of 3 pounds or more overnight or 5 pounds in a week, increased swelling in our hands or feet or shortness of breath while lying flat in bed. Please call your doctor as soon as you notice any of these symptoms; do not wait until your next office visit. The discharge information has been reviewed with the patient and caregiver. The patient and caregiver verbalized understanding. Discharge medications reviewed with the patient and caregiver and appropriate educational materials and side effects teaching were provided.   ___________________________________________________________________________________________________________________________________

## 2023-09-08 NOTE — ANESTHESIA POSTPROCEDURE EVALUATION
Department of Anesthesiology  Postprocedure Note    Patient: Carolyn Magallon  MRN: [id number]  YOB: 1960  Date of evaluation: 9/8/2023      Procedure Summary     Date: 09/08/23 Room / Location: [facility] OR R2 / [facility] OR    Anesthesia Start: 0908 Anesthesia Stop: 7679    Procedure: ELECTROCONVULSIVE THERAPY (Head) Diagnosis:       Major depressive disorder, recurrent episode, moderate (HCC)      (Major depressive disorder, recurrent episode, moderate ([address]) [F33.1])    Surgeons: Mikhail Diggs MD Responsible Provider: Nate Cortez MD    Anesthesia Type: general ASA Status: 3          Anesthesia Type: No value filed.     Ama Phase I: Ama Score: 6    Ama Phase II:        Anesthesia Post Evaluation    Patient location during evaluation: PACU  Patient participation: waiting for patient participation  Level of consciousness: sleepy but conscious  Airway patency: patent  Nausea & Vomiting: no vomiting and no nausea  Complications: no  Cardiovascular status: hemodynamically stable  Respiratory status: acceptable  Hydration status: stable  Pain management: adequate

## 2023-09-08 NOTE — ANESTHESIA PRE PROCEDURE
Cardiovascular:Negative CV ROS  Exercise tolerance: good (>4 METS),           Rhythm: regular  Rate: normal           Beta Blocker:  Not on Beta Blocker         Neuro/Psych:   (+) psychiatric history:            GI/Hepatic/Renal:   (+) PUD,           Endo/Other:    (+) Diabetes, . Abdominal:             Vascular: Other Findings:             Anesthesia Plan      general     ASA 3       Induction: intravenous. Anesthetic plan and risks discussed with patient.                         Mer Bernal MD   9/8/2023

## 2023-09-08 NOTE — PERIOP NOTE
Zaid Branch  1960  [id number]    Situation:  Verbal report given from: Dr. Evelina Amezcua and Merlin MANCIA  Procedure: Procedure(s):  ELECTROCONVULSIVE THERAPY    Background:    Preoperative diagnosis: Major depressive disorder, recurrent episode, moderate ([address]) [F33.1]    Postoperative diagnosis: * No post-op diagnosis entered *    :  Dr. Chloe Chadwick    Assistant(s): Circulator: Merlin Jones RN  Scrub Person First: Boby Grimm    Specimens: * No specimens in log *    Assessment:  Intra-procedure medications         Anesthesia gave intra-procedure sedation and medications, see anesthesia flow sheet     Intravenous fluids: LR@ KVO     Vital signs stable

## 2023-09-08 NOTE — PROCEDURES
ECT PROCEDURE NOTE         IDENTIFICATION:           Patient Name   Aliya Omer         Date of Birth  1960         [facility]  [id number]         Medical Record Number   [id number]             Age   57 y.o. PCP  Erkia Wynn MD         Admit date:  09/08/23           Room Number   PACU/PL  @ [facility]         Date of Service   09/08/23                          Electro Convulsive Therapy:   Treatment number 23         Maintenance ECT - YES        Aliya Omer was admitted to the PACU for ECT. Patient was medically cleared and NPO for procedure. Patient is NOT court mandated and gave informed consent for ECT treatment. Patient received       Bilateral ECT YES    Administered using the [address] [place] [address]. at 40 % Energy, under general anesthesia. Aliya Omer with a good, well generalized seizure of 42 seconds on observation of the motor manifestations of the seizure. EEG duration was 55 secs. Post-Ictal Suppression Index: 90.7 %   Recovery was unremarkable and with no complications. Change in Energy %:                 Anesthesia medications administered during procedure:   Brevital:  60 mg   Change for next treament:       Succinylcholine: 50 mg   Change for next treatment:        Propofol: mg   Glycopyrrolate:  mg   Labetalol:  mg   Esmolol:  mg   Lorazepam:  mg   Ketamine:  mg   Zofran:   mg     Toradol:  mg   Lidocaine:  mg   Caffeine Benzoate: mg               CSSRS  1/26/2023 7/8/2022         1) Within the past month, have you wished you were dead or wished you could go to sleep and not wake up? No  No     2) Have you actually had any thoughts of killing yourself? No  No     6) Have you ever done anything, started to do anything, or prepared to do anything to end your life? Yes  No         Did this occur within the past 3 months?   Yes  -

## 2023-09-18 ENCOUNTER — ANESTHESIA EVENT (OUTPATIENT)
Facility: HOSPITAL | Age: 63
End: 2023-09-18
Payer: MEDICAID

## 2023-09-22 ENCOUNTER — ANESTHESIA (OUTPATIENT)
Facility: HOSPITAL | Age: 63
End: 2023-09-22
Payer: MEDICAID

## 2023-09-22 ENCOUNTER — HOSPITAL ENCOUNTER (OUTPATIENT)
Facility: HOSPITAL | Age: 63
Setting detail: OUTPATIENT SURGERY
Discharge: HOME | End: 2023-09-22
Attending: PSYCHIATRY & NEUROLOGY | Admitting: PSYCHIATRY & NEUROLOGY
Payer: MEDICAID

## 2023-09-22 VITALS
DIASTOLIC BLOOD PRESSURE: 68 MMHG | RESPIRATION RATE: 19 BRPM | BODY MASS INDEX: 27.46 KG/M2 | OXYGEN SATURATION: 93 % | TEMPERATURE: 98 F | HEART RATE: 85 BPM | SYSTOLIC BLOOD PRESSURE: 130 MMHG | HEIGHT: 63 IN

## 2023-09-22 PROCEDURE — 7100000010 HC PHASE II RECOVERY - FIRST 15 MIN: Performed by: PSYCHIATRY & NEUROLOGY

## 2023-09-22 PROCEDURE — 7100000011 HC PHASE II RECOVERY - ADDTL 15 MIN: Performed by: PSYCHIATRY & NEUROLOGY

## 2023-09-22 PROCEDURE — 2709999900 HC NON-CHARGEABLE SUPPLY: Performed by: PSYCHIATRY & NEUROLOGY

## 2023-09-22 PROCEDURE — 2500000003 HC RX 250 WO HCPCS: Performed by: ANESTHESIOLOGY

## 2023-09-22 PROCEDURE — 90870 ELECTROCONVULSIVE THERAPY: CPT | Performed by: PSYCHIATRY & NEUROLOGY

## 2023-09-22 PROCEDURE — 7100000000 HC PACU RECOVERY - FIRST 15 MIN: Performed by: PSYCHIATRY & NEUROLOGY

## 2023-09-22 PROCEDURE — 7100000001 HC PACU RECOVERY - ADDTL 15 MIN: Performed by: PSYCHIATRY & NEUROLOGY

## 2023-09-22 PROCEDURE — 6360000002 HC RX W HCPCS: Performed by: ANESTHESIOLOGY

## 2023-09-22 PROCEDURE — 3700000000 HC ANESTHESIA ATTENDED CARE: Performed by: PSYCHIATRY & NEUROLOGY

## 2023-09-22 RX ORDER — SODIUM CHLORIDE 9 MG/ML
INJECTION, SOLUTION INTRAVENOUS PRN
Status: DISCONTINUED | OUTPATIENT
Start: 2023-09-22 | End: 2023-09-22 | Stop reason: HOSPADM

## 2023-09-22 RX ORDER — SODIUM CHLORIDE 0.9 % (FLUSH) 0.9 %
5-40 SYRINGE (ML) INJECTION PRN
Status: DISCONTINUED | OUTPATIENT
Start: 2023-09-22 | End: 2023-09-22 | Stop reason: HOSPADM

## 2023-09-22 RX ORDER — SODIUM CHLORIDE 0.9 % (FLUSH) 0.9 %
5-40 SYRINGE (ML) INJECTION EVERY 12 HOURS SCHEDULED
Status: DISCONTINUED | OUTPATIENT
Start: 2023-09-22 | End: 2023-09-22 | Stop reason: HOSPADM

## 2023-09-22 RX ORDER — SODIUM CHLORIDE 9 MG/ML
INJECTION, SOLUTION INTRAVENOUS CONTINUOUS
Status: DISCONTINUED | OUTPATIENT
Start: 2023-09-22 | End: 2023-09-22 | Stop reason: HOSPADM

## 2023-09-22 RX ORDER — SUCCINYLCHOLINE/SOD CL,ISO/PF 100 MG/5ML
SYRINGE (ML) INTRAVENOUS PRN
Status: DISCONTINUED | OUTPATIENT
Start: 2023-09-22 | End: 2023-09-22 | Stop reason: SDUPTHER

## 2023-09-22 RX ADMIN — Medication 50 MG: at 08:54

## 2023-09-22 RX ADMIN — Medication 60 MG: at 08:54

## 2023-09-22 NOTE — PERIOP NOTE
Cindy Aguirre  1960  [id number]    Situation:  Verbal report given from: rafael  Procedure: Procedure(s):  ELECTROCONVULSIVE THERAPY    Background:    Preoperative diagnosis: Major depressive disorder, recurrent episode, moderate ([address]) [F33.1]    Postoperative diagnosis: * No post-op diagnosis entered *    :  Dr. Lorenzo Aden    Assistant(s): Circulator: Roxanne Rodney RN  Scrub Person First: Cristi Ortiz    Specimens: * No specimens in log *    Assessment:  Intra-procedure medications         Anesthesia gave intra-procedure sedation and medications, see anesthesia flow sheet     Intravenous fluids: LR@ KVO     Vital signs stable yes      Recommendation:    Permission to share finding with

## 2023-09-22 NOTE — ANESTHESIA PRE PROCEDURE
09/01/23 119/67   08/25/23 126/71       NPO Status:                                                                                 BMI:   Wt Readings from Last 3 Encounters:   09/08/23 70.3 kg (155 lb)   09/01/23 70.3 kg (155 lb)   08/25/23 70.3 kg (155 lb)     There is no height or weight on file to calculate BMI.    CBC:   Lab Results   Component Value Date/Time    WBC 5.5 02/17/2023 05:34 AM    RBC 3.99 02/17/2023 05:34 AM    HGB 11.2 02/17/2023 05:34 AM    HCT 35.2 02/17/2023 05:34 AM    MCV 88.2 02/17/2023 05:34 AM    RDW 13.4 02/17/2023 05:34 AM     02/17/2023 05:34 AM       CMP:   Lab Results   Component Value Date/Time     02/17/2023 05:34 AM    K 3.9 02/17/2023 05:34 AM     02/17/2023 05:34 AM    CO2 26 02/17/2023 05:34 AM    BUN 19 02/17/2023 05:34 AM    CREATININE 0.88 02/17/2023 05:34 AM    GFRAA 60 07/08/2022 10:33 PM    AGRATIO 0.8 02/17/2023 05:34 AM    GLUCOSE 103 02/17/2023 05:34 AM     01/27/2023 05:39 AM    PROT 6.5 02/17/2023 05:34 AM    CALCIUM 8.6 02/17/2023 05:34 AM    BILITOT 0.2 02/17/2023 05:34 AM    ALKPHOS 71 02/17/2023 05:34 AM    AST 16 02/17/2023 05:34 AM    ALT 24 02/17/2023 05:34 AM       POC Tests: No results for input(s): \"POCGLU\", \"POCNA\", \"POCK\", \"POCCL\", \"POCBUN\", \"POCHEMO\", \"POCHCT\" in the last 72 hours.     Coags:   Lab Results   Component Value Date/Time    PROTIME 10.5 05/25/2021 02:17 AM    INR 1.0 05/25/2021 02:17 AM    APTT 21.5 05/25/2021 02:17 AM       HCG (If Applicable): No results found for: \"PREGTESTUR\", \"PREGSERUM\", \"HCG\", \"HCGQUANT\"     ABGs: No results found for: \"PHART\", \"PO2ART\", \"GAH5LKX\", \"QLF3LSR\", \"BEART\", \"W5CBMJOE\"     Type & Screen (If Applicable):  No results found for: \"LABABO\", \"LABRH\"    Drug/Infectious Status (If Applicable):  No results found for: \"HIV\", \"HEPCAB\"    COVID-19 Screening (If Applicable):   Lab Results   Component Value Date/Time    COVID19 Not detected 01/29/2023 01:12 PM           Anesthesia

## 2023-09-22 NOTE — PERIOP NOTE
Patient meets discharge criteria. Patient and Varun Aguilera provided with verbal and written discharge instructions. Patient discharged by wheelchair with Varun Aguilera to transport home.

## 2023-09-22 NOTE — DISCHARGE INSTRUCTIONS
ECT DISCHARGE INSTRUCTIONS      NAME: Silvia Calabrese   :  1960   MRN:  [id number]     Date/Time:  2023 9:01 AM    ADMIT DATE: 2023     DISCHARGE DATE: 2023     DISCHARGE DIAGNOSIS:  [unfilled]     Recommended diet:  {diet:05850}    Recommended activity: {discharge activity:69332}    Have a responsible person stay with the patient for 24 hours after the treatment, some temporary confusion may be noticed. Do not allow a patient to operate a motor vehicle for at least 24 hours and all the confusion has cleared. Do not drink alcoholic beverages for 48 hours past treatment. Do not sign any legal documents. Do not make any critical decisions for 24 hours. Advance diet as tolerated. Start with liquids and advance it nausea is not present. Understand that memory for recent events, phone numbers, addresses, ect. May be decreased for a short period of time. Report any persistant nausea or pain to the treating physician. Patient receiving ECT while in the hospital should also not attempt to ambulate without assistance, please use tap bell which will be provide. If you have questions regarding the hospital related prescriptions or hospital related issues please call the [address] [place] [address] at [facility] [phone]. If you experience any of the following symptoms then please call your primary care physician/outpatient psychiatrist or return to the emergency room if you cannot get hold of your doctor: Fever, chills, nausea, vomiting, diarrhea, change in mentation, falling, bleeding, shortness of breath. Follow Up:  Continue outpatient psychiatric follow-up visits with personal psychiatrist.  Return for next ECT treatment in ***        Information obtained by :  I understand that if any problems occur once I am at home I am to contact my physician. I understand and acknowledge receipt of the instructions indicated above. Physician's or R.N.'s Signature                                                             Date/Time                                                                                                                                              Patient or Representative Signature                                                     Date/Time                       DISCHARGE SUMMARY from Nurse    PATIENT INSTRUCTIONS:    After general anesthesia or intravenous sedation, for 24 hours or while taking prescription Narcotics:  Limit your activities  Do not drive and operate hazardous machinery  Do not make important personal or business decisions  Do  not drink alcoholic beverages  If you have not urinated within 8 hours after discharge, please contact your surgeon on call. Report the following to your surgeon:  Excessive pain, swelling, redness or odor of or around the surgical area  Temperature over 100.5  Nausea and vomiting lasting longer than 4 hours or if unable to take medications  Any signs of decreased circulation or nerve impairment to extremity: change in color, persistent  numbness, tingling, coldness or increase pain  Any questions      These are general instructions for a healthy lifestyle:    No smoking/ No tobacco products/ Avoid exposure to second hand smoke  Surgeon General's Warning:  Quitting smoking now greatly reduces serious risk to your health.     Obesity, smoking, and sedentary lifestyle greatly increases your risk for illness    A healthy diet, regular physical exercise & weight monitoring are important for maintaining a healthy lifestyle    You may be retaining fluid if you have a history of heart failure or if you experience any of the following symptoms:  Weight gain of 3 pounds or more overnight or 5 pounds in a week, increased swelling in our hands or feet

## 2023-09-22 NOTE — PROCEDURES
ECT PROCEDURE NOTE         IDENTIFICATION:           Patient Name   Magno Fowler         Date of Birth  1960         [facility]  [id number]         Medical Record Number   [id number]             Age   57 y.o. PCP  Alonso Adams MD         Admit date:  09/22/23           Room Number   PACU/PL  @ [facility]         Date of Service   09/22/23                          Electro Convulsive Therapy:   Treatment number 24         Maintenance ECT - YES        Magno Fowler was admitted to the PACU for ECT. Patient was medically cleared and NPO for procedure. Patient is NOT court mandated and gave informed consent for ECT treatment. Patient received       Bilateral ECT YES    Administered using the [address] [place] [address]. at 40 % Energy, under general anesthesia. Magno Fowler with a good, well generalized seizure of 65 seconds on observation of the motor manifestations of the seizure. EEG duration was 73 secs. Post-Ictal Suppression Index: 73.6 %   Recovery was unremarkable and with no complications. Change in Energy %:                 Anesthesia medications administered during procedure:   Brevital:  60 mg   Change for next treament:       Succinylcholine: 50 mg   Change for next treatment:        Propofol: mg   Glycopyrrolate:  mg   Labetalol:  mg   Esmolol:  mg   Lorazepam:  mg   Ketamine:  mg   Zofran:   mg     Toradol:  mg   Lidocaine:  mg   Caffeine Benzoate: mg               CSSRS  1/26/2023 7/8/2022         1) Within the past month, have you wished you were dead or wished you could go to sleep and not wake up? No  No     2) Have you actually had any thoughts of killing yourself? No  No     6) Have you ever done anything, started to do anything, or prepared to do anything to end your life? Yes  No         Did this occur within the past 3 months?   Yes  -

## 2023-09-22 NOTE — PERIOP NOTE
She had dental work on left side of mouth she bite the left lower lip and has bruising.  States was an accident because she had been numbed in that area

## 2023-09-22 NOTE — ANESTHESIA POSTPROCEDURE EVALUATION
Department of Anesthesiology  Postprocedure Note    Patient: Maddy Delaney  MRN: [id number]  YOB: 1960  Date of evaluation: 9/22/2023      Procedure Summary     Date: 09/22/23 Room / Location: [facility] OR R2 / [facility] OR    Anesthesia Start: 5236 Anesthesia Stop: 1598    Procedure: ELECTROCONVULSIVE THERAPY (Head) Diagnosis:       Major depressive disorder, recurrent episode, moderate (HCC)      (Major depressive disorder, recurrent episode, moderate ([address]) [F33.1])    Surgeons: Rajiv Jensen MD Responsible Provider: Radha White MD    Anesthesia Type: general ASA Status: 3          Anesthesia Type: No value filed.     Ama Phase I: Ama Score: 9    Ama Phase II: Ama Score: 10      Anesthesia Post Evaluation    Patient location during evaluation: PACU  Patient participation: complete - patient participated  Level of consciousness: awake and alert  Airway patency: patent  Nausea & Vomiting: no vomiting and no nausea  Complications: no  Cardiovascular status: hemodynamically stable  Respiratory status: acceptable  Hydration status: stable  Pain management: adequate

## 2023-09-29 ENCOUNTER — ANESTHESIA EVENT (OUTPATIENT)
Facility: HOSPITAL | Age: 63
End: 2023-09-29
Payer: MEDICAID

## 2023-10-06 ENCOUNTER — HOSPITAL ENCOUNTER (OUTPATIENT)
Facility: HOSPITAL | Age: 63
Setting detail: OUTPATIENT SURGERY
Discharge: HOME | End: 2023-10-06
Attending: PSYCHIATRY & NEUROLOGY | Admitting: PSYCHIATRY & NEUROLOGY
Payer: MEDICAID

## 2023-10-06 ENCOUNTER — ANESTHESIA (OUTPATIENT)
Facility: HOSPITAL | Age: 63
End: 2023-10-06
Payer: MEDICAID

## 2023-10-06 VITALS
BODY MASS INDEX: 27.46 KG/M2 | TEMPERATURE: 98.2 F | RESPIRATION RATE: 19 BRPM | SYSTOLIC BLOOD PRESSURE: 142 MMHG | DIASTOLIC BLOOD PRESSURE: 72 MMHG | OXYGEN SATURATION: 94 % | HEIGHT: 63 IN | HEART RATE: 89 BPM | WEIGHT: 155 LBS

## 2023-10-06 PROCEDURE — 2709999900 HC NON-CHARGEABLE SUPPLY: Performed by: PSYCHIATRY & NEUROLOGY

## 2023-10-06 PROCEDURE — 6360000002 HC RX W HCPCS: Performed by: ANESTHESIOLOGY

## 2023-10-06 PROCEDURE — 7100000000 HC PACU RECOVERY - FIRST 15 MIN: Performed by: PSYCHIATRY & NEUROLOGY

## 2023-10-06 PROCEDURE — 90870 ELECTROCONVULSIVE THERAPY: CPT | Performed by: PSYCHIATRY & NEUROLOGY

## 2023-10-06 PROCEDURE — 3700000000 HC ANESTHESIA ATTENDED CARE: Performed by: PSYCHIATRY & NEUROLOGY

## 2023-10-06 PROCEDURE — 7100000011 HC PHASE II RECOVERY - ADDTL 15 MIN: Performed by: PSYCHIATRY & NEUROLOGY

## 2023-10-06 PROCEDURE — 2500000003 HC RX 250 WO HCPCS: Performed by: ANESTHESIOLOGY

## 2023-10-06 PROCEDURE — 7100000010 HC PHASE II RECOVERY - FIRST 15 MIN: Performed by: PSYCHIATRY & NEUROLOGY

## 2023-10-06 PROCEDURE — 7100000001 HC PACU RECOVERY - ADDTL 15 MIN: Performed by: PSYCHIATRY & NEUROLOGY

## 2023-10-06 RX ORDER — SODIUM CHLORIDE 9 MG/ML
INJECTION, SOLUTION INTRAVENOUS PRN
Status: DISCONTINUED | OUTPATIENT
Start: 2023-10-06 | End: 2023-10-06 | Stop reason: HOSPADM

## 2023-10-06 RX ORDER — SODIUM CHLORIDE 9 MG/ML
INJECTION, SOLUTION INTRAVENOUS CONTINUOUS
Status: DISCONTINUED | OUTPATIENT
Start: 2023-10-06 | End: 2023-10-06 | Stop reason: HOSPADM

## 2023-10-06 RX ORDER — SODIUM CHLORIDE 0.9 % (FLUSH) 0.9 %
5-40 SYRINGE (ML) INJECTION PRN
Status: DISCONTINUED | OUTPATIENT
Start: 2023-10-06 | End: 2023-10-06 | Stop reason: HOSPADM

## 2023-10-06 RX ORDER — SODIUM CHLORIDE 0.9 % (FLUSH) 0.9 %
5-40 SYRINGE (ML) INJECTION EVERY 12 HOURS SCHEDULED
Status: DISCONTINUED | OUTPATIENT
Start: 2023-10-06 | End: 2023-10-06 | Stop reason: HOSPADM

## 2023-10-06 RX ORDER — SUCCINYLCHOLINE/SOD CL,ISO/PF 100 MG/5ML
SYRINGE (ML) INTRAVENOUS PRN
Status: DISCONTINUED | OUTPATIENT
Start: 2023-10-06 | End: 2023-10-06 | Stop reason: SDUPTHER

## 2023-10-06 RX ADMIN — Medication 50 MG: at 09:23

## 2023-10-06 RX ADMIN — Medication 60 MG: at 09:23

## 2023-10-06 ASSESSMENT — PAIN - FUNCTIONAL ASSESSMENT: PAIN_FUNCTIONAL_ASSESSMENT: 0-10

## 2023-10-06 ASSESSMENT — PAIN SCALES - GENERAL
PAINLEVEL_OUTOF10: 0

## 2023-10-06 NOTE — DISCHARGE INSTRUCTIONS
ECT DISCHARGE INSTRUCTIONS      NAME: Miri Magaña   :  1960   MRN:  [id number]     Date/Time:  10/6/2023 9:29 AM    ADMIT DATE: 10/6/2023     DISCHARGE DATE: 10/6/2023     DISCHARGE DIAGNOSIS:  [unfilled]     Recommended diet:  regular diet    Recommended activity: activity as tolerated and no driving for today    Have a responsible person stay with the patient for 24 hours after the treatment, some temporary confusion may be noticed. Do not allow a patient to operate a motor vehicle for at least 24 hours and all the confusion has cleared. Do not drink alcoholic beverages for 48 hours past treatment. Do not sign any legal documents. Do not make any critical decisions for 24 hours. Advance diet as tolerated. Start with liquids and advance it nausea is not present. Understand that memory for recent events, phone numbers, addresses, ect. May be decreased for a short period of time. Report any persistant nausea or pain to the treating physician. Patient receiving ECT while in the hospital should also not attempt to ambulate without assistance, please use tap bell which will be provide. If you have questions regarding the hospital related prescriptions or hospital related issues please call the [address] [place] [address] at [facility] [phone]. If you experience any of the following symptoms then please call your primary care physician/outpatient psychiatrist or return to the emergency room if you cannot get hold of your doctor: Fever, chills, nausea, vomiting, diarrhea, change in mentation, falling, bleeding, shortness of breath. Follow Up:  Continue outpatient psychiatric follow-up visits with personal psychiatrist.  Return for next ECT treatment in 10/20/23        Information obtained by :  I understand that if any problems occur once I am at home I am to contact my physician.     I understand and acknowledge receipt of the

## 2023-10-06 NOTE — ANESTHESIA PRE PROCEDURE
Answered      Vital Signs (Current): There were no vitals filed for this visit. BP Readings from Last 3 Encounters:   09/22/23 130/68   09/08/23 136/72   09/01/23 119/67       NPO Status:                                                                                 BMI:   Wt Readings from Last 3 Encounters:   09/08/23 70.3 kg (155 lb)   09/01/23 70.3 kg (155 lb)   08/25/23 70.3 kg (155 lb)     There is no height or weight on file to calculate BMI.    CBC:   Lab Results   Component Value Date/Time    WBC 5.5 02/17/2023 05:34 AM    RBC 3.99 02/17/2023 05:34 AM    HGB 11.2 02/17/2023 05:34 AM    HCT 35.2 02/17/2023 05:34 AM    MCV 88.2 02/17/2023 05:34 AM    RDW 13.4 02/17/2023 05:34 AM     02/17/2023 05:34 AM       CMP:   Lab Results   Component Value Date/Time     02/17/2023 05:34 AM    K 3.9 02/17/2023 05:34 AM     02/17/2023 05:34 AM    CO2 26 02/17/2023 05:34 AM    BUN 19 02/17/2023 05:34 AM    CREATININE 0.88 02/17/2023 05:34 AM    GFRAA 60 07/08/2022 10:33 PM    AGRATIO 0.8 02/17/2023 05:34 AM    GLUCOSE 103 02/17/2023 05:34 AM     01/27/2023 05:39 AM    PROT 6.5 02/17/2023 05:34 AM    CALCIUM 8.6 02/17/2023 05:34 AM    BILITOT 0.2 02/17/2023 05:34 AM    ALKPHOS 71 02/17/2023 05:34 AM    AST 16 02/17/2023 05:34 AM    ALT 24 02/17/2023 05:34 AM       POC Tests: No results for input(s): \"POCGLU\", \"POCNA\", \"POCK\", \"POCCL\", \"POCBUN\", \"POCHEMO\", \"POCHCT\" in the last 72 hours.     Coags:   Lab Results   Component Value Date/Time    PROTIME 10.5 05/25/2021 02:17 AM    INR 1.0 05/25/2021 02:17 AM    APTT 21.5 05/25/2021 02:17 AM       HCG (If Applicable): No results found for: \"PREGTESTUR\", \"PREGSERUM\", \"HCG\", \"HCGQUANT\"     ABGs: No results found for: \"PHART\", \"PO2ART\", \"OKY0ZMV\", \"PND5ZZZ\", \"BEART\", \"G7UXICCN\"     Type & Screen (If Applicable):  No results found for: \"LABABO\", \"LABRH\"    Drug/Infectious Status (If Applicable):  No results found for:

## 2023-10-06 NOTE — PERIOP NOTE
Jackson Rule  1960  [id number]    Situation:  Verbal report given from: Darlin Jay RN and Dr. Félix Montenegro   Procedure: Procedure(s):  ELECTROCONVULSIVE THERAPY    Background:    Preoperative diagnosis: Major depressive disorder, recurrent episode, moderate ([address]) [F33.1]    Postoperative diagnosis: * No post-op diagnosis entered *    :  Dr. Karlos Wolf    Assistant(s): Circulator: Erika Martin RN  Scrub Person First: Morgan Selby    Specimens: * No specimens in log *    Assessment:  Intra-procedure medications         Anesthesia gave intra-procedure sedation and medications, see anesthesia flow sheet     Intravenous fluids: NS@ KVO     Vital signs stable

## 2023-10-06 NOTE — ANESTHESIA POSTPROCEDURE EVALUATION
Department of Anesthesiology  Postprocedure Note    Patient: Silvia Calabrese  MRN: [id number]  YOB: 1960  Date of evaluation: 10/6/2023      Procedure Summary     Date: 10/06/23 Room / Location: [facility] OR R2 / [facility] OR    Anesthesia Start: 0919 Anesthesia Stop: 7460    Procedure: ELECTROCONVULSIVE THERAPY (Head) Diagnosis:       Major depressive disorder, recurrent episode, moderate (HCC)      (Major depressive disorder, recurrent episode, moderate ([address]) [F33.1])    Surgeons: Shital Reardon MD Responsible Provider: Leslie Rodríguez MD    Anesthesia Type: general ASA Status: 3          Anesthesia Type: No value filed.     Ama Phase I: Ama Score: 10    Ama Phase II:        Anesthesia Post Evaluation    Patient location during evaluation: PACU  Patient participation: complete - patient participated  Level of consciousness: awake and alert  Airway patency: patent  Nausea & Vomiting: no vomiting and no nausea  Complications: no  Cardiovascular status: hemodynamically stable  Respiratory status: acceptable  Hydration status: stable  Pain management: adequate

## 2023-10-06 NOTE — PERIOP NOTE
PACU DISCHARGE NOTE    Vital signs stable, pain well controlled, alert and oriented times three or at baseline, follow up per surgeon, no anesthetic complications. Patient meets discharge criteria. Patient and Laura Bryson () provided with verbal and written discharge instructions. Patient discharged by wheelchair with Laura Bryson to transport home.

## 2023-10-06 NOTE — PROCEDURES
ECT PROCEDURE NOTE         IDENTIFICATION:           Patient Name   Toribio Childers         Date of Birth  1960         [facility]  [id number]         Medical Record Number   [id number]             Age   57 y.o. PCP  Jp Day MD         Admit date:  10/06/23           Room Number   PACU/PL  @ [facility]         Date of Service   10/06/23                          Electro Convulsive Therapy:   Treatment number 25         Maintenance ECT - YES        Toribio Childers was admitted to the PACU for ECT. Patient was medically cleared and NPO for procedure. Patient is NOT court mandated and gave informed consent for ECT treatment. Patient received       Bilateral ECT YES    Administered using the 3 [facility]. at 40 % Energy, under general anesthesia. Toribio Childers with a good, well generalized seizure of 73 seconds on observation of the motor manifestations of the seizure. EEG duration was 88 secs. Post-Ictal Suppression Index: 79.1 %   Recovery was unremarkable and with no complications. Change in Energy %:                 Anesthesia medications administered during procedure:   Brevital:  60 mg   Change for next treament:       Succinylcholine: 50 mg   Change for next treatment:        Propofol: mg   Glycopyrrolate:  mg   Labetalol:  mg   Esmolol:  mg   Lorazepam:  mg   Ketamine:  mg   Zofran:   mg     Toradol:  mg   Lidocaine:  mg   Caffeine Benzoate: mg               CSSRS  1/26/2023 7/8/2022         1) Within the past month, have you wished you were dead or wished you could go to sleep and not wake up? No  No     2) Have you actually had any thoughts of killing yourself? No  No     6) Have you ever done anything, started to do anything, or prepared to do anything to end your life? Yes  No         Did this occur within the past 3 months?   Yes  -

## 2023-10-13 ENCOUNTER — ANESTHESIA EVENT (OUTPATIENT)
Facility: HOSPITAL | Age: 63
End: 2023-10-13
Payer: MEDICAID

## 2023-10-20 ENCOUNTER — HOSPITAL ENCOUNTER (OUTPATIENT)
Facility: HOSPITAL | Age: 63
Setting detail: OUTPATIENT SURGERY
Discharge: HOME | End: 2023-10-20
Attending: PSYCHIATRY & NEUROLOGY | Admitting: PSYCHIATRY & NEUROLOGY
Payer: MEDICAID

## 2023-10-20 ENCOUNTER — ANESTHESIA (OUTPATIENT)
Facility: HOSPITAL | Age: 63
End: 2023-10-20
Payer: MEDICAID

## 2023-10-20 VITALS
HEART RATE: 87 BPM | RESPIRATION RATE: 19 BRPM | WEIGHT: 181 LBS | BODY MASS INDEX: 32.07 KG/M2 | DIASTOLIC BLOOD PRESSURE: 71 MMHG | TEMPERATURE: 97.6 F | OXYGEN SATURATION: 95 % | HEIGHT: 63 IN | SYSTOLIC BLOOD PRESSURE: 124 MMHG

## 2023-10-20 LAB
GLUCOSE BLD STRIP.AUTO-MCNC: 115 MG/DL (ref 65–117)
SERVICE CMNT-IMP: NORMAL

## 2023-10-20 PROCEDURE — 2580000003 HC RX 258: Performed by: ANESTHESIOLOGY

## 2023-10-20 PROCEDURE — 3700000000 HC ANESTHESIA ATTENDED CARE: Performed by: PSYCHIATRY & NEUROLOGY

## 2023-10-20 PROCEDURE — 7100000000 HC PACU RECOVERY - FIRST 15 MIN: Performed by: PSYCHIATRY & NEUROLOGY

## 2023-10-20 PROCEDURE — 90870 ELECTROCONVULSIVE THERAPY: CPT | Performed by: PSYCHIATRY & NEUROLOGY

## 2023-10-20 PROCEDURE — 82962 GLUCOSE BLOOD TEST: CPT

## 2023-10-20 PROCEDURE — 7100000010 HC PHASE II RECOVERY - FIRST 15 MIN: Performed by: PSYCHIATRY & NEUROLOGY

## 2023-10-20 PROCEDURE — 2500000003 HC RX 250 WO HCPCS: Performed by: ANESTHESIOLOGY

## 2023-10-20 PROCEDURE — 6360000002 HC RX W HCPCS: Performed by: ANESTHESIOLOGY

## 2023-10-20 PROCEDURE — 2709999900 HC NON-CHARGEABLE SUPPLY: Performed by: PSYCHIATRY & NEUROLOGY

## 2023-10-20 PROCEDURE — 7100000001 HC PACU RECOVERY - ADDTL 15 MIN: Performed by: PSYCHIATRY & NEUROLOGY

## 2023-10-20 RX ORDER — SODIUM CHLORIDE 0.9 % (FLUSH) 0.9 %
5-40 SYRINGE (ML) INJECTION PRN
Status: DISCONTINUED | OUTPATIENT
Start: 2023-10-20 | End: 2023-10-20 | Stop reason: HOSPADM

## 2023-10-20 RX ORDER — SODIUM CHLORIDE 9 MG/ML
INJECTION, SOLUTION INTRAVENOUS CONTINUOUS
Status: DISCONTINUED | OUTPATIENT
Start: 2023-10-20 | End: 2023-10-20 | Stop reason: HOSPADM

## 2023-10-20 RX ORDER — SODIUM CHLORIDE 9 MG/ML
INJECTION, SOLUTION INTRAVENOUS PRN
Status: DISCONTINUED | OUTPATIENT
Start: 2023-10-20 | End: 2023-10-20 | Stop reason: HOSPADM

## 2023-10-20 RX ORDER — SUCCINYLCHOLINE/SOD CL,ISO/PF 100 MG/5ML
SYRINGE (ML) INTRAVENOUS PRN
Status: DISCONTINUED | OUTPATIENT
Start: 2023-10-20 | End: 2023-10-20 | Stop reason: SDUPTHER

## 2023-10-20 RX ORDER — SODIUM CHLORIDE, SODIUM LACTATE, POTASSIUM CHLORIDE, CALCIUM CHLORIDE 600; 310; 30; 20 MG/100ML; MG/100ML; MG/100ML; MG/100ML
INJECTION, SOLUTION INTRAVENOUS CONTINUOUS PRN
Status: DISCONTINUED | OUTPATIENT
Start: 2023-10-20 | End: 2023-10-20 | Stop reason: SDUPTHER

## 2023-10-20 RX ORDER — SODIUM CHLORIDE 0.9 % (FLUSH) 0.9 %
5-40 SYRINGE (ML) INJECTION EVERY 12 HOURS SCHEDULED
Status: DISCONTINUED | OUTPATIENT
Start: 2023-10-20 | End: 2023-10-20 | Stop reason: HOSPADM

## 2023-10-20 RX ADMIN — Medication 50 MG: at 08:49

## 2023-10-20 RX ADMIN — Medication 60 MG: at 08:49

## 2023-10-20 RX ADMIN — SODIUM CHLORIDE, POTASSIUM CHLORIDE, SODIUM LACTATE AND CALCIUM CHLORIDE: 600; 310; 30; 20 INJECTION, SOLUTION INTRAVENOUS at 08:51

## 2023-10-20 ASSESSMENT — PAIN - FUNCTIONAL ASSESSMENT
PAIN_FUNCTIONAL_ASSESSMENT: NONE - DENIES PAIN
PAIN_FUNCTIONAL_ASSESSMENT: 0-10
PAIN_FUNCTIONAL_ASSESSMENT: NONE - DENIES PAIN
PAIN_FUNCTIONAL_ASSESSMENT: 0-10
PAIN_FUNCTIONAL_ASSESSMENT: ADULT NONVERBAL PAIN SCALE (NPVS)
PAIN_FUNCTIONAL_ASSESSMENT: ADULT NONVERBAL PAIN SCALE (NPVS)
PAIN_FUNCTIONAL_ASSESSMENT: 0-10
PAIN_FUNCTIONAL_ASSESSMENT: 0-10

## 2023-10-20 NOTE — DISCHARGE INSTRUCTIONS
ECT DISCHARGE INSTRUCTIONS      NAME: Guido Winn   :  1960   MRN:  [id number]     Date/Time:  10/20/2023 8:52 AM    ADMIT DATE: 10/20/2023     DISCHARGE DATE: 10/20/2023     DISCHARGE DIAGNOSIS:  [unfilled]     Recommended diet:  regular diet    Recommended activity: activity as tolerated and no driving for today    Have a responsible person stay with the patient for 24 hours after the treatment, some temporary confusion may be noticed. Do not allow a patient to operate a motor vehicle for at least 24 hours and all the confusion has cleared. Do not drink alcoholic beverages for 48 hours past treatment. Do not sign any legal documents. Do not make any critical decisions for 24 hours. Advance diet as tolerated. Start with liquids and advance it nausea is not present. Understand that memory for recent events, phone numbers, addresses, ect. May be decreased for a short period of time. Report any persistant nausea or pain to the treating physician. Patient receiving ECT while in the hospital should also not attempt to ambulate without assistance, please use tap bell which will be provide. If you have questions regarding the hospital related prescriptions or hospital related issues please call the [address] [place] [address] at [facility] [phone]. If you experience any of the following symptoms then please call your primary care physician/outpatient psychiatrist or return to the emergency room if you cannot get hold of your doctor: Fever, chills, nausea, vomiting, diarrhea, change in mentation, falling, bleeding, shortness of breath. Follow Up:  Continue outpatient psychiatric follow-up visits with personal psychiatrist.  Return for next ECT treatment in 11/3/2023        Information obtained by :  I understand that if any problems occur once I am at home I am to contact my physician.     I understand and acknowledge receipt of the instructions indicated above. Physician's or R.N.'s Signature                                                             Date/Time                                                                                                                                              Patient or Representative Signature                                                     Date/Time                                                                         DISCHARGE SUMMARY from Nurse    PATIENT INSTRUCTIONS:    After general anesthesia or intravenous sedation, for 24 hours or while taking prescription Narcotics:  Limit your activities  Do not drive and operate hazardous machinery  Do not make important personal or business decisions  Do  not drink alcoholic beverages  If you have not urinated within 8 hours after discharge, please contact your surgeon on call. Report the following to your surgeon:  Excessive pain, swelling, redness or odor of or around the surgical area  Temperature over 100.5  Nausea and vomiting lasting longer than 4 hours or if unable to take medications  Any signs of decreased circulation or nerve impairment to extremity: change in color, persistent  numbness, tingling, coldness or increase pain  Any questions      These are general instructions for a healthy lifestyle:    No smoking/ No tobacco products/ Avoid exposure to second hand smoke  Surgeon General's Warning:  Quitting smoking now greatly reduces serious risk to your health.     Obesity, smoking, and sedentary lifestyle greatly increases your risk for illness    A healthy diet, regular physical exercise & weight monitoring are important for maintaining a healthy lifestyle    You may be retaining fluid if you have a history of heart failure or if you experience any of the following symptoms:  Weight gain of 3 pounds

## 2023-10-20 NOTE — PERIOP NOTE
Ayaan [facility]  1960  [id number]    Situation:  Verbal report given from: Dr. Ronal Andrade and Sigifredo Brandt RN  Procedure: Procedure(s):  ELECTROCONVULSIVE THERAPY    Background:    Preoperative diagnosis: Major depressive disorder, recurrent episode, moderate ([address]) [F33.1]    Postoperative diagnosis: * No post-op diagnosis entered *    :  Dr. Bradshaw Citizen    Assistant(s): Circulator: Sami Cowan RN  Scrub Person First: Jung Malin    Specimens: * No specimens in log *    Assessment:  Intra-procedure medications         Anesthesia gave intra-procedure sedation and medications, see anesthesia flow sheet     Intravenous fluids: LR@ KVO     Vital signs stable

## 2023-10-20 NOTE — ANESTHESIA PRE PROCEDURE
(If Applicable):  No results found for: \"LABABO\", \"LABRH\"    Drug/Infectious Status (If Applicable):  No results found for: \"HIV\", \"HEPCAB\"    COVID-19 Screening (If Applicable):   Lab Results   Component Value Date/Time    COVID19 Not detected 01/29/2023 01:12 PM           Anesthesia Evaluation  Patient summary reviewed no history of anesthetic complications:   Airway: Mallampati: II  TM distance: >3 FB   Neck ROM: full  Mouth opening: > = 3 FB   Dental: normal exam         Pulmonary: breath sounds clear to auscultation  (+) asthma:                            Cardiovascular:  Exercise tolerance: good (>4 METS),           Rhythm: regular  Rate: normal                    Neuro/Psych:   (+) psychiatric history:            GI/Hepatic/Renal:   (+) PUD,           Endo/Other:    (+) Diabetes, . Abdominal: normal exam            Vascular: Other Findings:           Anesthesia Plan      general     ASA 3       Induction: intravenous. Anesthetic plan and risks discussed with patient.                         Shorty Daniels MD   10/20/2023

## 2023-10-20 NOTE — PERIOP NOTE
Patient meets discharge criteria. Patient and   caregiver provided with verbal and written discharge instructions. Patient discharged by wheelchair with caregiver to transport home.

## 2023-10-20 NOTE — PROCEDURES
ECT PROCEDURE NOTE         IDENTIFICATION:           Patient Name   Ebony Farr         Date of Birth  1960         [facility]  [id number]         Medical Record Number   [id number]             Age   57 y.o. PCP  Janes Borrero MD         Admit date:  10/20/23           Room Number   PACU/PL  @ [facility]         Date of Service   10/20/23                          Electro Convulsive Therapy:   Treatment number 26         Maintenance ECT - YES        Ebony Farr was admitted to the PACU for ECT. Patient was medically cleared and NPO for procedure. Patient is NOT court mandated and gave informed consent for ECT treatment. Patient received       Bilateral ECT YES    Administered using the 3 [facility]. at 40 % Energy, under general anesthesia. Ebony Farr with a good, well generalized seizure of 42 seconds on observation of the motor manifestations of the seizure. EEG duration was 51 secs. Post-Ictal Suppression Index: 80.2 %   Recovery was unremarkable and with no complications. Change in Energy %:                 Anesthesia medications administered during procedure:   Brevital:  60 mg   Change for next treament:       Succinylcholine: 50 mg   Change for next treatment:        Propofol: mg   Glycopyrrolate:  mg   Labetalol:  mg   Esmolol:  mg   Lorazepam:  mg   Ketamine:  mg   Zofran:   mg     Toradol:  mg   Lidocaine:  mg   Caffeine Benzoate: mg               CSSRS  1/26/2023 7/8/2022         1) Within the past month, have you wished you were dead or wished you could go to sleep and not wake up? No  No     2) Have you actually had any thoughts of killing yourself? No  No     6) Have you ever done anything, started to do anything, or prepared to do anything to end your life? Yes  No         Did this occur within the past 3 months?   Yes  -

## 2023-10-20 NOTE — ANESTHESIA POSTPROCEDURE EVALUATION
Department of Anesthesiology  Postprocedure Note    Patient: Yoselyn Dockery  MRN: [id number]  YOB: 1960  Date of evaluation: 10/20/2023      Procedure Summary     Date: 10/20/23 Room / Location: [facility] R2 / [facility] OR    Anesthesia Start: 3515 Anesthesia Stop: 7814    Procedure: ELECTROCONVULSIVE THERAPY (Head) Diagnosis:       Major depressive disorder, recurrent episode, moderate (HCC)      (Major depressive disorder, recurrent episode, moderate ([address]) [F33.1])    Surgeons: Sacha Joy MD Responsible Provider: Festus Hollis MD    Anesthesia Type: general ASA Status: 3          Anesthesia Type: No value filed.     Ama Phase I: Ama Score: 10    Ama Phase II:        Anesthesia Post Evaluation    Patient location during evaluation: PACU  Patient participation: complete - patient participated  Level of consciousness: awake and alert  Pain score: 0  Airway patency: patent  Nausea & Vomiting: no nausea and no vomiting  Complications: no  Cardiovascular status: blood pressure returned to baseline  Respiratory status: acceptable  Hydration status: euvolemic  Pain management: adequate

## 2023-10-27 ENCOUNTER — ANESTHESIA EVENT (OUTPATIENT)
Facility: HOSPITAL | Age: 63
End: 2023-10-27
Payer: MEDICAID

## 2023-11-03 ENCOUNTER — HOSPITAL ENCOUNTER (OUTPATIENT)
Facility: HOSPITAL | Age: 63
Setting detail: OUTPATIENT SURGERY
Discharge: HOME | End: 2023-11-03
Attending: PSYCHIATRY & NEUROLOGY | Admitting: PSYCHIATRY & NEUROLOGY
Payer: MEDICAID

## 2023-11-03 ENCOUNTER — ANESTHESIA (OUTPATIENT)
Facility: HOSPITAL | Age: 63
End: 2023-11-03
Payer: MEDICAID

## 2023-11-03 VITALS
SYSTOLIC BLOOD PRESSURE: 127 MMHG | OXYGEN SATURATION: 91 % | WEIGHT: 175 LBS | HEIGHT: 63 IN | TEMPERATURE: 98.2 F | HEART RATE: 95 BPM | DIASTOLIC BLOOD PRESSURE: 81 MMHG | BODY MASS INDEX: 31.01 KG/M2 | RESPIRATION RATE: 15 BRPM

## 2023-11-03 LAB
GLUCOSE BLD STRIP.AUTO-MCNC: 111 MG/DL (ref 65–117)
SERVICE CMNT-IMP: NORMAL

## 2023-11-03 PROCEDURE — 7100000000 HC PACU RECOVERY - FIRST 15 MIN: Performed by: PSYCHIATRY & NEUROLOGY

## 2023-11-03 PROCEDURE — 2709999900 HC NON-CHARGEABLE SUPPLY: Performed by: PSYCHIATRY & NEUROLOGY

## 2023-11-03 PROCEDURE — 82962 GLUCOSE BLOOD TEST: CPT

## 2023-11-03 PROCEDURE — 7100000010 HC PHASE II RECOVERY - FIRST 15 MIN: Performed by: PSYCHIATRY & NEUROLOGY

## 2023-11-03 PROCEDURE — 90870 ELECTROCONVULSIVE THERAPY: CPT | Performed by: PSYCHIATRY & NEUROLOGY

## 2023-11-03 PROCEDURE — 7100000001 HC PACU RECOVERY - ADDTL 15 MIN: Performed by: PSYCHIATRY & NEUROLOGY

## 2023-11-03 PROCEDURE — 2500000003 HC RX 250 WO HCPCS: Performed by: ANESTHESIOLOGY

## 2023-11-03 PROCEDURE — 6360000002 HC RX W HCPCS: Performed by: ANESTHESIOLOGY

## 2023-11-03 PROCEDURE — 3700000000 HC ANESTHESIA ATTENDED CARE: Performed by: PSYCHIATRY & NEUROLOGY

## 2023-11-03 RX ORDER — SODIUM CHLORIDE 0.9 % (FLUSH) 0.9 %
5-40 SYRINGE (ML) INJECTION PRN
Status: DISCONTINUED | OUTPATIENT
Start: 2023-11-03 | End: 2023-11-03 | Stop reason: HOSPADM

## 2023-11-03 RX ORDER — SODIUM CHLORIDE 9 MG/ML
INJECTION, SOLUTION INTRAVENOUS PRN
Status: DISCONTINUED | OUTPATIENT
Start: 2023-11-03 | End: 2023-11-03 | Stop reason: HOSPADM

## 2023-11-03 RX ORDER — SODIUM CHLORIDE 9 MG/ML
INJECTION, SOLUTION INTRAVENOUS CONTINUOUS
Status: DISCONTINUED | OUTPATIENT
Start: 2023-11-03 | End: 2023-11-03 | Stop reason: HOSPADM

## 2023-11-03 RX ORDER — SODIUM CHLORIDE 0.9 % (FLUSH) 0.9 %
5-40 SYRINGE (ML) INJECTION EVERY 12 HOURS SCHEDULED
Status: DISCONTINUED | OUTPATIENT
Start: 2023-11-03 | End: 2023-11-03 | Stop reason: HOSPADM

## 2023-11-03 RX ORDER — SUCCINYLCHOLINE/SOD CL,ISO/PF 100 MG/5ML
SYRINGE (ML) INTRAVENOUS PRN
Status: DISCONTINUED | OUTPATIENT
Start: 2023-11-03 | End: 2023-11-03 | Stop reason: SDUPTHER

## 2023-11-03 RX ADMIN — Medication 60 MG: at 09:35

## 2023-11-03 RX ADMIN — Medication 50 MG: at 09:35

## 2023-11-03 ASSESSMENT — PAIN - FUNCTIONAL ASSESSMENT
PAIN_FUNCTIONAL_ASSESSMENT: 0-10
PAIN_FUNCTIONAL_ASSESSMENT: ADULT NONVERBAL PAIN SCALE (NPVS)
PAIN_FUNCTIONAL_ASSESSMENT: ADULT NONVERBAL PAIN SCALE (NPVS)
PAIN_FUNCTIONAL_ASSESSMENT: 0-10
PAIN_FUNCTIONAL_ASSESSMENT: 0-10
PAIN_FUNCTIONAL_ASSESSMENT: ADULT NONVERBAL PAIN SCALE (NPVS)
PAIN_FUNCTIONAL_ASSESSMENT: 0-10

## 2023-11-03 NOTE — DISCHARGE INSTRUCTIONS
ECT DISCHARGE INSTRUCTIONS      NAME: Jaden Rodriguez   :  1960   MRN:  [id number]     Date/Time:  11/3/2023 9:41 AM    ADMIT DATE: 11/3/2023     DISCHARGE DATE: 11/3/2023     DISCHARGE DIAGNOSIS:  [unfilled]     Recommended diet:  regular diet    Recommended activity: activity as tolerated and no driving for today    Have a responsible person stay with the patient for 24 hours after the treatment, some temporary confusion may be noticed. Do not allow a patient to operate a motor vehicle for at least 24 hours and all the confusion has cleared. Do not drink alcoholic beverages for 48 hours past treatment. Do not sign any legal documents. Do not make any critical decisions for 24 hours. Advance diet as tolerated. Start with liquids and advance it nausea is not present. Understand that memory for recent events, phone numbers, addresses, ect. May be decreased for a short period of time. Report any persistant nausea or pain to the treating physician. Patient receiving ECT while in the hospital should also not attempt to ambulate without assistance, please use tap bell which will be provide. If you have questions regarding the hospital related prescriptions or hospital related issues please call the [address] [place] [address] at [facility] [phone]. If you experience any of the following symptoms then please call your primary care physician/outpatient psychiatrist or return to the emergency room if you cannot get hold of your doctor: Fever, chills, nausea, vomiting, diarrhea, change in mentation, falling, bleeding, shortness of breath. Follow Up:  Continue outpatient psychiatric follow-up visits with personal psychiatrist.  Deidre Santoro will contact you will your follow up appointment date        Information obtained by :  I understand that if any problems occur once I am at home I am to contact my physician.     I understand and acknowledge

## 2023-11-03 NOTE — PERIOP NOTE
Patient meets discharge criteria. Patient and Inga (ACT) provided with verbal and written discharge instructions. Patient discharged by wheelchair with Inga (ACT) to transport home.

## 2023-11-03 NOTE — ANESTHESIA POSTPROCEDURE EVALUATION
Department of Anesthesiology  Postprocedure Note    Patient: Guido Winn  MRN: [id number]  YOB: 1960  Date of evaluation: 11/3/2023      Procedure Summary     Date: 11/03/23 Room / Location: [facility] OR R2 / [facility] OR    Anesthesia Start: 4504 Anesthesia Stop: 0945    Procedure: ELECTROCONVULSIVE THERAPY (Head) Diagnosis:       Major depressive disorder, recurrent episode, moderate (HCC)      (Major depressive disorder, recurrent episode, moderate ([address]) [F33.1])    Surgeons: Alexa Pierson MD Responsible Provider: Darlene Hercules MD    Anesthesia Type: general ASA Status: 3          Anesthesia Type: No value filed. Ama Phase I: Ama Score: 10    Ama Phase II:        Anesthesia Post Evaluation    Patient location during evaluation: PACU  Patient participation: waiting for patient participation  Level of consciousness: awake  Airway patency: patent  Nausea & Vomiting: no vomiting and no nausea  Complications: no  Cardiovascular status: hemodynamically stable  Respiratory status: acceptable  Hydration status: stable  There was medical reason for not using a multimodal analgesia pain management approach. Pain management: adequate

## 2023-11-03 NOTE — PERIOP NOTE
Miri Handsome  1960  [id number]    Situation:  Verbal report given from: Dr. Barry Hannon and Sabine oRss RN  Procedure: Procedure(s):  ELECTROCONVULSIVE THERAPY    Background:    Preoperative diagnosis: Major depressive disorder, recurrent episode, moderate ([address]) [F33.1]    Postoperative diagnosis: * No post-op diagnosis entered *    :  Dr. Cailin Cruz    Assistant(s): Circulator: Jessica Riddle RN  Scrub Person First: Julia Peter    Specimens: * No specimens in log *    Assessment:  Intra-procedure medications         Anesthesia gave intra-procedure sedation and medications, see anesthesia flow sheet     Intravenous fluids: LR@ KVO     Vital signs stable

## 2023-11-03 NOTE — PROCEDURES
ECT PROCEDURE NOTE         IDENTIFICATION:           Patient Name   Zaid Branch         Date of Birth  1960         [facility]  [id number]         Medical Record Number   [id number]             Age   57 y.o. PCP  Vik Rodriguez MD         Admit date:  11/03/23           Room Number   PACU/PL  @ [facility]         Date of Service   11/03/23                          Electro Convulsive Therapy:   Treatment number 27         Maintenance ECT - YES        Zaid Branch was admitted to the PACU for ECT. Patient was medically cleared and NPO for procedure. Patient is NOT court mandated and gave informed consent for ECT treatment. Patient received       Bilateral ECT YES    Administered using the [address] [place] [address]. at 40 % Energy, under general anesthesia. Zaid Branch with a good, well generalized seizure of 71 seconds on observation of the motor manifestations of the seizure. EEG duration was NA secs. Post-Ictal Suppression Index: NA %   Recovery was unremarkable and with no complications. Change in Energy %:                 Anesthesia medications administered during procedure:   Brevital:  60 mg   Change for next treament:       Succinylcholine: 50 mg   Change for next treatment:        Propofol: mg   Glycopyrrolate:  mg   Labetalol:  mg   Esmolol:  mg   Lorazepam:  mg   Ketamine:  mg   Zofran:   mg     Toradol:  mg   Lidocaine:  mg   Caffeine Benzoate: mg               CSSRS  1/26/2023 7/8/2022         1) Within the past month, have you wished you were dead or wished you could go to sleep and not wake up? No  No     2) Have you actually had any thoughts of killing yourself? No  No     6) Have you ever done anything, started to do anything, or prepared to do anything to end your life? Yes  No         Did this occur within the past 3 months?   Yes  -

## 2023-11-21 ENCOUNTER — ANESTHESIA EVENT (OUTPATIENT)
Facility: HOSPITAL | Age: 63
End: 2023-11-21
Payer: MEDICAID

## 2023-11-28 ENCOUNTER — OFFICE VISIT (OUTPATIENT)
Age: 63
End: 2023-11-28
Payer: MEDICAID

## 2023-11-28 ENCOUNTER — PROCEDURE VISIT (OUTPATIENT)
Age: 63
End: 2023-11-28
Payer: MEDICAID

## 2023-11-28 DIAGNOSIS — F32.3 MAJOR DEPRESSIVE DISORDER WITH PSYCHOTIC FEATURES (HCC): ICD-10-CM

## 2023-11-28 DIAGNOSIS — F41.8 OTHER SPECIFIED ANXIETY DISORDERS: ICD-10-CM

## 2023-11-28 DIAGNOSIS — F31.9 BIPOLAR 1 DISORDER (HCC): ICD-10-CM

## 2023-11-28 DIAGNOSIS — G31.84 MILD COGNITIVE IMPAIRMENT: Primary | ICD-10-CM

## 2023-11-28 DIAGNOSIS — F41.9 ANXIETY AND DEPRESSION: ICD-10-CM

## 2023-11-28 DIAGNOSIS — F33.3 MAJOR DEPRESSIVE DISORDER, RECURRENT, SEVERE WITH PSYCHOTIC SYMPTOMS (HCC): ICD-10-CM

## 2023-11-28 DIAGNOSIS — R41.3 OTHER AMNESIA: ICD-10-CM

## 2023-11-28 DIAGNOSIS — S09.90XS INJURY OF HEAD, SEQUELA: ICD-10-CM

## 2023-11-28 DIAGNOSIS — F32.A ANXIETY AND DEPRESSION: ICD-10-CM

## 2023-11-28 DIAGNOSIS — G31.84 MILD COGNITIVE IMPAIRMENT OF UNCERTAIN OR UNKNOWN ETIOLOGY: Primary | ICD-10-CM

## 2023-11-28 PROCEDURE — 90791 PSYCH DIAGNOSTIC EVALUATION: CPT | Performed by: CLINICAL NEUROPSYCHOLOGIST

## 2023-11-28 PROCEDURE — 96138 PSYCL/NRPSYC TECH 1ST: CPT | Performed by: CLINICAL NEUROPSYCHOLOGIST

## 2023-11-28 PROCEDURE — 96133 NRPSYC TST EVAL PHYS/QHP EA: CPT | Performed by: CLINICAL NEUROPSYCHOLOGIST

## 2023-11-28 PROCEDURE — 96139 PSYCL/NRPSYC TST TECH EA: CPT | Performed by: CLINICAL NEUROPSYCHOLOGIST

## 2023-11-28 PROCEDURE — 96132 NRPSYC TST EVAL PHYS/QHP 1ST: CPT | Performed by: CLINICAL NEUROPSYCHOLOGIST

## 2023-11-28 PROCEDURE — 90785 PSYTX COMPLEX INTERACTIVE: CPT | Performed by: CLINICAL NEUROPSYCHOLOGIST

## 2023-11-28 NOTE — PROGRESS NOTES
company. Report to follow once testing, scoring, and interpretation completed. ? Organic based neurocognitive issues versus mood disorder or combination of same. ? Problems organic, functional, or both? The patient has not gotten better from any treatment to date. Treatment decisions cannot be appropriately made without testing. The patient is not abusing drugs. There is a suspected brain problem, which can be identified, quantified, and hopefully addressed via neurocognitive and psychological testing. This note will not be viewable in [address] [place] [address]. Visit was complicated by Patient expressed desire to have others present during visit and Third parties responsible for patient's care were included. Provider spent 40 minutes providing interactive complexity services due to need to obtain or communicate information to those involved in patient's care. The session included the use of the following adaptations in order to overcome the difficulties. Others were included in order to discuss interval history from her mental health care provider. Susie Stewart

## 2023-11-30 NOTE — PROGRESS NOTES
testing/raw data collected and was completed on the date the report was signed. Please note that this dictation was completed with [employer], the computer voice recognition software. Quite often unanticipated grammatical, syntax, homophones, and other interpretive errors are inadvertently transcribed by the computer software. Please disregard these errors. Please excuse any errors that have escaped final proofreading. Thank you.

## 2023-12-01 ENCOUNTER — HOSPITAL ENCOUNTER (OUTPATIENT)
Facility: HOSPITAL | Age: 63
Setting detail: OUTPATIENT SURGERY
Discharge: HOME | End: 2023-12-01
Attending: PSYCHIATRY & NEUROLOGY | Admitting: PSYCHIATRY & NEUROLOGY
Payer: MEDICAID

## 2023-12-01 ENCOUNTER — ANESTHESIA (OUTPATIENT)
Facility: HOSPITAL | Age: 63
End: 2023-12-01
Payer: MEDICAID

## 2023-12-01 VITALS
HEIGHT: 63 IN | SYSTOLIC BLOOD PRESSURE: 127 MMHG | WEIGHT: 175 LBS | HEART RATE: 90 BPM | BODY MASS INDEX: 31.01 KG/M2 | RESPIRATION RATE: 16 BRPM | OXYGEN SATURATION: 93 % | DIASTOLIC BLOOD PRESSURE: 75 MMHG | TEMPERATURE: 97.7 F

## 2023-12-01 PROCEDURE — 3700000000 HC ANESTHESIA ATTENDED CARE: Performed by: PSYCHIATRY & NEUROLOGY

## 2023-12-01 PROCEDURE — 6360000002 HC RX W HCPCS: Performed by: ANESTHESIOLOGY

## 2023-12-01 PROCEDURE — 90870 ELECTROCONVULSIVE THERAPY: CPT | Performed by: PSYCHIATRY & NEUROLOGY

## 2023-12-01 PROCEDURE — 2500000003 HC RX 250 WO HCPCS: Performed by: ANESTHESIOLOGY

## 2023-12-01 PROCEDURE — 7100000010 HC PHASE II RECOVERY - FIRST 15 MIN: Performed by: PSYCHIATRY & NEUROLOGY

## 2023-12-01 PROCEDURE — 2709999900 HC NON-CHARGEABLE SUPPLY: Performed by: PSYCHIATRY & NEUROLOGY

## 2023-12-01 PROCEDURE — 7100000001 HC PACU RECOVERY - ADDTL 15 MIN: Performed by: PSYCHIATRY & NEUROLOGY

## 2023-12-01 PROCEDURE — 7100000000 HC PACU RECOVERY - FIRST 15 MIN: Performed by: PSYCHIATRY & NEUROLOGY

## 2023-12-01 RX ORDER — SODIUM CHLORIDE 9 MG/ML
INJECTION, SOLUTION INTRAVENOUS PRN
Status: DISCONTINUED | OUTPATIENT
Start: 2023-12-01 | End: 2023-12-01 | Stop reason: HOSPADM

## 2023-12-01 RX ORDER — SODIUM CHLORIDE 0.9 % (FLUSH) 0.9 %
5-40 SYRINGE (ML) INJECTION EVERY 12 HOURS SCHEDULED
Status: DISCONTINUED | OUTPATIENT
Start: 2023-12-01 | End: 2023-12-01 | Stop reason: HOSPADM

## 2023-12-01 RX ORDER — SODIUM CHLORIDE 0.9 % (FLUSH) 0.9 %
5-40 SYRINGE (ML) INJECTION PRN
Status: DISCONTINUED | OUTPATIENT
Start: 2023-12-01 | End: 2023-12-01 | Stop reason: HOSPADM

## 2023-12-01 RX ORDER — SUCCINYLCHOLINE/SOD CL,ISO/PF 100 MG/5ML
SYRINGE (ML) INTRAVENOUS PRN
Status: DISCONTINUED | OUTPATIENT
Start: 2023-12-01 | End: 2023-12-01 | Stop reason: SDUPTHER

## 2023-12-01 RX ORDER — SODIUM CHLORIDE 9 MG/ML
INJECTION, SOLUTION INTRAVENOUS CONTINUOUS
Status: DISCONTINUED | OUTPATIENT
Start: 2023-12-01 | End: 2023-12-01 | Stop reason: HOSPADM

## 2023-12-01 RX ADMIN — METHOHEXITAL SODIUM 60 MG: 500 INJECTION, POWDER, LYOPHILIZED, FOR SOLUTION INTRAMUSCULAR; INTRAVENOUS; RECTAL at 09:06

## 2023-12-01 RX ADMIN — Medication 50 MG: at 09:06

## 2023-12-01 ASSESSMENT — PAIN - FUNCTIONAL ASSESSMENT
PAIN_FUNCTIONAL_ASSESSMENT: ADULT NONVERBAL PAIN SCALE (NPVS)
PAIN_FUNCTIONAL_ASSESSMENT: ADULT NONVERBAL PAIN SCALE (NPVS)
PAIN_FUNCTIONAL_ASSESSMENT: NONE - DENIES PAIN
PAIN_FUNCTIONAL_ASSESSMENT: ADULT NONVERBAL PAIN SCALE (NPVS)
PAIN_FUNCTIONAL_ASSESSMENT: NONE - DENIES PAIN
PAIN_FUNCTIONAL_ASSESSMENT: ADULT NONVERBAL PAIN SCALE (NPVS)
PAIN_FUNCTIONAL_ASSESSMENT: NONE - DENIES PAIN

## 2023-12-01 NOTE — DISCHARGE INSTRUCTIONS
ECT DISCHARGE INSTRUCTIONS      NAME: Lucas Stoll   :  1960   MRN:  [id number]     Date/Time:  2023 9:12 AM    ADMIT DATE: 2023     DISCHARGE DATE: 2023     DISCHARGE DIAGNOSIS:  [unfilled]     Recommended diet:  regular diet    Recommended activity: activity as tolerated and no driving for today    Have a responsible person stay with the patient for 24 hours after the treatment, some temporary confusion may be noticed. Do not allow a patient to operate a motor vehicle for at least 24 hours and all the confusion has cleared. Do not drink alcoholic beverages for 48 hours past treatment. Do not sign any legal documents. Do not make any critical decisions for 24 hours. Advance diet as tolerated. Start with liquids and advance it nausea is not present. Understand that memory for recent events, phone numbers, addresses, ect. May be decreased for a short period of time. Report any persistant nausea or pain to the treating physician. Patient receiving ECT while in the hospital should also not attempt to ambulate without assistance, please use tap bell which will be provide. If you have questions regarding the hospital related prescriptions or hospital related issues please call the [address] [place] [address] at [facility] [phone]. If you experience any of the following symptoms then please call your primary care physician/outpatient psychiatrist or return to the emergency room if you cannot get hold of your doctor: Fever, chills, nausea, vomiting, diarrhea, change in mentation, falling, bleeding, shortness of breath. Follow Up:  Continue outpatient psychiatric follow-up visits with personal psychiatrist.  Return for next ECT treatment in 2023        Information obtained by :  I understand that if any problems occur once I am at home I am to contact my physician.     I understand and acknowledge receipt of the

## 2023-12-01 NOTE — PERIOP NOTE
Lesli Quiroz  1960  [id number]    Situation:  Verbal report given from: Zain Stoll and Dr. Liss Baxter   Procedure: Procedure(s):  ELECTROCONVULSIVE THERAPY    Background:    Preoperative diagnosis: Major depressive disorder, recurrent episode, moderate ([address]) [F33.1]    Postoperative diagnosis: * No post-op diagnosis entered *    :  Dr. Andrea Henderson    Assistant(s): Circulator: Hailey Trinh RN  Scrub Person First: Bettina Pierre    Specimens: [unfilled]    Assessment:  Intra-procedure medications         Anesthesia gave intra-procedure sedation and medications, see anesthesia flow sheet     Intravenous fluids: LR@ KVO     Vital signs stable

## 2023-12-01 NOTE — ANESTHESIA POSTPROCEDURE EVALUATION
Department of Anesthesiology  Postprocedure Note    Patient: Jamila Ballard  MRN: [id number]  YOB: 1960  Date of evaluation: 12/1/2023      Procedure Summary     Date: 12/01/23 Room / Location: [facility] OR R2 / [facility] OR    Anesthesia Start: 0902 Anesthesia Stop: 0688    Procedure: ELECTROCONVULSIVE THERAPY (Head) Diagnosis:       Major depressive disorder, recurrent episode, moderate (HCC)      (Major depressive disorder, recurrent episode, moderate ([address]) [F33.1])    Surgeons: Monica Stoddard MD Responsible Provider: Rosa Michel MD    Anesthesia Type: general ASA Status: 3          Anesthesia Type: No value filed. Ama Phase I: Ama Score: 10    Ama Phase II:        Anesthesia Post Evaluation    Patient location during evaluation: PACU  Patient participation: waiting for patient participation  Level of consciousness: awake  Airway patency: patent  Nausea & Vomiting: no vomiting and no nausea  Complications: no  Cardiovascular status: hemodynamically stable  Respiratory status: acceptable  Hydration status: stable  There was medical reason for not using a multimodal analgesia pain management approach. Pain management: adequate

## 2023-12-01 NOTE — ANESTHESIA PRE PROCEDURE
Department of Anesthesiology  Preprocedure Note       Name:  Padmaja Pop   Age:  57 y.o.  :  1960                                          MRN:  [id number]         Date:  2023      Surgeon: Radha Tran):  Malorie Owen MD    Procedure: Procedure(s):  ELECTROCONVULSIVE THERAPY    Medications prior to admission:   Prior to Admission medications    Medication Sig Start Date End Date Taking? Authorizing Provider   hydrocortisone 1 % cream Apply topically 2 times daily Apply topically 2 times daily. Patient not taking: Reported on 2023    Víctor Grande MD   metFORMIN (GLUCOPHAGE) 1000 MG tablet Take 1 tablet by mouth daily (with breakfast)    Víctor Grande MD   traZODone (DESYREL) 50 MG tablet Take 1 tablet by mouth nightly as needed for Sleep 7/10/23   Paco Fox MD   OLANZapine (ZYPREXA) 20 MG tablet Take 1 tablet by mouth in the morning and at bedtime 7/10/23   Paco Fox MD   levothyroxine (SYNTHROID) 100 MCG tablet Take 1 tablet by mouth Daily    Víctor Grande MD   hydrOXYzine HCl (ATARAX) 25 MG tablet Take 1 tablet by mouth 3 times daily as needed for Anxiety    Víctor Grande MD   pravastatin (PRAVACHOL) 40 MG tablet Take 1 tablet by mouth daily    Víctor Grande MD   apixaban (ELIQUIS) 5 MG TABS tablet Take 1 tablet by mouth 2 times daily H/o DVT 23   Automatic Reconciliation, Ar   midodrine (PROAMATINE) 10 MG tablet Take 1 tablet by mouth 3 times daily (with meals) 23   Automatic Reconciliation, Ar   mirtazapine (REMERON) 45 MG tablet Take 1 tablet by mouth nightly 23   Automatic Reconciliation, Ar   venlafaxine (EFFEXOR XR) 150 MG extended release capsule Take 2 capsules by mouth daily 23   Automatic Reconciliation, Ar       Current medications:    No current facility-administered medications for this visit. No current outpatient medications on file.      Facility-Administered Medications Ordered in Other

## 2023-12-01 NOTE — PROCEDURES
ECT PROCEDURE NOTE         IDENTIFICATION:           Patient Name   Magno Fowler         Date of Birth  1960         [facility]  [id number]         Medical Record Number   [id number]             Age   57 y.o. PCP  Alonso Adams MD         Admit date:  12/01/23           Room Number   PACU/PL  @ [facility]         Date of Service   12/01/23                          Electro Convulsive Therapy:   Treatment number 28         Maintenance ECT - YES        Magno Fowler was admitted to the PACU for ECT. Patient was medically cleared and NPO for procedure. Patient is NOT court mandated and gave informed consent for ECT treatment. Patient received       Bilateral ECT YES    Administered using the [address] [place] [address]. at 40 % Energy, under general anesthesia. Magno Fowler with a good, well generalized seizure of 78 seconds on observation of the motor manifestations of the seizure. EEG duration was NA secs. Post-Ictal Suppression Index: NA %   Recovery was unremarkable and with no complications. Change in Energy %:                 Anesthesia medications administered during procedure:   Brevital:  60 mg   Change for next treament:       Succinylcholine: 50 mg   Change for next treatment:        Propofol: mg   Glycopyrrolate:  mg   Labetalol:  mg   Esmolol:  mg   Lorazepam:  mg   Ketamine:  mg   Zofran:   mg     Toradol:  mg   Lidocaine:  mg   Caffeine Benzoate: mg               CSSRS  1/26/2023 7/8/2022         1) Within the past month, have you wished you were dead or wished you could go to sleep and not wake up? No  No     2) Have you actually had any thoughts of killing yourself? No  No     6) Have you ever done anything, started to do anything, or prepared to do anything to end your life? Yes  No         Did this occur within the past 3 months?   Yes  -

## 2024-01-12 ENCOUNTER — ANESTHESIA EVENT (OUTPATIENT)
Facility: HOSPITAL | Age: 64
End: 2024-01-12
Payer: MEDICARE

## 2024-01-12 RX ORDER — SODIUM CHLORIDE 0.9 % (FLUSH) 0.9 %
5-40 SYRINGE (ML) INJECTION PRN
Status: CANCELLED | OUTPATIENT
Start: 2024-01-12

## 2024-01-12 RX ORDER — SODIUM CHLORIDE 0.9 % (FLUSH) 0.9 %
5-40 SYRINGE (ML) INJECTION EVERY 12 HOURS SCHEDULED
Status: CANCELLED | OUTPATIENT
Start: 2024-01-12

## 2024-01-12 RX ORDER — SODIUM CHLORIDE 9 MG/ML
INJECTION, SOLUTION INTRAVENOUS PRN
Status: CANCELLED | OUTPATIENT
Start: 2024-01-12

## 2024-01-12 RX ORDER — SODIUM CHLORIDE 9 MG/ML
INJECTION, SOLUTION INTRAVENOUS CONTINUOUS
Status: CANCELLED | OUTPATIENT
Start: 2024-01-12

## 2024-01-19 ENCOUNTER — ANESTHESIA (OUTPATIENT)
Facility: HOSPITAL | Age: 64
End: 2024-01-19
Payer: MEDICARE

## 2024-01-19 RX ORDER — SODIUM CHLORIDE 9 MG/ML
INJECTION, SOLUTION INTRAVENOUS PRN
Status: CANCELLED | OUTPATIENT
Start: 2024-01-19

## 2024-01-19 RX ORDER — SODIUM CHLORIDE 0.9 % (FLUSH) 0.9 %
5-40 SYRINGE (ML) INJECTION EVERY 12 HOURS SCHEDULED
Status: CANCELLED | OUTPATIENT
Start: 2024-01-19

## 2024-01-19 RX ORDER — SODIUM CHLORIDE 9 MG/ML
INJECTION, SOLUTION INTRAVENOUS CONTINUOUS
Status: CANCELLED | OUTPATIENT
Start: 2024-01-19

## 2024-01-19 RX ORDER — SODIUM CHLORIDE 0.9 % (FLUSH) 0.9 %
5-40 SYRINGE (ML) INJECTION PRN
Status: CANCELLED | OUTPATIENT
Start: 2024-01-19

## 2024-01-26 ENCOUNTER — HOSPITAL ENCOUNTER (OUTPATIENT)
Facility: HOSPITAL | Age: 64
Setting detail: OUTPATIENT SURGERY
Discharge: HOME | End: 2024-01-26
Attending: PSYCHIATRY & NEUROLOGY | Admitting: PSYCHIATRY & NEUROLOGY
Payer: MEDICARE

## 2024-01-26 VITALS
OXYGEN SATURATION: 97 % | TEMPERATURE: 97.8 F | DIASTOLIC BLOOD PRESSURE: 70 MMHG | HEIGHT: 63 IN | HEART RATE: 84 BPM | RESPIRATION RATE: 16 BRPM | BODY MASS INDEX: 28.35 KG/M2 | SYSTOLIC BLOOD PRESSURE: 130 MMHG | WEIGHT: 160 LBS

## 2024-01-26 LAB
GLUCOSE BLD STRIP.AUTO-MCNC: 122 MG/DL (ref 65–117)
SERVICE CMNT-IMP: ABNORMAL

## 2024-01-26 PROCEDURE — 3700000000 HC ANESTHESIA ATTENDED CARE: Performed by: PSYCHIATRY & NEUROLOGY

## 2024-01-26 PROCEDURE — 7100000001 HC PACU RECOVERY - ADDTL 15 MIN: Performed by: PSYCHIATRY & NEUROLOGY

## 2024-01-26 PROCEDURE — 6360000002 HC RX W HCPCS: Performed by: NURSE ANESTHETIST, CERTIFIED REGISTERED

## 2024-01-26 PROCEDURE — 82962 GLUCOSE BLOOD TEST: CPT

## 2024-01-26 PROCEDURE — 90870 ELECTROCONVULSIVE THERAPY: CPT | Performed by: PSYCHIATRY & NEUROLOGY

## 2024-01-26 PROCEDURE — 7100000010 HC PHASE II RECOVERY - FIRST 15 MIN: Performed by: PSYCHIATRY & NEUROLOGY

## 2024-01-26 PROCEDURE — 2709999900 HC NON-CHARGEABLE SUPPLY: Performed by: PSYCHIATRY & NEUROLOGY

## 2024-01-26 PROCEDURE — 7100000011 HC PHASE II RECOVERY - ADDTL 15 MIN: Performed by: PSYCHIATRY & NEUROLOGY

## 2024-01-26 PROCEDURE — 2500000003 HC RX 250 WO HCPCS: Performed by: NURSE ANESTHETIST, CERTIFIED REGISTERED

## 2024-01-26 PROCEDURE — 7100000000 HC PACU RECOVERY - FIRST 15 MIN: Performed by: PSYCHIATRY & NEUROLOGY

## 2024-01-26 RX ORDER — SODIUM CHLORIDE 0.9 % (FLUSH) 0.9 %
5-40 SYRINGE (ML) INJECTION PRN
Status: DISCONTINUED | OUTPATIENT
Start: 2024-01-26 | End: 2024-01-26 | Stop reason: HOSPADM

## 2024-01-26 RX ORDER — SODIUM CHLORIDE 0.9 % (FLUSH) 0.9 %
5-40 SYRINGE (ML) INJECTION EVERY 12 HOURS SCHEDULED
Status: DISCONTINUED | OUTPATIENT
Start: 2024-01-26 | End: 2024-01-26 | Stop reason: HOSPADM

## 2024-01-26 RX ORDER — SODIUM CHLORIDE 9 MG/ML
INJECTION, SOLUTION INTRAVENOUS CONTINUOUS
Status: DISCONTINUED | OUTPATIENT
Start: 2024-01-26 | End: 2024-01-26 | Stop reason: HOSPADM

## 2024-01-26 RX ORDER — SUCCINYLCHOLINE/SOD CL,ISO/PF 100 MG/5ML
SYRINGE (ML) INTRAVENOUS PRN
Status: DISCONTINUED | OUTPATIENT
Start: 2024-01-26 | End: 2024-01-26 | Stop reason: SDUPTHER

## 2024-01-26 RX ORDER — SODIUM CHLORIDE 9 MG/ML
INJECTION, SOLUTION INTRAVENOUS PRN
Status: DISCONTINUED | OUTPATIENT
Start: 2024-01-26 | End: 2024-01-26 | Stop reason: HOSPADM

## 2024-01-26 RX ADMIN — Medication 50 MG: at 09:37

## 2024-01-26 ASSESSMENT — PAIN SCALES - GENERAL
PAINLEVEL_OUTOF10: 0

## 2024-01-26 NOTE — DISCHARGE INSTRUCTIONS
ECT DISCHARGE INSTRUCTIONS      NAME: Anuradha Lawler   :  1960   MRN:  [id number]     Date/Time:  2024 9:41 AM    ADMIT DATE: 2024     DISCHARGE DATE: 2024     DISCHARGE DIAGNOSIS:  [unfilled]     Recommended diet:  regular diet    Recommended activity: activity as tolerated and no driving for today    Have a responsible person stay with the patient for 24 hours after the treatment, some temporary confusion may be noticed.    Do not allow a patient to operate a motor vehicle for at least 24 hours and all the confusion has cleared.    Do not drink alcoholic beverages for 48 hours past treatment.    Do not sign any legal documents.    Do not make any critical decisions for 24 hours.    Advance diet as tolerated. Start with liquids and advance it nausea is not present.    Understand that memory for recent events, phone numbers, addresses, ect. May be decreased for a short period of time.     Report any persistant nausea or pain to the treating physician.    Patient receiving ECT while in the hospital should also not attempt to ambulate without assistance, please use tap bell which will be provide.    If you have questions regarding the hospital related prescriptions or hospital related issues please call the Behavioral Health Coordinator at [facility] [phone].    If you experience any of the following symptoms then please call your primary care physician/outpatient psychiatrist or return to the emergency room if you cannot get hold of your doctor: Fever, chills, nausea, vomiting, diarrhea, change in mentation, falling, bleeding, shortness of breath.    Follow Up:  Continue outpatient psychiatric follow-up visits with personal psychiatrist.  Return for next ECT treatment in 24        Information obtained by :  I understand that if any problems occur once I am at home I am to contact my physician.    I understand and acknowledge receipt of the  hands or feet or shortness of breath while lying flat in bed.  Please call your doctor as soon as you notice any of these symptoms; do not wait until your next office visit.        The discharge information has been reviewed with the patient and caregiver.  The patient and caregiver verbalized understanding.  Discharge medications reviewed with the patient and caregiver and appropriate educational materials and side effects teaching were provided.  ___________________________________________________________________________________________________________________________________

## 2024-01-26 NOTE — ANESTHESIA PRE PROCEDURE
Department of Anesthesiology  Preprocedure Note       Name:  Anuradha Lawler   Age:  63 y.o.  :  1960                                          MRN:  [id number]         Date:  2024      Surgeon: Surgeon(s):  Yolanda Naranjo MD    Procedure: Procedure(s):  ELECTROCONVULSIVE THERAPY    Medications prior to admission:   Prior to Admission medications    Medication Sig Start Date End Date Taking? Authorizing Provider   hydrocortisone 1 % cream Apply topically 2 times daily Apply topically 2 times daily.  Patient not taking: Reported on 2023    ProviderVíctor MD   metFORMIN (GLUCOPHAGE) 1000 MG tablet Take 1 tablet by mouth daily (with breakfast)    Víctor Grande MD   traZODone (DESYREL) 50 MG tablet Take 1 tablet by mouth nightly as needed for Sleep 7/10/23   Panfilo Currie MD   OLANZapine (ZYPREXA) 20 MG tablet Take 1 tablet by mouth in the morning and at bedtime 7/10/23   Panfilo Currie MD   levothyroxine (SYNTHROID) 100 MCG tablet Take 1 tablet by mouth Daily    Víctor Grande MD   hydrOXYzine HCl (ATARAX) 25 MG tablet Take 1 tablet by mouth 3 times daily as needed for Anxiety    Víctor Grande MD   pravastatin (PRAVACHOL) 40 MG tablet Take 1 tablet by mouth daily    Víctor Grande MD   apixaban (ELIQUIS) 5 MG TABS tablet Take 1 tablet by mouth 2 times daily H/o DVT 23   Automatic Reconciliation, Ar   midodrine (PROAMATINE) 10 MG tablet Take 1 tablet by mouth 3 times daily (with meals) 23   Automatic Reconciliation, Ar   mirtazapine (REMERON) 45 MG tablet Take 1 tablet by mouth nightly 23   Automatic Reconciliation, Ar   venlafaxine (EFFEXOR XR) 150 MG extended release capsule Take 2 capsules by mouth daily 23   Automatic Reconciliation, Ar       Current medications:    No current facility-administered medications for this encounter.     Current Outpatient Medications   Medication Sig Dispense Refill    hydrocortisone 1 % cream

## 2024-01-26 NOTE — PERIOP NOTE
Anuradha GILLIS Bucky  1960  [id number]    Situation:  Verbal report given from: Dr. Chong and Swati Armendariz RN  Procedure: Procedure(s):  ELECTROCONVULSIVE THERAPY    Background:    Preoperative diagnosis: Major depressive disorder, recurrent episode, moderate (HCC) [F33.1]    Postoperative diagnosis: * No post-op diagnosis entered *    :  Dr. Naranjo    Assistant(s): Circulator: Andrew Ndiaye RN  Scrub Person First: Alessandra Encinas  Circulator Assist: Swati Armendariz RN    Specimens: * No specimens in log *    Assessment:  Intra-procedure medications         Anesthesia gave intra-procedure sedation and medications, see anesthesia flow sheet     Intravenous fluids: LR@ KVO     Vital signs stable

## 2024-01-26 NOTE — PERIOP NOTE
Patient meets discharge criteria.  Patient and caregiver Chelsea provided with verbal and written discharge instructions.  Patient discharged by wheelchair with caregiver Chelsea to transport home.

## 2024-01-26 NOTE — PROCEDURES
ECT PROCEDURE NOTE         IDENTIFICATION:           Patient Name   Anuradha Lawler         Date of Birth  1960         [facility]  [id number]         Medical Record Number   [id number]             Age   62 y.o.         PCP  Cyndi Curtis MD         Admit date:  01/26/24           Room Number   PACU/PL  @ [facility]         Date of Service   01/26/24                          Electro Convulsive Therapy:   Treatment number 30         Maintenance ECT - YES        Anuradha Lawler was admitted to the PACU for ECT. Patient was medically cleared and NPO for procedure. Patient is NOT court mandated and gave informed consent for ECT treatment.        Patient received       Bilateral ECT YES    Administered using the [employer].       at 40 % Energy, under general anesthesia.    Anuradha Lawler with a good, well generalized seizure of 75 seconds on observation of the motor manifestations of the seizure. EEG duration was NA secs.    Post-Ictal Suppression Index: NA %   Recovery was unremarkable and with no complications.       Change in Energy %:                 Anesthesia medications administered during procedure:   Brevital:  50 mg   Change for next treament:       Succinylcholine: 50 mg   Change for next treatment:        Propofol: mg   Glycopyrrolate:  mg   Labetalol:  mg   Esmolol:  mg   Lorazepam:  mg   Ketamine:  mg   Zofran:   mg     Toradol:  mg   Lidocaine:  mg   Caffeine Benzoate: mg               CSSRS  1/26/2023 7/8/2022         1) Within the past month, have you wished you were dead or wished you could go to sleep and not wake up?   No  No     2) Have you actually had any thoughts of killing yourself?  No  No     6) Have you ever done anything, started to do anything, or prepared to do anything to end your life?  Yes  No         Did this occur within the past 3 months?  Yes  -

## 2024-02-01 NOTE — ANESTHESIA POSTPROCEDURE EVALUATION
Department of Anesthesiology  Postprocedure Note    Patient: Anuradha Lawler  MRN: [id number]  YOB: 1960  Date of evaluation: 2/1/2024    Procedure Summary     Date: 01/26/24 Room / Location: [facility] MAIN OR  / [facility] MAIN OR    Anesthesia Start: 0932 Anesthesia Stop: 0947    Procedure: ELECTROCONVULSIVE THERAPY (Head) Diagnosis:       Major depressive disorder, recurrent episode, moderate (HCC)      (Major depressive disorder, recurrent episode, moderate (HCC) [F33.1])    Surgeons: Yloanda Naranjo MD Responsible Provider: Christina Chong MD    Anesthesia Type: General ASA Status: 3          Anesthesia Type: General    Ama Phase I: Ama Score: 10    Ama Phase II: Ama Score: 10    Anesthesia Post Evaluation    Patient location during evaluation: PACU  Level of consciousness: awake  Airway patency: patent  Nausea & Vomiting: no nausea  Cardiovascular status: blood pressure returned to baseline  Respiratory status: acceptable  Hydration status: euvolemic  Comments: ---------------------------               01/26/24                      1020         ---------------------------   BP:          130/70         Pulse:         84           Resp:          16           Temp:   97.8 °F (36.6 °C)   SpO2:          97%         ---------------------------   Pain management: satisfactory to patient        No notable events documented.

## 2024-02-09 ENCOUNTER — ANESTHESIA EVENT (OUTPATIENT)
Facility: HOSPITAL | Age: 64
End: 2024-02-09
Payer: MEDICARE

## 2024-02-16 ENCOUNTER — ANESTHESIA (OUTPATIENT)
Facility: HOSPITAL | Age: 64
End: 2024-02-16
Payer: MEDICARE

## 2024-02-23 ENCOUNTER — HOSPITAL ENCOUNTER (OUTPATIENT)
Facility: HOSPITAL | Age: 64
Setting detail: OUTPATIENT SURGERY
Discharge: HOME | End: 2024-02-23
Attending: PSYCHIATRY & NEUROLOGY | Admitting: PSYCHIATRY & NEUROLOGY
Payer: MEDICARE

## 2024-02-23 VITALS
HEIGHT: 63 IN | DIASTOLIC BLOOD PRESSURE: 74 MMHG | SYSTOLIC BLOOD PRESSURE: 123 MMHG | HEART RATE: 87 BPM | WEIGHT: 192 LBS | RESPIRATION RATE: 12 BRPM | OXYGEN SATURATION: 90 % | BODY MASS INDEX: 34.02 KG/M2 | TEMPERATURE: 98.1 F

## 2024-02-23 PROCEDURE — 90870 ELECTROCONVULSIVE THERAPY: CPT | Performed by: PSYCHIATRY & NEUROLOGY

## 2024-02-23 PROCEDURE — 2580000003 HC RX 258: Performed by: ANESTHESIOLOGY

## 2024-02-23 PROCEDURE — 2500000003 HC RX 250 WO HCPCS: Performed by: ANESTHESIOLOGY

## 2024-02-23 PROCEDURE — 7100000001 HC PACU RECOVERY - ADDTL 15 MIN: Performed by: PSYCHIATRY & NEUROLOGY

## 2024-02-23 PROCEDURE — 7100000000 HC PACU RECOVERY - FIRST 15 MIN: Performed by: PSYCHIATRY & NEUROLOGY

## 2024-02-23 PROCEDURE — 7100000010 HC PHASE II RECOVERY - FIRST 15 MIN: Performed by: PSYCHIATRY & NEUROLOGY

## 2024-02-23 PROCEDURE — 7100000011 HC PHASE II RECOVERY - ADDTL 15 MIN: Performed by: PSYCHIATRY & NEUROLOGY

## 2024-02-23 PROCEDURE — 2709999900 HC NON-CHARGEABLE SUPPLY: Performed by: PSYCHIATRY & NEUROLOGY

## 2024-02-23 PROCEDURE — 6360000002 HC RX W HCPCS: Performed by: ANESTHESIOLOGY

## 2024-02-23 PROCEDURE — 3700000001 HC ADD 15 MINUTES (ANESTHESIA): Performed by: PSYCHIATRY & NEUROLOGY

## 2024-02-23 PROCEDURE — 3700000000 HC ANESTHESIA ATTENDED CARE: Performed by: PSYCHIATRY & NEUROLOGY

## 2024-02-23 RX ORDER — SODIUM CHLORIDE 9 MG/ML
INJECTION, SOLUTION INTRAVENOUS CONTINUOUS
Status: DISCONTINUED | OUTPATIENT
Start: 2024-02-23 | End: 2024-02-23 | Stop reason: HOSPADM

## 2024-02-23 RX ORDER — SUCCINYLCHOLINE CHLORIDE 20 MG/ML
INJECTION INTRAMUSCULAR; INTRAVENOUS PRN
Status: DISCONTINUED | OUTPATIENT
Start: 2024-02-23 | End: 2024-02-23 | Stop reason: SDUPTHER

## 2024-02-23 RX ORDER — SODIUM CHLORIDE 9 MG/ML
INJECTION, SOLUTION INTRAVENOUS PRN
Status: DISCONTINUED | OUTPATIENT
Start: 2024-02-23 | End: 2024-02-23 | Stop reason: HOSPADM

## 2024-02-23 RX ORDER — SODIUM CHLORIDE 0.9 % (FLUSH) 0.9 %
5-40 SYRINGE (ML) INJECTION EVERY 12 HOURS SCHEDULED
Status: DISCONTINUED | OUTPATIENT
Start: 2024-02-23 | End: 2024-02-23 | Stop reason: HOSPADM

## 2024-02-23 RX ORDER — SODIUM CHLORIDE, SODIUM LACTATE, POTASSIUM CHLORIDE, AND CALCIUM CHLORIDE .6; .31; .03; .02 G/100ML; G/100ML; G/100ML; G/100ML
INJECTION, SOLUTION INTRAVENOUS PRN
Status: DISCONTINUED | OUTPATIENT
Start: 2024-02-23 | End: 2024-02-23 | Stop reason: SDUPTHER

## 2024-02-23 RX ORDER — SODIUM CHLORIDE 0.9 % (FLUSH) 0.9 %
5-40 SYRINGE (ML) INJECTION PRN
Status: DISCONTINUED | OUTPATIENT
Start: 2024-02-23 | End: 2024-02-23 | Stop reason: HOSPADM

## 2024-02-23 RX ADMIN — SODIUM CHLORIDE, SODIUM LACTATE, POTASSIUM CHLORIDE, AND CALCIUM CHLORIDE 350 ML: 600; 310; 30; 20 INJECTION, SOLUTION INTRAVENOUS at 09:08

## 2024-02-23 RX ADMIN — METHOHEXITAL SODIUM 50 MG: 500 INJECTION, POWDER, LYOPHILIZED, FOR SOLUTION INTRAMUSCULAR; INTRAVENOUS; RECTAL at 09:07

## 2024-02-23 RX ADMIN — SUCCINYLCHOLINE CHLORIDE 50 MG: 20 INJECTION, SOLUTION INTRAMUSCULAR; INTRAVENOUS at 09:07

## 2024-02-23 ASSESSMENT — PAIN - FUNCTIONAL ASSESSMENT
PAIN_FUNCTIONAL_ASSESSMENT: ADULT NONVERBAL PAIN SCALE (NPVS)
PAIN_FUNCTIONAL_ASSESSMENT: 0-10
PAIN_FUNCTIONAL_ASSESSMENT: ADULT NONVERBAL PAIN SCALE (NPVS)
PAIN_FUNCTIONAL_ASSESSMENT: ADULT NONVERBAL PAIN SCALE (NPVS)
PAIN_FUNCTIONAL_ASSESSMENT: 0-10
PAIN_FUNCTIONAL_ASSESSMENT: 0-10
PAIN_FUNCTIONAL_ASSESSMENT: ADULT NONVERBAL PAIN SCALE (NPVS)
PAIN_FUNCTIONAL_ASSESSMENT: 0-10

## 2024-02-23 NOTE — ANESTHESIA POSTPROCEDURE EVALUATION
Post-Anesthesia Evaluation and Assessment    Patient: Anuradha Lawler MRN: [id number]  SSN: [id number]    YOB: 1960  Age: 63 y.o.  Sex: female      I have evaluated the patient and they are stable and ready for discharge from the PACU.     Cardiovascular Function/Vital Signs  Visit Vitals  /70   Pulse 81   Temp 98 °F (36.7 °C) (Oral)   Resp 16   Ht 1.6 m (5' 3\")   Wt 87.1 kg (192 lb)   SpO2 97%   BMI 34.01 kg/m²       Patient is status post General anesthesia for Procedure(s):  ELECTROCONVULSIVE THERAPY.    Nausea/Vomiting: None    Postoperative hydration reviewed and adequate.    Pain:      Managed    Neurological Status:       At baseline    Mental Status, Level of Consciousness: Alert and  oriented to person, place, and time    Pulmonary Status:       Adequate oxygenation and airway patent    Complications related to anesthesia: None    Post-anesthesia assessment completed. No concerns    Signed By: Elissa Vail MD     February 23, 2024            Department of Anesthesiology  Postprocedure Note    Patient: Anuradha Lawler  MRN: [id number]  YOB: 1960  Date of evaluation: 2/23/2024    Procedure Summary       Date: 02/23/24 Room / Location: [facility] MAIN OR  / [facility] MAIN OR    Anesthesia Start: 0902 Anesthesia Stop: 0914    Procedure: ELECTROCONVULSIVE THERAPY (Head) Diagnosis:       Major depressive disorder, recurrent episode, moderate (HCC)      (Major depressive disorder, recurrent episode, moderate (HCC) [F33.1])    Surgeons: Yolanda Naranjo MD Responsible Provider: Elissa Vail MD    Anesthesia Type: general ASA Status: 2            Anesthesia Type: No value filed.    Ama Phase I: Ama Score: 10    Ama Phase II:      Anesthesia Post Evaluation    No notable events documented.

## 2024-02-23 NOTE — PERIOP NOTE
Patient meets discharge criteria.  Patient and Chelsea provided with verbal and written discharge instructions.  Patient discharged by wheelchair with Chelsea to transport home.

## 2024-02-23 NOTE — ANESTHESIA PRE PROCEDURE
Department of Anesthesiology  Preprocedure Note       Name:  Anuradha Lawler   Age:  63 y.o.  :  1960                                          MRN:  [id number]         Date:  2024      Surgeon: Surgeon(s):  Yolanda Naranjo MD    Procedure: Procedure(s):  ELECTROCONVULSIVE THERAPY    Medications prior to admission:   Prior to Admission medications    Medication Sig Start Date End Date Taking? Authorizing Provider   hydrocortisone 1 % cream Apply topically 2 times daily Apply topically 2 times daily.  Patient not taking: Reported on 2023    ProviderVíctor MD   metFORMIN (GLUCOPHAGE) 1000 MG tablet Take 1 tablet by mouth daily (with breakfast)    Víctor Grande MD   traZODone (DESYREL) 50 MG tablet Take 1 tablet by mouth nightly as needed for Sleep 7/10/23   Panfilo Currie MD   OLANZapine (ZYPREXA) 20 MG tablet Take 1 tablet by mouth in the morning and at bedtime 7/10/23   Panfilo Currie MD   levothyroxine (SYNTHROID) 100 MCG tablet Take 1 tablet by mouth Daily    Víctor Grande MD   hydrOXYzine HCl (ATARAX) 25 MG tablet Take 1 tablet by mouth 3 times daily as needed for Anxiety    ProviderVíctor MD   pravastatin (PRAVACHOL) 40 MG tablet Take 1 tablet by mouth daily    Víctor Grande MD   apixaban (ELIQUIS) 5 MG TABS tablet Take 1 tablet by mouth 2 times daily H/o DVT 23   Automatic Reconciliation, Ar   midodrine (PROAMATINE) 10 MG tablet Take 1 tablet by mouth 3 times daily (with meals) 23   Automatic Reconciliation, Ar   mirtazapine (REMERON) 45 MG tablet Take 1 tablet by mouth nightly 23   Automatic Reconciliation, Ar   venlafaxine (EFFEXOR XR) 150 MG extended release capsule Take 2 capsules by mouth daily 23   Automatic Reconciliation, Ar       Current medications:    Current Facility-Administered Medications   Medication Dose Route Frequency Provider Last Rate Last Admin   • 0.9 % sodium chloride infusion   IntraVENous

## 2024-02-23 NOTE — PERIOP NOTE
Patient came back from ECT procedure with O2 stats at 86%-89% on room air.  Patient was pink and breathing bilaterally upon presentation.  Patient was then hooked up to 4L of oxygen, stats then jumped up to 94%.  Patient was eventually weaned off of oxygen, stats then dropped to 87% while back on room air.  Anesthesia evaluated her, encouraged her to take deep breaths, and instructed her to cough heavily a few times.  After this her stats then fátima to 94%.  Patient's O2 stats were stable at around 91%-92% upon discharge.  Patient denies any difficulty breathing, denies SOB, and states that they are not feeling anything out of the ordinary.  Patient was pink and breathing equally bilaterally upon discharge.

## 2024-02-23 NOTE — PERIOP NOTE
Patient reports drinking 12 ounces of water at 0700 today.  Patient advises she needed take her meds and \"I was really thirsty.\"  Dr. Vail, anesthesia aware of patient's PO intake, Dr. Vail advised will continue with ECT, patient to be last ECT procedure today.

## 2024-02-23 NOTE — DISCHARGE INSTRUCTIONS
ECT DISCHARGE INSTRUCTIONS      NAME: Anuradha Lawler   :  1960   MRN:  [id number]     Date/Time:  2024 9:09 AM    ADMIT DATE: 2024     DISCHARGE DATE: 2024     DISCHARGE DIAGNOSIS:  [unfilled]     Recommended diet:  regular diet    Recommended activity: activity as tolerated and no driving for today    Have a responsible person stay with the patient for 24 hours after the treatment, some temporary confusion may be noticed.    Do not allow a patient to operate a motor vehicle for at least 24 hours and all the confusion has cleared.    Do not drink alcoholic beverages for 48 hours past treatment.    Do not sign any legal documents.    Do not make any critical decisions for 24 hours.    Advance diet as tolerated. Start with liquids and advance it nausea is not present.    Understand that memory for recent events, phone numbers, addresses, ect. May be decreased for a short period of time.     Report any persistant nausea or pain to the treating physician.    Patient receiving ECT while in the hospital should also not attempt to ambulate without assistance, please use tap bell which will be provide.    If you have questions regarding the hospital related prescriptions or hospital related issues please call the Behavioral Health Coordinator at [facility] [phone].    If you experience any of the following symptoms then please call your primary care physician/outpatient psychiatrist or return to the emergency room if you cannot get hold of your doctor: Fever, chills, nausea, vomiting, diarrhea, change in mentation, falling, bleeding, shortness of breath.    Follow Up:  Continue outpatient psychiatric follow-up visits with personal psychiatrist.  Return for next ECT treatment in 2024        Information obtained by :  I understand that if any problems occur once I am at home I am to contact my physician.    I understand and acknowledge receipt of the  pounds or more overnight or 5 pounds in a week, increased swelling in our hands or feet or shortness of breath while lying flat in bed.  Please call your doctor as soon as you notice any of these symptoms; do not wait until your next office visit.        The discharge information has been reviewed with the patient and Chelsea.  The patient and Chelsea verbalized understanding.  Discharge medications reviewed with the patient and Chelsea and appropriate educational materials and side effects teaching were provided.  ___________________________________________________________________________________________________________________________________

## 2024-02-23 NOTE — PROCEDURES
ECT PROCEDURE NOTE         IDENTIFICATION:           Patient Name   Anuradha Lawler         Date of Birth  1960         [facility]  [id number]         Medical Record Number   [id number]             Age   62 y.o.         PCP  Cyndi Curtis MD         Admit date:  02/23/24           Room Number   PACU/PL  @ [facility]         Date of Service   02/23/24                          Electro Convulsive Therapy:   Treatment number 31         Maintenance ECT - YES        Anuradha Lawler was admitted to the PACU for ECT. Patient was medically cleared and NPO for procedure. Patient is NOT court mandated and gave informed consent for ECT treatment.        Patient received       Bilateral ECT YES    Administered using the Thymatron System IV - [employer].       at 40 % Energy, under general anesthesia.    Anuradha Lawler with a good, well generalized seizure of 121 seconds on observation of the motor manifestations of the seizure. EEG duration was 142 secs.    Post-Ictal Suppression Index: 57.7 %   Recovery was unremarkable and with no complications.       Change in Energy %:                 Anesthesia medications administered during procedure:   Brevital:  50 mg   Change for next treament:       Succinylcholine: 50 mg   Change for next treatment:        Propofol: mg   Glycopyrrolate:  mg   Labetalol:  mg   Esmolol:  mg   Lorazepam:  mg   Ketamine:  mg   Zofran:   mg     Toradol:  mg   Lidocaine:  mg   Caffeine Benzoate: mg               CSSRS  1/26/2023 7/8/2022         1) Within the past month, have you wished you were dead or wished you could go to sleep and not wake up?   No  No     2) Have you actually had any thoughts of killing yourself?  No  No     6) Have you ever done anything, started to do anything, or prepared to do anything to end your life?  Yes  No         Did this occur within the past 3 months?  Yes  -

## 2024-02-23 NOTE — PERIOP NOTE
Anuradha GILLIS Bucky  1960  [id number]    Situation:  Verbal report given from: Swati Armendariz  Procedure: Procedure(s):  ELECTROCONVULSIVE THERAPY    Background:    Preoperative diagnosis: Major depressive disorder, recurrent episode, moderate (HCC) [F33.1]    Postoperative diagnosis: * No post-op diagnosis entered *    :  Dr. Naranjo    Assistant(s): Circulator: Andrew Ndiaye RN  Scrub Person First: Alessandra Encinas  Circulator Assist: Swati Armendariz RN    Specimens: * No specimens in log *    Assessment:  Intra-procedure medications         Anesthesia gave intra-procedure sedation and medications, see anesthesia flow sheet     Intravenous fluids: LR@ KVO     Vital signs stable

## 2024-03-14 ENCOUNTER — ANESTHESIA EVENT (OUTPATIENT)
Facility: HOSPITAL | Age: 64
End: 2024-03-14
Payer: MEDICARE

## 2024-03-15 ENCOUNTER — OFFICE VISIT (OUTPATIENT)
Age: 64
End: 2024-03-15

## 2024-03-15 DIAGNOSIS — Z91.199 NO-SHOW FOR APPOINTMENT: Primary | ICD-10-CM

## 2024-03-22 ENCOUNTER — HOSPITAL ENCOUNTER (OUTPATIENT)
Facility: HOSPITAL | Age: 64
Setting detail: OUTPATIENT SURGERY
Discharge: HOME | End: 2024-03-22
Attending: PSYCHIATRY & NEUROLOGY | Admitting: PSYCHIATRY & NEUROLOGY
Payer: MEDICARE

## 2024-03-22 ENCOUNTER — ANESTHESIA (OUTPATIENT)
Facility: HOSPITAL | Age: 64
End: 2024-03-22
Payer: MEDICARE

## 2024-03-22 VITALS
WEIGHT: 190 LBS | HEART RATE: 88 BPM | RESPIRATION RATE: 17 BRPM | HEIGHT: 62 IN | DIASTOLIC BLOOD PRESSURE: 85 MMHG | TEMPERATURE: 98.2 F | BODY MASS INDEX: 34.96 KG/M2 | SYSTOLIC BLOOD PRESSURE: 140 MMHG | OXYGEN SATURATION: 94 %

## 2024-03-22 LAB
GLUCOSE BLD STRIP.AUTO-MCNC: 117 MG/DL (ref 65–117)
SERVICE CMNT-IMP: NORMAL

## 2024-03-22 PROCEDURE — 3700000000 HC ANESTHESIA ATTENDED CARE: Performed by: PSYCHIATRY & NEUROLOGY

## 2024-03-22 PROCEDURE — 7100000001 HC PACU RECOVERY - ADDTL 15 MIN: Performed by: PSYCHIATRY & NEUROLOGY

## 2024-03-22 PROCEDURE — 90870 ELECTROCONVULSIVE THERAPY: CPT | Performed by: PSYCHIATRY & NEUROLOGY

## 2024-03-22 PROCEDURE — 6360000002 HC RX W HCPCS: Performed by: NURSE ANESTHETIST, CERTIFIED REGISTERED

## 2024-03-22 PROCEDURE — 82962 GLUCOSE BLOOD TEST: CPT

## 2024-03-22 PROCEDURE — 7100000010 HC PHASE II RECOVERY - FIRST 15 MIN: Performed by: PSYCHIATRY & NEUROLOGY

## 2024-03-22 PROCEDURE — 2500000003 HC RX 250 WO HCPCS: Performed by: NURSE ANESTHETIST, CERTIFIED REGISTERED

## 2024-03-22 PROCEDURE — 2709999900 HC NON-CHARGEABLE SUPPLY: Performed by: PSYCHIATRY & NEUROLOGY

## 2024-03-22 PROCEDURE — 7100000000 HC PACU RECOVERY - FIRST 15 MIN: Performed by: PSYCHIATRY & NEUROLOGY

## 2024-03-22 PROCEDURE — 2580000003 HC RX 258: Performed by: ANESTHESIOLOGY

## 2024-03-22 RX ORDER — SODIUM CHLORIDE 0.9 % (FLUSH) 0.9 %
5-40 SYRINGE (ML) INJECTION PRN
Status: DISCONTINUED | OUTPATIENT
Start: 2024-03-22 | End: 2024-03-22 | Stop reason: HOSPADM

## 2024-03-22 RX ORDER — NALOXONE HYDROCHLORIDE 0.4 MG/ML
INJECTION, SOLUTION INTRAMUSCULAR; INTRAVENOUS; SUBCUTANEOUS PRN
Status: DISCONTINUED | OUTPATIENT
Start: 2024-03-22 | End: 2024-03-22 | Stop reason: HOSPADM

## 2024-03-22 RX ORDER — SODIUM CHLORIDE 9 MG/ML
INJECTION, SOLUTION INTRAVENOUS CONTINUOUS
Status: DISCONTINUED | OUTPATIENT
Start: 2024-03-22 | End: 2024-03-22 | Stop reason: HOSPADM

## 2024-03-22 RX ORDER — SODIUM CHLORIDE 0.9 % (FLUSH) 0.9 %
5-40 SYRINGE (ML) INJECTION EVERY 12 HOURS SCHEDULED
Status: DISCONTINUED | OUTPATIENT
Start: 2024-03-22 | End: 2024-03-22 | Stop reason: HOSPADM

## 2024-03-22 RX ORDER — SODIUM CHLORIDE 9 MG/ML
INJECTION, SOLUTION INTRAVENOUS PRN
Status: DISCONTINUED | OUTPATIENT
Start: 2024-03-22 | End: 2024-03-22 | Stop reason: HOSPADM

## 2024-03-22 RX ORDER — SUCCINYLCHOLINE CHLORIDE 20 MG/ML
INJECTION INTRAMUSCULAR; INTRAVENOUS PRN
Status: DISCONTINUED | OUTPATIENT
Start: 2024-03-22 | End: 2024-03-22 | Stop reason: SDUPTHER

## 2024-03-22 RX ADMIN — Medication 50 MG: at 09:13

## 2024-03-22 RX ADMIN — SUCCINYLCHOLINE CHLORIDE 50 MG: 20 INJECTION, SOLUTION INTRAMUSCULAR; INTRAVENOUS at 09:13

## 2024-03-22 RX ADMIN — SODIUM CHLORIDE: 9 INJECTION, SOLUTION INTRAVENOUS at 08:40

## 2024-03-22 ASSESSMENT — PAIN - FUNCTIONAL ASSESSMENT: PAIN_FUNCTIONAL_ASSESSMENT: 0-10

## 2024-03-22 NOTE — DISCHARGE INSTRUCTIONS
ECT DISCHARGE INSTRUCTIONS      NAME: Anuradha Lawler   :  1960   MRN:  [id number]     Date/Time:  3/22/2024 9:18 AM    ADMIT DATE: 3/22/2024     DISCHARGE DATE: 3/22/2024     DISCHARGE DIAGNOSIS:  [unfilled]     Recommended diet:  regular diet    Recommended activity: activity as tolerated and no driving for today    Have a responsible person stay with the patient for 24 hours after the treatment, some temporary confusion may be noticed.    Do not allow a patient to operate a motor vehicle for at least 24 hours and all the confusion has cleared.    Do not drink alcoholic beverages for 48 hours past treatment.    Do not sign any legal documents.    Do not make any critical decisions for 24 hours.    Advance diet as tolerated. Start with liquids and advance it nausea is not present.    Understand that memory for recent events, phone numbers, addresses, ect. May be decreased for a short period of time.     Report any persistant nausea or pain to the treating physician.    Patient receiving ECT while in the hospital should also not attempt to ambulate without assistance, please use tap bell which will be provide.    If you have questions regarding the hospital related prescriptions or hospital related issues please call the Behavioral Health Coordinator at [facility] [phone].    If you experience any of the following symptoms then please call your primary care physician/outpatient psychiatrist or return to the emergency room if you cannot get hold of your doctor: Fever, chills, nausea, vomiting, diarrhea, change in mentation, falling, bleeding, shortness of breath.    Follow Up:  Continue outpatient psychiatric follow-up visits with personal psychiatrist.  Return for next ECT treatment on 2024      DISCHARGE SUMMARY from Nurse    PATIENT INSTRUCTIONS:    After general anesthesia or intravenous sedation, for 24 hours or while taking prescription Narcotics:  Limit  your activities  Do not drive and operate hazardous machinery  Do not make important personal or business decisions  Do  not drink alcoholic beverages  If you have not urinated within 8 hours after discharge, please contact your surgeon on call.    Report the following to your surgeon:  Excessive pain, swelling, redness or odor of or around the surgical area  Temperature over 100.5  Nausea and vomiting lasting longer than 4 hours or if unable to take medications  Any signs of decreased circulation or nerve impairment to extremity: change in color, persistent  numbness, tingling, coldness or increase pain  Any questions      These are general instructions for a healthy lifestyle:    No smoking/ No tobacco products/ Avoid exposure to second hand smoke  Surgeon General's Warning:  Quitting smoking now greatly reduces serious risk to your health.    Obesity, smoking, and sedentary lifestyle greatly increases your risk for illness    A healthy diet, regular physical exercise & weight monitoring are important for maintaining a healthy lifestyle    You may be retaining fluid if you have a history of heart failure or if you experience any of the following symptoms:  Weight gain of 3 pounds or more overnight or 5 pounds in a week, increased swelling in our hands or feet or shortness of breath while lying flat in bed.  Please call your doctor as soon as you notice any of these symptoms; do not wait until your next office visit.        The discharge information has been reviewed with the patient and caregiver.  The patient and caregiver verbalized understanding.  Discharge medications reviewed with the patient and caregiver and appropriate educational materials and side effects teaching were provided.  ___________________________________________________________________________________________________________________________________

## 2024-03-22 NOTE — PROCEDURES
ECT PROCEDURE NOTE         IDENTIFICATION:           Patient Name   Anuradha Lawler         Date of Birth  1960         [facility]  [id number]         Medical Record Number   [id number]             Age   62 y.o.         PCP  Cyndi Curtis MD         Admit date:  03/22/24           Room Number   PACU/PL  @ [facility]         Date of Service   03/22/24                          Electro Convulsive Therapy:   Treatment number 32         Maintenance ECT - YES        Anuradha Lawler was admitted to the PACU for ECT. Patient was medically cleared and NPO for procedure. Patient is NOT court mandated and gave informed consent for ECT treatment.        Patient received       Bilateral ECT YES    Administered using the Thymatron System IV - [employer].       at 40 % Energy, under general anesthesia.    Anuradha Lawler with a good, well generalized seizure of 74 seconds on observation of the motor manifestations of the seizure. EEG duration was NA secs.    Post-Ictal Suppression Index: NA %   Recovery was unremarkable and with no complications.       Change in Energy %:                 Anesthesia medications administered during procedure:   Brevital:  50 mg   Change for next treament:       Succinylcholine: 50 mg   Change for next treatment:        Propofol: mg   Glycopyrrolate:  mg   Labetalol:  mg   Esmolol:  mg   Lorazepam:  mg   Ketamine:  mg   Zofran:   mg     Toradol:  mg   Lidocaine:  mg   Caffeine Benzoate: mg               CSSRS  1/26/2023 7/8/2022         1) Within the past month, have you wished you were dead or wished you could go to sleep and not wake up?   No  No     2) Have you actually had any thoughts of killing yourself?  No  No     6) Have you ever done anything, started to do anything, or prepared to do anything to end your life?  Yes  No         Did this occur within the past 3 months?  Yes  -

## 2024-03-22 NOTE — PERIOP NOTE
Patient meets discharge criteria.  Patient and  (reji) provided with verbal and written discharge instructions.  Patient discharged by wheelchair with  (reji) to transport home.

## 2024-03-22 NOTE — ANESTHESIA PRE PROCEDURE
Department of Anesthesiology  Preprocedure Note       Name:  Anuradha Lawler   Age:  63 y.o.  :  1960                                          MRN:  [id number]         Date:  3/22/2024      Surgeon: Surgeon(s):  Yolanda Naranjo MD    Procedure: Procedure(s):  ELECTROCONVULSIVE THERAPY    Medications prior to admission:   Prior to Admission medications    Medication Sig Start Date End Date Taking? Authorizing Provider   Rosuvastatin Calcium (CRESTOR PO) Take by mouth   Yes Víctor Grande MD   hydrocortisone 1 % cream Apply topically 2 times daily Apply topically 2 times daily.  Patient not taking: Reported on 2023    Víctor Grande MD   metFORMIN (GLUCOPHAGE) 1000 MG tablet Take 1 tablet by mouth daily (with breakfast)    Víctor Grande MD   traZODone (DESYREL) 50 MG tablet Take 1 tablet by mouth nightly as needed for Sleep 7/10/23   Panfilo Currie MD   OLANZapine (ZYPREXA) 20 MG tablet Take 1 tablet by mouth in the morning and at bedtime 7/10/23   Panfilo Currie MD   levothyroxine (SYNTHROID) 100 MCG tablet Take 1 tablet by mouth Daily    Víctor Grande MD   hydrOXYzine HCl (ATARAX) 25 MG tablet Take 1 tablet by mouth 3 times daily as needed for Anxiety    Víctor Grande MD   pravastatin (PRAVACHOL) 40 MG tablet Take 1 tablet by mouth daily  Patient not taking: Reported on 3/22/2024    Víctor Grande MD   apixaban (ELIQUIS) 5 MG TABS tablet Take 1 tablet by mouth 2 times daily H/o DVT 23   Automatic Reconciliation, Ar   midodrine (PROAMATINE) 10 MG tablet Take 1 tablet by mouth 3 times daily (with meals) 23   Automatic Reconciliation, Ar   mirtazapine (REMERON) 45 MG tablet Take 1 tablet by mouth nightly 23   Automatic Reconciliation, Ar   venlafaxine (EFFEXOR XR) 150 MG extended release capsule Take 2 capsules by mouth daily 23   Automatic Reconciliation, Ar       Current medications:    Current Facility-Administered

## 2024-03-22 NOTE — ANESTHESIA POSTPROCEDURE EVALUATION
Post-Anesthesia Evaluation and Assessment    Patient: Anuradha Lawler MRN: [id number]  SSN: [id number]    YOB: 1960  Age: 63 y.o.  Sex: female      I have evaluated the patient and they are stable and ready for discharge from the PACU.     Cardiovascular Function/Vital Signs  Visit Vitals  BP (!) 143/84   Pulse 90   Temp 98.1 °F (36.7 °C) (Oral)   Resp 17   Ht 1.575 m (5' 2\")   Wt 86.2 kg (190 lb)   SpO2 95%   BMI 34.75 kg/m²       Patient is status post General anesthesia for Procedure(s):  ELECTROCONVULSIVE THERAPY.    Nausea/Vomiting: None    Postoperative hydration reviewed and adequate.    Pain:  Managed    Neurological Status:   At baseline    Mental Status, Level of Consciousness: Alert and  oriented to person, place, and time    Pulmonary Status:   Adequate oxygenation and airway patent    Complications related to anesthesia: None    Post-anesthesia assessment completed. No concerns    Signed By: Dwight Hidalgo MD     March 22, 2024

## 2024-03-22 NOTE — PERIOP NOTE
Anuradha CARLIN Lawler  1960  [id number]    Situation:  Verbal report given from: Dr. Hidalgo and Andrew Ndiaye  RN  Procedure: Procedure(s):  ELECTROCONVULSIVE THERAPY    Background:    Preoperative diagnosis: Major depressive disorder, recurrent episode, moderate (HCC) [F33.1]    Postoperative diagnosis: * No post-op diagnosis entered *    :  Dr. Naranjo    Assistant(s): Circulator: Andrew Ndiaye RN  Scrub Person First: Alessandra Encinas    Specimens: * No specimens in log *    Assessment:  Intra-procedure medications         Anesthesia gave intra-procedure sedation and medications, see anesthesia flow sheet     Intravenous fluids: LR@ KVO     Vital signs stable

## 2024-03-27 ENCOUNTER — TELEPHONE (OUTPATIENT)
Age: 64
End: 2024-03-27

## 2024-04-01 ENCOUNTER — TELEPHONE (OUTPATIENT)
Age: 64
End: 2024-04-01

## 2024-04-02 NOTE — PROGRESS NOTES
Responded to request for visit on [address] [facility]. Patient was focused on her desire to go see her mother and thoughts of the afterlife. Patient has many different ideas and thoughts about heaven and being reunited with her mom. She often compared their relationship and her grief to that of a  and wife and one not wanting to continue without the other. Spent brief time in life review and patient expressed some frustrations that her mother did not pay closer attention to her own health and she (the patient) felt unprepared for her death. Through further conversation she also shared interactions that she and her mother had that she now believes were her mother's attempt to prepare her. Patient also talked about various opportunities and aspects of her life that she gave up to help care for her mother. We explored the possibility of rediscovering herself and her abilities. Patient was able to articulate her belief that this would make her mother happy but that she is unsure of how to even begin this process. Encouraged patient to reach out to friends and family as well as medical staff who are willing to help. Patient expressed her desire to do so as well as the difficulty she has removing the mask she puts on for family and getting past the \"boulder\" in her chest to talk to physicians. Patient shared that she has begun witting letters to her mother, which she finds helpful, and we discussed the possibility of using this same practice to prepare for sharing with others. Concluded visit with prayer and assurance of ongoing support as needed and desired. Chaplain Shaila Hunter M.Div.    Paging Service [phone]) Her/She

## 2024-04-19 ENCOUNTER — HOSPITAL ENCOUNTER (OUTPATIENT)
Facility: HOSPITAL | Age: 64
Setting detail: OUTPATIENT SURGERY
Discharge: HOME | End: 2024-04-19
Attending: PSYCHIATRY & NEUROLOGY | Admitting: PSYCHIATRY & NEUROLOGY
Payer: MEDICARE

## 2024-04-19 ENCOUNTER — ANESTHESIA EVENT (OUTPATIENT)
Facility: HOSPITAL | Age: 64
End: 2024-04-19
Payer: MEDICARE

## 2024-04-19 ENCOUNTER — ANESTHESIA (OUTPATIENT)
Facility: HOSPITAL | Age: 64
End: 2024-04-19
Payer: MEDICARE

## 2024-04-19 VITALS
DIASTOLIC BLOOD PRESSURE: 84 MMHG | HEIGHT: 62 IN | WEIGHT: 192 LBS | TEMPERATURE: 98 F | HEART RATE: 88 BPM | BODY MASS INDEX: 35.33 KG/M2 | OXYGEN SATURATION: 94 % | RESPIRATION RATE: 11 BRPM | SYSTOLIC BLOOD PRESSURE: 138 MMHG

## 2024-04-19 LAB
GLUCOSE BLD STRIP.AUTO-MCNC: 117 MG/DL (ref 65–117)
SERVICE CMNT-IMP: NORMAL

## 2024-04-19 PROCEDURE — 2500000003 HC RX 250 WO HCPCS: Performed by: NURSE ANESTHETIST, CERTIFIED REGISTERED

## 2024-04-19 PROCEDURE — 6360000002 HC RX W HCPCS: Performed by: NURSE ANESTHETIST, CERTIFIED REGISTERED

## 2024-04-19 PROCEDURE — 2709999900 HC NON-CHARGEABLE SUPPLY: Performed by: PSYCHIATRY & NEUROLOGY

## 2024-04-19 PROCEDURE — 3700000001 HC ADD 15 MINUTES (ANESTHESIA): Performed by: PSYCHIATRY & NEUROLOGY

## 2024-04-19 PROCEDURE — 7100000000 HC PACU RECOVERY - FIRST 15 MIN: Performed by: PSYCHIATRY & NEUROLOGY

## 2024-04-19 PROCEDURE — 7100000011 HC PHASE II RECOVERY - ADDTL 15 MIN: Performed by: PSYCHIATRY & NEUROLOGY

## 2024-04-19 PROCEDURE — 3700000000 HC ANESTHESIA ATTENDED CARE: Performed by: PSYCHIATRY & NEUROLOGY

## 2024-04-19 PROCEDURE — 2580000003 HC RX 258: Performed by: NURSE ANESTHETIST, CERTIFIED REGISTERED

## 2024-04-19 PROCEDURE — 90870 ELECTROCONVULSIVE THERAPY: CPT | Performed by: PSYCHIATRY & NEUROLOGY

## 2024-04-19 PROCEDURE — 82962 GLUCOSE BLOOD TEST: CPT

## 2024-04-19 PROCEDURE — 7100000001 HC PACU RECOVERY - ADDTL 15 MIN: Performed by: PSYCHIATRY & NEUROLOGY

## 2024-04-19 PROCEDURE — 7100000010 HC PHASE II RECOVERY - FIRST 15 MIN: Performed by: PSYCHIATRY & NEUROLOGY

## 2024-04-19 RX ORDER — SODIUM CHLORIDE, SODIUM LACTATE, POTASSIUM CHLORIDE, CALCIUM CHLORIDE 600; 310; 30; 20 MG/100ML; MG/100ML; MG/100ML; MG/100ML
INJECTION, SOLUTION INTRAVENOUS CONTINUOUS PRN
Status: DISCONTINUED | OUTPATIENT
Start: 2024-04-19 | End: 2024-04-19 | Stop reason: SDUPTHER

## 2024-04-19 RX ORDER — SUCCINYLCHOLINE CHLORIDE 20 MG/ML
INJECTION INTRAMUSCULAR; INTRAVENOUS PRN
Status: DISCONTINUED | OUTPATIENT
Start: 2024-04-19 | End: 2024-04-19 | Stop reason: SDUPTHER

## 2024-04-19 RX ADMIN — SUCCINYLCHOLINE CHLORIDE 50 MG: 20 INJECTION, SOLUTION INTRAMUSCULAR; INTRAVENOUS at 08:42

## 2024-04-19 RX ADMIN — SODIUM CHLORIDE, POTASSIUM CHLORIDE, SODIUM LACTATE AND CALCIUM CHLORIDE: 600; 310; 30; 20 INJECTION, SOLUTION INTRAVENOUS at 08:37

## 2024-04-19 RX ADMIN — METHOHEXITAL SODIUM 50 MG: 500 INJECTION, POWDER, LYOPHILIZED, FOR SOLUTION INTRAMUSCULAR; INTRAVENOUS; RECTAL at 08:42

## 2024-04-19 ASSESSMENT — PAIN SCALES - GENERAL
PAINLEVEL_OUTOF10: 0

## 2024-04-19 NOTE — ANESTHESIA POSTPROCEDURE EVALUATION
Department of Anesthesiology  Postprocedure Note    Patient: Anuradha Lawler  MRN: [id number]  YOB: 1960  Date of evaluation: 4/19/2024    Procedure Summary       Date: 04/19/24 Room / Location: [facility] MAIN OR  / [facility] MAIN OR    Anesthesia Start: 0837 Anesthesia Stop: 0854    Procedure: ELECTROCONVULSIVE THERAPY (Head) Diagnosis:       Major depressive disorder, recurrent episode, moderate (HCC)      (Major depressive disorder, recurrent episode, moderate (HCC) [F33.1])    Surgeons: Yolanda Naranjo MD Responsible Provider: Gab Lawler MD    Anesthesia Type: MAC ASA Status: 2            Anesthesia Type: MAC    Ama Phase I: Ama Score: 8    Ama Phase II:      Anesthesia Post Evaluation    Patient location during evaluation: PACU  Patient participation: complete - patient participated  Level of consciousness: awake  Airway patency: patent  Nausea & Vomiting: no vomiting and no nausea  Cardiovascular status: hemodynamically stable  Respiratory status: acceptable  Hydration status: stable  Pain management: adequate    No notable events documented.

## 2024-04-19 NOTE — ANESTHESIA PRE PROCEDURE
Department of Anesthesiology  Preprocedure Note       Name:  Anuradha Lawler   Age:  63 y.o.  :  1960                                          MRN:  [id number]         Date:  2024      Surgeon: Surgeon(s):  Yolanda Naranjo MD    Procedure: Procedure(s):  ELECTROCONVULSIVE THERAPY    Medications prior to admission:   Prior to Admission medications    Medication Sig Start Date End Date Taking? Authorizing Provider   Rosuvastatin Calcium (CRESTOR PO) Take by mouth    Víctor Grande MD   hydrocortisone 1 % cream Apply topically 2 times daily Apply topically 2 times daily.  Patient not taking: Reported on 2023    Víctor Grande MD   metFORMIN (GLUCOPHAGE) 1000 MG tablet Take 1 tablet by mouth daily (with breakfast)    Víctor Grande MD   traZODone (DESYREL) 50 MG tablet Take 1 tablet by mouth nightly as needed for Sleep 7/10/23   Panfilo Currie MD   OLANZapine (ZYPREXA) 20 MG tablet Take 1 tablet by mouth in the morning and at bedtime 7/10/23   Panfilo Currie MD   levothyroxine (SYNTHROID) 100 MCG tablet Take 1 tablet by mouth Daily    Víctor Grande MD   hydrOXYzine HCl (ATARAX) 25 MG tablet Take 1 tablet by mouth 3 times daily as needed for Anxiety    Víctor Grande MD   pravastatin (PRAVACHOL) 40 MG tablet Take 1 tablet by mouth daily  Patient not taking: Reported on 3/22/2024    Víctor Grande MD   apixaban (ELIQUIS) 5 MG TABS tablet Take 1 tablet by mouth 2 times daily H/o DVT 23   Automatic Reconciliation, Ar   midodrine (PROAMATINE) 10 MG tablet Take 1 tablet by mouth 3 times daily (with meals) 23   Automatic Reconciliation, Ar   mirtazapine (REMERON) 45 MG tablet Take 1 tablet by mouth nightly 23   Automatic Reconciliation, Ar   venlafaxine (EFFEXOR XR) 150 MG extended release capsule Take 2 capsules by mouth daily 23   Automatic Reconciliation, Ar       Current medications:    No current facility-administered

## 2024-04-19 NOTE — PROCEDURES
ECT PROCEDURE NOTE         IDENTIFICATION:           Patient Name   Anuradha Lawler         Date of Birth  1960         [facility]  [id number]         Medical Record Number   [id number]             Age   62 y.o.         PCP  Cyndi Curtis MD         Admit date:  04/19/24           Room Number   PACU/PL  @ [facility]         Date of Service   04/19/24                          Electro Convulsive Therapy:   Treatment number 33         Maintenance ECT - YES        Anuradha Lawler was admitted to the PACU for ECT. Patient was medically cleared and NPO for procedure. Patient is NOT court mandated and gave informed consent for ECT treatment.        Patient received       Bilateral ECT YES    Administered using the Thymatron System IV - [employer]       at 40 % Energy, under general anesthesia.    Anuradha Lawler with a good, well generalized seizure of 108 seconds on observation of the motor manifestations of the seizure. EEG duration was NA secs.    Post-Ictal Suppression Index: NA %   Recovery was unremarkable and with no complications.       Change in Energy %:                 Anesthesia medications administered during procedure:   Brevital:  50 mg   Change for next treament:       Succinylcholine: 50 mg   Change for next treatment:        Propofol: mg   Glycopyrrolate:  mg   Labetalol:  mg   Esmolol:  mg   Lorazepam:  mg   Ketamine:  mg   Zofran:   mg     Toradol:  mg   Lidocaine:  mg   Caffeine Benzoate: mg               CSSRS  1/26/2023 7/8/2022         1) Within the past month, have you wished you were dead or wished you could go to sleep and not wake up?   No  No     2) Have you actually had any thoughts of killing yourself?  No  No     6) Have you ever done anything, started to do anything, or prepared to do anything to end your life?  Yes  No         Did this occur within the past 3 months?  Yes  -

## 2024-04-19 NOTE — PERIOP NOTE
Anuradha CARLIN Lawler  1960  [id number]    Situation:  Verbal report given from: Swati Armendariz RN  Procedure: Procedure(s):  ELECTROCONVULSIVE THERAPY    Background:    Preoperative diagnosis: Major depressive disorder, recurrent episode, moderate (HCC) [F33.1]    Postoperative diagnosis: * No post-op diagnosis entered *    :  Dr. Naranjo    Assistant(s): Circulator: Swati Armendariz RN  Scrub Person First: Andrew Ndiaye RN    Specimens: * No specimens in log *    Assessment:  Intra-procedure medications         Anesthesia gave intra-procedure sedation and medications, see anesthesia flow sheet     Intravenous fluids: LR@ KVO     Vital signs stable

## 2024-04-19 NOTE — DISCHARGE INSTRUCTIONS
ECT DISCHARGE INSTRUCTIONS      NAME: Anuradha Lawler   :  1960   MRN:  [id number]     Date/Time:  2024 8:48 AM    ADMIT DATE: 2024     DISCHARGE DATE: 2024     DISCHARGE DIAGNOSIS:  [unfilled]     Recommended diet:  regular diet    Recommended activity: activity as tolerated and no driving for today    Have a responsible person stay with the patient for 24 hours after the treatment, some temporary confusion may be noticed.    Do not allow a patient to operate a motor vehicle for at least 24 hours and all the confusion has cleared.    Do not drink alcoholic beverages for 48 hours past treatment.    Do not sign any legal documents.    Do not make any critical decisions for 24 hours.    Advance diet as tolerated. Start with liquids and advance it nausea is not present.    Understand that memory for recent events, phone numbers, addresses, ect. May be decreased for a short period of time.     Report any persistant nausea or pain to the treating physician.    Patient receiving ECT while in the hospital should also not attempt to ambulate without assistance, please use tap bell which will be provide.    If you have questions regarding the hospital related prescriptions or hospital related issues please call the Behavioral Health Coordinator at [facility] [phone].    If you experience any of the following symptoms then please call your primary care physician/outpatient psychiatrist or return to the emergency room if you cannot get hold of your doctor: Fever, chills, nausea, vomiting, diarrhea, change in mentation, falling, bleeding, shortness of breath.    Follow Up:  Continue outpatient psychiatric follow-up visits with personal psychiatrist.  Return for next ECT treatment on 2024      DISCHARGE SUMMARY from Nurse    PATIENT INSTRUCTIONS:    After general anesthesia or intravenous sedation, for 24 hours or while taking prescription Narcotics:  Limit  your activities  Do not drive and operate hazardous machinery  Do not make important personal or business decisions  Do  not drink alcoholic beverages  If you have not urinated within 8 hours after discharge, please contact your surgeon on call.    Report the following to your surgeon:  Excessive pain, swelling, redness or odor of or around the surgical area  Temperature over 100.5  Nausea and vomiting lasting longer than 4 hours or if unable to take medications  Any signs of decreased circulation or nerve impairment to extremity: change in color, persistent  numbness, tingling, coldness or increase pain  Any questions    These are general instructions for a healthy lifestyle:    No smoking/ No tobacco products/ Avoid exposure to second hand smoke  Surgeon General's Warning:  Quitting smoking now greatly reduces serious risk to your health.    Obesity, smoking, and sedentary lifestyle greatly increases your risk for illness    A healthy diet, regular physical exercise & weight monitoring are important for maintaining a healthy lifestyle    You may be retaining fluid if you have a history of heart failure or if you experience any of the following symptoms:  Weight gain of 3 pounds or more overnight or 5 pounds in a week, increased swelling in our hands or feet or shortness of breath while lying flat in bed.  Please call your doctor as soon as you notice any of these symptoms; do not wait until your next office visit.        The discharge information has been reviewed with the patient and caregiver.  The patient and caregiver verbalized understanding.  Discharge medications reviewed with the patient and caregiver and appropriate educational materials and side effects teaching were provided.  ___________________________________________________________________________________________________________________________________

## 2024-05-14 ENCOUNTER — ANESTHESIA EVENT (OUTPATIENT)
Facility: HOSPITAL | Age: 64
End: 2024-05-14
Payer: MEDICARE

## 2024-05-16 RX ORDER — SODIUM CHLORIDE 0.9 % (FLUSH) 0.9 %
5-40 SYRINGE (ML) INJECTION PRN
Status: CANCELLED | OUTPATIENT
Start: 2024-05-16

## 2024-05-16 RX ORDER — ONDANSETRON 2 MG/ML
4 INJECTION INTRAMUSCULAR; INTRAVENOUS
Status: CANCELLED | OUTPATIENT
Start: 2024-05-16 | End: 2024-05-17

## 2024-05-16 RX ORDER — SODIUM CHLORIDE 0.9 % (FLUSH) 0.9 %
5-40 SYRINGE (ML) INJECTION EVERY 12 HOURS SCHEDULED
Status: CANCELLED | OUTPATIENT
Start: 2024-05-16

## 2024-05-16 RX ORDER — SODIUM CHLORIDE 9 MG/ML
INJECTION, SOLUTION INTRAVENOUS PRN
Status: CANCELLED | OUTPATIENT
Start: 2024-05-16

## 2024-05-16 RX ORDER — LIDOCAINE HYDROCHLORIDE 10 MG/ML
1 INJECTION, SOLUTION EPIDURAL; INFILTRATION; INTRACAUDAL; PERINEURAL
Status: CANCELLED | OUTPATIENT
Start: 2024-05-16 | End: 2024-05-17

## 2024-05-16 RX ORDER — SODIUM CHLORIDE, SODIUM LACTATE, POTASSIUM CHLORIDE, CALCIUM CHLORIDE 600; 310; 30; 20 MG/100ML; MG/100ML; MG/100ML; MG/100ML
INJECTION, SOLUTION INTRAVENOUS CONTINUOUS
Status: CANCELLED | OUTPATIENT
Start: 2024-05-16

## 2024-05-17 ENCOUNTER — HOSPITAL ENCOUNTER (OUTPATIENT)
Facility: HOSPITAL | Age: 64
Setting detail: OUTPATIENT SURGERY
Discharge: HOME | End: 2024-05-17
Attending: PSYCHIATRY & NEUROLOGY | Admitting: PSYCHIATRY & NEUROLOGY
Payer: MEDICARE

## 2024-05-17 ENCOUNTER — ANESTHESIA (OUTPATIENT)
Facility: HOSPITAL | Age: 64
End: 2024-05-17
Payer: MEDICARE

## 2024-05-17 VITALS
BODY MASS INDEX: 32.78 KG/M2 | SYSTOLIC BLOOD PRESSURE: 128 MMHG | TEMPERATURE: 98.1 F | RESPIRATION RATE: 16 BRPM | DIASTOLIC BLOOD PRESSURE: 77 MMHG | OXYGEN SATURATION: 93 % | WEIGHT: 185 LBS | HEART RATE: 82 BPM | HEIGHT: 63 IN

## 2024-05-17 LAB
ALBUMIN SERPL-MCNC: 3.4 G/DL (ref 3.5–5)
ALBUMIN/GLOB SERPL: 0.9 (ref 1.1–2.2)
ALP SERPL-CCNC: 88 U/L (ref 45–117)
ALT SERPL-CCNC: 29 U/L (ref 12–78)
ANION GAP SERPL CALC-SCNC: 11 MMOL/L (ref 5–15)
AST SERPL-CCNC: 17 U/L (ref 15–37)
BASOPHILS # BLD: 0 K/UL (ref 0–0.1)
BASOPHILS NFR BLD: 0 % (ref 0–1)
BILIRUB SERPL-MCNC: 0.2 MG/DL (ref 0.2–1)
BUN SERPL-MCNC: 13 MG/DL (ref 6–20)
BUN/CREAT SERPL: 13 (ref 12–20)
CALCIUM SERPL-MCNC: 8.5 MG/DL (ref 8.5–10.1)
CHLORIDE SERPL-SCNC: 101 MMOL/L (ref 97–108)
CO2 SERPL-SCNC: 27 MMOL/L (ref 21–32)
CREAT SERPL-MCNC: 1.04 MG/DL (ref 0.55–1.02)
DIFFERENTIAL METHOD BLD: NORMAL
EOSINOPHIL # BLD: 0.1 K/UL (ref 0–0.4)
EOSINOPHIL NFR BLD: 3 % (ref 0–7)
ERYTHROCYTE [DISTWIDTH] IN BLOOD BY AUTOMATED COUNT: 13.3 % (ref 11.5–14.5)
GLOBULIN SER CALC-MCNC: 3.6 G/DL (ref 2–4)
GLUCOSE BLD STRIP.AUTO-MCNC: 95 MG/DL (ref 65–117)
GLUCOSE SERPL-MCNC: 97 MG/DL (ref 65–100)
HCT VFR BLD AUTO: 38.3 % (ref 35–47)
HGB BLD-MCNC: 12.1 G/DL (ref 11.5–16)
IMM GRANULOCYTES # BLD AUTO: 0 K/UL (ref 0–0.04)
IMM GRANULOCYTES NFR BLD AUTO: 0 % (ref 0–0.5)
LYMPHOCYTES # BLD: 1.3 K/UL (ref 0.8–3.5)
LYMPHOCYTES NFR BLD: 24 % (ref 12–49)
MCH RBC QN AUTO: 27.7 PG (ref 26–34)
MCHC RBC AUTO-ENTMCNC: 31.6 G/DL (ref 30–36.5)
MCV RBC AUTO: 87.6 FL (ref 80–99)
MONOCYTES # BLD: 0.4 K/UL (ref 0–1)
MONOCYTES NFR BLD: 7 % (ref 5–13)
NEUTS SEG # BLD: 3.6 K/UL (ref 1.8–8)
NEUTS SEG NFR BLD: 66 % (ref 32–75)
NRBC # BLD: 0 K/UL (ref 0–0.01)
NRBC BLD-RTO: 0 PER 100 WBC
PLATELET # BLD AUTO: 304 K/UL (ref 150–400)
PMV BLD AUTO: 9.6 FL (ref 8.9–12.9)
POTASSIUM SERPL-SCNC: 3.9 MMOL/L (ref 3.5–5.1)
PROT SERPL-MCNC: 7 G/DL (ref 6.4–8.2)
RBC # BLD AUTO: 4.37 M/UL (ref 3.8–5.2)
SERVICE CMNT-IMP: NORMAL
SODIUM SERPL-SCNC: 139 MMOL/L (ref 136–145)
WBC # BLD AUTO: 5.5 K/UL (ref 3.6–11)

## 2024-05-17 PROCEDURE — 2500000003 HC RX 250 WO HCPCS: Performed by: NURSE ANESTHETIST, CERTIFIED REGISTERED

## 2024-05-17 PROCEDURE — 90870 ELECTROCONVULSIVE THERAPY: CPT | Performed by: PSYCHIATRY & NEUROLOGY

## 2024-05-17 PROCEDURE — 85025 COMPLETE CBC W/AUTO DIFF WBC: CPT

## 2024-05-17 PROCEDURE — 36415 COLL VENOUS BLD VENIPUNCTURE: CPT

## 2024-05-17 PROCEDURE — 3700000001 HC ADD 15 MINUTES (ANESTHESIA): Performed by: PSYCHIATRY & NEUROLOGY

## 2024-05-17 PROCEDURE — 6360000002 HC RX W HCPCS: Performed by: NURSE ANESTHETIST, CERTIFIED REGISTERED

## 2024-05-17 PROCEDURE — 3700000000 HC ANESTHESIA ATTENDED CARE: Performed by: PSYCHIATRY & NEUROLOGY

## 2024-05-17 PROCEDURE — 7100000001 HC PACU RECOVERY - ADDTL 15 MIN: Performed by: PSYCHIATRY & NEUROLOGY

## 2024-05-17 PROCEDURE — 82962 GLUCOSE BLOOD TEST: CPT

## 2024-05-17 PROCEDURE — 7100000000 HC PACU RECOVERY - FIRST 15 MIN: Performed by: PSYCHIATRY & NEUROLOGY

## 2024-05-17 PROCEDURE — 7100000011 HC PHASE II RECOVERY - ADDTL 15 MIN: Performed by: PSYCHIATRY & NEUROLOGY

## 2024-05-17 PROCEDURE — 2709999900 HC NON-CHARGEABLE SUPPLY: Performed by: PSYCHIATRY & NEUROLOGY

## 2024-05-17 PROCEDURE — 80053 COMPREHEN METABOLIC PANEL: CPT

## 2024-05-17 PROCEDURE — 2580000003 HC RX 258: Performed by: NURSE ANESTHETIST, CERTIFIED REGISTERED

## 2024-05-17 PROCEDURE — 7100000010 HC PHASE II RECOVERY - FIRST 15 MIN: Performed by: PSYCHIATRY & NEUROLOGY

## 2024-05-17 RX ORDER — SODIUM CHLORIDE 9 MG/ML
INJECTION, SOLUTION INTRAVENOUS PRN
Status: CANCELLED | OUTPATIENT
Start: 2024-05-17

## 2024-05-17 RX ORDER — SODIUM CHLORIDE 9 MG/ML
INJECTION, SOLUTION INTRAVENOUS CONTINUOUS PRN
Status: DISCONTINUED | OUTPATIENT
Start: 2024-05-17 | End: 2024-05-17 | Stop reason: SDUPTHER

## 2024-05-17 RX ORDER — SODIUM CHLORIDE 0.9 % (FLUSH) 0.9 %
5-40 SYRINGE (ML) INJECTION PRN
Status: CANCELLED | OUTPATIENT
Start: 2024-05-17

## 2024-05-17 RX ORDER — SODIUM CHLORIDE 0.9 % (FLUSH) 0.9 %
5-40 SYRINGE (ML) INJECTION EVERY 12 HOURS SCHEDULED
Status: CANCELLED | OUTPATIENT
Start: 2024-05-17

## 2024-05-17 RX ORDER — TIOTROPIUM BROMIDE 18 UG/1
18 CAPSULE ORAL; RESPIRATORY (INHALATION)
COMMUNITY
Start: 2024-04-30 | End: 2024-10-27

## 2024-05-17 RX ORDER — EPHEDRINE SULFATE/0.9% NACL/PF 50 MG/5 ML
SYRINGE (ML) INTRAVENOUS PRN
Status: DISCONTINUED | OUTPATIENT
Start: 2024-05-17 | End: 2024-05-17 | Stop reason: SDUPTHER

## 2024-05-17 RX ORDER — SUCCINYLCHOLINE/SOD CL,ISO/PF 100 MG/5ML
SYRINGE (ML) INTRAVENOUS PRN
Status: DISCONTINUED | OUTPATIENT
Start: 2024-05-17 | End: 2024-05-17 | Stop reason: SDUPTHER

## 2024-05-17 RX ADMIN — SODIUM CHLORIDE: 900 INJECTION, SOLUTION INTRAVENOUS at 09:11

## 2024-05-17 RX ADMIN — METHOHEXITAL SODIUM 50 MG: 500 INJECTION, POWDER, LYOPHILIZED, FOR SOLUTION INTRAMUSCULAR; INTRAVENOUS; RECTAL at 09:17

## 2024-05-17 RX ADMIN — Medication 50 MG: at 09:17

## 2024-05-17 RX ADMIN — Medication 10 MG: at 09:22

## 2024-05-17 ASSESSMENT — PAIN SCALES - GENERAL: PAINLEVEL_OUTOF10: 0

## 2024-05-17 NOTE — PERIOP NOTE
Anuradha CARLIN Lawler  1960  [id number]    Situation:  Verbal report given from: Swati Armendariz RN  Procedure: Procedure(s):  ELECTROCONVULSIVE THERAPY    Background:    Preoperative diagnosis: Major depressive disorder, recurrent episode, moderate (HCC) [F33.1]    Postoperative diagnosis: * No post-op diagnosis entered *    :  Dr. Naranjo    Assistant(s): Circulator: Swati Armendariz RN  Scrub Person First: Andrew Ndiaye RN    Specimens: * No specimens in log *    Assessment:  Intra-procedure medications         Anesthesia gave intra-procedure sedation and medications, see anesthesia flow sheet     Intravenous fluids: LR@ KVO     Vital signs stable

## 2024-05-17 NOTE — PERIOP NOTE
Patient meets discharge criteria.  Patient and caregiver (diane)  provided with verbal and written discharge instructions.  Patient discharged by wheelchair with caregive (Diane) to transport home.

## 2024-05-17 NOTE — DISCHARGE INSTRUCTIONS
your activities  Do not drive and operate hazardous machinery  Do not make important personal or business decisions  Do  not drink alcoholic beverages  If you have not urinated within 8 hours after discharge, please contact your surgeon on call.    Report the following to your surgeon:  Excessive pain, swelling, redness or odor of or around the surgical area  Temperature over 100.5  Nausea and vomiting lasting longer than 4 hours or if unable to take medications  Any signs of decreased circulation or nerve impairment to extremity: change in color, persistent  numbness, tingling, coldness or increase pain  Any questions    These are general instructions for a healthy lifestyle:    No smoking/ No tobacco products/ Avoid exposure to second hand smoke  Surgeon General's Warning:  Quitting smoking now greatly reduces serious risk to your health.    Obesity, smoking, and sedentary lifestyle greatly increases your risk for illness    A healthy diet, regular physical exercise & weight monitoring are important for maintaining a healthy lifestyle    You may be retaining fluid if you have a history of heart failure or if you experience any of the following symptoms:  Weight gain of 3 pounds or more overnight or 5 pounds in a week, increased swelling in our hands or feet or shortness of breath while lying flat in bed.  Please call your doctor as soon as you notice any of these symptoms; do not wait until your next office visit.        The discharge information has been reviewed with the patient and caregiver.  The patient and caregiver verbalized understanding.  Discharge medications reviewed with the patient and caregiver and appropriate educational materials and side effects teaching were provided.  ___________________________________________________________________________________________________________________________________

## 2024-05-17 NOTE — ANESTHESIA POSTPROCEDURE EVALUATION
Post-Anesthesia Evaluation and Assessment    Patient: Anuradha Lawler MRN: [id number]  SSN: [id number]    YOB: 1960  Age: 63 y.o.  Sex: female      I have evaluated the patient and they are stable and ready for discharge from the PACU.     Cardiovascular Function/Vital Signs  Visit Vitals  /72   Pulse 87   Temp 98.1 °F (36.7 °C) (Oral)   Resp 18   Ht 1.6 m (5' 3\")   Wt 83.9 kg (185 lb)   SpO2 96%   BMI 32.77 kg/m²       Patient is status post General anesthesia for Procedure(s):  ELECTROCONVULSIVE THERAPY.    Nausea/Vomiting: None    Postoperative hydration reviewed and adequate.    Pain:      Managed    Neurological Status:       At baseline    Mental Status, Level of Consciousness: Alert and  oriented to person, place, and time    Pulmonary Status:       Adequate oxygenation and airway patent    Complications related to anesthesia: None    Post-anesthesia assessment completed. No concerns    Signed By: Elissa Vail MD     May 17, 2024            Department of Anesthesiology  Postprocedure Note    Patient: Anuradha Lawler  MRN: [id number]  YOB: 1960  Date of evaluation: 5/17/2024    Procedure Summary       Date: 05/17/24 Room / Location: [facility] MAIN OR  / [facility] MAIN OR    Anesthesia Start: 0911 Anesthesia Stop: 0926    Procedure: ELECTROCONVULSIVE THERAPY (Head) Diagnosis:       Major depressive disorder, recurrent episode, moderate (HCC)      (Major depressive disorder, recurrent episode, moderate (HCC) [F33.1])    Surgeons: Yolanda Naranjo MD Responsible Provider: Elissa Vail MD    Anesthesia Type: General ASA Status: 2            Anesthesia Type: General    Ama Phase I: Ama Score: 9    Ama Phase II:      Anesthesia Post Evaluation    No notable events documented.

## 2024-05-17 NOTE — PROCEDURES
ECT PROCEDURE NOTE         IDENTIFICATION:           Patient Name   Anuradha Lawler         Date of Birth  1960         [facility]  [id number]         Medical Record Number   [id number]             Age   62 y.o.         PCP  Cyndi Curtis MD         Admit date:  05/17/24           Room Number   PACU/PL  @ [facility]         Date of Service   05/17/24                          Electro Convulsive Therapy:   Treatment number 34         Maintenance ECT - YES        Anuradha Lawler was admitted to the PACU for ECT. Patient was medically cleared and NPO for procedure. Patient is NOT court mandated and gave informed consent for ECT treatment.        Patient received       Bilateral ECT YES    Administered using the Thymatron System IV - [employer]       at 40 % Energy, under general anesthesia.    Anuradha Lawler with a good, well generalized seizure of 69 seconds on observation of the motor manifestations of the seizure. EEG duration was 78 secs.    Post-Ictal Suppression Index: 46.5 %. Mild hypotension was treated with epinephrine   Recovery was unremarkable and with no complications.       Change in Energy %:                 Anesthesia medications administered during procedure:   Brevital:  50 mg   Change for next treament:       Succinylcholine: 50 mg   Change for next treatment:        Propofol: mg   Glycopyrrolate:  mg   Labetalol:  mg   Esmolol:  mg   Lorazepam:  mg   Ketamine:  mg   Zofran:   mg     Toradol:  mg   Lidocaine:  mg   Caffeine Benzoate: mg               CSSRS  1/26/2023 7/8/2022         1) Within the past month, have you wished you were dead or wished you could go to sleep and not wake up?   No  No     2) Have you actually had any thoughts of killing yourself?  No  No     6) Have you ever done anything, started to do anything, or prepared to do anything to end your life?  Yes  No         Did this occur within the past 3 months?  Yes  -

## 2024-05-17 NOTE — ANESTHESIA PRE PROCEDURE
Thyroid disease    • Unspecified adverse effect of anesthesia     nasal growth and misalignment and cannot breath through nose       Past Surgical History:        Procedure Laterality Date   • ELECTROCONVULSIVE THERAPY N/A 5/19/2023    ELECTROCONVULSIVE THERAPY performed by Yolanda Naranjo MD at [facility] MAIN OR   • ELECTROCONVULSIVE THERAPY N/A 6/2/2023    ELECTROCONVULSIVE THERAPY performed by Yolanda Naranjo MD at [facility] MAIN OR   • ELECTROCONVULSIVE THERAPY  6/30/2023   • ELECTROCONVULSIVE THERAPY N/A 6/30/2023    ELECTROCONVULSIVE THERAPY performed by Yolanda Naranjo MD at [facility] MAIN OR   • ELECTROCONVULSIVE THERAPY  07/28/2023   • ELECTROCONVULSIVE THERAPY N/A 07/28/2023    ELECTROCONVULSIVE THERAPY performed by Yolanda Naranjo MD at [facility] MAIN OR   • ELECTROCONVULSIVE THERAPY  8/2/2023   • ELECTROCONVULSIVE THERAPY N/A 8/2/2023    ELECTROCONVULSIVE THERAPY performed by Yolanda Naranjo MD at [facility] MAIN OR   • ELECTROCONVULSIVE THERAPY  8/11/2023   • ELECTROCONVULSIVE THERAPY N/A 8/11/2023    ELECTROCONVULSIVE THERAPY performed by Yolanda Naranjo MD at [facility] MAIN OR   • ELECTROCONVULSIVE THERAPY  8/18/2023   • ELECTROCONVULSIVE THERAPY N/A 8/18/2023    ELECTROCONVULSIVE THERAPY performed by Yolanda Naranjo MD at [facility] MAIN OR   • ELECTROCONVULSIVE THERAPY  8/25/2023   • ELECTROCONVULSIVE THERAPY N/A 8/25/2023    ELECTROCONVULSIVE THERAPY performed by Yolanda Naranjo MD at [facility] MAIN OR   • ELECTROCONVULSIVE THERAPY  9/1/2023   • ELECTROCONVULSIVE THERAPY N/A 9/1/2023    ELECTROCONVULSIVE THERAPY performed by Yolanda Naranjo MD at [facility] MAIN OR   • ELECTROCONVULSIVE THERAPY  9/8/2023   • ELECTROCONVULSIVE THERAPY N/A 9/8/2023    ELECTROCONVULSIVE THERAPY performed by Yolanda Naranjo MD at [facility] MAIN OR   • ELECTROCONVULSIVE THERAPY  9/22/2023   • ELECTROCONVULSIVE THERAPY N/A 9/22/2023    ELECTROCONVULSIVE THERAPY performed by Yloanda Naranjo MD at [facility] MAIN OR   • ELECTROCONVULSIVE THERAPY  10/6/2023   • ELECTROCONVULSIVE THERAPY N/A 10/6/2023

## 2024-05-20 NOTE — PROGRESS NOTES
Problem: Depressed Mood (Adult/Pediatric) Goal: *STG: Remains safe in hospital 
Outcome: Progressing Towards Goal 
Pt is alert/oriented x4. Calm and Cooperative. Appropriate and visible in the milieu. Attending groups. Mood is good. Meds/meal compliant. PRN Trazodone 50 mg given this shift She received Zyprexa 5 mg  PO on previous shift. She seems anxious and med seeking. No behavioral issues. Denies suicidal and homicidal ideations. Denies auditory and visual hallucinations. Will continue to monitor pt with q15 min rounds for safety. Adult

## 2024-06-12 ENCOUNTER — ANESTHESIA EVENT (OUTPATIENT)
Facility: HOSPITAL | Age: 64
End: 2024-06-12
Payer: MEDICARE

## 2024-06-14 ENCOUNTER — ANESTHESIA (OUTPATIENT)
Facility: HOSPITAL | Age: 64
End: 2024-06-14
Payer: MEDICARE

## 2024-06-14 ENCOUNTER — HOSPITAL ENCOUNTER (OUTPATIENT)
Facility: HOSPITAL | Age: 64
Setting detail: OUTPATIENT SURGERY
Discharge: HOME | End: 2024-06-14
Attending: PSYCHIATRY & NEUROLOGY | Admitting: PSYCHIATRY & NEUROLOGY
Payer: MEDICARE

## 2024-06-14 VITALS
SYSTOLIC BLOOD PRESSURE: 147 MMHG | HEART RATE: 87 BPM | WEIGHT: 185 LBS | BODY MASS INDEX: 32.78 KG/M2 | TEMPERATURE: 98.2 F | RESPIRATION RATE: 20 BRPM | HEIGHT: 63 IN | OXYGEN SATURATION: 91 % | DIASTOLIC BLOOD PRESSURE: 75 MMHG

## 2024-06-14 LAB
GLUCOSE BLD STRIP.AUTO-MCNC: 108 MG/DL (ref 65–117)
SERVICE CMNT-IMP: NORMAL

## 2024-06-14 PROCEDURE — 7100000010 HC PHASE II RECOVERY - FIRST 15 MIN: Performed by: PSYCHIATRY & NEUROLOGY

## 2024-06-14 PROCEDURE — 7100000000 HC PACU RECOVERY - FIRST 15 MIN: Performed by: PSYCHIATRY & NEUROLOGY

## 2024-06-14 PROCEDURE — 7100000011 HC PHASE II RECOVERY - ADDTL 15 MIN: Performed by: PSYCHIATRY & NEUROLOGY

## 2024-06-14 PROCEDURE — 2580000003 HC RX 258: Performed by: ANESTHESIOLOGY

## 2024-06-14 PROCEDURE — 90870 ELECTROCONVULSIVE THERAPY: CPT | Performed by: PSYCHIATRY & NEUROLOGY

## 2024-06-14 PROCEDURE — 2709999900 HC NON-CHARGEABLE SUPPLY: Performed by: PSYCHIATRY & NEUROLOGY

## 2024-06-14 PROCEDURE — 7100000001 HC PACU RECOVERY - ADDTL 15 MIN: Performed by: PSYCHIATRY & NEUROLOGY

## 2024-06-14 PROCEDURE — 3700000000 HC ANESTHESIA ATTENDED CARE: Performed by: PSYCHIATRY & NEUROLOGY

## 2024-06-14 PROCEDURE — 82962 GLUCOSE BLOOD TEST: CPT

## 2024-06-14 RX ORDER — SODIUM CHLORIDE 0.9 % (FLUSH) 0.9 %
5-40 SYRINGE (ML) INJECTION EVERY 12 HOURS SCHEDULED
Status: DISCONTINUED | OUTPATIENT
Start: 2024-06-14 | End: 2024-06-14 | Stop reason: HOSPADM

## 2024-06-14 RX ORDER — SODIUM CHLORIDE 9 MG/ML
INJECTION, SOLUTION INTRAVENOUS PRN
Status: DISCONTINUED | OUTPATIENT
Start: 2024-06-14 | End: 2024-06-14 | Stop reason: HOSPADM

## 2024-06-14 RX ORDER — SODIUM CHLORIDE 9 MG/ML
INJECTION, SOLUTION INTRAVENOUS PRN
Status: DISCONTINUED | OUTPATIENT
Start: 2024-06-14 | End: 2024-06-14 | Stop reason: SDUPTHER

## 2024-06-14 RX ORDER — SODIUM CHLORIDE 0.9 % (FLUSH) 0.9 %
5-40 SYRINGE (ML) INJECTION PRN
Status: DISCONTINUED | OUTPATIENT
Start: 2024-06-14 | End: 2024-06-14 | Stop reason: HOSPADM

## 2024-06-14 RX ORDER — METHOHEXITAL IN WATER/PF 100MG/10ML
SYRINGE (ML) INTRAVENOUS PRN
Status: DISCONTINUED | OUTPATIENT
Start: 2024-06-14 | End: 2024-06-14 | Stop reason: SDUPTHER

## 2024-06-14 RX ORDER — SODIUM CHLORIDE, SODIUM LACTATE, POTASSIUM CHLORIDE, CALCIUM CHLORIDE 600; 310; 30; 20 MG/100ML; MG/100ML; MG/100ML; MG/100ML
INJECTION, SOLUTION INTRAVENOUS CONTINUOUS
Status: DISCONTINUED | OUTPATIENT
Start: 2024-06-14 | End: 2024-06-14 | Stop reason: HOSPADM

## 2024-06-14 RX ORDER — SUCCINYLCHOLINE/SOD CL,ISO/PF 200MG/10ML
SYRINGE (ML) INTRAVENOUS PRN
Status: DISCONTINUED | OUTPATIENT
Start: 2024-06-14 | End: 2024-06-14 | Stop reason: SDUPTHER

## 2024-06-14 RX ADMIN — SODIUM CHLORIDE: 900 INJECTION, SOLUTION INTRAVENOUS at 08:54

## 2024-06-14 RX ADMIN — Medication 50 MG: at 08:57

## 2024-06-14 ASSESSMENT — PAIN SCALES - GENERAL
PAINLEVEL_OUTOF10: 0

## 2024-06-14 NOTE — ANESTHESIA PRE PROCEDURE
BMI:   Wt Readings from Last 3 Encounters:   05/17/24 83.9 kg (185 lb)   04/19/24 87.1 kg (192 lb)   03/22/24 86.2 kg (190 lb)     There is no height or weight on file to calculate BMI.    CBC:   Lab Results   Component Value Date/Time    WBC 5.5 05/17/2024 08:29 AM    RBC 4.37 05/17/2024 08:29 AM    HGB 12.1 05/17/2024 08:29 AM    HCT 38.3 05/17/2024 08:29 AM    MCV 87.6 05/17/2024 08:29 AM    RDW 13.3 05/17/2024 08:29 AM     05/17/2024 08:29 AM       CMP:   Lab Results   Component Value Date/Time     05/17/2024 08:29 AM    K 3.9 05/17/2024 08:29 AM     05/17/2024 08:29 AM    CO2 27 05/17/2024 08:29 AM    BUN 13 05/17/2024 08:29 AM    CREATININE 1.04 05/17/2024 08:29 AM    GFRAA 60 07/08/2022 10:33 PM    AGRATIO 0.8 02/17/2023 05:34 AM    LABGLOM 60 05/17/2024 08:29 AM    LABGLOM >60 02/17/2023 05:34 AM    GLUCOSE 97 05/17/2024 08:29 AM    CALCIUM 8.5 05/17/2024 08:29 AM    BILITOT 0.2 05/17/2024 08:29 AM    ALKPHOS 88 05/17/2024 08:29 AM    ALKPHOS 71 02/17/2023 05:34 AM    AST 17 05/17/2024 08:29 AM    ALT 29 05/17/2024 08:29 AM       POC Tests: No results for input(s): \"POCGLU\", \"POCNA\", \"POCK\", \"POCCL\", \"POCBUN\", \"POCHEMO\", \"POCHCT\" in the last 72 hours.    Coags:   Lab Results   Component Value Date/Time    PROTIME 10.5 05/25/2021 02:17 AM    INR 1.0 05/25/2021 02:17 AM    APTT 21.5 05/25/2021 02:17 AM       HCG (If Applicable): No results found for: \"PREGTESTUR\", \"PREGSERUM\", \"HCG\", \"HCGQUANT\"     ABGs: No results found for: \"PHART\", \"PO2ART\", \"LVC8ERZ\", \"GTE1TTX\", \"BEART\", \"F3MKIRXU\"     Type & Screen (If Applicable):  No results found for: \"LABABO\"    Drug/Infectious Status (If Applicable):  No results found for: \"HIV\", \"HEPCAB\"    COVID-19 Screening (If Applicable):   Lab Results   Component Value Date/Time    COVID19 Not detected 01/29/2023 01:12 PM           Anesthesia Evaluation  Patient summary reviewed and Nursing notes

## 2024-06-14 NOTE — PERIOP NOTE
Anuradha CARLIN Lawler  1960  [id number]    Situation:  Verbal report given from: Swati Armendariz RN  Procedure: Procedure(s):  ELECTROCONVULSIVE THERAPY    Background:    Preoperative diagnosis: Major depressive disorder, recurrent episode, moderate (HCC) [F33.1]    Postoperative diagnosis: * No post-op diagnosis entered *    :  Dr. Naranjo    Assistant(s): Circulator: Swati Armendariz RN  Scrub Person First: Alessandra Encinas    Specimens: * No specimens in log *    Assessment:  Intra-procedure medications         Anesthesia gave intra-procedure sedation and medications, see anesthesia flow sheet     Intravenous fluids: LR@ KVO     Vital signs stable

## 2024-06-14 NOTE — PROCEDURES
ECT PROCEDURE NOTE         IDENTIFICATION:           Patient Name   Anuradha Lawler         Date of Birth  1960         [facility]  [id number]         Medical Record Number   [id number]             Age   62 y.o.         PCP  Cyndi Curtis MD         Admit date:  06/14/24           Room Number   PACU/PL  @ [facility]         Date of Service   06/14/24                          Electro Convulsive Therapy:   Treatment number 35         Maintenance ECT - YES        Anuradha Lawler was admitted to the PACU for ECT. Patient was medically cleared and NPO for procedure. Patient is NOT court mandated and gave informed consent for ECT treatment.        Patient received       Bilateral ECT YES    Administered using the Thymatron System IV - [employer].       at 40 % Energy, under general anesthesia.    Anuradha Lawler with a good, well generalized seizure of 68 seconds on observation of the motor manifestations of the seizure. EEG duration was 100 secs.    Post-Ictal Suppression Index: 72 %. Mild hypotension was treated with epinephrine   Recovery was unremarkable and with no complications.       Change in Energy %:                 Anesthesia medications administered during procedure:   Brevital:  50 mg   Change for next treament:       Succinylcholine: 50 mg   Change for next treatment:        Propofol: mg   Glycopyrrolate:  mg   Labetalol:  mg   Esmolol:  mg   Lorazepam:  mg   Ketamine:  mg   Zofran:   mg     Toradol:  mg   Lidocaine:  mg   Caffeine Benzoate: mg               CSSRS  1/26/2023 7/8/2022         1) Within the past month, have you wished you were dead or wished you could go to sleep and not wake up?   No  No     2) Have you actually had any thoughts of killing yourself?  No  No     6) Have you ever done anything, started to do anything, or prepared to do anything to end your life?  Yes  No         Did this occur within the past 3 months?  Yes  -

## 2024-06-14 NOTE — DISCHARGE INSTRUCTIONS
ECT DISCHARGE INSTRUCTIONS      NAME: Anuradha Lawler   :  1960   MRN:  [id number]     Date/Time:  2024 9:02 AM    ADMIT DATE: 2024     DISCHARGE DATE: 2024     DISCHARGE DIAGNOSIS:  [unfilled]     Recommended diet:  regular diet    Recommended activity: activity as tolerated and no driving for today    Have a responsible person stay with the patient for 24 hours after the treatment, some temporary confusion may be noticed.    Do not allow a patient to operate a motor vehicle for at least 24 hours and all the confusion has cleared.    Do not drink alcoholic beverages for 48 hours past treatment.    Do not sign any legal documents.    Do not make any critical decisions for 24 hours.    Advance diet as tolerated. Start with liquids and advance it nausea is not present.    Understand that memory for recent events, phone numbers, addresses, ect. May be decreased for a short period of time.     Report any persistant nausea or pain to the treating physician.    Patient receiving ECT while in the hospital should also not attempt to ambulate without assistance, please use tap bell which will be provide.    If you have questions regarding the hospital related prescriptions or hospital related issues please call the Behavioral Health Coordinator at [facility] [phone].    If you experience any of the following symptoms then please call your primary care physician/outpatient psychiatrist or return to the emergency room if you cannot get hold of your doctor: Fever, chills, nausea, vomiting, diarrhea, change in mentation, falling, bleeding, shortness of breath.    Follow Up:  Continue outpatient psychiatric follow-up visits with personal psychiatrist.  Return for next ECT treatment on 2024      DISCHARGE SUMMARY from Nurse    PATIENT INSTRUCTIONS:    After general anesthesia or intravenous sedation, for 24 hours or while taking prescription Narcotics:  Limit your      Behavioral Health Adult Initial Assessment    PATIENT: Nick Ocampo   MRN: [id number] : 1985  AGE: 32 year old    Date: 2018    Referred by: self    Relationship Status:  []  Single    []    (How long?)  []  Remarried (How long?)  []    (How long?)   []   (How long?)  []   (# of times)    [x]  Unmarried Couple 5 years--lis    Check the box or boxes that best describes patient's reason for seeking treatment:   [x]  Family  []  School  []  Marital  []  Alcohol  []  Drug  []  Behavior  []  Trauma/Abuse  []  Self-harm  []  Anger  []  Work  []  Problems with mood  []  Legal issues   []  Grief/loss  []  Eating disorder  []  Health related problems   []  Major life changes such as a move, death, employment/relationship changes  []  Childbirth/Adoption [x] Anxiety  []  Other (please describe)    Chief Complaint in Patient's Own Words: anxiety gets the best of me--was diagnosed with OCD as a teen.  Compulsions are getting worse--panic-- not close to dad--parents  at age 8--  OCD started at freshman in .    Check the box which best describes patient's general happiness and well being:   []  Excellent    [x]  Good    []  Fair     []  Poor     []   Very Poor     Current living situation:   []  Home    []  Apartment    []  Group Home    []  Nursing Home    []  Own      [x]  Rent      FAMILY MEMBERS:  Name    gabi ocampo Age    34  36 Occupation/  School  Care taker  [employer] Relationship   To Patient  Sister  brother Lives With    No  no       Family Psychiatric Hx Diagnosis/Medications AODA   Mother:       []  Yes  []  No Mom is  []  Yes  []  No   Father:        []  Yes  [x]  No Pt not close to dad- []  Yes  [x]  No   Siblings:      []  Yes  [x]  No  []  Yes  [x]  No   Extended Family  []  Yes [x]  No  []  Yes  [x]  No     EDUCATIONAL HISTORY: [employer] [facility] in [place]-- 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    LEARNING  DIFFICULTIES:   [] Yes (If yes, explain)                          [x] No     SOCIAL ACTIVITIES:  Describe exercise, leisure, recreational activities, hobbies, other interests: biking, gardening-  EMPLOYMENT STATUS:  [x]   Employed   [] Unemployed    []  Retired    []  In School (where):  [employer]     PRESENT EMPLOYMENT:  Place of Employment: [employer]  Position/How Long: pharmacy tech    PRIOR EMPLOYMENT HISTORY:  [x]  No problem  []  Laid off  []  Disciplinary Action []  Job Loss(es)   []  Employee/Employer Conflicts   []  Leave of Absence  []  Absenteeism    []  Alcohol/Drug Related Problems    JOB SATISFACTION:  [x]  Yes   []  No      If no, describe:      SERVICE:  []  Yes [x]  No        Deployment: []  Yes  []   No    Honorable Discharge:[]  Yes   []  No  If no to honorable discharge, describe:     FINANCIAL PROBLEMS:  [x]  None    []  Frequent Loans   []  Inability to Pay Loans     []  Poor Credit    []  Income Assistance  []  Mounting Bills   []  Gambling   []  Loss of Property  []  Bankruptcy     LEGAL PROBLEMS:  [x] None  [] Operating While Intoxicated []  Driving Under Influence   []  Emergency half-way  []  Court Stipulation  []  Protective Custody    []  Charges Pending   []  Pending Court Date (when?)  []  On Probation/[place] (when?)   []  Probation/[place] Office/Telephone Number  []  Professional License Revocation     []  Arrests:  Please list:   []  History of Incarceration       []  Divorce/Custody Proceeding    SPIRITUALITY:  Is spirituality part of patient's belief system?   [] Yes    [x] No     []  Unsure  If yes, how does spiritual belief help?    Does patient have a Shinto preference?  Yes  []    No  [x]    If yes, describe:    Does patient have any spiritual concerns to be addressed in therapy?   Yes  []    No  [x]      Does patient have any spiritual expectations, values, or pressures causing conflict in their life? Yes  []    No  [x]  If yes,  describe:  CULTURAL:  Does patient have any cultural/ethnic expectations, values, or pressures causing conflict in their life?  Yes  []    No  [x]  If yes, describe:    Check which best describes patient's current health:  []   Excellent    [x]  Good   []  Fair    []  Poor    []  Very Poor      CURRENT AND PAST MEDICAL HISTORY:  Check if patient is experiencing or has ever experienced:  []  Abnormal blood pressure    []  HIV/Positive/AIDS/ARC  []  Acne     []  Irritable Bowel Syndrome  []  Anemia     []  Kidney or Bladder Problems    []  Arthritis/Rheumatism   []  Liver Disease  []  Asthma     []  Memory Loss  []  Broken Bones    []  Neurological Disorders  []  Cancer     []  Rheumatic Fever  []  Changes in Appetite   []  Sickle Cell Disease  []  Chronic Fatigue Syndrome  []  Skin Disorders  []  Diabetes     []  Sleep Disorders  []  Eating Disorders    []  Ulcer/Abdominal Pain  []  Emphysema    []  Tuberculosis  []  Epilepsy/Seizure    []  Tumors  []  Fainting Spells    []  Stroke  []  Fibromyalgia    []  STD  []  Glaucoma/Cataracts   []  Visual Problems  []  Hay Fever     []  Weight Change  []  Head Injury      []  Hearing Impaired  []  Cirrhosis     []  Heart (Disease/Surgery)  []  Hepatitis-Type A, B, or C     []  Heart Murmur  []  Thyroid Problems  []  Heart Pacemaker    Is patient currently experiencing any ACUTE OR CHRONIC PAIN?  Yes  [] (if yes, explain)    No  [x]    If yes, is referral needed?:   Yes  []    No  []    To whom?:    SIGNIFICANT MEDICAL HISTORY:   Past Medical History:   Diagnosis Date   • Anxiety        TOBACCO USE:  []  Current   []  Former  [x]  Never  Is patient willing to make a Quit Attempt?   []  Yes   []  No   [] Maybe  If yes, provided [place] Tobacco Quit Line ([phone]) to patient?  []  Yes    []  No    DOES PATIENT LOPEZ?   Yes  []    No  [x]    If yes:  [] Have you ever felt the need to bet more and more money?  [] Have you ever had to lie to people important to you about  how much you munguia?  Further gambling assessment needed?: Yes  []    No  []    If yes, referred to whom?:    DOES PATIENT DRINK ALCOHOL?  Yes  [x]    No  []  If yes, answer the following questions:    A score of 8+ on the AUDIT generally indicates harmful or hazardous drinking-AODA Consult.  Questions 1-8=0, 1, 2, 3, or 4 points.  Questions 9 and 10 are scored 0, 2, or 4 only.   Never    (0) Monthly or less    (1) 2-4 times a month    (2) 2-3 times a week    (3) 4 or more times a week    (4)   1. How often do you have a drink containing alcohol?   x         1 or 2    (0) 3 or 4    (1) 5 or 6    (2) 7 to 9    (3) 10 or more    (4)   2. How many drinks containing alcohol do you have in a typical day when you are drinking? Number of standard drinks:  12 oz of beer, 5 oz wine, 1-1.5 oz of liquor:  x          Never    (0) Less Than Monthly    (1) Monthly    (2) 2-3 times per week    (3) 4 or more times a week    (4)   3. How often do you have six (6) or more drinks on one occasion?  x      4. How often during the last ear have you found that were not able to stop drinking once you had started? x       5. How often during the last year have you failed to do what was normally expected from you because of drinking? x       6. How often during the last year have you needed a first drink in the morning to get yourself going after a heavy drinking session? x       7. How often during the last year have you had a feeling of guilt or remorse after drinking? x       8. How often during the last year have you been unable to remember what happened the night before because you had been drinking? x          No  (0) Yes, but not in the last year  (2) Yes, during the last year  (4)   9. Have you or someone else been injured as a result of your drinking? x     10. Has a relative or friend, or a doctor or other health worker been concerned about your drinking or suggested you cut down? x       Score: 2    Do you use drugs such as  cannabis (marijuana, hash) solvents, tranquilizers, barbiturates, narcotics, cocaine, stimulants, hallucinogens, etc? Yes  []    No  [x]  If yes, answer the following questions:    In the statements \"drug abuse\" refers to (1) the use of prescribed or over the counter drugs in excess of the directions and (2) any non-medical use of drugs.  The various classes of drugs may include: cannabis, (e.g. marijuana, hash), solvents, tranquilizers (e.g. valium), barbiturates, cocaine, stimulants (e.g. speed), hallucinogens, (e.g. LSD) or narcotics (e.g. heroin). Remember that the questions do not include alcoholic beverages.    Please answer every question. If you have difficulty with a statement, then choose the response that is mostly right.    Score 1 point for each \"YES\" except for Questions 4 and 5, for which a \"NO\" receives 1 point.   YES NO   1. Have you used drugs other than those required for medical reasons?     2. Have you abused prescription drugs?     3. Do you abuse more than one drug at a time?         4. Can you get through the week without using drugs?         5. Are you always able to stop using drugs when you want to?       6. Have you had “blackouts” or “flashbacks” as a result of your drug use?       7. Do you ever feel bad or guilty about your drug use?         8. Does your spouse/significant other (or parents) ever complain about your involvement with drugs?       9. Has drug abuse created problems between you and your spouse/significant or parents?        10. Have you lost friends because of your use of drugs?        11. Have you neglected your family because of your use of drugs?          12. Have you been in trouble at work (or school) because of drug abuse?       13. Have you lost your job because of drug abuse?       14. Have you gotten into fights when under the influence of drugs?          15. Have you engaged in illegal activities in order to obtain drugs?         16. Have you been arrested for  possession of illegal drugs?         17. Have you ever experienced withdrawal symptoms (felt sick) when you stopped taking drugs?         18. Have you had medical problems as a result of your drug use? (e.g. memory loss, hepatitis, convulsions, bleeding, etc.)     19. Have you gone to anyone for help for a drug problem?          20. Have you been involved in a treatment program specifically related to drug use?       Score: 0    Score Severity Intervention Recommended   0 N/A N/A   1-5 Low AODA Consult   6-10 Intermediate(Likely meets DSM Criteria) Outpatient   11-15 Substantial Higher Level of Care Consult   16-20 Severe Higher Level of Care     Further AODA assessment needed?: Yes  []    No  [x]  If yes, referred to whom?:    *Drug Abuse Screening Test (DAST) was developed by Cortez Cortez, PhD, 1982    PAST/PRESENT PSYCHIATRIC AND AODA TREATMENT HISTORY:  [x] None  Facility Name Physician Name Date Reason IP OP AODA       Brief History of Presenting Problem(s): Pt stated he has noticed his OCD symptoms worsening over last year. PT stated his biggest trigger is cannabis and he is living with with GF mom and siblings-GF is 4th year pharmacy school and is very family centric-room mates smoke cannabis and that is difficult. PT stated that his symptoms have been dormant  Onset: more so in last three years when living with gf mom and her kids  Precipitating Events: pt stated he experimented with cannabis as a teen and had a terrible panic attack after using.    Frequency/Duration of Symptoms:  Yes Symptoms Frequency/Duration   [] Sad/Down     [] Crying    [] Hopeless    [] Helpless    [] Feelings of Worthlessness    [] Social Withdrawal    [] Irritability    [] Mood Swings    [] Lacy    [] Early AM Awakening     [] Difficulty Falling Asleep    [] Difficulty Staying Asleep    [] Nightmares    [] Hallucinations/Delusions    [] Paranoia     [] Libido Disruption     [] Problematic Spending/Shopping    [x] Anxiety     [x] Panic Attacks    [x] Obsessions/Compulsions    [] Loss of Interest in Usual Activities    [] Self Abuse/Cutting/Burning      Energy:  [x]  Good  []  Fair  []  Poor  Concentration: [x]  Good  []  Fair  []  Poor  Appetite: []  Increase      []  Weight Gain Amount  Duration:     []  Decrease    []  Weight Loss Amount  Duration:     []  Binging     []  Restricting   []  Purging Behaviors  Further Eating Disorder assessment needed?:  Yes []    No  [x]    If yes, referred to whom?    SUICIDE RISK ASSESSMENT  YES NO If yes, describe    x Suicide attempt in last 24 hour?    x Suicidal thoughts?    x Plan or considering various methods? Describe: N/A     x Access to means?  No Specify weapon location N/A     x Indication of substance abuse/dependence?    x Attempts in past?  How many?  Date of most recent:   Method used?     x Any family members, loved ones, friends who committed suicide?    x Recent deaths, losses, anniversary dates?    x Has made preparations for death?    x Lack of support system?       [x] N/A Verbal contract for safety?    x Patient has no current intent,or plan, but agrees to contact provider if Suicidal Ideation arises.    x Patient given emergency 24 hour access information.      VIOLENCE/HOMICIDE ASSESSMENT  YES NO If yes, describe current or past violence    x Threat made to harm or kill someone?    A specific individual?       Name:      x Access to weapon?  Where is weapon?     x History of violence/aggressive behavior to others?    x History of significant damage to property?    x Indication of substance abuse/dependence?    x Witnessed violence or significant aggression?     Does patient experience anger management problems?   []  Yes   [x]  No  If yes, describe:  How does the patient cope with anger?talk-cope  TRAUMA ASSESSMENT  YES NO If yes, describe current or past trauma     x Physically abused?    x Emotionally abused?    x Sexually abused?    x Verbally abused?    x Exposed to  domestic violence, community violence or  violence?  Specify:      x Been physically, sexually or verbally abusive to others?    x Safety concerns for anyone in the family?     PATIENT STRENGTHS:  Patient View:  support  Provider View: support    PATIENT LIMITS:  Patient View: ocd  Provider View:ocd    Patient Support System: gf    Does the patient want family or others involved in treatment or education and learning?    []  Yes    [x]  No  If yes, whom?    MENTAL STATUS EXAM:  Hygiene Concerns:  []  Yes   [x]  No   Describe:  Appearance:   [x]  Unremarkable  []  Other  Distress:  []  Acute  []  Moderate   [x]  Mild    []  None  Gait:   [x]  Normal  []  Slow/Retarded  []  Hyper  Posture:  [x]  Normal  []  Slumped  []  Rigid  Eye Contact:  [x]  Maintained  [] Avoided [] Plainedge intense  [] Improving  Mannerisms:   [x]  Unremarkable   [] Gestures [] Grimaces  [] Twitches/Tics     []  Tremor  []  Other  Behavior:  [x]  Normal  []  Restless  []  Compulsive  []  Tremulous     []  Lethargic  []  Uncoordinated  Mood/Feelings: []  Depressed  []  Irritable  [x]  Anxious  []  Fearful  []  Euphoric     []  Labile  []  Grief  []  Paranoia   []  Panic  [] Guilt/shame     []  Apathy/indifference  []  Jealousy  []  Helpless  []  Hopeless     []  Euthymic  []  Other  Affect:   [x]  Normal  []  Constricted  [] Flat  []  Blunted     [] Appropriate to Content   []Inappropriate to Content  Thought Processes: [x]  Congruent  []  Incongruent  [] Loose Associations     []  Poverty of Ideas  []  Tangential    []  Incoherence     []  Blocking/thought interruption  Thought Content: [x]  Normal  []  Delusions  []  Obsessions  []  Phobias     []  Hypochondria  []  Sexual Preoccupation  Perceptual Problems: [x]  None  [] Hallucinations  []  Auditory  [] Visual  [] Perceptual  Orientation:  [x]  Normal/No Impairment  []  Person  []  Place  []  Time  Speech:  [x]  Clear/Articulate  []  Soft  []  Loud  []  Pressured  []  Animated     []   Rambling  []  Slurred  Insight:  []  Good  [x]  Fair  []  Poor  Judgement:  []  Good  [x]  Fair  []  Poor  Recent Memory: []  Good  [x]  Fair  []  Poor  Remote Memory: []  Good  [x]  Fair  []  Poor  Concentration: []  Good  [x]  Fair  []  Poor  Attention:  []  Good  [x]  Fair  []  Poor  Level of Engagement:   [x]  Open  []  Guarded    Resistant    PRIMARY DIAGNOSIS: ocd    SECONDARY DIAGNOSIS:  anxiety    Refer for Psychotherapy?:  [x] Yes     Estimated Length of Treatment:   []  No   [x] Assessment Only   [] Refer to Higher Level of Care   [x] Refer for Medication Assessment --pt stated he will think about seeing a psychiatrist or attending higher level of care for OCD    Patient given emergency 24 hour access information?  [x]   Yes   []  No    INITIAL PROGRESS NOTE (include an integrated clinical summary):    D:  Patient presented for individual therapy.  Pt stated he has been struggling with increasing ocd symptoms and finds it is most difficult and triggered by room mates smoking cannabis. PT stated he has had his OCD symptoms basically dormant until living with girlfriends mom and siblings.    A:  Writer provided supportive listening. Writer completed psychosocial assessment. Writer encouraged client to practice relaxation on a daily basis, including breathing exercise and meditation. Writer discussed starting exposure trials to challenge and reduce client's anxiety in future sessions.  R:  Client presented with congruent affect.  Client was receptive to writer's feedback.  Client talkative and cooperative.  P:  Client will return for psychotherapy in about one week  Develop treatment plan.    Marysol Perez, LPC

## 2024-06-14 NOTE — ANESTHESIA POSTPROCEDURE EVALUATION
Post-Anesthesia Evaluation and Assessment    Patient: Anuradha Lawler MRN: [id number]  SSN: [id number]    YOB: 1960  Age: 63 y.o.  Sex: female      I have evaluated the patient and they are stable and ready for discharge from the PACU.     Cardiovascular Function/Vital Signs  Visit Vitals  BP (!) 147/75   Pulse 87   Temp 98.2 °F (36.8 °C) (Oral)   Resp 20   Ht 1.6 m (5' 3\")   Wt 83.9 kg (185 lb)   SpO2 91%   BMI 32.77 kg/m²       Patient is status post General anesthesia for Procedure(s):  ELECTROCONVULSIVE THERAPY.    Nausea/Vomiting: None    Postoperative hydration reviewed and adequate.    Pain:      Managed    Neurological Status:       At baseline    Mental Status, Level of Consciousness: Alert and  oriented to person, place, and time    Pulmonary Status:       Adequate oxygenation and airway patent    Complications related to anesthesia: None    Post-anesthesia assessment completed. No concerns    Signed By: Elissa Vail MD     June 14, 2024            Department of Anesthesiology  Postprocedure Note    Patient: Anuradha Lawler  MRN: [id number]  YOB: 1960  Date of evaluation: 6/14/2024    Procedure Summary       Date: 06/14/24 Room / Location: [facility] MAIN OR  / [facility] MAIN OR    Anesthesia Start: 0854 Anesthesia Stop: 0906    Procedure: ELECTROCONVULSIVE THERAPY (Head) Diagnosis:       Major depressive disorder, recurrent episode, moderate (HCC)      (Major depressive disorder, recurrent episode, moderate (HCC) [F33.1])    Surgeons: Yolanda Naranjo MD Responsible Provider: Elissa Vail MD    Anesthesia Type: MAC ASA Status: 2            Anesthesia Type: MAC    Ama Phase I: Ama Score: 8    Ama Phase II: Ama Score: 10    Anesthesia Post Evaluation    No notable events documented.

## 2024-06-15 ENCOUNTER — OFFICE VISIT (OUTPATIENT)
Age: 64
End: 2024-06-15

## 2024-06-15 VITALS
OXYGEN SATURATION: 97 % | HEIGHT: 63 IN | WEIGHT: 190.2 LBS | BODY MASS INDEX: 33.7 KG/M2 | TEMPERATURE: 98.3 F | SYSTOLIC BLOOD PRESSURE: 105 MMHG | DIASTOLIC BLOOD PRESSURE: 71 MMHG | HEART RATE: 86 BPM

## 2024-06-15 DIAGNOSIS — K14.0 TRANSIENT LINGUAL PAPILLITIS: ICD-10-CM

## 2024-06-15 DIAGNOSIS — J02.9 SORE THROAT: Primary | ICD-10-CM

## 2024-06-15 RX ORDER — LIDOCAINE HYDROCHLORIDE 20 MG/ML
5 SOLUTION OROPHARYNGEAL PRN
Qty: 100 ML | Refills: 0 | Status: SHIPPED | OUTPATIENT
Start: 2024-06-15

## 2024-06-15 NOTE — PROGRESS NOTES
Anuradha Lawler (:  1960) is a 63 y.o. female,New patient, here for evaluation of the following chief complaint(s):  Pharyngitis (Right side throat pain x 2 weeks, painful bump on side of tongue x 2 weeks )      Assessment & Plan :    Below is the assessment and plan developed based on review of history and physical exam.     1. Sore throat  Patient appears well, VSS, afebrile, SPO2 97% on room air. No distress noted. The patient presents with symptoms suspicious for viral pharyngitis.  Differential includes URI, acute bronchitis, rhinosinusitis, allergic rhinitis or COVID.  Do not suspect underlying cardiopulmonary process.  I considered, but think unlikely, dangerous causes of this patient's symptoms to include acute epiglottitis, bacterial croup, infectious mononucleosis, or peritonsillar abscess.  Patient is nontoxic appearing and not in need of emergent medical intervention.    - lidocaine viscous hcl (XYLOCAINE) 2 % SOLN solution; Take 5 mLs by mouth as needed for Irritation Gargle and spit twice a day as needed  Dispense: 100 mL; Refill: 0    2. Transient lingual papillitis  Patient appears well, presents with white bump on right side of tongue. No drainage or redness noted.  Patient advised to gargle with warm salt water and Listerine to help with pain. Advised if symptoms fail to improve or worsens to follow-up with PCP or ER. Patient verbalized understanding, no questions or concerns at this time.      -Provided reassurance and reassessment  -Discussed over-the-counter medications for symptomatic relief  -Provided educational material and patient instructions  -Discharged patient with instructions to follow-up with pediatrician  -Advised to go immediately to the ED for worsening or persistent symptoms      1. Handout given with care instructions.     2. OTC for symptom management. Increase fluid intake.     3. Follow-up with PCP as needed.     4. Go to ED with development of any acute symptoms.

## 2024-07-02 ENCOUNTER — OFFICE VISIT (OUTPATIENT)
Age: 64
End: 2024-07-02
Payer: MEDICARE

## 2024-07-02 DIAGNOSIS — F41.9 ANXIETY AND DEPRESSION: ICD-10-CM

## 2024-07-02 DIAGNOSIS — G31.84 MILD COGNITIVE IMPAIRMENT: Primary | ICD-10-CM

## 2024-07-02 DIAGNOSIS — F32.A ANXIETY AND DEPRESSION: ICD-10-CM

## 2024-07-02 PROCEDURE — 96132 NRPSYC TST EVAL PHYS/QHP 1ST: CPT | Performed by: CLINICAL NEUROPSYCHOLOGIST

## 2024-07-02 NOTE — PROGRESS NOTES
A neuropsychological evaluation was completed with the patient on 11/28/2023     Prior to seeing the patient for today's visit, I reviewed pertinent records, including the previously completed report, the records in Epic, and any updated visits from other providers since the patient's last visit.     During today's appointment I administered the following measures: NMSE, Fluency.  Exam normal          I provided feedback services related to the previously completed report. Attendees included: Patient. Education was provided regarding my diagnostic impressions, and we discussed treatment plan/options. Attendees were provided with the opportunity to ask questions, which were answered to the best of my ability.     We discussed, in detail, the following:     Results from serial neurocognitive testing again show concern for mild cognitive impairment without any evidence of a decline in neurocognitive functioning over time.  She reports slightly worsened anxiety and mild depression.                  In addition to continued medical care, my recommendations include psychiatric review of her medication management for anxiety and depression and ongoing monitoring and treatment of psychotic issues as they recur.  Continue with case management/outpatient community-based mental health care services.  The patient retains capacity at this time.  She should be encouraged to remain as mentally, physically, and socially active as possible.  That her memory scores are normal is reassuring.  We now have extensive baseline and updated neurocognitive and psychologic data on her.  Follow-up prn.  Clinical correlation is, of course, indicated.                 I will discuss these findings with the patient when she follows up with me in the near future.  A follow up Neuropsychological Evaluation is indicated on a prn basis, especially if there are any cognitive and/or emotional changes.       DIAGNOSES:                         MCI Without

## 2024-07-11 ENCOUNTER — ANESTHESIA EVENT (OUTPATIENT)
Facility: HOSPITAL | Age: 64
End: 2024-07-11
Payer: MEDICARE

## 2024-07-12 ENCOUNTER — ANESTHESIA (OUTPATIENT)
Facility: HOSPITAL | Age: 64
End: 2024-07-12
Payer: MEDICARE

## 2024-07-12 ENCOUNTER — HOSPITAL ENCOUNTER (OUTPATIENT)
Facility: HOSPITAL | Age: 64
Setting detail: OUTPATIENT SURGERY
Discharge: HOME | End: 2024-07-12
Attending: PSYCHIATRY & NEUROLOGY | Admitting: PSYCHIATRY & NEUROLOGY
Payer: MEDICARE

## 2024-07-12 VITALS
OXYGEN SATURATION: 93 % | DIASTOLIC BLOOD PRESSURE: 73 MMHG | TEMPERATURE: 98.3 F | WEIGHT: 191 LBS | HEART RATE: 81 BPM | HEIGHT: 63 IN | SYSTOLIC BLOOD PRESSURE: 127 MMHG | BODY MASS INDEX: 33.84 KG/M2 | RESPIRATION RATE: 19 BRPM

## 2024-07-12 LAB
GLUCOSE BLD STRIP.AUTO-MCNC: 101 MG/DL (ref 65–117)
SERVICE CMNT-IMP: NORMAL

## 2024-07-12 PROCEDURE — 7100000011 HC PHASE II RECOVERY - ADDTL 15 MIN: Performed by: PSYCHIATRY & NEUROLOGY

## 2024-07-12 PROCEDURE — 7100000010 HC PHASE II RECOVERY - FIRST 15 MIN: Performed by: PSYCHIATRY & NEUROLOGY

## 2024-07-12 PROCEDURE — 2500000003 HC RX 250 WO HCPCS

## 2024-07-12 PROCEDURE — 7100000000 HC PACU RECOVERY - FIRST 15 MIN: Performed by: PSYCHIATRY & NEUROLOGY

## 2024-07-12 PROCEDURE — 7100000001 HC PACU RECOVERY - ADDTL 15 MIN: Performed by: PSYCHIATRY & NEUROLOGY

## 2024-07-12 PROCEDURE — 6360000002 HC RX W HCPCS

## 2024-07-12 PROCEDURE — 90870 ELECTROCONVULSIVE THERAPY: CPT | Performed by: PSYCHIATRY & NEUROLOGY

## 2024-07-12 PROCEDURE — 3700000000 HC ANESTHESIA ATTENDED CARE: Performed by: PSYCHIATRY & NEUROLOGY

## 2024-07-12 PROCEDURE — 82962 GLUCOSE BLOOD TEST: CPT

## 2024-07-12 PROCEDURE — 2709999900 HC NON-CHARGEABLE SUPPLY: Performed by: PSYCHIATRY & NEUROLOGY

## 2024-07-12 PROCEDURE — 2580000003 HC RX 258

## 2024-07-12 RX ORDER — SODIUM CHLORIDE 0.9 % (FLUSH) 0.9 %
5-40 SYRINGE (ML) INJECTION PRN
Status: DISCONTINUED | OUTPATIENT
Start: 2024-07-12 | End: 2024-07-12 | Stop reason: HOSPADM

## 2024-07-12 RX ORDER — MIDAZOLAM HYDROCHLORIDE 1 MG/ML
2 INJECTION INTRAMUSCULAR; INTRAVENOUS
Status: DISCONTINUED | OUTPATIENT
Start: 2024-07-12 | End: 2024-07-12 | Stop reason: HOSPADM

## 2024-07-12 RX ORDER — OXYCODONE HYDROCHLORIDE 5 MG/1
10 TABLET ORAL PRN
Status: DISCONTINUED | OUTPATIENT
Start: 2024-07-12 | End: 2024-07-12 | Stop reason: HOSPADM

## 2024-07-12 RX ORDER — ONDANSETRON 2 MG/ML
4 INJECTION INTRAMUSCULAR; INTRAVENOUS
Status: DISCONTINUED | OUTPATIENT
Start: 2024-07-12 | End: 2024-07-12 | Stop reason: HOSPADM

## 2024-07-12 RX ORDER — PROCHLORPERAZINE EDISYLATE 5 MG/ML
5 INJECTION INTRAMUSCULAR; INTRAVENOUS
Status: DISCONTINUED | OUTPATIENT
Start: 2024-07-12 | End: 2024-07-12 | Stop reason: HOSPADM

## 2024-07-12 RX ORDER — HYDRALAZINE HYDROCHLORIDE 20 MG/ML
10 INJECTION INTRAMUSCULAR; INTRAVENOUS
Status: DISCONTINUED | OUTPATIENT
Start: 2024-07-12 | End: 2024-07-12 | Stop reason: HOSPADM

## 2024-07-12 RX ORDER — 0.9 % SODIUM CHLORIDE 0.9 %
INTRAVENOUS SOLUTION INTRAVENOUS PRN
Status: DISCONTINUED | OUTPATIENT
Start: 2024-07-12 | End: 2024-07-12 | Stop reason: SDUPTHER

## 2024-07-12 RX ORDER — SODIUM CHLORIDE 9 MG/ML
INJECTION, SOLUTION INTRAVENOUS PRN
Status: DISCONTINUED | OUTPATIENT
Start: 2024-07-12 | End: 2024-07-12 | Stop reason: HOSPADM

## 2024-07-12 RX ORDER — SODIUM CHLORIDE 0.9 % (FLUSH) 0.9 %
5-40 SYRINGE (ML) INJECTION EVERY 12 HOURS SCHEDULED
Status: CANCELLED | OUTPATIENT
Start: 2024-07-12

## 2024-07-12 RX ORDER — ACETAMINOPHEN 325 MG/1
650 TABLET ORAL ONCE
Status: DISCONTINUED | OUTPATIENT
Start: 2024-07-12 | End: 2024-07-12 | Stop reason: HOSPADM

## 2024-07-12 RX ORDER — OXYCODONE HYDROCHLORIDE 5 MG/1
5 TABLET ORAL PRN
Status: DISCONTINUED | OUTPATIENT
Start: 2024-07-12 | End: 2024-07-12 | Stop reason: HOSPADM

## 2024-07-12 RX ORDER — SUCCINYLCHOLINE/SOD CL,ISO/PF 100 MG/5ML
SYRINGE (ML) INTRAVENOUS PRN
Status: DISCONTINUED | OUTPATIENT
Start: 2024-07-12 | End: 2024-07-12 | Stop reason: SDUPTHER

## 2024-07-12 RX ORDER — SODIUM CHLORIDE 0.9 % (FLUSH) 0.9 %
5-40 SYRINGE (ML) INJECTION EVERY 12 HOURS SCHEDULED
Status: DISCONTINUED | OUTPATIENT
Start: 2024-07-12 | End: 2024-07-12 | Stop reason: HOSPADM

## 2024-07-12 RX ORDER — FENTANYL CITRATE 50 UG/ML
100 INJECTION, SOLUTION INTRAMUSCULAR; INTRAVENOUS
Status: DISCONTINUED | OUTPATIENT
Start: 2024-07-12 | End: 2024-07-12 | Stop reason: HOSPADM

## 2024-07-12 RX ORDER — LIDOCAINE HYDROCHLORIDE 10 MG/ML
1 INJECTION, SOLUTION EPIDURAL; INFILTRATION; INTRACAUDAL; PERINEURAL
Status: DISCONTINUED | OUTPATIENT
Start: 2024-07-12 | End: 2024-07-12 | Stop reason: HOSPADM

## 2024-07-12 RX ORDER — SODIUM CHLORIDE, SODIUM LACTATE, POTASSIUM CHLORIDE, CALCIUM CHLORIDE 600; 310; 30; 20 MG/100ML; MG/100ML; MG/100ML; MG/100ML
INJECTION, SOLUTION INTRAVENOUS CONTINUOUS
Status: DISCONTINUED | OUTPATIENT
Start: 2024-07-12 | End: 2024-07-12 | Stop reason: HOSPADM

## 2024-07-12 RX ORDER — NALOXONE HYDROCHLORIDE 0.4 MG/ML
INJECTION, SOLUTION INTRAMUSCULAR; INTRAVENOUS; SUBCUTANEOUS PRN
Status: DISCONTINUED | OUTPATIENT
Start: 2024-07-12 | End: 2024-07-12 | Stop reason: HOSPADM

## 2024-07-12 RX ORDER — FENTANYL CITRATE 50 UG/ML
25 INJECTION, SOLUTION INTRAMUSCULAR; INTRAVENOUS EVERY 5 MIN PRN
Status: DISCONTINUED | OUTPATIENT
Start: 2024-07-12 | End: 2024-07-12 | Stop reason: HOSPADM

## 2024-07-12 RX ORDER — SODIUM CHLORIDE 0.9 % (FLUSH) 0.9 %
5-40 SYRINGE (ML) INJECTION PRN
Status: CANCELLED | OUTPATIENT
Start: 2024-07-12

## 2024-07-12 RX ORDER — SODIUM CHLORIDE 9 MG/ML
INJECTION, SOLUTION INTRAVENOUS PRN
Status: CANCELLED | OUTPATIENT
Start: 2024-07-12

## 2024-07-12 RX ADMIN — METHOHEXITAL SODIUM 50 MG: 500 INJECTION, POWDER, LYOPHILIZED, FOR SOLUTION INTRAMUSCULAR; INTRAVENOUS; RECTAL at 09:39

## 2024-07-12 RX ADMIN — Medication 50 MG: at 09:39

## 2024-07-12 RX ADMIN — SODIUM CHLORIDE 400 ML: 900 INJECTION, SOLUTION INTRAVENOUS at 09:45

## 2024-07-12 ASSESSMENT — PAIN SCALES - GENERAL: PAINLEVEL_OUTOF10: 0

## 2024-07-12 NOTE — ANESTHESIA POSTPROCEDURE EVALUATION
Department of Anesthesiology  Postprocedure Note    Patient: Anuradha Lawler  MRN: [id number]  YOB: 1960  Date of evaluation: 7/12/2024    Procedure Summary       Date: 07/12/24 Room / Location: [facility] MAIN OR  / [facility] MAIN OR    Anesthesia Start: 0933 Anesthesia Stop: 0947    Procedure: ELECTROCONVULSIVE THERAPY (Head) Diagnosis:       Major depressive disorder, recurrent episode, moderate (HCC)      (Major depressive disorder, recurrent episode, moderate (HCC) [F33.1])    Surgeons: Yolanda Naranjo MD Responsible Provider: Christina Chong MD    Anesthesia Type: General ASA Status: 2            Anesthesia Type: General    Ama Phase I: Ama Score: 10    Ama Phase II:      Anesthesia Post Evaluation    Patient location during evaluation: PACU  Level of consciousness: awake  Airway patency: patent  Nausea & Vomiting: no nausea  Cardiovascular status: hemodynamically stable  Respiratory status: acceptable  Hydration status: stable  Comments: ---------------------------               07/12/24                      1020         ---------------------------   BP:          127/73         Pulse:         81           Resp:          19           Temp:   98.3 °F (36.8 °C)   SpO2:          93%         ---------------------------   Pain management: adequate    No notable events documented.

## 2024-07-12 NOTE — DISCHARGE INSTRUCTIONS
ECT DISCHARGE INSTRUCTIONS      NAME: Anuradha Lawler   :  1960   MRN:  [id number]     Date/Time:  2024 9:42 AM    ADMIT DATE: 2024     DISCHARGE DATE: 2024     DISCHARGE DIAGNOSIS:  [unfilled]     Recommended diet:  As tolerated.     Recommended activity:      Have a responsible person stay with the patient for 24 hours after the treatment, some temporary confusion may be noticed.    Do not allow a patient to operate a motor vehicle for at least 24 hours and all the confusion has cleared.    Do not drink alcoholic beverages for 48 hours past treatment.    Do not sign any legal documents.    Do not make any critical decisions for 24 hours.    Advance diet as tolerated. Start with liquids and advance it nausea is not present.    Understand that memory for recent events, phone numbers, addresses, ect. May be decreased for a short period of time.     Report any persistant nausea or pain to the treating physician.    Patient receiving ECT while in the hospital should also not attempt to ambulate without assistance, please use tap bell which will be provide.    If you experience any of the following symptoms then please call your primary care physician/outpatient psychiatrist or return to the emergency room if you cannot get hold of your doctor: Fever, chills, nausea, vomiting, diarrhea, change in mentation, falling, bleeding, shortness of breath. Please call the emergency room at [facility] [phone] in this case.     Follow Up:  Continue outpatient psychiatric follow-up visits with your personal psychiatrist.       DISCHARGE SUMMARY from Nurse    PATIENT INSTRUCTIONS:    After general anesthesia or intravenous sedation, for 24 hours or while taking prescription Narcotics:  Limit your activities  Do not drive and operate hazardous machinery  Do not make important personal or business decisions  Do  not drink alcoholic beverages  If you have not urinated

## 2024-07-12 NOTE — ANESTHESIA PRE PROCEDURE
Medication Dose Route Frequency Provider Last Rate Last Admin    naloxone 0.4 mg in 10 mL sodium chloride syringe   IntraVENous PRN Christina Chong MD        sodium chloride flush 0.9 % injection 5-40 mL  5-40 mL IntraVENous 2 times per day Christina Chong MD        sodium chloride flush 0.9 % injection 5-40 mL  5-40 mL IntraVENous PRN Christina Chong MD        0.9 % sodium chloride infusion   IntraVENous PRN Christina Chong MD        fentaNYL (SUBLIMAZE) injection 25 mcg  25 mcg IntraVENous Q5 Min PRN Christina Chong MD        HYDROmorphone (DILAUDID) injection 0.5 mg  0.5 mg IntraVENous Q5 Min PRN Christina Chong MD        oxyCODONE (ROXICODONE) immediate release tablet 5 mg  5 mg Oral PRN Christina Chong MD        Or    oxyCODONE (ROXICODONE) immediate release tablet 10 mg  10 mg Oral PRN Christina Chong MD        ondansetron (ZOFRAN) injection 4 mg  4 mg IntraVENous Once PRN Christina Chong MD        prochlorperazine (COMPAZINE) injection 5 mg  5 mg IntraVENous Once PRN Christina Chong MD        hydrALAZINE (APRESOLINE) injection 10 mg  10 mg IntraVENous Q15 Min PRN Christina Chong MD        lidocaine PF 1 % injection 1 mL  1 mL IntraDERmal Once PRN Christina Chong MD        acetaminophen (TYLENOL) tablet 650 mg  650 mg Oral Once Christina Chong MD        fentaNYL (SUBLIMAZE) injection 100 mcg  100 mcg IntraVENous Once PRN Christina Chong MD        lactated ringers IV soln infusion   IntraVENous Continuous Christina Chong MD        sodium chloride flush 0.9 % injection 5-40 mL  5-40 mL IntraVENous 2 times per day Christina Chong MD        sodium chloride flush 0.9 % injection 5-40 mL  5-40 mL IntraVENous PRN Christina Chong MD        0.9 % sodium chloride infusion   IntraVENous PRN Christina Chong MD        midazolam (VERSED) injection 2 mg  2 mg IntraVENous Once PRN Christina Chong MD           Allergies:    Allergies   Allergen Reactions    Coconut (Cocos Nucifera) Shortness Of Breath    Peanut-Containing Drug Products

## 2024-07-12 NOTE — PROCEDURES
ECT PROCEDURE NOTE         IDENTIFICATION:           Patient Name   Anuradha Lawler         Date of Birth  1960         [facility]  [id number]         Medical Record Number   [id number]             Age   62 y.o.         PCP  Cyndi Curtis MD         Admit date:  07/12/24           Room Number   PACU/PL  @ [facility]         Date of Service   07/12/24                          Electro Convulsive Therapy:   Treatment number 36         Maintenance ECT - YES        Anuradha Lawler was admitted to the PACU for ECT. Patient was medically cleared and NPO for procedure. Patient is NOT court mandated and gave informed consent for ECT treatment.        Patient received       Bilateral ECT YES    Administered using the Thymatron System IV - [employer].       at 40 % Energy, under general anesthesia.    Anuradha Lawler with a good, well generalized seizure of 51 seconds on observation of the motor manifestations of the seizure. EEG duration was 56 secs.    Post-Ictal Suppression Index: 19.4 %.    Recovery was unremarkable and with no complications.       Change in Energy %:                 Anesthesia medications administered during procedure:   Brevital:  50 mg   Change for next treament:       Succinylcholine: 50 mg   Change for next treatment:        Propofol: mg   Glycopyrrolate:  mg   Labetalol:  mg   Esmolol:  mg   Lorazepam:  mg   Ketamine:  mg   Zofran:   mg     Toradol:  mg   Lidocaine:  mg   Caffeine Benzoate: mg               CSSRS  1/26/2023 7/8/2022         1) Within the past month, have you wished you were dead or wished you could go to sleep and not wake up?   No  No     2) Have you actually had any thoughts of killing yourself?  No  No     6) Have you ever done anything, started to do anything, or prepared to do anything to end your life?  Yes  No         Did this occur within the past 3 months?  Yes  -

## 2024-07-12 NOTE — PERIOP NOTE
Anuradha CARLIN Lawler  1960  [id number]    Situation:  Verbal report given from: Swati Armendariz RN  Procedure: Procedure(s):  ELECTROCONVULSIVE THERAPY    Background:    Preoperative diagnosis: Major depressive disorder, recurrent episode, moderate (HCC) [F33.1]    Postoperative diagnosis: * No post-op diagnosis entered *    :  Dr. Naranjo    Assistant(s): Circulator: Swati Armendariz RN  Scrub Person First: Andrew Ndiaye RN    Specimens: * No specimens in log *    Assessment:  Intra-procedure medications         Anesthesia gave intra-procedure sedation and medications, see anesthesia flow sheet     Intravenous fluids: LR@ KVO     Vital signs stable

## 2024-07-30 ENCOUNTER — TELEPHONE (OUTPATIENT)
Age: 64
End: 2024-07-30

## 2024-08-01 NOTE — TELEPHONE ENCOUNTER
Returned call to Devin and scheduled a virtual appt for Dr Whalen to discuss plan of care with him.

## 2024-08-02 ENCOUNTER — TELEMEDICINE (OUTPATIENT)
Age: 64
End: 2024-08-02
Payer: MEDICARE

## 2024-08-02 DIAGNOSIS — F41.9 ANXIETY AND DEPRESSION: ICD-10-CM

## 2024-08-02 DIAGNOSIS — F32.A ANXIETY AND DEPRESSION: ICD-10-CM

## 2024-08-02 DIAGNOSIS — G31.84 MILD COGNITIVE IMPAIRMENT: Primary | ICD-10-CM

## 2024-08-02 PROCEDURE — 96132 NRPSYC TST EVAL PHYS/QHP 1ST: CPT | Performed by: CLINICAL NEUROPSYCHOLOGIST

## 2024-08-02 NOTE — PROGRESS NOTES
Anuradha Lawler, was evaluated through a synchronous (real-time) audio-video encounter. The patient (or guardian if applicable) is aware that this is a billable service, which includes applicable co-pays. This Virtual Visit was conducted with patient's (and/or legal guardian's) consent. Patient identification was verified, and a caregiver was present when appropriate.   The patient was located at Home: [address] [place] [id number]  Provider was located at Facility (Appt Dept): [address] [place] [address]  Confirm you are appropriately licensed, registered, or certified to deliver care in the state where the patient is located as indicated above. If you are not or unsure, please re-schedule the visit: Yes, I confirm.     Anuradha Lawler (:  1960) is a Established patient, presenting virtually for evaluation of the following:      A neuropsychological evaluation was completed with the patient on 2023     Prior to seeing the patient for today's visit, I reviewed pertinent records, including the previously completed report, the records in Epic, and any updated visits from other providers since the patient's last visit.     I provided feedback services related to the previously completed report. Attendees included: Patient's first cousin/caregiver. Education was provided regarding my diagnostic impressions, and we discussed treatment plan/options. Attendees were provided with the opportunity to ask questions, which were answered to the best of my ability. We discussed test results, psychoeducation provided regarding the testing procedure, offered and provided emotional support given her diagnoses, and discussed treatment planning moving forward from here.       We discussed, in detail, the following:      Results from serial neurocognitive testing again show concern for mild cognitive impairment without any evidence of a decline in neurocognitive functioning

## 2024-08-08 ENCOUNTER — ANESTHESIA EVENT (OUTPATIENT)
Facility: HOSPITAL | Age: 64
End: 2024-08-08
Payer: MEDICARE

## 2024-08-09 ENCOUNTER — HOSPITAL ENCOUNTER (OUTPATIENT)
Facility: HOSPITAL | Age: 64
Setting detail: OUTPATIENT SURGERY
Discharge: HOME | End: 2024-08-09
Attending: PSYCHIATRY & NEUROLOGY | Admitting: PSYCHIATRY & NEUROLOGY
Payer: MEDICARE

## 2024-08-09 ENCOUNTER — ANESTHESIA (OUTPATIENT)
Facility: HOSPITAL | Age: 64
End: 2024-08-09
Payer: MEDICARE

## 2024-08-09 VITALS
HEART RATE: 80 BPM | WEIGHT: 187 LBS | OXYGEN SATURATION: 93 % | HEIGHT: 63 IN | TEMPERATURE: 98 F | DIASTOLIC BLOOD PRESSURE: 73 MMHG | SYSTOLIC BLOOD PRESSURE: 131 MMHG | BODY MASS INDEX: 33.13 KG/M2 | RESPIRATION RATE: 18 BRPM

## 2024-08-09 PROCEDURE — 6360000002 HC RX W HCPCS: Performed by: NURSE ANESTHETIST, CERTIFIED REGISTERED

## 2024-08-09 PROCEDURE — 3700000000 HC ANESTHESIA ATTENDED CARE: Performed by: PSYCHIATRY & NEUROLOGY

## 2024-08-09 PROCEDURE — 7100000001 HC PACU RECOVERY - ADDTL 15 MIN: Performed by: PSYCHIATRY & NEUROLOGY

## 2024-08-09 PROCEDURE — 7100000000 HC PACU RECOVERY - FIRST 15 MIN: Performed by: PSYCHIATRY & NEUROLOGY

## 2024-08-09 PROCEDURE — 2709999900 HC NON-CHARGEABLE SUPPLY: Performed by: PSYCHIATRY & NEUROLOGY

## 2024-08-09 PROCEDURE — 2580000003 HC RX 258: Performed by: ANESTHESIOLOGY

## 2024-08-09 PROCEDURE — 7100000010 HC PHASE II RECOVERY - FIRST 15 MIN: Performed by: PSYCHIATRY & NEUROLOGY

## 2024-08-09 PROCEDURE — 7100000011 HC PHASE II RECOVERY - ADDTL 15 MIN: Performed by: PSYCHIATRY & NEUROLOGY

## 2024-08-09 PROCEDURE — 90870 ELECTROCONVULSIVE THERAPY: CPT | Performed by: PSYCHIATRY & NEUROLOGY

## 2024-08-09 RX ORDER — SODIUM CHLORIDE 0.9 % (FLUSH) 0.9 %
5-40 SYRINGE (ML) INJECTION PRN
Status: DISCONTINUED | OUTPATIENT
Start: 2024-08-09 | End: 2024-08-09 | Stop reason: HOSPADM

## 2024-08-09 RX ORDER — SODIUM CHLORIDE 0.9 % (FLUSH) 0.9 %
5-40 SYRINGE (ML) INJECTION EVERY 12 HOURS SCHEDULED
Status: DISCONTINUED | OUTPATIENT
Start: 2024-08-09 | End: 2024-08-09 | Stop reason: HOSPADM

## 2024-08-09 RX ORDER — SODIUM CHLORIDE 9 MG/ML
INJECTION, SOLUTION INTRAVENOUS PRN
Status: DISCONTINUED | OUTPATIENT
Start: 2024-08-09 | End: 2024-08-09 | Stop reason: HOSPADM

## 2024-08-09 RX ORDER — METHOHEXITAL IN WATER/PF 100MG/10ML
SYRINGE (ML) INTRAVENOUS PRN
Status: DISCONTINUED | OUTPATIENT
Start: 2024-08-09 | End: 2024-08-09 | Stop reason: SDUPTHER

## 2024-08-09 RX ORDER — HYDRALAZINE HYDROCHLORIDE 20 MG/ML
10 INJECTION INTRAMUSCULAR; INTRAVENOUS
Status: DISCONTINUED | OUTPATIENT
Start: 2024-08-09 | End: 2024-08-09 | Stop reason: HOSPADM

## 2024-08-09 RX ORDER — SODIUM CHLORIDE, SODIUM LACTATE, POTASSIUM CHLORIDE, CALCIUM CHLORIDE 600; 310; 30; 20 MG/100ML; MG/100ML; MG/100ML; MG/100ML
INJECTION, SOLUTION INTRAVENOUS CONTINUOUS
Status: DISCONTINUED | OUTPATIENT
Start: 2024-08-09 | End: 2024-08-09 | Stop reason: HOSPADM

## 2024-08-09 RX ORDER — SUCCINYLCHOLINE/SOD CL,ISO/PF 100 MG/5ML
SYRINGE (ML) INTRAVENOUS PRN
Status: DISCONTINUED | OUTPATIENT
Start: 2024-08-09 | End: 2024-08-09 | Stop reason: SDUPTHER

## 2024-08-09 RX ORDER — ONDANSETRON 2 MG/ML
4 INJECTION INTRAMUSCULAR; INTRAVENOUS
Status: DISCONTINUED | OUTPATIENT
Start: 2024-08-09 | End: 2024-08-09 | Stop reason: HOSPADM

## 2024-08-09 RX ADMIN — SODIUM CHLORIDE, POTASSIUM CHLORIDE, SODIUM LACTATE AND CALCIUM CHLORIDE: 600; 310; 30; 20 INJECTION, SOLUTION INTRAVENOUS at 08:25

## 2024-08-09 RX ADMIN — Medication 50 MG: at 08:40

## 2024-08-09 ASSESSMENT — PAIN SCALES - GENERAL: PAINLEVEL_OUTOF10: 0

## 2024-08-09 ASSESSMENT — PAIN - FUNCTIONAL ASSESSMENT: PAIN_FUNCTIONAL_ASSESSMENT: NONE - DENIES PAIN

## 2024-08-09 NOTE — DISCHARGE INSTRUCTIONS
ECT DISCHARGE INSTRUCTIONS      NAME: Anuradha Lawler   :  1960   MRN:  [id number]     Date/Time:  2024 8:44 AM    ADMIT DATE: 2024     DISCHARGE DATE: 2024     DISCHARGE DIAGNOSIS:  [unfilled]     Recommended diet:  As tolerated.     Recommended activity:      Have a responsible person stay with the patient for 24 hours after the treatment, some temporary confusion may be noticed.    Do not allow a patient to operate a motor vehicle for at least 24 hours and all the confusion has cleared.    Do not drink alcoholic beverages for 48 hours past treatment.    Do not sign any legal documents.    Do not make any critical decisions for 24 hours.    Advance diet as tolerated. Start with liquids and advance it nausea is not present.    Understand that memory for recent events, phone numbers, addresses, ect. May be decreased for a short period of time.     Report any persistant nausea or pain to the treating physician.    Patient receiving ECT while in the hospital should also not attempt to ambulate without assistance, please use tap bell which will be provide.    If you experience any of the following symptoms then please call your primary care physician/outpatient psychiatrist or return to the emergency room if you cannot get hold of your doctor: Fever, chills, nausea, vomiting, diarrhea, change in mentation, falling, bleeding, shortness of breath. Please call the emergency room at [facility] [phone] in this case.     Follow Up:  Continue outpatient psychiatric follow-up visits with your personal psychiatrist.       DISCHARGE SUMMARY from Nurse    PATIENT INSTRUCTIONS:    After general anesthesia or intravenous sedation, for 24 hours or while taking prescription Narcotics:  Limit your activities  Do not drive and operate hazardous machinery  Do not make important personal or business decisions  Do  not drink alcoholic beverages  If you have not urinated within 8

## 2024-08-09 NOTE — ANESTHESIA POSTPROCEDURE EVALUATION
Post-Anesthesia Evaluation and Assessment    Patient: Anuradha Lawler MRN: [id number]  SSN: [id number]    YOB: 1960  Age: 63 y.o.  Sex: female      I have evaluated the patient and they are stable and ready for discharge from the PACU.     Cardiovascular Function/Vital Signs  Visit Vitals  /73   Pulse 80   Temp 98 °F (36.7 °C) (Oral)   Resp 18   Ht 1.6 m (5' 3\")   Wt 84.8 kg (187 lb)   SpO2 93%   BMI 33.13 kg/m²       Patient is status post General anesthesia for Procedure(s):  ELECTROCONVULSIVE THERAPY.    Nausea/Vomiting: None    Postoperative hydration reviewed and adequate.    Pain:      Managed    Neurological Status:       At baseline    Mental Status, Level of Consciousness: Alert and  oriented to person, place, and time    Pulmonary Status:       Adequate oxygenation and airway patent    Complications related to anesthesia: None    Post-anesthesia assessment completed. No concerns    Signed By: Elissa Vail MD     August 9, 2024            Department of Anesthesiology  Postprocedure Note    Patient: Anuradha Lawler  MRN: [id number]  YOB: 1960  Date of evaluation: 8/9/2024    Procedure Summary       Date: 08/09/24 Room / Location: [facility] MAIN OR  / [facility] MAIN OR    Anesthesia Start: 0836 Anesthesia Stop: 0848    Procedure: ELECTROCONVULSIVE THERAPY (Head) Diagnosis:       Major depressive disorder, recurrent episode, moderate (HCC)      (Major depressive disorder, recurrent episode, moderate (HCC) [F33.1])    Surgeons: Yolanda Naranjo MD Responsible Provider: Elissa Vail MD    Anesthesia Type: MAC ASA Status: 2            Anesthesia Type: MAC    Ama Phase I: Ama Score: 10    Ama Phase II:      Anesthesia Post Evaluation    No notable events documented.

## 2024-08-09 NOTE — PERIOP NOTE
Patient meets discharge criteria.  Patient and caregiver, Chelsea provided with verbal and written discharge instructions.  Patient discharged by wheelchair with Chelsea mills to transport home.

## 2024-08-09 NOTE — ANESTHESIA PRE PROCEDURE
Surgical History:        Procedure Laterality Date   • ELECTROCONVULSIVE THERAPY N/A 5/19/2023    ELECTROCONVULSIVE THERAPY performed by Yolanda Naranjo MD at [facility] MAIN OR   • ELECTROCONVULSIVE THERAPY N/A 6/2/2023    ELECTROCONVULSIVE THERAPY performed by Yolanda Naranjo MD at [facility] MAIN OR   • ELECTROCONVULSIVE THERAPY  6/30/2023   • ELECTROCONVULSIVE THERAPY N/A 6/30/2023    ELECTROCONVULSIVE THERAPY performed by Yolanda Naranjo MD at [facility] MAIN OR   • ELECTROCONVULSIVE THERAPY  07/28/2023   • ELECTROCONVULSIVE THERAPY N/A 07/28/2023    ELECTROCONVULSIVE THERAPY performed by Yolanda Naranjo MD at [facility] MAIN OR   • ELECTROCONVULSIVE THERAPY  8/2/2023   • ELECTROCONVULSIVE THERAPY N/A 8/2/2023    ELECTROCONVULSIVE THERAPY performed by Yolanda Naranjo MD at [facility] MAIN OR   • ELECTROCONVULSIVE THERAPY  8/11/2023   • ELECTROCONVULSIVE THERAPY N/A 8/11/2023    ELECTROCONVULSIVE THERAPY performed by Yolanda Naranjo MD at [facility] MAIN OR   • ELECTROCONVULSIVE THERAPY  8/18/2023   • ELECTROCONVULSIVE THERAPY N/A 8/18/2023    ELECTROCONVULSIVE THERAPY performed by Yolanda Naranjo MD at [facility] MAIN OR   • ELECTROCONVULSIVE THERAPY  8/25/2023   • ELECTROCONVULSIVE THERAPY N/A 8/25/2023    ELECTROCONVULSIVE THERAPY performed by Yolanda Naranjo MD at [facility] MAIN OR   • ELECTROCONVULSIVE THERAPY  9/1/2023   • ELECTROCONVULSIVE THERAPY N/A 9/1/2023    ELECTROCONVULSIVE THERAPY performed by Yolanda Naranjo MD at [facility] MAIN OR   • ELECTROCONVULSIVE THERAPY  9/8/2023   • ELECTROCONVULSIVE THERAPY N/A 9/8/2023    ELECTROCONVULSIVE THERAPY performed by Yolanda Naranjo MD at [facility] MAIN OR   • ELECTROCONVULSIVE THERAPY  9/22/2023   • ELECTROCONVULSIVE THERAPY N/A 9/22/2023    ELECTROCONVULSIVE THERAPY performed by Yolanda Naranjo MD at [facility] MAIN OR   • ELECTROCONVULSIVE THERAPY  10/6/2023   • ELECTROCONVULSIVE THERAPY N/A 10/6/2023    ELECTROCONVULSIVE THERAPY performed by Yolanda Naranjo MD at [facility] MAIN OR   • ELECTROCONVULSIVE THERAPY  10/20/2023   • ELECTROCONVULSIVE THERAPY N/A

## 2024-08-09 NOTE — H&P
Update History & Physical for Anuradha Lawler      This is an update on the patient's History and Physical done today 08/09/24. The ECT procedure was reviewed with the patient and I examined the patient. There was no change. The procedure site was confirmed by the patient and me.       Plan: The risks, benefits, expected outcome, and alternative to the recommended procedure have been discussed with the patient. Patient understands and wants to proceed with the procedure.     Electronically signed by TANISHA KUMAR MD on 8/9/2024 at 8:21 AM

## 2024-08-09 NOTE — PERIOP NOTE
Anuradha CARLIN Lawler  1960  [id number]    Situation:  Verbal report given from: Swati Armendariz RN  Procedure: Procedure(s):  ELECTROCONVULSIVE THERAPY    Background:    Preoperative diagnosis: Major depressive disorder, recurrent episode, moderate (HCC) [F33.1]    Postoperative diagnosis: * No post-op diagnosis entered *    :  Dr. Naranjo    Assistant(s): Circulator: Swati Armendariz RN  Scrub Person First: Alessandra Encinas    Specimens: * No specimens in log *    Assessment:  Intra-procedure medications         Anesthesia gave intra-procedure sedation and medications, see anesthesia flow sheet     Intravenous fluids: LR@ KVO     Vital signs stable

## 2024-08-09 NOTE — PROCEDURES
ECT PROCEDURE NOTE         IDENTIFICATION:           Patient Name   Anuradha Lawler         Date of Birth  1960         [facility]  [id number]         Medical Record Number   [id number]             Age   62 y.o.         PCP  Cyndi Curtis MD         Admit date:  08/09/24           Room Number   PACU/PL  @ [facility]         Date of Service   08/09/24                          Electro Convulsive Therapy:   Treatment number 37         Maintenance ECT - YES        Anuradha Lawler was admitted to the PACU for ECT. Patient was medically cleared and NPO for procedure. Patient is NOT court mandated and gave informed consent for ECT treatment.        Patient received       Bilateral ECT YES    Administered using the Thymatron System IV - [employer].       at 40 % Energy, under general anesthesia.    Anuradha Lawler with a good, well generalized seizure of 54 seconds on observation of the motor manifestations of the seizure. EEG duration was 90 secs.    Post-Ictal Suppression Index: 50 %.    Recovery was unremarkable and with no complications.       Change in Energy %:                 Anesthesia medications administered during procedure:   Brevital:  50 mg   Change for next treament:       Succinylcholine: 50 mg   Change for next treatment:        Propofol: mg   Glycopyrrolate:  mg   Labetalol:  mg   Esmolol:  mg   Lorazepam:  mg   Ketamine:  mg   Zofran:   mg     Toradol:  mg   Lidocaine:  mg   Caffeine Benzoate: mg               CSSRS  1/26/2023 7/8/2022         1) Within the past month, have you wished you were dead or wished you could go to sleep and not wake up?   No  No     2) Have you actually had any thoughts of killing yourself?  No  No     6) Have you ever done anything, started to do anything, or prepared to do anything to end your life?  Yes  No         Did this occur within the past 3 months?  Yes  -

## 2024-09-05 ENCOUNTER — ANESTHESIA EVENT (OUTPATIENT)
Facility: HOSPITAL | Age: 64
End: 2024-09-05
Payer: MEDICARE

## 2024-09-06 ENCOUNTER — ANESTHESIA (OUTPATIENT)
Facility: HOSPITAL | Age: 64
End: 2024-09-06
Payer: MEDICARE

## 2024-09-12 NOTE — PROGRESS NOTES
Admission Medication Reconciliation:    Information obtained from:  Patient interview/Communication with [facility] pharmacy/[facility]Query    Comments/Recommendations: Updated PTA meds/reviewed patient's allergies. 1)  Pharmacist called [facility] pharmacy to confirm levothyroxine dose. Patient last filled 125mcg tablets. 2)  Medication changes (since last review): Added  - none    Adjusted  - none    Removed  - levothyroxine 648EHK (duplicate)     Allergies: Other food; Astepro [azelastine]; Bactrim [sulfamethoprim]; Banana; Coconut; Cortisone; Peanut; and Prednisone    Significant PMH/Disease States:   Past Medical History:   Diagnosis Date    Asthma 1967    hospitalized for asthma attack x 2    Chronic kidney disease     kidney stones x 2-3 times    Ill-defined condition     nasal blockage from growth and misalignment - cannot breath out of nose - surgery in the future/cleared for colonoscopy 7/31/2014 - will bring in letter    Other ill-defined conditions(799.89)     blood in stool    Other ill-defined conditions(799.89)     hx low BP - 90's/60's-70's    PUD (peptic ulcer disease)     Thyroid disease     Unspecified adverse effect of anesthesia     nasal growth and misalignment and cannot breath through nose     Chief Complaint for this Admission:    Chief Complaint   Patient presents with   [address]     Prior to Admission Medications:   Prior to Admission Medications   Prescriptions Last Dose Informant Patient Reported? Taking? Cholecalciferol, Vitamin D3, (VITAMIN D3) 1,000 unit cap 7/16/2018 at Unknown time  Yes Yes   Sig: Take 1,000 Units by mouth daily. GREEN TEA EXTRACT PO 7/16/2018 at Unknown time  Yes Yes   Sig: Take 700 mg by mouth daily. MULTIVITAMIN PO 7/16/2018 at Unknown time  Yes Yes   Sig: Take  by mouth. Takes one women's multivitamin po daily. aspirin (ASPIRIN) 325 mg tablet 7/16/2018 at Unknown time  Yes Yes   Sig: Take 325-650 mg by mouth as needed for Pain. [History of Hormone Replacement Treatment] : has no history of hormone replacement treatment cyanocobalamin, vitamin B-12, (VITAMIN B-12) 5,000 mcg subl 2018 at Unknown time  Yes Yes   Si,000 mcg by SubLINGual route daily. diphenhydrAMINE (BENADRYL ALLERGY) 25 mg tablet 2018 at Unknown time  Yes Yes   Sig: Take 50 mg by mouth as needed for Sleep. fluticasone (FLONASE) 50 mcg/actuation nasal spray 2018 at Unknown time  Yes Yes   Si Sprays by Both Nostrils route two (2) times a day. levothyroxine (SYNTHROID) 125 mcg tablet 2018 at Unknown time  Yes Yes   Sig: Take 125 mcg by mouth Daily (before breakfast). Indications: hypothyroidism   zolpidem (AMBIEN) 10 mg tablet 7/10/2018 at Unknown time  Yes Yes   Sig: Take 10 mg by mouth nightly as needed.       Facility-Administered Medications: None     Andrew Real, PharmD, BCPP, [facility], [facility] [FreeTextEntry5] : s/p hysterectomy and unilateral oophorectomy in 1988  [FreeTextEntry6] : denies [FreeTextEntry7] : denies [FreeTextEntry8] : denies

## 2024-09-13 ENCOUNTER — HOSPITAL ENCOUNTER (OUTPATIENT)
Facility: HOSPITAL | Age: 64
Setting detail: OUTPATIENT SURGERY
Discharge: HOME | End: 2024-09-13
Attending: PSYCHIATRY & NEUROLOGY | Admitting: PSYCHIATRY & NEUROLOGY
Payer: MEDICARE

## 2024-09-13 VITALS
OXYGEN SATURATION: 95 % | HEART RATE: 83 BPM | RESPIRATION RATE: 19 BRPM | DIASTOLIC BLOOD PRESSURE: 72 MMHG | HEIGHT: 63 IN | BODY MASS INDEX: 33.49 KG/M2 | SYSTOLIC BLOOD PRESSURE: 126 MMHG | WEIGHT: 189 LBS | TEMPERATURE: 98.4 F

## 2024-09-13 LAB
GLUCOSE BLD STRIP.AUTO-MCNC: 108 MG/DL (ref 65–117)
SERVICE CMNT-IMP: NORMAL

## 2024-09-13 PROCEDURE — 6360000002 HC RX W HCPCS: Performed by: NURSE ANESTHETIST, CERTIFIED REGISTERED

## 2024-09-13 PROCEDURE — 2709999900 HC NON-CHARGEABLE SUPPLY: Performed by: PSYCHIATRY & NEUROLOGY

## 2024-09-13 PROCEDURE — 7100000001 HC PACU RECOVERY - ADDTL 15 MIN: Performed by: PSYCHIATRY & NEUROLOGY

## 2024-09-13 PROCEDURE — 2500000003 HC RX 250 WO HCPCS: Performed by: NURSE ANESTHETIST, CERTIFIED REGISTERED

## 2024-09-13 PROCEDURE — 90870 ELECTROCONVULSIVE THERAPY: CPT | Performed by: PSYCHIATRY & NEUROLOGY

## 2024-09-13 PROCEDURE — 82962 GLUCOSE BLOOD TEST: CPT

## 2024-09-13 PROCEDURE — 3700000000 HC ANESTHESIA ATTENDED CARE: Performed by: PSYCHIATRY & NEUROLOGY

## 2024-09-13 PROCEDURE — 7100000010 HC PHASE II RECOVERY - FIRST 15 MIN: Performed by: PSYCHIATRY & NEUROLOGY

## 2024-09-13 PROCEDURE — 7100000000 HC PACU RECOVERY - FIRST 15 MIN: Performed by: PSYCHIATRY & NEUROLOGY

## 2024-09-13 RX ORDER — SODIUM CHLORIDE 0.9 % (FLUSH) 0.9 %
5-40 SYRINGE (ML) INJECTION PRN
Status: DISCONTINUED | OUTPATIENT
Start: 2024-09-13 | End: 2024-09-13 | Stop reason: HOSPADM

## 2024-09-13 RX ORDER — FENTANYL CITRATE 50 UG/ML
100 INJECTION, SOLUTION INTRAMUSCULAR; INTRAVENOUS
Status: DISCONTINUED | OUTPATIENT
Start: 2024-09-13 | End: 2024-09-13 | Stop reason: HOSPADM

## 2024-09-13 RX ORDER — SUCCINYLCHOLINE/SOD CL,ISO/PF 100 MG/5ML
SYRINGE (ML) INTRAVENOUS
Status: DISCONTINUED | OUTPATIENT
Start: 2024-09-13 | End: 2024-09-13 | Stop reason: SDUPTHER

## 2024-09-13 RX ORDER — SODIUM CHLORIDE 0.9 % (FLUSH) 0.9 %
5-40 SYRINGE (ML) INJECTION EVERY 12 HOURS SCHEDULED
Status: DISCONTINUED | OUTPATIENT
Start: 2024-09-13 | End: 2024-09-13 | Stop reason: HOSPADM

## 2024-09-13 RX ORDER — SODIUM CHLORIDE 9 MG/ML
INJECTION, SOLUTION INTRAVENOUS PRN
Status: DISCONTINUED | OUTPATIENT
Start: 2024-09-13 | End: 2024-09-13 | Stop reason: HOSPADM

## 2024-09-13 RX ORDER — SODIUM CHLORIDE, SODIUM LACTATE, POTASSIUM CHLORIDE, CALCIUM CHLORIDE 600; 310; 30; 20 MG/100ML; MG/100ML; MG/100ML; MG/100ML
INJECTION, SOLUTION INTRAVENOUS CONTINUOUS
Status: DISCONTINUED | OUTPATIENT
Start: 2024-09-13 | End: 2024-09-13 | Stop reason: HOSPADM

## 2024-09-13 RX ORDER — LIDOCAINE HYDROCHLORIDE 10 MG/ML
1 INJECTION, SOLUTION EPIDURAL; INFILTRATION; INTRACAUDAL; PERINEURAL
Status: DISCONTINUED | OUTPATIENT
Start: 2024-09-13 | End: 2024-09-13 | Stop reason: HOSPADM

## 2024-09-13 RX ORDER — NALOXONE HYDROCHLORIDE 0.4 MG/ML
INJECTION, SOLUTION INTRAMUSCULAR; INTRAVENOUS; SUBCUTANEOUS PRN
Status: DISCONTINUED | OUTPATIENT
Start: 2024-09-13 | End: 2024-09-13 | Stop reason: HOSPADM

## 2024-09-13 RX ORDER — ONDANSETRON 2 MG/ML
4 INJECTION INTRAMUSCULAR; INTRAVENOUS
Status: DISCONTINUED | OUTPATIENT
Start: 2024-09-13 | End: 2024-09-13 | Stop reason: HOSPADM

## 2024-09-13 RX ORDER — FENTANYL CITRATE 50 UG/ML
25 INJECTION, SOLUTION INTRAMUSCULAR; INTRAVENOUS EVERY 5 MIN PRN
Status: DISCONTINUED | OUTPATIENT
Start: 2024-09-13 | End: 2024-09-13 | Stop reason: HOSPADM

## 2024-09-13 RX ORDER — ACETAMINOPHEN 500 MG
1000 TABLET ORAL ONCE
Status: DISCONTINUED | OUTPATIENT
Start: 2024-09-13 | End: 2024-09-13 | Stop reason: HOSPADM

## 2024-09-13 RX ORDER — MIDAZOLAM HYDROCHLORIDE 1 MG/ML
2 INJECTION INTRAMUSCULAR; INTRAVENOUS
Status: DISCONTINUED | OUTPATIENT
Start: 2024-09-13 | End: 2024-09-13 | Stop reason: HOSPADM

## 2024-09-13 RX ORDER — OXYCODONE HYDROCHLORIDE 5 MG/1
5 TABLET ORAL
Status: DISCONTINUED | OUTPATIENT
Start: 2024-09-13 | End: 2024-09-13 | Stop reason: HOSPADM

## 2024-09-13 RX ADMIN — METHOHEXITAL SODIUM 50 MG: 500 INJECTION, POWDER, LYOPHILIZED, FOR SOLUTION INTRAMUSCULAR; INTRAVENOUS; RECTAL at 08:36

## 2024-09-13 RX ADMIN — Medication 50 MG: at 08:36

## 2024-09-13 ASSESSMENT — PAIN SCALES - GENERAL: PAINLEVEL_OUTOF10: 0

## 2024-10-04 ENCOUNTER — ANESTHESIA EVENT (OUTPATIENT)
Facility: HOSPITAL | Age: 64
End: 2024-10-04
Payer: MEDICARE

## 2024-10-04 ENCOUNTER — HOSPITAL ENCOUNTER (OUTPATIENT)
Facility: HOSPITAL | Age: 64
Setting detail: OUTPATIENT SURGERY
Discharge: HOME | End: 2024-10-04
Attending: PSYCHIATRY & NEUROLOGY | Admitting: PSYCHIATRY & NEUROLOGY
Payer: MEDICARE

## 2024-10-04 ENCOUNTER — ANESTHESIA (OUTPATIENT)
Facility: HOSPITAL | Age: 64
End: 2024-10-04
Payer: MEDICARE

## 2024-10-04 VITALS
TEMPERATURE: 98.7 F | HEART RATE: 90 BPM | WEIGHT: 181 LBS | OXYGEN SATURATION: 91 % | DIASTOLIC BLOOD PRESSURE: 69 MMHG | SYSTOLIC BLOOD PRESSURE: 122 MMHG | BODY MASS INDEX: 32.07 KG/M2 | RESPIRATION RATE: 14 BRPM | HEIGHT: 63 IN

## 2024-10-04 LAB
GLUCOSE BLD STRIP.AUTO-MCNC: 103 MG/DL (ref 65–117)
SERVICE CMNT-IMP: NORMAL

## 2024-10-04 PROCEDURE — 90870 ELECTROCONVULSIVE THERAPY: CPT | Performed by: PSYCHIATRY & NEUROLOGY

## 2024-10-04 PROCEDURE — 7100000010 HC PHASE II RECOVERY - FIRST 15 MIN: Performed by: PSYCHIATRY & NEUROLOGY

## 2024-10-04 PROCEDURE — 2709999900 HC NON-CHARGEABLE SUPPLY: Performed by: PSYCHIATRY & NEUROLOGY

## 2024-10-04 PROCEDURE — 3700000000 HC ANESTHESIA ATTENDED CARE: Performed by: PSYCHIATRY & NEUROLOGY

## 2024-10-04 PROCEDURE — 6360000002 HC RX W HCPCS: Performed by: ANESTHESIOLOGY

## 2024-10-04 PROCEDURE — 7100000000 HC PACU RECOVERY - FIRST 15 MIN: Performed by: PSYCHIATRY & NEUROLOGY

## 2024-10-04 PROCEDURE — 7100000001 HC PACU RECOVERY - ADDTL 15 MIN: Performed by: PSYCHIATRY & NEUROLOGY

## 2024-10-04 PROCEDURE — 82962 GLUCOSE BLOOD TEST: CPT

## 2024-10-04 PROCEDURE — 2500000003 HC RX 250 WO HCPCS: Performed by: ANESTHESIOLOGY

## 2024-10-04 RX ORDER — SODIUM CHLORIDE 0.9 % (FLUSH) 0.9 %
5-40 SYRINGE (ML) INJECTION PRN
Status: DISCONTINUED | OUTPATIENT
Start: 2024-10-04 | End: 2024-10-04 | Stop reason: HOSPADM

## 2024-10-04 RX ORDER — SUCCINYLCHOLINE/SOD CL,ISO/PF 100 MG/5ML
SYRINGE (ML) INTRAVENOUS
Status: DISCONTINUED | OUTPATIENT
Start: 2024-10-04 | End: 2024-10-04 | Stop reason: SDUPTHER

## 2024-10-04 RX ORDER — SODIUM CHLORIDE 9 MG/ML
INJECTION, SOLUTION INTRAVENOUS PRN
Status: DISCONTINUED | OUTPATIENT
Start: 2024-10-04 | End: 2024-10-04 | Stop reason: HOSPADM

## 2024-10-04 RX ORDER — SODIUM CHLORIDE 0.9 % (FLUSH) 0.9 %
5-40 SYRINGE (ML) INJECTION EVERY 12 HOURS SCHEDULED
Status: DISCONTINUED | OUTPATIENT
Start: 2024-10-04 | End: 2024-10-04 | Stop reason: HOSPADM

## 2024-10-04 RX ORDER — ONDANSETRON 2 MG/ML
4 INJECTION INTRAMUSCULAR; INTRAVENOUS
Status: DISCONTINUED | OUTPATIENT
Start: 2024-10-04 | End: 2024-10-04 | Stop reason: HOSPADM

## 2024-10-04 RX ORDER — SODIUM CHLORIDE, SODIUM LACTATE, POTASSIUM CHLORIDE, CALCIUM CHLORIDE 600; 310; 30; 20 MG/100ML; MG/100ML; MG/100ML; MG/100ML
INJECTION, SOLUTION INTRAVENOUS CONTINUOUS
Status: DISCONTINUED | OUTPATIENT
Start: 2024-10-04 | End: 2024-10-04 | Stop reason: HOSPADM

## 2024-10-04 RX ORDER — HYDRALAZINE HYDROCHLORIDE 20 MG/ML
10 INJECTION INTRAMUSCULAR; INTRAVENOUS
Status: DISCONTINUED | OUTPATIENT
Start: 2024-10-04 | End: 2024-10-04 | Stop reason: HOSPADM

## 2024-10-04 RX ADMIN — METHOHEXITAL SODIUM 50 MG: 500 INJECTION, POWDER, LYOPHILIZED, FOR SOLUTION INTRAMUSCULAR; INTRAVENOUS; RECTAL at 08:54

## 2024-10-04 RX ADMIN — Medication 50 MG: at 08:54

## 2024-10-04 ASSESSMENT — PAIN SCALES - GENERAL: PAINLEVEL_OUTOF10: 0

## 2024-10-04 NOTE — ANESTHESIA POSTPROCEDURE EVALUATION
Post-Anesthesia Evaluation and Assessment    Patient: Anuradha Lawler MRN: [id number]  SSN: [id number]    YOB: 1960  Age: 63 y.o.  Sex: female      I have evaluated the patient and they are stable and ready for discharge from the PACU.     Cardiovascular Function/Vital Signs  Visit Vitals  /69   Pulse 90   Temp 98.9 °F (37.2 °C) (Temporal)   Resp 14   Ht 1.6 m (5' 3\")   Wt 82.1 kg (181 lb)   SpO2 91%   BMI 32.06 kg/m²       Patient is status post General anesthesia for Procedure(s):  ELECTROCONVULSIVE THERAPY.    Nausea/Vomiting: None    Postoperative hydration reviewed and adequate.    Pain:      Managed    Neurological Status:       At baseline    Mental Status, Level of Consciousness: Alert and  oriented to person, place, and time    Pulmonary Status:       Adequate oxygenation and airway patent    Complications related to anesthesia: None    Post-anesthesia assessment completed. No concerns    Signed By: Elissa Vail MD     October 4, 2024            Department of Anesthesiology  Postprocedure Note    Patient: Anuradha Lawler  MRN: [id number]  YOB: 1960  Date of evaluation: 10/4/2024    Procedure Summary       Date: 10/04/24 Room / Location: [facility] MAIN OR  / [facility] MAIN OR    Anesthesia Start: 0850 Anesthesia Stop: 0858    Procedure: ELECTROCONVULSIVE THERAPY (Head) Diagnosis:       Major depressive disorder, recurrent episode, moderate (HCC)      (Major depressive disorder, recurrent episode, moderate (HCC) [F33.1])    Surgeons: Yolanda Naranjo MD Responsible Provider: Elissa Vail MD    Anesthesia Type: general ASA Status: 2            Anesthesia Type: No value filed.    Ama Phase I: Ama Score: 10    Ama Phase II:      Anesthesia Post Evaluation    No notable events documented.

## 2024-10-04 NOTE — PROCEDURES
ECT PROCEDURE NOTE         IDENTIFICATION:           Patient Name   Anuradha Lawler         Date of Birth  1960         [facility]  [id number]         Medical Record Number   [id number]             Age   62 y.o.         PCP  Cyndi Curtis MD         Admit date:  10/04/24           Room Number   PACU/PL  @ [facility]         Date of Service   10/04/24                          Electro Convulsive Therapy:   Treatment number 38         Maintenance ECT - YES        Anuradha Lawler was admitted to the PACU for ECT. Patient was medically cleared and NPO for procedure. Patient is NOT court mandated and gave informed consent for ECT treatment.        Patient received       Bilateral ECT YES    Administered using the Thymatron System IV - [employer].       at 40 % Energy, under general anesthesia.    Anuradha Lawler with a good, well generalized seizure of 73 seconds on observation of the motor manifestations of the seizure. EEG duration was 84 secs.    Post-Ictal Suppression Index: 68.4 %.    Recovery was unremarkable and with no complications.       Change in Energy %:                 Anesthesia medications administered during procedure:   Brevital:  50 mg   Change for next treament:       Succinylcholine: 50 mg   Change for next treatment:        Propofol: mg   Glycopyrrolate:  mg   Labetalol:  mg   Esmolol:  mg   Lorazepam:  mg   Ketamine:  mg   Zofran:   mg     Toradol:  mg   Lidocaine:  mg   Caffeine Benzoate: mg               CSSRS  1/26/2023 7/8/2022         1) Within the past month, have you wished you were dead or wished you could go to sleep and not wake up?   No  No     2) Have you actually had any thoughts of killing yourself?  No  No     6) Have you ever done anything, started to do anything, or prepared to do anything to end your life?  Yes  No         Did this occur within the past 3 months?  Yes  -

## 2024-10-04 NOTE — ANESTHESIA PRE PROCEDURE
Department of Anesthesiology  Preprocedure Note       Name:  Anuradha Lawler   Age:  63 y.o.  :  1960                                          MRN:  [id number]         Date:  10/4/2024      Surgeon: Surgeon(s):  Yolanda Naranjo MD    Procedure: Procedure(s):  ELECTROCONVULSIVE THERAPY    Medications prior to admission:   Prior to Admission medications    Medication Sig Start Date End Date Taking? Authorizing Provider   lidocaine viscous hcl (XYLOCAINE) 2 % SOLN solution Take 5 mLs by mouth as needed for Irritation Gargle and spit twice a day as needed 6/15/24   Sara Lozano APRN - NP   tiotropium (SPIRIVA) 18 MCG inhalation capsule Inhale 1 capsule into the lungs 4/30/24 10/27/24  Víctor Grande MD   Rosuvastatin Calcium (CRESTOR PO) Take by mouth    Víctor Grande MD   hydrocortisone 1 % cream Apply topically 2 times daily Apply topically 2 times daily.    ProviderVíctor MD   metFORMIN (GLUCOPHAGE) 1000 MG tablet Take 1 tablet by mouth daily (with breakfast)  Patient not taking: Reported on 2024    ProviderVíctor MD   OLANZapine (ZYPREXA) 20 MG tablet Take 1 tablet by mouth in the morning and at bedtime 7/10/23   Panfilo Currie MD   levothyroxine (SYNTHROID) 100 MCG tablet Take 1 tablet by mouth Daily    ProviderVíctor MD   hydrOXYzine HCl (ATARAX) 25 MG tablet Take 1 tablet by mouth 3 times daily as needed for Anxiety    ProviderVíctor MD   apixaban (ELIQUIS) 5 MG TABS tablet Take 1 tablet by mouth 2 times daily H/o DVT 23   Automatic Reconciliation, Ar   midodrine (PROAMATINE) 10 MG tablet Take 1 tablet by mouth 3 times daily (with meals) 23   Automatic Reconciliation, Ar   mirtazapine (REMERON) 45 MG tablet Take 1 tablet by mouth nightly 23   Automatic Reconciliation, Ar   venlafaxine (EFFEXOR XR) 150 MG extended release capsule Take 2 capsules by mouth daily 23   Automatic Reconciliation, Ar       Current medications:    No

## 2024-10-04 NOTE — DISCHARGE INSTRUCTIONS
ECT DISCHARGE INSTRUCTIONS      NAME: Anuradha Lawler   :  1960   MRN:  [id number]     Date/Time:  10/4/2024 8:58 AM    ADMIT DATE: 10/4/2024     DISCHARGE DATE: 10/4/2024     DISCHARGE DIAGNOSIS:  [unfilled]     Recommended diet:  As tolerated.     Recommended activity:      Have a responsible person stay with the patient for 24 hours after the treatment, some temporary confusion may be noticed.    Do not allow a patient to operate a motor vehicle for at least 24 hours and all the confusion has cleared.    Do not drink alcoholic beverages for 48 hours past treatment.    Do not sign any legal documents.    Do not make any critical decisions for 24 hours.    Advance diet as tolerated. Start with liquids and advance it nausea is not present.    Understand that memory for recent events, phone numbers, addresses, ect. May be decreased for a short period of time.     Report any persistant nausea or pain to the treating physician.    Patient receiving ECT while in the hospital should also not attempt to ambulate without assistance, please use tap bell which will be provide.    If you experience any of the following symptoms then please call your primary care physician/outpatient psychiatrist or return to the emergency room if you cannot get hold of your doctor: Fever, chills, nausea, vomiting, diarrhea, change in mentation, falling, bleeding, shortness of breath. Please call the emergency room at [facility] [phone] in this case.     Follow Up:  Continue outpatient psychiatric follow-up visits with your personal psychiatrist.       DISCHARGE SUMMARY from Nurse    PATIENT INSTRUCTIONS:    After general anesthesia or intravenous sedation, for 24 hours or while taking prescription Narcotics:  Limit your activities  Do not drive and operate hazardous machinery  Do not make important personal or business decisions  Do  not drink alcoholic beverages  If you have not urinated

## 2024-10-04 NOTE — PERIOP NOTE
Anuradha CARLIN Lawler  1960  [id number]    Situation:  Verbal report given from: Andrew Ndiaye RN  Procedure: Procedure(s):  ELECTROCONVULSIVE THERAPY    Background:    Preoperative diagnosis: Major depressive disorder, recurrent episode, moderate (HCC) [F33.1]    Postoperative diagnosis: * No post-op diagnosis entered *    :  Dr. Naranjo    Assistant(s): Circulator: Andrew Ndiaye RN  Scrub Person First: Alessandra Encinas    Specimens: * No specimens in log *    Assessment:  Intra-procedure medications         Anesthesia gave intra-procedure sedation and medications, see anesthesia flow sheet     Intravenous fluids: LR@ KVO     Vital signs stable

## 2024-10-04 NOTE — H&P
Update History & Physical for Anuradha Lawler      This is an update on the patient's History and Physical done today 10/04/24. The ECT procedure was reviewed with the patient and I examined the patient. There was no change. The procedure site was confirmed by the patient and me.       Plan: The risks, benefits, expected outcome, and alternative to the recommended procedure have been discussed with the patient. Patient understands and wants to proceed with the procedure.     Electronically signed by TANISHA KUMAR MD on 10/4/2024 at 8:32 AM

## 2024-10-30 ENCOUNTER — ANESTHESIA EVENT (OUTPATIENT)
Facility: HOSPITAL | Age: 64
End: 2024-10-30
Payer: MEDICARE

## 2024-11-01 ENCOUNTER — ANESTHESIA (OUTPATIENT)
Facility: HOSPITAL | Age: 64
End: 2024-11-01
Payer: MEDICARE

## 2024-11-01 ENCOUNTER — HOSPITAL ENCOUNTER (OUTPATIENT)
Facility: HOSPITAL | Age: 64
Setting detail: OUTPATIENT SURGERY
Discharge: HOME | End: 2024-11-01
Attending: PSYCHIATRY & NEUROLOGY | Admitting: PSYCHIATRY & NEUROLOGY
Payer: MEDICARE

## 2024-11-01 VITALS
HEIGHT: 62 IN | WEIGHT: 181 LBS | RESPIRATION RATE: 17 BRPM | BODY MASS INDEX: 33.31 KG/M2 | OXYGEN SATURATION: 93 % | SYSTOLIC BLOOD PRESSURE: 132 MMHG | HEART RATE: 86 BPM | DIASTOLIC BLOOD PRESSURE: 81 MMHG | TEMPERATURE: 98.3 F

## 2024-11-01 LAB
GLUCOSE BLD STRIP.AUTO-MCNC: 118 MG/DL (ref 65–117)
SERVICE CMNT-IMP: ABNORMAL

## 2024-11-01 PROCEDURE — 2709999900 HC NON-CHARGEABLE SUPPLY: Performed by: PSYCHIATRY & NEUROLOGY

## 2024-11-01 PROCEDURE — 6360000002 HC RX W HCPCS: Performed by: NURSE ANESTHETIST, CERTIFIED REGISTERED

## 2024-11-01 PROCEDURE — 7100000011 HC PHASE II RECOVERY - ADDTL 15 MIN: Performed by: PSYCHIATRY & NEUROLOGY

## 2024-11-01 PROCEDURE — 3700000000 HC ANESTHESIA ATTENDED CARE: Performed by: PSYCHIATRY & NEUROLOGY

## 2024-11-01 PROCEDURE — 90870 ELECTROCONVULSIVE THERAPY: CPT | Performed by: PSYCHIATRY & NEUROLOGY

## 2024-11-01 PROCEDURE — 7100000010 HC PHASE II RECOVERY - FIRST 15 MIN: Performed by: PSYCHIATRY & NEUROLOGY

## 2024-11-01 PROCEDURE — 7100000001 HC PACU RECOVERY - ADDTL 15 MIN: Performed by: PSYCHIATRY & NEUROLOGY

## 2024-11-01 PROCEDURE — 2580000003 HC RX 258: Performed by: NURSE ANESTHETIST, CERTIFIED REGISTERED

## 2024-11-01 PROCEDURE — 82962 GLUCOSE BLOOD TEST: CPT

## 2024-11-01 PROCEDURE — 7100000000 HC PACU RECOVERY - FIRST 15 MIN: Performed by: PSYCHIATRY & NEUROLOGY

## 2024-11-01 RX ORDER — SODIUM CHLORIDE 0.9 % (FLUSH) 0.9 %
5-40 SYRINGE (ML) INJECTION PRN
Status: DISCONTINUED | OUTPATIENT
Start: 2024-11-01 | End: 2024-11-01 | Stop reason: HOSPADM

## 2024-11-01 RX ORDER — SODIUM CHLORIDE 9 MG/ML
INJECTION, SOLUTION INTRAVENOUS PRN
Status: DISCONTINUED | OUTPATIENT
Start: 2024-11-01 | End: 2024-11-01 | Stop reason: HOSPADM

## 2024-11-01 RX ORDER — ONDANSETRON 2 MG/ML
4 INJECTION INTRAMUSCULAR; INTRAVENOUS
Status: DISCONTINUED | OUTPATIENT
Start: 2024-11-01 | End: 2024-11-01 | Stop reason: HOSPADM

## 2024-11-01 RX ORDER — SODIUM CHLORIDE 0.9 % (FLUSH) 0.9 %
5-40 SYRINGE (ML) INJECTION EVERY 12 HOURS SCHEDULED
Status: DISCONTINUED | OUTPATIENT
Start: 2024-11-01 | End: 2024-11-01 | Stop reason: HOSPADM

## 2024-11-01 RX ORDER — SUCCINYLCHOLINE/SOD CL,ISO/PF 100 MG/5ML
SYRINGE (ML) INTRAVENOUS
Status: DISCONTINUED | OUTPATIENT
Start: 2024-11-01 | End: 2024-11-01 | Stop reason: SDUPTHER

## 2024-11-01 RX ORDER — HYDRALAZINE HYDROCHLORIDE 20 MG/ML
10 INJECTION INTRAMUSCULAR; INTRAVENOUS
Status: DISCONTINUED | OUTPATIENT
Start: 2024-11-01 | End: 2024-11-01 | Stop reason: HOSPADM

## 2024-11-01 RX ORDER — METHOHEXITAL IN WATER/PF 100MG/10ML
SYRINGE (ML) INTRAVENOUS
Status: DISCONTINUED | OUTPATIENT
Start: 2024-11-01 | End: 2024-11-01 | Stop reason: SDUPTHER

## 2024-11-01 RX ORDER — SODIUM CHLORIDE 0.9 % (FLUSH) 0.9 %
SYRINGE (ML) INJECTION
Status: DISCONTINUED | OUTPATIENT
Start: 2024-11-01 | End: 2024-11-01 | Stop reason: SDUPTHER

## 2024-11-01 RX ADMIN — Medication 50 MG: at 09:15

## 2024-11-01 RX ADMIN — SODIUM CHLORIDE, PRESERVATIVE FREE 10 ML: 5 INJECTION INTRAVENOUS at 09:15

## 2024-11-01 NOTE — ANESTHESIA POSTPROCEDURE EVALUATION
Post-Anesthesia Evaluation and Assessment    Patient: Anuradha Lawler MRN: [id number]  SSN: [id number]    YOB: 1960  Age: 63 y.o.  Sex: female      I have evaluated the patient and they are stable and ready for discharge from the PACU.     Cardiovascular Function/Vital Signs  Visit Vitals  /81   Pulse 87   Temp 99.2 °F (37.3 °C) (Temporal)   Resp 11   Ht 1.575 m (5' 2\")   Wt 82.1 kg (181 lb)   SpO2 94%   BMI 33.11 kg/m²       Patient is status post General anesthesia for Procedure(s):  ELECTROCONVULSIVE THERAPY.    Nausea/Vomiting: None    Postoperative hydration reviewed and adequate.    Pain:      Managed    Neurological Status:       At baseline    Mental Status, Level of Consciousness: Alert and  oriented to person, place, and time    Pulmonary Status:       Adequate oxygenation and airway patent    Complications related to anesthesia: None    Post-anesthesia assessment completed. No concerns    Signed By: Elissa Vail MD     November 1, 2024            Department of Anesthesiology  Postprocedure Note    Patient: Anuradha Lawler  MRN: [id number]  YOB: 1960  Date of evaluation: 11/1/2024    Procedure Summary       Date: 11/01/24 Room / Location: [facility] MAIN OR  / [facility] MAIN OR    Anesthesia Start: 0911 Anesthesia Stop: 0921    Procedure: ELECTROCONVULSIVE THERAPY (Head) Diagnosis:       Major depressive disorder, recurrent episode, moderate (HCC)      (Major depressive disorder, recurrent episode, moderate (HCC) [F33.1])    Surgeons: Yolanda Naranjo MD Responsible Provider: Elissa Vail MD    Anesthesia Type: MAC ASA Status: 2            Anesthesia Type: MAC    Ama Phase I: Ama Score: 5    Ama Phase II: Ama Score: 10    Anesthesia Post Evaluation    No notable events documented.

## 2024-11-01 NOTE — DISCHARGE INSTRUCTIONS
ECT DISCHARGE INSTRUCTIONS      NAME: Anuradha Lawler   :  1960   MRN:  [id number]     Date/Time:  2024 9:19 AM    ADMIT DATE: 2024     DISCHARGE DATE: 2024     DISCHARGE DIAGNOSIS:  [unfilled]     Recommended diet:  As tolerated.     Recommended activity:      Have a responsible person stay with the patient for 24 hours after the treatment, some temporary confusion may be noticed.    Do not allow a patient to operate a motor vehicle for at least 24 hours and all the confusion has cleared.    Do not drink alcoholic beverages for 48 hours past treatment.    Do not sign any legal documents.    Do not make any critical decisions for 24 hours.    Advance diet as tolerated. Start with liquids and advance it nausea is not present.    Understand that memory for recent events, phone numbers, addresses, ect. May be decreased for a short period of time.     Report any persistant nausea or pain to the treating physician.    Patient receiving ECT while in the hospital should also not attempt to ambulate without assistance, please use tap bell which will be provide.    If you experience any of the following symptoms then please call your primary care physician/outpatient psychiatrist or return to the emergency room if you cannot get hold of your doctor: Fever, chills, nausea, vomiting, diarrhea, change in mentation, falling, bleeding, shortness of breath. Please call the emergency room at [facility] [phone] in this case.     Follow Up:  Continue outpatient psychiatric follow-up visits with your personal psychiatrist.         DISCHARGE SUMMARY from Nurse    PATIENT INSTRUCTIONS:    After general anesthesia or intravenous sedation, for 24 hours or while taking prescription Narcotics:  Limit your activities  Do not drive and operate hazardous machinery  Do not make important personal or business decisions  Do  not drink alcoholic beverages  If you have not urinated  within 8 hours after discharge, please contact your surgeon on call.    Report the following to your surgeon:  Excessive pain, swelling, redness or odor of or around the surgical area  Temperature over 100.5  Nausea and vomiting lasting longer than 4 hours or if unable to take medications  Any signs of decreased circulation or nerve impairment to extremity: change in color, persistent  numbness, tingling, coldness or increase pain  Any questions    What to do at Home:  Recommended activity: activity as tolerated and no driving for today, 11/1/2024    These are general instructions for a healthy lifestyle:    No smoking/ No tobacco products/ Avoid exposure to second hand smoke  Surgeon General's Warning:  Quitting smoking now greatly reduces serious risk to your health.    Obesity, smoking, and sedentary lifestyle greatly increases your risk for illness    A healthy diet, regular physical exercise & weight monitoring are important for maintaining a healthy lifestyle    You may be retaining fluid if you have a history of heart failure or if you experience any of the following symptoms:  Weight gain of 3 pounds or more overnight or 5 pounds in a week, increased swelling in our hands or feet or shortness of breath while lying flat in bed.  Please call your doctor as soon as you notice any of these symptoms; do not wait until your next office visit.        The discharge information has been reviewed with the patient and guardian.  The patient and guardian verbalized understanding.  Discharge medications reviewed with the patient and guardian and appropriate educational materials and side effects teaching were provided.  ___________________________________________________________________________________________________________________________________

## 2024-11-01 NOTE — PROGRESS NOTES
Patient meets discharge criteria.  Patient and Ann provided with verbal and written discharge instructions.  Patient discharged by wheelchair with Ann to transport home.

## 2024-11-01 NOTE — H&P
Update History & Physical for Anuradha Lawler      This is an update on the patient's History and Physical done today 11/01/24. The ECT procedure was reviewed with the patient and I examined the patient. There was no change. The procedure site was confirmed by the patient and me.       Plan: The risks, benefits, expected outcome, and alternative to the recommended procedure have been discussed with the patient. Patient understands and wants to proceed with the procedure.     Electronically signed by TANISHA KUMAR MD on 11/1/2024 at 8:54 AM

## 2024-11-01 NOTE — PROGRESS NOTES
Anuradha CARLIN Lawler  1960  [id number]    Situation:  Verbal report given from: Swati Armendariz  Procedure: Procedure(s):  ELECTROCONVULSIVE THERAPY    Background:    Preoperative diagnosis: Major depressive disorder, recurrent episode, moderate (HCC) [F33.1]    Postoperative diagnosis: * No post-op diagnosis entered *    :  Dr. Jose A Guerrero    Assistant(s): Circulator: Swati Armendariz RN  Scrub Person First: Andrew Ndiaye RN    Specimens: * No specimens in log *    Assessment:  Intra-procedure medications         Anesthesia gave intra-procedure sedation and medications, see anesthesia flow sheet     Intravenous fluids: LR@ KVO     Vital signs stable

## 2024-11-01 NOTE — PROCEDURES
ECT PROCEDURE NOTE         IDENTIFICATION:           Patient Name   Anuradha Lawler         Date of Birth  1960         [facility]  [id number]         Medical Record Number   [id number]             Age   62 y.o.         PCP  Cyndi Curtis MD         Admit date:  11/01/24           Room Number   PACU/PL  @ [facility]         Date of Service   11/01/24                          Electro Convulsive Therapy:   Treatment number 39         Maintenance ECT - YES        Anuradha Lawler was admitted to the PACU for ECT. Patient was medically cleared and NPO for procedure. Patient is NOT court mandated and gave informed consent for ECT treatment.        Patient received       Bilateral ECT YES    Administered using the Thymatron System IV - [employer].       at 40 % Energy, under general anesthesia.    Anuradha Lawler with a good, well generalized seizure of 51 seconds on observation of the motor manifestations of the seizure. EEG duration was 55 secs.    Post-Ictal Suppression Index: 86.7 %.    Recovery was unremarkable and with no complications.       Change in Energy %:                 Anesthesia medications administered during procedure:   Brevital:  50 mg   Change for next treament:       Succinylcholine: 50 mg   Change for next treatment:        Propofol: mg   Glycopyrrolate:  mg   Labetalol:  mg   Esmolol:  mg   Lorazepam:  mg   Ketamine:  mg   Zofran:   mg     Toradol:  mg   Lidocaine:  mg   Caffeine Benzoate: mg               CSSRS  1/26/2023 7/8/2022         1) Within the past month, have you wished you were dead or wished you could go to sleep and not wake up?   No  No     2) Have you actually had any thoughts of killing yourself?  No  No     6) Have you ever done anything, started to do anything, or prepared to do anything to end your life?  Yes  No         Did this occur within the past 3 months?  Yes  -

## 2024-11-01 NOTE — ANESTHESIA PRE PROCEDURE
THERAPY  10/20/2023   • ELECTROCONVULSIVE THERAPY N/A 10/20/2023    ELECTROCONVULSIVE THERAPY performed by Yolanda Naranjo MD at [facility] MAIN OR   • ELECTROCONVULSIVE THERAPY N/A 11/3/2023    ELECTROCONVULSIVE THERAPY performed by Yolanda Naranjo MD at [facility] MAIN OR   • ELECTROCONVULSIVE THERAPY  12/1/2023   • ELECTROCONVULSIVE THERAPY N/A 12/1/2023    ELECTROCONVULSIVE THERAPY performed by Yolanda Naranjo MD at [facility] MAIN OR   • ELECTROCONVULSIVE THERAPY  12/22/2023   • ELECTROCONVULSIVE THERAPY N/A 12/22/2023    ELECTROCONVULSIVE THERAPY performed by Yolanda Naranjo MD at [facility] MAIN OR   • ELECTROCONVULSIVE THERAPY  1/26/2024   • ELECTROCONVULSIVE THERAPY N/A 1/26/2024    ELECTROCONVULSIVE THERAPY performed by Yolanda Naranjo MD at [facility] MAIN OR   • ELECTROCONVULSIVE THERAPY N/A 2/23/2024    ELECTROCONVULSIVE THERAPY performed by Yolanda Naranjo MD at [facility] MAIN OR   • ELECTROCONVULSIVE THERAPY N/A 3/22/2024    ELECTROCONVULSIVE THERAPY performed by Yolanda Naranjo MD at [facility] MAIN OR   • ELECTROCONVULSIVE THERAPY  4/19/2024   • ELECTROCONVULSIVE THERAPY N/A 4/19/2024    ELECTROCONVULSIVE THERAPY performed by Yolanda Naranjo MD at [facility] MAIN OR   • ELECTROCONVULSIVE THERAPY N/A 5/17/2024    ELECTROCONVULSIVE THERAPY performed by Yolanda Naranjo MD at [facility] MAIN OR   • ELECTROCONVULSIVE THERAPY  6/14/2024   • ELECTROCONVULSIVE THERAPY N/A 6/14/2024    ELECTROCONVULSIVE THERAPY performed by Yolanda Naranjo MD at [facility] MAIN OR   • ELECTROCONVULSIVE THERAPY  7/12/2024   • ELECTROCONVULSIVE THERAPY N/A 7/12/2024    ELECTROCONVULSIVE THERAPY performed by Yolanda Naranjo MD at [facility] MAIN OR   • ELECTROCONVULSIVE THERAPY  8/9/2024   • ELECTROCONVULSIVE THERAPY N/A 8/9/2024    ELECTROCONVULSIVE THERAPY performed by Yolanda Naranjo MD at [facility] MAIN OR   • ELECTROCONVULSIVE THERAPY N/A 9/13/2024    ELECTROCONVULSIVE THERAPY performed by Yolanda Naranjo MD at [facility] MAIN OR   • ELECTROCONVULSIVE THERAPY N/A 10/4/2024    ELECTROCONVULSIVE THERAPY performed by Jose A,

## 2024-11-26 ENCOUNTER — ANESTHESIA EVENT (OUTPATIENT)
Facility: HOSPITAL | Age: 64
End: 2024-11-26
Payer: MEDICARE

## 2024-11-27 ENCOUNTER — ANESTHESIA (OUTPATIENT)
Facility: HOSPITAL | Age: 64
End: 2024-11-27
Payer: MEDICARE

## 2024-11-27 ENCOUNTER — HOSPITAL ENCOUNTER (OUTPATIENT)
Facility: HOSPITAL | Age: 64
Setting detail: OUTPATIENT SURGERY
Discharge: HOME | End: 2024-11-27
Attending: PSYCHIATRY & NEUROLOGY | Admitting: PSYCHIATRY & NEUROLOGY
Payer: MEDICARE

## 2024-11-27 VITALS
RESPIRATION RATE: 16 BRPM | HEART RATE: 78 BPM | OXYGEN SATURATION: 95 % | DIASTOLIC BLOOD PRESSURE: 72 MMHG | SYSTOLIC BLOOD PRESSURE: 122 MMHG | WEIGHT: 186 LBS | HEIGHT: 62 IN | TEMPERATURE: 98.4 F | BODY MASS INDEX: 34.23 KG/M2

## 2024-11-27 PROCEDURE — 90870 ELECTROCONVULSIVE THERAPY: CPT | Performed by: PSYCHIATRY & NEUROLOGY

## 2024-11-27 PROCEDURE — 6360000002 HC RX W HCPCS: Performed by: ANESTHESIOLOGY

## 2024-11-27 PROCEDURE — 3700000000 HC ANESTHESIA ATTENDED CARE: Performed by: PSYCHIATRY & NEUROLOGY

## 2024-11-27 PROCEDURE — 2709999900 HC NON-CHARGEABLE SUPPLY: Performed by: PSYCHIATRY & NEUROLOGY

## 2024-11-27 PROCEDURE — 7100000000 HC PACU RECOVERY - FIRST 15 MIN: Performed by: PSYCHIATRY & NEUROLOGY

## 2024-11-27 PROCEDURE — 2500000003 HC RX 250 WO HCPCS: Performed by: ANESTHESIOLOGY

## 2024-11-27 PROCEDURE — 7100000010 HC PHASE II RECOVERY - FIRST 15 MIN: Performed by: PSYCHIATRY & NEUROLOGY

## 2024-11-27 PROCEDURE — 7100000001 HC PACU RECOVERY - ADDTL 15 MIN: Performed by: PSYCHIATRY & NEUROLOGY

## 2024-11-27 RX ORDER — SODIUM CHLORIDE 0.9 % (FLUSH) 0.9 %
5-40 SYRINGE (ML) INJECTION EVERY 12 HOURS SCHEDULED
Status: DISCONTINUED | OUTPATIENT
Start: 2024-11-27 | End: 2024-11-27 | Stop reason: HOSPADM

## 2024-11-27 RX ORDER — SODIUM CHLORIDE 0.9 % (FLUSH) 0.9 %
5-40 SYRINGE (ML) INJECTION PRN
Status: DISCONTINUED | OUTPATIENT
Start: 2024-11-27 | End: 2024-11-27 | Stop reason: HOSPADM

## 2024-11-27 RX ORDER — SUCCINYLCHOLINE/SOD CL,ISO/PF 100 MG/5ML
SYRINGE (ML) INTRAVENOUS
Status: DISCONTINUED | OUTPATIENT
Start: 2024-11-27 | End: 2024-11-27 | Stop reason: SDUPTHER

## 2024-11-27 RX ORDER — HYDRALAZINE HYDROCHLORIDE 20 MG/ML
10 INJECTION INTRAMUSCULAR; INTRAVENOUS
Status: DISCONTINUED | OUTPATIENT
Start: 2024-11-27 | End: 2024-11-27 | Stop reason: HOSPADM

## 2024-11-27 RX ORDER — ONDANSETRON 2 MG/ML
4 INJECTION INTRAMUSCULAR; INTRAVENOUS
Status: DISCONTINUED | OUTPATIENT
Start: 2024-11-27 | End: 2024-11-27 | Stop reason: HOSPADM

## 2024-11-27 RX ORDER — SODIUM CHLORIDE, SODIUM LACTATE, POTASSIUM CHLORIDE, CALCIUM CHLORIDE 600; 310; 30; 20 MG/100ML; MG/100ML; MG/100ML; MG/100ML
INJECTION, SOLUTION INTRAVENOUS CONTINUOUS
Status: DISCONTINUED | OUTPATIENT
Start: 2024-11-27 | End: 2024-11-27 | Stop reason: HOSPADM

## 2024-11-27 RX ORDER — FENTANYL CITRATE 50 UG/ML
25 INJECTION, SOLUTION INTRAMUSCULAR; INTRAVENOUS EVERY 5 MIN PRN
Status: DISCONTINUED | OUTPATIENT
Start: 2024-11-27 | End: 2024-11-27 | Stop reason: HOSPADM

## 2024-11-27 RX ORDER — SODIUM CHLORIDE 9 MG/ML
INJECTION, SOLUTION INTRAVENOUS PRN
Status: DISCONTINUED | OUTPATIENT
Start: 2024-11-27 | End: 2024-11-27 | Stop reason: HOSPADM

## 2024-11-27 RX ADMIN — METHOHEXITAL SODIUM 50 MG: 500 INJECTION, POWDER, LYOPHILIZED, FOR SOLUTION INTRAMUSCULAR; INTRAVENOUS; RECTAL at 09:07

## 2024-11-27 RX ADMIN — Medication 50 MG: at 09:07

## 2024-11-27 ASSESSMENT — PAIN - FUNCTIONAL ASSESSMENT
PAIN_FUNCTIONAL_ASSESSMENT: NONE - DENIES PAIN
PAIN_FUNCTIONAL_ASSESSMENT: NONE - DENIES PAIN
PAIN_FUNCTIONAL_ASSESSMENT: ADULT NONVERBAL PAIN SCALE (NPVS)
PAIN_FUNCTIONAL_ASSESSMENT: NONE - DENIES PAIN
PAIN_FUNCTIONAL_ASSESSMENT: ADULT NONVERBAL PAIN SCALE (NPVS)

## 2024-11-27 NOTE — H&P
Update History & Physical for Anuradha Lawler      This is an update on the patient's History and Physical done today 11/27/24. The ECT procedure was reviewed with the patient and I examined the patient. There was no change. The procedure site was confirmed by the patient and me.       Plan: The risks, benefits, expected outcome, and alternative to the recommended procedure have been discussed with the patient. Patient understands and wants to proceed with the procedure.     Electronically signed by TANISHA KUMAR MD on 11/27/2024 at 8:43 AM

## 2024-11-27 NOTE — DISCHARGE INSTRUCTIONS
ECT DISCHARGE INSTRUCTIONS      NAME: Anuradha Lawler   :  1960   MRN:  [id number]     Date/Time:  2024 9:11 AM    ADMIT DATE: 2024     DISCHARGE DATE: 2024     DISCHARGE DIAGNOSIS:  [unfilled]     Recommended diet:  As tolerated.     Recommended activity:      Have a responsible person stay with the patient for 24 hours after the treatment, some temporary confusion may be noticed.    Do not allow a patient to operate a motor vehicle for at least 24 hours and all the confusion has cleared.    Do not drink alcoholic beverages for 48 hours past treatment.    Do not sign any legal documents.    Do not make any critical decisions for 24 hours.    Advance diet as tolerated. Start with liquids and advance it nausea is not present.    Understand that memory for recent events, phone numbers, addresses, ect. May be decreased for a short period of time.     Report any persistant nausea or pain to the treating physician.    Patient receiving ECT while in the hospital should also not attempt to ambulate without assistance, please use tap bell which will be provide.    If you experience any of the following symptoms then please call your primary care physician/outpatient psychiatrist or return to the emergency room if you cannot get hold of your doctor: Fever, chills, nausea, vomiting, diarrhea, change in mentation, falling, bleeding, shortness of breath. Please call the emergency room at [facility] [phone] in this case.     Follow Up:  Continue outpatient psychiatric follow-up visits with your personal psychiatrist.

## 2024-11-27 NOTE — ANESTHESIA PRE PROCEDURE
Department of Anesthesiology  Preprocedure Note       Name:  Anuradha Lawler   Age:  63 y.o.  :  1960                                          MRN:  [id number]         Date:  2024      Surgeon: Surgeon(s):  Yolanda Naranjo MD    Procedure: Procedure(s):  ELECTROCONVULSIVE THERAPY    Medications prior to admission:   Prior to Admission medications    Medication Sig Start Date End Date Taking? Authorizing Provider   Rosuvastatin Calcium (CRESTOR PO) Take by mouth   Yes Víctor Grande MD   OLANZapine (ZYPREXA) 20 MG tablet Take 1 tablet by mouth in the morning and at bedtime 7/10/23  Yes Panfilo Currie MD   levothyroxine (SYNTHROID) 100 MCG tablet Take 1 tablet by mouth Daily   Yes Víctor Grande MD   hydrOXYzine HCl (ATARAX) 25 MG tablet Take 1 tablet by mouth 3 times daily as needed for Anxiety   Yes ProviderVíctor MD   apixaban (ELIQUIS) 5 MG TABS tablet Take 1 tablet by mouth 2 times daily H/o DVT 23  Yes Automatic Reconciliation, Ar   midodrine (PROAMATINE) 10 MG tablet Take 1 tablet by mouth 3 times daily (with meals) 23  Yes Automatic Reconciliation, Ar   mirtazapine (REMERON) 45 MG tablet Take 1 tablet by mouth nightly 23  Yes Automatic Reconciliation, Ar   venlafaxine (EFFEXOR XR) 150 MG extended release capsule Take 2 capsules by mouth daily 23  Yes Automatic Reconciliation, Ar   lidocaine viscous hcl (XYLOCAINE) 2 % SOLN solution Take 5 mLs by mouth as needed for Irritation Gargle and spit twice a day as needed  Patient not taking: Reported on 2024 6/15/24   Sara Lozano APRN - NP   tiotropium (SPIRIVA) 18 MCG inhalation capsule Inhale 1 capsule into the lungs 4/30/24 10/27/24  ProviderVíctor MD   hydrocortisone 1 % cream Apply topically 2 times daily Apply topically 2 times daily.    ProviderVíctor MD       Current medications:    Current Facility-Administered Medications   Medication Dose Route Frequency Provider Last Rate

## 2024-11-27 NOTE — PROCEDURES
ECT PROCEDURE NOTE         IDENTIFICATION:           Patient Name   Anuradha Lawler         Date of Birth  1960         [facility]  [id number]         Medical Record Number   [id number]             Age   62 y.o.         PCP  Cyndi Curtis MD         Admit date:  11/27/24           Room Number   PACU/PL  @ [facility]         Date of Service   11/27/24                          Electro Convulsive Therapy:   Treatment number 40         Maintenance ECT - YES        Anuradha Lawler was admitted to the PACU for ECT. Patient was medically cleared and NPO for procedure. Patient is NOT court mandated and gave informed consent for ECT treatment.        Patient received       Bilateral ECT YES    Administered using the Thymatron System IV - [employer].       at 40 % Energy, under general anesthesia.    Anuradha Lawler with a good, well generalized seizure of 52 seconds on observation of the motor manifestations of the seizure. EEG duration was 70 secs.    Post-Ictal Suppression Index: 83.5 %.    Recovery was unremarkable and with no complications.       Change in Energy %:                 Anesthesia medications administered during procedure:   Brevital:  50 mg   Change for next treament:       Succinylcholine: 50 mg   Change for next treatment:        Propofol: mg   Glycopyrrolate:  mg   Labetalol:  mg   Esmolol:  mg   Lorazepam:  mg   Ketamine:  mg   Zofran:   mg     Toradol:  mg   Lidocaine:  mg   Caffeine Benzoate: mg               CSSRS  1/26/2023 7/8/2022         1) Within the past month, have you wished you were dead or wished you could go to sleep and not wake up?   No  No     2) Have you actually had any thoughts of killing yourself?  No  No     6) Have you ever done anything, started to do anything, or prepared to do anything to end your life?  Yes  No         Did this occur within the past 3 months?  Yes  -

## 2024-11-27 NOTE — PERIOP NOTE
Anuradha CARLIN Lawler  1960  [id number]    Situation:  Verbal report given from: Swati Armendariz RN  Procedure: Procedure(s):  ELECTROCONVULSIVE THERAPY    Background:    Preoperative diagnosis: Major depressive disorder, recurrent episode, moderate (HCC) [F33.1]    Postoperative diagnosis: * No post-op diagnosis entered *    :  Dr. Naranjo    Assistant(s): Circulator: Swati Armendariz RN  Scrub Person First: Saray Coleman RN    Specimens: * No specimens in log *    Assessment:  Intra-procedure medications         Anesthesia gave intra-procedure sedation and medications, see anesthesia flow sheet     Intravenous fluids: LR@ KVO     Vital signs stable

## 2024-11-27 NOTE — ANESTHESIA POSTPROCEDURE EVALUATION
Post-Anesthesia Evaluation and Assessment    Patient: Anuradha Lawler MRN: [id number]  SSN: [id number]    YOB: 1960  Age: 63 y.o.  Sex: female      I have evaluated the patient and they are stable and ready for discharge from the PACU.     Cardiovascular Function/Vital Signs  Visit Vitals  /70   Pulse 66   Temp 98.3 °F (36.8 °C) (Temporal)   Resp 12   Ht 1.575 m (5' 2\")   Wt 84.4 kg (186 lb)   SpO2 96%   BMI 34.02 kg/m²       Patient is status post General anesthesia for Procedure(s):  ELECTROCONVULSIVE THERAPY.    Nausea/Vomiting: None    Postoperative hydration reviewed and adequate.    Pain:      Managed    Neurological Status:       At baseline    Mental Status, Level of Consciousness: Alert and  oriented to person, place, and time    Pulmonary Status:       Adequate oxygenation and airway patent    Complications related to anesthesia: None    Post-anesthesia assessment completed. No concerns    Signed By: Elissa Vail MD     November 27, 2024            Department of Anesthesiology  Postprocedure Note    Patient: Anuradha Lawler  MRN: [id number]  YOB: 1960  Date of evaluation: 11/27/2024    Procedure Summary       Date: 11/27/24 Room / Location: [facility] MAIN OR  / [facility] MAIN OR    Anesthesia Start: 0902 Anesthesia Stop: 0912    Procedure: ELECTROCONVULSIVE THERAPY (Head) Diagnosis:       Major depressive disorder, recurrent episode, moderate (HCC)      (Major depressive disorder, recurrent episode, moderate (HCC) [F33.1])    Surgeons: Yolanda Naranjo MD Responsible Provider: Elissa Vail MD    Anesthesia Type: general ASA Status: 3            Anesthesia Type: No value filed.    Ama Phase I: Ama Score: 10    Ama Phase II:      Anesthesia Post Evaluation    No notable events documented.

## 2024-12-18 ENCOUNTER — ANESTHESIA EVENT (OUTPATIENT)
Facility: HOSPITAL | Age: 64
End: 2024-12-18
Payer: MEDICARE

## 2024-12-18 ENCOUNTER — HOSPITAL ENCOUNTER (OUTPATIENT)
Facility: HOSPITAL | Age: 64
Setting detail: OUTPATIENT SURGERY
Discharge: HOME | End: 2024-12-18
Attending: PSYCHIATRY & NEUROLOGY | Admitting: PSYCHIATRY & NEUROLOGY
Payer: MEDICARE

## 2024-12-18 ENCOUNTER — ANESTHESIA (OUTPATIENT)
Facility: HOSPITAL | Age: 64
End: 2024-12-18
Payer: MEDICARE

## 2024-12-18 VITALS
HEART RATE: 71 BPM | RESPIRATION RATE: 16 BRPM | SYSTOLIC BLOOD PRESSURE: 126 MMHG | HEIGHT: 63 IN | TEMPERATURE: 98.3 F | OXYGEN SATURATION: 96 % | DIASTOLIC BLOOD PRESSURE: 69 MMHG | WEIGHT: 187 LBS | BODY MASS INDEX: 33.13 KG/M2

## 2024-12-18 PROCEDURE — 7100000010 HC PHASE II RECOVERY - FIRST 15 MIN: Performed by: PSYCHIATRY & NEUROLOGY

## 2024-12-18 PROCEDURE — 6360000002 HC RX W HCPCS: Performed by: ANESTHESIOLOGY

## 2024-12-18 PROCEDURE — 7100000000 HC PACU RECOVERY - FIRST 15 MIN: Performed by: PSYCHIATRY & NEUROLOGY

## 2024-12-18 PROCEDURE — 3700000000 HC ANESTHESIA ATTENDED CARE: Performed by: PSYCHIATRY & NEUROLOGY

## 2024-12-18 PROCEDURE — 2709999900 HC NON-CHARGEABLE SUPPLY: Performed by: PSYCHIATRY & NEUROLOGY

## 2024-12-18 PROCEDURE — 2500000003 HC RX 250 WO HCPCS: Performed by: ANESTHESIOLOGY

## 2024-12-18 PROCEDURE — 7100000001 HC PACU RECOVERY - ADDTL 15 MIN: Performed by: PSYCHIATRY & NEUROLOGY

## 2024-12-18 PROCEDURE — 90870 ELECTROCONVULSIVE THERAPY: CPT | Performed by: PSYCHIATRY & NEUROLOGY

## 2024-12-18 RX ORDER — SODIUM CHLORIDE 0.9 % (FLUSH) 0.9 %
5-40 SYRINGE (ML) INJECTION EVERY 12 HOURS SCHEDULED
Status: DISCONTINUED | OUTPATIENT
Start: 2024-12-18 | End: 2024-12-18 | Stop reason: HOSPADM

## 2024-12-18 RX ORDER — SODIUM CHLORIDE 9 MG/ML
INJECTION, SOLUTION INTRAVENOUS PRN
Status: DISCONTINUED | OUTPATIENT
Start: 2024-12-18 | End: 2024-12-18 | Stop reason: HOSPADM

## 2024-12-18 RX ORDER — HYDRALAZINE HYDROCHLORIDE 20 MG/ML
10 INJECTION INTRAMUSCULAR; INTRAVENOUS
Status: DISCONTINUED | OUTPATIENT
Start: 2024-12-18 | End: 2024-12-18 | Stop reason: HOSPADM

## 2024-12-18 RX ORDER — ONDANSETRON 2 MG/ML
4 INJECTION INTRAMUSCULAR; INTRAVENOUS
Status: DISCONTINUED | OUTPATIENT
Start: 2024-12-18 | End: 2024-12-18 | Stop reason: HOSPADM

## 2024-12-18 RX ORDER — SODIUM CHLORIDE 0.9 % (FLUSH) 0.9 %
5-40 SYRINGE (ML) INJECTION PRN
Status: DISCONTINUED | OUTPATIENT
Start: 2024-12-18 | End: 2024-12-18 | Stop reason: HOSPADM

## 2024-12-18 RX ORDER — SODIUM CHLORIDE, SODIUM LACTATE, POTASSIUM CHLORIDE, CALCIUM CHLORIDE 600; 310; 30; 20 MG/100ML; MG/100ML; MG/100ML; MG/100ML
INJECTION, SOLUTION INTRAVENOUS CONTINUOUS
Status: DISCONTINUED | OUTPATIENT
Start: 2024-12-18 | End: 2024-12-18 | Stop reason: HOSPADM

## 2024-12-18 RX ORDER — SUCCINYLCHOLINE/SOD CL,ISO/PF 100 MG/5ML
SYRINGE (ML) INTRAVENOUS
Status: DISCONTINUED | OUTPATIENT
Start: 2024-12-18 | End: 2024-12-18 | Stop reason: SDUPTHER

## 2024-12-18 RX ADMIN — Medication 50 MG: at 08:40

## 2024-12-18 RX ADMIN — METHOHEXITAL SODIUM 50 MG: 500 INJECTION, POWDER, LYOPHILIZED, FOR SOLUTION INTRAMUSCULAR; INTRAVENOUS; RECTAL at 08:40

## 2024-12-18 ASSESSMENT — PAIN - FUNCTIONAL ASSESSMENT: PAIN_FUNCTIONAL_ASSESSMENT: NONE - DENIES PAIN

## 2024-12-18 NOTE — ANESTHESIA PRE PROCEDURE
Last Admin   • sodium chloride flush 0.9 % injection 5-40 mL  5-40 mL IntraVENous 2 times per day Elissa Vail MD       • sodium chloride flush 0.9 % injection 5-40 mL  5-40 mL IntraVENous PRN Elissa Vail MD       • 0.9 % sodium chloride infusion   IntraVENous PRN Elissa Vail MD       • ondansetron (ZOFRAN) injection 4 mg  4 mg IntraVENous Once PRN Elissa Vail MD       • hydrALAZINE (APRESOLINE) injection 10 mg  10 mg IntraVENous Q15 Min PRN Elissa Vail MD       • lactated ringers infusion   IntraVENous Continuous Elissa Vail MD       • sodium chloride flush 0.9 % injection 5-40 mL  5-40 mL IntraVENous 2 times per day Elissa Vail MD       • sodium chloride flush 0.9 % injection 5-40 mL  5-40 mL IntraVENous PRN Elissa Vail MD       • 0.9 % sodium chloride infusion   IntraVENous PRN Elissa Vail MD           Allergies:    Allergies   Allergen Reactions   • Coconut (Cocos Nucifera) Shortness Of Breath   • Peanut-Containing Drug Products Anaphylaxis   • [place] Shortness Of Breath and Other (See Comments)     Black [place] ; Asthma attack   • Azelastine Other (See Comments)     Violent headaches.   • Banana Other (See Comments)     Diffculty breathing, wheezing, asthma attack.   • Cortisone Hives     Difficulty breathing.   • Other Other (See Comments)     \"Bad chest pain\" with walnuts. Coke - asthma/stuffy head. Fish sticks causes an asthma attack.   • Prednisone Hives     Difficulty breathing, kidney stones.   • Sulfamethoxazole-Trimethoprim Other (See Comments)     Difficulty breathing, chills, fever.       Problem List:    Patient Active Problem List   Diagnosis Code   • LFT elevation R79.89   • Frequent falls R29.6   • Dizziness R42   • UTI (urinary tract infection) N39.0   • Depression F32.A   • Head injury S09.90XA   • Major depression with psychotic features (HCC) F32.3   • Severe major depression with psychotic features, mood-congruent (HCC) F32.3   • Bipolar 1 disorder

## 2024-12-18 NOTE — H&P
Update History & Physical for Anuradha Lawler      This is an update on the patient's History and Physical done today 12/18/24. The ECT procedure was reviewed with the patient and I examined the patient. There was no change. The procedure site was confirmed by the patient and me.       Plan: The risks, benefits, expected outcome, and alternative to the recommended procedure have been discussed with the patient. Patient understands and wants to proceed with the procedure.     Electronically signed by TANISHA KUMAR MD on 12/18/2024 at 8:09 AM

## 2024-12-18 NOTE — DISCHARGE INSTRUCTIONS
ECT DISCHARGE INSTRUCTIONS      NAME: Anuradha Lawler   :  1960   MRN:  [id number]     Date/Time:  2024 8:45 AM    ADMIT DATE: 2024     DISCHARGE DATE: 2024     DISCHARGE DIAGNOSIS:  [unfilled]     Recommended diet:  As tolerated.     Recommended activity:      Have a responsible person stay with the patient for 24 hours after the treatment, some temporary confusion may be noticed.    Do not allow a patient to operate a motor vehicle for at least 24 hours and all the confusion has cleared.    Do not drink alcoholic beverages for 48 hours past treatment.    Do not sign any legal documents.    Do not make any critical decisions for 24 hours.    Advance diet as tolerated. Start with liquids and advance it nausea is not present.    Understand that memory for recent events, phone numbers, addresses, ect. May be decreased for a short period of time.     Report any persistant nausea or pain to the treating physician.    Patient receiving ECT while in the hospital should also not attempt to ambulate without assistance, please use tap bell which will be provide.    If you experience any of the following symptoms then please call your primary care physician/outpatient psychiatrist or return to the emergency room if you cannot get hold of your doctor: Fever, chills, nausea, vomiting, diarrhea, change in mentation, falling, bleeding, shortness of breath. Please call the emergency room at [facility] [phone] in this case.     Follow Up:  Continue outpatient psychiatric follow-up visits with your personal psychiatrist.

## 2024-12-18 NOTE — PROCEDURES
ECT PROCEDURE NOTE         IDENTIFICATION:           Patient Name   Anuradha Lawler         Date of Birth  1960         [facility]  [id number]         Medical Record Number   [id number]             Age   62 y.o.         PCP  Cyndi Curtis MD         Admit date:  12/18/24           Room Number   PACU/PL  @ [facility]         Date of Service   12/18/24                          Electro Convulsive Therapy:   Treatment number 41         Maintenance ECT - YES        Anuradha Lawler was admitted to the PACU for ECT. Patient was medically cleared and NPO for procedure. Patient is NOT court mandated and gave informed consent for ECT treatment.        Patient received       Bilateral ECT YES    Administered using the Thymatron System IV - [employer].       at 40 % Energy, under general anesthesia.    Anuradha Lawler with a good, well generalized seizure of 56 seconds on observation of the motor manifestations of the seizure. EEG duration was 77 secs.    Post-Ictal Suppression Index: <10 %.    Recovery was unremarkable and with no complications.       Change in Energy %:                 Anesthesia medications administered during procedure:   Brevital:  50 mg   Change for next treament:       Succinylcholine: 50 mg   Change for next treatment:        Propofol: mg   Glycopyrrolate:  mg   Labetalol:  mg   Esmolol:  mg   Lorazepam:  mg   Ketamine:  mg   Zofran:   mg     Toradol:  mg   Lidocaine:  mg   Caffeine Benzoate: mg               CSSRS  1/26/2023 7/8/2022         1) Within the past month, have you wished you were dead or wished you could go to sleep and not wake up?   No  No     2) Have you actually had any thoughts of killing yourself?  No  No     6) Have you ever done anything, started to do anything, or prepared to do anything to end your life?  Yes  No         Did this occur within the past 3 months?  Yes  -

## 2024-12-18 NOTE — PROGRESS NOTES
Anuradha GILLIS Bucky  1960  [id number]    Situation:  Verbal report given from: Swati Armendariz RN  Procedure: Procedure(s):  ELECTROCONVULSIVE THERAPY    Background:    Preoperative diagnosis: Major depressive disorder, recurrent episode, moderate (HCC) [F33.1]    Postoperative diagnosis: * No post-op diagnosis entered *    :  Dr. Jose A Guerrero    Assistant(s): Circulator: Swati Armendariz RN  Scrub Person First: Andrew Ndiaye RN    Specimens: * No specimens in log *    Assessment:  Intra-procedure medications         Anesthesia gave intra-procedure sedation and medications, see anesthesia flow sheet     Intravenous fluids: LR@ KVO     Vital signs stable

## 2024-12-18 NOTE — ANESTHESIA POSTPROCEDURE EVALUATION
Post-Anesthesia Evaluation and Assessment    Patient: Anuradha Lawler MRN: [id number]  SSN: [id number]    YOB: 1960  Age: 63 y.o.  Sex: female      I have evaluated the patient and they are stable and ready for discharge from the PACU.     Cardiovascular Function/Vital Signs  Visit Vitals  /68   Pulse 66   Temp 98 °F (36.7 °C) (Oral)   Resp 18   Ht 1.6 m (5' 3\")   Wt 84.8 kg (187 lb)   SpO2 97%   BMI 33.13 kg/m²       Patient is status post General anesthesia for Procedure(s):  ELECTROCONVULSIVE THERAPY.    Nausea/Vomiting: None    Postoperative hydration reviewed and adequate.    Pain:      Managed    Neurological Status:       At baseline    Mental Status, Level of Consciousness: Alert and  oriented to person, place, and time    Pulmonary Status:       Adequate oxygenation and airway patent    Complications related to anesthesia: None    Post-anesthesia assessment completed. No concerns    Signed By: Elissa Vail MD     December 18, 2024            Department of Anesthesiology  Postprocedure Note    Patient: Anuradha Lawler  MRN: [id number]  YOB: 1960  Date of evaluation: 12/18/2024    Procedure Summary       Date: 12/18/24 Room / Location: [facility] MAIN OR  / [facility] MAIN OR    Anesthesia Start: 0837 Anesthesia Stop: 0845    Procedure: ELECTROCONVULSIVE THERAPY (Head) Diagnosis:       Major depressive disorder, recurrent episode, moderate (HCC)      (Major depressive disorder, recurrent episode, moderate (HCC) [F33.1])    Surgeons: Yolanda Naranjo MD Responsible Provider: Elissa Vail MD    Anesthesia Type: general ASA Status: 2            Anesthesia Type: No value filed.    Ama Phase I: Ama Score: 10    Ama Phase II:      Anesthesia Post Evaluation    No notable events documented.

## 2025-01-14 ENCOUNTER — ANESTHESIA EVENT (OUTPATIENT)
Facility: HOSPITAL | Age: 65
End: 2025-01-14
Payer: MEDICARE

## 2025-01-15 ENCOUNTER — HOSPITAL ENCOUNTER (OUTPATIENT)
Facility: HOSPITAL | Age: 65
Setting detail: OUTPATIENT SURGERY
Discharge: HOME | End: 2025-01-15
Attending: PSYCHIATRY & NEUROLOGY | Admitting: PSYCHIATRY & NEUROLOGY
Payer: MEDICARE

## 2025-01-15 ENCOUNTER — ANESTHESIA (OUTPATIENT)
Facility: HOSPITAL | Age: 65
End: 2025-01-15
Payer: MEDICARE

## 2025-01-15 VITALS
TEMPERATURE: 98.2 F | BODY MASS INDEX: 32.43 KG/M2 | DIASTOLIC BLOOD PRESSURE: 74 MMHG | HEART RATE: 84 BPM | HEIGHT: 63 IN | WEIGHT: 183 LBS | OXYGEN SATURATION: 93 % | SYSTOLIC BLOOD PRESSURE: 129 MMHG | RESPIRATION RATE: 17 BRPM

## 2025-01-15 PROCEDURE — 7100000000 HC PACU RECOVERY - FIRST 15 MIN: Performed by: PSYCHIATRY & NEUROLOGY

## 2025-01-15 PROCEDURE — 7100000001 HC PACU RECOVERY - ADDTL 15 MIN: Performed by: PSYCHIATRY & NEUROLOGY

## 2025-01-15 PROCEDURE — 6360000002 HC RX W HCPCS: Performed by: ANESTHESIOLOGY

## 2025-01-15 PROCEDURE — 2709999900 HC NON-CHARGEABLE SUPPLY: Performed by: PSYCHIATRY & NEUROLOGY

## 2025-01-15 PROCEDURE — 7100000010 HC PHASE II RECOVERY - FIRST 15 MIN: Performed by: PSYCHIATRY & NEUROLOGY

## 2025-01-15 PROCEDURE — 2500000003 HC RX 250 WO HCPCS: Performed by: ANESTHESIOLOGY

## 2025-01-15 PROCEDURE — 90870 ELECTROCONVULSIVE THERAPY: CPT | Performed by: PSYCHIATRY & NEUROLOGY

## 2025-01-15 PROCEDURE — 3700000000 HC ANESTHESIA ATTENDED CARE: Performed by: PSYCHIATRY & NEUROLOGY

## 2025-01-15 RX ORDER — SODIUM CHLORIDE 9 MG/ML
INJECTION, SOLUTION INTRAVENOUS PRN
Status: DISCONTINUED | OUTPATIENT
Start: 2025-01-15 | End: 2025-01-15 | Stop reason: HOSPADM

## 2025-01-15 RX ORDER — ONDANSETRON 2 MG/ML
4 INJECTION INTRAMUSCULAR; INTRAVENOUS
Status: DISCONTINUED | OUTPATIENT
Start: 2025-01-15 | End: 2025-01-15 | Stop reason: HOSPADM

## 2025-01-15 RX ORDER — SODIUM CHLORIDE 0.9 % (FLUSH) 0.9 %
5-40 SYRINGE (ML) INJECTION EVERY 12 HOURS SCHEDULED
Status: DISCONTINUED | OUTPATIENT
Start: 2025-01-15 | End: 2025-01-15 | Stop reason: HOSPADM

## 2025-01-15 RX ORDER — SODIUM CHLORIDE 0.9 % (FLUSH) 0.9 %
5-40 SYRINGE (ML) INJECTION PRN
Status: DISCONTINUED | OUTPATIENT
Start: 2025-01-15 | End: 2025-01-15 | Stop reason: HOSPADM

## 2025-01-15 RX ORDER — SUCCINYLCHOLINE/SOD CL,ISO/PF 100 MG/5ML
SYRINGE (ML) INTRAVENOUS
Status: DISCONTINUED | OUTPATIENT
Start: 2025-01-15 | End: 2025-01-15 | Stop reason: SDUPTHER

## 2025-01-15 RX ORDER — HYDRALAZINE HYDROCHLORIDE 20 MG/ML
10 INJECTION INTRAMUSCULAR; INTRAVENOUS
Status: DISCONTINUED | OUTPATIENT
Start: 2025-01-15 | End: 2025-01-15 | Stop reason: HOSPADM

## 2025-01-15 RX ADMIN — Medication 50 MG: at 08:40

## 2025-01-15 ASSESSMENT — PAIN - FUNCTIONAL ASSESSMENT
PAIN_FUNCTIONAL_ASSESSMENT: ADULT NONVERBAL PAIN SCALE (NPVS)
PAIN_FUNCTIONAL_ASSESSMENT: NONE - DENIES PAIN
PAIN_FUNCTIONAL_ASSESSMENT: ADULT NONVERBAL PAIN SCALE (NPVS)
PAIN_FUNCTIONAL_ASSESSMENT: NONE - DENIES PAIN

## 2025-01-15 NOTE — PROCEDURES
ECT OPERATIVE PROCEDURE NOTE         IDENTIFICATION:           Patient Name   Anuradha Lawler         Date of Birth  1960         [facility]  [id number]         Medical Record Number   [id number]             Age   62 y.o.         PCP  Cyndi Curtis MD         Admit date:  01/15/25           Room Number   PACU/PL  @ [facility]         Date of Service   01/15/25                         Electro Convulsive Therapy:  Treatment number 42         Maintenance ECT - YES   Anuradha Lawler was admitted to the PACU for ECT. Patient was medically cleared and NPO for procedure. Patient is NOT court mandated and gave informed consent for ECT treatment.       Patient received      Bilateral ECT - YES   Administered using the Thymatron System IV - [employer].      at 40 % Energy, under general anesthesia.   Anuradha Lawler with a good, well generalized seizure of 63 seconds on observation of the motor manifestations of the seizure. EEG duration was 83 secs.   Post-Ictal Suppression Index: 57.8 %.   Recovery was unremarkable and with no complications.      Change in Energy %:                 Anesthesia medications administered during procedure:   Brevital:  50 mg   Change for next treament:       Succinylcholine: 50 mg   Change for next treatment:        Propofol: mg   Glycopyrrolate:  mg   Labetalol:  mg   Esmolol:  mg   Lorazepam:  mg   Ketamine:  mg   Zofran:   mg     Toradol:  mg   Lidocaine:  mg   Caffeine Benzoate: mg               CSSRS  1/26/2023 7/8/2022         1) Within the past month, have you wished you were dead or wished you could go to sleep and not wake up?   No  No     2) Have you actually had any thoughts of killing yourself?  No  No     6) Have you ever done anything, started to do anything, or prepared to do anything to end your life?  Yes  No         Did this occur within the past 3 months?  Yes  -

## 2025-01-15 NOTE — PERIOP NOTE
Anuradha GILLIS Bucky  1960  [id number]    Situation:  Verbal report given from: Swati Armendariz RN  Procedure: Procedure(s):  ELECTROCONVULSIVE THERAPY    Background:    Preoperative diagnosis: Major depressive disorder, recurrent episode, moderate (HCC) [F33.1]    Postoperative diagnosis: * No post-op diagnosis entered *    :  Dr. Naranjo    Assistant(s): Circulator: Swati Armendariz RN  Scrub Person First: Andrew Ndiaye RN    Specimens: * No specimens in log *    Assessment:  Intra-procedure medications         Anesthesia gave intra-procedure sedation and medications, see anesthesia flow sheet     Intravenous fluids: LR@ KVO     Vital signs stable

## 2025-01-15 NOTE — ANESTHESIA POSTPROCEDURE EVALUATION
Post-Anesthesia Evaluation and Assessment    Patient: Anuradha Lawler MRN: [id number]  SSN: [id number]    YOB: 1960  Age: 64 y.o.  Sex: female      I have evaluated the patient and they are stable and ready for discharge from the PACU.     Cardiovascular Function/Vital Signs  Visit Vitals  /74   Pulse 67   Temp 98.7 °F (37.1 °C) (Temporal)   Resp 11   Ht 1.6 m (5' 3\")   Wt 83 kg (183 lb)   SpO2 95%   BMI 32.42 kg/m²       Patient is status post General anesthesia for Procedure(s):  ELECTROCONVULSIVE THERAPY.    Nausea/Vomiting: None    Postoperative hydration reviewed and adequate.    Pain:      Managed    Neurological Status:       At baseline    Mental Status, Level of Consciousness: Alert and  oriented to person, place, and time    Pulmonary Status:       Adequate oxygenation and airway patent    Complications related to anesthesia: None    Post-anesthesia assessment completed. No concerns    Signed By: Elissa Vali MD     January 15, 2025            Department of Anesthesiology  Postprocedure Note    Patient: Anuradha Lawler  MRN: [id number]  YOB: 1960  Date of evaluation: 1/15/2025    Procedure Summary       Date: 01/15/25 Room / Location: [facility] MAIN OR  / [facility] MAIN OR    Anesthesia Start: 0836 Anesthesia Stop:     Procedure: ELECTROCONVULSIVE THERAPY (Head) Diagnosis:       Major depressive disorder, recurrent episode, moderate (HCC)      (Major depressive disorder, recurrent episode, moderate (HCC) [F33.1])    Surgeons: Yolanda Naranjo MD Responsible Provider: Elissa Vail MD    Anesthesia Type: general ASA Status: 3            Anesthesia Type: No value filed.    Ama Phase I: Ama Score: 10    Ama Phase II:      Anesthesia Post Evaluation    No notable events documented.

## 2025-01-15 NOTE — H&P
Update History & Physical for Anuradha Lawler      This is an update on the patient's History and Physical done today 01/15/25. The ECT procedure was reviewed with the patient and I examined the patient. There was no change. The procedure site was confirmed by the patient and me.       Plan: The risks, benefits, expected outcome, and alternative to the recommended procedure have been discussed with the patient. Patient understands and wants to proceed with the procedure.     Electronically signed by TANISHA KUMAR MD on 1/15/2025 at 8:16 AM

## 2025-01-15 NOTE — DISCHARGE INSTRUCTIONS
ECT DISCHARGE INSTRUCTIONS      NAME: Anuradha Lawler   :  1960   MRN:  [id number]     Date/Time:  1/15/2025 8:44 AM    ADMIT DATE: 1/15/2025     DISCHARGE DATE: 1/15/2025     DISCHARGE DIAGNOSIS:  [unfilled]     Recommended diet:  As tolerated.     Recommended activity:      Have a responsible person stay with the patient for 24 hours after the treatment, some temporary confusion may be noticed.    Do not allow a patient to operate a motor vehicle for at least 24 hours and all the confusion has cleared.    Do not drink alcoholic beverages for 48 hours past treatment.    Do not sign any legal documents.    Do not make any critical decisions for 24 hours.    Advance diet as tolerated. Start with liquids and advance it nausea is not present.    Understand that memory for recent events, phone numbers, addresses, ect. May be decreased for a short period of time.     Report any persistant nausea or pain to the treating physician.    Patient receiving ECT while in the hospital should also not attempt to ambulate without assistance, please use tap bell which will be provide.    If you experience any of the following symptoms then please call your primary care physician/outpatient psychiatrist or return to the emergency room if you cannot get hold of your doctor: Fever, chills, nausea, vomiting, diarrhea, change in mentation, falling, bleeding, shortness of breath. Please call the emergency room at [facility] [phone] in this case.     Follow Up:  Continue outpatient psychiatric follow-up visits with your personal psychiatrist.      DISCHARGE SUMMARY from Nurse    PATIENT INSTRUCTIONS:    After general anesthesia or intravenous sedation, for 24 hours or while taking prescription Narcotics:  Limit your activities  Do not drive and operate hazardous machinery  Do not make important personal or business decisions  Do  not drink alcoholic beverages  If you have not urinated within

## 2025-02-03 ENCOUNTER — OFFICE VISIT (OUTPATIENT)
Age: 65
End: 2025-02-03

## 2025-02-03 VITALS
HEART RATE: 90 BPM | TEMPERATURE: 98.3 F | RESPIRATION RATE: 18 BRPM | WEIGHT: 178 LBS | OXYGEN SATURATION: 96 % | DIASTOLIC BLOOD PRESSURE: 73 MMHG | BODY MASS INDEX: 31.53 KG/M2 | SYSTOLIC BLOOD PRESSURE: 111 MMHG

## 2025-02-03 DIAGNOSIS — K62.5 RECTAL BLEEDING: ICD-10-CM

## 2025-02-03 DIAGNOSIS — K92.1 BLOOD IN STOOL: Primary | ICD-10-CM

## 2025-02-03 DIAGNOSIS — R10.12 ABDOMINAL PAIN, LEFT UPPER QUADRANT: ICD-10-CM

## 2025-02-03 NOTE — PROGRESS NOTES
2/3/2025   Anuradha Lawler (: 1960) is a 64 y.o. female, established patient, here for evaluation of the following chief complaint(s):  Abdominal Pain (Pt presents with lower L back pain and L left abdominal pain x 2 weeks and bloody stools x 1 week ), Rectal Bleeding, and Care Coordination ([facility])     ASSESSMENT/PLAN:  Below is the assessment and plan developed based on review of pertinent history, physical exam, labs, studies, and medications.  1. Blood in stool  2. Abdominal pain, left upper quadrant  3. Rectal bleeding    Pt c/o rectal bleeding, blood in stool, and left abdominal and back pain, advised patient to go to ED for further evaluation. Patient is also on a blood thinner (eliquis) and there is concern for possible anemia. Patient reports that she will go to [facility] on [facility].    Follow up:    Follow up to be determined by ED provider.    SUBJECTIVE/OBJECTIVE:  Patient here today with c/o rectal bleeding and left upper abdominal pain that radiates to her back left side for about two and a half weeks. Denies any fever or vomiting. Admits to occasional diarrhea (three episodes). Admits to having blood in stools occasionally. Admits to hx of hemorrhoids. Reports that she has \"what looks like blood clots on my rectum\". Patient reports that bleeding has been dark in color.         Abdominal Pain (Pt presents with lower L back pain and L left abdominal pain x 2 weeks and bloody stools x 1 week ), Rectal Bleeding, and Care Coordination ([facility])      Physical Exam  Vitals and nursing note reviewed. Exam conducted with a chaperone present.   Constitutional:       Appearance: Normal appearance.   HENT:      Head: Normocephalic.      Mouth/Throat:      Mouth: Mucous membranes are moist.      Pharynx: Oropharynx is clear.   Eyes:      Pupils: Pupils are equal, round, and reactive to light.   Cardiovascular:      Rate and Rhythm: Normal rate and regular

## 2025-02-21 ENCOUNTER — ANESTHESIA EVENT (OUTPATIENT)
Facility: HOSPITAL | Age: 65
End: 2025-02-21
Payer: MEDICARE

## 2025-02-21 ENCOUNTER — HOSPITAL ENCOUNTER (OUTPATIENT)
Facility: HOSPITAL | Age: 65
Setting detail: OUTPATIENT SURGERY
Discharge: HOME | End: 2025-02-21
Attending: PSYCHIATRY & NEUROLOGY | Admitting: PSYCHIATRY & NEUROLOGY
Payer: MEDICARE

## 2025-02-21 ENCOUNTER — ANESTHESIA (OUTPATIENT)
Facility: HOSPITAL | Age: 65
End: 2025-02-21
Payer: MEDICARE

## 2025-02-21 VITALS
RESPIRATION RATE: 15 BRPM | SYSTOLIC BLOOD PRESSURE: 116 MMHG | TEMPERATURE: 98.4 F | DIASTOLIC BLOOD PRESSURE: 76 MMHG | HEART RATE: 87 BPM | OXYGEN SATURATION: 93 %

## 2025-02-21 PROCEDURE — 7100000000 HC PACU RECOVERY - FIRST 15 MIN: Performed by: PSYCHIATRY & NEUROLOGY

## 2025-02-21 PROCEDURE — 90870 ELECTROCONVULSIVE THERAPY: CPT | Performed by: PSYCHIATRY & NEUROLOGY

## 2025-02-21 PROCEDURE — 7100000001 HC PACU RECOVERY - ADDTL 15 MIN: Performed by: PSYCHIATRY & NEUROLOGY

## 2025-02-21 PROCEDURE — 7100000010 HC PHASE II RECOVERY - FIRST 15 MIN: Performed by: PSYCHIATRY & NEUROLOGY

## 2025-02-21 PROCEDURE — 2709999900 HC NON-CHARGEABLE SUPPLY: Performed by: PSYCHIATRY & NEUROLOGY

## 2025-02-21 PROCEDURE — 3700000000 HC ANESTHESIA ATTENDED CARE: Performed by: PSYCHIATRY & NEUROLOGY

## 2025-02-21 PROCEDURE — 2500000003 HC RX 250 WO HCPCS: Performed by: NURSE ANESTHETIST, CERTIFIED REGISTERED

## 2025-02-21 PROCEDURE — 3700000001 HC ADD 15 MINUTES (ANESTHESIA): Performed by: PSYCHIATRY & NEUROLOGY

## 2025-02-21 PROCEDURE — 2580000003 HC RX 258: Performed by: PSYCHIATRY & NEUROLOGY

## 2025-02-21 RX ORDER — METHOHEXITAL IN WATER/PF 100MG/10ML
SYRINGE (ML) INTRAVENOUS
Status: DISCONTINUED | OUTPATIENT
Start: 2025-02-21 | End: 2025-02-21 | Stop reason: SDUPTHER

## 2025-02-21 RX ORDER — SODIUM CHLORIDE 0.9 % (FLUSH) 0.9 %
5-40 SYRINGE (ML) INJECTION EVERY 12 HOURS SCHEDULED
Status: DISCONTINUED | OUTPATIENT
Start: 2025-02-21 | End: 2025-02-21 | Stop reason: HOSPADM

## 2025-02-21 RX ORDER — ROCURONIUM BROMIDE 10 MG/ML
INJECTION, SOLUTION INTRAVENOUS
Status: DISCONTINUED | OUTPATIENT
Start: 2025-02-21 | End: 2025-02-21 | Stop reason: SDUPTHER

## 2025-02-21 RX ORDER — SODIUM CHLORIDE 0.9 % (FLUSH) 0.9 %
5-40 SYRINGE (ML) INJECTION PRN
Status: DISCONTINUED | OUTPATIENT
Start: 2025-02-21 | End: 2025-02-21 | Stop reason: HOSPADM

## 2025-02-21 RX ORDER — SODIUM CHLORIDE 9 MG/ML
INJECTION, SOLUTION INTRAVENOUS PRN
Status: DISCONTINUED | OUTPATIENT
Start: 2025-02-21 | End: 2025-02-21 | Stop reason: HOSPADM

## 2025-02-21 RX ADMIN — Medication 50 MG: at 08:50

## 2025-02-21 RX ADMIN — SODIUM CHLORIDE: 900 INJECTION, SOLUTION INTRAVENOUS at 08:48

## 2025-02-21 RX ADMIN — ROCURONIUM BROMIDE 25 MG: 10 INJECTION, SOLUTION INTRAVENOUS at 08:50

## 2025-02-21 RX ADMIN — SUGAMMADEX 200 MG: 100 INJECTION, SOLUTION INTRAVENOUS at 08:59

## 2025-02-21 ASSESSMENT — PAIN - FUNCTIONAL ASSESSMENT
PAIN_FUNCTIONAL_ASSESSMENT: ADULT NONVERBAL PAIN SCALE (NPVS)
PAIN_FUNCTIONAL_ASSESSMENT: NONE - DENIES PAIN
PAIN_FUNCTIONAL_ASSESSMENT: ADULT NONVERBAL PAIN SCALE (NPVS)
PAIN_FUNCTIONAL_ASSESSMENT: ADULT NONVERBAL PAIN SCALE (NPVS)
PAIN_FUNCTIONAL_ASSESSMENT: NONE - DENIES PAIN
PAIN_FUNCTIONAL_ASSESSMENT: NONE - DENIES PAIN
PAIN_FUNCTIONAL_ASSESSMENT: ADULT NONVERBAL PAIN SCALE (NPVS)
PAIN_FUNCTIONAL_ASSESSMENT: NONE - DENIES PAIN

## 2025-02-21 NOTE — ANESTHESIA POSTPROCEDURE EVALUATION
Department of Anesthesiology  Postprocedure Note    Patient: Anuradha Lawler  MRN: [id number]  YOB: 1960  Date of evaluation: 2/21/2025    Procedure Summary       Date: 02/21/25 Room / Location: [facility] MAIN OR  / [facility] MAIN OR    Anesthesia Start: 0848 Anesthesia Stop: 0913    Procedure: ELECTROCONVULSIVE THERAPY (Head) Diagnosis:       Major depressive disorder, recurrent episode, moderate (HCC)      (Major depressive disorder, recurrent episode, moderate (HCC) [F33.1])    Surgeons: Yolanda Naranjo MD Responsible Provider: Erika Gonsalves MD    Anesthesia Type: General ASA Status: 3            Anesthesia Type: General    Ama Phase I: Ama Score: 10    Ama Phase II:      Anesthesia Post Evaluation    Patient location during evaluation: PACU  Patient participation: complete - patient participated  Level of consciousness: awake  Airway patency: patent  Nausea & Vomiting: no vomiting and no nausea  Cardiovascular status: hemodynamically stable  Respiratory status: acceptable  Hydration status: stable  Pain management: adequate    No notable events documented.

## 2025-02-21 NOTE — PROCEDURES
ECT OPERATIVE PROCEDURE NOTE         IDENTIFICATION:           Patient Name   Anuradha Lawler         Date of Birth  1960         [facility]  [id number]         Medical Record Number   [id number]             Age   62 y.o.         PCP  Cyndi Curtis MD         Admit date:  02/21/25           Room Number   PACU/PL  @ [facility]         Date of Service   02/21/25                         Electro Convulsive Therapy:  Treatment number 43         Maintenance ECT - YES   Anuradha Lawler was admitted to the PACU for ECT. Patient was medically cleared and NPO for procedure. Patient is NOT court mandated and gave informed consent for ECT treatment.       Patient received      Bilateral ECT - YES   Administered using the Thymatron System IV - [employer]      at 40 % Energy, under general anesthesia.   Anuradha Lawler with a good, well generalized seizure of 55 seconds on observation of the motor manifestations of the seizure. EEG duration was 90 secs.   Post-Ictal Suppression Index: 45 %.   Recovery was unremarkable and with no complications.   Rocuronium was given for skeletal muscle paralysis and reversal was done. Succinylcholine to be used for future procedures.   Change in Energy %:                 Anesthesia medications administered during procedure:   Brevital:  50 mg   Change for next treament:       Succinylcholine: 50 mg   Change for next treatment:        Propofol: mg   Glycopyrrolate:  mg   Labetalol:  mg   Esmolol:  mg   Lorazepam:  mg   Ketamine:  mg   Zofran:   mg     Toradol:  mg   Lidocaine:  mg   Caffeine Benzoate: mg               CSSRS  1/26/2023 7/8/2022         1) Within the past month, have you wished you were dead or wished you could go to sleep and not wake up?   No  No     2) Have you actually had any thoughts of killing yourself?  No  No     6) Have you ever done anything, started to do anything, or prepared to do anything to end your life?  Yes  No         Did this occur within

## 2025-02-21 NOTE — ANESTHESIA PRE PROCEDURE
Department of Anesthesiology  Preprocedure Note       Name:  Anuradha Lawler   Age:  64 y.o.  :  1960                                          MRN:  [id number]         Date:  2025      Surgeon: Surgeon(s):  Yolanda Naranjo MD    Procedure: Procedure(s):  ELECTROCONVULSIVE THERAPY    Medications prior to admission:   Prior to Admission medications    Medication Sig Start Date End Date Taking? Authorizing Provider   lidocaine viscous hcl (XYLOCAINE) 2 % SOLN solution Take 5 mLs by mouth as needed for Irritation Gargle and spit twice a day as needed  Patient not taking: Reported on 2024 6/15/24   Sara Lozano APRN - NP   tiotropium (SPIRIVA) 18 MCG inhalation capsule Inhale 1 capsule into the lungs 4/30/24 10/27/24  ProviderVíctor MD   Rosuvastatin Calcium (CRESTOR PO) Take by mouth    Provider, MD Víctor   hydrocortisone 1 % cream Apply topically 2 times daily Apply topically 2 times daily.    Provider, MD Víctor   OLANZapine (ZYPREXA) 20 MG tablet Take 1 tablet by mouth in the morning and at bedtime 7/10/23   Panfilo Currie MD   levothyroxine (SYNTHROID) 100 MCG tablet Take 1 tablet by mouth Daily    ProviderVíctor MD   hydrOXYzine HCl (ATARAX) 25 MG tablet Take 1 tablet by mouth 3 times daily as needed for Anxiety    Provider, MD Víctor   apixaban (ELIQUIS) 5 MG TABS tablet Take 1 tablet by mouth 2 times daily H/o DVT 23   Automatic Reconciliation, Ar   midodrine (PROAMATINE) 10 MG tablet Take 1 tablet by mouth 3 times daily (with meals) 23   Automatic Reconciliation, Ar   mirtazapine (REMERON) 45 MG tablet Take 1 tablet by mouth nightly 23   Automatic Reconciliation, Ar   venlafaxine (EFFEXOR XR) 150 MG extended release capsule Take 2 capsules by mouth daily 23   Automatic Reconciliation, Ar       Current medications:    No current facility-administered medications for this encounter.       Allergies:    Allergies   Allergen Reactions   •

## 2025-02-21 NOTE — H&P
Update History & Physical for Anuradha Lawler      This is an update on the patient's History and Physical done today 02/21/25. The ECT procedure was reviewed with the patient and I examined the patient. There was no change. The procedure site was confirmed by the patient and me.       Plan: The risks, benefits, expected outcome, and alternative to the recommended procedure have been discussed with the patient. Patient understands and wants to proceed with the procedure.     Electronically signed by TANISHA KUMAR MD on 2/21/2025 at 8:39 AM

## 2025-02-21 NOTE — PROGRESS NOTES
Anuradha CARLIN Lawler  1960  [id number]    Situation:  Verbal report given from: Turner Laboy MD  Procedure: Procedure(s):  ELECTROCONVULSIVE THERAPY    Background:    Preoperative diagnosis: Major depressive disorder, recurrent episode, moderate (HCC) [F33.1]    Postoperative diagnosis: * No post-op diagnosis entered *    :  Dr. GARAY    Assistant(s): Circulator: Swati Armendariz RN  Scrub Person First: Saray Colmean RN    Specimens: * No specimens in log *    Assessment:  Intra-procedure medications         Anesthesia gave intra-procedure sedation and medications, see anesthesia flow sheet     Intravenous fluids: LR@ KVO     Vital signs stable YES

## 2025-02-21 NOTE — DISCHARGE INSTRUCTIONS
ECT DISCHARGE INSTRUCTIONS      NAME: Anuradha Lawler   :  1960   MRN:  [id number]     Date/Time:  2025 8:59 AM    ADMIT DATE: 2025     DISCHARGE DATE: 2025     DISCHARGE DIAGNOSIS:  [unfilled]     Recommended diet:  As tolerated.     Recommended activity:      Have a responsible person stay with the patient for 24 hours after the treatment, some temporary confusion may be noticed.    Do not allow a patient to operate a motor vehicle for at least 24 hours and all the confusion has cleared.    Do not drink alcoholic beverages for 48 hours past treatment.    Do not sign any legal documents.    Do not make any critical decisions for 24 hours.    Advance diet as tolerated. Start with liquids and advance it nausea is not present.    Understand that memory for recent events, phone numbers, addresses, ect. May be decreased for a short period of time.     Report any persistant nausea or pain to the treating physician.    Patient receiving ECT while in the hospital should also not attempt to ambulate without assistance, please use tap bell which will be provide.    If you experience any of the following symptoms then please call your primary care physician/outpatient psychiatrist or return to the emergency room if you cannot get hold of your doctor: Fever, chills, nausea, vomiting, diarrhea, change in mentation, falling, bleeding, shortness of breath. Please call the emergency room at [facility] [phone] in this case.     Follow Up:  Continue outpatient psychiatric follow-up visits with your personal psychiatrist.

## 2025-02-25 ENCOUNTER — ANESTHESIA EVENT (OUTPATIENT)
Facility: HOSPITAL | Age: 65
End: 2025-02-25
Payer: MEDICARE

## 2025-03-07 ENCOUNTER — TELEPHONE (OUTPATIENT)
Age: 65
End: 2025-03-07

## 2025-03-07 NOTE — TELEPHONE ENCOUNTER
Patient requesting a call to discuss the testing procedure she had .     She needs her results faxed to :    Dr. Maryam Hardy    She don't have the fax number.

## 2025-03-10 ENCOUNTER — HOSPITAL ENCOUNTER (OUTPATIENT)
Facility: HOSPITAL | Age: 65
Setting detail: OUTPATIENT SURGERY
Discharge: HOME | End: 2025-03-10
Attending: PSYCHIATRY & NEUROLOGY | Admitting: PSYCHIATRY & NEUROLOGY
Payer: MEDICARE

## 2025-03-10 VITALS
RESPIRATION RATE: 21 BRPM | WEIGHT: 181 LBS | SYSTOLIC BLOOD PRESSURE: 107 MMHG | DIASTOLIC BLOOD PRESSURE: 88 MMHG | OXYGEN SATURATION: 93 % | TEMPERATURE: 99 F | HEART RATE: 89 BPM | BODY MASS INDEX: 32.07 KG/M2 | HEIGHT: 63 IN

## 2025-03-10 LAB
GLUCOSE BLD STRIP.AUTO-MCNC: 112 MG/DL (ref 65–117)
SERVICE CMNT-IMP: NORMAL

## 2025-03-10 PROCEDURE — 2580000003 HC RX 258: Performed by: ANESTHESIOLOGY

## 2025-03-10 PROCEDURE — 90870 ELECTROCONVULSIVE THERAPY: CPT | Performed by: PSYCHIATRY & NEUROLOGY

## 2025-03-10 PROCEDURE — 7100000000 HC PACU RECOVERY - FIRST 15 MIN: Performed by: PSYCHIATRY & NEUROLOGY

## 2025-03-10 PROCEDURE — 2709999900 HC NON-CHARGEABLE SUPPLY: Performed by: PSYCHIATRY & NEUROLOGY

## 2025-03-10 PROCEDURE — 82962 GLUCOSE BLOOD TEST: CPT

## 2025-03-10 PROCEDURE — 6360000002 HC RX W HCPCS: Performed by: ANESTHESIOLOGY

## 2025-03-10 PROCEDURE — 7100000010 HC PHASE II RECOVERY - FIRST 15 MIN: Performed by: PSYCHIATRY & NEUROLOGY

## 2025-03-10 PROCEDURE — 3700000000 HC ANESTHESIA ATTENDED CARE: Performed by: PSYCHIATRY & NEUROLOGY

## 2025-03-10 PROCEDURE — 7100000011 HC PHASE II RECOVERY - ADDTL 15 MIN: Performed by: PSYCHIATRY & NEUROLOGY

## 2025-03-10 PROCEDURE — 2500000003 HC RX 250 WO HCPCS: Performed by: ANESTHESIOLOGY

## 2025-03-10 PROCEDURE — 7100000001 HC PACU RECOVERY - ADDTL 15 MIN: Performed by: PSYCHIATRY & NEUROLOGY

## 2025-03-10 RX ORDER — SODIUM CHLORIDE 9 MG/ML
INJECTION, SOLUTION INTRAVENOUS PRN
Status: DISCONTINUED | OUTPATIENT
Start: 2025-03-10 | End: 2025-03-10 | Stop reason: HOSPADM

## 2025-03-10 RX ORDER — SUCCINYLCHOLINE/SOD CL,ISO/PF 100 MG/5ML
SYRINGE (ML) INTRAVENOUS
Status: DISCONTINUED | OUTPATIENT
Start: 2025-03-10 | End: 2025-03-10 | Stop reason: SDUPTHER

## 2025-03-10 RX ORDER — SODIUM CHLORIDE 0.9 % (FLUSH) 0.9 %
5-40 SYRINGE (ML) INJECTION PRN
Status: DISCONTINUED | OUTPATIENT
Start: 2025-03-10 | End: 2025-03-10 | Stop reason: HOSPADM

## 2025-03-10 RX ORDER — 0.9 % SODIUM CHLORIDE 0.9 %
INTRAVENOUS SOLUTION INTRAVENOUS
Status: DISCONTINUED | OUTPATIENT
Start: 2025-03-10 | End: 2025-03-10 | Stop reason: SDUPTHER

## 2025-03-10 RX ORDER — SODIUM CHLORIDE 0.9 % (FLUSH) 0.9 %
5-40 SYRINGE (ML) INJECTION EVERY 12 HOURS SCHEDULED
Status: DISCONTINUED | OUTPATIENT
Start: 2025-03-10 | End: 2025-03-10 | Stop reason: HOSPADM

## 2025-03-10 RX ADMIN — Medication 50 MG: at 08:57

## 2025-03-10 RX ADMIN — SODIUM CHLORIDE 150 ML/HR: 9 INJECTION, SOLUTION INTRAVENOUS at 08:51

## 2025-03-10 ASSESSMENT — PAIN - FUNCTIONAL ASSESSMENT: PAIN_FUNCTIONAL_ASSESSMENT: NONE - DENIES PAIN

## 2025-03-10 NOTE — PROCEDURES
ECT OPERATIVE PROCEDURE NOTE         IDENTIFICATION:           Patient Name   Anuradha Lawler         Date of Birth  1960         [facility]  [id number]         Medical Record Number   [id number]             Age   62 y.o.         PCP  Cyndi Curtis MD         Admit date:  03/10/25           Room Number   PACU/PL  @ [facility]         Date of Service   03/10/25                         Electro Convulsive Therapy:  Treatment number 44         Maintenance ECT - YES   Anuradha Lawler was admitted to the PACU for ECT. Patient was medically cleared and NPO for procedure. Patient is NOT court mandated and gave informed consent for ECT treatment.       Patient received      Bilateral ECT - YES   Administered using the Thymatron System IV - [employer].      at 40 % Energy, under general anesthesia.   Anuradha Lawler with a good, well generalized seizure of 56 seconds on observation of the motor manifestations of the seizure. EEG duration was 63 secs.   Post-Ictal Suppression Index: 29.4%.   Recovery was unremarkable and with no complications.   Rocuronium was given for skeletal muscle paralysis and reversal was done. Succinylcholine to be used for future procedures.   Change in Energy %:                 Anesthesia medications administered during procedure:   Brevital:  50 mg   Change for next treament:       Succinylcholine: 50 mg   Change for next treatment:        Propofol: mg   Glycopyrrolate:  mg   Labetalol:  mg   Esmolol:  mg   Lorazepam:  mg   Ketamine:  mg   Zofran:   mg     Toradol:  mg   Lidocaine:  mg   Caffeine Benzoate: mg               CSSRS  1/26/2023 7/8/2022         1) Within the past month, have you wished you were dead or wished you could go to sleep and not wake up?   No  No     2) Have you actually had any thoughts of killing yourself?  No  No     6) Have you ever done anything, started to do anything, or prepared to do anything to end your life?  Yes  No         Did this occur within

## 2025-03-10 NOTE — ANESTHESIA PRE PROCEDURE
Department of Anesthesiology  Preprocedure Note       Name:  Anuradha Lawler   Age:  64 y.o.  :  1960                                          MRN:  [id number]         Date:  3/10/2025      Surgeon: Surgeon(s):  Yolanda Naranjo MD    Procedure: Procedure(s):  ELECTROCONVULSIVE THERAPY    Medications prior to admission:   Prior to Admission medications    Medication Sig Start Date End Date Taking? Authorizing Provider   lidocaine viscous hcl (XYLOCAINE) 2 % SOLN solution Take 5 mLs by mouth as needed for Irritation Gargle and spit twice a day as needed  Patient not taking: Reported on 2024 6/15/24   Sara Lozano APRN - NP   tiotropium (SPIRIVA) 18 MCG inhalation capsule Inhale 1 capsule into the lungs 4/30/24 10/27/24  ProviderVíctor MD   Rosuvastatin Calcium (CRESTOR PO) Take by mouth    Víctor Grande MD   hydrocortisone 1 % cream Apply topically 2 times daily Apply topically 2 times daily.  Patient not taking: Reported on 2025    ProviderVíctor MD   OLANZapine (ZYPREXA) 20 MG tablet Take 1 tablet by mouth in the morning and at bedtime 7/10/23   Panfilo Currie MD   levothyroxine (SYNTHROID) 100 MCG tablet Take 1 tablet by mouth Daily    ProviderVíctor MD   hydrOXYzine HCl (ATARAX) 25 MG tablet Take 1 tablet by mouth 3 times daily as needed for Anxiety    ProviderVíctor MD   apixaban (ELIQUIS) 5 MG TABS tablet Take 1 tablet by mouth 2 times daily H/o DVT  Patient not taking: Reported on 2025   Automatic Reconciliation, Ar   midodrine (PROAMATINE) 10 MG tablet Take 1 tablet by mouth 3 times daily (with meals) 23   Automatic Reconciliation, Ar   mirtazapine (REMERON) 45 MG tablet Take 1 tablet by mouth nightly 23   Automatic Reconciliation, Ar   venlafaxine (EFFEXOR XR) 150 MG extended release capsule Take 2 capsules by mouth daily 23   Automatic Reconciliation, Ar       Current medications:    Current Facility-Administered

## 2025-03-10 NOTE — ANESTHESIA POSTPROCEDURE EVALUATION
Department of Anesthesiology  Postprocedure Note    Patient: Anuradha Lawler  MRN: [id number]  YOB: 1960  Date of evaluation: 3/10/2025    Procedure Summary       Date: 03/10/25 Room / Location: [facility] MAIN OR  / [facility] MAIN OR    Anesthesia Start: 0854 Anesthesia Stop:     Procedure: ELECTROCONVULSIVE THERAPY (Head) Diagnosis:       Major depressive disorder, recurrent episode, moderate (HCC)      (Major depressive disorder, recurrent episode, moderate (HCC) [F33.1])    Surgeons: Yolanda Naranjo MD Responsible Provider: Gab Lawler MD    Anesthesia Type: general ASA Status: 3            Anesthesia Type: No value filed.    Ama Phase I: Ama Score: 6    Ama Phase II:      Anesthesia Post Evaluation    Patient location during evaluation: PACU  Patient participation: complete - patient participated  Level of consciousness: awake  Airway patency: patent  Nausea & Vomiting: no vomiting and no nausea  Cardiovascular status: hemodynamically stable  Respiratory status: acceptable  Hydration status: stable  Pain management: adequate    No notable events documented.

## 2025-03-10 NOTE — PROGRESS NOTES
Anuradha CARLIN Lawler  1960  [id number]    Situation:  Verbal report given from: Swati Armendariz RN  Procedure: Procedure(s):  ELECTROCONVULSIVE THERAPY    Background:    Preoperative diagnosis: Major depressive disorder, recurrent episode, moderate (HCC) [F33.1]    Postoperative diagnosis: * No post-op diagnosis entered *    :  Dr. Yolanda Naranjo    Assistant(s): Circulator: Swati Armendariz RN  Scrub Person First: Saray Coleman RN    Specimens: * No specimens in log *    Assessment:  Intra-procedure medications         Anesthesia gave intra-procedure sedation and medications, see anesthesia flow sheet     Intravenous fluids: LR@ KVO     Vital signs stable

## 2025-03-10 NOTE — DISCHARGE INSTRUCTIONS
ECT DISCHARGE INSTRUCTIONS      NAME: Anuradha Lawler   :  1960   MRN:  [id number]     Date/Time:  3/10/2025 9:02 AM    ADMIT DATE: 3/10/2025     DISCHARGE DATE: 3/10/2025     DISCHARGE DIAGNOSIS:  [unfilled]     Recommended diet:  As tolerated.     Recommended activity:      Have a responsible person stay with the patient for 24 hours after the treatment, some temporary confusion may be noticed.    Do not allow a patient to operate a motor vehicle for at least 24 hours and all the confusion has cleared.    Do not drink alcoholic beverages for 48 hours past treatment.    Do not sign any legal documents.    Do not make any critical decisions for 24 hours.    Advance diet as tolerated. Start with liquids and advance it nausea is not present.    Understand that memory for recent events, phone numbers, addresses, ect. May be decreased for a short period of time.     Report any persistant nausea or pain to the treating physician.    Patient receiving ECT while in the hospital should also not attempt to ambulate without assistance, please use tap bell which will be provide.    If you experience any of the following symptoms then please call your primary care physician/outpatient psychiatrist or return to the emergency room if you cannot get hold of your doctor: Fever, chills, nausea, vomiting, diarrhea, change in mentation, falling, bleeding, shortness of breath. Please call the emergency room at [facility] [phone] in this case.     Follow Up:  Continue outpatient psychiatric follow-up visits with your personal psychiatrist.

## 2025-03-10 NOTE — H&P
Update History & Physical for Anuradha Lawler      This is an update on the patient's History and Physical done today 03/10/25. The ECT procedure was reviewed with the patient and I examined the patient. There was no change. The procedure site was confirmed by the patient and me.       Plan: The risks, benefits, expected outcome, and alternative to the recommended procedure have been discussed with the patient. Patient understands and wants to proceed with the procedure.     Electronically signed by TANISHA KUMAR MD on 3/10/2025 at 8:35 AM

## 2025-03-21 ENCOUNTER — ANESTHESIA (OUTPATIENT)
Facility: HOSPITAL | Age: 65
End: 2025-03-21
Payer: MEDICARE

## 2025-04-11 ENCOUNTER — ANESTHESIA (OUTPATIENT)
Facility: HOSPITAL | Age: 65
End: 2025-04-11
Payer: MEDICARE

## 2025-04-11 ENCOUNTER — HOSPITAL ENCOUNTER (OUTPATIENT)
Facility: HOSPITAL | Age: 65
Setting detail: OUTPATIENT SURGERY
Discharge: HOME | End: 2025-04-11
Attending: PSYCHIATRY & NEUROLOGY | Admitting: PSYCHIATRY & NEUROLOGY
Payer: MEDICARE

## 2025-04-11 ENCOUNTER — ANESTHESIA EVENT (OUTPATIENT)
Facility: HOSPITAL | Age: 65
End: 2025-04-11
Payer: MEDICARE

## 2025-04-11 VITALS
OXYGEN SATURATION: 97 % | BODY MASS INDEX: 31.01 KG/M2 | TEMPERATURE: 99 F | SYSTOLIC BLOOD PRESSURE: 110 MMHG | WEIGHT: 175 LBS | DIASTOLIC BLOOD PRESSURE: 69 MMHG | RESPIRATION RATE: 12 BRPM | HEIGHT: 63 IN | HEART RATE: 80 BPM

## 2025-04-11 PROCEDURE — 7100000011 HC PHASE II RECOVERY - ADDTL 15 MIN: Performed by: PSYCHIATRY & NEUROLOGY

## 2025-04-11 PROCEDURE — 90870 ELECTROCONVULSIVE THERAPY: CPT | Performed by: PSYCHIATRY & NEUROLOGY

## 2025-04-11 PROCEDURE — 2500000003 HC RX 250 WO HCPCS: Performed by: NURSE ANESTHETIST, CERTIFIED REGISTERED

## 2025-04-11 PROCEDURE — 2709999900 HC NON-CHARGEABLE SUPPLY: Performed by: PSYCHIATRY & NEUROLOGY

## 2025-04-11 PROCEDURE — 3700000000 HC ANESTHESIA ATTENDED CARE: Performed by: PSYCHIATRY & NEUROLOGY

## 2025-04-11 PROCEDURE — 6360000002 HC RX W HCPCS: Performed by: NURSE ANESTHETIST, CERTIFIED REGISTERED

## 2025-04-11 PROCEDURE — 7100000010 HC PHASE II RECOVERY - FIRST 15 MIN: Performed by: PSYCHIATRY & NEUROLOGY

## 2025-04-11 PROCEDURE — 7100000001 HC PACU RECOVERY - ADDTL 15 MIN: Performed by: PSYCHIATRY & NEUROLOGY

## 2025-04-11 PROCEDURE — 7100000000 HC PACU RECOVERY - FIRST 15 MIN: Performed by: PSYCHIATRY & NEUROLOGY

## 2025-04-11 RX ORDER — SODIUM CHLORIDE 0.9 % (FLUSH) 0.9 %
5-40 SYRINGE (ML) INJECTION EVERY 12 HOURS SCHEDULED
Status: DISCONTINUED | OUTPATIENT
Start: 2025-04-11 | End: 2025-04-11 | Stop reason: HOSPADM

## 2025-04-11 RX ORDER — SUCCINYLCHOLINE CHLORIDE 20 MG/ML
INJECTION INTRAMUSCULAR; INTRAVENOUS
Status: DISCONTINUED | OUTPATIENT
Start: 2025-04-11 | End: 2025-04-11 | Stop reason: SDUPTHER

## 2025-04-11 RX ORDER — SODIUM CHLORIDE 0.9 % (FLUSH) 0.9 %
5-40 SYRINGE (ML) INJECTION PRN
Status: DISCONTINUED | OUTPATIENT
Start: 2025-04-11 | End: 2025-04-11 | Stop reason: HOSPADM

## 2025-04-11 RX ORDER — SODIUM CHLORIDE 9 MG/ML
INJECTION, SOLUTION INTRAVENOUS PRN
Status: DISCONTINUED | OUTPATIENT
Start: 2025-04-11 | End: 2025-04-11 | Stop reason: HOSPADM

## 2025-04-11 RX ADMIN — SUCCINYLCHOLINE CHLORIDE 50 MG: 20 INJECTION, SOLUTION INTRAMUSCULAR; INTRAVENOUS at 08:43

## 2025-04-11 RX ADMIN — Medication 50 MG: at 08:43

## 2025-04-11 ASSESSMENT — PAIN SCALES - GENERAL
PAINLEVEL_OUTOF10: 0

## 2025-04-11 ASSESSMENT — PAIN - FUNCTIONAL ASSESSMENT: PAIN_FUNCTIONAL_ASSESSMENT: NONE - DENIES PAIN

## 2025-04-11 NOTE — DISCHARGE INSTRUCTIONS
ECT DISCHARGE INSTRUCTIONS      NAME: Anuradha Lawler   :  1960   MRN:  [id number]     Date/Time:  2025 8:49 AM    ADMIT DATE: 2025     DISCHARGE DATE: 2025     DISCHARGE DIAGNOSIS:  [unfilled]     Recommended diet:  As tolerated.     Recommended activity:      Have a responsible person stay with the patient for 24 hours after the treatment, some temporary confusion may be noticed.    Do not allow a patient to operate a motor vehicle for at least 24 hours and all the confusion has cleared.    Do not drink alcoholic beverages for 48 hours past treatment.    Do not sign any legal documents.    Do not make any critical decisions for 24 hours.    Advance diet as tolerated. Start with liquids and advance it nausea is not present.    Understand that memory for recent events, phone numbers, addresses, ect. May be decreased for a short period of time.     Report any persistant nausea or pain to the treating physician.    Patient receiving ECT while in the hospital should also not attempt to ambulate without assistance, please use tap bell which will be provide.    If you experience any of the following symptoms then please call your primary care physician/outpatient psychiatrist or return to the emergency room if you cannot get hold of your doctor: Fever, chills, nausea, vomiting, diarrhea, change in mentation, falling, bleeding, shortness of breath. Please call the emergency room at [facility] [phone] in this case.     Follow Up:  Continue outpatient psychiatric follow-up visits with your personal psychiatrist.                                                                        DISCHARGE SUMMARY from Nurse    PATIENT INSTRUCTIONS:    After general anesthesia or intravenous sedation, for 24 hours or while taking prescription Narcotics:  Limit your activities  Do not drive and operate hazardous machinery  Do not make important personal or business

## 2025-04-11 NOTE — ANESTHESIA POSTPROCEDURE EVALUATION
Department of Anesthesiology  Postprocedure Note    Patient: Anuradha Lawler  MRN: [id number]  YOB: 1960  Date of evaluation: 4/11/2025    Procedure Summary       Date: 04/11/25 Room / Location: [facility] MAIN OR  / [facility] MAIN OR    Anesthesia Start: 0840 Anesthesia Stop: 0852    Procedure: ELECTROCONVULSIVE THERAPY (Head) Diagnosis:       Major depressive disorder, recurrent episode, moderate (HCC)      (Major depressive disorder, recurrent episode, moderate (HCC) [F33.1])    Surgeons: Yolanda Naranjo MD Responsible Provider: Gab Lawler MD    Anesthesia Type: General ASA Status: 3            Anesthesia Type: General    Ama Phase I: Ama Score: 8    Ama Phase II: Ama Score: 10    Anesthesia Post Evaluation    Patient location during evaluation: PACU  Patient participation: complete - patient participated  Level of consciousness: awake  Airway patency: patent  Nausea & Vomiting: no vomiting and no nausea  Cardiovascular status: hemodynamically stable  Respiratory status: acceptable  Hydration status: stable  Pain management: adequate    No notable events documented.

## 2025-04-11 NOTE — ANESTHESIA PRE PROCEDURE
GI/Hepatic/Renal:   (+) PUD          Endo/Other:    (+) hypothyroidism::., malignancy/cancer.                  ROS comment: Colon cancer resection end of March 2025 Abdominal:             Vascular:   + DVT (2 years ago).       Other Findings:       Anesthesia Plan      general     ASA 3       Induction: intravenous.      Anesthetic plan and risks discussed with patient.        Attending anesthesiologist reviewed and agrees with Preprocedure content            Gab Lawler MD   4/11/2025

## 2025-04-11 NOTE — H&P
Update History & Physical for Anuradha Lawler      This is an update on the patient's History and Physical done today 04/11/25. The ECT procedure was reviewed with the patient and I examined the patient. There was no change. The procedure site was confirmed by the patient and me.       Plan: The risks, benefits, expected outcome, and alternative to the recommended procedure have been discussed with the patient. Patient understands and wants to proceed with the procedure.     Electronically signed by TANISHA KUMAR MD on 4/11/2025 at 8:13 AM

## 2025-04-11 NOTE — PERIOP NOTE
Anuradha Lawler  1960  [id number]    Situation:  Verbal report given from: Andrew Ndiaye  Procedure: Procedure(s):  ELECTROCONVULSIVE THERAPY    Background:    Preoperative diagnosis: Major depressive disorder, recurrent episode, moderate (HCC) [F33.1]    Postoperative diagnosis: * No post-op diagnosis entered *    :  Dr. Naranjo    Assistant(s): Circulator: Andrew Ndiaye RN  Scrub Person First: Saray Coleman RN    Specimens: * No specimens in log *    Assessment:  Intra-procedure medications         Anesthesia gave intra-procedure sedation and medications, see anesthesia flow sheet     Intravenous fluids: LR@ KVO     Vital signs stable

## 2025-05-22 ENCOUNTER — ANESTHESIA EVENT (OUTPATIENT)
Facility: HOSPITAL | Age: 65
End: 2025-05-22
Payer: MEDICARE

## 2025-05-23 ENCOUNTER — ANESTHESIA (OUTPATIENT)
Facility: HOSPITAL | Age: 65
End: 2025-05-23
Payer: MEDICARE

## 2025-05-23 ENCOUNTER — HOSPITAL ENCOUNTER (OUTPATIENT)
Facility: HOSPITAL | Age: 65
Setting detail: OUTPATIENT SURGERY
Discharge: HOME | End: 2025-05-23
Attending: PSYCHIATRY & NEUROLOGY | Admitting: PSYCHIATRY & NEUROLOGY
Payer: MEDICARE

## 2025-05-23 VITALS
RESPIRATION RATE: 15 BRPM | HEIGHT: 63 IN | OXYGEN SATURATION: 92 % | BODY MASS INDEX: 30.48 KG/M2 | SYSTOLIC BLOOD PRESSURE: 108 MMHG | TEMPERATURE: 99.2 F | DIASTOLIC BLOOD PRESSURE: 70 MMHG | WEIGHT: 172 LBS | HEART RATE: 79 BPM

## 2025-05-23 PROCEDURE — 3700000000 HC ANESTHESIA ATTENDED CARE: Performed by: PSYCHIATRY & NEUROLOGY

## 2025-05-23 PROCEDURE — 2500000003 HC RX 250 WO HCPCS: Performed by: ANESTHESIOLOGY

## 2025-05-23 PROCEDURE — 7100000010 HC PHASE II RECOVERY - FIRST 15 MIN: Performed by: PSYCHIATRY & NEUROLOGY

## 2025-05-23 PROCEDURE — 6360000002 HC RX W HCPCS: Performed by: ANESTHESIOLOGY

## 2025-05-23 PROCEDURE — 90870 ELECTROCONVULSIVE THERAPY: CPT | Performed by: PSYCHIATRY & NEUROLOGY

## 2025-05-23 PROCEDURE — 2709999900 HC NON-CHARGEABLE SUPPLY: Performed by: PSYCHIATRY & NEUROLOGY

## 2025-05-23 PROCEDURE — 7100000001 HC PACU RECOVERY - ADDTL 15 MIN: Performed by: PSYCHIATRY & NEUROLOGY

## 2025-05-23 PROCEDURE — 7100000000 HC PACU RECOVERY - FIRST 15 MIN: Performed by: PSYCHIATRY & NEUROLOGY

## 2025-05-23 RX ORDER — SODIUM CHLORIDE 0.9 % (FLUSH) 0.9 %
5-40 SYRINGE (ML) INJECTION PRN
Status: DISCONTINUED | OUTPATIENT
Start: 2025-05-23 | End: 2025-05-23 | Stop reason: HOSPADM

## 2025-05-23 RX ORDER — SODIUM CHLORIDE 0.9 % (FLUSH) 0.9 %
5-40 SYRINGE (ML) INJECTION EVERY 12 HOURS SCHEDULED
Status: DISCONTINUED | OUTPATIENT
Start: 2025-05-23 | End: 2025-05-23 | Stop reason: HOSPADM

## 2025-05-23 RX ORDER — ONDANSETRON 2 MG/ML
4 INJECTION INTRAMUSCULAR; INTRAVENOUS
Status: DISCONTINUED | OUTPATIENT
Start: 2025-05-23 | End: 2025-05-23 | Stop reason: HOSPADM

## 2025-05-23 RX ORDER — SODIUM CHLORIDE 9 MG/ML
INJECTION, SOLUTION INTRAVENOUS PRN
Status: DISCONTINUED | OUTPATIENT
Start: 2025-05-23 | End: 2025-05-23 | Stop reason: HOSPADM

## 2025-05-23 RX ORDER — HYDRALAZINE HYDROCHLORIDE 20 MG/ML
10 INJECTION INTRAMUSCULAR; INTRAVENOUS
Status: DISCONTINUED | OUTPATIENT
Start: 2025-05-23 | End: 2025-05-23 | Stop reason: HOSPADM

## 2025-05-23 RX ORDER — SUCCINYLCHOLINE/SOD CL,ISO/PF 100 MG/5ML
SYRINGE (ML) INTRAVENOUS
Status: DISCONTINUED | OUTPATIENT
Start: 2025-05-23 | End: 2025-05-23 | Stop reason: SDUPTHER

## 2025-05-23 RX ORDER — SODIUM CHLORIDE, SODIUM LACTATE, POTASSIUM CHLORIDE, CALCIUM CHLORIDE 600; 310; 30; 20 MG/100ML; MG/100ML; MG/100ML; MG/100ML
INJECTION, SOLUTION INTRAVENOUS CONTINUOUS
Status: DISCONTINUED | OUTPATIENT
Start: 2025-05-23 | End: 2025-05-23 | Stop reason: HOSPADM

## 2025-05-23 RX ADMIN — Medication 50 MG: at 09:12

## 2025-05-23 ASSESSMENT — PAIN - FUNCTIONAL ASSESSMENT: PAIN_FUNCTIONAL_ASSESSMENT: NONE - DENIES PAIN

## 2025-05-23 NOTE — PERIOP NOTE
Anuradha GILLIS Bucky  1960  [id number]    Situation:  Verbal report given from: GAVINO Longoria  Procedure: Procedure(s):  ELECTROCONVULSIVE THERAPY    Background:    Preoperative diagnosis: Major depressive disorder, recurrent episode, moderate (HCC) [F33.1]    Postoperative diagnosis: * No post-op diagnosis entered *    :  Dr. Naranjo    Assistant(s): Circulator: Swati Armendariz RN  Scrub Person First: Andrew Ndiaye RN    Specimens: * No specimens in log *    Assessment:  Intra-procedure medications         Anesthesia gave intra-procedure sedation and medications, see anesthesia flow sheet     Intravenous fluids: LR@ KVO     Vital signs stable yes      Recommendation:

## 2025-05-23 NOTE — PERIOP NOTE
PACU DISCHARGE NOTE    Vital signs stable, pain well controlled, alert and oriented times three or at baseline, follow up per surgeon, no anesthetic complications.

## 2025-05-23 NOTE — H&P
Update History & Physical for Anuradha Lawler      This is an update on the patient's History and Physical done today 05/23/25. The ECT procedure was reviewed with the patient and I examined the patient. There was no change. The procedure site was confirmed by the patient and me.       Plan: The risks, benefits, expected outcome, and alternative to the recommended procedure have been discussed with the patient. Patient understands and wants to proceed with the procedure.     Electronically signed by TANISHA KUMAR MD on 5/23/2025 at 8:37 AM

## 2025-05-23 NOTE — ANESTHESIA PRE PROCEDURE
Department of Anesthesiology  Preprocedure Note       Name:  Anuradha Lawler   Age:  64 y.o.  :  1960                                          MRN:  [id number]         Date:  2025      Surgeon: Surgeon(s):  Yolanda Naranjo MD    Procedure: Procedure(s):  ELECTROCONVULSIVE THERAPY    Medications prior to admission:   Prior to Admission medications    Medication Sig Start Date End Date Taking? Authorizing Provider   lidocaine viscous hcl (XYLOCAINE) 2 % SOLN solution Take 5 mLs by mouth as needed for Irritation Gargle and spit twice a day as needed  Patient not taking: Reported on 2025 6/15/24   Sara Shine APRN - NP   tiotropium (SPIRIVA) 18 MCG inhalation capsule Inhale 1 capsule into the lungs daily 24  ProviderVíctor MD   Rosuvastatin Calcium (CRESTOR PO) Take by mouth    ProviderVíctor MD   hydrocortisone 1 % cream Apply topically 2 times daily Apply topically 2 times daily.    Provider, MD Víctor   OLANZapine (ZYPREXA) 20 MG tablet Take 1 tablet by mouth in the morning and at bedtime 7/10/23   Panfilo Currie MD   levothyroxine (SYNTHROID) 100 MCG tablet Take 1 tablet by mouth Daily    ProviderVíctor MD   hydrOXYzine HCl (ATARAX) 25 MG tablet Take 1 tablet by mouth 3 times daily as needed for Anxiety    ProviderVíctor MD   apixaban (ELIQUIS) 5 MG TABS tablet Take 1 tablet by mouth 2 times daily H/o DVT  Patient not taking: Reported on 2025   Automatic Reconciliation, Ar   midodrine (PROAMATINE) 10 MG tablet Take 1 tablet by mouth 3 times daily (with meals) 23   Automatic Reconciliation, Ar   mirtazapine (REMERON) 45 MG tablet Take 1 tablet by mouth nightly 23   Automatic Reconciliation, Ar   venlafaxine (EFFEXOR XR) 150 MG extended release capsule Take 2 capsules by mouth daily 23   Automatic Reconciliation, Ar       Current medications:    No current facility-administered medications for this

## 2025-05-23 NOTE — DISCHARGE INSTRUCTIONS
ECT DISCHARGE INSTRUCTIONS      NAME: Anuradha Lawler   :  1960   MRN:  [id number]     Date/Time:  2025 9:16 AM    ADMIT DATE: 2025     DISCHARGE DATE: 2025     DISCHARGE DIAGNOSIS:  [unfilled]     Recommended diet:  As tolerated.     Recommended activity:      Have a responsible person stay with the patient for 24 hours after the treatment, some temporary confusion may be noticed.    Do not allow a patient to operate a motor vehicle for at least 24 hours and all the confusion has cleared.    Do not drink alcoholic beverages for 48 hours past treatment.    Do not sign any legal documents.    Do not make any critical decisions for 24 hours.    Advance diet as tolerated. Start with liquids and advance it nausea is not present.    Understand that memory for recent events, phone numbers, addresses, ect. May be decreased for a short period of time.     Report any persistant nausea or pain to the treating physician.    Patient receiving ECT while in the hospital should also not attempt to ambulate without assistance, please use tap bell which will be provide.    If you experience any of the following symptoms then please call your primary care physician/outpatient psychiatrist or return to the emergency room if you cannot get hold of your doctor: Fever, chills, nausea, vomiting, diarrhea, change in mentation, falling, bleeding, shortness of breath. Please call the emergency room at [facility] [phone] in this case.     Follow Up:  Continue outpatient psychiatric follow-up visits with your personal psychiatrist.

## 2025-05-23 NOTE — ANESTHESIA POSTPROCEDURE EVALUATION
Post-Anesthesia Evaluation and Assessment    Patient: Anuradha Lawler MRN: [id number]  SSN: [id number]    YOB: 1960  Age: 64 y.o.  Sex: female      I have evaluated the patient and they are stable and ready for discharge from the PACU.     Cardiovascular Function/Vital Signs  Visit Vitals  /66   Pulse 66   Temp 97 °F (36.1 °C) (Tympanic)   Resp 14   Ht 1.6 m (5' 3\")   Wt 78 kg (172 lb)   SpO2 97%   BMI 30.47 kg/m²       Patient is status post General anesthesia for Procedure(s):  ELECTROCONVULSIVE THERAPY.    Nausea/Vomiting: None    Postoperative hydration reviewed and adequate.    Pain:      Managed    Neurological Status:       At baseline    Mental Status, Level of Consciousness: Alert and  oriented to person, place, and time    Pulmonary Status:       Adequate oxygenation and airway patent    Complications related to anesthesia: None    Post-anesthesia assessment completed. No concerns    Signed By: Elissa Vail MD     May 23, 2025            Department of Anesthesiology  Postprocedure Note    Patient: Anuradha Lawler  MRN: [id number]  YOB: 1960  Date of evaluation: 5/23/2025    Procedure Summary       Date: 05/23/25 Room / Location: [facility] MAIN OR  / [facility] MAIN OR    Anesthesia Start: 0908 Anesthesia Stop: 0918    Procedure: ELECTROCONVULSIVE THERAPY (Head) Diagnosis:       Major depressive disorder, recurrent episode, moderate (HCC)      (Major depressive disorder, recurrent episode, moderate (HCC) [F33.1])    Surgeons: Yolanda Naranjo MD Responsible Provider: Elissa Vail MD    Anesthesia Type: general ASA Status: 3            Anesthesia Type: No value filed.    Ama Phase I: Ama Score: 10    Ama Phase II:      Anesthesia Post Evaluation    No notable events documented.

## 2025-05-23 NOTE — PERIOP NOTE
PACU DISCHARGE NOTE  Patient meets discharge criteria.  Patient and , Ann, provided with verbal and written discharge instructions.  Patient discharged by wheelchair with GAVINO Tinoco to transport home.

## 2025-06-19 ENCOUNTER — ANESTHESIA EVENT (OUTPATIENT)
Facility: HOSPITAL | Age: 65
End: 2025-06-19
Payer: MEDICARE

## 2025-06-19 NOTE — PERIOP NOTE
Attempted to call  twice but no answer. Left message that pt needed to be at [facility] at 0700 am on 6/20/25 for ECT. I also mentioned that pt could not eat or drink after midnight.

## 2025-06-20 ENCOUNTER — HOSPITAL ENCOUNTER (OUTPATIENT)
Facility: HOSPITAL | Age: 65
Setting detail: OUTPATIENT SURGERY
Discharge: HOME | End: 2025-06-20
Attending: PSYCHIATRY & NEUROLOGY | Admitting: PSYCHIATRY & NEUROLOGY
Payer: MEDICARE

## 2025-06-20 ENCOUNTER — ANESTHESIA (OUTPATIENT)
Facility: HOSPITAL | Age: 65
End: 2025-06-20
Payer: MEDICARE

## 2025-06-20 VITALS
DIASTOLIC BLOOD PRESSURE: 70 MMHG | SYSTOLIC BLOOD PRESSURE: 119 MMHG | HEART RATE: 85 BPM | RESPIRATION RATE: 15 BRPM | TEMPERATURE: 98.4 F | OXYGEN SATURATION: 92 %

## 2025-06-20 PROCEDURE — 3700000000 HC ANESTHESIA ATTENDED CARE: Performed by: PSYCHIATRY & NEUROLOGY

## 2025-06-20 PROCEDURE — 7100000001 HC PACU RECOVERY - ADDTL 15 MIN: Performed by: PSYCHIATRY & NEUROLOGY

## 2025-06-20 PROCEDURE — 7100000010 HC PHASE II RECOVERY - FIRST 15 MIN: Performed by: PSYCHIATRY & NEUROLOGY

## 2025-06-20 PROCEDURE — 6360000002 HC RX W HCPCS: Performed by: ANESTHESIOLOGY

## 2025-06-20 PROCEDURE — 7100000011 HC PHASE II RECOVERY - ADDTL 15 MIN: Performed by: PSYCHIATRY & NEUROLOGY

## 2025-06-20 PROCEDURE — 90870 ELECTROCONVULSIVE THERAPY: CPT | Performed by: PSYCHIATRY & NEUROLOGY

## 2025-06-20 PROCEDURE — 2500000003 HC RX 250 WO HCPCS: Performed by: ANESTHESIOLOGY

## 2025-06-20 PROCEDURE — 7100000000 HC PACU RECOVERY - FIRST 15 MIN: Performed by: PSYCHIATRY & NEUROLOGY

## 2025-06-20 PROCEDURE — 2709999900 HC NON-CHARGEABLE SUPPLY: Performed by: PSYCHIATRY & NEUROLOGY

## 2025-06-20 RX ORDER — SODIUM CHLORIDE 0.9 % (FLUSH) 0.9 %
5-40 SYRINGE (ML) INJECTION EVERY 12 HOURS SCHEDULED
Status: DISCONTINUED | OUTPATIENT
Start: 2025-06-20 | End: 2025-06-20 | Stop reason: HOSPADM

## 2025-06-20 RX ORDER — SODIUM CHLORIDE 9 MG/ML
INJECTION, SOLUTION INTRAVENOUS PRN
Status: DISCONTINUED | OUTPATIENT
Start: 2025-06-20 | End: 2025-06-20 | Stop reason: HOSPADM

## 2025-06-20 RX ORDER — SODIUM CHLORIDE 0.9 % (FLUSH) 0.9 %
5-40 SYRINGE (ML) INJECTION PRN
Status: DISCONTINUED | OUTPATIENT
Start: 2025-06-20 | End: 2025-06-20 | Stop reason: HOSPADM

## 2025-06-20 RX ORDER — SODIUM CHLORIDE 9 MG/ML
INJECTION, SOLUTION INTRAVENOUS PRN
Status: DISCONTINUED | OUTPATIENT
Start: 2025-06-20 | End: 2025-06-20 | Stop reason: SDUPTHER

## 2025-06-20 RX ORDER — PROCHLORPERAZINE EDISYLATE 5 MG/ML
5 INJECTION INTRAMUSCULAR; INTRAVENOUS
Status: DISCONTINUED | OUTPATIENT
Start: 2025-06-20 | End: 2025-06-20 | Stop reason: HOSPADM

## 2025-06-20 RX ORDER — SODIUM CHLORIDE, SODIUM LACTATE, POTASSIUM CHLORIDE, CALCIUM CHLORIDE 600; 310; 30; 20 MG/100ML; MG/100ML; MG/100ML; MG/100ML
INJECTION, SOLUTION INTRAVENOUS CONTINUOUS
Status: DISCONTINUED | OUTPATIENT
Start: 2025-06-20 | End: 2025-06-20 | Stop reason: HOSPADM

## 2025-06-20 RX ORDER — SUCCINYLCHOLINE/SOD CL,ISO/PF 100 MG/5ML
SYRINGE (ML) INTRAVENOUS
Status: DISCONTINUED | OUTPATIENT
Start: 2025-06-20 | End: 2025-06-20 | Stop reason: SDUPTHER

## 2025-06-20 RX ADMIN — Medication 50 MG: at 08:40

## 2025-06-20 NOTE — ANESTHESIA POSTPROCEDURE EVALUATION
Post-Anesthesia Evaluation and Assessment    Patient: Anuradha Lawler MRN: [id number]  SSN: [id number]    YOB: 1960  Age: 64 y.o.  Sex: female      I have evaluated the patient and they are stable and ready for discharge from the PACU.     Cardiovascular Function/Vital Signs  Visit Vitals  /70   Pulse 85   Temp 98.4 °F (36.9 °C) (Tympanic)   Resp 15   SpO2 92%       Patient is status post General anesthesia for Procedure(s):  ELECTROCONVULSIVE THERAPY.    Nausea/Vomiting: None    Postoperative hydration reviewed and adequate.    Pain:      Managed    Neurological Status:       At baseline    Mental Status, Level of Consciousness: Alert and  oriented to person, place, and time    Pulmonary Status:       Adequate oxygenation and airway patent    Complications related to anesthesia: None    Post-anesthesia assessment completed. No concerns    Signed By: Elissa Vail MD     June 20, 2025            Department of Anesthesiology  Postprocedure Note    Patient: Anuradha Lawler  MRN: [id number]  YOB: 1960  Date of evaluation: 6/20/2025    Procedure Summary       Date: 06/20/25 Room / Location: [facility] MAIN OR  / [facility] MAIN OR    Anesthesia Start: 0836 Anesthesia Stop: 0846    Procedure: ELECTROCONVULSIVE THERAPY (Head) Diagnosis:       Major depressive disorder, recurrent episode, moderate (HCC)      (Major depressive disorder, recurrent episode, moderate (HCC) [F33.1])    Surgeons: Yolanda Naranjo MD Responsible Provider: Elissa Vail MD    Anesthesia Type: general ASA Status: 2            Anesthesia Type: No value filed.    Ama Phase I: Ama Score: 8    Ama Phase II: Ama Score: 10    Anesthesia Post Evaluation    No notable events documented.

## 2025-06-20 NOTE — ANESTHESIA PRE PROCEDURE
Department of Anesthesiology  Preprocedure Note       Name:  Anuradha Lawler   Age:  64 y.o.  :  1960                                          MRN:  [id number]         Date:  2025      Surgeon: Surgeon(s):  Yolanda Naranjo MD    Procedure: Procedure(s):  ELECTROCONVULSIVE THERAPY    Medications prior to admission:   Prior to Admission medications    Medication Sig Start Date End Date Taking? Authorizing Provider   lidocaine viscous hcl (XYLOCAINE) 2 % SOLN solution Take 5 mLs by mouth as needed for Irritation Gargle and spit twice a day as needed  Patient not taking: Reported on 2024 6/15/24   Sara Shine APRN - NP   tiotropium (SPIRIVA) 18 MCG inhalation capsule Inhale 1 capsule into the lungs daily 24  ProviderVíctor MD   Rosuvastatin Calcium (CRESTOR PO) Take by mouth    ProviderVíctor MD   hydrocortisone 1 % cream Apply topically 2 times daily Apply topically 2 times daily.    Provider, MD Víctor   OLANZapine (ZYPREXA) 20 MG tablet Take 1 tablet by mouth in the morning and at bedtime 7/10/23   Panfilo Currie MD   levothyroxine (SYNTHROID) 100 MCG tablet Take 1 tablet by mouth Daily    ProviderVíctor MD   hydrOXYzine HCl (ATARAX) 25 MG tablet Take 1 tablet by mouth 3 times daily as needed for Anxiety    ProviderVíctor MD   apixaban (ELIQUIS) 5 MG TABS tablet Take 1 tablet by mouth 2 times daily H/o DVT  Patient not taking: Reported on 2025   Automatic Reconciliation, Ar   midodrine (PROAMATINE) 10 MG tablet Take 1 tablet by mouth 3 times daily (with meals) 23   Automatic Reconciliation, Ar   mirtazapine (REMERON) 45 MG tablet Take 1 tablet by mouth nightly 23   Automatic Reconciliation, Ar   venlafaxine (EFFEXOR XR) 150 MG extended release capsule Take 2 capsules by mouth daily 23   Automatic Reconciliation, Ar       Current medications:    No current facility-administered medications for this

## 2025-06-20 NOTE — PERIOP NOTE
Patient meets discharge criteria.  Patient provided with verbal and written discharge instructions.  Patient discharged by wheelchair with caregiver to transport home.

## 2025-06-20 NOTE — H&P
Update History & Physical for Anuradha Lawler      This is an update on the patient's History and Physical done today 06/20/25. The ECT procedure was reviewed with the patient and I examined the patient. There was no change. The procedure site was confirmed by the patient and me.       Plan: The risks, benefits, expected outcome, and alternative to the recommended procedure have been discussed with the patient. Patient understands and wants to proceed with the procedure.     Electronically signed by TANISHA KUMAR MD on 6/20/2025 at 8:13 AM

## 2025-06-20 NOTE — PROCEDURES
ECT OPERATIVE PROCEDURE NOTE         IDENTIFICATION:           Patient Name   Anuradha Lawler         Date of Birth  1960         [facility]  [id number]         Medical Record Number   [id number]             Age   62 y.o.         PCP  Cyndi Curtis MD         Admit date:  06/20/25           Room Number   PACU/PL  @ [facility]         Date of Service   06/20/25                         Electro Convulsive Therapy:  Treatment number 47         Maintenance ECT - YES   Anuradha Lawler was admitted to the PACU for ECT. Patient was medically cleared and NPO for procedure. Patient is NOT court mandated and gave informed consent for ECT treatment.       Patient received      Bilateral ECT - YES   Administered using the Thymatron System IV - [employer].      at 40 % Energy, under general anesthesia.   Anuradha Lawler with a good, well generalized seizure of 60 seconds on observation of the motor manifestations of the seizure. EEG duration was 79 secs.   Post-Ictal Suppression Index: 79.8 %.   Recovery was unremarkable and with no complications.   Rocuronium was given for skeletal muscle paralysis and reversal was done. Succinylcholine to be used for future procedures.   Change in Energy %:                 Anesthesia medications administered during procedure:   Brevital:  50 mg   Change for next treament:       Succinylcholine: 50 mg   Change for next treatment:        Propofol: mg   Glycopyrrolate:  mg   Labetalol:  mg   Esmolol:  mg   Lorazepam:  mg   Ketamine:  mg   Zofran:   mg     Toradol:  mg   Lidocaine:  mg   Caffeine Benzoate: mg               CSSRS  1/26/2023 7/8/2022         1) Within the past month, have you wished you were dead or wished you could go to sleep and not wake up?   No  No     2) Have you actually had any thoughts of killing yourself?  No  No     6) Have you ever done anything, started to do anything, or prepared to do anything to end your life?  Yes  No         Did this occur within

## 2025-06-20 NOTE — DISCHARGE INSTRUCTIONS
ECT DISCHARGE INSTRUCTIONS      NAME: Anuradha Lawler   :  1960   MRN:  [id number]     Date/Time:  2025 8:44 AM    ADMIT DATE: 2025     DISCHARGE DATE: 2025     DISCHARGE DIAGNOSIS:  [unfilled]     Recommended diet:  As tolerated.     Recommended activity:      Have a responsible person stay with the patient for 24 hours after the treatment, some temporary confusion may be noticed.    Do not allow a patient to operate a motor vehicle for at least 24 hours and all the confusion has cleared.    Do not drink alcoholic beverages for 48 hours past treatment.    Do not sign any legal documents.    Do not make any critical decisions for 24 hours.    Advance diet as tolerated. Start with liquids and advance it nausea is not present.    Understand that memory for recent events, phone numbers, addresses, ect. May be decreased for a short period of time.     Report any persistant nausea or pain to the treating physician.    Patient receiving ECT while in the hospital should also not attempt to ambulate without assistance, please use tap bell which will be provide.    If you experience any of the following symptoms then please call your primary care physician/outpatient psychiatrist or return to the emergency room if you cannot get hold of your doctor: Fever, chills, nausea, vomiting, diarrhea, change in mentation, falling, bleeding, shortness of breath. Please call the emergency room at [facility] [phone] in this case.     Follow Up:  Continue outpatient psychiatric follow-up visits with your personal psychiatrist.         DISCHARGE SUMMARY from Nurse    PATIENT INSTRUCTIONS:    After general anesthesia or intravenous sedation, for 24 hours or while taking prescription Narcotics:  Limit your activities  Do not drive and operate hazardous machinery  Do not make important personal or business decisions  Do  not drink alcoholic beverages  If you have not

## 2025-07-17 ENCOUNTER — ANESTHESIA EVENT (OUTPATIENT)
Facility: HOSPITAL | Age: 65
End: 2025-07-17
Payer: MEDICARE

## 2025-07-17 RX ORDER — SODIUM CHLORIDE 0.9 % (FLUSH) 0.9 %
5-40 SYRINGE (ML) INJECTION PRN
Status: CANCELLED | OUTPATIENT
Start: 2025-07-17

## 2025-07-17 RX ORDER — SODIUM CHLORIDE 9 MG/ML
INJECTION, SOLUTION INTRAVENOUS PRN
Status: CANCELLED | OUTPATIENT
Start: 2025-07-17

## 2025-07-17 RX ORDER — SODIUM CHLORIDE 0.9 % (FLUSH) 0.9 %
5-40 SYRINGE (ML) INJECTION EVERY 12 HOURS SCHEDULED
Status: CANCELLED | OUTPATIENT
Start: 2025-07-17

## 2025-07-18 ENCOUNTER — HOSPITAL ENCOUNTER (OUTPATIENT)
Facility: HOSPITAL | Age: 65
Setting detail: OUTPATIENT SURGERY
Discharge: HOME | End: 2025-07-18
Attending: PSYCHIATRY & NEUROLOGY | Admitting: PSYCHIATRY & NEUROLOGY
Payer: MEDICARE

## 2025-07-18 ENCOUNTER — ANESTHESIA (OUTPATIENT)
Facility: HOSPITAL | Age: 65
End: 2025-07-18
Payer: MEDICARE

## 2025-07-18 VITALS
WEIGHT: 172 LBS | OXYGEN SATURATION: 99 % | DIASTOLIC BLOOD PRESSURE: 61 MMHG | SYSTOLIC BLOOD PRESSURE: 111 MMHG | BODY MASS INDEX: 30.48 KG/M2 | TEMPERATURE: 98 F | HEART RATE: 88 BPM | RESPIRATION RATE: 15 BRPM | HEIGHT: 63 IN

## 2025-07-18 PROCEDURE — 7100000001 HC PACU RECOVERY - ADDTL 15 MIN: Performed by: PSYCHIATRY & NEUROLOGY

## 2025-07-18 PROCEDURE — 90870 ELECTROCONVULSIVE THERAPY: CPT | Performed by: PSYCHIATRY & NEUROLOGY

## 2025-07-18 PROCEDURE — 7100000000 HC PACU RECOVERY - FIRST 15 MIN: Performed by: PSYCHIATRY & NEUROLOGY

## 2025-07-18 PROCEDURE — 7100000010 HC PHASE II RECOVERY - FIRST 15 MIN: Performed by: PSYCHIATRY & NEUROLOGY

## 2025-07-18 PROCEDURE — 3700000000 HC ANESTHESIA ATTENDED CARE: Performed by: PSYCHIATRY & NEUROLOGY

## 2025-07-18 PROCEDURE — 2709999900 HC NON-CHARGEABLE SUPPLY: Performed by: PSYCHIATRY & NEUROLOGY

## 2025-07-18 PROCEDURE — 7100000011 HC PHASE II RECOVERY - ADDTL 15 MIN: Performed by: PSYCHIATRY & NEUROLOGY

## 2025-07-18 RX ORDER — SODIUM CHLORIDE, SODIUM LACTATE, POTASSIUM CHLORIDE, CALCIUM CHLORIDE 600; 310; 30; 20 MG/100ML; MG/100ML; MG/100ML; MG/100ML
INJECTION, SOLUTION INTRAVENOUS CONTINUOUS
Status: DISCONTINUED | OUTPATIENT
Start: 2025-07-18 | End: 2025-07-18 | Stop reason: HOSPADM

## 2025-07-18 RX ORDER — SODIUM CHLORIDE 0.9 % (FLUSH) 0.9 %
5-40 SYRINGE (ML) INJECTION EVERY 12 HOURS SCHEDULED
Status: DISCONTINUED | OUTPATIENT
Start: 2025-07-18 | End: 2025-07-18 | Stop reason: HOSPADM

## 2025-07-18 RX ORDER — SODIUM CHLORIDE 9 MG/ML
INJECTION, SOLUTION INTRAVENOUS PRN
Status: DISCONTINUED | OUTPATIENT
Start: 2025-07-18 | End: 2025-07-18 | Stop reason: HOSPADM

## 2025-07-18 RX ORDER — SUCCINYLCHOLINE/SOD CL,ISO/PF 100 MG/5ML
SYRINGE (ML) INTRAVENOUS
Status: DISCONTINUED | OUTPATIENT
Start: 2025-07-18 | End: 2025-07-18 | Stop reason: SDUPTHER

## 2025-07-18 RX ORDER — SODIUM CHLORIDE 0.9 % (FLUSH) 0.9 %
5-40 SYRINGE (ML) INJECTION PRN
Status: DISCONTINUED | OUTPATIENT
Start: 2025-07-18 | End: 2025-07-18 | Stop reason: HOSPADM

## 2025-07-18 RX ADMIN — Medication 50 MG: at 08:39

## 2025-07-18 ASSESSMENT — PAIN - FUNCTIONAL ASSESSMENT
PAIN_FUNCTIONAL_ASSESSMENT: NONE - DENIES PAIN
PAIN_FUNCTIONAL_ASSESSMENT: 0-10

## 2025-07-18 ASSESSMENT — PAIN SCALES - GENERAL: PAINLEVEL_OUTOF10: 0

## 2025-07-18 NOTE — DISCHARGE INSTRUCTIONS
ECT DISCHARGE INSTRUCTIONS      NAME: Anuradha Lawler   :  1960   MRN:  [id number]     Date/Time:  2025 8:43 AM    ADMIT DATE: 2025     DISCHARGE DATE: 2025     DISCHARGE DIAGNOSIS:  Treatment resistant depression    Recommended diet:  As tolerated.     Recommended activity:      Have a responsible person stay with the patient for 24 hours after the treatment, some temporary confusion may be noticed.    Do not allow a patient to operate a motor vehicle for at least 24 hours and all the confusion has cleared.    Do not drink alcoholic beverages for 48 hours past treatment.    Do not sign any legal documents.    Do not make any critical decisions for 24 hours.    Advance diet as tolerated. Start with liquids and advance it nausea is not present.    Understand that memory for recent events, phone numbers, addresses, ect. May be decreased for a short period of time.     Report any persistant nausea or pain to the treating physician.    Patient receiving ECT while in the hospital should also not attempt to ambulate without assistance, please use tap bell which will be provide.    If you experience any of the following symptoms then please call your primary care physician/outpatient psychiatrist or return to the emergency room if you cannot get hold of your doctor: Fever, chills, nausea, vomiting, diarrhea, change in mentation, falling, bleeding, shortness of breath. Please call the emergency room at [facility] [phone] in this case.     Follow Up:  Continue outpatient psychiatric follow-up visits with your personal psychiatrist.

## 2025-07-18 NOTE — ANESTHESIA PRE PROCEDURE
Unspecified adverse effect of anesthesia     nasal growth and misalignment and cannot breath through nose       Past Surgical History:        Procedure Laterality Date   • ELECTROCONVULSIVE THERAPY N/A 5/19/2023    ELECTROCONVULSIVE THERAPY performed by Yolanda Naranjo MD at [facility] MAIN OR   • ELECTROCONVULSIVE THERAPY N/A 6/2/2023    ELECTROCONVULSIVE THERAPY performed by Yolanda Naranjo MD at [facility] MAIN OR   • ELECTROCONVULSIVE THERAPY  6/30/2023   • ELECTROCONVULSIVE THERAPY N/A 6/30/2023    ELECTROCONVULSIVE THERAPY performed by Yolanda Naranjo MD at [facility] MAIN OR   • ELECTROCONVULSIVE THERAPY  07/28/2023   • ELECTROCONVULSIVE THERAPY N/A 07/28/2023    ELECTROCONVULSIVE THERAPY performed by Yolanda Naranjo MD at [facility] MAIN OR   • ELECTROCONVULSIVE THERAPY  8/2/2023   • ELECTROCONVULSIVE THERAPY N/A 8/2/2023    ELECTROCONVULSIVE THERAPY performed by Yolanda Naranjo MD at [facility] MAIN OR   • ELECTROCONVULSIVE THERAPY  8/11/2023   • ELECTROCONVULSIVE THERAPY N/A 8/11/2023    ELECTROCONVULSIVE THERAPY performed by Yolanda Naranjo MD at [facility] MAIN OR   • ELECTROCONVULSIVE THERAPY  8/18/2023   • ELECTROCONVULSIVE THERAPY N/A 8/18/2023    ELECTROCONVULSIVE THERAPY performed by Yolanda Naranjo MD at [facility] MAIN OR   • ELECTROCONVULSIVE THERAPY  8/25/2023   • ELECTROCONVULSIVE THERAPY N/A 8/25/2023    ELECTROCONVULSIVE THERAPY performed by Yolanda Naranjo MD at [facility] MAIN OR   • ELECTROCONVULSIVE THERAPY  9/1/2023   • ELECTROCONVULSIVE THERAPY N/A 9/1/2023    ELECTROCONVULSIVE THERAPY performed by Yolanda Naranjo MD at [facility] MAIN OR   • ELECTROCONVULSIVE THERAPY  9/8/2023   • ELECTROCONVULSIVE THERAPY N/A 9/8/2023    ELECTROCONVULSIVE THERAPY performed by Yolanda Naranjo MD at [facility] MAIN OR   • ELECTROCONVULSIVE THERAPY  9/22/2023   • ELECTROCONVULSIVE THERAPY N/A 9/22/2023    ELECTROCONVULSIVE THERAPY performed by Yolanda Naranjo MD at [facility] MAIN OR   • ELECTROCONVULSIVE THERAPY  10/6/2023   • ELECTROCONVULSIVE THERAPY N/A 10/6/2023    ELECTROCONVULSIVE THERAPY    TempSrc: Temporal   SpO2: 98%   Weight: 78 kg (172 lb)   Height: 1.6 m (5' 3\")                                              BP Readings from Last 3 Encounters:   07/18/25 127/84   06/20/25 119/70   05/23/25 108/70       NPO Status: Time of last liquid consumption: 1730                        Time of last solid consumption: 1730                        Date of last liquid consumption: 07/17/25                        Date of last solid food consumption: 07/17/25    BMI:   Wt Readings from Last 3 Encounters:   07/18/25 78 kg (172 lb)   05/23/25 78 kg (172 lb)   04/11/25 79.4 kg (175 lb)     Body mass index is 30.47 kg/m².    CBC:   Lab Results   Component Value Date/Time    WBC 5.5 05/17/2024 08:29 AM    RBC 4.37 05/17/2024 08:29 AM    HGB 12.1 05/17/2024 08:29 AM    HCT 38.3 05/17/2024 08:29 AM    MCV 87.6 05/17/2024 08:29 AM    RDW 13.3 05/17/2024 08:29 AM     05/17/2024 08:29 AM       CMP:   Lab Results   Component Value Date/Time     05/17/2024 08:29 AM    K 3.9 05/17/2024 08:29 AM     05/17/2024 08:29 AM    CO2 27 05/17/2024 08:29 AM    BUN 13 05/17/2024 08:29 AM    CREATININE 1.04 05/17/2024 08:29 AM    GFRAA 60 07/08/2022 10:33 PM    AGRATIO 0.8 02/17/2023 05:34 AM    LABGLOM 60 05/17/2024 08:29 AM    LABGLOM >60 02/17/2023 05:34 AM    GLUCOSE 97 05/17/2024 08:29 AM    CALCIUM 8.5 05/17/2024 08:29 AM    BILITOT 0.2 05/17/2024 08:29 AM    ALKPHOS 88 05/17/2024 08:29 AM    ALKPHOS 71 02/17/2023 05:34 AM    AST 17 05/17/2024 08:29 AM    ALT 29 05/17/2024 08:29 AM       POC Tests: No results for input(s): \"POCGLU\", \"POCNA\", \"POCK\", \"POCCL\", \"POCBUN\", \"POCHEMO\", \"POCHCT\" in the last 72 hours.    Coags:   Lab Results   Component Value Date/Time    PROTIME 10.5 05/25/2021 02:17 AM    INR 1.0 05/25/2021 02:17 AM    APTT 21.5 05/25/2021 02:17 AM       HCG (If Applicable): No results found for: \"PREGTESTUR\", \"PREGSERUM\", \"HCG\", \"HCGQUANT\"     ABGs: No results found for: \"PHART\", \"PO2ART\", \"EQU0BDG\",

## 2025-07-18 NOTE — H&P
PDMP reviewed; no aberrant behavior identified, prescription authorized.     Update History & Physical for Anuradha Lawler      This is an update on the patient's History and Physical done today 07/18/25. The ECT procedure was reviewed with the patient and I examined the patient. There was no change. The procedure site was confirmed by the patient and me.       Plan: The risks, benefits, expected outcome, and alternative to the recommended procedure have been discussed with the patient. Patient understands and wants to proceed with the procedure.     Electronically signed by Devin Higgins MD on 7/18/2025 at 8:34 AM

## 2025-07-18 NOTE — PROCEDURES
ECT OPERATIVE PROCEDURE NOTE         IDENTIFICATION:           Patient Name   Anuradha Lawler         Date of Birth  1960         [facility]  [id number]         Medical Record Number   [id number]             Age   62 y.o.         PCP  Cyndi Curtis MD         Admit date:  07/18/25           Room Number   PACU/PL  @ [facility]         Date of Service   07/18/25                         Electro Convulsive Therapy:  Treatment number 48         Maintenance ECT - YES   Anuradha Lawler was admitted to the PACU for ECT. Patient was medically cleared and NPO for procedure. Patient is NOT court mandated and gave informed consent for ECT treatment.       Patient received      Bilateral ECT - YES   Administered using the Thymatron System IV - [employer].      at 40 % Energy, under general anesthesia.   Anuradha Lawler with a good, well generalized seizure of 47 seconds on observation of the motor manifestations of the seizure. EEG duration was 81 secs.   Post-Ictal Suppression Index: 65.9 %.   Recovery was unremarkable and with no complications.   Rocuronium was given for skeletal muscle paralysis and reversal was done. Succinylcholine to be used for future procedures.   Change in Energy %:                 Anesthesia medications administered during procedure:   Brevital:  50 mg   Change for next treament:       Succinylcholine: 50 mg   Change for next treatment:        Propofol: mg   Glycopyrrolate:  mg   Labetalol:  mg   Esmolol:  mg   Lorazepam:  mg   Ketamine:  mg   Zofran:   mg     Toradol:  mg   Lidocaine:  mg   Caffeine Benzoate: mg               CSSRS  1/26/2023 7/8/2022         1) Within the past month, have you wished you were dead or wished you could go to sleep and not wake up?   No  No     2) Have you actually had any thoughts of killing yourself?  No  No     6) Have you ever done anything, started to do anything, or prepared to do anything to end your life?  Yes  No         Did this occur within

## 2025-08-15 ENCOUNTER — ANESTHESIA EVENT (OUTPATIENT)
Facility: HOSPITAL | Age: 65
End: 2025-08-15
Payer: MEDICARE

## 2025-08-15 ENCOUNTER — ANESTHESIA (OUTPATIENT)
Facility: HOSPITAL | Age: 65
End: 2025-08-15
Payer: MEDICARE

## 2025-08-15 ENCOUNTER — HOSPITAL ENCOUNTER (OUTPATIENT)
Facility: HOSPITAL | Age: 65
Setting detail: OUTPATIENT SURGERY
Discharge: HOME | End: 2025-08-15
Attending: PSYCHIATRY & NEUROLOGY | Admitting: PSYCHIATRY & NEUROLOGY
Payer: MEDICARE

## 2025-08-15 VITALS
SYSTOLIC BLOOD PRESSURE: 124 MMHG | HEIGHT: 63 IN | WEIGHT: 172 LBS | HEART RATE: 88 BPM | RESPIRATION RATE: 15 BRPM | DIASTOLIC BLOOD PRESSURE: 77 MMHG | OXYGEN SATURATION: 94 % | TEMPERATURE: 97.9 F | BODY MASS INDEX: 30.48 KG/M2

## 2025-08-15 PROCEDURE — 2709999900 HC NON-CHARGEABLE SUPPLY: Performed by: PSYCHIATRY & NEUROLOGY

## 2025-08-15 PROCEDURE — 7100000001 HC PACU RECOVERY - ADDTL 15 MIN: Performed by: PSYCHIATRY & NEUROLOGY

## 2025-08-15 PROCEDURE — 7100000010 HC PHASE II RECOVERY - FIRST 15 MIN: Performed by: PSYCHIATRY & NEUROLOGY

## 2025-08-15 PROCEDURE — 90870 ELECTROCONVULSIVE THERAPY: CPT | Performed by: PSYCHIATRY & NEUROLOGY

## 2025-08-15 PROCEDURE — 2500000003 HC RX 250 WO HCPCS: Performed by: ANESTHESIOLOGY

## 2025-08-15 PROCEDURE — 3700000000 HC ANESTHESIA ATTENDED CARE: Performed by: PSYCHIATRY & NEUROLOGY

## 2025-08-15 PROCEDURE — 7100000000 HC PACU RECOVERY - FIRST 15 MIN: Performed by: PSYCHIATRY & NEUROLOGY

## 2025-08-15 PROCEDURE — 6360000002 HC RX W HCPCS: Performed by: ANESTHESIOLOGY

## 2025-08-15 RX ORDER — SODIUM CHLORIDE 0.9 % (FLUSH) 0.9 %
5-40 SYRINGE (ML) INJECTION PRN
Status: DISCONTINUED | OUTPATIENT
Start: 2025-08-15 | End: 2025-08-15 | Stop reason: HOSPADM

## 2025-08-15 RX ORDER — SODIUM CHLORIDE 9 MG/ML
INJECTION, SOLUTION INTRAVENOUS PRN
Status: DISCONTINUED | OUTPATIENT
Start: 2025-08-15 | End: 2025-08-15 | Stop reason: HOSPADM

## 2025-08-15 RX ORDER — HYDRALAZINE HYDROCHLORIDE 20 MG/ML
10 INJECTION INTRAMUSCULAR; INTRAVENOUS
Status: DISCONTINUED | OUTPATIENT
Start: 2025-08-15 | End: 2025-08-15 | Stop reason: HOSPADM

## 2025-08-15 RX ORDER — ONDANSETRON 2 MG/ML
4 INJECTION INTRAMUSCULAR; INTRAVENOUS
Status: DISCONTINUED | OUTPATIENT
Start: 2025-08-15 | End: 2025-08-15 | Stop reason: HOSPADM

## 2025-08-15 RX ORDER — SUCCINYLCHOLINE/SOD CL,ISO/PF 100 MG/5ML
SYRINGE (ML) INTRAVENOUS
Status: DISCONTINUED | OUTPATIENT
Start: 2025-08-15 | End: 2025-08-15 | Stop reason: SDUPTHER

## 2025-08-15 RX ORDER — SODIUM CHLORIDE 0.9 % (FLUSH) 0.9 %
5-40 SYRINGE (ML) INJECTION EVERY 12 HOURS SCHEDULED
Status: DISCONTINUED | OUTPATIENT
Start: 2025-08-15 | End: 2025-08-15 | Stop reason: HOSPADM

## 2025-08-15 RX ORDER — SODIUM CHLORIDE, SODIUM LACTATE, POTASSIUM CHLORIDE, CALCIUM CHLORIDE 600; 310; 30; 20 MG/100ML; MG/100ML; MG/100ML; MG/100ML
INJECTION, SOLUTION INTRAVENOUS CONTINUOUS
Status: DISCONTINUED | OUTPATIENT
Start: 2025-08-15 | End: 2025-08-15 | Stop reason: HOSPADM

## 2025-08-15 RX ADMIN — Medication 50 MG: at 09:08

## 2025-08-15 ASSESSMENT — PAIN - FUNCTIONAL ASSESSMENT
PAIN_FUNCTIONAL_ASSESSMENT: 0-10
PAIN_FUNCTIONAL_ASSESSMENT: ADULT NONVERBAL PAIN SCALE (NPVS)
PAIN_FUNCTIONAL_ASSESSMENT: 0-10
PAIN_FUNCTIONAL_ASSESSMENT: ADULT NONVERBAL PAIN SCALE (NPVS)
PAIN_FUNCTIONAL_ASSESSMENT: ADULT NONVERBAL PAIN SCALE (NPVS)
PAIN_FUNCTIONAL_ASSESSMENT: 0-10
PAIN_FUNCTIONAL_ASSESSMENT: ADULT NONVERBAL PAIN SCALE (NPVS)
PAIN_FUNCTIONAL_ASSESSMENT: ADULT NONVERBAL PAIN SCALE (NPVS)

## (undated) DEVICE — ELECTRODE EKG PED PREGELLED FOAM

## (undated) DEVICE — GEL ELECTRD 8.5OZ HIGHLY COND BACTSTAT H2O SOL NONSTAINING

## (undated) DEVICE — GEL ELECTRD MULT PURP 8OZ TUBE --